# Patient Record
Sex: MALE | Race: WHITE | NOT HISPANIC OR LATINO | Employment: OTHER | URBAN - METROPOLITAN AREA
[De-identification: names, ages, dates, MRNs, and addresses within clinical notes are randomized per-mention and may not be internally consistent; named-entity substitution may affect disease eponyms.]

---

## 2020-06-03 ENCOUNTER — OFFICE VISIT (OUTPATIENT)
Dept: FAMILY MEDICINE CLINIC | Facility: CLINIC | Age: 80
End: 2020-06-03

## 2020-06-03 VITALS
HEIGHT: 69 IN | TEMPERATURE: 98 F | BODY MASS INDEX: 29.77 KG/M2 | SYSTOLIC BLOOD PRESSURE: 144 MMHG | HEART RATE: 60 BPM | OXYGEN SATURATION: 95 % | DIASTOLIC BLOOD PRESSURE: 66 MMHG | WEIGHT: 201 LBS | RESPIRATION RATE: 16 BRPM

## 2020-06-03 DIAGNOSIS — E03.9 ACQUIRED HYPOTHYROIDISM: ICD-10-CM

## 2020-06-03 DIAGNOSIS — E78.2 MIXED HYPERLIPIDEMIA: ICD-10-CM

## 2020-06-03 DIAGNOSIS — I10 ESSENTIAL HYPERTENSION: ICD-10-CM

## 2020-06-03 DIAGNOSIS — I63.9 CEREBROVASCULAR ACCIDENT (CVA), UNSPECIFIED MECHANISM (HCC): ICD-10-CM

## 2020-06-03 DIAGNOSIS — E11.9 TYPE 2 DIABETES MELLITUS WITHOUT COMPLICATION, WITHOUT LONG-TERM CURRENT USE OF INSULIN (HCC): Primary | ICD-10-CM

## 2020-06-03 DIAGNOSIS — K21.9 GASTROESOPHAGEAL REFLUX DISEASE, ESOPHAGITIS PRESENCE NOT SPECIFIED: ICD-10-CM

## 2020-06-03 PROBLEM — J30.1 SEASONAL ALLERGIC RHINITIS DUE TO POLLEN: Status: ACTIVE | Noted: 2020-06-03

## 2020-06-03 PROBLEM — R01.1 HEART MURMUR: Status: ACTIVE | Noted: 2020-06-03

## 2020-06-03 PROCEDURE — 99204 OFFICE O/P NEW MOD 45 MIN: CPT | Performed by: FAMILY MEDICINE

## 2020-06-03 RX ORDER — PANTOPRAZOLE SODIUM 40 MG/1
40 TABLET, DELAYED RELEASE ORAL DAILY
Qty: 90 TABLET | Refills: 1 | Status: SHIPPED | OUTPATIENT
Start: 2020-06-03 | End: 2020-11-11

## 2020-06-03 RX ORDER — AMLODIPINE BESYLATE 10 MG/1
10 TABLET ORAL DAILY
Qty: 90 TABLET | Refills: 1 | Status: SHIPPED | OUTPATIENT
Start: 2020-06-03 | End: 2020-10-28

## 2020-06-03 RX ORDER — AMLODIPINE BESYLATE 10 MG/1
10 TABLET ORAL DAILY
COMMUNITY
End: 2020-06-03 | Stop reason: SDUPTHER

## 2020-06-03 RX ORDER — PANTOPRAZOLE SODIUM 40 MG/1
40 TABLET, DELAYED RELEASE ORAL DAILY
COMMUNITY
End: 2020-06-03 | Stop reason: SDUPTHER

## 2020-06-03 RX ORDER — CLOPIDOGREL BISULFATE 75 MG/1
75 TABLET ORAL DAILY
COMMUNITY
End: 2020-06-03 | Stop reason: SDUPTHER

## 2020-06-03 RX ORDER — FLUTICASONE PROPIONATE 50 MCG
2 SPRAY, SUSPENSION (ML) NASAL DAILY PRN
COMMUNITY

## 2020-06-03 RX ORDER — LEVOTHYROXINE SODIUM 0.07 MG/1
75 TABLET ORAL DAILY
COMMUNITY
End: 2020-07-13 | Stop reason: SDUPTHER

## 2020-06-03 RX ORDER — EMPAGLIFLOZIN 10 MG/1
TABLET, FILM COATED ORAL
COMMUNITY
Start: 2020-05-19 | End: 2020-06-03 | Stop reason: SDUPTHER

## 2020-06-03 RX ORDER — ATORVASTATIN CALCIUM 40 MG/1
40 TABLET, FILM COATED ORAL NIGHTLY
COMMUNITY
End: 2020-09-17 | Stop reason: SDUPTHER

## 2020-06-03 RX ORDER — LOSARTAN POTASSIUM 50 MG/1
50 TABLET ORAL DAILY
COMMUNITY
End: 2020-07-13 | Stop reason: SDUPTHER

## 2020-06-03 RX ORDER — CLOPIDOGREL BISULFATE 75 MG/1
75 TABLET ORAL DAILY
Qty: 90 TABLET | Refills: 1 | Status: SHIPPED | OUTPATIENT
Start: 2020-06-03 | End: 2020-11-11

## 2020-06-03 RX ORDER — EMPAGLIFLOZIN 10 MG/1
10 TABLET, FILM COATED ORAL DAILY
Qty: 90 TABLET | Refills: 1 | Status: SHIPPED | OUTPATIENT
Start: 2020-06-03 | End: 2020-10-28

## 2020-06-03 NOTE — PROGRESS NOTES
Subjective   Chief Complaint   Patient presents with   • Establish Care   • Med Refill   • Diabetes   • GI Problem   • Heartburn   • Hyperlipidemia   • Hypertension   • Hypothyroidism     Alen Ratliff is a 79 y.o. male.     Patient Care Team:  Serenity Wren MD as PCP - General (Family Medicine)    He is coming in today to get established and he wants to review his medical problems and his medications.  He reports that him and his wife moved to this area about a month ago from [place] as their daughter lives in this area.  He has been treated for several medical problems and he is on several different medications.  We are addressing today his hypertension, hypothyroidism, hyperlipidemia, diabetes, acid reflux, and his prior stroke.  He reports that he had a stroke several years ago and at that time he was seen and evaluated by a neurologist.  He has been on Plavix since then.  He tells me that due to his stroke he has been followed by cardiologist and neurologist while living in [place] and he would like to get some refills today.  He was able to provide a copy of his blood work results from February of this year, which was done by his previous medical doctor.  He denies any polyuria polydipsia.  He reports to be up to speed with his colonoscopy as well. Patient denies any chest pain, shortness of breath, dizziness, nausea, vomiting, or diarrhea. No visual issues reported. No headaches, numbness or tingling. No urinary issues. Patient has good appetite and denies any weight changes. No swelling reported. No emotional issues.         The following portions of the patient's history were reviewed and updated as appropriate: allergies, current medications, past family history, past medical history, past social history, past surgical history and problem list.  Past Medical History:   Diagnosis Date   • Allergic rhinitis    • Diabetes (CMS/[facility])    • GERD (gastroesophageal reflux disease)    • Hyperlipidemia     • Hypertension    • Hypothyroidism    • Prostate cancer (CMS/HCC)    • Stroke (CMS/HCC)      Past Surgical History:   Procedure Laterality Date   • BACK SURGERY  1994   • PROSTATECTOMY  2001    due to cancer     The patient has a family history of  Family History   Problem Relation Age of Onset   • Hypertension Other      Social History     Socioeconomic History   • Marital status:      Spouse name: Not on file   • Number of children: Not on file   • Years of education: Not on file   • Highest education level: Not on file   Tobacco Use   • Smoking status: Never Smoker   • Smokeless tobacco: Never Used   Substance and Sexual Activity   • Alcohol use: Not Currently   • Drug use: Never   • Sexual activity: Defer       Review of Systems   Constitutional: Negative for activity change, appetite change, chills, fatigue, fever, unexpected weight gain and unexpected weight loss.   HENT: Negative for congestion, ear pain, hearing loss, nosebleeds, postnasal drip, swollen glands, tinnitus and trouble swallowing.    Eyes: Negative for blurred vision, double vision and visual disturbance.   Respiratory: Negative for cough, choking, chest tightness, shortness of breath, wheezing and stridor.    Cardiovascular: Negative for chest pain, palpitations and leg swelling.   Gastrointestinal: Negative for abdominal distention, abdominal pain, anal bleeding, constipation, diarrhea, nausea, vomiting and GERD.   Endocrine: Negative for cold intolerance, heat intolerance and polyphagia.   Genitourinary: Negative for dysuria, flank pain, frequency, hematuria and urgency.   Musculoskeletal: Negative for arthralgias, back pain, gait problem, joint swelling and neck pain.   Skin: Negative for color change, dry skin and skin lesions.   Allergic/Immunologic: Negative for environmental allergies and food allergies.   Neurological: Negative for dizziness, tremors, speech difficulty, light-headedness, numbness, headache and confusion.  "  Hematological: Negative for adenopathy. Does not bruise/bleed easily.   Psychiatric/Behavioral: Negative for decreased concentration, sleep disturbance, depressed mood and stress.     Visit Vitals  /66 (BP Location: Left arm, Patient Position: Sitting, Cuff Size: Large Adult)   Pulse 60   Temp 98 °F (36.7 °C) (Temporal)   Resp 16   Ht 174 cm (68.5\")   Wt 91.2 kg (201 lb)   SpO2 95%   BMI 30.12 kg/m²       Current Outpatient Medications:   •  amLODIPine (NORVASC) 10 MG tablet, Take 1 tablet by mouth Daily., Disp: 90 tablet, Rfl: 1  •  atorvastatin (LIPITOR) 40 MG tablet, Take 40 mg by mouth Every Night., Disp: , Rfl:   •  clopidogrel (PLAVIX) 75 MG tablet, Take 1 tablet by mouth Daily., Disp: 90 tablet, Rfl: 1  •  fluticasone (FLONASE) 50 MCG/ACT nasal spray, 2 sprays into the nostril(s) as directed by provider Daily., Disp: , Rfl:   •  glucose blood test strip, 1 each by Other route Daily. Use as instructed, Disp: , Rfl:   •  levothyroxine (SYNTHROID, LEVOTHROID) 75 MCG tablet, Take 75 mcg by mouth Daily., Disp: , Rfl:   •  losartan (COZAAR) 50 MG tablet, Take 50 mg by mouth Daily., Disp: , Rfl:   •  pantoprazole (PROTONIX) 40 MG EC tablet, Take 1 tablet by mouth Daily., Disp: 90 tablet, Rfl: 1  •  SITagliptin (JANUVIA) 50 MG tablet, Take 50 mg by mouth Daily., Disp: , Rfl:   •  JARDIANCE 10 MG tablet, Take 10 mg by mouth Daily., Disp: 90 tablet, Rfl: 1    Objective   Physical Exam   Constitutional: He is oriented to person, place, and time. He appears well-developed and well-nourished. No distress.   HENT:   Head: Normocephalic and atraumatic.   Right Ear: External ear normal.   Left Ear: External ear normal.   Mouth/Throat: Oropharynx is clear and moist.   Eyes: Pupils are equal, round, and reactive to light. Conjunctivae and EOM are normal. Right eye exhibits no discharge. Left eye exhibits no discharge. No scleral icterus.   Neck: Normal range of motion. Neck supple. No JVD present. No thyromegaly " present.   Cardiovascular: Normal rate, regular rhythm and intact distal pulses. Exam reveals no gallop and no friction rub.   Murmur heard.  Pulmonary/Chest: Effort normal and breath sounds normal. No respiratory distress. He has no wheezes. He has no rales.   Abdominal: Soft. Bowel sounds are normal. He exhibits no distension and no mass. There is no tenderness. There is no rebound and no guarding. No hernia.   Musculoskeletal: Normal range of motion. He exhibits no edema, tenderness or deformity.   Lymphadenopathy:     He has no cervical adenopathy.   Neurological: He is alert and oriented to person, place, and time. He displays normal reflexes. No cranial nerve deficit or sensory deficit.   Skin: Skin is warm and dry. Capillary refill takes less than 2 seconds. No rash noted. He is not diaphoretic. No erythema. No pallor.   Psychiatric: He has a normal mood and affect. His behavior is normal. Judgment normal.   Nursing note and vitals reviewed.            Advance Care Planning   ACP discussion was held with the patient during this visit. Patient has an advance directive in EMR which is still valid.       Assessment/Plan   Problems Addressed this Visit        Cardiovascular and Mediastinum    Stroke (cerebrum) (CMS/HCC)    Relevant Orders    Ambulatory Referral to Neurology    CBC Auto Differential    Essential hypertension    Relevant Medications    losartan (COZAAR) 50 MG tablet    amLODIPine (NORVASC) 10 MG tablet    Mixed hyperlipidemia    Relevant Medications    atorvastatin (LIPITOR) 40 MG tablet    Other Relevant Orders    Comprehensive Metabolic Panel    Lipid Panel       Digestive    GERD (gastroesophageal reflux disease)    Relevant Medications    pantoprazole (PROTONIX) 40 MG EC tablet       Endocrine    Acquired hypothyroidism    Relevant Medications    levothyroxine (SYNTHROID, LEVOTHROID) 75 MCG tablet    Other Relevant Orders    TSH    Type 2 diabetes mellitus, without long-term current use of  insulin (CMS/[facility]) - Primary    Relevant Medications    SITagliptin (JANUVIA) 50 MG tablet    JARDIANCE 10 MG tablet    Other Relevant Orders    Hemoglobin A1c    Microalbumin / Creatinine Urine Ratio - Urine, Clean Catch        Patient was evaluated in person in the office today.  I reviewed his medical problems and his medications.  I also was able to review some of his blood work results from February of this year, which he provided today.  His hemoglobin A1c then was 7.4, he tells me that his hemoglobin A1c was 8.1 in October 2019.  At this point I advised for him to continue his current medicines and refills were given.  I will be referring him to see the neurologist as well as he request this referral, however I am not sure what the neurologist will at to his care at this point besides surveillance.  He also signed a release form for us to request his old medical records.  Healthy lifestyle was discussed and reinforced.  I advised for him to schedule follow-up appointment with me in 3 months with fasting blood work prior to his appointment.  All questions were answered today to my best knowledge and his satisfaction.             Requested Prescriptions     Signed Prescriptions Disp Refills   • amLODIPine (NORVASC) 10 MG tablet 90 tablet 1     Sig: Take 1 tablet by mouth Daily.   • pantoprazole (PROTONIX) 40 MG EC tablet 90 tablet 1     Sig: Take 1 tablet by mouth Daily.   • clopidogrel (PLAVIX) 75 MG tablet 90 tablet 1     Sig: Take 1 tablet by mouth Daily.   • JARDIANCE 10 MG tablet 90 tablet 1     Sig: Take 10 mg by mouth Daily.

## 2020-07-13 RX ORDER — LOSARTAN POTASSIUM 50 MG/1
50 TABLET ORAL DAILY
Qty: 90 TABLET | Refills: 0 | Status: SHIPPED | OUTPATIENT
Start: 2020-07-13 | End: 2020-07-21 | Stop reason: SDUPTHER

## 2020-07-13 RX ORDER — LEVOTHYROXINE SODIUM 0.07 MG/1
75 TABLET ORAL DAILY
Qty: 90 TABLET | Refills: 0 | Status: SHIPPED | OUTPATIENT
Start: 2020-07-13 | End: 2020-07-21 | Stop reason: SDUPTHER

## 2020-07-21 ENCOUNTER — TELEPHONE (OUTPATIENT)
Dept: FAMILY MEDICINE CLINIC | Facility: CLINIC | Age: 80
End: 2020-07-21

## 2020-07-21 RX ORDER — LEVOTHYROXINE SODIUM 0.07 MG/1
75 TABLET ORAL DAILY
Qty: 90 TABLET | Refills: 1 | Status: SHIPPED | OUTPATIENT
Start: 2020-07-21 | End: 2020-11-30

## 2020-07-21 RX ORDER — LOSARTAN POTASSIUM 50 MG/1
50 TABLET ORAL DAILY
Qty: 90 TABLET | Refills: 1 | Status: SHIPPED | OUTPATIENT
Start: 2020-07-21 | End: 2020-11-30

## 2020-07-21 NOTE — TELEPHONE ENCOUNTER
Patients wife called and patients 2 medications (Levothyroxine and Losartan) were suppose to be sent to [facility] , they only use [facility] when is something to be taken for a month.

## 2020-08-13 ENCOUNTER — TELEPHONE (OUTPATIENT)
Dept: FAMILY MEDICINE CLINIC | Facility: CLINIC | Age: 80
End: 2020-08-13

## 2020-08-27 ENCOUNTER — LAB (OUTPATIENT)
Dept: LAB | Facility: HOSPITAL | Age: 80
End: 2020-08-27

## 2020-08-27 LAB
ALBUMIN SERPL-MCNC: 4.3 G/DL (ref 3.5–5.2)
ALBUMIN UR-MCNC: 5.5 MG/DL
ALBUMIN/GLOB SERPL: 1.5 G/DL
ALP SERPL-CCNC: 64 U/L (ref 39–117)
ALT SERPL W P-5'-P-CCNC: 19 U/L (ref 1–41)
ANION GAP SERPL CALCULATED.3IONS-SCNC: 12.7 MMOL/L (ref 5–15)
AST SERPL-CCNC: 19 U/L (ref 1–40)
BASOPHILS # BLD AUTO: 0.06 10*3/MM3 (ref 0–0.2)
BASOPHILS NFR BLD AUTO: 0.9 % (ref 0–1.5)
BILIRUB SERPL-MCNC: 0.4 MG/DL (ref 0–1.2)
BUN SERPL-MCNC: 30 MG/DL (ref 8–23)
BUN/CREAT SERPL: 21.3 (ref 7–25)
CALCIUM SPEC-SCNC: 9.5 MG/DL (ref 8.6–10.5)
CHLORIDE SERPL-SCNC: 103 MMOL/L (ref 98–107)
CHOLEST SERPL-MCNC: 131 MG/DL (ref 0–200)
CO2 SERPL-SCNC: 23.3 MMOL/L (ref 22–29)
CREAT SERPL-MCNC: 1.41 MG/DL (ref 0.76–1.27)
CREAT UR-MCNC: 66.2 MG/DL
DEPRECATED RDW RBC AUTO: 46 FL (ref 37–54)
EOSINOPHIL # BLD AUTO: 0.09 10*3/MM3 (ref 0–0.4)
EOSINOPHIL NFR BLD AUTO: 1.4 % (ref 0.3–6.2)
ERYTHROCYTE [DISTWIDTH] IN BLOOD BY AUTOMATED COUNT: 13.5 % (ref 12.3–15.4)
GFR SERPL CREATININE-BSD FRML MDRD: 48 ML/MIN/1.73
GLOBULIN UR ELPH-MCNC: 2.8 GM/DL
GLUCOSE SERPL-MCNC: 139 MG/DL (ref 65–99)
HBA1C MFR BLD: 7.1 % (ref 3.5–5.6)
HCT VFR BLD AUTO: 44.7 % (ref 37.5–51)
HDLC SERPL-MCNC: 37 MG/DL (ref 40–60)
HGB BLD-MCNC: 14.7 G/DL (ref 13–17.7)
IMM GRANULOCYTES # BLD AUTO: 0.02 10*3/MM3 (ref 0–0.05)
IMM GRANULOCYTES NFR BLD AUTO: 0.3 % (ref 0–0.5)
LDLC SERPL CALC-MCNC: 76 MG/DL (ref 0–100)
LDLC/HDLC SERPL: 2.05 {RATIO}
LYMPHOCYTES # BLD AUTO: 1.8 10*3/MM3 (ref 0.7–3.1)
LYMPHOCYTES NFR BLD AUTO: 27.8 % (ref 19.6–45.3)
MCH RBC QN AUTO: 30.4 PG (ref 26.6–33)
MCHC RBC AUTO-ENTMCNC: 32.9 G/DL (ref 31.5–35.7)
MCV RBC AUTO: 92.4 FL (ref 79–97)
MICROALBUMIN/CREAT UR: 83.1 MG/G
MONOCYTES # BLD AUTO: 0.73 10*3/MM3 (ref 0.1–0.9)
MONOCYTES NFR BLD AUTO: 11.3 % (ref 5–12)
NEUTROPHILS NFR BLD AUTO: 3.78 10*3/MM3 (ref 1.7–7)
NEUTROPHILS NFR BLD AUTO: 58.3 % (ref 42.7–76)
NRBC BLD AUTO-RTO: 0 /100 WBC (ref 0–0.2)
PLATELET # BLD AUTO: 247 10*3/MM3 (ref 140–450)
PMV BLD AUTO: 10.8 FL (ref 6–12)
POTASSIUM SERPL-SCNC: 4.6 MMOL/L (ref 3.5–5.2)
PROT SERPL-MCNC: 7.1 G/DL (ref 6–8.5)
RBC # BLD AUTO: 4.84 10*6/MM3 (ref 4.14–5.8)
SODIUM SERPL-SCNC: 139 MMOL/L (ref 136–145)
TRIGL SERPL-MCNC: 91 MG/DL (ref 0–150)
TSH SERPL DL<=0.05 MIU/L-ACNC: 3.04 UIU/ML (ref 0.27–4.2)
VLDLC SERPL-MCNC: 18.2 MG/DL (ref 5–40)
WBC # BLD AUTO: 6.48 10*3/MM3 (ref 3.4–10.8)

## 2020-08-27 PROCEDURE — 36415 COLL VENOUS BLD VENIPUNCTURE: CPT | Performed by: FAMILY MEDICINE

## 2020-08-27 PROCEDURE — 82043 UR ALBUMIN QUANTITATIVE: CPT | Performed by: FAMILY MEDICINE

## 2020-08-27 PROCEDURE — 84443 ASSAY THYROID STIM HORMONE: CPT | Performed by: FAMILY MEDICINE

## 2020-08-27 PROCEDURE — 85025 COMPLETE CBC W/AUTO DIFF WBC: CPT | Performed by: FAMILY MEDICINE

## 2020-08-27 PROCEDURE — 82570 ASSAY OF URINE CREATININE: CPT | Performed by: FAMILY MEDICINE

## 2020-08-27 PROCEDURE — 83036 HEMOGLOBIN GLYCOSYLATED A1C: CPT | Performed by: FAMILY MEDICINE

## 2020-08-27 PROCEDURE — 80053 COMPREHEN METABOLIC PANEL: CPT | Performed by: FAMILY MEDICINE

## 2020-08-27 PROCEDURE — 80061 LIPID PANEL: CPT | Performed by: FAMILY MEDICINE

## 2020-09-03 ENCOUNTER — OFFICE VISIT (OUTPATIENT)
Dept: FAMILY MEDICINE CLINIC | Facility: CLINIC | Age: 80
End: 2020-09-03

## 2020-09-03 ENCOUNTER — LAB (OUTPATIENT)
Dept: FAMILY MEDICINE CLINIC | Facility: CLINIC | Age: 80
End: 2020-09-03

## 2020-09-03 VITALS
HEART RATE: 60 BPM | WEIGHT: 207 LBS | RESPIRATION RATE: 16 BRPM | OXYGEN SATURATION: 94 % | TEMPERATURE: 97.8 F | BODY MASS INDEX: 30.66 KG/M2 | DIASTOLIC BLOOD PRESSURE: 73 MMHG | SYSTOLIC BLOOD PRESSURE: 135 MMHG | HEIGHT: 69 IN

## 2020-09-03 DIAGNOSIS — R01.1 HEART MURMUR: ICD-10-CM

## 2020-09-03 DIAGNOSIS — Z85.46 HISTORY OF PROSTATE CANCER: ICD-10-CM

## 2020-09-03 DIAGNOSIS — N18.30 CKD (CHRONIC KIDNEY DISEASE) STAGE 3, GFR 30-59 ML/MIN (HCC): ICD-10-CM

## 2020-09-03 DIAGNOSIS — E03.9 ACQUIRED HYPOTHYROIDISM: ICD-10-CM

## 2020-09-03 DIAGNOSIS — E11.9 TYPE 2 DIABETES MELLITUS WITHOUT COMPLICATION, WITHOUT LONG-TERM CURRENT USE OF INSULIN (HCC): Primary | ICD-10-CM

## 2020-09-03 DIAGNOSIS — E78.2 MIXED HYPERLIPIDEMIA: ICD-10-CM

## 2020-09-03 DIAGNOSIS — I10 ESSENTIAL HYPERTENSION: ICD-10-CM

## 2020-09-03 LAB — PSA SERPL-MCNC: 0.07 NG/ML (ref 0–4)

## 2020-09-03 PROCEDURE — 99214 OFFICE O/P EST MOD 30 MIN: CPT | Performed by: FAMILY MEDICINE

## 2020-09-03 PROCEDURE — 36415 COLL VENOUS BLD VENIPUNCTURE: CPT

## 2020-09-03 PROCEDURE — 84153 ASSAY OF PSA TOTAL: CPT | Performed by: FAMILY MEDICINE

## 2020-09-03 NOTE — PROGRESS NOTES
Subjective   Chief Complaint   Patient presents with   • Follow-up     3 month   • Med Refill   • Discussed labs   • Diabetes   • Hyperlipidemia   • Hypertension     Alen Ratliff is a 80 y.o. male.     Patient Care Team:  Serenity Wren MD as PCP - General (Family Medicine)  William Patricio MD as Consulting Physician (Neurology)    He is coming in today to follow-up on his medical problems and his medications and he also wants to discuss his recent labs.  We are addressing his diabetes, hypertension, hyperlipidemia, hypothyroidism, heart issues, and history of prostate cancer.  He takes his medicines as directed and denies any issues or concerns.  He and his wife recently moved here from [place] and he already got established with a neurologist due to his prior stroke and he would like to get a referral to see a local cardiologist as he was followed by cardiologist in [place] and was planned to get some testing, however this did not happen due to him moving. Patient denies any chest pain, shortness of breath, dizziness, nausea, vomiting, or diarrhea. No visual issues reported. No headaches, numbness or tingling. No urinary issues. Patient has good appetite and denies any weight changes. No swelling reported. No emotional issues.         The following portions of the patient's history were reviewed and updated as appropriate: allergies, current medications, past family history, past medical history, past social history, past surgical history and problem list.  Past Medical History:   Diagnosis Date   • Allergic rhinitis    • Diabetes (CMS/HCC)    • GERD (gastroesophageal reflux disease)    • Hyperlipidemia    • Hypertension    • Hypothyroidism    • Prostate cancer (CMS/HCC)    • Stroke (CMS/HCC)      Past Surgical History:   Procedure Laterality Date   • BACK SURGERY  1994   • COLONOSCOPY  08/25/2015   • PROSTATECTOMY  2001    due to cancer     The patient has a family history of  Family History   Problem  "Relation Age of Onset   • Hypertension Other      Social History     Socioeconomic History   • Marital status:      Spouse name: Not on file   • Number of children: Not on file   • Years of education: Not on file   • Highest education level: Not on file   Tobacco Use   • Smoking status: Never Smoker   • Smokeless tobacco: Never Used   Substance and Sexual Activity   • Alcohol use: Not Currently   • Drug use: Never   • Sexual activity: Defer       Review of Systems   Constitutional: Negative for activity change, fatigue and fever.   Respiratory: Negative for cough, shortness of breath and wheezing.    Cardiovascular: Negative for chest pain, palpitations and leg swelling.   Gastrointestinal: Negative for constipation, diarrhea and indigestion.   Endocrine: Negative for cold intolerance, heat intolerance, polydipsia and polyphagia.   Skin: Negative for color change, dry skin and rash.   Neurological: Negative for tremors and headache.     Visit Vitals  /73 (BP Location: Left arm, Patient Position: Sitting, Cuff Size: Large Adult)   Pulse 60   Temp 97.8 °F (36.6 °C) (Temporal)   Resp 16   Ht 174 cm (68.5\")   Wt 93.9 kg (207 lb)   SpO2 94%   BMI 31.02 kg/m²       Current Outpatient Medications:   •  amLODIPine (NORVASC) 10 MG tablet, Take 1 tablet by mouth Daily., Disp: 90 tablet, Rfl: 1  •  atorvastatin (LIPITOR) 40 MG tablet, Take 40 mg by mouth Every Night., Disp: , Rfl:   •  clopidogrel (PLAVIX) 75 MG tablet, Take 1 tablet by mouth Daily., Disp: 90 tablet, Rfl: 1  •  fluticasone (FLONASE) 50 MCG/ACT nasal spray, 2 sprays into the nostril(s) as directed by provider Daily., Disp: , Rfl:   •  glucose blood test strip, 1 each by Other route Daily. Use as instructed, Disp: , Rfl:   •  JARDIANCE 10 MG tablet, Take 10 mg by mouth Daily., Disp: 90 tablet, Rfl: 1  •  levothyroxine (SYNTHROID, LEVOTHROID) 75 MCG tablet, Take 1 tablet by mouth Daily., Disp: 90 tablet, Rfl: 1  •  losartan (COZAAR) 50 MG tablet, " Take 1 tablet by mouth Daily., Disp: 90 tablet, Rfl: 1  •  pantoprazole (PROTONIX) 40 MG EC tablet, Take 1 tablet by mouth Daily., Disp: 90 tablet, Rfl: 1  •  SITagliptin (JANUVIA) 50 MG tablet, Take 1 tablet by mouth Daily., Disp: 30 tablet, Rfl: 3    Objective   Physical Exam   Constitutional: He is oriented to person, place, and time. He appears well-developed and well-nourished.   HENT:   Head: Normocephalic and atraumatic.   Eyes: Pupils are equal, round, and reactive to light. Conjunctivae and EOM are normal.   Neck: Normal range of motion. Neck supple.   Cardiovascular: Normal rate, regular rhythm and intact distal pulses. Exam reveals no gallop.   Murmur heard.  Pulmonary/Chest: Effort normal and breath sounds normal. No respiratory distress. He has no rales. He exhibits no tenderness.   Musculoskeletal: Normal range of motion. He exhibits no edema or deformity.   Neurological: He is alert and oriented to person, place, and time.   Skin: Skin is warm and dry.   Nursing note and vitals reviewed.           No visits with results within 7 Day(s) from this visit.   Latest known visit with results is:   Office Visit on 06/03/2020   Component Date Value Ref Range Status   • WBC 08/27/2020 6.48  3.40 - 10.80 10*3/mm3 Final   • RBC 08/27/2020 4.84  4.14 - 5.80 10*6/mm3 Final   • Hemoglobin 08/27/2020 14.7  13.0 - 17.7 g/dL Final   • Hematocrit 08/27/2020 44.7  37.5 - 51.0 % Final   • MCV 08/27/2020 92.4  79.0 - 97.0 fL Final   • MCH 08/27/2020 30.4  26.6 - 33.0 pg Final   • MCHC 08/27/2020 32.9  31.5 - 35.7 g/dL Final   • RDW 08/27/2020 13.5  12.3 - 15.4 % Final   • RDW-SD 08/27/2020 46.0  37.0 - 54.0 fl Final   • MPV 08/27/2020 10.8  6.0 - 12.0 fL Final   • Platelets 08/27/2020 247  140 - 450 10*3/mm3 Final   • Neutrophil % 08/27/2020 58.3  42.7 - 76.0 % Final   • Lymphocyte % 08/27/2020 27.8  19.6 - 45.3 % Final   • Monocyte % 08/27/2020 11.3  5.0 - 12.0 % Final   • Eosinophil % 08/27/2020 1.4  0.3 - 6.2 % Final    • Basophil % 08/27/2020 0.9  0.0 - 1.5 % Final   • Immature Grans % 08/27/2020 0.3  0.0 - 0.5 % Final   • Neutrophils, Absolute 08/27/2020 3.78  1.70 - 7.00 10*3/mm3 Final   • Lymphocytes, Absolute 08/27/2020 1.80  0.70 - 3.10 10*3/mm3 Final   • Monocytes, Absolute 08/27/2020 0.73  0.10 - 0.90 10*3/mm3 Final   • Eosinophils, Absolute 08/27/2020 0.09  0.00 - 0.40 10*3/mm3 Final   • Basophils, Absolute 08/27/2020 0.06  0.00 - 0.20 10*3/mm3 Final   • Immature Grans, Absolute 08/27/2020 0.02  0.00 - 0.05 10*3/mm3 Final   • nRBC 08/27/2020 0.0  0.0 - 0.2 /100 WBC Final   • Glucose 08/27/2020 139* 65 - 99 mg/dL Final   • BUN 08/27/2020 30* 8 - 23 mg/dL Final   • Creatinine 08/27/2020 1.41* 0.76 - 1.27 mg/dL Final   • Sodium 08/27/2020 139  136 - 145 mmol/L Final   • Potassium 08/27/2020 4.6  3.5 - 5.2 mmol/L Final    Specimen hemolyzed.  Results may be affected.   • Chloride 08/27/2020 103  98 - 107 mmol/L Final   • CO2 08/27/2020 23.3  22.0 - 29.0 mmol/L Final   • Calcium 08/27/2020 9.5  8.6 - 10.5 mg/dL Final   • Total Protein 08/27/2020 7.1  6.0 - 8.5 g/dL Final   • Albumin 08/27/2020 4.30  3.50 - 5.20 g/dL Final   • ALT (SGPT) 08/27/2020 19  1 - 41 U/L Final   • AST (SGOT) 08/27/2020 19  1 - 40 U/L Final   • Alkaline Phosphatase 08/27/2020 64  39 - 117 U/L Final   • Total Bilirubin 08/27/2020 0.4  0.0 - 1.2 mg/dL Final   • eGFR Non African Amer 08/27/2020 48* >60 mL/min/1.73 Final   • Globulin 08/27/2020 2.8  gm/dL Final   • A/G Ratio 08/27/2020 1.5  g/dL Final   • BUN/Creatinine Ratio 08/27/2020 21.3  7.0 - 25.0 Final   • Anion Gap 08/27/2020 12.7  5.0 - 15.0 mmol/L Final   • Hemoglobin A1C 08/27/2020 7.1* 3.5 - 5.6 % Final   • Total Cholesterol 08/27/2020 131  0 - 200 mg/dL Final   • Triglycerides 08/27/2020 91  0 - 150 mg/dL Final   • HDL Cholesterol 08/27/2020 37* 40 - 60 mg/dL Final   • LDL Cholesterol  08/27/2020 76  0 - 100 mg/dL Final   • VLDL Cholesterol 08/27/2020 18.2  5 - 40 mg/dL Final   • LDL/HDL Ratio  08/27/2020 2.05   Final   • Microalbumin/Creatinine Ratio 08/27/2020 83.1  mg/g Final   • Creatinine, Urine 08/27/2020 66.2  mg/dL Final   • Microalbumin, Urine 08/27/2020 5.5  mg/dL Final   • TSH 08/27/2020 3.040  0.270 - 4.200 uIU/mL Final         Lab Results   Component Value Date    BUN 30 (H) 08/27/2020    CREATININE 1.41 (H) 08/27/2020    EGFRIFNONA 48 (L) 08/27/2020     08/27/2020    K 4.6 08/27/2020     08/27/2020    CALCIUM 9.5 08/27/2020    ALBUMIN 4.30 08/27/2020    BILITOT 0.4 08/27/2020    ALKPHOS 64 08/27/2020    AST 19 08/27/2020    ALT 19 08/27/2020    TRIG 91 08/27/2020    HDL 37 (L) 08/27/2020    VLDL 18.2 08/27/2020    LDL 76 08/27/2020    LDLHDL 2.05 08/27/2020    MICROALBUR 5.5 08/27/2020    WBC 6.48 08/27/2020    RBC 4.84 08/27/2020    HCT 44.7 08/27/2020    MCV 92.4 08/27/2020    MCH 30.4 08/27/2020    TSH 3.040 08/27/2020          Assessment/Plan   Problems Addressed this Visit        Cardiovascular and Mediastinum    Heart murmur    Relevant Orders    Ambulatory Referral to Cardiology    Essential hypertension    Mixed hyperlipidemia       Endocrine    Acquired hypothyroidism    Type 2 diabetes mellitus, without long-term current use of insulin (CMS/[facility]) - Primary    Relevant Medications    SITagliptin (JANUVIA) 50 MG tablet       Genitourinary    CKD (chronic kidney disease) stage 3, GFR 30-59 ml/min (CMS/[facility])       Other    History of prostate cancer    Relevant Orders    PSA DIAGNOSTIC        I reviewed his medical problems and his medications.  His chronic medical problems are stable and pretty well controlled.  He will continue all current medicines.  I will be also rechecking his PSA due to history of prostate cancer several years ago.  I am putting a referral for him to see the cardiologist due to heart murmur and prior history of stroke.  I also reviewed his immunization and he already had both shingles shots and per his wife report even both pneumonia shots.  He will be  getting flu shot later this month.  Healthy lifestyle was discussed and reinforced.             Requested Prescriptions     Signed Prescriptions Disp Refills   • SITagliptin (JANUVIA) 50 MG tablet 30 tablet 3     Sig: Take 1 tablet by mouth Daily.

## 2020-09-16 ENCOUNTER — OFFICE VISIT (OUTPATIENT)
Dept: CARDIOLOGY | Facility: CLINIC | Age: 80
End: 2020-09-16

## 2020-09-16 VITALS
BODY MASS INDEX: 30.51 KG/M2 | WEIGHT: 206 LBS | HEART RATE: 53 BPM | HEIGHT: 69 IN | DIASTOLIC BLOOD PRESSURE: 64 MMHG | SYSTOLIC BLOOD PRESSURE: 120 MMHG

## 2020-09-16 DIAGNOSIS — E78.2 MIXED HYPERLIPIDEMIA: ICD-10-CM

## 2020-09-16 DIAGNOSIS — R01.1 HEART MURMUR: Primary | ICD-10-CM

## 2020-09-16 DIAGNOSIS — I63.9 CEREBROVASCULAR ACCIDENT (CVA), UNSPECIFIED MECHANISM (HCC): ICD-10-CM

## 2020-09-16 DIAGNOSIS — I10 ESSENTIAL HYPERTENSION: ICD-10-CM

## 2020-09-16 DIAGNOSIS — R07.9 CHEST PAIN, UNSPECIFIED TYPE: ICD-10-CM

## 2020-09-16 PROCEDURE — 99204 OFFICE O/P NEW MOD 45 MIN: CPT | Performed by: INTERNAL MEDICINE

## 2020-09-16 PROCEDURE — 93000 ELECTROCARDIOGRAM COMPLETE: CPT | Performed by: INTERNAL MEDICINE

## 2020-09-17 RX ORDER — ATORVASTATIN CALCIUM 40 MG/1
40 TABLET, FILM COATED ORAL NIGHTLY
Qty: 90 TABLET | Refills: 1 | Status: SHIPPED | OUTPATIENT
Start: 2020-09-17 | End: 2020-12-18

## 2020-09-24 ENCOUNTER — HOSPITAL ENCOUNTER (OUTPATIENT)
Dept: NUCLEAR MEDICINE | Facility: HOSPITAL | Age: 80
Discharge: HOME | End: 2020-09-24

## 2020-09-24 ENCOUNTER — OFFICE VISIT (OUTPATIENT)
Dept: CARDIOLOGY | Facility: CLINIC | Age: 80
End: 2020-09-24

## 2020-09-24 ENCOUNTER — HOSPITAL ENCOUNTER (OUTPATIENT)
Dept: CARDIOLOGY | Facility: HOSPITAL | Age: 80
Discharge: HOME | End: 2020-09-24

## 2020-09-24 VITALS
SYSTOLIC BLOOD PRESSURE: 148 MMHG | DIASTOLIC BLOOD PRESSURE: 72 MMHG | WEIGHT: 206 LBS | HEIGHT: 68 IN | BODY MASS INDEX: 31.22 KG/M2

## 2020-09-24 VITALS
DIASTOLIC BLOOD PRESSURE: 64 MMHG | SYSTOLIC BLOOD PRESSURE: 122 MMHG | WEIGHT: 207 LBS | HEIGHT: 68 IN | BODY MASS INDEX: 31.37 KG/M2 | HEART RATE: 64 BPM

## 2020-09-24 DIAGNOSIS — E78.2 MIXED HYPERLIPIDEMIA: ICD-10-CM

## 2020-09-24 DIAGNOSIS — R07.9 CHEST PAIN, UNSPECIFIED TYPE: ICD-10-CM

## 2020-09-24 DIAGNOSIS — R01.1 HEART MURMUR: Primary | ICD-10-CM

## 2020-09-24 DIAGNOSIS — I63.9 CEREBROVASCULAR ACCIDENT (CVA), UNSPECIFIED MECHANISM (HCC): ICD-10-CM

## 2020-09-24 DIAGNOSIS — I10 ESSENTIAL HYPERTENSION: ICD-10-CM

## 2020-09-24 DIAGNOSIS — R01.1 HEART MURMUR: ICD-10-CM

## 2020-09-24 LAB
BH CV STRESS BP STAGE 1: NORMAL
BH CV STRESS BP STAGE 2: NORMAL
BH CV STRESS BP STAGE 3: NORMAL
BH CV STRESS BP STAGE 4: NORMAL
BH CV STRESS COMMENTS STAGE 1: NORMAL
BH CV STRESS COMMENTS STAGE 2: NORMAL
BH CV STRESS DOSE REGADENOSON STAGE 1: 0.4
BH CV STRESS DURATION MIN STAGE 1: 0
BH CV STRESS DURATION MIN STAGE 2: 4
BH CV STRESS DURATION SEC STAGE 1: 10
BH CV STRESS DURATION SEC STAGE 2: 0
BH CV STRESS HR STAGE 1: 66
BH CV STRESS HR STAGE 2: 77
BH CV STRESS HR STAGE 3: 74
BH CV STRESS HR STAGE 4: 71
BH CV STRESS PROTOCOL 1: NORMAL
BH CV STRESS RECOVERY BP: NORMAL MMHG
BH CV STRESS RECOVERY HR: 62 BPM
BH CV STRESS STAGE 1: 1
BH CV STRESS STAGE 2: 2
BH CV STRESS STAGE 3: 3
BH CV STRESS STAGE 4: 4
LV EF NUC BP: 64 %
MAXIMAL PREDICTED HEART RATE: 139 BPM
PERCENT MAX PREDICTED HR: 55.4 %
STRESS BASELINE BP: NORMAL MMHG
STRESS BASELINE HR: 55 BPM
STRESS PERCENT HR: 65 %
STRESS POST PEAK BP: NORMAL MMHG
STRESS POST PEAK HR: 77 BPM
STRESS TARGET HR: 118 BPM

## 2020-09-24 PROCEDURE — 93018 CV STRESS TEST I&R ONLY: CPT | Performed by: NURSE PRACTITIONER

## 2020-09-24 PROCEDURE — 0 TECHNETIUM SESTAMIBI: Performed by: INTERNAL MEDICINE

## 2020-09-24 PROCEDURE — 93306 TTE W/DOPPLER COMPLETE: CPT | Performed by: INTERNAL MEDICINE

## 2020-09-24 PROCEDURE — 99213 OFFICE O/P EST LOW 20 MIN: CPT | Performed by: INTERNAL MEDICINE

## 2020-09-24 PROCEDURE — A9500 TC99M SESTAMIBI: HCPCS | Performed by: INTERNAL MEDICINE

## 2020-09-24 PROCEDURE — 78452 HT MUSCLE IMAGE SPECT MULT: CPT | Performed by: INTERNAL MEDICINE

## 2020-09-24 PROCEDURE — 25010000002 REGADENOSON 0.4 MG/5ML SOLUTION: Performed by: INTERNAL MEDICINE

## 2020-09-24 PROCEDURE — 93017 CV STRESS TEST TRACING ONLY: CPT

## 2020-09-24 PROCEDURE — 93306 TTE W/DOPPLER COMPLETE: CPT

## 2020-09-24 PROCEDURE — 78452 HT MUSCLE IMAGE SPECT MULT: CPT

## 2020-09-24 RX ADMIN — TECHNETIUM TC 99M SESTAMIBI 1 DOSE: 1 INJECTION INTRAVENOUS at 09:35

## 2020-09-24 RX ADMIN — REGADENOSON 0.4 MG: 0.08 INJECTION, SOLUTION INTRAVENOUS at 09:35

## 2020-09-24 RX ADMIN — TECHNETIUM TC 99M SESTAMIBI 1 DOSE: 1 INJECTION INTRAVENOUS at 08:30

## 2020-09-24 NOTE — PROGRESS NOTES
Date of Office Visit: 2020  Encounter Provider: Dr. Steve Muhammad  Place of Service: [facility] CARDIOLOGY [place]  Patient Name: Alen Ratliff  :1940  Serenity Wren MD    Chief Complaint   Patient presents with   • Heart Murmur     2 week follow up stress test   • Stroke   • Hypertension   • Hyperlipidemia   • Shortness of Breath     History of Present Illness    I am pleased to see Mr. Rao in my office today as a follow-up.    As you know, patient is 80-year-old white gentleman whose past medical history is significant for hypertension, hyperlipidemia, diabetes mellitus, history of cardiac murmur, who came today for follow-up.      In 2020, patient was seen in my office for symptom of shortness of breath and relative bradycardia.  Patient also had symptom of occasional chest pain.  Patient underwent stress test which showed moderate inferior/septal fixed defect with small amount of joseph-infarct ischemia.  Echocardiogram showed normal left ventricular size and function and LVEF was 55 to 60%.  Mild LVH was noted.  Calcified aortic valve was noted with mild to moderate aortic valve stenosis with aortic valve area of 1.3 cm².  Holter monitor is pending    Since the previous visit, patient overall is stable.  Patient has baseline shortness of breath.  Chest pain is limited and contained.  Patient denies any orthopnea, PND, syncope or presyncope.  No dizziness and lightheadedness.  No leg edema noted.    Patient had abnormal stress test but he is relatively asymptomatic.  Patient is noted to have mild to moderate aortic stenosis which is because of his cardiac murmur.  At this stage after discussion with the patient and his wife we agreed to observe the symptoms and patient.  If he develops more significant symptoms related to angina pectoris, I would consider cardiac catheterization.  Patient would need a repeat echocardiogram in 1 to 2 years.  We will follow-up on Holter  monitor.        Past Medical History:   Diagnosis Date   • Allergic rhinitis    • Diabetes (CMS/HCC)    • GERD (gastroesophageal reflux disease)    • Heart murmur    • Hyperlipidemia    • Hypertension    • Hypothyroidism    • Prostate cancer (CMS/HCC)    • Stroke (CMS/HCC)          Past Surgical History:   Procedure Laterality Date   • BACK SURGERY  1994   • COLONOSCOPY  08/25/2015   • PROSTATECTOMY  2001    due to cancer           Current Outpatient Medications:   •  amLODIPine (NORVASC) 10 MG tablet, Take 1 tablet by mouth Daily., Disp: 90 tablet, Rfl: 1  •  atorvastatin (LIPITOR) 40 MG tablet, Take 1 tablet by mouth Every Night., Disp: 90 tablet, Rfl: 1  •  clopidogrel (PLAVIX) 75 MG tablet, Take 1 tablet by mouth Daily., Disp: 90 tablet, Rfl: 1  •  fluticasone (FLONASE) 50 MCG/ACT nasal spray, 2 sprays into the nostril(s) as directed by provider Daily., Disp: , Rfl:   •  glucose blood test strip, 1 each by Other route Daily. Use as instructed, Disp: , Rfl:   •  JARDIANCE 10 MG tablet, Take 10 mg by mouth Daily., Disp: 90 tablet, Rfl: 1  •  levothyroxine (SYNTHROID, LEVOTHROID) 75 MCG tablet, Take 1 tablet by mouth Daily., Disp: 90 tablet, Rfl: 1  •  losartan (COZAAR) 50 MG tablet, Take 1 tablet by mouth Daily., Disp: 90 tablet, Rfl: 1  •  pantoprazole (PROTONIX) 40 MG EC tablet, Take 1 tablet by mouth Daily., Disp: 90 tablet, Rfl: 1  •  SITagliptin (JANUVIA) 50 MG tablet, Take 1 tablet by mouth Daily., Disp: 90 tablet, Rfl: 1  No current facility-administered medications for this visit.       Social History     Socioeconomic History   • Marital status:      Spouse name: Not on file   • Number of children: Not on file   • Years of education: Not on file   • Highest education level: Not on file   Tobacco Use   • Smoking status: Never Smoker   • Smokeless tobacco: Never Used   Substance and Sexual Activity   • Alcohol use: Not Currently   • Drug use: Never   • Sexual activity: Defer         Review of Systems  "  Constitution: Negative for chills and fever.   HENT: Negative for ear discharge and nosebleeds.    Eyes: Negative for discharge and redness.   Cardiovascular: Negative for chest pain, orthopnea, palpitations, paroxysmal nocturnal dyspnea and syncope.   Respiratory: Positive for shortness of breath. Negative for cough and wheezing.    Endocrine: Negative for heat intolerance.   Skin: Negative for rash.   Musculoskeletal: Positive for arthritis and joint pain. Negative for myalgias.   Gastrointestinal: Negative for abdominal pain, melena, nausea and vomiting.   Genitourinary: Negative for dysuria and hematuria.   Neurological: Negative for dizziness, light-headedness, numbness and tremors.   Psychiatric/Behavioral: Negative for depression. The patient is not nervous/anxious.        Procedures    Procedures    No orders to display           Objective:    /64   Pulse 64   Ht 172.7 cm (67.99\")   Wt 93.9 kg (207 lb)   BMI 31.48 kg/m²         Constitutional:       Appearance: Well-developed.   Eyes:      General: No scleral icterus.        Right eye: No discharge.   HENT:      Head: Normocephalic and atraumatic.   Neck:      Thyroid: No thyromegaly.      Lymphadenopathy: No cervical adenopathy.   Pulmonary:      Effort: Pulmonary effort is normal. No respiratory distress.      Breath sounds: Normal breath sounds. No wheezing. No rales.   Cardiovascular:      Normal rate. Regular rhythm.      Murmurs: There is a systolic murmur.      No gallop.   Abdominal:      Tenderness: There is no abdominal tenderness.   Skin:     Findings: No erythema or rash.   Neurological:      Mental Status: Alert and oriented to person, place, and time.             Assessment:       Diagnosis Plan   1. Heart murmur     2. Cerebrovascular accident (CVA), unspecified mechanism (CMS/HCC)     3. Essential hypertension     4. Mixed hyperlipidemia              Plan:       At this stage, I will continue current therapy.  Patient does have " abnormal stress test but he is relatively asymptomatic.  I would recommend observation.  If there is change in his symptom pattern, I will consider cardiac catheterization.  We shall follow Holter monitor.  His heart rate is in 60s.  Recommend observation

## 2020-09-27 LAB
BH CV ECHO MEAS - AI DEC SLOPE: 221.2 CM/SEC^2
BH CV ECHO MEAS - AI DEC TIME: 1.9 SEC
BH CV ECHO MEAS - AI MAX PG: 67.5 MMHG
BH CV ECHO MEAS - AI MAX VEL: 410.9 CM/SEC
BH CV ECHO MEAS - AI P1/2T: 544.1 MSEC
BH CV ECHO MEAS - AO MAX PG (FULL): 27.3 MMHG
BH CV ECHO MEAS - AO MAX PG: 30.2 MMHG
BH CV ECHO MEAS - AO MEAN PG (FULL): 14 MMHG
BH CV ECHO MEAS - AO MEAN PG: 15.6 MMHG
BH CV ECHO MEAS - AO ROOT AREA (BSA CORRECTED): 1.4
BH CV ECHO MEAS - AO ROOT AREA: 6.7 CM^2
BH CV ECHO MEAS - AO ROOT DIAM: 2.9 CM
BH CV ECHO MEAS - AO V2 MAX: 274.7 CM/SEC
BH CV ECHO MEAS - AO V2 MEAN: 184.3 CM/SEC
BH CV ECHO MEAS - AO V2 VTI: 66.6 CM
BH CV ECHO MEAS - AORTIC HR: 51.4 BPM
BH CV ECHO MEAS - AORTIC R-R: 1.2 SEC
BH CV ECHO MEAS - ASC AORTA: 2.9 CM
BH CV ECHO MEAS - AVA(I,A): 1.2 CM^2
BH CV ECHO MEAS - AVA(I,D): 1.2 CM^2
BH CV ECHO MEAS - AVA(V,A): 1.2 CM^2
BH CV ECHO MEAS - AVA(V,D): 1.2 CM^2
BH CV ECHO MEAS - BSA(HAYCOCK): 2.1 M^2
BH CV ECHO MEAS - BSA: 2.1 M^2
BH CV ECHO MEAS - BZI_BMI: 31.3 KILOGRAMS/M^2
BH CV ECHO MEAS - BZI_METRIC_HEIGHT: 172.7 CM
BH CV ECHO MEAS - BZI_METRIC_WEIGHT: 93.4 KG
BH CV ECHO MEAS - CI(AO): 11.1 L/MIN/M^2
BH CV ECHO MEAS - CI(LVOT): 2 L/MIN/M^2
BH CV ECHO MEAS - CO(AO): 23.1 L/MIN
BH CV ECHO MEAS - CO(LVOT): 4.2 L/MIN
BH CV ECHO MEAS - EDV(CUBED): 42.5 ML
BH CV ECHO MEAS - EDV(MOD-SP4): 54.5 ML
BH CV ECHO MEAS - EDV(TEICH): 50.5 ML
BH CV ECHO MEAS - EF(CUBED): 69.1 %
BH CV ECHO MEAS - EF(MOD-BP): 60 %
BH CV ECHO MEAS - EF(MOD-SP4): 60.3 %
BH CV ECHO MEAS - EF(TEICH): 61.8 %
BH CV ECHO MEAS - ESV(CUBED): 13.1 ML
BH CV ECHO MEAS - ESV(MOD-SP4): 21.6 ML
BH CV ECHO MEAS - ESV(TEICH): 19.3 ML
BH CV ECHO MEAS - FS: 32.4 %
BH CV ECHO MEAS - IVS/LVPW: 1.1
BH CV ECHO MEAS - IVSD: 1.3 CM
BH CV ECHO MEAS - LA DIMENSION(2D): 5.5 CM
BH CV ECHO MEAS - LV DIASTOLIC VOL/BSA (35-75): 26.3 ML/M^2
BH CV ECHO MEAS - LV MASS(C)D: 147.1 GRAMS
BH CV ECHO MEAS - LV MASS(C)DI: 71.1 GRAMS/M^2
BH CV ECHO MEAS - LV MAX PG: 2.9 MMHG
BH CV ECHO MEAS - LV MEAN PG: 1.7 MMHG
BH CV ECHO MEAS - LV SYSTOLIC VOL/BSA (12-30): 10.5 ML/M^2
BH CV ECHO MEAS - LV V1 MAX: 85.5 CM/SEC
BH CV ECHO MEAS - LV V1 MEAN: 61.2 CM/SEC
BH CV ECHO MEAS - LV V1 VTI: 21.9 CM
BH CV ECHO MEAS - LVIDD: 3.5 CM
BH CV ECHO MEAS - LVIDS: 2.4 CM
BH CV ECHO MEAS - LVOT AREA: 3.8 CM^2
BH CV ECHO MEAS - LVOT DIAM: 2.2 CM
BH CV ECHO MEAS - LVPWD: 1.2 CM
BH CV ECHO MEAS - MR MAX PG: 80.8 MMHG
BH CV ECHO MEAS - MR MAX VEL: 449.5 CM/SEC
BH CV ECHO MEAS - MV A MAX VEL: 104.4 CM/SEC
BH CV ECHO MEAS - MV DEC SLOPE: 326.5 CM/SEC^2
BH CV ECHO MEAS - MV DEC TIME: 0.26 SEC
BH CV ECHO MEAS - MV E MAX VEL: 85.4 CM/SEC
BH CV ECHO MEAS - MV E/A: 0.82
BH CV ECHO MEAS - MV MAX PG: 5.8 MMHG
BH CV ECHO MEAS - MV MEAN PG: 1.9 MMHG
BH CV ECHO MEAS - MV V2 MAX: 120.4 CM/SEC
BH CV ECHO MEAS - MV V2 MEAN: 63.7 CM/SEC
BH CV ECHO MEAS - MV V2 VTI: 34.7 CM
BH CV ECHO MEAS - MVA(VTI): 2.4 CM^2
BH CV ECHO MEAS - PA ACC TIME: 0.1 SEC
BH CV ECHO MEAS - PA MAX PG (FULL): 2.7 MMHG
BH CV ECHO MEAS - PA MAX PG: 6.9 MMHG
BH CV ECHO MEAS - PA MEAN PG (FULL): 0.91 MMHG
BH CV ECHO MEAS - PA MEAN PG: 3.1 MMHG
BH CV ECHO MEAS - PA PR(ACCEL): 32.8 MMHG
BH CV ECHO MEAS - PA V2 MAX: 130.9 CM/SEC
BH CV ECHO MEAS - PA V2 MEAN: 82.1 CM/SEC
BH CV ECHO MEAS - PA V2 VTI: 29.7 CM
BH CV ECHO MEAS - PULM A REVS DUR: 0.13 SEC
BH CV ECHO MEAS - PULM A REVS VEL: 23.3 CM/SEC
BH CV ECHO MEAS - PULM DIAS VEL: 47.8 CM/SEC
BH CV ECHO MEAS - PULM S/D: 1.3
BH CV ECHO MEAS - PULM SYS VEL: 60.4 CM/SEC
BH CV ECHO MEAS - PVA(I,A): 5.6 CM^2
BH CV ECHO MEAS - PVA(I,D): 5.6 CM^2
BH CV ECHO MEAS - PVA(V,A): 4.8 CM^2
BH CV ECHO MEAS - PVA(V,D): 4.8 CM^2
BH CV ECHO MEAS - QP/QS: 2
BH CV ECHO MEAS - RAP SYSTOLE: 3 MMHG
BH CV ECHO MEAS - RV MAX PG: 4.1 MMHG
BH CV ECHO MEAS - RV MEAN PG: 2.2 MMHG
BH CV ECHO MEAS - RV V1 MAX: 101.4 CM/SEC
BH CV ECHO MEAS - RV V1 MEAN: 69.8 CM/SEC
BH CV ECHO MEAS - RV V1 VTI: 26.8 CM
BH CV ECHO MEAS - RVDD: 2 CM
BH CV ECHO MEAS - RVOT AREA: 6.2 CM^2
BH CV ECHO MEAS - RVOT DIAM: 2.8 CM
BH CV ECHO MEAS - RVSP: 29.4 MMHG
BH CV ECHO MEAS - SI(AO): 217.1 ML/M^2
BH CV ECHO MEAS - SI(CUBED): 14.2 ML/M^2
BH CV ECHO MEAS - SI(LVOT): 39.8 ML/M^2
BH CV ECHO MEAS - SI(MOD-SP4): 15.9 ML/M^2
BH CV ECHO MEAS - SI(TEICH): 15.1 ML/M^2
BH CV ECHO MEAS - SV(AO): 449.3 ML
BH CV ECHO MEAS - SV(CUBED): 29.4 ML
BH CV ECHO MEAS - SV(LVOT): 82.4 ML
BH CV ECHO MEAS - SV(MOD-SP4): 32.9 ML
BH CV ECHO MEAS - SV(RVOT): 167.3 ML
BH CV ECHO MEAS - SV(TEICH): 31.2 ML
BH CV ECHO MEAS - TR MAX VEL: 257.1 CM/SEC

## 2020-09-28 ENCOUNTER — TELEPHONE (OUTPATIENT)
Dept: CARDIOLOGY | Facility: CLINIC | Age: 80
End: 2020-09-28

## 2020-10-07 ENCOUNTER — TELEPHONE (OUTPATIENT)
Dept: CARDIOLOGY | Facility: CLINIC | Age: 80
End: 2020-10-07

## 2020-10-28 RX ORDER — AMLODIPINE BESYLATE 10 MG/1
TABLET ORAL
Qty: 90 TABLET | Refills: 1 | Status: SHIPPED | OUTPATIENT
Start: 2020-10-28 | End: 2021-03-11 | Stop reason: SDUPTHER

## 2020-10-28 RX ORDER — EMPAGLIFLOZIN 10 MG/1
TABLET, FILM COATED ORAL
Qty: 90 TABLET | Refills: 1 | Status: SHIPPED | OUTPATIENT
Start: 2020-10-28 | End: 2021-03-11 | Stop reason: SDUPTHER

## 2020-11-11 RX ORDER — PANTOPRAZOLE SODIUM 40 MG/1
TABLET, DELAYED RELEASE ORAL
Qty: 90 TABLET | Refills: 1 | Status: SHIPPED | OUTPATIENT
Start: 2020-11-11 | End: 2020-11-25

## 2020-11-11 RX ORDER — SITAGLIPTIN 50 MG/1
TABLET, FILM COATED ORAL
Qty: 90 TABLET | Refills: 1 | Status: SHIPPED | OUTPATIENT
Start: 2020-11-11 | End: 2021-05-06 | Stop reason: SDUPTHER

## 2020-11-11 RX ORDER — CLOPIDOGREL BISULFATE 75 MG/1
TABLET ORAL
Qty: 90 TABLET | Refills: 1 | Status: SHIPPED | OUTPATIENT
Start: 2020-11-11 | End: 2021-05-06 | Stop reason: SDUPTHER

## 2020-11-25 RX ORDER — PANTOPRAZOLE SODIUM 40 MG/1
TABLET, DELAYED RELEASE ORAL
Qty: 90 TABLET | Refills: 1 | Status: SHIPPED | OUTPATIENT
Start: 2020-11-25 | End: 2021-05-06 | Stop reason: SDUPTHER

## 2020-11-30 RX ORDER — LOSARTAN POTASSIUM 50 MG/1
TABLET ORAL
Qty: 90 TABLET | Refills: 1 | Status: SHIPPED | OUTPATIENT
Start: 2020-11-30 | End: 2021-05-06 | Stop reason: SDUPTHER

## 2020-11-30 RX ORDER — LEVOTHYROXINE SODIUM 0.07 MG/1
TABLET ORAL
Qty: 90 TABLET | Refills: 1 | Status: SHIPPED | OUTPATIENT
Start: 2020-11-30 | End: 2021-05-06 | Stop reason: SDUPTHER

## 2020-12-11 ENCOUNTER — APPOINTMENT (OUTPATIENT)
Dept: CT IMAGING | Facility: HOSPITAL | Age: 80
End: 2020-12-11

## 2020-12-11 ENCOUNTER — HOSPITAL ENCOUNTER (INPATIENT)
Facility: HOSPITAL | Age: 80
LOS: 4 days | Discharge: HOME | End: 2020-12-17
Attending: EMERGENCY MEDICINE | Admitting: INTERNAL MEDICINE

## 2020-12-11 ENCOUNTER — APPOINTMENT (OUTPATIENT)
Dept: GENERAL RADIOLOGY | Facility: HOSPITAL | Age: 80
End: 2020-12-11

## 2020-12-11 DIAGNOSIS — S82.62XA CLOSED DISPLACED FRACTURE OF LATERAL MALLEOLUS OF LEFT FIBULA, INITIAL ENCOUNTER: ICD-10-CM

## 2020-12-11 DIAGNOSIS — G45.9 TIA (TRANSIENT ISCHEMIC ATTACK): Primary | ICD-10-CM

## 2020-12-11 DIAGNOSIS — I65.21 STENOSIS OF RIGHT CAROTID ARTERY: ICD-10-CM

## 2020-12-11 LAB
ABO GROUP BLD: NORMAL
ALBUMIN SERPL-MCNC: 4 G/DL (ref 3.5–5.2)
ALBUMIN/GLOB SERPL: 1.5 G/DL
ALP SERPL-CCNC: 64 U/L (ref 39–117)
ALT SERPL W P-5'-P-CCNC: 23 U/L (ref 1–41)
ANION GAP SERPL CALCULATED.3IONS-SCNC: 13 MMOL/L (ref 5–15)
APTT PPP: 22.8 SECONDS (ref 24–31)
AST SERPL-CCNC: 21 U/L (ref 1–40)
BASOPHILS # BLD AUTO: 0.1 10*3/MM3 (ref 0–0.2)
BASOPHILS NFR BLD AUTO: 1.3 % (ref 0–1.5)
BILIRUB SERPL-MCNC: 0.4 MG/DL (ref 0–1.2)
BLD GP AB SCN SERPL QL: NEGATIVE
BUN SERPL-MCNC: 23 MG/DL (ref 8–23)
BUN/CREAT SERPL: 19.8 (ref 7–25)
CALCIUM SPEC-SCNC: 9 MG/DL (ref 8.6–10.5)
CHLORIDE SERPL-SCNC: 107 MMOL/L (ref 98–107)
CO2 SERPL-SCNC: 21 MMOL/L (ref 22–29)
CREAT SERPL-MCNC: 1.16 MG/DL (ref 0.76–1.27)
DEPRECATED RDW RBC AUTO: 47.3 FL (ref 37–54)
EOSINOPHIL # BLD AUTO: 0.1 10*3/MM3 (ref 0–0.4)
EOSINOPHIL NFR BLD AUTO: 1.2 % (ref 0.3–6.2)
ERYTHROCYTE [DISTWIDTH] IN BLOOD BY AUTOMATED COUNT: 14.9 % (ref 12.3–15.4)
GFR SERPL CREATININE-BSD FRML MDRD: 60 ML/MIN/1.73
GLOBULIN UR ELPH-MCNC: 2.6 GM/DL
GLUCOSE BLDC GLUCOMTR-MCNC: 135 MG/DL (ref 70–105)
GLUCOSE SERPL-MCNC: 133 MG/DL (ref 65–99)
HCT VFR BLD AUTO: 43.8 % (ref 37.5–51)
HGB BLD-MCNC: 14.4 G/DL (ref 13–17.7)
HOLD SPECIMEN: NORMAL
HOLD SPECIMEN: NORMAL
INR PPP: 0.95 (ref 0.93–1.1)
LYMPHOCYTES # BLD AUTO: 1.6 10*3/MM3 (ref 0.7–3.1)
LYMPHOCYTES NFR BLD AUTO: 23.4 % (ref 19.6–45.3)
MCH RBC QN AUTO: 30.1 PG (ref 26.6–33)
MCHC RBC AUTO-ENTMCNC: 33 G/DL (ref 31.5–35.7)
MCV RBC AUTO: 91.3 FL (ref 79–97)
MONOCYTES # BLD AUTO: 0.8 10*3/MM3 (ref 0.1–0.9)
MONOCYTES NFR BLD AUTO: 11.2 % (ref 5–12)
NEUTROPHILS NFR BLD AUTO: 4.3 10*3/MM3 (ref 1.7–7)
NEUTROPHILS NFR BLD AUTO: 62.9 % (ref 42.7–76)
NRBC BLD AUTO-RTO: 0.2 /100 WBC (ref 0–0.2)
PLATELET # BLD AUTO: 219 10*3/MM3 (ref 140–450)
PMV BLD AUTO: 8 FL (ref 6–12)
POTASSIUM SERPL-SCNC: 3.4 MMOL/L (ref 3.5–5.2)
PROT SERPL-MCNC: 6.6 G/DL (ref 6–8.5)
PROTHROMBIN TIME: 10.5 SECONDS (ref 9.6–11.7)
QT INTERVAL: 383 MS
RBC # BLD AUTO: 4.8 10*6/MM3 (ref 4.14–5.8)
RH BLD: POSITIVE
SODIUM SERPL-SCNC: 141 MMOL/L (ref 136–145)
T&S EXPIRATION DATE: NORMAL
TROPONIN T SERPL-MCNC: <0.01 NG/ML (ref 0–0.03)
WBC # BLD AUTO: 6.9 10*3/MM3 (ref 3.4–10.8)
WHOLE BLOOD HOLD SPECIMEN: NORMAL
WHOLE BLOOD HOLD SPECIMEN: NORMAL

## 2020-12-11 PROCEDURE — 86901 BLOOD TYPING SEROLOGIC RH(D): CPT

## 2020-12-11 PROCEDURE — 0 IOPAMIDOL PER 1 ML: Performed by: EMERGENCY MEDICINE

## 2020-12-11 PROCEDURE — 86901 BLOOD TYPING SEROLOGIC RH(D): CPT | Performed by: EMERGENCY MEDICINE

## 2020-12-11 PROCEDURE — 99219 PR INITIAL OBSERVATION CARE/DAY 50 MINUTES: CPT | Performed by: NURSE PRACTITIONER

## 2020-12-11 PROCEDURE — G0378 HOSPITAL OBSERVATION PER HR: HCPCS

## 2020-12-11 PROCEDURE — 86850 RBC ANTIBODY SCREEN: CPT | Performed by: EMERGENCY MEDICINE

## 2020-12-11 PROCEDURE — 70496 CT ANGIOGRAPHY HEAD: CPT

## 2020-12-11 PROCEDURE — 85025 COMPLETE CBC W/AUTO DIFF WBC: CPT | Performed by: EMERGENCY MEDICINE

## 2020-12-11 PROCEDURE — 93005 ELECTROCARDIOGRAM TRACING: CPT | Performed by: EMERGENCY MEDICINE

## 2020-12-11 PROCEDURE — 86900 BLOOD TYPING SEROLOGIC ABO: CPT | Performed by: EMERGENCY MEDICINE

## 2020-12-11 PROCEDURE — 82962 GLUCOSE BLOOD TEST: CPT

## 2020-12-11 PROCEDURE — 84484 ASSAY OF TROPONIN QUANT: CPT | Performed by: EMERGENCY MEDICINE

## 2020-12-11 PROCEDURE — 85610 PROTHROMBIN TIME: CPT | Performed by: EMERGENCY MEDICINE

## 2020-12-11 PROCEDURE — 80053 COMPREHEN METABOLIC PANEL: CPT | Performed by: EMERGENCY MEDICINE

## 2020-12-11 PROCEDURE — 86900 BLOOD TYPING SEROLOGIC ABO: CPT

## 2020-12-11 PROCEDURE — 99285 EMERGENCY DEPT VISIT HI MDM: CPT

## 2020-12-11 PROCEDURE — 70498 CT ANGIOGRAPHY NECK: CPT

## 2020-12-11 PROCEDURE — 71045 X-RAY EXAM CHEST 1 VIEW: CPT

## 2020-12-11 PROCEDURE — 70450 CT HEAD/BRAIN W/O DYE: CPT

## 2020-12-11 PROCEDURE — 85730 THROMBOPLASTIN TIME PARTIAL: CPT | Performed by: EMERGENCY MEDICINE

## 2020-12-11 RX ORDER — POTASSIUM CHLORIDE 1.5 G/1.77G
40 POWDER, FOR SOLUTION ORAL AS NEEDED
Status: DISCONTINUED | OUTPATIENT
Start: 2020-12-11 | End: 2020-12-17 | Stop reason: HOSPADM

## 2020-12-11 RX ORDER — CLOPIDOGREL BISULFATE 75 MG/1
75 TABLET ORAL DAILY
Status: DISCONTINUED | OUTPATIENT
Start: 2020-12-12 | End: 2020-12-17 | Stop reason: HOSPADM

## 2020-12-11 RX ORDER — PANTOPRAZOLE SODIUM 40 MG/1
40 TABLET, DELAYED RELEASE ORAL DAILY
Status: DISCONTINUED | OUTPATIENT
Start: 2020-12-12 | End: 2020-12-17 | Stop reason: HOSPADM

## 2020-12-11 RX ORDER — POTASSIUM CHLORIDE 20 MEQ/1
40 TABLET, EXTENDED RELEASE ORAL AS NEEDED
Status: DISCONTINUED | OUTPATIENT
Start: 2020-12-11 | End: 2020-12-17 | Stop reason: HOSPADM

## 2020-12-11 RX ORDER — ATORVASTATIN CALCIUM 40 MG/1
40 TABLET, FILM COATED ORAL NIGHTLY
Status: DISCONTINUED | OUTPATIENT
Start: 2020-12-11 | End: 2020-12-12

## 2020-12-11 RX ORDER — AMLODIPINE BESYLATE 5 MG/1
10 TABLET ORAL DAILY
Status: DISCONTINUED | OUTPATIENT
Start: 2020-12-12 | End: 2020-12-17 | Stop reason: HOSPADM

## 2020-12-11 RX ORDER — POTASSIUM CHLORIDE 7.45 MG/ML
10 INJECTION INTRAVENOUS
Status: DISCONTINUED | OUTPATIENT
Start: 2020-12-11 | End: 2020-12-17 | Stop reason: HOSPADM

## 2020-12-11 RX ORDER — LOSARTAN POTASSIUM 50 MG/1
50 TABLET ORAL DAILY
Status: DISCONTINUED | OUTPATIENT
Start: 2020-12-12 | End: 2020-12-17 | Stop reason: HOSPADM

## 2020-12-11 RX ORDER — SODIUM CHLORIDE 0.9 % (FLUSH) 0.9 %
10 SYRINGE (ML) INJECTION AS NEEDED
Status: DISCONTINUED | OUTPATIENT
Start: 2020-12-11 | End: 2020-12-17 | Stop reason: HOSPADM

## 2020-12-11 RX ORDER — LEVOTHYROXINE SODIUM 0.07 MG/1
75 TABLET ORAL DAILY
Status: DISCONTINUED | OUTPATIENT
Start: 2020-12-12 | End: 2020-12-17 | Stop reason: HOSPADM

## 2020-12-11 RX ADMIN — IOPAMIDOL 100 ML: 755 INJECTION, SOLUTION INTRAVENOUS at 20:12

## 2020-12-12 ENCOUNTER — APPOINTMENT (OUTPATIENT)
Dept: CARDIOLOGY | Facility: HOSPITAL | Age: 80
End: 2020-12-12

## 2020-12-12 ENCOUNTER — APPOINTMENT (OUTPATIENT)
Dept: MRI IMAGING | Facility: HOSPITAL | Age: 80
End: 2020-12-12

## 2020-12-12 ENCOUNTER — APPOINTMENT (OUTPATIENT)
Dept: GENERAL RADIOLOGY | Facility: HOSPITAL | Age: 80
End: 2020-12-12

## 2020-12-12 PROBLEM — I65.23: Status: ACTIVE | Noted: 2020-12-12

## 2020-12-12 LAB
BH CV ECHO MEAS - ACS: 1.9 CM
BH CV ECHO MEAS - AI DEC SLOPE: 249.5 CM/SEC^2
BH CV ECHO MEAS - AI DEC TIME: 1.6 SEC
BH CV ECHO MEAS - AI MAX PG: 64.8 MMHG
BH CV ECHO MEAS - AI MAX VEL: 402.5 CM/SEC
BH CV ECHO MEAS - AI P1/2T: 472.5 MSEC
BH CV ECHO MEAS - AO MAX PG (FULL): 37.9 MMHG
BH CV ECHO MEAS - AO MAX PG: 46.2 MMHG
BH CV ECHO MEAS - AO MEAN PG (FULL): 20.6 MMHG
BH CV ECHO MEAS - AO MEAN PG: 25.3 MMHG
BH CV ECHO MEAS - AO ROOT AREA (BSA CORRECTED): 1.5
BH CV ECHO MEAS - AO ROOT AREA: 8.5 CM^2
BH CV ECHO MEAS - AO ROOT DIAM: 3.3 CM
BH CV ECHO MEAS - AO V2 MAX: 340 CM/SEC
BH CV ECHO MEAS - AO V2 MEAN: 238.7 CM/SEC
BH CV ECHO MEAS - AO V2 VTI: 74.5 CM
BH CV ECHO MEAS - AORTIC HR: 56.3 BPM
BH CV ECHO MEAS - AORTIC R-R: 1.1 SEC
BH CV ECHO MEAS - ASC AORTA: 2.9 CM
BH CV ECHO MEAS - AVA(I,A): 1.5 CM^2
BH CV ECHO MEAS - AVA(I,D): 1.5 CM^2
BH CV ECHO MEAS - AVA(V,A): 1.4 CM^2
BH CV ECHO MEAS - AVA(V,D): 1.4 CM^2
BH CV ECHO MEAS - BSA(HAYCOCK): 2.2 M^2
BH CV ECHO MEAS - BSA: 2.2 M^2
BH CV ECHO MEAS - BZI_BMI: 28.1 KILOGRAMS/M^2
BH CV ECHO MEAS - BZI_METRIC_HEIGHT: 182.9 CM
BH CV ECHO MEAS - BZI_METRIC_WEIGHT: 93.9 KG
BH CV ECHO MEAS - CI(AO): 16.5 L/MIN/M^2
BH CV ECHO MEAS - CI(LVOT): 3 L/MIN/M^2
BH CV ECHO MEAS - CO(AO): 35.6 L/MIN
BH CV ECHO MEAS - CO(LVOT): 6.4 L/MIN
BH CV ECHO MEAS - EDV(CUBED): 119.7 ML
BH CV ECHO MEAS - EDV(MOD-SP4): 69 ML
BH CV ECHO MEAS - EDV(TEICH): 114.3 ML
BH CV ECHO MEAS - EF(CUBED): 66.9 %
BH CV ECHO MEAS - EF(MOD-BP): 65 %
BH CV ECHO MEAS - EF(MOD-SP4): 64.8 %
BH CV ECHO MEAS - EF(TEICH): 58.3 %
BH CV ECHO MEAS - ESV(CUBED): 39.6 ML
BH CV ECHO MEAS - ESV(MOD-SP4): 24.3 ML
BH CV ECHO MEAS - ESV(TEICH): 47.7 ML
BH CV ECHO MEAS - FS: 30.8 %
BH CV ECHO MEAS - IVS/LVPW: 0.95
BH CV ECHO MEAS - IVSD: 1.1 CM
BH CV ECHO MEAS - LA DIMENSION(2D): 4.7 CM
BH CV ECHO MEAS - LV DIASTOLIC VOL/BSA (35-75): 31.9 ML/M^2
BH CV ECHO MEAS - LV MASS(C)D: 223.3 GRAMS
BH CV ECHO MEAS - LV MASS(C)DI: 103.3 GRAMS/M^2
BH CV ECHO MEAS - LV MAX PG: 8.3 MMHG
BH CV ECHO MEAS - LV MEAN PG: 4.7 MMHG
BH CV ECHO MEAS - LV SYSTOLIC VOL/BSA (12-30): 11.2 ML/M^2
BH CV ECHO MEAS - LV V1 MAX: 144.4 CM/SEC
BH CV ECHO MEAS - LV V1 MEAN: 103.3 CM/SEC
BH CV ECHO MEAS - LV V1 VTI: 34.2 CM
BH CV ECHO MEAS - LVIDD: 4.9 CM
BH CV ECHO MEAS - LVIDS: 3.4 CM
BH CV ECHO MEAS - LVOT AREA: 3.3 CM^2
BH CV ECHO MEAS - LVOT DIAM: 2.1 CM
BH CV ECHO MEAS - LVPWD: 1.2 CM
BH CV ECHO MEAS - MV A MAX VEL: 108.4 CM/SEC
BH CV ECHO MEAS - MV DEC SLOPE: 435.1 CM/SEC^2
BH CV ECHO MEAS - MV DEC TIME: 0.22 SEC
BH CV ECHO MEAS - MV E MAX VEL: 96.8 CM/SEC
BH CV ECHO MEAS - MV E/A: 0.89
BH CV ECHO MEAS - MV MAX PG: 6.3 MMHG
BH CV ECHO MEAS - MV MEAN PG: 2.8 MMHG
BH CV ECHO MEAS - MV V2 MAX: 125.8 CM/SEC
BH CV ECHO MEAS - MV V2 MEAN: 79.9 CM/SEC
BH CV ECHO MEAS - MV V2 VTI: 33.7 CM
BH CV ECHO MEAS - MVA(VTI): 3.4 CM^2
BH CV ECHO MEAS - PA ACC TIME: 0.13 SEC
BH CV ECHO MEAS - PA MAX PG (FULL): 0.41 MMHG
BH CV ECHO MEAS - PA MAX PG: 5.5 MMHG
BH CV ECHO MEAS - PA MEAN PG (FULL): 0.54 MMHG
BH CV ECHO MEAS - PA MEAN PG: 3 MMHG
BH CV ECHO MEAS - PA PR(ACCEL): 22.5 MMHG
BH CV ECHO MEAS - PA V2 MAX: 116.8 CM/SEC
BH CV ECHO MEAS - PA V2 MEAN: 82 CM/SEC
BH CV ECHO MEAS - PA V2 VTI: 25.3 CM
BH CV ECHO MEAS - PVA(I,A): 3.7 CM^2
BH CV ECHO MEAS - PVA(I,D): 3.7 CM^2
BH CV ECHO MEAS - PVA(V,A): 3.8 CM^2
BH CV ECHO MEAS - PVA(V,D): 3.8 CM^2
BH CV ECHO MEAS - QP/QS: 0.82
BH CV ECHO MEAS - RAP SYSTOLE: 3 MMHG
BH CV ECHO MEAS - RV MAX PG: 5.1 MMHG
BH CV ECHO MEAS - RV MEAN PG: 2.5 MMHG
BH CV ECHO MEAS - RV V1 MAX: 112.4 CM/SEC
BH CV ECHO MEAS - RV V1 MEAN: 73.6 CM/SEC
BH CV ECHO MEAS - RV V1 VTI: 23.5 CM
BH CV ECHO MEAS - RVDD: 2.8 CM
BH CV ECHO MEAS - RVOT AREA: 4 CM^2
BH CV ECHO MEAS - RVOT DIAM: 2.2 CM
BH CV ECHO MEAS - RVSP: 46.6 MMHG
BH CV ECHO MEAS - SI(AO): 292.3 ML/M^2
BH CV ECHO MEAS - SI(CUBED): 37 ML/M^2
BH CV ECHO MEAS - SI(LVOT): 52.5 ML/M^2
BH CV ECHO MEAS - SI(MOD-SP4): 20.7 ML/M^2
BH CV ECHO MEAS - SI(TEICH): 30.8 ML/M^2
BH CV ECHO MEAS - SV(AO): 632 ML
BH CV ECHO MEAS - SV(CUBED): 80.1 ML
BH CV ECHO MEAS - SV(LVOT): 113.5 ML
BH CV ECHO MEAS - SV(MOD-SP4): 44.7 ML
BH CV ECHO MEAS - SV(RVOT): 93.2 ML
BH CV ECHO MEAS - SV(TEICH): 66.6 ML
BH CV ECHO MEAS - TR MAX VEL: 330.3 CM/SEC
CHOLEST SERPL-MCNC: 162 MG/DL (ref 0–200)
GLUCOSE BLDC GLUCOMTR-MCNC: 161 MG/DL (ref 70–105)
HDLC SERPL-MCNC: 48 MG/DL (ref 40–60)
LDLC SERPL CALC-MCNC: 97 MG/DL (ref 0–100)
LDLC/HDLC SERPL: 1.99 {RATIO}
LV EF 2D ECHO EST: 65 %
SARS-COV-2 RNA PNL SPEC NAA+PROBE: NOT DETECTED
TRIGL SERPL-MCNC: 93 MG/DL (ref 0–150)
TSH SERPL DL<=0.05 MIU/L-ACNC: 3.71 UIU/ML (ref 0.27–4.2)
VLDLC SERPL-MCNC: 17 MG/DL (ref 5–40)

## 2020-12-12 PROCEDURE — G0378 HOSPITAL OBSERVATION PER HR: HCPCS

## 2020-12-12 PROCEDURE — 63710000001 INSULIN LISPRO (HUMAN) PER 5 UNITS: Performed by: NURSE PRACTITIONER

## 2020-12-12 PROCEDURE — 84443 ASSAY THYROID STIM HORMONE: CPT | Performed by: NURSE PRACTITIONER

## 2020-12-12 PROCEDURE — 73600 X-RAY EXAM OF ANKLE: CPT

## 2020-12-12 PROCEDURE — 70551 MRI BRAIN STEM W/O DYE: CPT

## 2020-12-12 PROCEDURE — 99225 PR SBSQ OBSERVATION CARE/DAY 25 MINUTES: CPT | Performed by: INTERNAL MEDICINE

## 2020-12-12 PROCEDURE — 93306 TTE W/DOPPLER COMPLETE: CPT

## 2020-12-12 PROCEDURE — 82962 GLUCOSE BLOOD TEST: CPT

## 2020-12-12 PROCEDURE — 99223 1ST HOSP IP/OBS HIGH 75: CPT | Performed by: NURSE PRACTITIONER

## 2020-12-12 PROCEDURE — 83036 HEMOGLOBIN GLYCOSYLATED A1C: CPT | Performed by: NURSE PRACTITIONER

## 2020-12-12 PROCEDURE — 93306 TTE W/DOPPLER COMPLETE: CPT | Performed by: INTERNAL MEDICINE

## 2020-12-12 PROCEDURE — U0003 INFECTIOUS AGENT DETECTION BY NUCLEIC ACID (DNA OR RNA); SEVERE ACUTE RESPIRATORY SYNDROME CORONAVIRUS 2 (SARS-COV-2) (CORONAVIRUS DISEASE [COVID-19]), AMPLIFIED PROBE TECHNIQUE, MAKING USE OF HIGH THROUGHPUT TECHNOLOGIES AS DESCRIBED BY CMS-2020-01-R: HCPCS | Performed by: SURGERY

## 2020-12-12 PROCEDURE — 80061 LIPID PANEL: CPT | Performed by: NURSE PRACTITIONER

## 2020-12-12 RX ORDER — ATORVASTATIN CALCIUM 40 MG/1
80 TABLET, FILM COATED ORAL NIGHTLY
Status: DISCONTINUED | OUTPATIENT
Start: 2020-12-12 | End: 2020-12-17 | Stop reason: HOSPADM

## 2020-12-12 RX ORDER — INSULIN LISPRO 100 [IU]/ML
0-9 INJECTION, SOLUTION INTRAVENOUS; SUBCUTANEOUS AS NEEDED
Status: DISCONTINUED | OUTPATIENT
Start: 2020-12-12 | End: 2020-12-17 | Stop reason: HOSPADM

## 2020-12-12 RX ORDER — ONDANSETRON 2 MG/ML
4 INJECTION INTRAMUSCULAR; INTRAVENOUS EVERY 6 HOURS PRN
Status: DISCONTINUED | OUTPATIENT
Start: 2020-12-12 | End: 2020-12-17 | Stop reason: HOSPADM

## 2020-12-12 RX ORDER — ACETAMINOPHEN 650 MG/1
650 SUPPOSITORY RECTAL EVERY 4 HOURS PRN
Status: DISCONTINUED | OUTPATIENT
Start: 2020-12-12 | End: 2020-12-17 | Stop reason: HOSPADM

## 2020-12-12 RX ORDER — ACETAMINOPHEN 325 MG/1
650 TABLET ORAL EVERY 4 HOURS PRN
Status: DISCONTINUED | OUTPATIENT
Start: 2020-12-12 | End: 2020-12-17 | Stop reason: HOSPADM

## 2020-12-12 RX ORDER — NICOTINE POLACRILEX 4 MG
15 LOZENGE BUCCAL
Status: DISCONTINUED | OUTPATIENT
Start: 2020-12-12 | End: 2020-12-17 | Stop reason: HOSPADM

## 2020-12-12 RX ORDER — SODIUM CHLORIDE 0.9 % (FLUSH) 0.9 %
3 SYRINGE (ML) INJECTION EVERY 12 HOURS SCHEDULED
Status: DISCONTINUED | OUTPATIENT
Start: 2020-12-12 | End: 2020-12-17 | Stop reason: HOSPADM

## 2020-12-12 RX ORDER — SODIUM CHLORIDE 0.9 % (FLUSH) 0.9 %
3-10 SYRINGE (ML) INJECTION AS NEEDED
Status: DISCONTINUED | OUTPATIENT
Start: 2020-12-12 | End: 2020-12-17 | Stop reason: HOSPADM

## 2020-12-12 RX ORDER — ASPIRIN 325 MG
325 TABLET ORAL DAILY
Status: DISCONTINUED | OUTPATIENT
Start: 2020-12-12 | End: 2020-12-12

## 2020-12-12 RX ORDER — ASPIRIN 81 MG/1
81 TABLET, CHEWABLE ORAL DAILY
Status: DISCONTINUED | OUTPATIENT
Start: 2020-12-13 | End: 2020-12-17 | Stop reason: HOSPADM

## 2020-12-12 RX ORDER — BISACODYL 10 MG
10 SUPPOSITORY, RECTAL RECTAL DAILY PRN
Status: DISCONTINUED | OUTPATIENT
Start: 2020-12-12 | End: 2020-12-17 | Stop reason: HOSPADM

## 2020-12-12 RX ORDER — ASPIRIN 300 MG/1
300 SUPPOSITORY RECTAL DAILY
Status: DISCONTINUED | OUTPATIENT
Start: 2020-12-12 | End: 2020-12-12

## 2020-12-12 RX ORDER — DEXTROSE MONOHYDRATE 25 G/50ML
25 INJECTION, SOLUTION INTRAVENOUS
Status: DISCONTINUED | OUTPATIENT
Start: 2020-12-12 | End: 2020-12-17 | Stop reason: HOSPADM

## 2020-12-12 RX ORDER — INSULIN LISPRO 100 [IU]/ML
0-9 INJECTION, SOLUTION INTRAVENOUS; SUBCUTANEOUS
Status: DISCONTINUED | OUTPATIENT
Start: 2020-12-12 | End: 2020-12-17 | Stop reason: HOSPADM

## 2020-12-12 RX ADMIN — CLOPIDOGREL BISULFATE 75 MG: 75 TABLET ORAL at 08:18

## 2020-12-12 RX ADMIN — INSULIN LISPRO 2 UNITS: 100 INJECTION, SOLUTION INTRAVENOUS; SUBCUTANEOUS at 08:17

## 2020-12-12 RX ADMIN — AMLODIPINE BESYLATE 10 MG: 5 TABLET ORAL at 08:18

## 2020-12-12 RX ADMIN — LEVOTHYROXINE SODIUM 75 MCG: 75 TABLET ORAL at 08:18

## 2020-12-12 RX ADMIN — PANTOPRAZOLE SODIUM 40 MG: 40 TABLET, DELAYED RELEASE ORAL at 08:18

## 2020-12-12 RX ADMIN — ATORVASTATIN CALCIUM 80 MG: 40 TABLET, FILM COATED ORAL at 21:26

## 2020-12-12 RX ADMIN — INSULIN LISPRO 2 UNITS: 100 INJECTION, SOLUTION INTRAVENOUS; SUBCUTANEOUS at 18:17

## 2020-12-12 RX ADMIN — Medication 3 ML: at 21:26

## 2020-12-12 RX ADMIN — Medication 3 ML: at 08:18

## 2020-12-12 RX ADMIN — ATORVASTATIN CALCIUM 40 MG: 40 TABLET, FILM COATED ORAL at 03:17

## 2020-12-12 RX ADMIN — LOSARTAN POTASSIUM 50 MG: 25 TABLET, FILM COATED ORAL at 08:17

## 2020-12-12 RX ADMIN — ASPIRIN 325 MG ORAL TABLET 325 MG: 325 PILL ORAL at 08:18

## 2020-12-12 NOTE — SIGNIFICANT NOTE
Pt passed stroke swallow screen and placed on a regular consistency diet. CT & MRI imaging negative for any acute findings. Will sign off per protocol. Please reconsult if ST evaluation warranted at later date.

## 2020-12-12 NOTE — PROGRESS NOTES
"      [facility] Daily Progress Note      Hospitalist Team  LOS 1 days      Patient Care Team:  Serenity Wren MD as PCP - General (Family Medicine)  William Patricio MD as Consulting Physician (Neurology)    Patient Location: 271/1      Subjective   Subjective   Complain of ankle pain and swelling.    Chief Complaint / Subjective  Chief Complaint   Patient presents with   • Numbness     tingling on left side and face.  Onset 30 minutes ago.          Brief Synopsis of Hospital Course/HPI  80-year-old male with known history of hypertension, history of CVA, T2DM, hyperlipidemia, prostate cancer, hypothyroidism, and GERD.  He presented to [facility] ED complaining of left arm/left facial numbness.  Denies dysphagia, no dysarthria, slurred speech or speech impediment.  No headache, visual changes, no chest pain, shortness of breath dizziness or lightheadedness.  No fever, or chills.    Presentation, he was hypertensive.  So far, CT of the head without contrast showed no acute intracranial finding, hemorrhage, revealed lacunar infarct in the right thalamus which appears to be chronic.  CTA of the head and neck with finding of left ICA with 50% stenosis and 80% stenosis on the right ICA.  MRI of the brain with result pending.  Laboratory notable for mild hypokalemia, otherwise, unremarkable.  X-ray showed no acute cardiopulmonary finding. He is admitted for suspected TIA versus ischemic stroke, and maintained on stroke protocol.       Objective   Objective    And examined in follow-up.  Extremities numbness has improved.  Vital Signs  Temp:  [97 °F (36.1 °C)-98 °F (36.7 °C)] 98 °F (36.7 °C)  Heart Rate:  [52-99] 61  Resp:  [12-20] 12  BP: (131-188)/(47-85) 136/55  Oxygen Therapy  SpO2: 98 %  Device (Oxygen Therapy): room air  Flowsheet Rows      First Filed Value   Admission Height  175.3 cm (69\") Documented at 12/11/2020 1959   Admission Weight  95.7 kg (211 lb) Documented at 12/11/2020 " 1959        Intake & Output (last 3 days)       12/09 0701 - 12/10 0700 12/10 0701 - 12/11 0700 12/11 0701 - 12/12 0700 12/12 0701 - 12/13 0700    P.O.   240     Total Intake(mL/kg)   240 (2.6)     Net   +240             Urine Unmeasured Occurrence   1 x         Lines, Drains & Airways    Active LDAs     Name:   Placement date:   Placement time:   Site:   Days:    Peripheral IV 09/24/20 0830 Anterior;Distal;Right;Upper Arm   09/24/20 0830    Arm   79    Peripheral IV 12/11/20 2005 Right Arm   12/11/20 2005    Arm   less than 1                Physical Exam  Constitutional:       Appearance: Normal appearance. He is obese.   HENT:      Head: Normocephalic and atraumatic.      Right Ear: External ear normal.      Left Ear: External ear normal.      Nose: Nose normal.      Mouth/Throat:      Mouth: Mucous membranes are moist.   Eyes:      Extraocular Movements: Extraocular movements intact.   Neck:      Musculoskeletal: Normal range of motion and neck supple.   Cardiovascular:      Rate and Rhythm: Normal rate and regular rhythm.      Pulses: Normal pulses.      Heart sounds: Murmur present.   Pulmonary:      Effort: Pulmonary effort is normal.      Breath sounds: Normal breath sounds.   Abdominal:      Palpations: Abdomen is soft.   Genitourinary:     Comments: deferred  Musculoskeletal: Normal range of motion.   Skin:     General: Skin is warm and dry.   Neurological:      General: No focal deficit present.      Mental Status: He is alert and oriented to person, place, and time. Mental status is at baseline.   Psychiatric:         Mood and Affect: Mood normal.         Behavior: Behavior normal.         Thought Content: Thought content normal.            Procedures:      Results Review:     I reviewed the patient's new clinical results.      Lab Results (last 24 hours)     Procedure Component Value Units Date/Time    POC Glucose Once [[id number]]  (Abnormal) Collected: 12/12/20 0813    Specimen: Blood Updated:  12/12/20 0814     Glucose 161 mg/dL      Comment: Serial Number: [id number]:  [id number]       TSH [[id number]]  (Normal) Collected: 12/12/20 0317    Specimen: Blood Updated: 12/12/20 0403     TSH 3.710 uIU/mL     Lipid Panel [[id number]] Collected: 12/12/20 0317    Specimen: Blood Updated: 12/12/20 0402     Total Cholesterol 162 mg/dL      Triglycerides 93 mg/dL      HDL Cholesterol 48 mg/dL      LDL Cholesterol  97 mg/dL      VLDL Cholesterol 17 mg/dL      LDL/HDL Ratio 1.99    Narrative:      Cholesterol Reference Ranges  (U.S. Department of Health and Human Services ATP III Classifications)    Desirable          <200 mg/dL  Borderline High    200-239 mg/dL  High Risk          >240 mg/dL      Triglyceride Reference Ranges  (U.S. Department of Health and Human Services ATP III Classifications)    Normal           <150 mg/dL  Borderline High  150-199 mg/dL  High             200-499 mg/dL  Very High        >500 mg/dL    HDL Reference Ranges  (U.S. Department of Health and Human Services ATP III Classifcations)    Low     <40 mg/dl (major risk factor for CHD)  High    >60 mg/dl ('negative' risk factor for CHD)        LDL Reference Ranges  (U.S. Department of Health and Human Services ATP III Classifcations)    Optimal          <100 mg/dL  Near Optimal     100-129 mg/dL  Borderline High  130-159 mg/dL  High             160-189 mg/dL  Very High        >189 mg/dL    Hemoglobin A1c [[id number]] Collected: 12/12/20 0317    Specimen: Blood Updated: 12/12/20 0323    Violet Draw [[id number]] Collected: 12/11/20 1959    Specimen: Blood Updated: 12/11/20 2100    Narrative:      The following orders were created for panel order Violet Draw.  Procedure                               Abnormality         Status                     ---------                               -----------         ------                     Light Blue Top[[id number]]                                   Final result               Green Top (Gel)[[id number]]                                   Final result               Lavender Top[[id number]]                                     Final result               Gold Top - SST[[id number]]                                   Final result                 Please view results for these tests on the individual orders.    Light Blue Top [[id number]] Collected: 12/11/20 1959    Specimen: Blood Updated: 12/11/20 2100     Extra Tube hold for add-on     Comment: Auto resulted       Green Top (Gel) [[id number]] Collected: 12/11/20 1959    Specimen: Blood Updated: 12/11/20 2100     Extra Tube Hold for add-ons.     Comment: Auto resulted.       Lavender Top [[id number]] Collected: 12/11/20 1959    Specimen: Blood Updated: 12/11/20 2100     Extra Tube hold for add-on     Comment: Auto resulted       Gold Top - SST [[id number]] Collected: 12/11/20 1959    Specimen: Blood Updated: 12/11/20 2100     Extra Tube Hold for add-ons.     Comment: Auto resulted.       Comprehensive Metabolic Panel [[id number]]  (Abnormal) Collected: 12/11/20 1959    Specimen: Blood Updated: 12/11/20 2031     Glucose 133 mg/dL      BUN 23 mg/dL      Creatinine 1.16 mg/dL      Sodium 141 mmol/L      Potassium 3.4 mmol/L      Comment: Slight hemolysis detected by analyzer. Results may be affected.        Chloride 107 mmol/L      CO2 21.0 mmol/L      Calcium 9.0 mg/dL      Total Protein 6.6 g/dL      Albumin 4.00 g/dL      ALT (SGPT) 23 U/L      AST (SGOT) 21 U/L      Alkaline Phosphatase 64 U/L      Total Bilirubin 0.4 mg/dL      eGFR Non African Amer 60 mL/min/1.73      Globulin 2.6 gm/dL      A/G Ratio 1.5 g/dL      BUN/Creatinine Ratio 19.8     Anion Gap 13.0 mmol/L     Narrative:      GFR Normal >60  Chronic Kidney Disease <60  Kidney Failure <15      Troponin [[id number]]  (Normal) Collected: 12/11/20 1959    Specimen: Blood Updated: 12/11/20 2031     Troponin T <0.010 ng/mL     Narrative:      Troponin T Reference Range:  <= 0.03 ng/mL-   Negative for AMI  >0.03 ng/mL-      Abnormal for myocardial necrosis.  Clinicians would have to utilize clinical acumen, EKG, Troponin and serial changes to determine if it is an Acute Myocardial Infarction or myocardial injury due to an underlying chronic condition.       Results may be falsely decreased if patient taking Biotin.      Protime-INR [[id number]]  (Normal) Collected: 12/11/20 1959    Specimen: Blood Updated: 12/11/20 2017     Protime 10.5 Seconds      INR 0.95    aPTT [[id number]]  (Abnormal) Collected: 12/11/20 1959    Specimen: Blood Updated: 12/11/20 2017     PTT 22.8 seconds     CBC & Differential [[id number]]  (Normal) Collected: 12/11/20 1959    Specimen: Blood Updated: 12/11/20 2004    Narrative:      The following orders were created for panel order CBC & Differential.  Procedure                               Abnormality         Status                     ---------                               -----------         ------                     CBC Auto Differential[[id number]]        Normal              Final result                 Please view results for these tests on the individual orders.    CBC Auto Differential [[id number]]  (Normal) Collected: 12/11/20 1959    Specimen: Blood Updated: 12/11/20 2004     WBC 6.90 10*3/mm3      RBC 4.80 10*6/mm3      Hemoglobin 14.4 g/dL      Hematocrit 43.8 %      MCV 91.3 fL      MCH 30.1 pg      MCHC 33.0 g/dL      RDW 14.9 %      RDW-SD 47.3 fl      MPV 8.0 fL      Platelets 219 10*3/mm3      Neutrophil % 62.9 %      Lymphocyte % 23.4 %      Monocyte % 11.2 %      Eosinophil % 1.2 %      Basophil % 1.3 %      Neutrophils, Absolute 4.30 10*3/mm3      Lymphocytes, Absolute 1.60 10*3/mm3      Monocytes, Absolute 0.80 10*3/mm3      Eosinophils, Absolute 0.10 10*3/mm3      Basophils, Absolute 0.10 10*3/mm3      nRBC 0.2 /100 WBC     POC Glucose Once [[id number]]  (Abnormal) Collected: 12/11/20 1949    Specimen: Blood Updated: 12/11/20 1950     Glucose 135 mg/dL      Comment: Serial Number:  [id number]:  [id number]           No results found for: HGBA1C  Results from last 7 days   Lab Units 12/11/20 1959   INR  0.95           No results found for: LIPASE  Lab Results   Component Value Date    CHOL 162 12/12/2020    TRIG 93 12/12/2020    HDL 48 12/12/2020    LDL 97 12/12/2020       No results found for: INTRAOP, PREDX, FINALDX, COMDX    Microbiology Results (last 10 days)     ** No results found for the last 240 hours. **          ECG/EMG Results (most recent)     Procedure Component Value Units Date/Time    ECG 12 Lead [[id number]] Collected: 12/11/20 2017     Updated: 12/11/20 2022     QT Interval 383 ms     Narrative:      HEART RATE= 75  bpm  RR Interval= 804  ms  IN Interval= 92  ms  P Horizontal Axis= 221  deg  P Front Axis= 0  deg  QRSD Interval= 113  ms  QT Interval= 383  ms  QRS Axis= -37  deg  T Wave Axis= 166  deg  - ABNORMAL ECG -  Sinus rhythm  Repol abnrm suggests ischemia, diffuse leads  No previous ECG available for comparison  Electronically Signed By:   Date and Time of Study: 2020-12-11 20:17:32               Results for orders placed during the hospital encounter of 09/24/20   Adult Transthoracic Echo Complete W/ Cont if Necessary Per Protocol    Narrative · Left ventricular systolic function is normal.  · Left ventricular ejection fraction is 55 to 60%  · Left ventricular wall thickness is consistent with concentric   hypertrophy.  · Left ventricular diastolic function is consistent with (grade I)   impaired relaxation.  · Mild aortic valve regurgitation is present.  · Peak velocity of the flow distal to the aortic valve is 274.7 cm/s.   Aortic valve maximum pressure gradient is 30.2 mmHg.  · Aortic valve area is 1.2 cm2.  · Mild mitral valve regurgitation is present.          Xr Chest 1 View    Result Date: 12/11/2020  1.  No evidence of active disease.   Electronically Signed By-Nikki Bright MD On:12/11/2020 8:43 PM This report was finalized on [id number] by  Nikki  MD Kaila.    Ct Head Without Contrast Stroke Protocol    Result Date: 12/11/2020  1. Negative for hemorrhage or mass effect. 2. There is moderate cerebral and cerebellar atrophy. There is mild white matter abnormality which is favored to be due to chronic small vessel ischemia. 3. 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 4. If acute ischemia is clinically suspected, recommend consideration of brain MR.  Electronically Signed By-Nikki Bright MD On:12/11/2020 8:04 PM This report was finalized on [id number] by  Nikki Bright MD.          Xrays, labs reviewed personally by physician.    Medication Review:   I have reviewed the patient's current medication list      Scheduled Meds  amLODIPine, 10 mg, Oral, Daily  aspirin, 325 mg, Oral, Daily    Or  aspirin, 300 mg, Rectal, Daily  atorvastatin, 40 mg, Oral, Nightly  clopidogrel, 75 mg, Oral, Daily  insulin lispro, 0-9 Units, Subcutaneous, TID AC  levothyroxine, 75 mcg, Oral, Daily  losartan, 50 mg, Oral, Daily  pantoprazole, 40 mg, Oral, Daily  sodium chloride, 3 mL, Intravenous, Q12H        Meds Infusions       Meds PRN  •  acetaminophen **OR** acetaminophen  •  bisacodyl  •  dextrose  •  dextrose  •  glucagon (human recombinant)  •  influenza vaccine  •  insulin lispro **AND** insulin lispro  •  ondansetron  •  potassium chloride **OR** potassium chloride **OR** potassium chloride  •  sodium chloride  •  sodium chloride        Assessment/Plan   Assessment/Plan     Active Hospital Problems    Diagnosis  POA   • TIA (transient ischemic attack) [G45.9]  Yes      Resolved Hospital Problems   No resolved problems to display.       MEDICAL DECISION MAKING COMPLEXITY BY PROBLEM:     Suspected TIA versus ischemic stroke     -follow up MRI of the brain pending and neurologist consulted     -on antiplatelet with aspirin/Plavix/Lipitor    Bilateral SIVAN     -follow up on vascular surgeon consultation     -most likely will require endarterectomy      -currently on dual antiplatelets with aspirin/Plavix    Left ankle pain with swelling/suspected  fracture      -obtain x-ray of the left ankle        -cont with of care and pain control    Essential hypertension       -Amlodipine/losartan    T2DM     -cont SSI    Mixed hyperlipidemia     -Lipitor     Hypothyroidism       -Levothyroxine    Obesity      -encourage lifestyle changes        VTE Prophylaxis -   Mechanical Order History:      Ordered        12/12/20 0255  Place Sequential Compression Device  Once         12/12/20 0255  Maintain Sequential Compression Device  Continuous                 Pharmalogical Order History:     None            Code Status -   Code Status and Medical Interventions:   Ordered at: 12/11/20 2055     Code Status:    CPR     Medical Interventions (Level of Support Prior to Arrest):    Full         Discharge Planning      Electronically signed by Kendall Arellano MD, 12/12/20, 08:47 EST.  [facility] Hospitalist Team

## 2020-12-12 NOTE — PLAN OF CARE
Goal Outcome Evaluation:  Plan of Care Reviewed With: patient  Progress: no change   Carotid surgery scheduled for Monday, pt also had left foot xray which showed multiple fractures, c/o pain with movement only

## 2020-12-12 NOTE — ED PROVIDER NOTES
Subjective   Patient is an 80-year-old male who is 1/2-hour prior to arrival to the ED developed some tingling and numbness in his left leg and his face.  He states he had similar symptoms several years ago although it was tingling in his opposite leg.  Was noted to have a mild CVA at that time.  Patient denies cough fever chest pain vomiting or other complaint other than mild dizziness.          Review of Systems  Negative for headache earache throat neck pain cough fever chest pain shortness of breath vomiting diarrhea dysuria achiness weight loss or other complaint  Past Medical History:   Diagnosis Date   • Allergic rhinitis    • Diabetes (CMS/HCC)    • GERD (gastroesophageal reflux disease)    • Heart murmur    • Hyperlipidemia    • Hypertension    • Hypothyroidism    • Prostate cancer (CMS/HCC)    • Stroke (CMS/HCC)        Allergies   Allergen Reactions   • Azithromycin Unknown - High Severity       Past Surgical History:   Procedure Laterality Date   • BACK SURGERY  1994   • COLONOSCOPY  08/25/2015   • PROSTATECTOMY  2001    due to cancer       Family History   Problem Relation Age of Onset   • Hypertension Other    • Heart attack Mother    • Heart disease Mother    • Heart attack Father    • Heart disease Father        Social History     Socioeconomic History   • Marital status:      Spouse name: Not on file   • Number of children: Not on file   • Years of education: Not on file   • Highest education level: Not on file   Tobacco Use   • Smoking status: Never Smoker   • Smokeless tobacco: Never Used   Substance and Sexual Activity   • Alcohol use: Not Currently   • Drug use: Never   • Sexual activity: Defer           Objective   Physical Exam  Neurologic exam is nonfocal.  HEENT exam shows TMs to be clear.  Oropharynx comers but sclerae nonicteric.  Neck has no adenopathy JVD or bruits.  Lungs are clear.  Heart has a regular rate rhythm without murmur rub or gallop.  Chest is nontender.  Abdomen is  soft nontender.  Extremity exam is no cyanosis or edema.  Procedures       My EKG interpretation shows normal sinus rhythm with no acute ST change    ED Course      Results for orders placed or performed during the hospital encounter of 12/11/20   Comprehensive Metabolic Panel    Specimen: Blood   Result Value Ref Range    Glucose 133 (H) 65 - 99 mg/dL    BUN 23 8 - 23 mg/dL    Creatinine 1.16 0.76 - 1.27 mg/dL    Sodium 141 136 - 145 mmol/L    Potassium 3.4 (L) 3.5 - 5.2 mmol/L    Chloride 107 98 - 107 mmol/L    CO2 21.0 (L) 22.0 - 29.0 mmol/L    Calcium 9.0 8.6 - 10.5 mg/dL    Total Protein 6.6 6.0 - 8.5 g/dL    Albumin 4.00 3.50 - 5.20 g/dL    ALT (SGPT) 23 1 - 41 U/L    AST (SGOT) 21 1 - 40 U/L    Alkaline Phosphatase 64 39 - 117 U/L    Total Bilirubin 0.4 0.0 - 1.2 mg/dL    eGFR Non African Amer 60 (L) >60 mL/min/1.73    Globulin 2.6 gm/dL    A/G Ratio 1.5 g/dL    BUN/Creatinine Ratio 19.8 7.0 - 25.0    Anion Gap 13.0 5.0 - 15.0 mmol/L   Protime-INR    Specimen: Blood   Result Value Ref Range    Protime 10.5 9.6 - 11.7 Seconds    INR 0.95 0.93 - 1.10   aPTT    Specimen: Blood   Result Value Ref Range    PTT 22.8 (L) 24.0 - 31.0 seconds   Troponin    Specimen: Blood   Result Value Ref Range    Troponin T <0.010 0.000 - 0.030 ng/mL   CBC Auto Differential    Specimen: Blood   Result Value Ref Range    WBC 6.90 3.40 - 10.80 10*3/mm3    RBC 4.80 4.14 - 5.80 10*6/mm3    Hemoglobin 14.4 13.0 - 17.7 g/dL    Hematocrit 43.8 37.5 - 51.0 %    MCV 91.3 79.0 - 97.0 fL    MCH 30.1 26.6 - 33.0 pg    MCHC 33.0 31.5 - 35.7 g/dL    RDW 14.9 12.3 - 15.4 %    RDW-SD 47.3 37.0 - 54.0 fl    MPV 8.0 6.0 - 12.0 fL    Platelets 219 140 - 450 10*3/mm3    Neutrophil % 62.9 42.7 - 76.0 %    Lymphocyte % 23.4 19.6 - 45.3 %    Monocyte % 11.2 5.0 - 12.0 %    Eosinophil % 1.2 0.3 - 6.2 %    Basophil % 1.3 0.0 - 1.5 %    Neutrophils, Absolute 4.30 1.70 - 7.00 10*3/mm3    Lymphocytes, Absolute 1.60 0.70 - 3.10 10*3/mm3    Monocytes, Absolute  0.80 0.10 - 0.90 10*3/mm3    Eosinophils, Absolute 0.10 0.00 - 0.40 10*3/mm3    Basophils, Absolute 0.10 0.00 - 0.20 10*3/mm3    nRBC 0.2 0.0 - 0.2 /100 WBC   POC Glucose Once    Specimen: Blood   Result Value Ref Range    Glucose 135 (H) 70 - 105 mg/dL   ECG 12 Lead   Result Value Ref Range    QT Interval 383 ms   Type & Screen    Specimen: Blood   Result Value Ref Range    ABO Type O     RH type Positive     Antibody Screen Negative     T&S Expiration Date 12/14/2020 11:59:59 PM    Light Blue Top   Result Value Ref Range    Extra Tube hold for add-on    Green Top (Gel)   Result Value Ref Range    Extra Tube Hold for add-ons.    Lavender Top   Result Value Ref Range    Extra Tube hold for add-on    Gold Top - SST   Result Value Ref Range    Extra Tube Hold for add-ons.      Xr Chest 1 View    Result Date: 12/11/2020  1.  No evidence of active disease.   Electronically Signed By-Nikki Bright MD On:12/11/2020 8:43 PM This report was finalized on [id number] by  Nikki Bright MD.    Ct Head Without Contrast Stroke Protocol    Result Date: 12/11/2020  1. Negative for hemorrhage or mass effect. 2. There is moderate cerebral and cerebellar atrophy. There is mild white matter abnormality which is favored to be due to chronic small vessel ischemia. 3. 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 4. If acute ischemia is clinically suspected, recommend consideration of brain MR.  Electronically Signed By-Nikki Bright MD On:12/11/2020 8:04 PM This report was finalized on [id number] by  Nikki Bright MD.                                           MDM  Number of Diagnoses or Management Options  Diagnosis management comments: Patient had a code stroke initiated upon arrival to the ED.  His NIH was 0.  CT scan shows no acute abnormality.  Upon returning back to the ED the patient was asymptomatic and felt at baseline.  His NIH was 0.  He had no focal neurologic deficits.  There is no evidence of metabolic  abnormality.  There is no evidence infectious process.  Patient was brought to hospital for further observation.  I did speak to the on-call neurologist and hospitalist.       Amount and/or Complexity of Data Reviewed  Clinical lab tests: reviewed  Tests in the radiology section of CPT®: reviewed  Tests in the medicine section of CPT®: reviewed    Risk of Complications, Morbidity, and/or Mortality  Presenting problems: high  Diagnostic procedures: high  Management options: high    Patient Progress  Patient progress: stable      Final diagnoses:   TIA (transient ischemic attack)            Alexys Stevens MD  12/11/20 5035

## 2020-12-12 NOTE — H&P
"      [facility]      Patient Name: Alen Ratliff  : 1940  MRN: [id number]  Primary Care Physician: Serenity Wren MD  Date of admission: 2020    Patient Care Team:  Serenity Wren MD as PCP - General (Family Medicine)  William Patricio MD as Consulting Physician (Neurology)          Subjective   History Present Illness     Chief Complaint:   Chief Complaint   Patient presents with   • Numbness     tingling on left side and face.  Onset 30 minutes ago.                   80 year old male , awake, alert and good hisotrian presents with complaints of onset of left arm and left facial numbness this evening. He denies headache, confusion, slurred speech , focal deficits or vision changes. He reports PMH of a stroke 4 years ago when he lived in [place] and today felt similar. Symptoms resolved in ED, NIH ). CT scan head per radiology:    \"IMPRESSION:  1. Negative for hemorrhage or mass effect.  2. There is moderate cerebral and cerebellar atrophy. There is mild  white matter abnormality which is favored to be due to chronic small  vessel ischemia.  3. 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  4. If acute ischemia is clinically suspected, recommend consideration of  brain MR.\"    CTA head and neck today per radiology:    \"CTA HEAD:      There is calcified atherosclerotic plaque in the cavernous segment of each internal carotid artery.   There is a stenosis of a right M2 branch. RT M2 branches are slightly decreased as compared to LT, with no definiteocclusion.   The left PCA has a fetal origin and there are 2 severe stenoses in the right PCA. There are several stenoses in the left PCA.   The upper segment of the left vertebral artery is not identified. This may be a congenital anomaly or due to pathology.   The basilar artery is relatively small in caliber, which may be due to the fetal origin of the left PCA.        CTA neck:   There is " "calcified plaque in the left ICA bulb with a stenosis of about 50%.   There is calcified plaque in the right ICA bulb with a stenosis of about 80%.   Vertebral arteries are patent. The right is larger than the left.\"    He reports he was followed by both a cardiologist and neurologist in [place]. He denies CAD but reports a hear murmer . Review of records shows he has established care with Dr Muhammad and had an echo and stress test in September:    \"'Left ventricular systolic function is normal.·   Left ventricular ejection fraction is 55 to 60%  · Left ventricular wall thickness is consistent with concentric hypertrophy.  · Left ventricular diastolic function is consistent with (grade I) impaired relaxation.  · Mild aortic valve regurgitation is present.  · Peak velocity of the flow distal to the aortic valve is 274.7 cm/s. Aortic valve maximum pressure gradient is 30.2 mmHg.  · Aortic valve area is 1.2 cm2.  · Mild mitral valve regurgitation is present.'    \"9/16/20    · Findings consistent with a normal ECG stress test.  · Left ventricular ejection fraction is normal. (Calculated EF = 64%).  · Myocardial perfusion imaging indicates a medium-sized infarct located in the inferior wall and septal wall with mild joseph-infarct ischemia.  · Impressions are consistent with a high risk study.  · This is abnormal Cardiolite imaging stress test with moderate sized inferior/septal myocardial infarction with small amount of joseph-infarct ischemia. Left ventricular size and function was normal. Clinical correlation recommended.'    Troponin today negative he denies dizziness or chest pain or dyspnea. Neurology was consulted from ED and he will be admitted for further evaluation and treatment .     PMH DM Type 2, HLD, HTN, prostate cancer, GERD, hypothyroidism . Glucose 133, K 3.4.           Review of Systems   Constitution: Negative.   HENT: Negative.    Eyes: Negative.    Cardiovascular: Negative.    Respiratory: Negative.  "   Endocrine: Negative.    Hematologic/Lymphatic: Negative.    Skin: Negative.    Musculoskeletal: Negative.    Gastrointestinal: Negative.    Genitourinary: Negative.    Neurological: Positive for numbness and paresthesias.   Psychiatric/Behavioral: Negative.    Allergic/Immunologic: Negative.            Personal History     Past Medical History:   Past Medical History:   Diagnosis Date   • Allergic rhinitis    • Diabetes (CMS/HCC)    • GERD (gastroesophageal reflux disease)    • Heart murmur    • Hyperlipidemia    • Hypertension    • Hypothyroidism    • Prostate cancer (CMS/HCC)    • Stroke (CMS/HCC)        Surgical History:      Past Surgical History:   Procedure Laterality Date   • BACK SURGERY  1994   • COLONOSCOPY  08/25/2015   • PROSTATECTOMY  2001    due to cancer           Family History: family history includes Heart attack in his father and mother; Heart disease in his father and mother; Hypertension in an other family member. Otherwise pertinent FHx was reviewed and unremarkable.     Social History:  reports that he has never smoked. He has never used smokeless tobacco. He reports previous alcohol use. He reports that he does not use drugs.      Medications:  Prior to Admission medications    Medication Sig Start Date End Date Taking? Authorizing Provider   amLODIPine (NORVASC) 10 MG tablet TAKE 1 TABLET EVERY DAY 10/28/20   Serenity Wren MD   atorvastatin (LIPITOR) 40 MG tablet Take 1 tablet by mouth Every Night. 9/17/20   Serenity Wren MD   clopidogrel (PLAVIX) 75 MG tablet TAKE 1 TABLET EVERY DAY 11/11/20   Serenity Wren MD   fluticasone (FLONASE) 50 MCG/ACT nasal spray 2 sprays into the nostril(s) as directed by provider Daily.    Renee Fabian MD   glucose blood test strip 1 each by Other route Daily. Use as instructed    Renee Fabian MD   Januvia 50 MG tablet TAKE 1 TABLET EVERY DAY 11/11/20   Serenity Wren MD   Jardiance 10 MG tablet TAKE 1 TABLET EVERY DAY 10/28/20    Serenity Wren MD   levothyroxine (SYNTHROID, LEVOTHROID) 75 MCG tablet TAKE 1 TABLET EVERY DAY 11/30/20   Serenity Wren MD   losartan (COZAAR) 50 MG tablet TAKE 1 TABLET EVERY DAY 11/30/20   Serenity Wren MD   pantoprazole (PROTONIX) 40 MG EC tablet TAKE 1 TABLET EVERY DAY 11/25/20   Serenity Wren MD       Allergies:    Allergies   Allergen Reactions   • Azithromycin Unknown - High Severity       Objective   Objective     Vital Signs  Temp:  [97 °F (36.1 °C)-98 °F (36.7 °C)] 98 °F (36.7 °C)  Heart Rate:  [62-99] 67  Resp:  [18-20] 20  BP: (135-188)/(57-85) 153/62  SpO2:  [95 %-99 %] 97 %  on   ;      Body mass index is 31.16 kg/m².    Physical Exam  Vitals signs reviewed.   Constitutional:       Appearance: Normal appearance. He is obese.   HENT:      Head: Normocephalic and atraumatic.      Right Ear: External ear normal.      Left Ear: External ear normal.      Nose: Nose normal.      Mouth/Throat:      Mouth: Mucous membranes are moist.   Eyes:      Extraocular Movements: Extraocular movements intact.   Neck:      Musculoskeletal: Normal range of motion and neck supple.   Cardiovascular:      Rate and Rhythm: Normal rate and regular rhythm.      Pulses: Normal pulses.      Heart sounds: Murmur present.   Pulmonary:      Effort: Pulmonary effort is normal.      Breath sounds: Normal breath sounds.   Abdominal:      Palpations: Abdomen is soft.   Genitourinary:     Comments: deferred  Musculoskeletal: Normal range of motion.   Skin:     General: Skin is warm and dry.   Neurological:      General: No focal deficit present.      Mental Status: He is alert and oriented to person, place, and time. Mental status is at baseline.   Psychiatric:         Mood and Affect: Mood normal.         Behavior: Behavior normal.         Thought Content: Thought content normal.         Judgment: Judgment normal.         Results Review:  I have personally reviewed most recent radiology images and interpretations and  agree with findings, most notably: CT head CTA head and neck .    Results from last 7 days   Lab Units 12/11/20 1959   WBC 10*3/mm3 6.90   HEMOGLOBIN g/dL 14.4   HEMATOCRIT % 43.8   PLATELETS 10*3/mm3 219   INR  0.95     Results from last 7 days   Lab Units 12/11/20 1959   SODIUM mmol/L 141   POTASSIUM mmol/L 3.4*   CHLORIDE mmol/L 107   CO2 mmol/L 21.0*   BUN mg/dL 23   CREATININE mg/dL 1.16   GLUCOSE mg/dL 133*   CALCIUM mg/dL 9.0   ALT (SGPT) U/L 23   AST (SGOT) U/L 21   TROPONIN T ng/mL <0.010     Estimated Creatinine Clearance: 57 mL/min (by C-G formula based on SCr of 1.16 mg/dL).  Brief Urine Lab Results  (Last result in the past 365 days)      Color   Clarity   Blood   Leuk Est   Nitrite   Protein   CREAT   Urine HCG        08/27/20 0821             66.2             Microbiology Results (last 10 days)     ** No results found for the last 240 hours. **          ECG/EMG Results (most recent)     Procedure Component Value Units Date/Time    ECG 12 Lead [[id number]] Collected: 12/11/20 2017     Updated: 12/11/20 2022     QT Interval 383 ms     Narrative:      HEART RATE= 75  bpm  RR Interval= 804  ms  IA Interval= 92  ms  P Horizontal Axis= 221  deg  P Front Axis= 0  deg  QRSD Interval= 113  ms  QT Interval= 383  ms  QRS Axis= -37  deg  T Wave Axis= 166  deg  - ABNORMAL ECG -  Sinus rhythm  Repol abnrm suggests ischemia, diffuse leads  No previous ECG available for comparison  Electronically Signed By:   Date and Time of Study: 2020-12-11 20:17:32               Results for orders placed during the hospital encounter of 09/24/20   Adult Transthoracic Echo Complete W/ Cont if Necessary Per Protocol    Narrative · Left ventricular systolic function is normal.  · Left ventricular ejection fraction is 55 to 60%  · Left ventricular wall thickness is consistent with concentric   hypertrophy.  · Left ventricular diastolic function is consistent with (grade I)   impaired relaxation.  · Mild aortic valve regurgitation is  present.  · Peak velocity of the flow distal to the aortic valve is 274.7 cm/s.   Aortic valve maximum pressure gradient is 30.2 mmHg.  · Aortic valve area is 1.2 cm2.  · Mild mitral valve regurgitation is present.          Xr Chest 1 View    Result Date: 12/11/2020  1.  No evidence of active disease.   Electronically Signed By-Nikki Bright MD On:12/11/2020 8:43 PM This report was finalized on [id number] by  Nikki Bright MD.    Ct Head Without Contrast Stroke Protocol    Result Date: 12/11/2020  1. Negative for hemorrhage or mass effect. 2. There is moderate cerebral and cerebellar atrophy. There is mild white matter abnormality which is favored to be due to chronic small vessel ischemia. 3. 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 4. If acute ischemia is clinically suspected, recommend consideration of brain MR.  Electronically Signed By-Nikki Bright MD On:12/11/2020 8:04 PM This report was finalized on [id number] by  Nikki Bright MD.        Estimated Creatinine Clearance: 57 mL/min (by C-G formula based on SCr of 1.16 mg/dL).    Assessment/Plan   Assessment/Plan       Active Hospital Problems    Diagnosis  POA   • TIA (transient ischemic attack) [G45.9]  Yes      Resolved Hospital Problems   No resolved problems to display.     TIA CT head negative for hemorrhage or mass effect per radiology- has severe stenosis R PCA and Right ICA per radiology see CTA head and neck , on statin add aspirin 325 daily, on Plavix from PMH CVA MRI in am , neurology following , 2 d echo in am     Hypokalemia K 3.4- K replacement protocol     DM Type 2 controlled- hold oral hypoglycemics add ssi prn and accucheck achs LCS diet     HTN- controlled on Losartan and amlodipine    GERD- on PPI    Hypothyroidism - on levothyroxine - TSH pending     Obesity BMI 31- lifestyle management education             VTE Prophylaxis -   Mechanical Order History:      Ordered        Signed and Held  Place Sequential  Compression Device  Once         Signed and Held  Maintain Sequential Compression Device  Continuous                 Pharmalogical Order History:     None          CODE STATUS:    Code Status and Medical Interventions:   Ordered at: 12/11/20 2055     Code Status:    CPR     Medical Interventions (Level of Support Prior to Arrest):    Full       This patient has been examined wearing appropriate Personal Protective Equipment 12/11/20      I discussed the patient's findings and my recommendations with patient and wife .      Signature:Electronically signed by LARRY Rosario, 12/11/20, 11:07 PM EST.    [facility] Hospitalist Team

## 2020-12-12 NOTE — CONSULTS
Name: Alen Ratliff ADMIT: 2020   : 1940  PCP: Serenity Wren MD    MRN: [id number] LOS: 0 days   AGE/SEX: 80 y.o. male  ROOM: [facility]/[address] [place] [address]      Patient Care Team:  Serenity Wren MD as PCP - General (Family Medicine)  William Patricio MD as Consulting Physician (Neurology)  Chief Complaint   Patient presents with   • Numbness     tingling on left side and face.  Onset 30 minutes ago.      CC: Episode left face and arm numbness resolved after few hours associated with fall.    Subjective     Consults  80-year-old gentleman presenting to the hospital with episode of left face and arm numbness.  Evaluation with CT angiogram head and neck showing 80% right carotid stenosis and 50% left carotid stenosis.  He is on aspirin and Plavix.  He has a remote history of stroke about 4 years ago.  MRI brain with no acute stroke.  Reviewed images tracing report of CT angiogram and an MRI.  Patient has fully recovered from neurologic event.  Continue aspirin Plavix.    Discussed with patient and wife at time of consultation.  Plan right carotid endarterectomy Monday, 2020.  Procedure risk discussed with patient and wife by phone and agreed to proceed.  Discussed risk of stroke, cranial nerve injury, recurrent stenosis, bleeding, and other risks of surgery and agreed to proceed.    Patient with fall at time of event.  He has left fibula fracture.  Primary physician to consult orthopedic surgeon of their choice.  Recommend carotid surgery first prior to any kind of surgical intervention if needed for left ankle fracture.    Review of Systems   Musculoskeletal: Positive for arthralgias and joint swelling.   Neurological: Positive for light-headedness and numbness.   All other systems reviewed and are negative.      Past Medical History:   Diagnosis Date   • Allergic rhinitis    • Diabetes (CMS/[facility])    • GERD (gastroesophageal reflux disease)    • Heart murmur    • Hyperlipidemia    • Hypertension    •  Hypothyroidism    • Prostate cancer (CMS/HCC)    • Stroke (CMS/HCC)      Past Surgical History:   Procedure Laterality Date   • BACK SURGERY  1994   • COLONOSCOPY  08/25/2015   • PROSTATECTOMY  2001    due to cancer     Family History   Problem Relation Age of Onset   • Hypertension Other    • Heart attack Mother    • Heart disease Mother    • Heart attack Father    • Heart disease Father      Social History     Tobacco Use   • Smoking status: Never Smoker   • Smokeless tobacco: Never Used   Substance Use Topics   • Alcohol use: Not Currently   • Drug use: Never     Medications Prior to Admission   Medication Sig Dispense Refill Last Dose   • amLODIPine (NORVASC) 10 MG tablet TAKE 1 TABLET EVERY DAY 90 tablet 1    • atorvastatin (LIPITOR) 40 MG tablet Take 1 tablet by mouth Every Night. 90 tablet 1    • clopidogrel (PLAVIX) 75 MG tablet TAKE 1 TABLET EVERY DAY 90 tablet 1    • fluticasone (FLONASE) 50 MCG/ACT nasal spray 2 sprays into the nostril(s) as directed by provider Daily As Needed.      • Januvia 50 MG tablet TAKE 1 TABLET EVERY DAY 90 tablet 1    • Jardiance 10 MG tablet TAKE 1 TABLET EVERY DAY 90 tablet 1    • levothyroxine (SYNTHROID, LEVOTHROID) 75 MCG tablet TAKE 1 TABLET EVERY DAY 90 tablet 1    • losartan (COZAAR) 50 MG tablet TAKE 1 TABLET EVERY DAY 90 tablet 1    • pantoprazole (PROTONIX) 40 MG EC tablet TAKE 1 TABLET EVERY DAY 90 tablet 1    • glucose blood test strip 1 each by Other route Daily. Use as instructed        amLODIPine, 10 mg, Oral, Daily  [START ON 12/13/2020] aspirin, 81 mg, Oral, Daily  atorvastatin, 80 mg, Oral, Nightly  clopidogrel, 75 mg, Oral, Daily  insulin lispro, 0-9 Units, Subcutaneous, TID AC  levothyroxine, 75 mcg, Oral, Daily  losartan, 50 mg, Oral, Daily  pantoprazole, 40 mg, Oral, Daily  sodium chloride, 3 mL, Intravenous, Q12H         •  acetaminophen **OR** acetaminophen  •  bisacodyl  •  dextrose  •  dextrose  •  glucagon (human recombinant)  •  influenza vaccine  •   "insulin lispro **AND** insulin lispro  •  ondansetron  •  potassium chloride **OR** potassium chloride **OR** potassium chloride  •  sodium chloride  •  sodium chloride  Azithromycin    Objective     Physical Exam:  Physical Exam  Vitals signs reviewed.   Constitutional:       Appearance: Normal appearance.   Eyes:      Extraocular Movements: Extraocular movements intact.      Pupils: Pupils are equal, round, and reactive to light.   Neck:      Musculoskeletal: Neck supple.   Cardiovascular:      Rate and Rhythm: Normal rate and regular rhythm. Occasional extrasystoles are present.     Pulses: Normal pulses.           Radial pulses are 2+ on the right side and 2+ on the left side.        Femoral pulses are 2+ on the right side and 2+ on the left side.       Dorsalis pedis pulses are 2+ on the right side.      Comments: Doppler left pedal pulses but unable to feel likely from fracture  Pulmonary:      Effort: Pulmonary effort is normal.      Breath sounds: Normal breath sounds.   Abdominal:      General: Abdomen is flat. Bowel sounds are normal.      Palpations: Abdomen is soft.   Musculoskeletal:         General: Swelling and tenderness (Left ankle fracture site) present.   Skin:     General: Skin is warm.   Neurological:      General: No focal deficit present.      Mental Status: He is alert and oriented to person, place, and time.      Cranial Nerves: No cranial nerve deficit.      Sensory: No sensory deficit.      Motor: No weakness.   Psychiatric:         Mood and Affect: Mood normal.         Behavior: Behavior normal.         Thought Content: Thought content normal.         Judgment: Judgment normal.          Vital Signs and Labs:  Vital Signs Patient Vitals for the past 24 hrs:   BP Temp Temp src Pulse Resp SpO2 Height Weight   12/12/20 1100 139/65 98.4 °F (36.9 °C) Oral 66 14 99 % -- --   12/12/20 0904 136/55 -- -- -- -- -- 182.9 cm (72\") 93.9 kg (207 lb)   12/12/20 0700 136/55 98 °F (36.7 °C) Oral 61 12 98 % " "-- --   12/12/20 0611 131/47 -- -- 65 15 97 % -- --   12/12/20 0255 146/52 97.8 °F (36.6 °C) Oral 59 18 96 % 182.9 cm (72\") 94.1 kg (207 lb 6.4 oz)   12/12/20 0149 136/62 -- -- 52 -- 95 % -- --   12/11/20 2249 153/62 -- -- 67 -- 97 % -- --   12/11/20 2234 138/57 -- -- 63 -- 95 % -- --   12/11/20 2219 135/63 -- -- 64 -- 97 % -- --   12/11/20 2204 143/60 -- -- 62 -- 96 % -- --   12/11/20 2008 180/70 -- -- -- -- -- -- --   12/11/20 2003 (!) 185/79 98 °F (36.7 °C) -- 98 20 96 % -- --   12/11/20 1959 (!) 188/85 97 °F (36.1 °C) -- 99 18 99 % 175.3 cm (69\") 95.7 kg (211 lb)       CBC    Results from last 7 days   Lab Units 12/11/20 1959   WBC 10*3/mm3 6.90   HEMOGLOBIN g/dL 14.4   PLATELETS 10*3/mm3 219     BMP   Results from last 7 days   Lab Units 12/11/20 1959   SODIUM mmol/L 141   POTASSIUM mmol/L 3.4*   CHLORIDE mmol/L 107   CO2 mmol/L 21.0*   BUN mg/dL 23   CREATININE mg/dL 1.16   GLUCOSE mg/dL 133*     Radiology(recent) Xr Ankle 2 View Left    Result Date: 12/12/2020  1.Oblique fracture of the distal fibula with mild lateral displacement. 2.Suspected minimally displaced fracture of the posterior malleolus. 3.Mild osteoarthritis. 4.Calcific atherosclerosis.  Electronically Signed By-Dylon Cedillo MD On:12/12/2020 1:05 PM This report was finalized on [id number] by  Dylon Cedillo MD.    Mri Brain Without Contrast    Result Date: 12/12/2020  1.No definite MRI findings of acute infarction or other acute intracranial abnormality at this time. 2.Volume loss and suspected mild to moderate chronic small vessel ischemic changes with remote lacunar infarct in the right basal ganglion.   Electronically Signed By-Dylon Cedillo MD On:12/12/2020 1:33 PM This report was finalized on [id number] by  Dylon Cedillo MD.    Xr Chest 1 View    Result Date: 12/11/2020  1.  No evidence of active disease.   Electronically Signed By-Nikki Bright MD On:12/11/2020 8:43 PM This report was finalized on [id number] by  Nikki Bright, " MD.    Ct Head Without Contrast Stroke Protocol    Result Date: 12/11/2020  1. Negative for hemorrhage or mass effect. 2. There is moderate cerebral and cerebellar atrophy. There is mild white matter abnormality which is favored to be due to chronic small vessel ischemia. 3. 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 4. If acute ischemia is clinically suspected, recommend consideration of brain MR.  Electronically Signed By-Nikki Bright MD On:12/11/2020 8:04 PM This report was finalized on [id number] by  Nikki Bright MD.      Active Hospital Problems    Diagnosis  POA   • **Symptomatic carotid artery stenosis, bilateral [I65.23]  Yes   • TIA (transient ischemic attack) [G45.9]  Yes      Resolved Hospital Problems   No resolved problems to display.     Problem Points:  4:  Patient has a new problem, with additional work-up planned  Total problem points:4 or more    Data Points:  1:  I have reviewed or order clinical lab test  1:  I have reviewed or order radiology test (except heart catheterization or echo)  2:  I have personally and independently review of image, tracing, or specimen  1:  I have personally decided to obtain of records  2:  I have reviewed and summation of old records and/or discussed the patients care with another health care provider  Total data points:4 or more    Risk Points:  High:  Abrupt change in neuorlogic exam    MDM requires 2/3 (Problem points, Data points and Risk)  MDM Prob point Data point Risk   SF 1 1 Minimal   Low 2 2 Low   Mod 3 3 Moderate   High 4 4 High     Code requires 3/3 (MDM, History and Exam)  Code MDM History Exam Time   93939 SF/Low Detailed Detailed 30   60577 Mod Comprehensive Comprehensive 50   00827 High Comprehensive Comprehensive 70     Detailed history:  4 elements HPI or status of 3 chronic problems; 2-9 system ROS  Comprehensive:  4 elements HPI or status of 3 chronic problems;  10 system ROS    Detailed Exam:  12 findings from any organ  system  Comprehensive Exam:  2 findings from each of 9 systems.   97004    Assessment/Plan       Symptomatic carotid artery stenosis, bilateral    TIA (transient ischemic attack)      80 y.o. male with severe symptomatic right carotid stenosis and moderate left carotid stenosis.  Continue aspirin and Plavix for now.  Discussed with patient and wife by phone in detail above findings.      Plan right carotid endarterectomy Monday, 12/14/2020.  Procedure and risk discussed with patient and family agreed to proceed.    X-rays with left fibula fracture and primary physician to evaluate and possible orthopedic consultation.  Recommend carotid surgery prior to any kind of orthopedic surgery if indicated or needed by orthopedic surgeon.    Personal protective equipment used for this patient encounter:  Patient wearing surgical mask [x]    Provider wearing a surgical mask [x]    Gloves [x]    Eye protection [x]    Face Shield []    Gown []    N 95 respirator or CAPR/PAPR []   Duration of interaction 30 minutes    I discussed the patients findings and my recommendations with patient and family.    Toby Fung MD  12/12/20  14:47 EST    Please call my office with any question: [phone]

## 2020-12-12 NOTE — PLAN OF CARE
Goal Outcome Evaluation:  Plan of Care Reviewed With: patient  Progress: no change  Chart reviewed and spoke with RN.  New finding of LLE distal fx, plans for ortho consult.  RN reports pt to have R CEA on Monday 12/14.  RN reports pt sits self up on EOB independently.  Will f/u as appropriate for PT eval.  PT did not enter room.

## 2020-12-12 NOTE — CONSULTS
Primary Care Provider: Serenity Wren MD     Consult requested by: Dr. Stevens; LARRY Rosario    Reason for Consultation: Neurological evaluation     History taken from: patient chart RN    Chief complaint: tingling and numbness in his left leg and his face       SUBJECTIVE:    History of present illness: Background per H&P: 80 year old male , awake, alert and good hisotrian presents with complaints of onset of left arm and left facial numbness this evening. He denies headache, confusion, slurred speech , focal deficits or vision changes. Symptoms resolved in ED. His NIH was 0.  He reports PMH of a stroke 4 years ago that caused right leg numbness when he lived in [place] and today felt similar.  - Portions of the above HPI were copied from previous encounters and edited as appropriate.      Pt states that he developed numbness of the left hand, left face, and left leg yesterday. When he went to stand up, he became lightheaded and lost his balance. He fell and caused trauma to his left ankle. His numbness has resolved, but he is complaining of left ankle pain from the fall. No other complaints.     Review of Systems   Constitutional: Negative for chills, diaphoresis and fatigue.   Eyes: Negative for photophobia and visual disturbance.   Musculoskeletal: Positive for joint swelling (left ankle pain and swelling).   Neurological: Negative for dizziness, tremors, seizures, syncope, facial asymmetry, speech difficulty, weakness, light-headedness, numbness and headaches.            PATIENT HISTORY:  Past Medical History:   Diagnosis Date   • Allergic rhinitis    • Diabetes (CMS/HCC)    • GERD (gastroesophageal reflux disease)    • Heart murmur    • Hyperlipidemia    • Hypertension    • Hypothyroidism    • Prostate cancer (CMS/HCC)    • Stroke (CMS/HCC)    ,   Past Surgical History:   Procedure Laterality Date   • BACK SURGERY  1994   • COLONOSCOPY  08/25/2015   • PROSTATECTOMY  2001    due to cancer    ,   Family History   Problem Relation Age of Onset   • Hypertension Other    • Heart attack Mother    • Heart disease Mother    • Heart attack Father    • Heart disease Father    ,   Social History     Tobacco Use   • Smoking status: Never Smoker   • Smokeless tobacco: Never Used   Substance Use Topics   • Alcohol use: Not Currently   • Drug use: Never   ,   Medications Prior to Admission   Medication Sig Dispense Refill Last Dose   • amLODIPine (NORVASC) 10 MG tablet TAKE 1 TABLET EVERY DAY 90 tablet 1    • atorvastatin (LIPITOR) 40 MG tablet Take 1 tablet by mouth Every Night. 90 tablet 1    • clopidogrel (PLAVIX) 75 MG tablet TAKE 1 TABLET EVERY DAY 90 tablet 1    • fluticasone (FLONASE) 50 MCG/ACT nasal spray 2 sprays into the nostril(s) as directed by provider Daily As Needed.      • Januvia 50 MG tablet TAKE 1 TABLET EVERY DAY 90 tablet 1    • Jardiance 10 MG tablet TAKE 1 TABLET EVERY DAY 90 tablet 1    • levothyroxine (SYNTHROID, LEVOTHROID) 75 MCG tablet TAKE 1 TABLET EVERY DAY 90 tablet 1    • losartan (COZAAR) 50 MG tablet TAKE 1 TABLET EVERY DAY 90 tablet 1    • pantoprazole (PROTONIX) 40 MG EC tablet TAKE 1 TABLET EVERY DAY 90 tablet 1    • glucose blood test strip 1 each by Other route Daily. Use as instructed      , Scheduled Meds:  amLODIPine, 10 mg, Oral, Daily  aspirin, 325 mg, Oral, Daily    Or  aspirin, 300 mg, Rectal, Daily  atorvastatin, 40 mg, Oral, Nightly  clopidogrel, 75 mg, Oral, Daily  insulin lispro, 0-9 Units, Subcutaneous, TID AC  levothyroxine, 75 mcg, Oral, Daily  losartan, 50 mg, Oral, Daily  pantoprazole, 40 mg, Oral, Daily  sodium chloride, 3 mL, Intravenous, Q12H    , Continuous Infusions:   , PRN Meds:  •  acetaminophen **OR** acetaminophen  •  bisacodyl  •  dextrose  •  dextrose  •  glucagon (human recombinant)  •  influenza vaccine  •  insulin lispro **AND** insulin lispro  •  ondansetron  •  potassium chloride **OR** potassium chloride **OR** potassium chloride  •  sodium  chloride  •  sodium chloride, Allergies:  Azithromycin    ________________________________________________________        OBJECTIVE:  Upon today's exam, pt is awake and alert. Oriented x3.    Neurologic Exam  PHYSICAL EXAM:    CONSTITUTIONAL: The patient is in no apparent distress, bright awake and alert. There is no shortness of breath.     HEENT: There is no tenderness over the temporal arteries bilaterally. Normocephalic, atraumatic. TMJ open symmetrically without tenderness.    NECK: ROM normal, supple    RESPIRATORY: Breathing unlabored, Breath sounds are normal    CARDIAC: Regular rate and rhythm.     EXTREMITIES:  No clubbing, cyanosis or edema except mild edema and bruising on and around the medial malleolus. Tender to touch    PSYCHIATRIC: Mood/affect normal, judgement normal, appropriate    NEUROLOGICAL:    Cognition:   Fully oriented.  Fund of knowledge excellent.  Concentration and attention normal.   Language normal with normal comprehension, fluent speech, intact repetition and naming.   Short and long term memory appears intact    Cranial nerves;    II - pupils bilaterally equal reacting to light,  No new Visual field deficits;  Fundoscopic exam- Not able to be done, non-dilated exam  III,IV,VI: EOMI with no diplopia  V: Normal facial sensations  VII: No facial asymmetry,  VIII: No New hearing abnormality  IX, X, XI: normal gag and shoulder shrug;  XII: tongue is in the midline.    Sensory:  Intact to light touch in all extremities.     Motor: Strength 5/5 bilaterally upper and lower extremities. No involuntary movements present. Normal tone and bulk.  Deep tendon reflexes: 2/4 and symmetrical in biceps, brachioradialis, triceps, bilateral 2/4 knees and ankles. Both plantars are flexor.    Cerebellar: Finger to nose and mirror movements normal bilaterally.    Gait and balance: deferred due to ankle pain    ________________________________________________________   RESULTS REVIEW:    VITAL SIGNS:    Temp:  [97 °F (36.1 °C)-98 °F (36.7 °C)] 98 °F (36.7 °C)  Heart Rate:  [52-99] 61  Resp:  [12-20] 12  BP: (131-188)/(47-85) 136/55     LABS:  WBC   Date Value Ref Range Status   12/11/2020 6.90 3.40 - 10.80 10*3/mm3 Final     RBC   Date Value Ref Range Status   12/11/2020 4.80 4.14 - 5.80 10*6/mm3 Final     Hemoglobin   Date Value Ref Range Status   12/11/2020 14.4 13.0 - 17.7 g/dL Final     Hematocrit   Date Value Ref Range Status   12/11/2020 43.8 37.5 - 51.0 % Final     MCV   Date Value Ref Range Status   12/11/2020 91.3 79.0 - 97.0 fL Final     MCH   Date Value Ref Range Status   12/11/2020 30.1 26.6 - 33.0 pg Final     MCHC   Date Value Ref Range Status   12/11/2020 33.0 31.5 - 35.7 g/dL Final     RDW   Date Value Ref Range Status   12/11/2020 14.9 12.3 - 15.4 % Final     RDW-SD   Date Value Ref Range Status   12/11/2020 47.3 37.0 - 54.0 fl Final     MPV   Date Value Ref Range Status   12/11/2020 8.0 6.0 - 12.0 fL Final     Platelets   Date Value Ref Range Status   12/11/2020 219 140 - 450 10*3/mm3 Final     Neutrophil %   Date Value Ref Range Status   12/11/2020 62.9 42.7 - 76.0 % Final     Lymphocyte %   Date Value Ref Range Status   12/11/2020 23.4 19.6 - 45.3 % Final     Monocyte %   Date Value Ref Range Status   12/11/2020 11.2 5.0 - 12.0 % Final     Eosinophil %   Date Value Ref Range Status   12/11/2020 1.2 0.3 - 6.2 % Final     Basophil %   Date Value Ref Range Status   12/11/2020 1.3 0.0 - 1.5 % Final     Neutrophils, Absolute   Date Value Ref Range Status   12/11/2020 4.30 1.70 - 7.00 10*3/mm3 Final     Lymphocytes, Absolute   Date Value Ref Range Status   12/11/2020 1.60 0.70 - 3.10 10*3/mm3 Final     Monocytes, Absolute   Date Value Ref Range Status   12/11/2020 0.80 0.10 - 0.90 10*3/mm3 Final     Eosinophils, Absolute   Date Value Ref Range Status   12/11/2020 0.10 0.00 - 0.40 10*3/mm3 Final     Basophils, Absolute   Date Value Ref Range Status   12/11/2020 0.10 0.00 - 0.20 10*3/mm3 Final      nRBC   Date Value Ref Range Status   12/11/2020 0.2 0.0 - 0.2 /100 WBC Final     Glucose   Date Value Ref Range Status   12/11/2020 133 (H) 65 - 99 mg/dL Final     BUN   Date Value Ref Range Status   12/11/2020 23 8 - 23 mg/dL Final     Creatinine   Date Value Ref Range Status   12/11/2020 1.16 0.76 - 1.27 mg/dL Final     Sodium   Date Value Ref Range Status   12/11/2020 141 136 - 145 mmol/L Final     Potassium   Date Value Ref Range Status   12/11/2020 3.4 (L) 3.5 - 5.2 mmol/L Final     Comment:     Slight hemolysis detected by analyzer. Results may be affected.     Chloride   Date Value Ref Range Status   12/11/2020 107 98 - 107 mmol/L Final     CO2   Date Value Ref Range Status   12/11/2020 21.0 (L) 22.0 - 29.0 mmol/L Final     Calcium   Date Value Ref Range Status   12/11/2020 9.0 8.6 - 10.5 mg/dL Final     Total Protein   Date Value Ref Range Status   12/11/2020 6.6 6.0 - 8.5 g/dL Final     Albumin   Date Value Ref Range Status   12/11/2020 4.00 3.50 - 5.20 g/dL Final     ALT (SGPT)   Date Value Ref Range Status   12/11/2020 23 1 - 41 U/L Final     AST (SGOT)   Date Value Ref Range Status   12/11/2020 21 1 - 40 U/L Final     Alkaline Phosphatase   Date Value Ref Range Status   12/11/2020 64 39 - 117 U/L Final     Total Bilirubin   Date Value Ref Range Status   12/11/2020 0.4 0.0 - 1.2 mg/dL Final     eGFR Non  Amer   Date Value Ref Range Status   12/11/2020 60 (L) >60 mL/min/1.73 Final     BUN/Creatinine Ratio   Date Value Ref Range Status   12/11/2020 19.8 7.0 - 25.0 Final     Anion Gap   Date Value Ref Range Status   12/11/2020 13.0 5.0 - 15.0 mmol/L Final       Lab Results   Component Value Date    TSH 3.710 12/12/2020    LDL 97 12/12/2020    HGBA1C 7.1 (H) 08/27/2020         IMAGING STUDIES:  Xr Chest 1 View    Result Date: 12/11/2020  1.  No evidence of active disease.   Electronically Signed By-Nikki Bright MD On:12/11/2020 8:43 PM This report was finalized on [id number] by  Nikki Bright,  MD.    Ct Head Without Contrast Stroke Protocol    Result Date: 12/11/2020  1. Negative for hemorrhage or mass effect. 2. There is moderate cerebral and cerebellar atrophy. There is mild white matter abnormality which is favored to be due to chronic small vessel ischemia. 3. 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 4. If acute ischemia is clinically suspected, recommend consideration of brain MR.  Electronically Signed By-Nikki Bright MD On:12/11/2020 8:04 PM This report was finalized on [id number] by  Nikki Bright MD.      I reviewed the patient's new clinical results.      ________________________________________________________     PROBLEM LIST:    TIA (transient ischemic attack)          Assessment/Plan   ASSESSMENT/PLAN:  1. Acute transient left sided numbness, resolved. Likely TIA second to symptomatic right carotid disease.   - CT head: Negative for hemorrhage or mass effect. There is moderate cerebral and cerebellar atrophy. There is mild white matter abnormality which is favored to be due to chronic small vessel ischemia. 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  - CTA head and neck: Final report pending. Prelim: Right ICA 80% stenosis, left ICA 50% stenosis. Multiple areas of stenosis throughout the intracranial vasculature.   - MRI brain: Report pending. Images reviewed: No acute stroke  - Echo: pending  - EKG: Sinus rhythm. Repol abnrm suggests ischemia, diffuse leads  - Labs: A1C: P, B12: P, LDL: 97, TSH: 3.71  - Antithrombotics: On Plavix 75mg daily at home, add ASA 81mg daily  - Statin: Lipitor 80mg qhs  - PT/OT/ST as appropriate, Neuro checks per protocol, DVT prophylaxis, Stroke education     2. Right ICA stenosis, likely symptomatic. Pt had a near syncopal episode yesterday and had left sided numbness as well.   - Consult vascular  - Continue Plavix, add ASA 81mg daily, increase Lipitor to 80mg qhs    3. Intracranial atherosclerotic disease  - DAPT, Lipitor  80mg qhs  - Follow up with outpt neurology     4. HTN, controlled  - Continue home Losartan and amlodipine     5. Hypothyroidism   - On levothyroxine   - TSH pending      6. Hypokalemia   - K 3.4  - K replacement protocol per primary    7. Type 2 diabetes mellitus  - Strict glycemic control, SSI per primary  - hold oral hypoglycemics add ssi prn and accucheck achs LCS diet     8. Modification of stroke risk factors:   - Blood pressure should be less than 130/80 outpatient, HbA1c less than 6.5, LDL less than 70; b12>500 and smoking cessation if applicable. We would be grateful if the primary team / primary care physician would keep a close watch on the above targets.  - Stroke education  - Follow up with neurologist of choice      I discussed the patient's findings and my recommendations with patient, nursing staff and consulting provider    LARRY Cerna  12/12/20  11:50 EST

## 2020-12-12 NOTE — PLAN OF CARE
Problem: Adult Inpatient Plan of Care  Goal: Plan of Care Review  Outcome: Ongoing, Progressing  Goal: Patient-Specific Goal (Individualized)  Outcome: Ongoing, Progressing  Goal: Absence of Hospital-Acquired Illness or Injury  Outcome: Ongoing, Progressing  Intervention: Identify and Manage Fall Risk  Recent Flowsheet Documentation  Taken 12/12/2020 0330 by Ashley Sanchez RN  Safety Promotion/Fall Prevention:   safety round/check completed   nonskid shoes/slippers when out of bed   lighting adjusted   fall prevention program maintained   clutter free environment maintained   assistive device/personal items within reach   activity supervised  Intervention: Prevent Infection  Recent Flowsheet Documentation  Taken 12/12/2020 0330 by Ashley Sanchez RN  Infection Prevention:   visitors restricted/screened   single patient room provided   rest/sleep promoted   personal protective equipment utilized   hand hygiene promoted  Goal: Optimal Comfort and Wellbeing  Outcome: Ongoing, Progressing  Intervention: Provide Person-Centered Care  Recent Flowsheet Documentation  Taken 12/12/2020 0330 by Ashley Sanchez RN  Trust Relationship/Rapport:   care explained   thoughts/feelings acknowledged   questions answered   questions encouraged  Goal: Readiness for Transition of Care  Outcome: Ongoing, Progressing  Intervention: Mutually Develop Transition Plan  Recent Flowsheet Documentation  Taken 12/12/2020 0308 by Ashley Sanchez RN  Equipment Currently Used at Home: none  Transportation Anticipated: family or friend will provide  Patient/Family Anticipated Services at Transition: none  Patient/Family Anticipates Transition to: home with family  Taken 12/12/2020 0300 by Ashley Sanchez RN  Equipment Currently Used at Home: none     Problem: Fall Injury Risk  Goal: Absence of Fall and Fall-Related Injury  Outcome: Ongoing, Progressing  Intervention: Identify and Manage Contributors to Fall Injury Risk  Recent Flowsheet Documentation  Taken 12/12/2020  0330 by Ashley Sanchez RN  Medication Review/Management: medications reviewed  Intervention: Promote Injury-Free Environment  Recent Flowsheet Documentation  Taken 12/12/2020 0330 by Ashley Sanchez RN  Safety Promotion/Fall Prevention:   safety round/check completed   nonskid shoes/slippers when out of bed   lighting adjusted   fall prevention program maintained   clutter free environment maintained   assistive device/personal items within reach   activity supervised     Problem: Adjustment to Illness (Stroke, Ischemic/Transient Ischemic Attack)  Goal: Optimal Coping  Outcome: Ongoing, Progressing  Intervention: Support Patient/Family Psychosocial Response to Stroke  Recent Flowsheet Documentation  Taken 12/12/2020 0330 by Ashley Sanchez RN  Supportive Measures:   self-care encouraged   verbalization of feelings encouraged   active listening utilized   positive reinforcement provided  Family/Support System Care:   self-care encouraged   support provided     Problem: Bowel Elimination Impaired (Stroke, Ischemic/Transient Ischemic Attack)  Goal: Effective Bowel Elimination  Outcome: Ongoing, Progressing     Problem: Cerebral Tissue Perfusion Risk (Stroke, Ischemic/Transient Ischemic Attack)  Goal: Optimal Cerebral Tissue Perfusion  Outcome: Ongoing, Progressing  Intervention: Protect and Optimize Cerebral Perfusion  Recent Flowsheet Documentation  Taken 12/12/2020 0330 by Ahsley Sanchez RN  Cerebral Perfusion Promotion: blood pressure monitored     Problem: Communication Impairment (Stroke, Ischemic/Transient Ischemic Attack)  Goal: Improved Communication Skills  Outcome: Ongoing, Progressing     Problem: Eating/Swallowing Impairment (Stroke, Ischemic/Transient Ischemic Attack)  Goal: Oral Intake without Aspiration  Outcome: Ongoing, Progressing     Problem: Functional Ability Impaired (Stroke, Ischemic/Transient Ischemic Attack)  Goal: Optimal Functional Ability  Outcome: Ongoing, Progressing  Intervention: Optimize Functional  Ability  Recent Flowsheet Documentation  Taken 12/12/2020 0330 by Ashley Sanchez RN  Activity Management: ambulated to bathroom     Problem: Hemodynamic Instability (Stroke, Ischemic/Transient Ischemic Attack)  Goal: Vital Signs Remain in Desired Range  Outcome: Ongoing, Progressing  Intervention: Optimize Blood Flow  Recent Flowsheet Documentation  Taken 12/12/2020 0330 by Ashley Sanchez RN  Fluid/Electrolyte Management: fluids provided     Problem: Pain (Stroke, Ischemic/Transient Ischemic Attack)  Goal: Acceptable Pain Control  Outcome: Ongoing, Progressing     Problem: Sensorimotor Impairment (Stroke, Ischemic/Transient Ischemic Attack)  Goal: Improved Sensorimotor Function  Outcome: Ongoing, Progressing  Intervention: Optimize Sensory and Perceptual Abilities  Recent Flowsheet Documentation  Taken 12/12/2020 0330 by Ashley Sanchez RN  Pressure Reduction Techniques: frequent weight shift encouraged  Pressure Reduction Devices: pressure-redistributing mattress utilized     Problem: Urinary Elimination Impaired (Stroke, Ischemic/Transient Ischemic Attack)  Goal: Effective Urinary Elimination  Outcome: Ongoing, Progressing   Goal Outcome Evaluation:

## 2020-12-13 ENCOUNTER — ANESTHESIA EVENT (OUTPATIENT)
Dept: PERIOP | Facility: HOSPITAL | Age: 80
End: 2020-12-13

## 2020-12-13 LAB
ANION GAP SERPL CALCULATED.3IONS-SCNC: 9 MMOL/L (ref 5–15)
BUN SERPL-MCNC: 23 MG/DL (ref 8–23)
BUN/CREAT SERPL: 17 (ref 7–25)
CALCIUM SPEC-SCNC: 8.7 MG/DL (ref 8.6–10.5)
CHLORIDE SERPL-SCNC: 109 MMOL/L (ref 98–107)
CO2 SERPL-SCNC: 23 MMOL/L (ref 22–29)
CREAT SERPL-MCNC: 1.35 MG/DL (ref 0.76–1.27)
DEPRECATED RDW RBC AUTO: 45.9 FL (ref 37–54)
ERYTHROCYTE [DISTWIDTH] IN BLOOD BY AUTOMATED COUNT: 14.5 % (ref 12.3–15.4)
GFR SERPL CREATININE-BSD FRML MDRD: 51 ML/MIN/1.73
GLUCOSE BLDC GLUCOMTR-MCNC: 131 MG/DL (ref 70–105)
GLUCOSE BLDC GLUCOMTR-MCNC: 145 MG/DL (ref 70–105)
GLUCOSE BLDC GLUCOMTR-MCNC: 156 MG/DL (ref 70–105)
GLUCOSE BLDC GLUCOMTR-MCNC: 160 MG/DL (ref 70–105)
GLUCOSE SERPL-MCNC: 142 MG/DL (ref 65–99)
HCT VFR BLD AUTO: 37.4 % (ref 37.5–51)
HGB BLD-MCNC: 12.2 G/DL (ref 13–17.7)
MCH RBC QN AUTO: 29.9 PG (ref 26.6–33)
MCHC RBC AUTO-ENTMCNC: 32.7 G/DL (ref 31.5–35.7)
MCV RBC AUTO: 91.4 FL (ref 79–97)
PLATELET # BLD AUTO: 179 10*3/MM3 (ref 140–450)
PMV BLD AUTO: 8.7 FL (ref 6–12)
POTASSIUM SERPL-SCNC: 3.8 MMOL/L (ref 3.5–5.2)
QT INTERVAL: 418 MS
RBC # BLD AUTO: 4.09 10*6/MM3 (ref 4.14–5.8)
SODIUM SERPL-SCNC: 141 MMOL/L (ref 136–145)
WBC # BLD AUTO: 7.3 10*3/MM3 (ref 3.4–10.8)

## 2020-12-13 PROCEDURE — 99232 SBSQ HOSP IP/OBS MODERATE 35: CPT | Performed by: INTERNAL MEDICINE

## 2020-12-13 PROCEDURE — 85027 COMPLETE CBC AUTOMATED: CPT | Performed by: SURGERY

## 2020-12-13 PROCEDURE — 80048 BASIC METABOLIC PNL TOTAL CA: CPT | Performed by: SURGERY

## 2020-12-13 PROCEDURE — 99222 1ST HOSP IP/OBS MODERATE 55: CPT | Performed by: NURSE PRACTITIONER

## 2020-12-13 PROCEDURE — 93005 ELECTROCARDIOGRAM TRACING: CPT | Performed by: SURGERY

## 2020-12-13 PROCEDURE — 82962 GLUCOSE BLOOD TEST: CPT

## 2020-12-13 PROCEDURE — 93010 ELECTROCARDIOGRAM REPORT: CPT | Performed by: INTERNAL MEDICINE

## 2020-12-13 RX ORDER — HYDROCODONE BITARTRATE AND ACETAMINOPHEN 5; 325 MG/1; MG/1
1 TABLET ORAL EVERY 6 HOURS PRN
Status: DISCONTINUED | OUTPATIENT
Start: 2020-12-13 | End: 2020-12-17 | Stop reason: HOSPADM

## 2020-12-13 RX ADMIN — Medication 3 ML: at 08:48

## 2020-12-13 RX ADMIN — CLOPIDOGREL BISULFATE 75 MG: 75 TABLET ORAL at 08:49

## 2020-12-13 RX ADMIN — LOSARTAN POTASSIUM 50 MG: 25 TABLET, FILM COATED ORAL at 08:49

## 2020-12-13 RX ADMIN — ATORVASTATIN CALCIUM 80 MG: 40 TABLET, FILM COATED ORAL at 21:06

## 2020-12-13 RX ADMIN — AMLODIPINE BESYLATE 10 MG: 5 TABLET ORAL at 08:49

## 2020-12-13 RX ADMIN — HYDROCODONE BITARTRATE AND ACETAMINOPHEN 1 TABLET: 5; 325 TABLET ORAL at 21:16

## 2020-12-13 RX ADMIN — ASPIRIN 81 MG CHEWABLE TABLET 81 MG: 81 TABLET CHEWABLE at 08:49

## 2020-12-13 RX ADMIN — HYDROCODONE BITARTRATE AND ACETAMINOPHEN 1 TABLET: 5; 325 TABLET ORAL at 09:15

## 2020-12-13 RX ADMIN — LEVOTHYROXINE SODIUM 75 MCG: 75 TABLET ORAL at 08:49

## 2020-12-13 RX ADMIN — Medication 3 ML: at 21:07

## 2020-12-13 RX ADMIN — PANTOPRAZOLE SODIUM 40 MG: 40 TABLET, DELAYED RELEASE ORAL at 08:49

## 2020-12-13 NOTE — PROGRESS NOTES
Name: Alen Ratliff ADMIT: 2020   : 1940  PCP: Serenity Wren MD    MRN: [id number] LOS: 0 days   AGE/SEX: 80 y.o. male  ROOM: 271/1   [facility]    [place] Subsequent hospital care    Chief Complaint   Patient presents with   • Numbness     tingling on left side and face.  Onset 30 minutes ago.      CC: No further TIA episodes  Subjective     80 y.o. male with transient ischemic attack and severe right carotid stenosis as etiology.  Patient on aspirin and Plavix.  Plan right carotid endarterectomy tomorrow morning 8:00.  Patient aware and agrees to proceed.    Patient with no cardiopulmonary symptoms.  2020 patient had heart stress test that had mild to moderate fixed defect with small amount of joseph-infarct ischemia with no symptoms and plan medical treatment by Dr. Muhammad.  Echocardiogram with ejection fraction normal 64% and mild to moderate aortic valvular disease.  Will check EKG and reconsult cardiology for assessment today.    Orthopedic surgery consult for left ankle fracture.    Review of Systems   Constitutional: Negative.    Respiratory: Negative.    Cardiovascular: Negative.    Gastrointestinal: Negative.    Musculoskeletal: Positive for gait problem and joint swelling (Left ankle fracture).       Objective     Scheduled Medications:   amLODIPine, 10 mg, Oral, Daily  aspirin, 81 mg, Oral, Daily  atorvastatin, 80 mg, Oral, Nightly  [START ON 2020] ceFAZolin, 2 g, Intravenous, 30 Min Pre-Op  clopidogrel, 75 mg, Oral, Daily  insulin lispro, 0-9 Units, Subcutaneous, TID AC  levothyroxine, 75 mcg, Oral, Daily  losartan, 50 mg, Oral, Daily  pantoprazole, 40 mg, Oral, Daily  sodium chloride, 3 mL, Intravenous, Q12H        Active Infusions:       As Needed Medications:  •  acetaminophen **OR** acetaminophen  •  bisacodyl  •  dextrose  •  dextrose  •  glucagon (human recombinant)  •  influenza vaccine  •  insulin lispro **AND** insulin lispro  •  ondansetron  •  potassium  "chloride **OR** potassium chloride **OR** potassium chloride  •  sodium chloride  •  sodium chloride    Vital Signs  Vital Signs Patient Vitals for the past 24 hrs:   BP Temp Temp src Pulse Resp SpO2 Height Weight   12/13/20 0620 150/56 97.8 °F (36.6 °C) Oral 68 19 95 % -- --   12/13/20 0221 153/60 99 °F (37.2 °C) Oral 77 21 94 % -- --   12/12/20 2313 132/54 98.6 °F (37 °C) Oral 69 14 96 % -- --   12/12/20 1819 154/63 98 °F (36.7 °C) Oral 65 14 96 % -- --   12/12/20 1100 139/65 98.4 °F (36.9 °C) Oral 66 14 99 % -- --   12/12/20 0904 136/55 -- -- -- -- -- 182.9 cm (72\") 93.9 kg (207 lb)   12/12/20 0700 136/55 98 °F (36.7 °C) Oral 61 12 98 % -- --       Physical Exam:  Physical Exam  Vitals signs reviewed.   Constitutional:       Appearance: Normal appearance.   Eyes:      Extraocular Movements: Extraocular movements intact.      Pupils: Pupils are equal, round, and reactive to light.   Neck:      Musculoskeletal: Normal range of motion and neck supple.   Cardiovascular:      Rate and Rhythm: Normal rate. Occasional extrasystoles are present.     Pulses: Normal pulses.           Radial pulses are 2+ on the right side and 2+ on the left side.        Femoral pulses are 2+ on the right side and 2+ on the left side.       Dorsalis pedis pulses are 2+ on the right side.      Comments: Doppler left pedal pulses and difficulty feeling with swelling related to left ankle fracture  Pulmonary:      Effort: Pulmonary effort is normal.      Breath sounds: Normal breath sounds.   Abdominal:      General: Abdomen is flat. Bowel sounds are normal.      Palpations: Abdomen is soft.   Musculoskeletal:         General: Swelling (Left ankle fracture), tenderness, deformity and signs of injury present.   Skin:     General: Skin is warm.      Capillary Refill: Capillary refill takes less than 2 seconds.   Neurological:      General: No focal deficit present.      Mental Status: He is alert and oriented to person, place, and time.      " Cranial Nerves: No cranial nerve deficit.      Sensory: No sensory deficit.      Motor: No weakness.   Psychiatric:         Mood and Affect: Mood normal.         Behavior: Behavior normal.         Thought Content: Thought content normal.         Judgment: Judgment normal.          Results Review:     CBC    Results from last 7 days   Lab Units 12/13/20 0437 12/11/20 1959   WBC 10*3/mm3 7.30 6.90   HEMOGLOBIN g/dL 12.2* 14.4   PLATELETS 10*3/mm3 179 219     BMP   Results from last 7 days   Lab Units 12/13/20 0437 12/11/20 1959   SODIUM mmol/L 141 141   POTASSIUM mmol/L 3.8 3.4*   CHLORIDE mmol/L 109* 107   CO2 mmol/L 23.0 21.0*   BUN mg/dL 23 23   CREATININE mg/dL 1.35* 1.16   GLUCOSE mg/dL 142* 133*     Radiology(recent) Xr Ankle 2 View Left    Result Date: 12/12/2020  1.Oblique fracture of the distal fibula with mild lateral displacement. 2.Suspected minimally displaced fracture of the posterior malleolus. 3.Mild osteoarthritis. 4.Calcific atherosclerosis.  Electronically Signed By-Dylon Cedillo MD On:12/12/2020 1:05 PM This report was finalized on [id number] by  Dylon Cedillo MD.    Mri Brain Without Contrast    Result Date: 12/12/2020  1.No definite MRI findings of acute infarction or other acute intracranial abnormality at this time. 2.Volume loss and suspected mild to moderate chronic small vessel ischemic changes with remote lacunar infarct in the right basal ganglion.   Electronically Signed By-Dylon Cedillo MD On:12/12/2020 1:33 PM This report was finalized on [id number] by  Dylon Cedillo MD.    Xr Chest 1 View    Result Date: 12/11/2020  1.  No evidence of active disease.   Electronically Signed By-Nikki Bright MD On:12/11/2020 8:43 PM This report was finalized on [id number] by  Nikki Bright MD.    Ct Head Without Contrast Stroke Protocol    Result Date: 12/11/2020  1. Negative for hemorrhage or mass effect. 2. There is moderate cerebral and cerebellar atrophy. There is mild white matter  abnormality which is favored to be due to chronic small vessel ischemia. 3. 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 4. If acute ischemia is clinically suspected, recommend consideration of brain MR.  Electronically Signed By-Nikki Bright MD On:12/11/2020 8:04 PM This report was finalized on [id number] by  Nikki Bright MD.      Assessment/Plan     Assessment & Plan      Symptomatic carotid artery stenosis, bilateral    TIA (transient ischemic attack)      80 y.o. male plans for left carotid endarterectomy tomorrow morning if okay with medicine and cardiology for symptomatic severe right carotid stenosis.    Patient with abnormal heart stress test with fixed defect and small periodic infarct ischemia normal ejection fraction September 2020 seen Dr. Muhammad and will reconsult.    Orthopedic surgeon to see patient for left ankle fracture evaluation and management.      Personal protective equipment used for this patient encounter:  Patient wearing surgical mask [x]    Provider wearing a surgical mask [x]    Gloves [x]    Eye protection [x]    Face Shield []    Gown []    N 95 respirator or CAPR/PAPR []   Duration of interaction 20 minutes    Toby Fung MD  12/13/20  06:19 EST    Please call my office with any question: [phone]    Active Hospital Problems    Diagnosis  POA   • **Symptomatic carotid artery stenosis, bilateral [I65.23]  Yes   • TIA (transient ischemic attack) [G45.9]  Yes      Resolved Hospital Problems   No resolved problems to display.

## 2020-12-13 NOTE — CONSULTS
CARDIOLOGY CONSULT NOTE      Referring Provider: Dr. De Santiago    Reason for Consultation: Preop Cardiovascular risk assessment    Attending: Kendall Arellano MD    Chief complaint    Left sided weakness/numbness    Subjective .     History of present illness:  Alen Ratliff is a 80 y.o. male who presents with left arm and facial numbness and left lower extremity weakness that occurred suddenly.  He reports he tried to stand and became somewhat dizzy and off balance and fell to the ground.  He denies radha syncope it resulted in him falling to the floor.    The symptoms resolved in the emergency room..  In the emergency room he had a CT of his head that was negative for hemorrhage that showed moderate cerebral and cerebellar atrophy and mild white matter abnormality which was favored to be due to chronic small vessel ischemia.  There is a previous lacunar infarct in the right thalamus.  CTA of his head and neck also was obtained.  There was calcified plaque in the right ICA bulb with stenosis of about 80% calcified plaque in the left ICA bulb with stenosis of about 50%    Patient had an echocardiogram back in September 2020 that showed preserved LV function with moderate aortic stenosis, mild mitral regurgitation.    Patient had a Lexiscan Myoview done in September 2020 that showed a moderate sized inferior septal MI with a small amount of joseph-infarct ischemia.  Patient elected to be treated medically due to no symptoms suggesting unstable angina.    Patient reports since he last saw Dr. Muhammad in September he has had no recurrent chest pain, worsening dyspnea, PND, orthopnea, palpitations, near syncope, lower extremity edema or feelings of his heart racing.  He reports compliance with medical therapy.    His past medical history is significant for hypertension, dyslipidemia, diabetes and previous stroke.    Ankle x-ray done on admission showed an oblique fracture of the distal fibula of the left leg and  suspected minimally displaced fracture of the posterior malleolus    Review of Systems   Constitution: Negative for decreased appetite and diaphoresis.   HENT: Negative for congestion, hearing loss and nosebleeds.    Cardiovascular: Negative for chest pain, claudication, dyspnea on exertion, irregular heartbeat, leg swelling, near-syncope, orthopnea, palpitations, paroxysmal nocturnal dyspnea and syncope.   Respiratory: Negative for cough, shortness of breath and sleep disturbances due to breathing.    Endocrine: Negative for polyuria.   Hematologic/Lymphatic: Does not bruise/bleed easily.   Skin: Negative for itching and rash.   Musculoskeletal: Positive for joint pain. Negative for back pain, muscle weakness and myalgias.   Gastrointestinal: Negative for abdominal pain, change in bowel habit and nausea.   Genitourinary: Negative for dysuria, flank pain, frequency and hesitancy.   Neurological: Positive for focal weakness, loss of balance and numbness. Negative for dizziness, tremors and weakness.   Psychiatric/Behavioral: Negative for altered mental status. The patient does not have insomnia.        History  Past Medical History:   Diagnosis Date   • Allergic rhinitis    • Diabetes (CMS/[facility])    • GERD (gastroesophageal reflux disease)    • Heart murmur    • Hyperlipidemia    • Hypertension    • Hypothyroidism    • Prostate cancer (CMS/HCC)    • Stroke (CMS/HCC)        Past Surgical History:   Procedure Laterality Date   • BACK SURGERY  1994   • COLONOSCOPY  08/25/2015   • PROSTATECTOMY  2001    due to cancer       Family History   Problem Relation Age of Onset   • Hypertension Other    • Heart attack Mother    • Heart disease Mother    • Heart attack Father    • Heart disease Father        Social History     Tobacco Use   • Smoking status: Never Smoker   • Smokeless tobacco: Never Used   Substance Use Topics   • Alcohol use: Not Currently   • Drug use: Never        Medications Prior to Admission   Medication Sig  Dispense Refill Last Dose   • amLODIPine (NORVASC) 10 MG tablet TAKE 1 TABLET EVERY DAY 90 tablet 1    • atorvastatin (LIPITOR) 40 MG tablet Take 1 tablet by mouth Every Night. 90 tablet 1    • clopidogrel (PLAVIX) 75 MG tablet TAKE 1 TABLET EVERY DAY 90 tablet 1    • fluticasone (FLONASE) 50 MCG/ACT nasal spray 2 sprays into the nostril(s) as directed by provider Daily As Needed.      • Januvia 50 MG tablet TAKE 1 TABLET EVERY DAY 90 tablet 1    • Jardiance 10 MG tablet TAKE 1 TABLET EVERY DAY 90 tablet 1    • levothyroxine (SYNTHROID, LEVOTHROID) 75 MCG tablet TAKE 1 TABLET EVERY DAY 90 tablet 1    • losartan (COZAAR) 50 MG tablet TAKE 1 TABLET EVERY DAY 90 tablet 1    • pantoprazole (PROTONIX) 40 MG EC tablet TAKE 1 TABLET EVERY DAY 90 tablet 1    • glucose blood test strip 1 each by Other route Daily. Use as instructed            Azithromycin    Scheduled Meds:amLODIPine, 10 mg, Oral, Daily  aspirin, 81 mg, Oral, Daily  atorvastatin, 80 mg, Oral, Nightly  [START ON 12/14/2020] ceFAZolin, 2 g, Intravenous, 30 Min Pre-Op  clopidogrel, 75 mg, Oral, Daily  insulin lispro, 0-9 Units, Subcutaneous, TID AC  levothyroxine, 75 mcg, Oral, Daily  losartan, 50 mg, Oral, Daily  pantoprazole, 40 mg, Oral, Daily  sodium chloride, 3 mL, Intravenous, Q12H      Continuous Infusions:   PRN Meds:.•  acetaminophen **OR** acetaminophen  •  bisacodyl  •  dextrose  •  dextrose  •  glucagon (human recombinant)  •  HYDROcodone-acetaminophen  •  influenza vaccine  •  insulin lispro **AND** insulin lispro  •  ondansetron  •  potassium chloride **OR** potassium chloride **OR** potassium chloride  •  sodium chloride  •  sodium chloride    Objective     VITAL SIGNS  Vitals:    12/12/20 2313 12/13/20 0221 12/13/20 0620 12/13/20 1025   BP: 132/54 153/60 150/56 149/51   BP Location: Right arm Right arm Right arm Right arm   Patient Position: Lying Lying Lying Lying   Pulse: 69 77 68 61   Resp: 14 21 19 21   Temp: 98.6 °F (37 °C) 99 °F (37.2 °C)  "97.8 °F (36.6 °C) 98.6 °F (37 °C)   TempSrc: Oral Oral Oral Oral   SpO2: 96% 94% 95% 95%   Weight:       Height:           Flowsheet Rows      First Filed Value   Admission Height  175.3 cm (69\") Documented at 12/11/2020 1959   Admission Weight  95.7 kg (211 lb) Documented at 12/11/2020 1959           TELEMETRY: SR    Physical Exam:  Constitutional:       General: Not in acute distress.     Appearance: Normal appearance. Well-developed.   Eyes:      Pupils: Pupils are equal, round, and reactive to light.   HENT:      Head: Normocephalic and atraumatic.   Neck:      Musculoskeletal: Normal range of motion and neck supple.      Vascular: No JVD.   Pulmonary:      Effort: Pulmonary effort is normal.      Breath sounds: Normal breath sounds.   Cardiovascular:      Normal rate. Regular rhythm.   Pulses:     Intact distal pulses.   Edema:     Peripheral edema absent.   Abdominal:      General: There is no distension.      Palpations: Abdomen is soft.      Tenderness: There is no abdominal tenderness.   Musculoskeletal: Normal range of motion.   Skin:     General: Skin is warm and dry.   Neurological:      Mental Status: Alert and oriented to person, place, and time.          Results Review:   I reviewed the patient's new clinical results.    CBC    Results from last 7 days   Lab Units 12/13/20 0437 12/11/20 1959   WBC 10*3/mm3 7.30 6.90   HEMOGLOBIN g/dL 12.2* 14.4   PLATELETS 10*3/mm3 179 219     BMP   Results from last 7 days   Lab Units 12/13/20 0437 12/11/20 1959   SODIUM mmol/L 141 141   POTASSIUM mmol/L 3.8 3.4*   CHLORIDE mmol/L 109* 107   CO2 mmol/L 23.0 21.0*   BUN mg/dL 23 23   CREATININE mg/dL 1.35* 1.16   GLUCOSE mg/dL 142* 133*     Cr Clearance Estimated Creatinine Clearance: 51 mL/min (A) (by C-G formula based on SCr of 1.35 mg/dL (H)).  Coag   Results from last 7 days   Lab Units 12/11/20 1959   INR  0.95   APTT seconds 22.8*     HbA1C   Lab Results   Component Value Date    HGBA1C 7.1 (H) 08/27/2020 "     Blood Glucose   Glucose   Date/Time Value Ref Range Status   12/13/2020 0752 131 (H) 70 - 105 mg/dL Final     Comment:     Serial Number: [id number]:  [id number]   12/12/2020 0813 161 (H) 70 - 105 mg/dL Final     Comment:     Serial Number: [id number]:  [id number]   12/11/2020 1949 135 (H) 70 - 105 mg/dL Final     Comment:     Serial Number: [id number]:  [id number]     Infection     CMP   Results from last 7 days   Lab Units 12/13/20 0437 12/11/20 1959   SODIUM mmol/L 141 141   POTASSIUM mmol/L 3.8 3.4*   CHLORIDE mmol/L 109* 107   CO2 mmol/L 23.0 21.0*   BUN mg/dL 23 23   CREATININE mg/dL 1.35* 1.16   GLUCOSE mg/dL 142* 133*   ALBUMIN g/dL  --  4.00   BILIRUBIN mg/dL  --  0.4   ALK PHOS U/L  --  64   AST (SGOT) U/L  --  21   ALT (SGPT) U/L  --  23     ABG      UA      RHONA  No results found for: POCMETH, POCAMPHET, POCBARBITUR, POCBENZO, POCCOCAINE, POCOPIATES, POCOXYCODO, POCPHENCYC, POCPROPOXY, POCTHC, POCTRICYC  Lysis Labs   Results from last 7 days   Lab Units 12/13/20 0437 12/11/20 1959   INR   --  0.95   APTT seconds  --  22.8*   HEMOGLOBIN g/dL 12.2* 14.4   PLATELETS 10*3/mm3 179 219   CREATININE mg/dL 1.35* 1.16     Radiology(recent) Xr Ankle 2 View Left    Result Date: 12/12/2020  1.Oblique fracture of the distal fibula with mild lateral displacement. 2.Suspected minimally displaced fracture of the posterior malleolus. 3.Mild osteoarthritis. 4.Calcific atherosclerosis.  Electronically Signed By-Dylon Cedillo MD On:12/12/2020 1:05 PM This report was finalized on [id number] by  Dylon Cedillo MD.    Mri Brain Without Contrast    Result Date: 12/12/2020  1.No definite MRI findings of acute infarction or other acute intracranial abnormality at this time. 2.Volume loss and suspected mild to moderate chronic small vessel ischemic changes with remote lacunar infarct in the right basal ganglion.   Electronically Signed By-Dylon Cedillo MD On:12/12/2020 1:33 PM This report was finalized  on [id number] by  Dylon Cedillo MD.    Xr Chest 1 View    Result Date: 12/11/2020  1.  No evidence of active disease.   Electronically Signed By-Nikki Bright MD On:12/11/2020 8:43 PM This report was finalized on [id number] by  Nikki Bright MD.    Ct Head Without Contrast Stroke Protocol    Result Date: 12/11/2020  1. Negative for hemorrhage or mass effect. 2. There is moderate cerebral and cerebellar atrophy. There is mild white matter abnormality which is favored to be due to chronic small vessel ischemia. 3. 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 4. If acute ischemia is clinically suspected, recommend consideration of brain MR.  Electronically Signed By-Nikki Bright MD On:12/11/2020 8:04 PM This report was finalized on [id number] by  Nikki Bright MD.      Results from last 7 days   Lab Units 12/11/20 1959   TROPONIN T ng/mL <0.010       Imaging Results (Last 24 Hours)     Procedure Component Value Units Date/Time    MRI Brain Without Contrast [[id number]] Collected: 12/12/20 1329     Updated: 12/12/20 1335    Narrative:      MRI BRAIN WO CONTRAST-     Date of Exam: 12/12/2020 11:40 AM     Indication: Stroke, follow up; G45.9-Transient cerebral ischemic attack,  unspecified     Comparison: CT head and CT angiography 12/11/2020.     Technique: Multiplanar multisequence images of the brain were performed  without contrast according to routine brain MRI protocol.     FINDINGS:  Midline intracranial structures demonstrate expected morphology. There  is prominence of the ventricles and sulci. No midline shift. Major  intracranial vascular structures appear to demonstrate expected vascular  flow signal. The cerebellopontine angles appear clear. No definite  extra-axial collection or findings to suggest acute or chronic  hemorrhage at this time. T2 and FLAIR hyperintense signal seen in the  periventricular white matter and jaqueline, suspected to represent mild to  moderate small vessel  ischemic changes. There appear to be dilated  perivascular spaces in the basal ganglia. There is a small CSF signal  intensity focus in the right basal ganglion most consistent with a  chronic lacunar infarct. No definite mass lesion or edema is identified.  No definite restricted diffusion is visualized at this time to indicate  a recent infarction.     There is thinning of the orbital lenses consistent with cataracts. There  is a suspected mucous retention cyst in the inferior left maxillary  sinus with mild left maxillary sinus mucosal thickening. Mastoid air  cells appear clear. No definite calvarial abnormality is seen.          Impression:      1.No definite MRI findings of acute infarction or other acute  intracranial abnormality at this time.  2.Volume loss and suspected mild to moderate chronic small vessel  ischemic changes with remote lacunar infarct in the right basal  ganglion.        Electronically Signed By-Dylon Cedillo MD On:12/12/2020 1:33 PM  This report was finalized on [id number] by  Dylon Cedillo MD.    XR Ankle 2 View Left [[id number]] Collected: 12/12/20 1303     Updated: 12/12/20 1307    Narrative:      DATE OF EXAM:  12/12/2020 12:22 PM     PROCEDURE:  XR ANKLE 2 VW LEFT-     INDICATIONS:  Left ankle pain and swelling pain/swelling; G45.9-Transient cerebral  ischemic attack, unspecified     COMPARISON:  No Comparisons Available     TECHNIQUE:   Two routine standard radiographic views were obtained of the left ankle.     FINDINGS:  There is an oblique fracture of the distal fibula. There is lateral  displacement of the distal fracture fragment by approximately 3 mm.  There is surrounding soft tissue edema. There is also a suspected  minimally displaced fracture of the posterior malleolus. Ankle mortise  alignment appears grossly maintained on these 2 views. There appears to  be mild osteoarthritis at the tibiotalar and midfoot joints. There is  calcific atherosclerosis of the visualized  lower extremity arteries.  Enthesophyte formation is seen at the Achilles insertion.       Impression:      1.Oblique fracture of the distal fibula with mild lateral displacement.  2.Suspected minimally displaced fracture of the posterior malleolus.  3.Mild osteoarthritis.  4.Calcific atherosclerosis.     Electronically Signed By-Dylon Cedillo MD On:12/12/2020 1:05 PM  This report was finalized on [id number] by  Dylon Cedillo MD.          EKG            I personally viewed and interpreted the patient's EKG/Telemetry data:    ECHOCARDIOGRAM:  12/12/2020  Interpretation Summary    · Estimated left ventricular EF = 65% Left ventricular systolic function is normal.  · Left ventricular diastolic dysfunction is noted.  · The right atrial cavity is mildly dilated.  · Moderate aortic valve stenosis is present.  · Estimated right ventricular systolic pressure from tricuspid regurgitation is moderately elevated (45-55 mmHg).        Interpretation Summary  9/24/2020    · Left ventricular systolic function is normal.  · Left ventricular ejection fraction is 55 to 60%  · Left ventricular wall thickness is consistent with concentric hypertrophy.  · Left ventricular diastolic function is consistent with (grade I) impaired relaxation.  · Mild aortic valve regurgitation is present.  · Peak velocity of the flow distal to the aortic valve is 274.7 cm/s. Aortic valve maximum pressure gradient is 30.2 mmHg.  · Aortic valve area is 1.2 cm2.  · Mild mitral valve regurgitation is present.            STRESS MYOVIEW:    Interpretation Summary   9/2020    · Findings consistent with a normal ECG stress test.  · Left ventricular ejection fraction is normal. (Calculated EF = 64%).  · Myocardial perfusion imaging indicates a medium-sized infarct located in the inferior wall and septal wall with mild joseph-infarct ischemia.  · Impressions are consistent with a high risk study.  · This is abnormal Cardiolite imaging stress test with moderate  sized inferior/septal myocardial infarction with small amount of joseph-infarct ischemia. Left ventricular size and function was normal. Clinical correlation recommended.            CARDIAC CATHETERIZATION:    OTHER:         Assessment/Plan       Symptomatic carotid artery stenosis, bilateral    TIA (transient ischemic attack)    Assessment:  Preoperative cardiovascular risk assessment requested  Right carotid artery stenosis noted to be 80% by CTA of head and neck  Acute TIA  History of CVA  Moderate aortic stenosis by recent echocardiogram; preserved LV function  Abnormal stress myoview 9/2020: moderate inferior/septal MI with small amount joseph-infarct    Ischemia    Plan:  Patient denies any current or recent chest pain or exertional angina to suggest unstable angina, worsening dyspnea and troponin was negative on admission  EKG does not show acute ST or T wave segment abnormalities  Chest x-ray shows no active disease  Patient appears to be acceptable risk for vascular surgery  We will follow perioperatively    I discussed the patients findings and my recommendations with patient.    Mary Hernandez, LARRY  12/13/20  11:19 EST

## 2020-12-13 NOTE — PLAN OF CARE
Chart reviewed and spoke with RN.  New finding of LLE distal fx, ortho consulted this am although has not seen pt yet.  RN reports pt to have R CEA on Monday 12/14. Will f/u as appropriate for OT eval.  OT did not enter room.

## 2020-12-13 NOTE — PROGRESS NOTES
[facility] Medicine Services Daily Progress Note      Hospitalist Team  LOS 0 days      Patient Care Team:  Serenity Wren MD as PCP - General (Family Medicine)  William Patricio MD as Consulting Physician (Neurology)    Patient Location: 271/1      Subjective   Subjective   Complaint of left ankle pain and discomfort  Chief Complaint / Subjective  Chief Complaint   Patient presents with   • Numbness     tingling on left side and face.  Onset 30 minutes ago.          Brief Synopsis of Hospital Course/HPI  80-year-old male with known history of hypertension, history of CVA, T2DM, hyperlipidemia, prostate cancer, hypothyroidism, and GERD.  He presented to [facility] ED complaining of left arm/left facial numbness.  Denies dysphagia, no dysarthria, slurred speech or speech impediment.  No headache, visual changes, no chest pain, shortness of breath dizziness or lightheadedness.  No fever, or chills.     Presentation, he was hypertensive.  So far, CT of the head without contrast showed no acute intracranial finding, hemorrhage, revealed lacunar infarct in the right thalamus which appears to be chronic.  CTA of the head and neck with finding of left ICA with 50% stenosis and 80% stenosis on the right ICA.  MRI of the brain with result pending.  Laboratory notable for mild hypokalemia, otherwise, unremarkable.  X-ray showed no acute cardiopulmonary finding. He is admitted for suspected TIA versus ischemic stroke, and maintained on stroke protocol.  During examination, he complained of left ankle pain.  X-ray of the ankle showed fracture of the distal fibula and malleolus.         Objective   Objective    Seen and examined in follow-up evaluation.  Vital Signs  Temp:  [97.8 °F (36.6 °C)-99 °F (37.2 °C)] 98.6 °F (37 °C)  Heart Rate:  [61-77] 61  Resp:  [14-21] 21  BP: (132-154)/(51-63) 149/51  Oxygen Therapy  SpO2: 95 %  Pulse Oximetry Type: Intermittent  Device (Oxygen Therapy): room air  Flowsheet Rows  "     First Filed Value   Admission Height  175.3 cm (69\") Documented at 12/11/2020 1959   Admission Weight  95.7 kg (211 lb) Documented at 12/11/2020 1959        Intake & Output (last 3 days)       12/10 0701 - 12/11 0700 12/11 0701 - 12/12 0700 12/12 0701 - 12/13 0700 12/13 0701 - 12/14 0700    P.O.  240 720 240    Total Intake(mL/kg)  240 (2.6) 720 (7.7) 240 (2.6)    Urine (mL/kg/hr)   400 (0.2)     Total Output   400     Net  +240 +320 +240            Urine Unmeasured Occurrence  1 x          Lines, Drains & Airways    Active LDAs     Name:   Placement date:   Placement time:   Site:   Days:    Peripheral IV 09/24/20 0830 Anterior;Distal;Right;Upper Arm   09/24/20    0830    Arm   80    Peripheral IV 12/11/20 2005 Right Arm   12/11/20 2005    Arm   1              Physical Exam  Constitutional:       Appearance: Normal appearance. He is obese.   HENT:      Head: Normocephalic and atraumatic.      Right Ear: External ear normal.      Left Ear: External ear normal.      Nose: Nose normal.      Mouth/Throat:      Mouth: Mucous membranes are moist.   Eyes:      Extraocular Movements: Extraocular movements intact.   Neck:      Musculoskeletal: Normal range of motion and neck supple.   Cardiovascular:      Rate and Rhythm: Normal rate and regular rhythm.      Pulses: Normal pulses.      Heart sounds: Murmur present.   Pulmonary:      Effort: Pulmonary effort is normal.      Breath sounds: Normal breath sounds.   Abdominal:      Palpations: Abdomen is soft.   Genitourinary:     Comments: deferred  Musculoskeletal: Normal range of motion.   Skin: Left ankle slightly swollen with decreased range of motion due to pain     General: Skin is warm and dry.   Neurological:      General: No focal deficit present.      Mental Status: He is alert and oriented to person, place, and time. Mental status is at baseline.   Psychiatric:         Mood and Affect: Mood normal.         Behavior: Behavior normal.         Thought Content: " Thought content normal.               Procedures:    Procedure(s):  CAROTID ENDARTERECTOMY      Results Review:     I reviewed the patient's new clinical results.      Lab Results (last 24 hours)     Procedure Component Value Units Date/Time    POC Glucose Once [[id number]]  (Abnormal) Collected: 12/13/20 1144    Specimen: Blood Updated: 12/13/20 1152     Glucose 160 mg/dL      Comment: Serial Number: [id number]:  [id number]       POC Glucose Once [[id number]]  (Abnormal) Collected: 12/13/20 0752    Specimen: Blood Updated: 12/13/20 0754     Glucose 131 mg/dL      Comment: Serial Number: [id number]:  [id number]       Basic Metabolic Panel [[id number]]  (Abnormal) Collected: 12/13/20 0437    Specimen: Blood Updated: 12/13/20 0554     Glucose 142 mg/dL      BUN 23 mg/dL      Creatinine 1.35 mg/dL      Sodium 141 mmol/L      Potassium 3.8 mmol/L      Chloride 109 mmol/L      CO2 23.0 mmol/L      Calcium 8.7 mg/dL      eGFR Non African Amer 51 mL/min/1.73      BUN/Creatinine Ratio 17.0     Anion Gap 9.0 mmol/L     Narrative:      GFR Normal >60  Chronic Kidney Disease <60  Kidney Failure <15      CBC (No Diff) [[id number]]  (Abnormal) Collected: 12/13/20 0437    Specimen: Blood Updated: 12/13/20 0545     WBC 7.30 10*3/mm3      RBC 4.09 10*6/mm3      Hemoglobin 12.2 g/dL      Hematocrit 37.4 %      MCV 91.4 fL      MCH 29.9 pg      MCHC 32.7 g/dL      RDW 14.5 %      RDW-SD 45.9 fl      MPV 8.7 fL      Platelets 179 10*3/mm3     COVID PRE-OP / PRE-PROCEDURE SCREENING ORDER (NO ISOLATION) - Swab, Nasopharynx [[id number]]  (Normal) Collected: 12/12/20 1817    Specimen: Swab from Nasopharynx Updated: 12/12/20 1915    Narrative:      The following orders were created for panel order COVID PRE-OP / PRE-PROCEDURE SCREENING ORDER (NO ISOLATION) - Swab, Nasopharynx.  Procedure                               Abnormality         Status                     ---------                               -----------         ------                      COVID-19,CEPHEID,COR/LORI...[[id number]]  Normal              Final result                 Please view results for these tests on the individual orders.    COVID-19,CEPHEID,COR/LORI/PAD IN-HOUSE(OR EMERGENT/ADD-ON),NP SWAB IN TRANSPORT MEDIA 3-4 HR TAT - Swab, Nasopharynx [[id number]]  (Normal) Collected: 12/12/20 1817    Specimen: Swab from Nasopharynx Updated: 12/12/20 1915     COVID19 Not Detected    Narrative:      Fact sheet for providers: https://www.fda.gov/media/093634/download     Fact sheet for patients: https://www.fda.gov/media/718619/download        No results found for: HGBA1C  Results from last 7 days   Lab Units 12/11/20 1959   INR  0.95           No results found for: LIPASE  Lab Results   Component Value Date    CHOL 162 12/12/2020    TRIG 93 12/12/2020    HDL 48 12/12/2020    LDL 97 12/12/2020       No results found for: INTRAOP, PREDX, FINALDX, COMDX    Microbiology Results (last 10 days)     Procedure Component Value - Date/Time    COVID PRE-OP / PRE-PROCEDURE SCREENING ORDER (NO ISOLATION) - Swab, Nasopharynx [[id number]]  (Normal) Collected: 12/12/20 1817    Lab Status: Final result Specimen: Swab from Nasopharynx Updated: 12/12/20 1915    Narrative:      The following orders were created for panel order COVID PRE-OP / PRE-PROCEDURE SCREENING ORDER (NO ISOLATION) - Swab, Nasopharynx.  Procedure                               Abnormality         Status                     ---------                               -----------         ------                     COVID-19,CEPHEID,COR/LROI...[[id number]]  Normal              Final result                 Please view results for these tests on the individual orders.    COVID-19,CEPHEID,COR/LORI/PAD IN-HOUSE(OR EMERGENT/ADD-ON),NP SWAB IN TRANSPORT MEDIA 3-4 HR TAT - Swab, Nasopharynx [[id number]]  (Normal) Collected: 12/12/20 1817    Lab Status: Final result Specimen: Swab from Nasopharynx Updated: 12/12/20 1915     COVID19 Not Detected     Narrative:      Fact sheet for providers: https://www.fda.gov/media/640488/download     Fact sheet for patients: https://www.fda.gov/media/127206/download          ECG/EMG Results (most recent)     Procedure Component Value Units Date/Time    ECG 12 Lead [[id number]] Collected: 12/11/20 2017     Updated: 12/11/20 2022     QT Interval 383 ms     Narrative:      HEART RATE= 75  bpm  RR Interval= 804  ms  IL Interval= 92  ms  P Horizontal Axis= 221  deg  P Front Axis= 0  deg  QRSD Interval= 113  ms  QT Interval= 383  ms  QRS Axis= -37  deg  T Wave Axis= 166  deg  - ABNORMAL ECG -  Sinus rhythm  Repol abnrm suggests ischemia, diffuse leads  No previous ECG available for comparison  Electronically Signed By:   Date and Time of Study: 2020-12-11 20:17:32    Adult Transthoracic Echo Complete W/ Cont if Necessary Per Protocol (With Agitated Saline) [[id number]] Collected: 12/12/20 0905     Updated: 12/12/20 1313     BSA 2.2 m^2      RVIDd 2.8 cm      IVSd 1.1 cm      LVIDd 4.9 cm      LVIDs 3.4 cm      LVPWd 1.2 cm      IVS/LVPW 0.95     FS 30.8 %      EDV(Teich) 114.3 ml      ESV(Teich) 47.7 ml      EF(Teich) 58.3 %      EDV(cubed) 119.7 ml      ESV(cubed) 39.6 ml      EF(cubed) 66.9 %      LV mass(C)d 223.3 grams      LV mass(C)dI 103.3 grams/m^2      SV(Teich) 66.6 ml      SI(Teich) 30.8 ml/m^2      SV(cubed) 80.1 ml      SI(cubed) 37.0 ml/m^2      Ao root diam 3.3 cm      Ao root area 8.5 cm^2      ACS 1.9 cm      asc Aorta Diam 2.9 cm      LVOT diam 2.1 cm      LVOT area 3.3 cm^2      RVOT diam 2.2 cm      RVOT area 4.0 cm^2      EDV(MOD-sp4) 69.0 ml      ESV(MOD-sp4) 24.3 ml      EF(MOD-sp4) 64.8 %      SV(MOD-sp4) 44.7 ml      SI(MOD-sp4) 20.7 ml/m^2      Ao root area (BSA corrected) 1.5     LV Delgado Vol (BSA corrected) 31.9 ml/m^2      LV Sys Vol (BSA corrected) 11.2 ml/m^2      Aortic R-R 1.1 sec      Aortic HR 56.3 BPM      MV E max britney 96.8 cm/sec      MV A max britney 108.4 cm/sec      MV E/A 0.89     MV V2 max 125.8  cm/sec      MV max PG 6.3 mmHg      MV V2 mean 79.9 cm/sec      MV mean PG 2.8 mmHg      MV V2 VTI 33.7 cm      MVA(VTI) 3.4 cm^2      MV dec slope 435.1 cm/sec^2      MV dec time 0.22 sec      Ao pk britney 340.0 cm/sec      Ao max PG 46.2 mmHg      Ao max PG (full) 37.9 mmHg      Ao V2 mean 238.7 cm/sec      Ao mean PG 25.3 mmHg      Ao mean PG (full) 20.6 mmHg      Ao V2 VTI 74.5 cm      SAL(I,A) 1.5 cm^2      SAL(I,D) 1.5 cm^2      SAL(V,A) 1.4 cm^2      SAL(V,D) 1.4 cm^2      AI max britney 402.5 cm/sec      AI max PG 64.8 mmHg      AI dec slope 249.5 cm/sec^2      AI dec time 1.6 sec      AI P1/2t 472.5 msec      LV V1 max PG 8.3 mmHg      LV V1 mean PG 4.7 mmHg      LV V1 max 144.4 cm/sec      LV V1 mean 103.3 cm/sec      LV V1 VTI 34.2 cm      CO(Ao) 35.6 l/min      CI(Ao) 16.5 l/min/m^2      SV(Ao) 632.0 ml      SI(Ao) 292.3 ml/m^2      CO(LVOT) 6.4 l/min      CI(LVOT) 3.0 l/min/m^2      SV(LVOT) 113.5 ml      SV(RVOT) 93.2 ml      SI(LVOT) 52.5 ml/m^2      PA V2 max 116.8 cm/sec      PA max PG 5.5 mmHg      PA max PG (full) 0.41 mmHg      PA V2 mean 82.0 cm/sec      PA mean PG 3.0 mmHg      PA mean PG (full) 0.54 mmHg      PA V2 VTI 25.3 cm      PVA(I,A) 3.7 cm^2      BH CV ECHO SANJAY - PVA(I,D) 3.7 cm^2      BH CV ECHO SANJAY - PVA(V,A) 3.8 cm^2      BH CV ECHO SANJAY - PVA(V,D) 3.8 cm^2      PA acc time 0.13 sec      RV V1 max PG 5.1 mmHg      RV V1 mean PG 2.5 mmHg      RV V1 max 112.4 cm/sec      RV V1 mean 73.6 cm/sec      RV V1 VTI 23.5 cm      TR max britney 330.3 cm/sec      RVSP(TR) 46.6 mmHg      RAP systole 3.0 mmHg      PA pr(Accel) 22.5 mmHg      Qp/Qs 0.82      CV ECHO SANJAY - BZI_BMI 28.1 kilograms/m^2       CV ECHO SANJAY - BSA(HAYCOCK) 2.2 m^2       CV ECHO SANJAY - BZI_METRIC_WEIGHT 93.9 kg       CV ECHO SANJAY - BZI_METRIC_HEIGHT 182.9 cm      EF(MOD-bp) 65.0 %      LA dimension(2D) 4.7 cm      Echo EF Estimated 65 %     Narrative:      · Estimated left ventricular EF = 65% Left ventricular systolic  function   is normal.  · Left ventricular diastolic dysfunction is noted.  · The right atrial cavity is mildly dilated.  · Moderate aortic valve stenosis is present.  · Estimated right ventricular systolic pressure from tricuspid   regurgitation is moderately elevated (45-55 mmHg).       ECG 12 Lead [[id number]] Collected: 12/13/20 1107     Updated: 12/13/20 1109     QT Interval 418 ms     Narrative:      HEART RATE= 60  bpm  RR Interval= 996  ms  WA Interval= 296  ms  P Horizontal Axis= -17  deg  P Front Axis= 46  deg  QRSD Interval= 107  ms  QT Interval= 418  ms  QRS Axis= -41  deg  T Wave Axis= -8  deg  - ABNORMAL ECG -  Sinus rhythm  Prolonged WA interval  Inferior infarct, old  Electronically Signed By:   Date and Time of Study: 2020-12-13 11:07:57               Results for orders placed during the hospital encounter of 12/11/20   Adult Transthoracic Echo Complete W/ Cont if Necessary Per Protocol (With Agitated Saline)    Narrative · Estimated left ventricular EF = 65% Left ventricular systolic function   is normal.  · Left ventricular diastolic dysfunction is noted.  · The right atrial cavity is mildly dilated.  · Moderate aortic valve stenosis is present.  · Estimated right ventricular systolic pressure from tricuspid   regurgitation is moderately elevated (45-55 mmHg).          Xr Ankle 2 View Left    Result Date: 12/12/2020  1.Oblique fracture of the distal fibula with mild lateral displacement. 2.Suspected minimally displaced fracture of the posterior malleolus. 3.Mild osteoarthritis. 4.Calcific atherosclerosis.  Electronically Signed By-Dylon Cedillo MD On:12/12/2020 1:05 PM This report was finalized on [id number] by  Dylon Cedillo MD.    Mri Brain Without Contrast    Result Date: 12/12/2020  1.No definite MRI findings of acute infarction or other acute intracranial abnormality at this time. 2.Volume loss and suspected mild to moderate chronic small vessel ischemic changes with remote lacunar infarct  in the right basal ganglion.   Electronically Signed By-Dylon Cedillo MD On:12/12/2020 1:33 PM This report was finalized on [id number] by  Dylon Cedillo MD.    Xr Chest 1 View    Result Date: 12/11/2020  1.  No evidence of active disease.   Electronically Signed By-Nikki Bright MD On:12/11/2020 8:43 PM This report was finalized on [id number] by  Nikki Bright MD.    Ct Head Without Contrast Stroke Protocol    Result Date: 12/11/2020  1. Negative for hemorrhage or mass effect. 2. There is moderate cerebral and cerebellar atrophy. There is mild white matter abnormality which is favored to be due to chronic small vessel ischemia. 3. 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 4. If acute ischemia is clinically suspected, recommend consideration of brain MR.  Electronically Signed By-Nikki Bright MD On:12/11/2020 8:04 PM This report was finalized on [id number] by  Nikki Bright MD.          Xrays, labs reviewed personally by physician.    Medication Review:   I have reviewed the patient's current medication list      Scheduled Meds  amLODIPine, 10 mg, Oral, Daily  aspirin, 81 mg, Oral, Daily  atorvastatin, 80 mg, Oral, Nightly  [START ON 12/14/2020] ceFAZolin, 2 g, Intravenous, 30 Min Pre-Op  clopidogrel, 75 mg, Oral, Daily  insulin lispro, 0-9 Units, Subcutaneous, TID AC  levothyroxine, 75 mcg, Oral, Daily  losartan, 50 mg, Oral, Daily  pantoprazole, 40 mg, Oral, Daily  sodium chloride, 3 mL, Intravenous, Q12H        Meds Infusions       Meds PRN  •  acetaminophen **OR** acetaminophen  •  bisacodyl  •  dextrose  •  dextrose  •  glucagon (human recombinant)  •  HYDROcodone-acetaminophen  •  influenza vaccine  •  insulin lispro **AND** insulin lispro  •  ondansetron  •  potassium chloride **OR** potassium chloride **OR** potassium chloride  •  sodium chloride  •  sodium chloride        Assessment/Plan   Assessment/Plan     Active Hospital Problems    Diagnosis  POA   • **Symptomatic carotid  artery stenosis, bilateral [I65.23]  Yes   • TIA (transient ischemic attack) [G45.9]  Yes      Resolved Hospital Problems   No resolved problems to display.       MEDICAL DECISION MAKING COMPLEXITY BY PROBLEM:   Suspected TIA      -MRI of the brain showed no acute infarct or other intracranial abnormality      -on aspirin/Plavix/Lipitor, and followed by neurologist     Bilateral SIVAN     -appreciate vascular surgeon evaluation and recommendation      -plan for endarterectomy in a.m.      - aspirin/Plavix     Left ankle pain with swelling/suspected  fracture      -supportive care with analgesic and follow-up on orthopedic consult         -X-ray of the ankle on 12/12/2020 showed oblique fracture of the distal fibula with mild lateral displacement and suspected posterior malleolus fracture.     Essential hypertension       -Amlodipine/losartan     T2DM     -cont SSI     Mixed hyperlipidemia     -Lipitor      Hypothyroidism       -Levothyroxine     Obesity      -encourage lifestyle changes          VTE Prophylaxis -   Mechanical Order History:      Ordered        12/12/20 0255  Place Sequential Compression Device  Once         12/12/20 0255  Maintain Sequential Compression Device  Continuous                 Pharmalogical Order History:     None            Code Status -   Code Status and Medical Interventions:   Ordered at: 12/11/20 2055     Code Status:    CPR     Medical Interventions (Level of Support Prior to Arrest):    Full         Discharge Planning    Electronically signed by Kendall Arellano MD, 12/13/20, 13:50 EST.  [facility] Hospitalist Team

## 2020-12-13 NOTE — PLAN OF CARE
Goal Outcome Evaluation:  Plan of Care Reviewed With: patient  Progress: no change   Pt still in quite a bit of pain from his left foot. Spoke with Pedro and will place a CAM boot and see the patient tomorrow after carotic surgery to determine next steps with ortho, no complaints from pt at this time, will continue to monitor

## 2020-12-13 NOTE — ANESTHESIA PREPROCEDURE EVALUATION
Anesthesia Evaluation     Patient summary reviewed and Nursing notes reviewed   no history of anesthetic complications:  NPO Solid Status: > 8 hours  NPO Liquid Status: > 8 hours           Airway   Dental      Pulmonary    Cardiovascular     ECG reviewed  PT is on anticoagulation therapy    (+) hypertension, valvular problems/murmurs AS, hyperlipidemia,  carotid artery disease      Neuro/Psych  (+) TIA, CVA,     GI/Hepatic/Renal/Endo    (+)  GERD,  renal disease CRI, diabetes mellitus, thyroid problem hypothyroidism    Musculoskeletal     Abdominal    Substance History      OB/GYN          Other      history of cancer    ROS/Med Hx Other: Allergies, bilateral carotid stenosis, prostate cancer    Echocardiogram Findings    Left Ventricle Calculated left ventricular EF = 65% Estimated left ventricular EF = 65% Left ventricular systolic function is normal.   Normal left ventricular cavity size and wall thickness noted. Left ventricular diastolic dysfunction is noted.  Right Ventricle Normal right ventricular cavity size noted.  Left Atrium The left atrial cavity is mild to moderately dilated.  Right Atrium The right atrial cavity is mildly dilated.  Aortic Valve The aortic valve is abnormal in structure. There is moderate calcification of the aortic valve. Moderate aortic valve stenosis is present.  Mitral Valve Mild mitral annular calcification is present. Mild mitral valve regurgitation is present.  Tricuspid Valve Mild tricuspid valve regurgitation is present. Estimated right ventricular systolic pressure from tricuspid regurgitation is moderately elevated (45-55 mmHg).  Pulmonic Valve The pulmonic valve is not well visualized.  Greater Vessels No dilation of the aortic root is present.  Pericardium There is no evidence of pericardial effusion.    Stress  · Findings consistent with a normal ECG stress test.  · Left ventricular ejection fraction is normal. (Calculated EF = 64%).  · Myocardial perfusion imaging  indicates a medium-sized infarct located in the inferior wall and septal wall with mild joseph-infarct ischemia.  · Impressions are consistent with a high risk study.  · This is abnormal Cardiolite imaging stress test with moderate sized inferior/septal myocardial infarction with small amount of joseph-infarct ischemia. Left ventricular size and function was normal. Clinical correlation recommended.    [facility]  PROSTATECTOMY BACK SURGERY  COLONOSCOPY                  Anesthesia Plan    ASA 3     general   (Patient identified; pre-operative vital signs, all relevant labs/studies, complete medical/surgical/anesthetic history, full medication list, full allergy list, and NPO status obtained/reviewed; physical assessment performed; anesthetic options, side effects, potential complications, risks, and benefits discussed; questions answered; written anesthesia consent obtained; patient cleared for procedure; anesthesia machine and equipment checked and functioning)  intravenous induction     Anesthetic plan, all risks, benefits, and alternatives have been provided, discussed and informed consent has been obtained with: patient.

## 2020-12-13 NOTE — CONSULTS
12/13/20   Foot and Ankle Surgery - Consult  Provider: Dr. Alen Vasquez, DPM  Location: [facility]    Subjective:  Alen Ratliff is a 80 y.o. male.     Chief Complaint   Patient presents with   • Numbness     tingling on left side and face.  Onset 30 minutes ago.        Consulting Physician: Left ankle pain    Reason for Consult: Left ankle fracture    HPI: Patient is an 80-year-old male that has been admitted for left arm and facial numbness.  He underwent carotid endarterectomy earlier today.  Patient apparently had a near syncopal episode prior to admission causing fall and injury to his left ankle.  Imaging was performed in the ED showing oblique displaced lateral malleolus fracture.  Verbal order was given for cam boot immobilization and minimal weightbearing as needed for transfers.  Patient is unable to provide any substantial history as he does appear to be somewhat lethargic after surgery.  Case was reviewed with patient's nurse.    Allergies   Allergen Reactions   • Azithromycin Unknown - High Severity       Past Medical History:   Diagnosis Date   • Allergic rhinitis    • Diabetes (CMS/[facility])    • GERD (gastroesophageal reflux disease)    • Heart murmur    • Hyperlipidemia    • Hypertension    • Hypothyroidism    • Prostate cancer (CMS/HCC)    • Stroke (CMS/HCC)        Past Surgical History:   Procedure Laterality Date   • BACK SURGERY  1994   • COLONOSCOPY  08/25/2015   • PROSTATECTOMY  2001    due to cancer       Family History   Problem Relation Age of Onset   • Hypertension Other    • Heart attack Mother    • Heart disease Mother    • Heart attack Father    • Heart disease Father        Social History     Socioeconomic History   • Marital status:      Spouse name: Not on file   • Number of children: Not on file   • Years of education: Not on file   • Highest education level: Not on file   Tobacco Use   • Smoking status: Never Smoker   • Smokeless tobacco: Never Used   Substance  and Sexual Activity   • Alcohol use: Not Currently   • Drug use: Never   • Sexual activity: Defer          Current Facility-Administered Medications:   •  acetaminophen (TYLENOL) tablet 650 mg, 650 mg, Oral, Q4H PRN **OR** acetaminophen (TYLENOL) suppository 650 mg, 650 mg, Rectal, Q4H PRN, Daly Buckner APRN  •  amLODIPine (NORVASC) tablet 10 mg, 10 mg, Oral, Daily, Daly Buckner APRN, 10 mg at 12/13/20 0849  •  aspirin chewable tablet 81 mg, 81 mg, Oral, Daily, Jessica Sotomayor APRN, 81 mg at 12/13/20 0849  •  atorvastatin (LIPITOR) tablet 80 mg, 80 mg, Oral, Nightly, Jessica Sotomayor APRN, 80 mg at 12/12/20 2126  •  bisacodyl (DULCOLAX) suppository 10 mg, 10 mg, Rectal, Daily PRN, Daly Buckner APRN  •  [START ON 12/14/2020] ceFAZolin (ANCEF) in SWFI 2 g/20ml IV PUSH syringe, 2 g, Intravenous, 30 Min Pre-Op, Toby Fung MD  •  clopidogrel (PLAVIX) tablet 75 mg, 75 mg, Oral, Daily, Daly Buckner APRN, 75 mg at 12/13/20 0849  •  dextrose (D50W) 25 g/ 50mL Intravenous Solution 25 g, 25 g, Intravenous, Q15 Min PRN, Daly Buckner APRN  •  dextrose (GLUTOSE) oral gel 15 g, 15 g, Oral, Q15 Min PRN, Daly Buckner APRN  •  glucagon (human recombinant) (GLUCAGEN DIAGNOSTIC) injection 1 mg, 1 mg, Subcutaneous, PRN, Daly Buckner APRN  •  HYDROcodone-acetaminophen (NORCO) 5-325 MG per tablet 1 tablet, 1 tablet, Oral, Q6H PRN, Kendall Arellano MD, 1 tablet at 12/13/20 0915  •  influenza vac split quad (FLUZONE,FLUARIX,AFLURIA,FLULAVAL) injection 0.5 mL, 0.5 mL, Intramuscular, During Hospitalization, Maeve Rob MD  •  insulin lispro (ADMELOG) injection 0-9 Units, 0-9 Units, Subcutaneous, TID AC, 2 Units at 12/12/20 1817 **AND** insulin lispro (ADMELOG) injection 0-9 Units, 0-9 Units, Subcutaneous, PRN, Daly Buckner APRN  •  levothyroxine (SYNTHROID, LEVOTHROID) tablet 75 mcg, 75 mcg, Oral, Daily, Daly Buckner APRN, 75 mcg  "at 12/13/20 0849  •  losartan (COZAAR) tablet 50 mg, 50 mg, Oral, Daily, Daly Buckner APRN, 50 mg at 12/13/20 0849  •  ondansetron (ZOFRAN) injection 4 mg, 4 mg, Intravenous, Q6H PRN, Daly Buckner, APRN  •  pantoprazole (PROTONIX) EC tablet 40 mg, 40 mg, Oral, Daily, Daly Buckner APRN, 40 mg at 12/13/20 0849  •  potassium chloride (K-DUR,KLOR-CON) CR tablet 40 mEq, 40 mEq, Oral, PRN **OR** potassium chloride (KLOR-CON) packet 40 mEq, 40 mEq, Oral, PRN **OR** potassium chloride 10 mEq in 100 mL IVPB, 10 mEq, Intravenous, Q1H PRN, Daly Buckner, APRN  •  sodium chloride 0.9 % flush 10 mL, 10 mL, Intravenous, PRN, Alexys Stevens MD  •  sodium chloride 0.9 % flush 3 mL, 3 mL, Intravenous, Q12H, Daly Buckner, APRN, 3 mL at 12/13/20 0848  •  sodium chloride 0.9 % flush 3-10 mL, 3-10 mL, Intravenous, PRN, Daly Buckner, APRN    Review of Systems:  General: Denies fever, chills, fatigue, and weakness.  Eyes: Denies vision loss, blurry vision, and excessive redness.  ENT: Denies hearing issues and difficulty swallowing.  Cardiovascular: Denies palpitations, chest pain, or syncopal episodes.  Respiratory: Denies shortness of breath, wheezing, and coughing.  GI: Denies abdominal pain, nausea, and vomiting.   : Denies frequency, hematuria, and urgency.  Musculoskeletal: + Left ankle pain  Derm: Denies rash, open wounds, or suspicious lesions.  Neuro: Denies headaches, numbness, loss of coordination, and tremors.  Psych: Denies anxiety and depression.  Endocrine: Denies temperature intolerance and changes in appetite.  Heme: Denies bleeding disorders or abnormal bruising.     Objective   /51 (BP Location: Right arm, Patient Position: Lying)   Pulse 61   Temp 98.6 °F (37 °C) (Oral)   Resp 21   Ht 182.9 cm (72\")   Wt 93.9 kg (207 lb)   SpO2 95%   BMI 28.07 kg/m²     Foot/Ankle Exam:       General:   Appearance comment:  Mildly lethargic after surgery  Orientation: " disoriented    Affect: inappropriate      VASCULAR      Left Foot Vascularity   Normal vascular exam    Dorsalis pedis:  2+  Posterior tibial:  2+  Skin Temperature: warm    Edema Grading:  None  CFT:  < 3 seconds  Pedal Hair Growth:  Present  Varicosities: none        NEUROLOGIC     Left Foot Neurologic   Light touch sensation:  Normal  Hot/cold sensation: normal    Achilles reflex:  2+     MUSCULOSKELETAL      Left Foot Musculoskeletal   Ecchymosis:  None  Tenderness: lateral malleolus and anterior tibiotalar joint line    Arch:  Normal     DERMATOLOGIC     Left Foot Dermatologic   Skin: skin intact        Left Foot Additional Comments: Pain with palpation to the lateral malleoli region.  No gross deformity.  Mild edema.  No proximal fibular pain            Results from last 7 days   Lab Units 12/13/20  0437   WBC 10*3/mm3 7.30   HEMOGLOBIN g/dL 12.2*   HEMATOCRIT % 37.4*   PLATELETS 10*3/mm3 179       Assessment/Plan   Patient Active Problem List   Diagnosis   • Heart murmur   • Stroke (cerebrum) (CMS/[facility])   • Essential hypertension   • Mixed hyperlipidemia   • Acquired hypothyroidism   • Type 2 diabetes mellitus, without long-term current use of insulin (CMS/[facility])   • GERD (gastroesophageal reflux disease)   • Seasonal allergic rhinitis due to pollen   • CKD (chronic kidney disease) stage 3, GFR 30-59 ml/min   • History of prostate cancer   • TIA (transient ischemic attack)   • Symptomatic carotid artery stenosis, bilateral     Patient appears to be doing well after carotid endarterectomy.  He does appear to be mildly lethargic and unable to provide any substantial history or discussion.  Case was reviewed with patient's wife.  I explained that there is a mildly displaced lateral malleolus fracture.  We did discuss options of nonoperative versus operative repair.  Patient's wife feels that he should undergo operative repair.  I have recommended that the patient remain in the cam boot with decreased overall  activity.  I will discuss options with him tomorrow.    Thank you for the consultation and allowing me to participate in this patient's care. Please call with any additional questions or concerns.     Note is dictated utilizing voice recognition software. Unfortunately this leads to occasional typographical errors. I apologize in advance if the situation occurs. If questions occur please do not hesitate to call our office.

## 2020-12-13 NOTE — PROGRESS NOTES
Appreciate Dr Fung's  Input  I believe surgery is planned pending cardiology clearance      Modification of stroke risk factors:   - Blood pressure should be less than 130/80 outpatient, HbA1c less than 6.5, LDL less than 70; b12>500 and smoking cessation if applicable. We would be grateful if the primary team / primary care physician keep a close watch on this above targets.  - Stroke education  - Follow up with neurologist of choice

## 2020-12-14 ENCOUNTER — ANESTHESIA (OUTPATIENT)
Dept: PERIOP | Facility: HOSPITAL | Age: 80
End: 2020-12-14

## 2020-12-14 ENCOUNTER — APPOINTMENT (OUTPATIENT)
Dept: CARDIOLOGY | Facility: HOSPITAL | Age: 80
End: 2020-12-14

## 2020-12-14 LAB
BH CV XLRA MEAS RIGHT DIST CCA EDV: 12 CM/SEC
BH CV XLRA MEAS RIGHT DIST CCA PSV: 74 CM/SEC
BH CV XLRA MEAS RIGHT DIST ICA EDV: 32 CM/SEC
BH CV XLRA MEAS RIGHT DIST ICA PSV: 104 CM/SEC
BH CV XLRA MEAS RIGHT PROX ECA PSV: 71 CM/SEC
BH CV XLRA MEAS RIGHT PROX ICA EDV: 19 CM/SEC
BH CV XLRA MEAS RIGHT PROX ICA PSV: 52 CM/SEC
GLUCOSE BLDC GLUCOMTR-MCNC: 140 MG/DL (ref 70–105)
GLUCOSE BLDC GLUCOMTR-MCNC: 142 MG/DL (ref 70–105)
GLUCOSE BLDC GLUCOMTR-MCNC: 162 MG/DL (ref 70–105)
GLUCOSE BLDC GLUCOMTR-MCNC: 170 MG/DL (ref 70–105)
GLUCOSE BLDC GLUCOMTR-MCNC: 187 MG/DL (ref 70–105)
GLUCOSE BLDC GLUCOMTR-MCNC: 190 MG/DL (ref 70–105)
GLUCOSE BLDC GLUCOMTR-MCNC: 194 MG/DL (ref 70–105)
HBA1C MFR BLD: 7.1 % (ref 3.5–5.6)

## 2020-12-14 PROCEDURE — 25010000002 ONDANSETRON PER 1 MG: Performed by: SURGERY

## 2020-12-14 PROCEDURE — 25010000003 CEFAZOLIN PER 500 MG: Performed by: SURGERY

## 2020-12-14 PROCEDURE — 25010000002 HYDROMORPHONE PER 4 MG: Performed by: SURGERY

## 2020-12-14 PROCEDURE — 25010000002 HEPARIN (PORCINE) PER 1000 UNITS: Performed by: SURGERY

## 2020-12-14 PROCEDURE — 82962 GLUCOSE BLOOD TEST: CPT

## 2020-12-14 PROCEDURE — 25010000002 HEPARIN (PORCINE) PER 1000 UNITS: Performed by: ANESTHESIOLOGY

## 2020-12-14 PROCEDURE — 03UM0KZ SUPPLEMENT RIGHT EXTERNAL CAROTID ARTERY WITH NONAUTOLOGOUS TISSUE SUBSTITUTE, OPEN APPROACH: ICD-10-PCS | Performed by: SURGERY

## 2020-12-14 PROCEDURE — C1768 GRAFT, VASCULAR: HCPCS | Performed by: SURGERY

## 2020-12-14 PROCEDURE — 25010000002 ONDANSETRON PER 1 MG: Performed by: ANESTHESIOLOGY

## 2020-12-14 PROCEDURE — 25010000002 PROPOFOL 200 MG/20ML EMULSION: Performed by: ANESTHESIOLOGY

## 2020-12-14 PROCEDURE — 99232 SBSQ HOSP IP/OBS MODERATE 35: CPT | Performed by: INTERNAL MEDICINE

## 2020-12-14 PROCEDURE — 85347 COAGULATION TIME ACTIVATED: CPT

## 2020-12-14 PROCEDURE — 99221 1ST HOSP IP/OBS SF/LOW 40: CPT | Performed by: PODIATRIST

## 2020-12-14 PROCEDURE — 03CK0ZZ EXTIRPATION OF MATTER FROM RIGHT INTERNAL CAROTID ARTERY, OPEN APPROACH: ICD-10-PCS | Performed by: SURGERY

## 2020-12-14 PROCEDURE — 25010000002 PROTAMINE SULFATE PER 10 MG: Performed by: ANESTHESIOLOGY

## 2020-12-14 PROCEDURE — 03CM0ZZ EXTIRPATION OF MATTER FROM RIGHT EXTERNAL CAROTID ARTERY, OPEN APPROACH: ICD-10-PCS | Performed by: SURGERY

## 2020-12-14 PROCEDURE — 25010000003 LIDOCAINE 1 % SOLUTION: Performed by: SURGERY

## 2020-12-14 PROCEDURE — 25010000002 FENTANYL CITRATE (PF) 100 MCG/2ML SOLUTION: Performed by: ANESTHESIOLOGY

## 2020-12-14 PROCEDURE — 03UK0KZ SUPPLEMENT RIGHT INTERNAL CAROTID ARTERY WITH NONAUTOLOGOUS TISSUE SUBSTITUTE, OPEN APPROACH: ICD-10-PCS | Performed by: SURGERY

## 2020-12-14 PROCEDURE — 25010000002 DIPHENHYDRAMINE PER 50 MG: Performed by: NURSE PRACTITIONER

## 2020-12-14 PROCEDURE — 63710000001 INSULIN LISPRO (HUMAN) PER 5 UNITS: Performed by: SURGERY

## 2020-12-14 PROCEDURE — 25010000002 NEOSTIGMINE 10 MG/10ML SOLUTION: Performed by: ANESTHESIOLOGY

## 2020-12-14 PROCEDURE — 25010000002 DEXAMETHASONE PER 1 MG: Performed by: ANESTHESIOLOGY

## 2020-12-14 PROCEDURE — 93882 EXTRACRANIAL UNI/LTD STUDY: CPT

## 2020-12-14 PROCEDURE — 25010000002 CEFAZOLIN PER 500 MG: Performed by: SURGERY

## 2020-12-14 DEVICE — LIGACLIP MCA MULTIPLE CLIP APPLIERS, 20 MEDIUM CLIPS
Type: IMPLANTABLE DEVICE | Status: FUNCTIONAL
Brand: LIGACLIP

## 2020-12-14 DEVICE — VASCU-GUARD PERIPHERAL VASCULAR PATCH IS PREPARED FROM BOVINE PERICARDIUM WHICH IS CROSS-LINKED WITH GLUTARALDEHYDE. THE PERICARDIUM IS PROCURED FROM CATTLE ORIGINATING IN THE UNITED STATES. VASCU-GUARD PERIPHERAL VASCULAR PATCH IS CHEMICALLY STERILIZED USING ETHANOL AND PROPYLENE OXIDE. VASCU-GUARD PERIPHERAL VASCULAR PATCH HAS BEEN TREATED WITH 1 MOLAR SODIUM HYDROXIDE FOR A MINIMUM OF 60 MINUTES AT 20 - 25°C.  VASCU-GUARD PERIPHERAL VASCULAR PATCH IS PACKAGED IN A CONTAINER FILLED WITH STERILE, NON-PYROGENIC WATER CONTAINING PROPYLENE OXIDE. THE CONTENTS OF THE UNOPENED, UNDAMAGED CONTAINER ARE STERILE.
Type: IMPLANTABLE DEVICE | Site: CAROTID | Status: FUNCTIONAL
Brand: VASCU-GUARD PERIPHERAL VASCULAR PATCH

## 2020-12-14 DEVICE — LIGACLIP MCA MULTIPLE CLIP APPLIERS, 20 SMALL CLIPS
Type: IMPLANTABLE DEVICE | Status: FUNCTIONAL
Brand: LIGACLIP

## 2020-12-14 RX ORDER — FENTANYL CITRATE 50 UG/ML
INJECTION, SOLUTION INTRAMUSCULAR; INTRAVENOUS AS NEEDED
Status: DISCONTINUED | OUTPATIENT
Start: 2020-12-14 | End: 2020-12-14 | Stop reason: SURG

## 2020-12-14 RX ORDER — FLUMAZENIL 0.1 MG/ML
0.5 INJECTION INTRAVENOUS AS NEEDED
Status: DISCONTINUED | OUTPATIENT
Start: 2020-12-14 | End: 2020-12-14 | Stop reason: HOSPADM

## 2020-12-14 RX ORDER — HYDRALAZINE HYDROCHLORIDE 10 MG/1
10 TABLET, FILM COATED ORAL EVERY 6 HOURS PRN
Status: DISCONTINUED | OUTPATIENT
Start: 2020-12-14 | End: 2020-12-17 | Stop reason: HOSPADM

## 2020-12-14 RX ORDER — MEPERIDINE HYDROCHLORIDE 25 MG/ML
12.5 INJECTION INTRAMUSCULAR; INTRAVENOUS; SUBCUTANEOUS
Status: DISCONTINUED | OUTPATIENT
Start: 2020-12-14 | End: 2020-12-14 | Stop reason: HOSPADM

## 2020-12-14 RX ORDER — SODIUM CHLORIDE, SODIUM LACTATE, POTASSIUM CHLORIDE, CALCIUM CHLORIDE 600; 310; 30; 20 MG/100ML; MG/100ML; MG/100ML; MG/100ML
1000 INJECTION, SOLUTION INTRAVENOUS CONTINUOUS
Status: DISCONTINUED | OUTPATIENT
Start: 2020-12-14 | End: 2020-12-14

## 2020-12-14 RX ORDER — NALOXONE HCL 0.4 MG/ML
0.4 VIAL (ML) INJECTION AS NEEDED
Status: DISCONTINUED | OUTPATIENT
Start: 2020-12-14 | End: 2020-12-14 | Stop reason: HOSPADM

## 2020-12-14 RX ORDER — DIPHENHYDRAMINE HYDROCHLORIDE 50 MG/ML
12.5 INJECTION INTRAMUSCULAR; INTRAVENOUS EVERY 6 HOURS PRN
Status: DISCONTINUED | OUTPATIENT
Start: 2020-12-14 | End: 2020-12-17 | Stop reason: HOSPADM

## 2020-12-14 RX ORDER — NALOXONE HCL 0.4 MG/ML
0.4 VIAL (ML) INJECTION
Status: DISCONTINUED | OUTPATIENT
Start: 2020-12-14 | End: 2020-12-17 | Stop reason: HOSPADM

## 2020-12-14 RX ORDER — HYDROCODONE BITARTRATE AND ACETAMINOPHEN 5; 325 MG/1; MG/1
1 TABLET ORAL ONCE AS NEEDED
Status: DISCONTINUED | OUTPATIENT
Start: 2020-12-14 | End: 2020-12-14 | Stop reason: HOSPADM

## 2020-12-14 RX ORDER — ACETAMINOPHEN 650 MG/1
650 SUPPOSITORY RECTAL ONCE AS NEEDED
Status: DISCONTINUED | OUTPATIENT
Start: 2020-12-14 | End: 2020-12-14 | Stop reason: HOSPADM

## 2020-12-14 RX ORDER — ONDANSETRON 2 MG/ML
INJECTION INTRAMUSCULAR; INTRAVENOUS AS NEEDED
Status: DISCONTINUED | OUTPATIENT
Start: 2020-12-14 | End: 2020-12-14 | Stop reason: SURG

## 2020-12-14 RX ORDER — PROTAMINE SULFATE 10 MG/ML
INJECTION, SOLUTION INTRAVENOUS AS NEEDED
Status: DISCONTINUED | OUTPATIENT
Start: 2020-12-14 | End: 2020-12-14 | Stop reason: SURG

## 2020-12-14 RX ORDER — PROMETHAZINE HYDROCHLORIDE 25 MG/1
25 TABLET ORAL ONCE AS NEEDED
Status: DISCONTINUED | OUTPATIENT
Start: 2020-12-14 | End: 2020-12-14 | Stop reason: HOSPADM

## 2020-12-14 RX ORDER — PROPOFOL 10 MG/ML
INJECTION, EMULSION INTRAVENOUS AS NEEDED
Status: DISCONTINUED | OUTPATIENT
Start: 2020-12-14 | End: 2020-12-14 | Stop reason: SURG

## 2020-12-14 RX ORDER — ACETAMINOPHEN 325 MG/1
650 TABLET ORAL ONCE AS NEEDED
Status: DISCONTINUED | OUTPATIENT
Start: 2020-12-14 | End: 2020-12-14 | Stop reason: HOSPADM

## 2020-12-14 RX ORDER — NEOSTIGMINE METHYLSULFATE 1 MG/ML
INJECTION, SOLUTION INTRAVENOUS AS NEEDED
Status: DISCONTINUED | OUTPATIENT
Start: 2020-12-14 | End: 2020-12-14 | Stop reason: SURG

## 2020-12-14 RX ORDER — ONDANSETRON 2 MG/ML
4 INJECTION INTRAMUSCULAR; INTRAVENOUS ONCE AS NEEDED
Status: COMPLETED | OUTPATIENT
Start: 2020-12-14 | End: 2020-12-14

## 2020-12-14 RX ORDER — CEFAZOLIN SODIUM 1 G/3ML
INJECTION, POWDER, FOR SOLUTION INTRAMUSCULAR; INTRAVENOUS AS NEEDED
Status: DISCONTINUED | OUTPATIENT
Start: 2020-12-14 | End: 2020-12-14 | Stop reason: HOSPADM

## 2020-12-14 RX ORDER — PROMETHAZINE HYDROCHLORIDE 25 MG/1
25 SUPPOSITORY RECTAL ONCE AS NEEDED
Status: DISCONTINUED | OUTPATIENT
Start: 2020-12-14 | End: 2020-12-14 | Stop reason: HOSPADM

## 2020-12-14 RX ORDER — LORAZEPAM 2 MG/ML
0.5 INJECTION INTRAMUSCULAR
Status: DISCONTINUED | OUTPATIENT
Start: 2020-12-14 | End: 2020-12-14 | Stop reason: HOSPADM

## 2020-12-14 RX ORDER — HEPARIN SODIUM 1000 [USP'U]/ML
INJECTION, SOLUTION INTRAVENOUS; SUBCUTANEOUS AS NEEDED
Status: DISCONTINUED | OUTPATIENT
Start: 2020-12-14 | End: 2020-12-14 | Stop reason: HOSPADM

## 2020-12-14 RX ORDER — ROCURONIUM BROMIDE 10 MG/ML
INJECTION, SOLUTION INTRAVENOUS AS NEEDED
Status: DISCONTINUED | OUTPATIENT
Start: 2020-12-14 | End: 2020-12-14 | Stop reason: SURG

## 2020-12-14 RX ORDER — LIDOCAINE HYDROCHLORIDE 10 MG/ML
INJECTION, SOLUTION INFILTRATION; PERINEURAL AS NEEDED
Status: DISCONTINUED | OUTPATIENT
Start: 2020-12-14 | End: 2020-12-14 | Stop reason: HOSPADM

## 2020-12-14 RX ORDER — MORPHINE SULFATE 4 MG/ML
2 INJECTION, SOLUTION INTRAMUSCULAR; INTRAVENOUS
Status: DISCONTINUED | OUTPATIENT
Start: 2020-12-14 | End: 2020-12-14 | Stop reason: HOSPADM

## 2020-12-14 RX ORDER — HEPARIN SODIUM 1000 [USP'U]/ML
INJECTION, SOLUTION INTRAVENOUS; SUBCUTANEOUS AS NEEDED
Status: DISCONTINUED | OUTPATIENT
Start: 2020-12-14 | End: 2020-12-14 | Stop reason: SURG

## 2020-12-14 RX ORDER — HYDROMORPHONE HCL 110MG/55ML
0.5 PATIENT CONTROLLED ANALGESIA SYRINGE INTRAVENOUS
Status: DISCONTINUED | OUTPATIENT
Start: 2020-12-14 | End: 2020-12-17 | Stop reason: HOSPADM

## 2020-12-14 RX ORDER — SODIUM CHLORIDE 9 MG/ML
INJECTION, SOLUTION INTRAVENOUS AS NEEDED
Status: DISCONTINUED | OUTPATIENT
Start: 2020-12-14 | End: 2020-12-14 | Stop reason: HOSPADM

## 2020-12-14 RX ORDER — KETOROLAC TROMETHAMINE 15 MG/ML
15 INJECTION, SOLUTION INTRAMUSCULAR; INTRAVENOUS EVERY 6 HOURS PRN
Status: DISCONTINUED | OUTPATIENT
Start: 2020-12-14 | End: 2020-12-14 | Stop reason: HOSPADM

## 2020-12-14 RX ORDER — MAGNESIUM HYDROXIDE 1200 MG/15ML
LIQUID ORAL AS NEEDED
Status: DISCONTINUED | OUTPATIENT
Start: 2020-12-14 | End: 2020-12-14 | Stop reason: HOSPADM

## 2020-12-14 RX ORDER — DEXAMETHASONE SODIUM PHOSPHATE 4 MG/ML
INJECTION, SOLUTION INTRA-ARTICULAR; INTRALESIONAL; INTRAMUSCULAR; INTRAVENOUS; SOFT TISSUE AS NEEDED
Status: DISCONTINUED | OUTPATIENT
Start: 2020-12-14 | End: 2020-12-14 | Stop reason: SURG

## 2020-12-14 RX ADMIN — ROCURONIUM BROMIDE 10 MG: 10 INJECTION, SOLUTION INTRAVENOUS at 09:54

## 2020-12-14 RX ADMIN — ONDANSETRON 4 MG: 2 INJECTION INTRAMUSCULAR; INTRAVENOUS at 08:37

## 2020-12-14 RX ADMIN — Medication 3 ML: at 20:33

## 2020-12-14 RX ADMIN — FENTANYL CITRATE 100 MCG: 50 INJECTION, SOLUTION INTRAMUSCULAR; INTRAVENOUS at 07:47

## 2020-12-14 RX ADMIN — ONDANSETRON 4 MG: 2 INJECTION INTRAMUSCULAR; INTRAVENOUS at 17:16

## 2020-12-14 RX ADMIN — ROCURONIUM BROMIDE 10 MG: 10 INJECTION, SOLUTION INTRAVENOUS at 08:12

## 2020-12-14 RX ADMIN — ROCURONIUM BROMIDE 40 MG: 10 INJECTION, SOLUTION INTRAVENOUS at 08:00

## 2020-12-14 RX ADMIN — HYDROCODONE BITARTRATE AND ACETAMINOPHEN 1 TABLET: 5; 325 TABLET ORAL at 20:25

## 2020-12-14 RX ADMIN — CEFAZOLIN SODIUM 2 G: 1 INJECTION, POWDER, FOR SOLUTION INTRAMUSCULAR; INTRAVENOUS at 08:00

## 2020-12-14 RX ADMIN — PROTAMINE SULFATE 40 MG: 10 INJECTION, SOLUTION INTRAVENOUS at 10:04

## 2020-12-14 RX ADMIN — CEFAZOLIN SODIUM 2 G: 10 INJECTION, POWDER, FOR SOLUTION INTRAVENOUS at 14:15

## 2020-12-14 RX ADMIN — HEPARIN SODIUM 7500 UNITS: 1000 INJECTION, SOLUTION INTRAVENOUS; SUBCUTANEOUS at 09:05

## 2020-12-14 RX ADMIN — NEOSTIGMINE METHYLSULFATE 4 MG: 1 INJECTION, SOLUTION INTRAVENOUS at 10:11

## 2020-12-14 RX ADMIN — FENTANYL CITRATE 50 MCG: 50 INJECTION, SOLUTION INTRAMUSCULAR; INTRAVENOUS at 08:10

## 2020-12-14 RX ADMIN — FENTANYL CITRATE 50 MCG: 50 INJECTION, SOLUTION INTRAMUSCULAR; INTRAVENOUS at 08:00

## 2020-12-14 RX ADMIN — ROCURONIUM BROMIDE 10 MG: 10 INJECTION, SOLUTION INTRAVENOUS at 08:40

## 2020-12-14 RX ADMIN — HEPARIN SODIUM 2500 UNITS: 1000 INJECTION, SOLUTION INTRAVENOUS; SUBCUTANEOUS at 09:16

## 2020-12-14 RX ADMIN — GLYCOPYRROLATE 0.5 MG: 0.2 INJECTION, SOLUTION INTRAMUSCULAR; INTRAVITREAL at 10:11

## 2020-12-14 RX ADMIN — DIPHENHYDRAMINE HYDROCHLORIDE 12.5 MG: 50 INJECTION, SOLUTION INTRAMUSCULAR; INTRAVENOUS at 15:49

## 2020-12-14 RX ADMIN — ONDANSETRON 4 MG: 2 INJECTION INTRAMUSCULAR; INTRAVENOUS at 23:12

## 2020-12-14 RX ADMIN — INSULIN LISPRO 2 UNITS: 100 INJECTION, SOLUTION INTRAVENOUS; SUBCUTANEOUS at 17:15

## 2020-12-14 RX ADMIN — ONDANSETRON 4 MG: 2 INJECTION, SOLUTION INTRAMUSCULAR; INTRAVENOUS at 12:20

## 2020-12-14 RX ADMIN — ATORVASTATIN CALCIUM 80 MG: 40 TABLET, FILM COATED ORAL at 20:25

## 2020-12-14 RX ADMIN — LOSARTAN POTASSIUM 50 MG: 25 TABLET, FILM COATED ORAL at 17:26

## 2020-12-14 RX ADMIN — DEXAMETHASONE SODIUM PHOSPHATE 4 MG: 4 INJECTION, SOLUTION INTRAMUSCULAR; INTRAVENOUS at 08:37

## 2020-12-14 RX ADMIN — AMLODIPINE BESYLATE 10 MG: 5 TABLET ORAL at 17:25

## 2020-12-14 RX ADMIN — HYDROMORPHONE HYDROCHLORIDE 0.5 MG: 2 INJECTION, SOLUTION INTRAMUSCULAR; INTRAVENOUS; SUBCUTANEOUS at 14:16

## 2020-12-14 RX ADMIN — FENTANYL CITRATE 50 MCG: 50 INJECTION, SOLUTION INTRAMUSCULAR; INTRAVENOUS at 09:54

## 2020-12-14 RX ADMIN — PROPOFOL 200 MG: 10 INJECTION, EMULSION INTRAVENOUS at 08:00

## 2020-12-14 RX ADMIN — CEFAZOLIN SODIUM 2 G: 10 INJECTION, POWDER, FOR SOLUTION INTRAVENOUS at 22:53

## 2020-12-14 RX ADMIN — SODIUM CHLORIDE, POTASSIUM CHLORIDE, SODIUM LACTATE AND CALCIUM CHLORIDE 1000 ML: 600; 310; 30; 20 INJECTION, SOLUTION INTRAVENOUS at 07:01

## 2020-12-14 NOTE — OP NOTE
Alen Ratliff  Admission date: 12/11/2020  Date of operation: 12/14/2020    Location: [facility]    Pre-op Diagnosis:   TIA (transient ischemic attack) [G45.9]    Post-Op Diagnosis Codes:     * TIA (transient ischemic attack) [G45.9]    Procedure performed: Right carotid endarterectomy    Surgeon: Dr. Toby Fung    Assistants:  Angeles AMBROSE , Provided critical assistance in exposure, retraction, and suction that overall decrease blood loss and operative time.    Anesthesia: General    Staff:   Circulator: Shelia Calderon RN  Scrub Person: Antonina Lane  Assistant: Angeles Diane CSA    Indications: Pleasant gentleman with transient ischemic attack and severe 80% right carotid stenosis for endarterectomy.       Procedure Details right neck prepped and draped in usual fashion.  Linear incision made through skin subcutaneous tissue and platysma.  Bleeders controlled electrocautery and hemoclips.  External jugular vein divided and ligated with silk ligatures.  Greater auricular nerve identified and protected.  Sternocleidomastoid mobilized laterally.  Crossing vessels controlled with hemoclips electrocautery and silk ties.  Carotid sheath identified.  Facial vein divided and ligated with silk suture ligatures.  Additional veins controlled with hemoclips and silk ties.  The common carotid artery, internal carotid artery, and external carotid artery circumferentially dissected from the omohyoid to digastric muscle.  Vagus nerves and hypoglossal nerves were both identified and protected throughout case.  The carotid bifurcation was high and additional crossing veins as well as occipital artery are all divided and ligated with silk ligatures and hemoclips.  The hypoglossal nerve was mobilized superiorly off the internal carotid artery in order to get above the level of plaquing.  Patient heparinized with 7,500 units of heparin.  Additional 2500 units of heparin given throughout the  case to maintain ACT twice normal.  Blood pressure kept over 140 systolic during clamping.  Internal, common, and external carotid artery clamped sequentially.  Longitudinal arteriotomy made in the common carotid artery and extended into the internal carotid artery above level plaquing.  Backbleeding was checked and was not adequate. External Sundt shunt was placed and secured proximally and distally with Ervin clamps.  Endarterectomy performed of the common and internal carotid artery direct vision.  Eversion endarterectomy performed of the external carotid artery.  Adequate endpoints were achieved.  All loose plaque removed.  Endarterectomy site irrigated with heparin saline.  Vascu-Guard patch angioplasty closure of the endarterectomy site was then performed running 6-0 Prolene suture without problems. Shunt removed and all air flushed prior to completion.  Intraoperative duplex scan was normal.  Heparin reversed with 40 mg of protamine.  Hemostasis achieved.  Irrigated with antibiotic solution.  Counts correct.  Wounds closed in layers with 3-0 Vicryl for the sternocleidomastoid, 4-0 Vicryl for the platysma, and subcuticular Vicryl stitch for the skin.  Dermabond applied.  Patient awoke neurologically intact.  Patient taken to recovery room in stable condition.      Radiographic interpretation: Not applicable    Findings: Severe calcified carotid stenosis with high bifurcation, normal intraoperative duplex scan after endarterectomy    Estimated Blood Loss: 100ml    Specimens: * No orders in the log *      Drains: * No LDAs found *    Complications: None    Condition: stable    Disposition: To recovery room    Toby uFng MD     Date: 12/14/2020  Time: 10:39 EST    Active Hospital Problems    Diagnosis  POA   • **Symptomatic carotid artery stenosis, bilateral [I65.23]  Yes   • TIA (transient ischemic attack) [G45.9]  Yes      Resolved Hospital Problems   No resolved problems to display.

## 2020-12-14 NOTE — ANESTHESIA POSTPROCEDURE EVALUATION
Patient: Alen Ratliff    Procedure Summary     Date: 12/14/20 Room / Location: [facility] OR  / [facility] MAIN OR    Anesthesia Start: 0747 Anesthesia Stop: 1044    Procedure: CAROTID ENDARTERECTOMY (Right Neck) Diagnosis:       TIA (transient ischemic attack)      (TIA (transient ischemic attack) [G45.9])    Surgeon: Toby Fung MD Provider: Jadiel Curiel MD    Anesthesia Type: general ASA Status: 3          Anesthesia Type: general    Vitals  Vitals Value Taken Time   /43 12/14/20 1046   Temp     Pulse 56 12/14/20 1048   Resp     SpO2 98 % 12/14/20 1048   Vitals shown include unvalidated device data.        Post Anesthesia Care and Evaluation    Patient location during evaluation: bedside  Patient participation: complete - patient participated  Level of consciousness: awake and alert  Pain score: 1  Pain management: adequate  Airway patency: patent  Anesthetic complications: No anesthetic complications  PONV Status: none  Cardiovascular status: acceptable  Respiratory status: acceptable  Hydration status: acceptable  Post Neuraxial Block status: Motor and sensory function returned to baseline  Comments: Awake  Somewhat confused immediately post opp    Moved all 4

## 2020-12-14 NOTE — PROGRESS NOTES
[facility] Medicine Services Daily Progress Note      Hospitalist Team  LOS 1 days      Patient Care Team:  Serenity Wren MD as PCP - General (Family Medicine)  William Patricio MD as Consulting Physician (Neurology)    Patient Location: 2202/1      Subjective   Subjective   No complaints and preparing for surgery  Chief Complaint / Subjective  Chief Complaint   Patient presents with   • Numbness     tingling on left side and face.  Onset 30 minutes ago.          Brief Synopsis of Hospital Course/HPI  80-year-old male with known history of hypertension,  CVA, T2DM, hyperlipidemia, prostate cancer, hypothyroidism, and GERD.  He presented to [facility] ED complaining of left arm/left facial numbness.  Denies dysphagia, no dysarthria, slurred speech or speech impediment.  No reported headache, visual changes, no chest pain, shortness of breath dizziness or lightheadedness.  He denies fever, chills, no abdominal pain, nausea or vomiting.  .     On presentation, he was hypertensive.  CT of the head without contrast showed no acute intracranial finding, hemorrhage,but  revealed lacunar infarct in the right thalamus which appears to be chronic.  CTA of the head and neck with finding of left ICA with 50% stenosis and 80% stenosis on the right ICA.  Follow-up MRI of the brain on 12/20/2020 showed no definite H acute infarct or other intracranial abnormalities.  Laboratory notable for mild hypokalemia, otherwise, unremarkable.  X-ray showed no acute cardiopulmonary finding. He is admitted for suspected TIA versus ischemic stroke, and bilateral SIVAN.  He was maintained on stroke protocol with dual antiplatelet with aspirin/Plavix and neurologist/vascular surgeon seen in consultation. During hospitalization,  he complained of left ankle pain.  X-ray of the ankle obtained revealed fracture of the distal fibula and malleolus, most likely due to mechanical fall at home.  Vascular surgeon is recommending  "endarterectomy and will follow with orthopedic surgeon for evaluation and recommendation.       Objective   Objective    Seen and examined in evaluation.  Plan for endarterectomy scheduled for today.  Vital Signs  Temp:  [97.9 °F (36.6 °C)-99 °F (37.2 °C)] 98.4 °F (36.9 °C)  Heart Rate:  [51-72] 54  Resp:  [10-19] 17  BP: (119-168)/(40-75) 168/60  Arterial Line BP: (131-156)/(46-56) 139/52  Oxygen Therapy  SpO2: 93 %  Pulse Oximetry Type: Continuous  Device (Oxygen Therapy): nasal cannula  Flow (L/min): 4  Flowsheet Rows      First Filed Value   Admission Height  175.3 cm (69\") Documented at 12/11/2020 1959   Admission Weight  95.7 kg (211 lb) Documented at 12/11/2020 1959        Intake & Output (last 3 days)       12/11 0701 - 12/12 0700 12/12 0701 - 12/13 0700 12/13 0701 - 12/14 0700 12/14 0701 - 12/15 0700    P.O. 240 720 680 0    Total Intake(mL/kg) 240 (2.6) 720 (7.7) 680 (7) 0 (0)    Urine (mL/kg/hr)  400 (0.2)      Total Output  400      Net +240 +320 +680 0            Urine Unmeasured Occurrence 1 x  1 x         Lines, Drains & Airways    Active LDAs     Name:   Placement date:   Placement time:   Site:   Days:    Peripheral IV 09/24/20 0830 Anterior;Distal;Right;Upper Arm   09/24/20    0830    Arm   80    Peripheral IV 12/14/20 0656 Anterior;Left Forearm   12/14/20    0656    Forearm   less than 1    Arterial Line 12/14/20 Left Radial   12/14/20    0755 created via procedure documentation    Radial   less than 1              Physical Exam    Constitutional:       Appearance: Normal appearance. He is obese.   HENT:      Head: Normocephalic and atraumatic.      Right Ear: External ear normal.      Left Ear: External ear normal.      Nose: Nose normal.      Mouth/Throat:      Mouth: Mucous membranes are moist.   Eyes:      Extraocular Movements: Extraocular movements intact.   Neck:      Musculoskeletal: Normal range of motion and neck supple.   Cardiovascular:      Rate and Rhythm: Normal rate and regular " rhythm.      Pulses: Normal pulses.      Heart sounds: Murmur present.   Pulmonary:      Effort: Pulmonary effort is normal.      Breath sounds: Normal breath sounds.   Abdominal:      Palpations: Abdomen is soft.   Genitourinary:     Comments: deferred  Musculoskeletal: Normal range of motion.   Skin: Left ankle slightly swollen with decreased range of motion due to pain     General: Skin is warm and dry.   Neurological:      General: No focal deficit present.      Mental Status: He is alert and oriented to person, place, and time. Mental status is at baseline.   Psychiatric:         Mood and Affect: Mood normal.         Behavior: Behavior normal.         Thought Content: Thought content normal.            Wounds (last 24 hours)      LDA Wound     Row Name 12/14/20 1215 12/14/20 1159 12/14/20 1144       Wound 12/14/20 0830 Right neck    Wound - Properties Group Placement Date: 12/14/20  -CW Placement Time: 0830  -CW Present on Hospital Admission: N  -CW Side: Right  -CW Location: neck  -CW    Dressing Appearance  dry;intact;no drainage;open to air  -AV  dry;intact;no drainage;open to air  -AV  dry;intact;no drainage;open to air  -AV    Closure  Liquid skin adhesive  -AV  Liquid skin adhesive  -AV  Liquid skin adhesive  -AV    Retired Wound - Properties Group Date first assessed: 12/14/20  -CW Time first assessed: 0830  -CW Present on Hospital Admission: N  -CW Side: Right  -CW Location: neck  -CW    Row Name 12/14/20 1129 12/14/20 1114 12/14/20 1059       Wound 12/14/20 0830 Right neck    Wound - Properties Group Placement Date: 12/14/20  -CW Placement Time: 0830  -CW Present on Hospital Admission: N  -CW Side: Right  -CW Location: neck  -CW    Dressing Appearance  dry;intact;open to air;no drainage  -AV  dry;intact;no drainage;open to air  -AV  dry;intact;no drainage;open to air  -AV    Closure  Liquid skin adhesive  -AV  Liquid skin adhesive  -AV  Liquid skin adhesive  -AV    Retired Wound - Properties Group Date  first assessed: 12/14/20  -CW Time first assessed: 0830  -CW Present on Hospital Admission: N  -CW Side: Right  -CW Location: neck  -CW    Row Name 12/14/20 1050 12/14/20 1044          Wound 12/14/20 0830 Right neck    Wound - Properties Group Placement Date: 12/14/20  -CW Placement Time: 0830  -CW Present on Hospital Admission: N  -CW Side: Right  -CW Location: neck  -CW    Dressing Appearance  dry skin affix  -CW  dry;intact;no drainage;open to air  -AV     Closure  --  Liquid skin adhesive  -AV     Retired Wound - Properties Group Date first assessed: 12/14/20  -CW Time first assessed: 0830  -CW Present on Hospital Admission: N  -CW Side: Right  -CW Location: neck  -CW      User Key  (r) = Recorded By, (t) = Taken By, (c) = Cosigned By    Initials Name Provider Type    Rita Eric RN Registered Nurse    Shelia Triana RN Registered Nurse          Procedures:    Procedure(s):  CAROTID ENDARTERECTOMY          Results Review:     I reviewed the patient's new clinical results.      Lab Results (last 24 hours)     Procedure Component Value Units Date/Time    POC Glucose Once [[id number]]  (Abnormal) Collected: 12/14/20 1050    Specimen: Blood Updated: 12/14/20 1053     Glucose 162 mg/dL      Comment: Serial Number: [id number]:  [id number]       Hemoglobin A1c [[id number]]  (Abnormal) Collected: 12/12/20 0317    Specimen: Blood Updated: 12/14/20 1024     Hemoglobin A1C 7.1 %     Narrative:      Hemoglobin A1C Reference Range:    <5.7 %        Normal  5.7-6.4 %     Increased risk for diabetes  > 6.4 %        Diabetes       These guidelines have been recommended by the American Diabetic Association for Hgb A1c.      The following 2010 guidelines have been recommended by the American Diabetes Association for Hemoglobin A1c.    HBA1c 5.7-6.4% Increased risk for future diabetes (pre-diabetes)  HBA1c     >6.4% Diabetes      POC Glucose Once [[id number]]  (Abnormal) Collected: 12/12/20 1839    Specimen: Blood  Updated: 12/14/20 0737     Glucose 187 mg/dL      Comment: Serial Number: [id number]:  [id number]       POC Glucose Once [[id number]]  (Abnormal) Collected: 12/12/20 1811    Specimen: Blood Updated: 12/14/20 0737     Glucose 194 mg/dL      Comment: Serial Number: [id number]:  [id number]       POC Glucose Once [[id number]]  (Abnormal) Collected: 12/12/20 1328    Specimen: Blood Updated: 12/14/20 0737     Glucose 140 mg/dL      Comment: Serial Number: [id number]:  [id number]       POC Glucose Once [[id number]]  (Abnormal) Collected: 12/14/20 0642    Specimen: Blood Updated: 12/14/20 0643     Glucose 142 mg/dL      Comment: Serial Number: [id number]:  [id number]       POC Glucose Once [[id number]]  (Abnormal) Collected: 12/13/20 2309    Specimen: Blood Updated: 12/13/20 2310     Glucose 156 mg/dL      Comment: Serial Number: [id number]:  [id number]       POC Glucose Once [[id number]]  (Abnormal) Collected: 12/13/20 1643    Specimen: Blood Updated: 12/13/20 1648     Glucose 145 mg/dL      Comment: Serial Number: [id number]:  [id number]           Hemoglobin A1C   Date Value Ref Range Status   12/12/2020 7.1 (H) 3.5 - 5.6 % Final     Results from last 7 days   Lab Units 12/11/20  1959   INR  0.95           No results found for: LIPASE  Lab Results   Component Value Date    CHOL 162 12/12/2020    TRIG 93 12/12/2020    HDL 48 12/12/2020    LDL 97 12/12/2020       No results found for: INTRAOP, PREDX, FINALDX, COMDX    Microbiology Results (last 10 days)     Procedure Component Value - Date/Time    COVID PRE-OP / PRE-PROCEDURE SCREENING ORDER (NO ISOLATION) - Swab, Nasopharynx [[id number]]  (Normal) Collected: 12/12/20 1817    Lab Status: Final result Specimen: Swab from Nasopharynx Updated: 12/12/20 1915    Narrative:      The following orders were created for panel order COVID PRE-OP / PRE-PROCEDURE SCREENING ORDER (NO ISOLATION) - Swab, Nasopharynx.  Procedure                                Abnormality         Status                     ---------                               -----------         ------                     COVID-19,CEPHEID,COR/LORI...[[id number]]  Normal              Final result                 Please view results for these tests on the individual orders.    COVID-19,CEPHEID,COR/LORI/PAD IN-HOUSE(OR EMERGENT/ADD-ON),NP SWAB IN TRANSPORT MEDIA 3-4 HR TAT - Swab, Nasopharynx [[id number]]  (Normal) Collected: 12/12/20 1817    Lab Status: Final result Specimen: Swab from Nasopharynx Updated: 12/12/20 1915     COVID19 Not Detected    Narrative:      Fact sheet for providers: https://www.fda.gov/media/998740/download     Fact sheet for patients: https://www.fda.gov/media/218543/download          ECG/EMG Results (most recent)     Procedure Component Value Units Date/Time    ECG 12 Lead [[id number]] Collected: 12/11/20 2017     Updated: 12/11/20 2022     QT Interval 383 ms     Narrative:      HEART RATE= 75  bpm  RR Interval= 804  ms  VT Interval= 92  ms  P Horizontal Axis= 221  deg  P Front Axis= 0  deg  QRSD Interval= 113  ms  QT Interval= 383  ms  QRS Axis= -37  deg  T Wave Axis= 166  deg  - ABNORMAL ECG -  Sinus rhythm  Repol abnrm suggests ischemia, diffuse leads  No previous ECG available for comparison  Electronically Signed By:   Date and Time of Study: 2020-12-11 20:17:32    Adult Transthoracic Echo Complete W/ Cont if Necessary Per Protocol (With Agitated Saline) [[id number]] Collected: 12/12/20 0905     Updated: 12/12/20 1313     BSA 2.2 m^2      RVIDd 2.8 cm      IVSd 1.1 cm      LVIDd 4.9 cm      LVIDs 3.4 cm      LVPWd 1.2 cm      IVS/LVPW 0.95     FS 30.8 %      EDV(Teich) 114.3 ml      ESV(Teich) 47.7 ml      EF(Teich) 58.3 %      EDV(cubed) 119.7 ml      ESV(cubed) 39.6 ml      EF(cubed) 66.9 %      LV mass(C)d 223.3 grams      LV mass(C)dI 103.3 grams/m^2      SV(Teich) 66.6 ml      SI(Teich) 30.8 ml/m^2      SV(cubed) 80.1 ml      SI(cubed) 37.0 ml/m^2      Ao root diam 3.3 cm       Ao root area 8.5 cm^2      ACS 1.9 cm      asc Aorta Diam 2.9 cm      LVOT diam 2.1 cm      LVOT area 3.3 cm^2      RVOT diam 2.2 cm      RVOT area 4.0 cm^2      EDV(MOD-sp4) 69.0 ml      ESV(MOD-sp4) 24.3 ml      EF(MOD-sp4) 64.8 %      SV(MOD-sp4) 44.7 ml      SI(MOD-sp4) 20.7 ml/m^2      Ao root area (BSA corrected) 1.5     LV Delgado Vol (BSA corrected) 31.9 ml/m^2      LV Sys Vol (BSA corrected) 11.2 ml/m^2      Aortic R-R 1.1 sec      Aortic HR 56.3 BPM      MV E max britney 96.8 cm/sec      MV A max britney 108.4 cm/sec      MV E/A 0.89     MV V2 max 125.8 cm/sec      MV max PG 6.3 mmHg      MV V2 mean 79.9 cm/sec      MV mean PG 2.8 mmHg      MV V2 VTI 33.7 cm      MVA(VTI) 3.4 cm^2      MV dec slope 435.1 cm/sec^2      MV dec time 0.22 sec      Ao pk britney 340.0 cm/sec      Ao max PG 46.2 mmHg      Ao max PG (full) 37.9 mmHg      Ao V2 mean 238.7 cm/sec      Ao mean PG 25.3 mmHg      Ao mean PG (full) 20.6 mmHg      Ao V2 VTI 74.5 cm      SAL(I,A) 1.5 cm^2      SAL(I,D) 1.5 cm^2      SAL(V,A) 1.4 cm^2      SAL(V,D) 1.4 cm^2      AI max britney 402.5 cm/sec      AI max PG 64.8 mmHg      AI dec slope 249.5 cm/sec^2      AI dec time 1.6 sec      AI P1/2t 472.5 msec      LV V1 max PG 8.3 mmHg      LV V1 mean PG 4.7 mmHg      LV V1 max 144.4 cm/sec      LV V1 mean 103.3 cm/sec      LV V1 VTI 34.2 cm      CO(Ao) 35.6 l/min      CI(Ao) 16.5 l/min/m^2      SV(Ao) 632.0 ml      SI(Ao) 292.3 ml/m^2      CO(LVOT) 6.4 l/min      CI(LVOT) 3.0 l/min/m^2      SV(LVOT) 113.5 ml      SV(RVOT) 93.2 ml      SI(LVOT) 52.5 ml/m^2      PA V2 max 116.8 cm/sec      PA max PG 5.5 mmHg      PA max PG (full) 0.41 mmHg      PA V2 mean 82.0 cm/sec      PA mean PG 3.0 mmHg      PA mean PG (full) 0.54 mmHg      PA V2 VTI 25.3 cm      PVA(I,A) 3.7 cm^2      BH CV ECHO SANJAY - PVA(I,D) 3.7 cm^2      BH CV ECHO SANJAY - PVA(V,A) 3.8 cm^2      BH CV ECHO SANJAY - PVA(V,D) 3.8 cm^2      PA acc time 0.13 sec      RV V1 max PG 5.1 mmHg      RV V1 mean PG 2.5 mmHg       RV V1 max 112.4 cm/sec      RV V1 mean 73.6 cm/sec      RV V1 VTI 23.5 cm      TR max britney 330.3 cm/sec      RVSP(TR) 46.6 mmHg      RAP systole 3.0 mmHg      PA pr(Accel) 22.5 mmHg      Qp/Qs 0.82      CV ECHO SANJAY - BZI_BMI 28.1 kilograms/m^2       CV ECHO SANJAY - BSA(HAYCOCK) 2.2 m^2       CV ECHO SANJAY - BZI_METRIC_WEIGHT 93.9 kg       CV ECHO SANJAY - BZI_METRIC_HEIGHT 182.9 cm      EF(MOD-bp) 65.0 %      LA dimension(2D) 4.7 cm      Echo EF Estimated 65 %     Narrative:      · Estimated left ventricular EF = 65% Left ventricular systolic function   is normal.  · Left ventricular diastolic dysfunction is noted.  · The right atrial cavity is mildly dilated.  · Moderate aortic valve stenosis is present.  · Estimated right ventricular systolic pressure from tricuspid   regurgitation is moderately elevated (45-55 mmHg).       ECG 12 Lead [[id number]] Collected: 12/13/20 1107     Updated: 12/13/20 1607     QT Interval 418 ms     Narrative:      HEART RATE= 60  bpm  RR Interval= 996  ms  LA Interval= 296  ms  P Horizontal Axis= -17  deg  P Front Axis= 46  deg  QRSD Interval= 107  ms  QT Interval= 418  ms  QRS Axis= -41  deg  T Wave Axis= -8  deg  - ABNORMAL ECG -  Sinus rhythm  Prolonged LA interval  Inferior infarct, old  When compared with ECG of 11-Dec-2020 20:17:32,  Sinus rate is slower  Electronically Signed By: Steve Swain (LORI) 13-Dec-2020 16:07:07  Date and Time of Study: 2020-12-13 11:07:57          Results for orders placed during the hospital encounter of 12/11/20   Intra-Op Duplex Carotid Right Lmt CAR    Narrative · Imaging indicates a normal intra-op exam.          Results for orders placed during the hospital encounter of 12/11/20   Adult Transthoracic Echo Complete W/ Cont if Necessary Per Protocol (With Agitated Saline)    Narrative · Estimated left ventricular EF = 65% Left ventricular systolic function   is normal.  · Left ventricular diastolic dysfunction is noted.  · The right  atrial cavity is mildly dilated.  · Moderate aortic valve stenosis is present.  · Estimated right ventricular systolic pressure from tricuspid   regurgitation is moderately elevated (45-55 mmHg).          Xr Ankle 2 View Left    Result Date: 12/12/2020  1.Oblique fracture of the distal fibula with mild lateral displacement. 2.Suspected minimally displaced fracture of the posterior malleolus. 3.Mild osteoarthritis. 4.Calcific atherosclerosis.  Electronically Signed By-Dylon Cedillo MD On:12/12/2020 1:05 PM This report was finalized on [id number] by  Dylon Cedillo MD.    Ct Angiogram Neck    Result Date: 12/14/2020   1. Approximately 50% stenosis of the right internal carotid artery in its proximal portion. Approximately 80% stenosis of the left internal carotid artery in its proximal portion. 2. Small vertebrobasilar system. The left vertebral artery terminates in the left posterior inferior cerebellar artery. 3. Significant right M2 stenosis. 4. Multiple stenoses throughout the posterior cerebral arteries bilaterally.  Electronically Signed By-Sg Tyson MD On:12/14/2020 9:21 AM This report was finalized on [id number] by  Sg Tyson MD.    Mri Brain Without Contrast    Result Date: 12/12/2020  1.No definite MRI findings of acute infarction or other acute intracranial abnormality at this time. 2.Volume loss and suspected mild to moderate chronic small vessel ischemic changes with remote lacunar infarct in the right basal ganglion.   Electronically Signed By-Dylon Cedillo MD On:12/12/2020 1:33 PM This report was finalized on [id number] by  Dylon Cedillo MD.    Xr Chest 1 View    Result Date: 12/11/2020  1.  No evidence of active disease.   Electronically Signed By-Nikki Bright MD On:12/11/2020 8:43 PM This report was finalized on [id number] by  Nikki Bright MD.    Ct Angiogram Head    Result Date: 12/14/2020   1. Approximately 50% stenosis of the right internal carotid artery in its proximal  portion. Approximately 80% stenosis of the left internal carotid artery in its proximal portion. 2. Small vertebrobasilar system. The left vertebral artery terminates in the left posterior inferior cerebellar artery. 3. Significant right M2 stenosis. 4. Multiple stenoses throughout the posterior cerebral arteries bilaterally.  Electronically Signed By-Sg Tyson MD On:12/14/2020 9:21 AM This report was finalized on [id number] by  Sg Tyson MD.    Ct Head Without Contrast Stroke Protocol    Result Date: 12/11/2020  1. Negative for hemorrhage or mass effect. 2. There is moderate cerebral and cerebellar atrophy. There is mild white matter abnormality which is favored to be due to chronic small vessel ischemia. 3. 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 4. If acute ischemia is clinically suspected, recommend consideration of brain MR.  Electronically Signed By-Nikki Bright MD On:12/11/2020 8:04 PM This report was finalized on [id number] by  Nikki Bright MD.          Xrays, labs reviewed personally by physician.    Medication Review:   I have reviewed the patient's current medication list      Scheduled Meds  amLODIPine, 10 mg, Oral, Daily  [MAR Hold] aspirin, 81 mg, Oral, Daily  [MAR Hold] atorvastatin, 80 mg, Oral, Nightly  [MAR Hold] clopidogrel, 75 mg, Oral, Daily  [MAR Hold] insulin lispro, 0-9 Units, Subcutaneous, TID AC  [MAR Hold] levothyroxine, 75 mcg, Oral, Daily  losartan, 50 mg, Oral, Daily  [MAR Hold] pantoprazole, 40 mg, Oral, Daily  [MAR Hold] sodium chloride, 3 mL, Intravenous, Q12H        Meds Infusions  lactated ringers, 1,000 mL, Last Rate: 1,000 mL (12/14/20 0701)        Meds PRN  •  [MAR Hold] acetaminophen **OR** [MAR Hold] acetaminophen  •  [MAR Hold] bisacodyl  •  [MAR Hold] dextrose  •  [MAR Hold] dextrose  •  [MAR Hold] glucagon (human recombinant)  •  [MAR Hold] HYDROcodone-acetaminophen  •  [MAR Hold] influenza vaccine  •  [MAR Hold] insulin lispro **AND** [MAR  Hold] insulin lispro  •  [MAR Hold] ondansetron  •  [MAR Hold] potassium chloride **OR** [MAR Hold] potassium chloride **OR** [MAR Hold] potassium chloride  •  [MAR Hold] sodium chloride  •  [MAR Hold] sodium chloride        Assessment/Plan   Assessment/Plan     Active Hospital Problems    Diagnosis  POA   • **Symptomatic carotid artery stenosis, bilateral [I65.23]  Yes   • TIA (transient ischemic attack) [G45.9]  Yes   • Essential hypertension [I10]  Yes   • Mixed hyperlipidemia [E78.2]  Yes   • Type 2 diabetes mellitus, without long-term current use of insulin (CMS/HCC) [E11.9]  Yes      Resolved Hospital Problems   No resolved problems to display.       MEDICAL DECISION MAKING COMPLEXITY BY PROBLEM:       Suspected TIA      -MRI of the brain showed no acute infarct or other intracranial abnormality      -on aspirin/Plavix/Lipitor, and followed by neurologist     Symptomatic bilateral SIVAN      -plan for endarterectomy  today      - aspirin/Plavix     Left ankle pain with swelling/suspected  fracture      -supportive care with analgesic and follow-up on orthopedic consult         -x-ray of the ankle on 12/12/2020 showed oblique fracture of the distal fibula with mild lateral displacement and suspected posterior malleolus fracture.     Essential hypertension       -Amlodipine/losartan     T2DM     -cont SSI     Mixed hyperlipidemia     -Lipitor      Hypothyroidism       -Levothyroxine     Obesity      -encourage lifestyle changes          VTE Prophylaxis -   Mechanical Order History:      Ordered        12/14/20 1047  Place Sequential Compression Device  Once         12/14/20 1047  Maintain Sequential Compression Device  Continuous         12/12/20 0255  Place Sequential Compression Device  Once         12/12/20 0255  Maintain Sequential Compression Device  Continuous                 Pharmalogical Order History:      Ordered     Dose Route Frequency Stop    12/14/20 0716  heparin (porcine) injection  Status:   Discontinued      -- -- As Needed 12/14/20 1050                  Code Status -   Code Status and Medical Interventions:   Ordered at: 12/11/20 2055     Code Status:    CPR     Medical Interventions (Level of Support Prior to Arrest):    Full         Discharge Planning      Electronically signed by Kendall Arellano MD, 12/14/20, 12:55 EST.  [facility]ist Team

## 2020-12-14 NOTE — SIGNIFICANT NOTE
12/14/20 1545   Transfer of Nursing Care   Nurse Report Given To ABBIE Sellers   Transfer of Care Comment no new skin issues noted.

## 2020-12-14 NOTE — CONSULTS
PULMONARY/CRITICAL CARE CONSULT NOTE     PATIENT NAME:  Alen Ratliff       :  1940       MRN:  [id number]       ROOM:  [facility]      PRIMARY CARE PROVIDER  Serenity Wren MD    REFERRING PROVIDER     Nik Tejeda MD    REASON FOR CONSULTATION  Critical care management    SUBJECTIVE     CHIEF COMPLAINT:       HISTORY OF PRESENT ILLNESS:  The patient initially presented to the emergency department for evaluation of left arm and left hand numbness, left face numbness, and left leg numbness.  He reports that his symptoms started the previous night and that when he stood from a sitting position he became lightheaded and lost his balance resulting in a fall with pain to his left ankle.  He reported resolution of the lateralizing numbness however continued to complain of left ankle pain related to the fall.  He denied headache, confusion, slurred speech, lateralizing deficits, blurred vision.  He denies that he has recently experienced fever, chills, nausea, vomiting, diarrhea, cough, expectoration, shortness of air or chest pain.  He does report a history of stroke approximately 4 years ago for which he has been on aspirin and Plavix and reports good compliance.  His lateralizing deficits had no relieving or exacerbating factors.  Weight and movement exacerbated the pain in his left ankle which was relieved somewhat at rest.    The patient had a neurology work-up with the diagnosis of acute TIA.  CT of the head was negative for acute hemorrhage or mass-effect.  Nonacute lacunar infarct in the right thalamus.  CTA of the head and neck revealed critical right ICA stenosis as well as left ICA stenosis.  MRI was negative for acute intracranial findings.  Left ankle x-ray revealed oblique fracture of the distal fibula with mild lateral displacement with suspicion of minimally displaced fracture of the posterior malleolus.  Following admission the patient had a vascular surgery consult with plan for  right carotid endarterectomy for severe/symptomatic right carotid stenosis.  Underwent cardiology evaluation and was found to be an acceptable candidate for surgery.  Today the patient underwent an elective right carotid endarterectomy.  The case went as expected and the patient was promptly extubated after surgery.  He has been admitted to the ICU for close monitoring and further postoperative medical management.    REVIEW OF SYSTEMS:  As above      ASSESSMENT & PLAN     Principal Problem:    Symptomatic carotid artery stenosis, bilateral  Active Problems:    Essential hypertension    Mixed hyperlipidemia    Type 2 diabetes mellitus, without long-term current use of insulin (CMS/[facility])    TIA (transient ischemic attack)    Severe, symptomatic right carotid stenosis  TIA with history of CVA  Left lateral malleolus fracture  Essential hypertension  CKD  Hyperlipidemia  Diabetes mellitus  Hypothyroidism    Plan  S/p right carotid endarterectomy  Blood pressure control, Cardene if needed to maintain an SBP <180  Vascular surgery following closely  Frequent neuro checks per protocol  Continue amlodipine, losartan  Continue levothyroxine  Continue aspirin, statin, Plavix  Podiatry consulted, left lower extremity immobilization pending further surgical evaluation  Neurology following  Cardiology following    Code Status: Full code  VTE Prophylaxis: Defer to surgery  PUD Prophylaxis: Protonix  Sliding-scale insulin for tight glycemic control      HOSPITAL MEDICATIONS     SCHEDULED MEDICATIONS:  amLODIPine, 10 mg, Oral, Daily  aspirin, 81 mg, Oral, Daily  atorvastatin, 80 mg, Oral, Nightly  ceFAZolin, 2 g, Intravenous, Q8H  clopidogrel, 75 mg, Oral, Daily  insulin lispro, 0-9 Units, Subcutaneous, TID AC  levothyroxine, 75 mcg, Oral, Daily  losartan, 50 mg, Oral, Daily  pantoprazole, 40 mg, Oral, Daily  sodium chloride, 3 mL, Intravenous, Q12H         CONTINUOUS INFUSIONS:          PRN MEDICATIONS:   •  acetaminophen **OR**  "acetaminophen  •  bisacodyl  •  dextrose  •  dextrose  •  diphenhydrAMINE  •  glucagon (human recombinant)  •  hydrALAZINE  •  HYDROcodone-acetaminophen  •  HYDROmorphone **AND** naloxone  •  influenza vaccine  •  insulin lispro **AND** insulin lispro  •  ondansetron  •  potassium chloride **OR** potassium chloride **OR** potassium chloride  •  sodium chloride  •  sodium chloride       OBJECTIVE     VITAL SIGNS:  /60   Pulse 54   Temp 97.6 °F (36.4 °C) (Oral)   Resp 17   Ht 182.9 cm (72\")   Wt 95.9 kg (211 lb 6.7 oz)   SpO2 92%   BMI 28.67 kg/m²     Wt Readings from Last 3 Encounters:   12/14/20 95.9 kg (211 lb 6.7 oz)   09/24/20 93.4 kg (206 lb)   09/24/20 93.9 kg (207 lb)       INTAKE/OUTPUT:    Intake/Output Summary (Last 24 hours) at 12/14/2020 2053  Last data filed at 12/14/2020 0900  Gross per 24 hour   Intake 0 ml   Output --   Net 0 ml       PHYSICAL EXAM:   Constitutional:  Well developed, well nourished, no acute distress, non-toxic appearance   Eyes:  PERRL, conjunctiva normal, EOMI   HENT:  Atraumatic, external ears normal, nose normal. Neck-somewhat limited movement postoperatively.  Incision well approximated  Respiratory: Slightly diminished bases but clear, non-labored respirations without accessory muscle use  Cardiovascular: Sinus bradycardia, systolic murmur, no gallops, no rubs   GI:  Soft, nondistended, normal bowel sounds, nontender, no rebound or guarding  : Deferred  Musculoskeletal: Swelling and tenderness to the left ankle, no clubbing or cyanosis  Integument:  Well hydrated, no rash   Neurologic: Drowsy postanesthesia, awakens easily and oriented.  Upper extremities with no lateralizing deficits.  Decreased movement of the left lower extremity secondary to fracture  Psychiatric: Unable to evaluate at this time      HISTORY     HISTORY:  Past Medical History:   Diagnosis Date   • Allergic rhinitis    • Diabetes (CMS/HCC)    • GERD (gastroesophageal reflux disease)    • Heart " murmur    • Hyperlipidemia    • Hypertension    • Hypothyroidism    • Prostate cancer (CMS/HCC)    • Stroke (CMS/HCC)      Past Surgical History:   Procedure Laterality Date   • BACK SURGERY  1994   • COLONOSCOPY  08/25/2015   • PROSTATECTOMY  2001    due to cancer     Family History   Problem Relation Age of Onset   • Hypertension Other    • Heart attack Mother    • Heart disease Mother    • Heart attack Father    • Heart disease Father      Social History     Socioeconomic History   • Marital status:      Spouse name: Not on file   • Number of children: Not on file   • Years of education: Not on file   • Highest education level: Not on file   Tobacco Use   • Smoking status: Never Smoker   • Smokeless tobacco: Never Used   Substance and Sexual Activity   • Alcohol use: Not Currently   • Drug use: Never   • Sexual activity: Defer        HOME MEDICATIONS:   Prior to Admission medications    Medication Sig Start Date End Date Taking? Authorizing Provider   amLODIPine (NORVASC) 10 MG tablet TAKE 1 TABLET EVERY DAY 10/28/20  Yes Serenity Wren MD   atorvastatin (LIPITOR) 40 MG tablet Take 1 tablet by mouth Every Night. 9/17/20  Yes Serenity Wren MD   clopidogrel (PLAVIX) 75 MG tablet TAKE 1 TABLET EVERY DAY 11/11/20  Yes Serenity Wren MD   fluticasone (FLONASE) 50 MCG/ACT nasal spray 2 sprays into the nostril(s) as directed by provider Daily As Needed.   Yes Provider, MD Renee   Januvia 50 MG tablet TAKE 1 TABLET EVERY DAY 11/11/20  Yes Serenity Wren MD   Jardiance 10 MG tablet TAKE 1 TABLET EVERY DAY 10/28/20  Yes Serenity Wren MD   levothyroxine (SYNTHROID, LEVOTHROID) 75 MCG tablet TAKE 1 TABLET EVERY DAY 11/30/20  Yes Serenity Wren MD   losartan (COZAAR) 50 MG tablet TAKE 1 TABLET EVERY DAY 11/30/20  Yes Serenity Wren MD   pantoprazole (PROTONIX) 40 MG EC tablet TAKE 1 TABLET EVERY DAY 11/25/20  Yes Serenity Wren MD   glucose blood test strip 1 each by Other route Daily.  Use as instructed    ProviderRenee MD       ALLERGIES:  Azithromycin      RESULTS     LABS:  Lab Results (last 24 hours)     Procedure Component Value Units Date/Time    POC Glucose Once [[id number]]  (Abnormal) Collected: 12/13/20 2309    Specimen: Blood Updated: 12/13/20 2310     Glucose 156 mg/dL      Comment: Serial Number: [id number]:  [id number]       POC Glucose Once [[id number]]  (Abnormal) Collected: 12/14/20 0642    Specimen: Blood Updated: 12/14/20 0643     Glucose 142 mg/dL      Comment: Serial Number: [id number]:  [id number]       POC Glucose Once [[id number]]  (Abnormal) Collected: 12/14/20 1050    Specimen: Blood Updated: 12/14/20 1053     Glucose 162 mg/dL      Comment: Serial Number: [id number]:  [id number]       POC Glucose Once [[id number]]  (Abnormal) Collected: 12/14/20 1633    Specimen: Blood Updated: 12/14/20 1639     Glucose 170 mg/dL      Comment: Serial Number: [id number]:  [id number]       POC Glucose Once [[id number]]  (Abnormal) Collected: 12/14/20 1931    Specimen: Blood Updated: 12/14/20 1936     Glucose 190 mg/dL      Comment: Serial Number: [id number]:  [id number]               MICRO:  Microbiology Results (last 10 days)     Procedure Component Value - Date/Time    COVID PRE-OP / PRE-PROCEDURE SCREENING ORDER (NO ISOLATION) - Swab, Nasopharynx [[id number]]  (Normal) Collected: 12/12/20 1817    Lab Status: Final result Specimen: Swab from Nasopharynx Updated: 12/12/20 1915    Narrative:      The following orders were created for panel order COVID PRE-OP / PRE-PROCEDURE SCREENING ORDER (NO ISOLATION) - Swab, Nasopharynx.  Procedure                               Abnormality         Status                     ---------                               -----------         ------                     COVID-19,CEPHEID,COR/LORI...[[id number]]  Normal              Final result                 Please view results for these tests on the individual orders.     COVID-19,CEPHEID,COR/LORI/PAD IN-HOUSE(OR EMERGENT/ADD-ON),NP SWAB IN TRANSPORT MEDIA 3-4 HR TAT - Swab, Nasopharynx [[id number]]  (Normal) Collected: 12/12/20 1817    Lab Status: Final result Specimen: Swab from Nasopharynx Updated: 12/12/20 1915     COVID19 Not Detected    Narrative:      Fact sheet for providers: https://www.fda.gov/media/480306/download     Fact sheet for patients: https://www.fda.gov/media/539401/download            RADIOLOGY STUDIES:  Imaging Results (Last 72 Hours)     Procedure Component Value Units Date/Time    CT Angiogram Head [[id number]] Collected: 12/14/20 0852     Updated: 12/14/20 0933    Narrative:         DATE OF EXAM:  12/11/2020 8:03 PM     PROCEDURE:  CT ANGIOGRAM HEAD-, CT ANGIOGRAM NECK-     INDICATIONS:   Sensation loss     COMPARISON:   No Comparisons Available     TECHNIQUE:  CTA of the head and CTA of the neck were performed after the intravenous  administration of 100 mL Isovue 370. Reconstructed coronal and sagittal  images were also obtained. In addition, a 3 D volume rendered image was  obtained after post processing. Automated exposure control and iterative  reconstruction methods were used.      FINDINGS:  VASCULAR FINDINGS: Diffuse atherosclerotic disease is present in the  transverse and proximal descending thoracic aorta. Minimal plaque is  identified in the origins of the great vessels. Moderate plaque is  present in the left subclavian without a demonstrable significant  stenosis. Mild plaque deposition is present in the right subclavian.  Both vertebral arteries are relatively small but appear patent. The left  vertebral artery terminates in the left posterior inferior cerebellar  artery. The basilar artery is small. There is dense atherosclerotic  plaque in the carotid bifurcations extending into the proximal internal  carotid arteries bilaterally. The right internal carotid artery stenosis  is estimated at approximately 50%. The left carotid stenosis  is  estimated at approximately 80% by NASCET criteria. Atherosclerotic  plaque is present in both carotid siphons. There is a high-grade  stenosis in the right M2 segment. The left middle cerebral artery is  widely patent. There is decreased filling of the distal right MCA  branches compared with the left. Both anterior cerebral arteries fill  normally. Multiple stenoses are identified in the posterior cerebral  arteries bilaterally.     NONVASCULAR FINDINGS: No acute findings are identified in the lung  apices. There is no superior mediastinal, supraclavicular, or  significant cervical lymphadenopathy. Advanced multilevel degenerative  disc and facet disease is present. There is chronic left maxillary sinus  disease. The bony calvarium is intact. There is generalized prominence  of the ventricles and CSF containing spaces. No intracranial enhancing  lesions are identified. There is a small lacunar infarction in the right  basal ganglia, likely chronic.          Impression:         1. Approximately 50% stenosis of the right internal carotid artery in  its proximal portion. Approximately 80% stenosis of the left internal  carotid artery in its proximal portion.  2. Small vertebrobasilar system. The left vertebral artery terminates in  the left posterior inferior cerebellar artery.  3. Significant right M2 stenosis.  4. Multiple stenoses throughout the posterior cerebral arteries  bilaterally.     Electronically Signed By-Sg Tyson MD On:12/14/2020 9:21 AM  This report was finalized on [id number] by  Sg Tyson MD.    CT Angiogram Neck [[id number]] Collected: 12/14/20 0852     Updated: 12/14/20 0933    Narrative:         DATE OF EXAM:  12/11/2020 8:03 PM     PROCEDURE:  CT ANGIOGRAM HEAD-, CT ANGIOGRAM NECK-     INDICATIONS:   Sensation loss     COMPARISON:   No Comparisons Available     TECHNIQUE:  CTA of the head and CTA of the neck were performed after the intravenous  administration of 100 mL Isovue 370.  Reconstructed coronal and sagittal  images were also obtained. In addition, a 3 D volume rendered image was  obtained after post processing. Automated exposure control and iterative  reconstruction methods were used.      FINDINGS:  VASCULAR FINDINGS: Diffuse atherosclerotic disease is present in the  transverse and proximal descending thoracic aorta. Minimal plaque is  identified in the origins of the great vessels. Moderate plaque is  present in the left subclavian without a demonstrable significant  stenosis. Mild plaque deposition is present in the right subclavian.  Both vertebral arteries are relatively small but appear patent. The left  vertebral artery terminates in the left posterior inferior cerebellar  artery. The basilar artery is small. There is dense atherosclerotic  plaque in the carotid bifurcations extending into the proximal internal  carotid arteries bilaterally. The right internal carotid artery stenosis  is estimated at approximately 50%. The left carotid stenosis is  estimated at approximately 80% by NASCET criteria. Atherosclerotic  plaque is present in both carotid siphons. There is a high-grade  stenosis in the right M2 segment. The left middle cerebral artery is  widely patent. There is decreased filling of the distal right MCA  branches compared with the left. Both anterior cerebral arteries fill  normally. Multiple stenoses are identified in the posterior cerebral  arteries bilaterally.     NONVASCULAR FINDINGS: No acute findings are identified in the lung  apices. There is no superior mediastinal, supraclavicular, or  significant cervical lymphadenopathy. Advanced multilevel degenerative  disc and facet disease is present. There is chronic left maxillary sinus  disease. The bony calvarium is intact. There is generalized prominence  of the ventricles and CSF containing spaces. No intracranial enhancing  lesions are identified. There is a small lacunar infarction in the right  basal  ganglia, likely chronic.          Impression:         1. Approximately 50% stenosis of the right internal carotid artery in  its proximal portion. Approximately 80% stenosis of the left internal  carotid artery in its proximal portion.  2. Small vertebrobasilar system. The left vertebral artery terminates in  the left posterior inferior cerebellar artery.  3. Significant right M2 stenosis.  4. Multiple stenoses throughout the posterior cerebral arteries  bilaterally.     Electronically Signed By-Sg Tyson MD On:12/14/2020 9:21 AM  This report was finalized on [id number] by  Sg Tyson MD.    MRI Brain Without Contrast [[id number]] Collected: 12/12/20 1329     Updated: 12/12/20 1335    Narrative:      MRI BRAIN WO CONTRAST-     Date of Exam: 12/12/2020 11:40 AM     Indication: Stroke, follow up; G45.9-Transient cerebral ischemic attack,  unspecified     Comparison: CT head and CT angiography 12/11/2020.     Technique: Multiplanar multisequence images of the brain were performed  without contrast according to routine brain MRI protocol.     FINDINGS:  Midline intracranial structures demonstrate expected morphology. There  is prominence of the ventricles and sulci. No midline shift. Major  intracranial vascular structures appear to demonstrate expected vascular  flow signal. The cerebellopontine angles appear clear. No definite  extra-axial collection or findings to suggest acute or chronic  hemorrhage at this time. T2 and FLAIR hyperintense signal seen in the  periventricular white matter and jaqueline, suspected to represent mild to  moderate small vessel ischemic changes. There appear to be dilated  perivascular spaces in the basal ganglia. There is a small CSF signal  intensity focus in the right basal ganglion most consistent with a  chronic lacunar infarct. No definite mass lesion or edema is identified.  No definite restricted diffusion is visualized at this time to indicate  a recent infarction.     There is  thinning of the orbital lenses consistent with cataracts. There  is a suspected mucous retention cyst in the inferior left maxillary  sinus with mild left maxillary sinus mucosal thickening. Mastoid air  cells appear clear. No definite calvarial abnormality is seen.          Impression:      1.No definite MRI findings of acute infarction or other acute  intracranial abnormality at this time.  2.Volume loss and suspected mild to moderate chronic small vessel  ischemic changes with remote lacunar infarct in the right basal  ganglion.        Electronically Signed By-Dylon Cedillo MD On:12/12/2020 1:33 PM  This report was finalized on [id number] by  Dylon Cedillo MD.    XR Ankle 2 View Left [[id number]] Collected: 12/12/20 1303     Updated: 12/12/20 1307    Narrative:      DATE OF EXAM:  12/12/2020 12:22 PM     PROCEDURE:  XR ANKLE 2 VW LEFT-     INDICATIONS:  Left ankle pain and swelling pain/swelling; G45.9-Transient cerebral  ischemic attack, unspecified     COMPARISON:  No Comparisons Available     TECHNIQUE:   Two routine standard radiographic views were obtained of the left ankle.     FINDINGS:  There is an oblique fracture of the distal fibula. There is lateral  displacement of the distal fracture fragment by approximately 3 mm.  There is surrounding soft tissue edema. There is also a suspected  minimally displaced fracture of the posterior malleolus. Ankle mortise  alignment appears grossly maintained on these 2 views. There appears to  be mild osteoarthritis at the tibiotalar and midfoot joints. There is  calcific atherosclerosis of the visualized lower extremity arteries.  Enthesophyte formation is seen at the Achilles insertion.       Impression:      1.Oblique fracture of the distal fibula with mild lateral displacement.  2.Suspected minimally displaced fracture of the posterior malleolus.  3.Mild osteoarthritis.  4.Calcific atherosclerosis.     Electronically Signed By-Dylon Cedillo MD On:12/12/2020 1:05  PM  This report was finalized on [id number] by  Dylon Cedillo MD.          Results for orders placed during the hospital encounter of 12/11/20   Intra-Op Duplex Carotid Right Lmt CAR    Narrative · Imaging indicates a normal intra-op exam.              ECHOCARDIOGRAM:  Results for orders placed during the hospital encounter of 12/11/20   Adult Transthoracic Echo Complete W/ Cont if Necessary Per Protocol (With Agitated Saline)    Narrative · Estimated left ventricular EF = 65% Left ventricular systolic function   is normal.  · Left ventricular diastolic dysfunction is noted.  · The right atrial cavity is mildly dilated.  · Moderate aortic valve stenosis is present.  · Estimated right ventricular systolic pressure from tricuspid   regurgitation is moderately elevated (45-55 mmHg).             I reviewed the patient's new clinical results.        Appropriate PPE worn during assessment of patient per established guidelines.      Electronically signed by LARRY Pham, 12/14/20, 8:53 PM EST.

## 2020-12-14 NOTE — PLAN OF CARE
Problem: Adult Inpatient Plan of Care  Goal: Plan of Care Review  12/14/2020 0031 by Ashley Sanchez RN  Outcome: Ongoing, Progressing  12/14/2020 0031 by Ashley Sanchez RN  Outcome: Ongoing, Progressing  Goal: Patient-Specific Goal (Individualized)  12/14/2020 0031 by Ashley Sanchez RN  Outcome: Ongoing, Progressing  12/14/2020 0031 by Ashley Sanchez RN  Outcome: Ongoing, Progressing  Goal: Absence of Hospital-Acquired Illness or Injury  12/14/2020 0031 by Ashley Sanchez RN  Outcome: Ongoing, Progressing  12/14/2020 0031 by Ashley Sanchez RN  Outcome: Ongoing, Progressing  Intervention: Identify and Manage Fall Risk  Recent Flowsheet Documentation  Taken 12/14/2020 0025 by Ashley Sanchez RN  Safety Promotion/Fall Prevention:   safety round/check completed   nonskid shoes/slippers when out of bed   fall prevention program maintained   clutter free environment maintained   assistive device/personal items within reach   activity supervised  Taken 12/13/2020 1950 by Ashley Sanchez RN  Safety Promotion/Fall Prevention:   safety round/check completed   nonskid shoes/slippers when out of bed   fall prevention program maintained   clutter free environment maintained   assistive device/personal items within reach   activity supervised  Intervention: Prevent Skin Injury  Recent Flowsheet Documentation  Taken 12/14/2020 0025 by Ashley Sanchez RN  Body Position:   position changed independently   lower extremity elevated, left   lower extremity elevated, right  Taken 12/13/2020 1950 by Ashley Sanchez RN  Body Position:   position changed independently   sitting up in bed  Intervention: Prevent Infection  Recent Flowsheet Documentation  Taken 12/13/2020 1950 by Ashley Sanchez RN  Infection Prevention:   single patient room provided   personal protective equipment utilized   hand hygiene promoted   rest/sleep promoted  Goal: Optimal Comfort and Wellbeing  12/14/2020 0031 by Ashley Sanchez RN  Outcome: Ongoing, Progressing  12/14/2020 0031 by Ashley Sanchez  RN  Outcome: Ongoing, Progressing  Intervention: Provide Person-Centered Care  Recent Flowsheet Documentation  Taken 12/14/2020 0025 by Ashley Sanchez RN  Trust Relationship/Rapport:   care explained   reassurance provided   thoughts/feelings acknowledged  Goal: Readiness for Transition of Care  12/14/2020 0031 by Ashley Sanchez RN  Outcome: Ongoing, Progressing  12/14/2020 0031 by Ashley Sanchez RN  Outcome: Ongoing, Progressing     Problem: Fall Injury Risk  Goal: Absence of Fall and Fall-Related Injury  12/14/2020 0031 by Ashley Sanchez RN  Outcome: Ongoing, Progressing  12/14/2020 0031 by Ashley Sanchez RN  Outcome: Ongoing, Progressing  Intervention: Identify and Manage Contributors to Fall Injury Risk  Recent Flowsheet Documentation  Taken 12/13/2020 1950 by Ashley Sanchez RN  Medication Review/Management: medications reviewed  Intervention: Promote Injury-Free Environment  Recent Flowsheet Documentation  Taken 12/14/2020 0025 by Ashley Sanchez RN  Safety Promotion/Fall Prevention:   safety round/check completed   nonskid shoes/slippers when out of bed   fall prevention program maintained   clutter free environment maintained   assistive device/personal items within reach   activity supervised  Taken 12/13/2020 1950 by Ashley Sanchez RN  Safety Promotion/Fall Prevention:   safety round/check completed   nonskid shoes/slippers when out of bed   fall prevention program maintained   clutter free environment maintained   assistive device/personal items within reach   activity supervised     Problem: Adjustment to Illness (Stroke, Ischemic/Transient Ischemic Attack)  Goal: Optimal Coping  12/14/2020 0031 by Ashley Sanchez RN  Outcome: Ongoing, Progressing  12/14/2020 0031 by Ashley Sanchez RN  Outcome: Ongoing, Progressing     Problem: Bowel Elimination Impaired (Stroke, Ischemic/Transient Ischemic Attack)  Goal: Effective Bowel Elimination  12/14/2020 0031 by Ashley Sanchez RN  Outcome: Ongoing, Progressing  12/14/2020 0031 by Ashley Sanchez  RN  Outcome: Ongoing, Progressing     Problem: Cerebral Tissue Perfusion Risk (Stroke, Ischemic/Transient Ischemic Attack)  Goal: Optimal Cerebral Tissue Perfusion  12/14/2020 0031 by Ashley Sanchez RN  Outcome: Ongoing, Progressing  12/14/2020 0031 by Ashley Sanchez RN  Outcome: Ongoing, Progressing     Problem: Communication Impairment (Stroke, Ischemic/Transient Ischemic Attack)  Goal: Improved Communication Skills  Outcome: Ongoing, Progressing     Problem: Eating/Swallowing Impairment (Stroke, Ischemic/Transient Ischemic Attack)  Goal: Oral Intake without Aspiration  Outcome: Ongoing, Progressing     Problem: Pain (Stroke, Ischemic/Transient Ischemic Attack)  Goal: Acceptable Pain Control  Outcome: Ongoing, Progressing     Problem: Sensorimotor Impairment (Stroke, Ischemic/Transient Ischemic Attack)  Goal: Improved Sensorimotor Function  Outcome: Ongoing, Progressing  Intervention: Optimize Sensory and Perceptual Abilities  Recent Flowsheet Documentation  Taken 12/13/2020 1950 by Ashley Sanchez RN  Pressure Reduction Techniques: frequent weight shift encouraged  Pressure Reduction Devices: pressure-redistributing mattress utilized     Problem: Urinary Elimination Impaired (Stroke, Ischemic/Transient Ischemic Attack)  Goal: Effective Urinary Elimination  Outcome: Ongoing, Progressing     Problem: Skin Injury Risk Increased  Goal: Skin Health and Integrity  Outcome: Ongoing, Progressing  Intervention: Optimize Skin Protection  Recent Flowsheet Documentation  Taken 12/13/2020 1950 by Ashley Sanchez RN  Pressure Reduction Techniques: frequent weight shift encouraged  Pressure Reduction Devices: pressure-redistributing mattress utilized   Goal Outcome Evaluation:  Plan of Care Reviewed With: patient  Progress: no change

## 2020-12-14 NOTE — ANESTHESIA PROCEDURE NOTES
Arterial Line      Patient reassessed immediately prior to procedure    Patient location during procedure: OR  Start time: 12/14/2020 7:55 AM  Stop Time:12/14/2020 8:00 AM       Line placed for hemodynamic monitoring.  Performed By   Anesthesiologist: Jadiel Curiel MD  Preanesthetic Checklist  Completed: patient identified, site marked, surgical consent, pre-op evaluation, timeout performed, IV checked, risks and benefits discussed and monitors and equipment checked  Arterial Line Prep   Sterile Tech: cap, gloves, gown, mask and sterile barriers  Prep: ChloraPrep  Patient monitoring: blood pressure monitoring, continuous pulse oximetry and EKG  Arterial Line Procedure   Laterality:left  Location:  radial artery  Catheter size: 20 G   Guidance: landmark technique and palpation technique  Number of attempts: 1  Successful placement: yes  Post Assessment   Dressing Type: secured with tape and wrist guard applied.   Complications no  Circ/Move/Sens Assessment: normal.   Patient Tolerance: patient tolerated the procedure well with no apparent complications

## 2020-12-15 PROBLEM — S82.62XA CLOSED DISPLACED FRACTURE OF LATERAL MALLEOLUS OF LEFT FIBULA: Status: ACTIVE | Noted: 2020-12-15

## 2020-12-15 LAB
ABO GROUP BLD: NORMAL
ACT BLD: 136 SECONDS (ref 89–137)
ACT BLD: 241 SECONDS (ref 89–137)
ANION GAP SERPL CALCULATED.3IONS-SCNC: 13 MMOL/L (ref 5–15)
APTT PPP: 24.5 SECONDS (ref 24–31)
BASOPHILS # BLD AUTO: 0.1 10*3/MM3 (ref 0–0.2)
BASOPHILS NFR BLD AUTO: 1.1 % (ref 0–1.5)
BLD GP AB SCN SERPL QL: NEGATIVE
BUN SERPL-MCNC: 33 MG/DL (ref 8–23)
BUN/CREAT SERPL: 22 (ref 7–25)
CA-I SERPL ISE-MCNC: 1.17 MMOL/L (ref 1.2–1.3)
CALCIUM SPEC-SCNC: 8.4 MG/DL (ref 8.6–10.5)
CHLORIDE SERPL-SCNC: 103 MMOL/L (ref 98–107)
CO2 SERPL-SCNC: 20 MMOL/L (ref 22–29)
CREAT SERPL-MCNC: 1.5 MG/DL (ref 0.76–1.27)
DEPRECATED RDW RBC AUTO: 46.4 FL (ref 37–54)
EOSINOPHIL # BLD AUTO: 0 10*3/MM3 (ref 0–0.4)
EOSINOPHIL NFR BLD AUTO: 0.2 % (ref 0.3–6.2)
ERYTHROCYTE [DISTWIDTH] IN BLOOD BY AUTOMATED COUNT: 14.6 % (ref 12.3–15.4)
GFR SERPL CREATININE-BSD FRML MDRD: 45 ML/MIN/1.73
GLUCOSE BLDC GLUCOMTR-MCNC: 146 MG/DL (ref 70–105)
GLUCOSE BLDC GLUCOMTR-MCNC: 163 MG/DL (ref 70–105)
GLUCOSE BLDC GLUCOMTR-MCNC: 178 MG/DL (ref 70–105)
GLUCOSE BLDC GLUCOMTR-MCNC: 194 MG/DL (ref 70–105)
GLUCOSE SERPL-MCNC: 170 MG/DL (ref 65–99)
HCT VFR BLD AUTO: 37.4 % (ref 37.5–51)
HGB BLD-MCNC: 12.3 G/DL (ref 13–17.7)
HOLD SPECIMEN: NORMAL
HOLD SPECIMEN: NORMAL
INR PPP: 0.93 (ref 0.93–1.1)
LYMPHOCYTES # BLD AUTO: 1.1 10*3/MM3 (ref 0.7–3.1)
LYMPHOCYTES NFR BLD AUTO: 9.1 % (ref 19.6–45.3)
MAGNESIUM SERPL-MCNC: 1.4 MG/DL (ref 1.6–2.4)
MCH RBC QN AUTO: 30.1 PG (ref 26.6–33)
MCHC RBC AUTO-ENTMCNC: 32.9 G/DL (ref 31.5–35.7)
MCV RBC AUTO: 91.5 FL (ref 79–97)
MONOCYTES # BLD AUTO: 1.7 10*3/MM3 (ref 0.1–0.9)
MONOCYTES NFR BLD AUTO: 14.2 % (ref 5–12)
NEUTROPHILS NFR BLD AUTO: 75.4 % (ref 42.7–76)
NEUTROPHILS NFR BLD AUTO: 8.8 10*3/MM3 (ref 1.7–7)
NRBC BLD AUTO-RTO: 0.1 /100 WBC (ref 0–0.2)
PHOSPHATE SERPL-MCNC: 4 MG/DL (ref 2.5–4.5)
PLATELET # BLD AUTO: 204 10*3/MM3 (ref 140–450)
PMV BLD AUTO: 8.7 FL (ref 6–12)
POTASSIUM SERPL-SCNC: 4.2 MMOL/L (ref 3.5–5.2)
PROTHROMBIN TIME: 10.3 SECONDS (ref 9.6–11.7)
RBC # BLD AUTO: 4.09 10*6/MM3 (ref 4.14–5.8)
RH BLD: POSITIVE
SODIUM SERPL-SCNC: 136 MMOL/L (ref 136–145)
T&S EXPIRATION DATE: NORMAL
WBC # BLD AUTO: 11.7 10*3/MM3 (ref 3.4–10.8)
WHOLE BLOOD HOLD SPECIMEN: NORMAL

## 2020-12-15 PROCEDURE — 93005 ELECTROCARDIOGRAM TRACING: CPT | Performed by: INTERNAL MEDICINE

## 2020-12-15 PROCEDURE — 93010 ELECTROCARDIOGRAM REPORT: CPT | Performed by: INTERNAL MEDICINE

## 2020-12-15 PROCEDURE — 86900 BLOOD TYPING SEROLOGIC ABO: CPT | Performed by: INTERNAL MEDICINE

## 2020-12-15 PROCEDURE — 85730 THROMBOPLASTIN TIME PARTIAL: CPT | Performed by: INTERNAL MEDICINE

## 2020-12-15 PROCEDURE — 85610 PROTHROMBIN TIME: CPT | Performed by: INTERNAL MEDICINE

## 2020-12-15 PROCEDURE — 25010000002 DOPAMINE PER 40 MG: Performed by: NURSE PRACTITIONER

## 2020-12-15 PROCEDURE — 97166 OT EVAL MOD COMPLEX 45 MIN: CPT

## 2020-12-15 PROCEDURE — 82330 ASSAY OF CALCIUM: CPT | Performed by: INTERNAL MEDICINE

## 2020-12-15 PROCEDURE — 84100 ASSAY OF PHOSPHORUS: CPT | Performed by: INTERNAL MEDICINE

## 2020-12-15 PROCEDURE — 85025 COMPLETE CBC W/AUTO DIFF WBC: CPT | Performed by: SURGERY

## 2020-12-15 PROCEDURE — 97535 SELF CARE MNGMENT TRAINING: CPT

## 2020-12-15 PROCEDURE — 83735 ASSAY OF MAGNESIUM: CPT | Performed by: INTERNAL MEDICINE

## 2020-12-15 PROCEDURE — 63710000001 INSULIN LISPRO (HUMAN) PER 5 UNITS: Performed by: SURGERY

## 2020-12-15 PROCEDURE — 97163 PT EVAL HIGH COMPLEX 45 MIN: CPT

## 2020-12-15 PROCEDURE — 99232 SBSQ HOSP IP/OBS MODERATE 35: CPT | Performed by: INTERNAL MEDICINE

## 2020-12-15 PROCEDURE — 80048 BASIC METABOLIC PNL TOTAL CA: CPT | Performed by: SURGERY

## 2020-12-15 PROCEDURE — 99232 SBSQ HOSP IP/OBS MODERATE 35: CPT | Performed by: PODIATRIST

## 2020-12-15 PROCEDURE — 86850 RBC ANTIBODY SCREEN: CPT | Performed by: INTERNAL MEDICINE

## 2020-12-15 PROCEDURE — 97530 THERAPEUTIC ACTIVITIES: CPT

## 2020-12-15 PROCEDURE — 82962 GLUCOSE BLOOD TEST: CPT

## 2020-12-15 PROCEDURE — 86901 BLOOD TYPING SEROLOGIC RH(D): CPT | Performed by: INTERNAL MEDICINE

## 2020-12-15 PROCEDURE — 25010000002 DOBUTAMINE PER 250 MG: Performed by: INTERNAL MEDICINE

## 2020-12-15 RX ORDER — DOPAMINE HYDROCHLORIDE 160 MG/100ML
2-20 INJECTION, SOLUTION INTRAVENOUS
Status: DISCONTINUED | OUTPATIENT
Start: 2020-12-15 | End: 2020-12-15

## 2020-12-15 RX ORDER — DOBUTAMINE HYDROCHLORIDE 100 MG/100ML
2.5 INJECTION INTRAVENOUS
Status: DISCONTINUED | OUTPATIENT
Start: 2020-12-15 | End: 2020-12-17 | Stop reason: HOSPADM

## 2020-12-15 RX ADMIN — HYDROCODONE BITARTRATE AND ACETAMINOPHEN 1 TABLET: 5; 325 TABLET ORAL at 08:17

## 2020-12-15 RX ADMIN — INSULIN LISPRO 2 UNITS: 100 INJECTION, SOLUTION INTRAVENOUS; SUBCUTANEOUS at 18:31

## 2020-12-15 RX ADMIN — LOSARTAN POTASSIUM 50 MG: 25 TABLET, FILM COATED ORAL at 10:32

## 2020-12-15 RX ADMIN — DOPAMINE HYDROCHLORIDE 2.5 MCG/KG/MIN: 160 INJECTION, SOLUTION INTRAVENOUS at 01:46

## 2020-12-15 RX ADMIN — DOBUTAMINE HYDROCHLORIDE 2.5 MCG/KG/MIN: 100 INJECTION INTRAVENOUS at 18:31

## 2020-12-15 RX ADMIN — ATORVASTATIN CALCIUM 80 MG: 40 TABLET, FILM COATED ORAL at 20:19

## 2020-12-15 RX ADMIN — LEVOTHYROXINE SODIUM 75 MCG: 75 TABLET ORAL at 09:08

## 2020-12-15 RX ADMIN — AMLODIPINE BESYLATE 10 MG: 5 TABLET ORAL at 10:32

## 2020-12-15 RX ADMIN — INSULIN LISPRO 2 UNITS: 100 INJECTION, SOLUTION INTRAVENOUS; SUBCUTANEOUS at 09:08

## 2020-12-15 RX ADMIN — Medication 3 ML: at 20:22

## 2020-12-15 RX ADMIN — CLOPIDOGREL BISULFATE 75 MG: 75 TABLET ORAL at 09:08

## 2020-12-15 RX ADMIN — HYDROCODONE BITARTRATE AND ACETAMINOPHEN 1 TABLET: 5; 325 TABLET ORAL at 18:42

## 2020-12-15 RX ADMIN — ASPIRIN 81 MG CHEWABLE TABLET 81 MG: 81 TABLET CHEWABLE at 09:08

## 2020-12-15 RX ADMIN — Medication 3 ML: at 10:33

## 2020-12-15 RX ADMIN — PANTOPRAZOLE SODIUM 40 MG: 40 TABLET, DELAYED RELEASE ORAL at 09:08

## 2020-12-15 NOTE — PROGRESS NOTES
"12/15/20   Foot and Ankle Surgery - Inpatient Follow-up  Provider: Dr. Alen Vasquez DPM  Location: [facility]    Chief Complaint: Left ankle fracture    Subjective:  Alen Ratliff is a 80 y.o. male.     Alert and oriented today.  Doing well.  Continues to have discomfort involving the left ankle which is well controlled with current pain medication.    Allergies   Allergen Reactions   • Azithromycin Unknown - High Severity       No current facility-administered medications on file prior to encounter.      Current Outpatient Medications on File Prior to Encounter   Medication Sig Dispense Refill   • amLODIPine (NORVASC) 10 MG tablet TAKE 1 TABLET EVERY DAY 90 tablet 1   • atorvastatin (LIPITOR) 40 MG tablet Take 1 tablet by mouth Every Night. 90 tablet 1   • clopidogrel (PLAVIX) 75 MG tablet TAKE 1 TABLET EVERY DAY 90 tablet 1   • fluticasone (FLONASE) 50 MCG/ACT nasal spray 2 sprays into the nostril(s) as directed by provider Daily As Needed.     • Januvia 50 MG tablet TAKE 1 TABLET EVERY DAY 90 tablet 1   • Jardiance 10 MG tablet TAKE 1 TABLET EVERY DAY 90 tablet 1   • levothyroxine (SYNTHROID, LEVOTHROID) 75 MCG tablet TAKE 1 TABLET EVERY DAY 90 tablet 1   • losartan (COZAAR) 50 MG tablet TAKE 1 TABLET EVERY DAY 90 tablet 1   • pantoprazole (PROTONIX) 40 MG EC tablet TAKE 1 TABLET EVERY DAY 90 tablet 1   • glucose blood test strip 1 each by Other route Daily. Use as instructed         Objective   /49   Pulse 59   Temp 98.2 °F (36.8 °C) (Oral)   Resp 17   Ht 182.9 cm (72\")   Wt 96 kg (211 lb 10.3 oz)   SpO2 94%   BMI 28.70 kg/m²     General: Alert and oriented x3.  No apparent distress  Vascular: DP and PT pulses are palpable.  CFT are intact to the digits.  Neuro: Sensation and motor function are intact to the digits.  MSK: Tenderness with palpation to the lateral malleolus.  No gross deformity or instability  Derm: No open wounds or signs of infection.  Mild edema.      Results " from last 7 days   Lab Units 12/15/20  0428   WBC 10*3/mm3 11.70*   HEMOGLOBIN g/dL 12.3*   HEMATOCRIT % 37.4*   PLATELETS 10*3/mm3 204       Assessment/Plan     Patient Active Problem List   Diagnosis   • Heart murmur   • Stroke (cerebrum) (CMS/[facility])   • Essential hypertension   • Mixed hyperlipidemia   • Acquired hypothyroidism   • Type 2 diabetes mellitus, without long-term current use of insulin (CMS/[facility])   • GERD (gastroesophageal reflux disease)   • Seasonal allergic rhinitis due to pollen   • CKD (chronic kidney disease) stage 3, GFR 30-59 ml/min   • History of prostate cancer   • TIA (transient ischemic attack)   • Symptomatic carotid artery stenosis, bilateral       Patient is alert and oriented today.  We did discuss imaging, diagnosis, and treatment options.  We did review operative versus nonoperative management.  Patient would like peace of mind and proceed with correction and fixation.  We did review the procedure, risk, goals, and recovery at length.  He does understand that he will require decreased overall activity after surgery.  Patient may proceed with antiplatelet therapy.  We will plan for surgery tomorrow if medically appropriate.    Note is dictated utilizing voice recognition software. Unfortunately this leads to occasional typographical errors. I apologize in advance if the situation occurs. If questions occur please do not hesitate to call our office.

## 2020-12-15 NOTE — THERAPY EVALUATION
Patient Name: Alen Ratliff  : 1940    MRN: [id number]                              Today's Date: 12/15/2020       Admit Date: 2020    Visit Dx:     ICD-10-CM ICD-9-CM   1. TIA (transient ischemic attack)  G45.9 435.9   2. Stenosis of right carotid artery  I65.21 433.10   3. Closed displaced fracture of lateral malleolus of left fibula, initial encounter  S82.62XA 824.2     Patient Active Problem List   Diagnosis   • Heart murmur   • Stroke (cerebrum) (CMS/HCC)   • Essential hypertension   • Mixed hyperlipidemia   • Acquired hypothyroidism   • Type 2 diabetes mellitus, without long-term current use of insulin (CMS/HCC)   • GERD (gastroesophageal reflux disease)   • Seasonal allergic rhinitis due to pollen   • CKD (chronic kidney disease) stage 3, GFR 30-59 ml/min   • History of prostate cancer   • TIA (transient ischemic attack)   • Symptomatic carotid artery stenosis, bilateral   • Closed displaced fracture of lateral malleolus of left fibula     Past Medical History:   Diagnosis Date   • Allergic rhinitis    • Diabetes (CMS/HCC)    • GERD (gastroesophageal reflux disease)    • Heart murmur    • Hyperlipidemia    • Hypertension    • Hypothyroidism    • Prostate cancer (CMS/HCC)    • Stroke (CMS/HCC)      Past Surgical History:   Procedure Laterality Date   • BACK SURGERY     • CAROTID ENDARTERECTOMY Right 2020    Procedure: CAROTID ENDARTERECTOMY;  Surgeon: Toby Fung MD;  Location: [facility];  Service: Vascular;  Laterality: Right;   • COLONOSCOPY  2015   • PROSTATECTOMY      due to cancer     General Information     Row Name 12/15/20 1153          Physical Therapy Time and Intention    Document Type  evaluation  -CM     Mode of Treatment  physical therapy;co-treatment;occupational therapy  -CM     Row Name 12/15/20 1153          General Information    Patient Profile Reviewed  yes  -CM     Prior Level of Function  independent:;community mobility;gait;driving   -CM     Existing Precautions/Restrictions  left;non-weight bearing to wear cam boot on LLE  -CM     Barriers to Rehab  none identified  -CM     Row Name 12/15/20 1153          Living Environment    Lives With  spouse  -CM     Row Name 12/15/20 1153          Home Main Entrance    Number of Stairs, Main Entrance  none  -CM     Row Name 12/15/20 1153          Stairs Within Home, Primary    Number of Stairs, Within Home, Primary  none  -CM     Row Name 12/15/20 1153          Cognition    Orientation Status (Cognition)  oriented x 4  -CM     Row Name 12/15/20 1153          Safety Issues, Functional Mobility    Impairments Affecting Function (Mobility)  balance;endurance/activity tolerance;pain  -CM       User Key  (r) = Recorded By, (t) = Taken By, (c) = Cosigned By    Initials Name Provider Type    Aundrea Enciso, PT Physical Therapist        Mobility     Row Name 12/15/20 1158          Bed Mobility    Bed Mobility  bed mobility (all) activities  -CM     All Activities, [place] (Bed Mobility)  supervision  -CM     Row Name 12/15/20 1158          Bed-Chair Transfer    Bed-Chair [place] (Transfers)  minimum assist (75% patient effort)  -CM     Assistive Device (Bed-Chair Transfers)  walker, front-wheeled  -CM     Row Name 12/15/20 1158          Sit-Stand Transfer    Sit-Stand [place] (Transfers)  minimum assist (75% patient effort)  -CM     Assistive Device (Sit-Stand Transfers)  walker, front-wheeled  -CM     Row Name 12/15/20 1158          Gait/Stairs (Locomotion)    [place] Level (Gait)  not tested  -CM     Distance in Feet (Gait)  pt fatigues w/ transfer to chair using rw  -CM     Row Name 12/15/20 1158          Mobility    Extremity Weight-bearing Status  left lower extremity  -CM     Left Lower Extremity (Weight-bearing Status)  non weight-bearing (NWB)  -CM       User Key  (r) = Recorded By, (t) = Taken By, (c) = Cosigned By    Initials Name Provider Type    Aundrea Enciso, PT  Physical Therapist        Obj/Interventions     Row Name 12/15/20 1159          Range of Motion Comprehensive    General Range of Motion  bilateral upper extremity ROM WNL;lower extremity range of motion deficits identified  -CM     Comment, General Range of Motion  L ankle ROM n/a due to presence of malleolar fx, not yet stabilized  -CM     Row Name 12/15/20 1159          Strength Comprehensive (MMT)    General Manual Muscle Testing (MMT) Assessment  no strength deficits identified  -CM     Row Name 12/15/20 1159          Balance    Balance Assessment  sitting static balance;sitting dynamic balance;standing static balance;standing dynamic balance  -CM     Static Sitting Balance  WNL;unsupported;sitting, edge of bed  -CM     Dynamic Sitting Balance  sitting, edge of bed;unsupported;WNL  -CM     Static Standing Balance  mild impairment;supported;standing  -CM     Dynamic Standing Balance  mild impairment;supported;standing  -CM     Row Name 12/15/20 1159          Sensory Assessment (Somatosensory)    Sensory Assessment (Somatosensory)  other (see comments) has decreased sensation in R ankle from previous cva, as well as intermittent numbness in L ankle  -CM       User Key  (r) = Recorded By, (t) = Taken By, (c) = Cosigned By    Initials Name Provider Type    CM Aundrea Alejandre, PT Physical Therapist        Goals/Plan     Row Name 12/15/20 1212          Bed Mobility Goal 1 (PT)    Activity/Assistive Device (Bed Mobility Goal 1, PT)  bed mobility activities, all  -CM     [place] Level/Cues Needed (Bed Mobility Goal 1, PT)  independent  -CM     Time Frame (Bed Mobility Goal 1, PT)  2 weeks  -CM     Row Name 12/15/20 1212          Transfer Goal 1 (PT)    Activity/Assistive Device (Transfer Goal 1, PT)  transfers, all;walker, rolling;other (see comments) rw if no pacemaker precautions  -CM     [place] Level/Cues Needed (Transfer Goal 1, PT)  supervision required  -CM     Time Frame (Transfer Goal 1, PT)  2  weeks  -CM     Row Name 12/15/20 1212          Gait Training Goal 1 (PT)    Activity/Assistive Device (Gait Training Goal 1, PT)  gait (walking locomotion);walker, rolling  -CM     [place] Level (Gait Training Goal 1, PT)  minimum assist (75% or more patient effort)  -CM     Distance (Gait Training Goal 1, PT)  25 ft, IF no pacemaker precautions in place  -CM     Time Frame (Gait Training Goal 1, PT)  2 weeks  -CM       User Key  (r) = Recorded By, (t) = Taken By, (c) = Cosigned By    Initials Name Provider Type    Aundrea Enciso, PT Physical Therapist        Clinical Impression     Row Name 12/15/20 1201          Pain    Additional Documentation  Pain Scale: Numbers Pre/Post-Treatment (Group)  -CM     Row Name 12/15/20 1201          Pain Scale: Numbers Pre/Post-Treatment    Pretreatment Pain Rating  0/10 - no pain  -CM     Posttreatment Pain Rating  0/10 - no pain  -CM     Pain Location - Side  Left  -CM     Pain Location  ankle  -CM     Pre/Posttreatment Pain Comment  reports only soreness in L ankle; has had pain meds  -CM     Pain Intervention(s)  Medication (See MAR);Repositioned;Emotional support  -CM     Row Name 12/15/20 1201          Plan of Care Review    Plan of Care Reviewed With  patient  -CM     Outcome Summary  81 yo male adm for acute onset of L sided numbness and paresthesias. Determined to be having TIA per neurology w/ symptomatic vascular disease. Hx of cva w/ little residual effect. Underwent R CEA on 12/14. Pt also determined to have sufferred L medial malleolar fx. ORIF being considered and likely to occur w/in next few days.  Pt now having bradycardia. Cardiology considering pacemaker placement. Pt normally ambulates independently for community distances. He presents w/ normal strength. He comes to standing at a rw w/ min assist and is able to transfer to a chair w/ min assist, maintaining nwb status on LLE. Pt likely to have nwb status on LLE, as well as nwb status on LUE if  undergoes pacemaker placement. Recommend home w/ family w/ use of w/c during few weeks that he has to follow pacemaker precautions. If no pacemaker, then no need for w/c. Will definitely need rw for home, regardless. Will follow pt to reassess as he undergoes procedure(s).  PPE: gloves, mask, goggles.  -     Row Name 12/15/20 1201          Therapy Assessment/Plan (PT)    Rehab Potential (PT)  good, to achieve stated therapy goals  -CM     Criteria for Skilled Interventions Met (PT)  yes;meets criteria;skilled treatment is necessary  -CM     Predicted Duration of Therapy Intervention (PT)  until d/c or goals met  -     Row Name 12/15/20 1201          Vital Signs    Pre Systolic BP Rehab  141  -CM     Pre Treatment Diastolic BP  47  -CM     Post Systolic BP Rehab  156  -CM     Post Treatment Diastolic BP  49  -CM     Pretreatment Heart Rate (beats/min)  55  -CM     Posttreatment Heart Rate (beats/min)  60  -CM     Pre SpO2 (%)  95  -CM     O2 Delivery Pre Treatment  room air  -CM     O2 Delivery Intra Treatment  room air  -CM     Post SpO2 (%)  95  -CM     O2 Delivery Post Treatment  room air  -CM     Row Name 12/15/20 1201          Positioning and Restraints    Pre-Treatment Position  in bed  -CM     Post Treatment Position  chair  -CM     In Chair  notified nsg;sitting;call light within reach;encouraged to call for assist declined to have LEs elevated  -CM       User Key  (r) = Recorded By, (t) = Taken By, (c) = Cosigned By    Initials Name Provider Type    Aundrea Enciso, PT Physical Therapist        Outcome Measures     Row Name 12/15/20 1214          Modified [place] Scale    Pre-Stroke Modified [place] Scale  1 - No significant disability despite symptoms.  Able to carry out all usual duties and activities.  -CM     Modified [place] Scale  5 - Severe disability.  Bedridden, incontinent, and requiring constant nursing care and attention.  -     Row Name 12/15/20 1210          Functional Assessment     Outcome Measure Options  Modified [place]  -       User Key  (r) = Recorded By, (t) = Taken By, (c) = Cosigned By    Initials Name Provider Type    Aundrea Enciso, PT Physical Therapist        Physical Therapy Education                 Title: PT OT SLP Therapies (In Progress)     Topic: Physical Therapy (Done)     Point: Mobility training (Done)     Learning Progress Summary           Patient Acceptance, E,TB, VU,DU by LAVELLE at 12/15/2020 1214                   Point: Precautions (Done)     Learning Progress Summary           Patient Acceptance, E,TB, VU,DU by LAVELLE at 12/15/2020 1214                               User Key     Initials Effective Dates Name Provider Type Discipline     03/01/19 -  Aundrea Alejandre, PT Physical Therapist PT              PT Recommendation and Plan  Planned Therapy Interventions (PT): balance training, bed mobility training, gait training, home exercise program, patient/family education, postural re-education, transfer training, strengthening  Plan of Care Reviewed With: patient  Outcome Summary: 81 yo male adm for acute onset of L sided numbness and paresthesias. Determined to be having TIA per neurology w/ symptomatic vascular disease. Hx of cva w/ little residual effect. Underwent R CEA on 12/14. Pt also determined to have sufferred L medial malleolar fx. ORIF being considered and likely to occur w/in next few days.  Pt now having bradycardia. Cardiology considering pacemaker placement. Pt normally ambulates independently for community distances. He presents w/ normal strength. He comes to standing at a rw w/ min assist and is able to transfer to a chair w/ min assist, maintaining nwb status on LLE. Pt likely to have nwb status on LLE, as well as nwb status on LUE if undergoes pacemaker placement. Recommend home w/ family w/ use of w/c during few weeks that he has to follow pacemaker precautions. If no pacemaker, then no need for w/c. Will definitely need rw for home, regardless.  Will follow pt to reassess as he undergoes procedure(s).  PPE: gloves, mask, goggles.     Time Calculation:   PT Charges     Row Name 12/15/20 1215             Time Calculation    Start Time  0923  -CM      Stop Time  0943  -CM      Time Calculation (min)  20 min  -CM      PT Received On  12/15/20  -CM      PT - Next Appointment  12/17/20  -CM      PT Goal Re-Cert Due Date  12/29/20  -CM         Time Calculation- PT    Total Timed Code Minutes- PT  0 minute(s)  -CM        User Key  (r) = Recorded By, (t) = Taken By, (c) = Cosigned By    Initials Name Provider Type     Aundrea Alejandre, PT Physical Therapist        Therapy Charges for Today     Code Description Service Date Service Provider Modifiers Qty    [id number]  PT EVAL HIGH COMPLEXITY 4 12/15/2020 Aundrea Alejandre, PT GP 1    [id number]  PT THERAPEUTIC ACT EA 15 MIN 12/15/2020 Aundrea Alejandre, PT GP 1          PT G-Codes  Outcome Measure Options: AM-PAC 6 Clicks Daily Activity (OT)  AM-PAC 6 Clicks Score (OT): 19  Modified [place] Scale: 5 - Severe disability.  Bedridden, incontinent, and requiring constant nursing care and attention.    Aundrea Alejandre PT  12/15/2020

## 2020-12-15 NOTE — PROGRESS NOTES
LOS: 2 days   Admiting Physician- Nik Tejeda MD    Reason For Followup:    Bradycardia  Paroxysmal atrial fibrillation  Tachybradycardia syndrome  Right carotid endarterectomy      Subjective     Patient had slight dizziness with bradycardia but at present was sitting comfortably.    Objective     Hemodynamics are stable    Review of Systems:   Review of Systems   Constitution: Negative for chills and fever.   HENT: Negative for ear discharge and nosebleeds.    Eyes: Negative for discharge and redness.   Cardiovascular: Negative for chest pain, orthopnea, palpitations, paroxysmal nocturnal dyspnea and syncope.   Respiratory: Positive for shortness of breath. Negative for cough and wheezing.    Endocrine: Negative for heat intolerance.   Skin: Negative for rash.   Musculoskeletal: Positive for arthritis and joint pain. Negative for myalgias.   Gastrointestinal: Negative for abdominal pain, melena, nausea and vomiting.   Genitourinary: Negative for dysuria and hematuria.   Neurological: Negative for dizziness, light-headedness, numbness and tremors.   Psychiatric/Behavioral: Negative for depression. The patient is not nervous/anxious.          Vital Signs  Vitals:    12/15/20 1120 12/15/20 1200 12/15/20 1220 12/15/20 1223   BP: 117/43  126/49    Pulse: 52 (!) 47 59    Resp:       Temp:    98.2 °F (36.8 °C)   TempSrc:    Oral   SpO2:  97% 94%    Weight:       Height:         Wt Readings from Last 1 Encounters:   12/15/20 96 kg (211 lb 10.3 oz)       Intake/Output Summary (Last 24 hours) at 12/15/2020 1412  Last data filed at 12/15/2020 0950  Gross per 24 hour   Intake 360 ml   Output --   Net 360 ml     Physical Exam:  Constitutional:       Appearance: Well-developed.   Eyes:      General: No scleral icterus.        Right eye: No discharge.   HENT:      Head: Normocephalic and atraumatic.   Neck:      Thyroid: No thyromegaly.      Lymphadenopathy: No cervical adenopathy.   Pulmonary:      Effort: Pulmonary effort  is normal. No respiratory distress.      Breath sounds: Normal breath sounds. No wheezing. No rales.   Cardiovascular:      Normal rate. Regular rhythm.      No gallop.   Edema:     Peripheral edema absent.   Abdominal:      Tenderness: There is no abdominal tenderness.   Skin:     Findings: No erythema or rash.   Neurological:      Mental Status: Alert and oriented to person, place, and time.         Results Review:   Lab Results (last 24 hours)     Procedure Component Value Units Date/Time    POC Activated Clotting Time [[id number]]  (Abnormal) Collected: 12/14/20 0912    Specimen: Arterial Blood Updated: 12/15/20 1251     Activated Clotting Time  241 Seconds      Comment: Serial Number: [id number]:  [id number]       POC Activated Clotting Time [[id number]]  (Normal) Collected: 12/14/20 0822    Specimen: Arterial Blood Updated: 12/15/20 1250     Activated Clotting Time  136 Seconds      Comment: Serial Number: [id number]:  [id number]       POC Glucose Once [[id number]]  (Abnormal) Collected: 12/15/20 1222    Specimen: Blood Updated: 12/15/20 1229     Glucose 146 mg/dL      Comment: Serial Number: [id number]:  [id number]       Calcium, Ionized [[id number]]  (Abnormal) Collected: 12/15/20 0846    Specimen: Blood Updated: 12/15/20 0940     Ionized Calcium 1.17 mmol/L     Magnesium [[id number]]  (Abnormal) Collected: 12/15/20 0428    Specimen: Blood Updated: 12/15/20 0906     Magnesium 1.4 mg/dL     Phosphorus [[id number]]  (Normal) Collected: 12/15/20 0428    Specimen: Blood Updated: 12/15/20 0906     Phosphorus 4.0 mg/dL     POC Glucose Once [[id number]]  (Abnormal) Collected: 12/15/20 0727    Specimen: Blood Updated: 12/15/20 0729     Glucose 163 mg/dL      Comment: Serial Number: [id number]:  [id number]       Basic Metabolic Panel [[id number]]  (Abnormal) Collected: 12/15/20 0428    Specimen: Blood Updated: 12/15/20 0520     Glucose 170 mg/dL      BUN 33 mg/dL      Creatinine 1.50 mg/dL      Sodium 136 mmol/L       Potassium 4.2 mmol/L      Chloride 103 mmol/L      CO2 20.0 mmol/L      Calcium 8.4 mg/dL      eGFR Non African Amer 45 mL/min/1.73      BUN/Creatinine Ratio 22.0     Anion Gap 13.0 mmol/L     Narrative:      GFR Normal >60  Chronic Kidney Disease <60  Kidney Failure <15      CBC & Differential [[id number]]  (Abnormal) Collected: 12/15/20 0428    Specimen: Blood Updated: 12/15/20 0515    Narrative:      The following orders were created for panel order CBC & Differential.  Procedure                               Abnormality         Status                     ---------                               -----------         ------                     CBC Auto Differential[[id number]]        Abnormal            Final result                 Please view results for these tests on the individual orders.    CBC Auto Differential [[id number]]  (Abnormal) Collected: 12/15/20 0428    Specimen: Blood Updated: 12/15/20 0515     WBC 11.70 10*3/mm3      RBC 4.09 10*6/mm3      Hemoglobin 12.3 g/dL      Hematocrit 37.4 %      MCV 91.5 fL      MCH 30.1 pg      MCHC 32.9 g/dL      RDW 14.6 %      RDW-SD 46.4 fl      MPV 8.7 fL      Platelets 204 10*3/mm3      Neutrophil % 75.4 %      Lymphocyte % 9.1 %      Monocyte % 14.2 %      Eosinophil % 0.2 %      Basophil % 1.1 %      Neutrophils, Absolute 8.80 10*3/mm3      Lymphocytes, Absolute 1.10 10*3/mm3      Monocytes, Absolute 1.70 10*3/mm3      Eosinophils, Absolute 0.00 10*3/mm3      Basophils, Absolute 0.10 10*3/mm3      nRBC 0.1 /100 WBC     POC Glucose Once [[id number]]  (Abnormal) Collected: 12/14/20 1931    Specimen: Blood Updated: 12/14/20 1936     Glucose 190 mg/dL      Comment: Serial Number: [id number]:  [id number]       POC Glucose Once [[id number]]  (Abnormal) Collected: 12/14/20 1633    Specimen: Blood Updated: 12/14/20 1639     Glucose 170 mg/dL      Comment: Serial Number: [id number]:  [id number]           Imaging Results (Last 72 Hours)     Procedure Component  Value Units Date/Time    CT Angiogram Head [[id number]] Collected: 12/14/20 0852     Updated: 12/14/20 0933    Narrative:         DATE OF EXAM:  12/11/2020 8:03 PM     PROCEDURE:  CT ANGIOGRAM HEAD-, CT ANGIOGRAM NECK-     INDICATIONS:   Sensation loss     COMPARISON:   No Comparisons Available     TECHNIQUE:  CTA of the head and CTA of the neck were performed after the intravenous  administration of 100 mL Isovue 370. Reconstructed coronal and sagittal  images were also obtained. In addition, a 3 D volume rendered image was  obtained after post processing. Automated exposure control and iterative  reconstruction methods were used.      FINDINGS:  VASCULAR FINDINGS: Diffuse atherosclerotic disease is present in the  transverse and proximal descending thoracic aorta. Minimal plaque is  identified in the origins of the great vessels. Moderate plaque is  present in the left subclavian without a demonstrable significant  stenosis. Mild plaque deposition is present in the right subclavian.  Both vertebral arteries are relatively small but appear patent. The left  vertebral artery terminates in the left posterior inferior cerebellar  artery. The basilar artery is small. There is dense atherosclerotic  plaque in the carotid bifurcations extending into the proximal internal  carotid arteries bilaterally. The right internal carotid artery stenosis  is estimated at approximately 50%. The left carotid stenosis is  estimated at approximately 80% by NASCET criteria. Atherosclerotic  plaque is present in both carotid siphons. There is a high-grade  stenosis in the right M2 segment. The left middle cerebral artery is  widely patent. There is decreased filling of the distal right MCA  branches compared with the left. Both anterior cerebral arteries fill  normally. Multiple stenoses are identified in the posterior cerebral  arteries bilaterally.     NONVASCULAR FINDINGS: No acute findings are identified in the lung  apices. There is  no superior mediastinal, supraclavicular, or  significant cervical lymphadenopathy. Advanced multilevel degenerative  disc and facet disease is present. There is chronic left maxillary sinus  disease. The bony calvarium is intact. There is generalized prominence  of the ventricles and CSF containing spaces. No intracranial enhancing  lesions are identified. There is a small lacunar infarction in the right  basal ganglia, likely chronic.          Impression:         1. Approximately 50% stenosis of the right internal carotid artery in  its proximal portion. Approximately 80% stenosis of the left internal  carotid artery in its proximal portion.  2. Small vertebrobasilar system. The left vertebral artery terminates in  the left posterior inferior cerebellar artery.  3. Significant right M2 stenosis.  4. Multiple stenoses throughout the posterior cerebral arteries  bilaterally.     Electronically Signed By-Sg Tysno MD On:12/14/2020 9:21 AM  This report was finalized on [id number] by  Sg Tyson MD.    CT Angiogram Neck [[id number]] Collected: 12/14/20 0852     Updated: 12/14/20 0933    Narrative:         DATE OF EXAM:  12/11/2020 8:03 PM     PROCEDURE:  CT ANGIOGRAM HEAD-, CT ANGIOGRAM NECK-     INDICATIONS:   Sensation loss     COMPARISON:   No Comparisons Available     TECHNIQUE:  CTA of the head and CTA of the neck were performed after the intravenous  administration of 100 mL Isovue 370. Reconstructed coronal and sagittal  images were also obtained. In addition, a 3 D volume rendered image was  obtained after post processing. Automated exposure control and iterative  reconstruction methods were used.      FINDINGS:  VASCULAR FINDINGS: Diffuse atherosclerotic disease is present in the  transverse and proximal descending thoracic aorta. Minimal plaque is  identified in the origins of the great vessels. Moderate plaque is  present in the left subclavian without a demonstrable significant  stenosis. Mild plaque  deposition is present in the right subclavian.  Both vertebral arteries are relatively small but appear patent. The left  vertebral artery terminates in the left posterior inferior cerebellar  artery. The basilar artery is small. There is dense atherosclerotic  plaque in the carotid bifurcations extending into the proximal internal  carotid arteries bilaterally. The right internal carotid artery stenosis  is estimated at approximately 50%. The left carotid stenosis is  estimated at approximately 80% by NASCET criteria. Atherosclerotic  plaque is present in both carotid siphons. There is a high-grade  stenosis in the right M2 segment. The left middle cerebral artery is  widely patent. There is decreased filling of the distal right MCA  branches compared with the left. Both anterior cerebral arteries fill  normally. Multiple stenoses are identified in the posterior cerebral  arteries bilaterally.     NONVASCULAR FINDINGS: No acute findings are identified in the lung  apices. There is no superior mediastinal, supraclavicular, or  significant cervical lymphadenopathy. Advanced multilevel degenerative  disc and facet disease is present. There is chronic left maxillary sinus  disease. The bony calvarium is intact. There is generalized prominence  of the ventricles and CSF containing spaces. No intracranial enhancing  lesions are identified. There is a small lacunar infarction in the right  basal ganglia, likely chronic.          Impression:         1. Approximately 50% stenosis of the right internal carotid artery in  its proximal portion. Approximately 80% stenosis of the left internal  carotid artery in its proximal portion.  2. Small vertebrobasilar system. The left vertebral artery terminates in  the left posterior inferior cerebellar artery.  3. Significant right M2 stenosis.  4. Multiple stenoses throughout the posterior cerebral arteries  bilaterally.     Electronically Signed By-Sg Tyson MD On:12/14/2020 9:21  AM  This report was finalized on [id number] by  Sg Tyson MD.        ECG/EMG Results (most recent)     Procedure Component Value Units Date/Time    ECG 12 Lead [[id number]] Collected: 12/11/20 2017     Updated: 12/11/20 2022     QT Interval 383 ms     Narrative:      HEART RATE= 75  bpm  RR Interval= 804  ms  IA Interval= 92  ms  P Horizontal Axis= 221  deg  P Front Axis= 0  deg  QRSD Interval= 113  ms  QT Interval= 383  ms  QRS Axis= -37  deg  T Wave Axis= 166  deg  - ABNORMAL ECG -  Sinus rhythm  Repol abnrm suggests ischemia, diffuse leads  No previous ECG available for comparison  Electronically Signed By:   Date and Time of Study: 2020-12-11 20:17:32    Adult Transthoracic Echo Complete W/ Cont if Necessary Per Protocol (With Agitated Saline) [[id number]] Collected: 12/12/20 0905     Updated: 12/12/20 1313     BSA 2.2 m^2      RVIDd 2.8 cm      IVSd 1.1 cm      LVIDd 4.9 cm      LVIDs 3.4 cm      LVPWd 1.2 cm      IVS/LVPW 0.95     FS 30.8 %      EDV(Teich) 114.3 ml      ESV(Teich) 47.7 ml      EF(Teich) 58.3 %      EDV(cubed) 119.7 ml      ESV(cubed) 39.6 ml      EF(cubed) 66.9 %      LV mass(C)d 223.3 grams      LV mass(C)dI 103.3 grams/m^2      SV(Teich) 66.6 ml      SI(Teich) 30.8 ml/m^2      SV(cubed) 80.1 ml      SI(cubed) 37.0 ml/m^2      Ao root diam 3.3 cm      Ao root area 8.5 cm^2      ACS 1.9 cm      asc Aorta Diam 2.9 cm      LVOT diam 2.1 cm      LVOT area 3.3 cm^2      RVOT diam 2.2 cm      RVOT area 4.0 cm^2      EDV(MOD-sp4) 69.0 ml      ESV(MOD-sp4) 24.3 ml      EF(MOD-sp4) 64.8 %      SV(MOD-sp4) 44.7 ml      SI(MOD-sp4) 20.7 ml/m^2      Ao root area (BSA corrected) 1.5     LV Delgado Vol (BSA corrected) 31.9 ml/m^2      LV Sys Vol (BSA corrected) 11.2 ml/m^2      Aortic R-R 1.1 sec      Aortic HR 56.3 BPM      MV E max britney 96.8 cm/sec      MV A max britney 108.4 cm/sec      MV E/A 0.89     MV V2 max 125.8 cm/sec      MV max PG 6.3 mmHg      MV V2 mean 79.9 cm/sec      MV mean PG 2.8 mmHg       MV V2 VTI 33.7 cm      MVA(VTI) 3.4 cm^2      MV dec slope 435.1 cm/sec^2      MV dec time 0.22 sec      Ao pk britney 340.0 cm/sec      Ao max PG 46.2 mmHg      Ao max PG (full) 37.9 mmHg      Ao V2 mean 238.7 cm/sec      Ao mean PG 25.3 mmHg      Ao mean PG (full) 20.6 mmHg      Ao V2 VTI 74.5 cm      SAL(I,A) 1.5 cm^2      SAL(I,D) 1.5 cm^2      SAL(V,A) 1.4 cm^2      SAL(V,D) 1.4 cm^2      AI max britney 402.5 cm/sec      AI max PG 64.8 mmHg      AI dec slope 249.5 cm/sec^2      AI dec time 1.6 sec      AI P1/2t 472.5 msec      LV V1 max PG 8.3 mmHg      LV V1 mean PG 4.7 mmHg      LV V1 max 144.4 cm/sec      LV V1 mean 103.3 cm/sec      LV V1 VTI 34.2 cm      CO(Ao) 35.6 l/min      CI(Ao) 16.5 l/min/m^2      SV(Ao) 632.0 ml      SI(Ao) 292.3 ml/m^2      CO(LVOT) 6.4 l/min      CI(LVOT) 3.0 l/min/m^2      SV(LVOT) 113.5 ml      SV(RVOT) 93.2 ml      SI(LVOT) 52.5 ml/m^2      PA V2 max 116.8 cm/sec      PA max PG 5.5 mmHg      PA max PG (full) 0.41 mmHg      PA V2 mean 82.0 cm/sec      PA mean PG 3.0 mmHg      PA mean PG (full) 0.54 mmHg      PA V2 VTI 25.3 cm      PVA(I,A) 3.7 cm^2      BH CV ECHO SANJAY - PVA(I,D) 3.7 cm^2      BH CV ECHO SANJAY - PVA(V,A) 3.8 cm^2      BH CV ECHO SANJAY - PVA(V,D) 3.8 cm^2      PA acc time 0.13 sec      RV V1 max PG 5.1 mmHg      RV V1 mean PG 2.5 mmHg      RV V1 max 112.4 cm/sec      RV V1 mean 73.6 cm/sec      RV V1 VTI 23.5 cm      TR max britney 330.3 cm/sec      RVSP(TR) 46.6 mmHg      RAP systole 3.0 mmHg      PA pr(Accel) 22.5 mmHg      Qp/Qs 0.82      CV ECHO SANJYA - BZI_BMI 28.1 kilograms/m^2       CV ECHO SANJAY - BSA(HAYCOCK) 2.2 m^2       CV ECHO SANJAY - BZI_METRIC_WEIGHT 93.9 kg       CV ECHO SANJAY - BZI_METRIC_HEIGHT 182.9 cm      EF(MOD-bp) 65.0 %      LA dimension(2D) 4.7 cm      Echo EF Estimated 65 %     Narrative:      · Estimated left ventricular EF = 65% Left ventricular systolic function   is normal.  · Left ventricular diastolic dysfunction is noted.  · The right  atrial cavity is mildly dilated.  · Moderate aortic valve stenosis is present.  · Estimated right ventricular systolic pressure from tricuspid   regurgitation is moderately elevated (45-55 mmHg).       ECG 12 Lead [[id number]] Collected: 12/13/20 1107     Updated: 12/13/20 1607     QT Interval 418 ms     Narrative:      HEART RATE= 60  bpm  RR Interval= 996  ms  NH Interval= 296  ms  P Horizontal Axis= -17  deg  P Front Axis= 46  deg  QRSD Interval= 107  ms  QT Interval= 418  ms  QRS Axis= -41  deg  T Wave Axis= -8  deg  - ABNORMAL ECG -  Sinus rhythm  Prolonged NH interval  Inferior infarct, old  When compared with ECG of 11-Dec-2020 20:17:32,  Sinus rate is slower  Electronically Signed By: Steve Swain (LORI) 13-Dec-2020 16:07:07  Date and Time of Study: 2020-12-13 11:07:57    ECG 12 Lead [[id number]] Collected: 12/15/20 0859     Updated: 12/15/20 0901     QT Interval 452 ms     Narrative:      HEART RATE= 47  bpm  RR Interval= 1271  ms  NH Interval=   ms  P Horizontal Axis=   deg  P Front Axis=   deg  QRSD Interval= 110  ms  QT Interval= 452  ms  QRS Axis= -22  deg  T Wave Axis= 69  deg  - ABNORMAL ECG -  Atrial fibrillation  Borderline ST elevation, anterior leads  When compared with ECG of 13-Dec-2020 11:07:57,  No significant change  Electronically Signed By:   Date and Time of Study: 2020-12-15 08:59:00        CBC    Results from last 7 days   Lab Units 12/15/20  0428 12/13/20  0437 12/11/20  1959   WBC 10*3/mm3 11.70* 7.30 6.90   HEMOGLOBIN g/dL 12.3* 12.2* 14.4   PLATELETS 10*3/mm3 204 179 219     BMP   Results from last 7 days   Lab Units 12/15/20  0428 12/13/20  0437 12/11/20  1959   SODIUM mmol/L 136 141 141   POTASSIUM mmol/L 4.2 3.8 3.4*   CHLORIDE mmol/L 103 109* 107   CO2 mmol/L 20.0* 23.0 21.0*   BUN mg/dL 33* 23 23   CREATININE mg/dL 1.50* 1.35* 1.16   GLUCOSE mg/dL 170* 142* 133*   MAGNESIUM mg/dL 1.4*  --   --    PHOSPHORUS mg/dL 4.0  --   --      CMP   Results from last 7 days   Lab Units  12/15/20  0428 12/13/20  0437 12/11/20  1959   SODIUM mmol/L 136 141 141   POTASSIUM mmol/L 4.2 3.8 3.4*   CHLORIDE mmol/L 103 109* 107   CO2 mmol/L 20.0* 23.0 21.0*   BUN mg/dL 33* 23 23   CREATININE mg/dL 1.50* 1.35* 1.16   GLUCOSE mg/dL 170* 142* 133*   ALBUMIN g/dL  --   --  4.00   BILIRUBIN mg/dL  --   --  0.4   ALK PHOS U/L  --   --  64   AST (SGOT) U/L  --   --  21   ALT (SGPT) U/L  --   --  23     Cardiac Studies:  Echo-   Results for orders placed during the hospital encounter of 12/11/20   Adult Transthoracic Echo Complete W/ Cont if Necessary Per Protocol (With Agitated Saline)    Narrative · Estimated left ventricular EF = 65% Left ventricular systolic function   is normal.  · Left ventricular diastolic dysfunction is noted.  · The right atrial cavity is mildly dilated.  · Moderate aortic valve stenosis is present.  · Estimated right ventricular systolic pressure from tricuspid   regurgitation is moderately elevated (45-55 mmHg).        Stress Myoview-  Cath-      Medication Review:   Scheduled Meds:amLODIPine, 10 mg, Oral, Daily  aspirin, 81 mg, Oral, Daily  atorvastatin, 80 mg, Oral, Nightly  atropine sulfate, , ,   clopidogrel, 75 mg, Oral, Daily  insulin lispro, 0-9 Units, Subcutaneous, TID AC  levothyroxine, 75 mcg, Oral, Daily  losartan, 50 mg, Oral, Daily  pantoprazole, 40 mg, Oral, Daily  sodium chloride, 3 mL, Intravenous, Q12H      Continuous Infusions:   PRN Meds:.•  acetaminophen **OR** acetaminophen  •  bisacodyl  •  dextrose  •  dextrose  •  diphenhydrAMINE  •  glucagon (human recombinant)  •  hydrALAZINE  •  HYDROcodone-acetaminophen  •  HYDROmorphone **AND** naloxone  •  influenza vaccine  •  insulin lispro **AND** insulin lispro  •  ondansetron  •  potassium chloride **OR** potassium chloride **OR** potassium chloride  •  sodium chloride  •  sodium chloride      Assessment/Plan   Patient Active Problem List   Diagnosis   • Heart murmur   • Stroke (cerebrum) (CMS/HCC)   • Essential  hypertension   • Mixed hyperlipidemia   • Acquired hypothyroidism   • Type 2 diabetes mellitus, without long-term current use of insulin (CMS/[facility], located [facility])   • GERD (gastroesophageal reflux disease)   • Seasonal allergic rhinitis due to pollen   • CKD (chronic kidney disease) stage 3, GFR 30-59 ml/min   • History of prostate cancer   • TIA (transient ischemic attack)   • Symptomatic carotid artery stenosis, bilateral     Plan:    Patient was presented with possible TIA and was found to have high-grade stenosis of right carotid artery patient underwent carotid endarterectomy yesterday.  Patient was in the OR and I could not see the patient.  Post operatively patient initial blood pressure was elevated and he was given home medication which included losartan and amlodipine.  His blood pressure improved but patient was noted to have a relative bradycardia through the night especially when he sleeps.  In the morning patient was suspected to have atrial fibrillation but rate was slow.    I suspect patient has tachybradycardia syndrome.  He is in atrial fibrillation.  Heart rate is in 60s.  If patient continues to have persistent bradycardia, patient would need permanent pacemaker.  Avoid any negative chronotropic agents at this stage.    Steve Muhammad MD  12/15/20  14:12 EST

## 2020-12-15 NOTE — PLAN OF CARE
Goal Outcome Evaluation:  Plan of Care Reviewed With: patient  Progress: no change  Outcome Summary: Pt is 81 yo male adm for acute onset of L sided numbness and paresthesias. Determined to be having TIA per neurology w/ symptomatic vascular disease. Hx of cva w/ little residual effect. Underwent R CEA on 12/14. Pt also determined to have sufferred L medial malleolar fx, with potential for ORIF. Pt bradycardic, with potential for pacemaker placement. Pt and spouse moved to [place] from [place] in May 2020, and live in a ground-level apartment with tub-shower combo. At baseline, he is independent with all ADLs and driving. Pt supine in bed upon OT arrival, and demos good strength. Bed mobility with setup. Sit<>Stand min A. Bed<>chair with CGA for balance/safeyt with NWB precautions utilizing RW. No CAM boot in room at this time. Pt requires Min A for LB ADLs secondary to NWB precautions. Based on this assessment, pt safe to return home with spouse and shower chair for bathing. However, pt clinical course to potentially include pacemaker and ORIF ankle.  Will continue to follow pending clinical course. PPE: Gloves, mask, eyewear

## 2020-12-15 NOTE — PAT
Spoke with patient's nurse Marlo, patient needs T&S, MRSA, PTT, PT/INR.  Follow diabetic protocol,

## 2020-12-15 NOTE — PROGRESS NOTES
Name: Alen Ratliff ADMIT: 2020   : 1940  PCP: Serenity Wren MD    MRN: [id number] LOS: 2 days   AGE/SEX: 80 y.o. male  ROOM: /[address] [place] [address]    [place], [facility] [employer]    Chief Complaint   Patient presents with   • Numbness     tingling on left side and face.  Onset 30 minutes ago.      CC: TIA  Subjective     80 y.o. male s/p symptomatic right carotid endarterectomy.  Patient sitting up in bed in [facility] this AM.  Denies any new focal symptoms.  Surgical pain controlled.    Review of Systems  No cardiopulmonary symptoms  No focal neurologic symptoms.    Objective     Scheduled Medications:   amLODIPine, 10 mg, Oral, Daily  aspirin, 81 mg, Oral, Daily  atorvastatin, 80 mg, Oral, Nightly  atropine sulfate, , ,   clopidogrel, 75 mg, Oral, Daily  insulin lispro, 0-9 Units, Subcutaneous, TID AC  levothyroxine, 75 mcg, Oral, Daily  losartan, 50 mg, Oral, Daily  pantoprazole, 40 mg, Oral, Daily  sodium chloride, 3 mL, Intravenous, Q12H        Active Infusions:  DOPamine, 2-20 mcg/kg/min, Last Rate: 2.5 mcg/kg/min (12/15/20 0146)        As Needed Medications:  •  acetaminophen **OR** acetaminophen  •  bisacodyl  •  dextrose  •  dextrose  •  diphenhydrAMINE  •  glucagon (human recombinant)  •  hydrALAZINE  •  HYDROcodone-acetaminophen  •  HYDROmorphone **AND** naloxone  •  influenza vaccine  •  insulin lispro **AND** insulin lispro  •  ondansetron  •  potassium chloride **OR** potassium chloride **OR** potassium chloride  •  sodium chloride  •  sodium chloride    Vital Signs  Vital Signs Patient Vitals for the past 24 hrs:   BP Temp Temp src Pulse Resp SpO2 Weight   12/15/20 0729 -- 98.7 °F (37.1 °C) Oral -- -- -- --   12/15/20 0400 -- 98.3 °F (36.8 °C) Oral -- -- -- 96 kg (211 lb 10.3 oz)   12/15/20 0003 -- 98.4 °F (36.9 °C) Oral -- -- -- --   20 -- -- -- 60 -- -- --   20 142/59 -- -- 55 -- -- --   20 -- -- -- 50 -- -- --   20 (!) 148/34 -- -- 60 --  -- --   12/14/20 2029 -- -- -- 62 -- -- --   12/14/20 2016 -- 97.9 °F (36.6 °C) Oral 68 -- -- --   12/14/20 1927 160/54 -- -- 58 -- 93 % --   12/14/20 1900 -- -- -- 51 -- 93 % --   12/14/20 1633 -- 97.6 °F (36.4 °C) Oral -- -- -- --   12/14/20 1600 -- -- -- 54 -- 92 % --   12/14/20 1530 -- -- -- (!) 49 -- 92 % --   12/14/20 1500 -- -- -- (!) 48 -- 92 % --   12/14/20 1430 -- -- -- 51 -- 95 % --   12/14/20 1415 -- -- -- 55 -- 97 % --   12/14/20 1400 -- -- -- 59 -- 98 % --   12/14/20 1345 -- -- -- 52 -- 96 % --   12/14/20 1330 -- -- -- 52 -- 95 % --   12/14/20 1315 -- -- -- 51 -- 95 % --   12/14/20 1300 -- -- -- 50 -- 95 % --   12/14/20 1245 -- -- -- 52 -- 94 % --   12/14/20 1240 168/60 98.4 °F (36.9 °C) Oral -- 17 93 % 95.9 kg (211 lb 6.7 oz)   12/14/20 1215 136/43 98.2 °F (36.8 °C) -- 54 15 95 % --   12/14/20 1159 136/47 -- -- 52 10 96 % --   12/14/20 1144 126/40 -- -- 51 15 95 % --   12/14/20 1129 150/53 -- -- 54 10 97 % --   12/14/20 1114 140/56 -- -- 54 10 96 % --   12/14/20 1059 138/51 -- -- 54 10 99 % --   12/14/20 1054 135/53 -- -- 55 17 97 % --   12/14/20 1049 119/65 -- -- 55 12 98 % --   12/14/20 1044 126/43 98 °F (36.7 °C) -- 64 10 94 % --     I/O:  I/O last 3 completed shifts:  In: 120 [P.O.:120]  Out: -   BMI:  Body mass index is 28.7 kg/m².    Physical Exam:  Physical Exam   Right cervical incision clean dry and intact.  No hematoma noted.  Tongue is midline.  Upper extremity  strength equal bilaterally.  No hematoma noted.  Left foot pain noted.    Results Review:     CBC    Results from last 7 days   Lab Units 12/15/20  0428 12/13/20  0437 12/11/20  1959   WBC 10*3/mm3 11.70* 7.30 6.90   HEMOGLOBIN g/dL 12.3* 12.2* 14.4   PLATELETS 10*3/mm3 204 179 219     BMP   Results from last 7 days   Lab Units 12/15/20  0428 12/13/20  0437 12/11/20  1959   SODIUM mmol/L 136 141 141   POTASSIUM mmol/L 4.2 3.8 3.4*   CHLORIDE mmol/L 103 109* 107   CO2 mmol/L 20.0* 23.0 21.0*   BUN mg/dL 33* 23 23   CREATININE  mg/dL 1.50* 1.35* 1.16   GLUCOSE mg/dL 170* 142* 133*     Coag   Results from last 7 days   Lab Units 12/11/20 1959   INR  0.95   APTT seconds 22.8*     HbA1C   Lab Results   Component Value Date    HGBA1C 7.1 (H) 12/12/2020    HGBA1C 7.1 (H) 08/27/2020     Infection     Radiology(recent) No radiology results for the last day    Assessment/Plan     Assessment & Plan      Symptomatic carotid artery stenosis, bilateral    Essential hypertension    Mixed hyperlipidemia    Type 2 diabetes mellitus, without long-term current use of insulin (CMS/HCC)    TIA (transient ischemic attack)      80 y.o. male POD #1 symptomatic right carotid endarterectomy.  No new focal neurologic symptoms overnight.  Blood pressure controlled.  Patient currently on dual antiplatelet and statin therapy.    Cardiology following, Dr Muhammad   -Low heart rate overnight    Podiatry following, Dr Vasquez   -oblique displaced lateral malleolus fracture  -cam boot immobilization and minimal weightbearing as needed for transfers  -Operative versus nonoperative    From vascular standpoint  Art line out  Systolic blood pressure goals 150 and below  Okay to downgrade to PCU/SIPS when medically ready      Personal protective equipment used for this patient encounter:  Patient wearing surgical mask []    Provider wearing a surgical mask [x]    Gloves [x]    Eye protection []    Face Shield []    Gown []    N 95 respirator or CAPR/PAPR []       Alba Price PA-C  12/15/20  08:02 EST    Please call my office with any question: [phone]    Active Hospital Problems    Diagnosis  POA   • **Symptomatic carotid artery stenosis, bilateral [I65.23]  Yes   • TIA (transient ischemic attack) [G45.9]  Yes   • Essential hypertension [I10]  Yes   • Mixed hyperlipidemia [E78.2]  Yes   • Type 2 diabetes mellitus, without long-term current use of insulin (CMS/HCC) [E11.9]  Yes      Resolved Hospital Problems   No resolved problems to display.

## 2020-12-15 NOTE — THERAPY EVALUATION
Patient Name: Alen Ratliff  : 1940    MRN: [id number]                              Today's Date: 12/15/2020       Admit Date: 2020    Visit Dx:     ICD-10-CM ICD-9-CM   1. TIA (transient ischemic attack)  G45.9 435.9   2. Stenosis of right carotid artery  I65.21 433.10     Patient Active Problem List   Diagnosis   • Heart murmur   • Stroke (cerebrum) (CMS/HCC)   • Essential hypertension   • Mixed hyperlipidemia   • Acquired hypothyroidism   • Type 2 diabetes mellitus, without long-term current use of insulin (CMS/HCC)   • GERD (gastroesophageal reflux disease)   • Seasonal allergic rhinitis due to pollen   • CKD (chronic kidney disease) stage 3, GFR 30-59 ml/min   • History of prostate cancer   • TIA (transient ischemic attack)   • Symptomatic carotid artery stenosis, bilateral     Past Medical History:   Diagnosis Date   • Allergic rhinitis    • Diabetes (CMS/HCC)    • GERD (gastroesophageal reflux disease)    • Heart murmur    • Hyperlipidemia    • Hypertension    • Hypothyroidism    • Prostate cancer (CMS/HCC)    • Stroke (CMS/HCC)      Past Surgical History:   Procedure Laterality Date   • BACK SURGERY     • CAROTID ENDARTERECTOMY Right 2020    Procedure: CAROTID ENDARTERECTOMY;  Surgeon: Toby Fung MD;  Location: [facility] MAIN OR;  Service: Vascular;  Laterality: Right;   • COLONOSCOPY  2015   • PROSTATECTOMY      due to cancer     General Information     Row Name 12/15/20 1246          OT Time and Intention    Document Type  evaluation  -ES     Mode of Treatment  physical therapy;co-treatment;occupational therapy  -ES     Row Name 12/15/20 1246          General Information    Prior Level of Function  independent:;ADL's;driving  -ES     Existing Precautions/Restrictions  left;non-weight bearing  -ES     Barriers to Rehab  none identified  -ES     Row Name 12/15/20 1246          Occupational Profile    Reason for Services/Referral (Occupational Profile)  Pt is 79 yo  male adm for acute onset of L sided numbness and paresthesias. Determined to be having TIA per neurology w/ symptomatic vascular disease. Hx of cva w/ little residual effect. Underwent R CEA on 12/14. Pt also determined to have sufferred L medial malleolar fx, with potential for ORIF. Pt bradycardic, with potential for pacemaker placement. Pt and spouse moved to [place] from [place] in May 2020, and live in a ground-level apartment with tub-shower combo. At baseline, he is independent with all ADLs and driving.  -ES     Row Name 12/15/20 1246          Living Environment    Lives With  spouse  -ES     Row Name 12/15/20 1246          Home Main Entrance    Number of Stairs, Main Entrance  none  -ES     Stair Railings, Main Entrance  none  -ES     Row Name 12/15/20 1246          Stairs Within Home, Primary    Number of Stairs, Within Home, Primary  none  -ES     Stair Railings, Within Home, Primary  none  -ES     Row Name 12/15/20 1246          Cognition    Orientation Status (Cognition)  oriented x 4  -ES     Row Name 12/15/20 1246          Safety Issues, Functional Mobility    Impairments Affecting Function (Mobility)  balance;endurance/activity tolerance;pain  -ES       User Key  (r) = Recorded By, (t) = Taken By, (c) = Cosigned By    Initials Name Provider Type    Annie Porras OT Occupational Therapist          Mobility/ADL's     Row Name 12/15/20 1253          Bed Mobility    Bed Mobility  bed mobility (all) activities  -ES     All Activities, [place] (Bed Mobility)  supervision  -ES     Row Name 12/15/20 1253          Transfers    Transfers  bed-chair transfer;sit-stand transfer  -ES     Bed-Chair [place] (Transfers)  minimum assist (75% patient effort)  -ES     Assistive Device (Bed-Chair Transfers)  walker, front-wheeled  -ES     Sit-Stand [place] (Transfers)  minimum assist (75% patient effort)  -ES     Row Name 12/15/20 1253          Sit-Stand Transfer    Assistive Device (Sit-Stand  Transfers)  walker, front-wheeled  -ES     Row Name 12/15/20 1253          Functional Mobility    Functional Mobility- Ind. Level  conditional independence  -ES     Row Name 12/15/20 1253          Activities of Daily Living    BADL Assessment/Intervention  upper body dressing;lower body dressing;toileting  -ES     Row Name 12/15/20 1253          Mobility    Extremity Weight-bearing Status  left lower extremity  -ES     Left Lower Extremity (Weight-bearing Status)  non weight-bearing (NWB)  -ES     Row Name 12/15/20 1253          Upper Body Dressing Assessment/Training    [place] Level (Upper Body Dressing)  set up  -ES     Row Name 12/15/20 1253          Lower Body Dressing Assessment/Training    [place] Level (Lower Body Dressing)  minimum assist (75% patient effort)  -ES     Row Name 12/15/20 1253          Toileting Assessment/Training    Comment (Toileting)  Saenz cath  -ES       User Key  (r) = Recorded By, (t) = Taken By, (c) = Cosigned By    Initials Name Provider Type     Annie Koch OT Occupational Therapist        Obj/Interventions     Row Name 12/15/20 1256          Sensory Assessment (Somatosensory)    Sensory Assessment (Somatosensory)  sensation intact  -ES     Row Name 12/15/20 1256          Vision Assessment/Intervention    Visual Impairment/Limitations  WNL  -ES     Row Name 12/15/20 1256          Range of Motion Comprehensive    General Range of Motion  bilateral upper extremity ROM WNL  -ES     Row Name 12/15/20 1256          Strength Comprehensive (MMT)    General Manual Muscle Testing (MMT) Assessment  no strength deficits identified  -ES     Comment, General Manual Muscle Testing (MMT) Assessment  BUE 5/5  -ES     Row Name 12/15/20 1256          Balance    Balance Assessment  sitting static balance;sitting dynamic balance;sit to stand dynamic balance  -ES     Static Sitting Balance  WNL  -ES     Dynamic Sitting Balance  WNL  -ES     Sit to Stand Dynamic Balance  mild  impairment  -ES     Static Standing Balance  mild impairment  -ES     Dynamic Standing Balance  mild impairment  -ES       User Key  (r) = Recorded By, (t) = Taken By, (c) = Cosigned By    Initials Name Provider Type    Annie Porras OT Occupational Therapist        Goals/Plan     Row Name 12/15/20 1312          Bathing Goal 1 (OT)    Activity/Device (Bathing Goal 1, OT)  bathing skills, all  -ES     [place] Level/Cues Needed (Bathing Goal 1, OT)  modified independence  -ES     Time Frame (Bathing Goal 1, OT)  2 weeks  -ES     Row Name 12/15/20 1312          Dressing Goal 1 (OT)    Activity/Device (Dressing Goal 1, OT)  dressing skills, all  -ES     [place]/Cues Needed (Dressing Goal 1, OT)  modified independence  -ES     Time Frame (Dressing Goal 1, OT)  2 weeks  -ES     Row Name 12/15/20 1312          Toileting Goal 1 (OT)    Activity/Device (Toileting Goal 1, OT)  toileting skills, all  -ES     [place] Level/Cues Needed (Toileting Goal 1, OT)  modified independence  -ES     Time Frame (Toileting Goal 1, OT)  2 weeks  -ES     Row Name 12/15/20 1312          Therapy Assessment/Plan (OT)    Planned Therapy Interventions (OT)  activity tolerance training;BADL retraining;functional balance retraining;neuromuscular control/coordination retraining;occupation/activity based interventions;passive ROM/stretching;patient/caregiver education/training;ROM/therapeutic exercise;strengthening exercise  -ES       User Key  (r) = Recorded By, (t) = Taken By, (c) = Cosigned By    Initials Name Provider Type    Annie Porras OT Occupational Therapist        Clinical Impression     Row Name 12/15/20 0931          Pain Scale: Numbers Pre/Post-Treatment    Pretreatment Pain Rating  0/10 - no pain  -ES     Posttreatment Pain Rating  0/10 - no pain  -ES     Row Name 12/15/20 1612          Plan of Care Review    Outcome Summary  Pt is 81 yo male adm for acute onset of L sided numbness and paresthesias.  Determined to be having TIA per neurology w/ symptomatic vascular disease. Hx of cva w/ little residual effect. Underwent R CEA on 12/14. Pt also determined to have sufferred L medial malleolar fx, with potential for ORIF. Pt bradycardic, with potential for pacemaker placement. Pt and spouse moved to [place] from [place] in May 2020, and live in a ground-level apartment with tub-shower combo. At baseline, he is independent with all ADLs and driving. Pt supine in bed upon OT arrival, and demos good strength. Bed mobility with setup. Sit<>Stand min A. Bed<>chair with CGA for balance/safeyt with NWB precautions utilizing RW. No CAM boot in room at this time. Pt requires Min A for LB ADLs secondary to NWB precautions. Based on this assessment, pt safe to return home with spouse and shower chair for bathing. However, pt clinical course to potentially include pacemaker and ORIF ankle.  Will continue to follow pending clinical course. PPE: Gloves, mask, eyewear  -ES     Row Name 12/15/20 1307          Therapy Assessment/Plan (OT)    Therapy Frequency (OT)  3 times/wk  -ES     Row Name 12/15/20 1307          Therapy Plan Review/Discharge Plan (OT)    Anticipated Discharge Disposition (OT)  home with assist  -ES     Row Name 12/15/20 1307          Vital Signs    Pre Systolic BP Rehab  141  -ES     Pre Treatment Diastolic BP  47  -ES     Post Systolic BP Rehab  156  -ES     Post Treatment Diastolic BP  49  -ES     Pretreatment Heart Rate (beats/min)  55  -ES     Posttreatment Heart Rate (beats/min)  60  -ES     Intratreatment Resp Rate (breaths/min)  95  -ES     O2 Delivery Pre Treatment  room air  -ES     Post SpO2 (%)  95  -ES     O2 Delivery Post Treatment  room air  -ES     Pre Patient Position  Supine  -ES     Intra Patient Position  Standing  -ES     Post Patient Position  Sitting  -ES     Row Name 12/15/20 1307          Positioning and Restraints    Pre-Treatment Position  in bed  -ES     Post Treatment Position   chair  -ES     In Chair  call light within reach;encouraged to call for assist;notified nsg  -ES       User Key  (r) = Recorded By, (t) = Taken By, (c) = Cosigned By    Initials Name Provider Type    Annie Porras OT Occupational Therapist        Outcome Measures     Row Name 12/15/20 1312          How much help from another is currently needed...    Putting on and taking off regular lower body clothing?  3  -ES     Bathing (including washing, rinsing, and drying)  3  -ES     Toileting (which includes using toilet bed pan or urinal)  1  -ES     Putting on and taking off regular upper body clothing  4  -ES     Taking care of personal grooming (such as brushing teeth)  4  -ES     Eating meals  4  -ES     AM-PAC 6 Clicks Score (OT)  19  -ES     Row Name 12/15/20 1312          Functional Assessment    Outcome Measure Options  AM-PAC 6 Clicks Daily Activity (OT)  -ES       User Key  (r) = Recorded By, (t) = Taken By, (c) = Cosigned By    Initials Name Provider Type    Annie Porras OT Occupational Therapist        Occupational Therapy Education                 Title: PT OT SLP Therapies (In Progress)     Topic: Occupational Therapy (In Progress)     Point: ADL training (Done)     Description:   Instruct learner(s) on proper safety adaptation and remediation techniques during self care or transfers.   Instruct in proper use of assistive devices.              Learning Progress Summary           Patient Acceptance, E,TB, VU by  at 12/15/2020 1315                   Point: Home exercise program (Not Started)     Description:   Instruct learner(s) on appropriate technique for monitoring, assisting and/or progressing therapeutic exercises/activities.              Learner Progress:  Not documented in this visit.          Point: Precautions (Done)     Description:   Instruct learner(s) on prescribed precautions during self-care and functional transfers.              Learning Progress Summary           Patient  Acceptance, E,TB, VU by  at 12/15/2020 1315                   Point: Body mechanics (Done)     Description:   Instruct learner(s) on proper positioning and spine alignment during self-care, functional mobility activities and/or exercises.              Learning Progress Summary           Patient Acceptance, E,TB, VU by  at 12/15/2020 1315                               User Key     Initials Effective Dates Name Provider Type Discipline    BHAVESH 03/01/19 -  Annie Koch OT Occupational Therapist OT              OT Recommendation and Plan  Planned Therapy Interventions (OT): activity tolerance training, BADL retraining, functional balance retraining, neuromuscular control/coordination retraining, occupation/activity based interventions, passive ROM/stretching, patient/caregiver education/training, ROM/therapeutic exercise, strengthening exercise  Therapy Frequency (OT): 3 times/wk  Plan of Care Review  Outcome Summary: Pt is 79 yo male adm for acute onset of L sided numbness and paresthesias. Determined to be having TIA per neurology w/ symptomatic vascular disease. Hx of cva w/ little residual effect. Underwent R CEA on 12/14. Pt also determined to have sufferred L medial malleolar fx, with potential for ORIF. Pt bradycardic, with potential for pacemaker placement. Pt and spouse moved to [place] from [place] in May 2020, and live in a ground-level apartment with tub-shower combo. At baseline, he is independent with all ADLs and driving. Pt supine in bed upon OT arrival, and demos good strength. Bed mobility with setup. Sit<>Stand min A. Bed<>chair with CGA for balance/safeyt with NWB precautions utilizing RW. No CAM boot in room at this time. Pt requires Min A for LB ADLs secondary to NWB precautions. Based on this assessment, pt safe to return home with spouse and shower chair for bathing. However, pt clinical course to potentially include pacemaker and ORIF ankle.  Will continue to follow pending clinical  course. PPE: Gloves, mask, eyewear     Time Calculation:   Time Calculation- OT     Row Name 12/15/20 1314 12/15/20 1313          Time Calculation- OT    OT Start Time  --  0922  -ES     OT Stop Time  --  0945  -ES     OT Time Calculation (min)  --  23 min  -ES     Total Timed Code Minutes- OT  --  15 minute(s)  -ES     OT Non-Billable Time (min)  --  8 min  -ES     OT Received On  --  12/15/20  -ES     OT - Next Appointment  --  12/17/20  -     OT Goal Re-Cert Due Date  12/29/20  -ES  --       User Key  (r) = Recorded By, (t) = Taken By, (c) = Cosigned By    Initials Name Provider Type    Annie Porras OT Occupational Therapist        Therapy Charges for Today     Code Description Service Date Service Provider Modifiers Qty    [id number]  OT SELF CARE/MGMT/TRAIN EA 15 MIN 12/15/2020 Annie Koch OT GO 1    [id number]  OT EVAL MOD COMPLEXITY 3 12/15/2020 Annie Koch OT GO 1               Annie Koch OT  12/15/2020

## 2020-12-15 NOTE — PLAN OF CARE
Goal Outcome Evaluation:  Plan of Care Reviewed With: patient  Progress: no change  Outcome Summary: 81 yo male adm for acute onset of L sided numbness and paresthesias. Determined to be having TIA per neurology w/ symptomatic vascular disease. Hx of cva w/ little residual effect. Underwent R CEA on 12/14. Pt also determined to have sufferred L medial malleolar fx. ORIF being considered and likely to occur w/in next few days.  Pt now having bradycardia. Cardiology considering pacemaker placement. Pt normally ambulates independently for community distances. He presents w/ normal strength. He comes to standing at a rw w/ min assist and is able to transfer to a chair w/ min assist, maintaining nwb status on LLE. Pt likely to have nwb status on LLE, as well as nwb status on LUE if undergoes pacemaker placement. Recommend home w/ family w/ use of w/c during few weeks that he has to follow pacemaker precautions. If no pacemaker, then no need for w/c. Will definitely need rw for home, regardless. Will follow pt to reassess as he undergoes procedure(s).  PPE: gloves, mask, goggles.

## 2020-12-15 NOTE — PLAN OF CARE
Goal Outcome Evaluation:  Plan of Care Reviewed With: patient  Progress: no change    PT was given the okay to attend surgery for R carotid. HR mid thirties up and do last night. No BP medications were given. 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

## 2020-12-15 NOTE — THERAPY EVALUATION
Patient Name: Alen Ratliff  : 1940    MRN: [id number]                              Today's Date: 12/15/2020       Admit Date: 2020    Visit Dx:     ICD-10-CM ICD-9-CM   1. TIA (transient ischemic attack)  G45.9 435.9   2. Stenosis of right carotid artery  I65.21 433.10     Patient Active Problem List   Diagnosis   • Heart murmur   • Stroke (cerebrum) (CMS/HCC)   • Essential hypertension   • Mixed hyperlipidemia   • Acquired hypothyroidism   • Type 2 diabetes mellitus, without long-term current use of insulin (CMS/HCC)   • GERD (gastroesophageal reflux disease)   • Seasonal allergic rhinitis due to pollen   • CKD (chronic kidney disease) stage 3, GFR 30-59 ml/min   • History of prostate cancer   • TIA (transient ischemic attack)   • Symptomatic carotid artery stenosis, bilateral     Past Medical History:   Diagnosis Date   • Allergic rhinitis    • Diabetes (CMS/HCC)    • GERD (gastroesophageal reflux disease)    • Heart murmur    • Hyperlipidemia    • Hypertension    • Hypothyroidism    • Prostate cancer (CMS/HCC)    • Stroke (CMS/HCC)      Past Surgical History:   Procedure Laterality Date   • BACK SURGERY     • COLONOSCOPY  2015   • PROSTATECTOMY      due to cancer     General Information     Row Name 12/15/20 1153          Physical Therapy Time and Intention    Document Type  evaluation  -CM     Mode of Treatment  physical therapy;co-treatment;occupational therapy  -CM     Row Name 12/15/20 1153          General Information    Patient Profile Reviewed  yes  -CM     Prior Level of Function  independent:;community mobility;gait;driving  -CM     Existing Precautions/Restrictions  left;non-weight bearing to wear cam boot on LLE  -CM     Barriers to Rehab  none identified  -CM     Row Name 12/15/20 1153          Living Environment    Lives With  spouse  -CM     Row Name 12/15/20 1153          Home Main Entrance    Number of Stairs, Main Entrance  none  -CM     Row Name 12/15/20 1158           Stairs Within Home, Primary    Number of Stairs, Within Home, Primary  none  -CM     Row Name 12/15/20 1153          Cognition    Orientation Status (Cognition)  oriented x 4  -CM     Row Name 12/15/20 1153          Safety Issues, Functional Mobility    Impairments Affecting Function (Mobility)  balance;endurance/activity tolerance;pain  -CM       User Key  (r) = Recorded By, (t) = Taken By, (c) = Cosigned By    Initials Name Provider Type    Aundrea Enciso, PT Physical Therapist        Mobility     Row Name 12/15/20 1158          Bed Mobility    Bed Mobility  bed mobility (all) activities  -CM     All Activities, [place] (Bed Mobility)  supervision  -CM     Row Name 12/15/20 1158          Bed-Chair Transfer    Bed-Chair [place] (Transfers)  minimum assist (75% patient effort)  -CM     Assistive Device (Bed-Chair Transfers)  walker, front-wheeled  -CM     Row Name 12/15/20 1158          Sit-Stand Transfer    Sit-Stand [place] (Transfers)  minimum assist (75% patient effort)  -CM     Assistive Device (Sit-Stand Transfers)  walker, front-wheeled  -CM     Row Name 12/15/20 1158          Gait/Stairs (Locomotion)    [place] Level (Gait)  not tested  -CM     Distance in Feet (Gait)  pt fatigues w/ transfer to chair using rw  -CM     Row Name 12/15/20 1158          Mobility    Extremity Weight-bearing Status  left lower extremity  -CM     Left Lower Extremity (Weight-bearing Status)  non weight-bearing (NWB)  -CM       User Key  (r) = Recorded By, (t) = Taken By, (c) = Cosigned By    Initials Name Provider Type    Aundrea Enciso, PT Physical Therapist        Obj/Interventions     Row Name 12/15/20 1159          Range of Motion Comprehensive    General Range of Motion  bilateral upper extremity ROM WNL;lower extremity range of motion deficits identified  -CM     Comment, General Range of Motion  L ankle ROM n/a due to presence of malleolar fx, not yet stabilized  -CM     Row Name  12/15/20 1159          Strength Comprehensive (MMT)    General Manual Muscle Testing (MMT) Assessment  no strength deficits identified  -CM     Row Name 12/15/20 1159          Balance    Balance Assessment  sitting static balance;sitting dynamic balance;standing static balance;standing dynamic balance  -CM     Static Sitting Balance  WNL;unsupported;sitting, edge of bed  -CM     Dynamic Sitting Balance  sitting, edge of bed;unsupported;WNL  -CM     Static Standing Balance  mild impairment;supported;standing  -CM     Dynamic Standing Balance  mild impairment;supported;standing  -CM     Row Name 12/15/20 1159          Sensory Assessment (Somatosensory)    Sensory Assessment (Somatosensory)  other (see comments) has decreased sensation in R ankle from previous cva, as well as intermittent numbness in L ankle  -CM       User Key  (r) = Recorded By, (t) = Taken By, (c) = Cosigned By    Initials Name Provider Type    Aundrea Enciso, PT Physical Therapist        Goals/Plan     Row Name 12/15/20 1212          Bed Mobility Goal 1 (PT)    Activity/Assistive Device (Bed Mobility Goal 1, PT)  bed mobility activities, all  -CM     [place] Level/Cues Needed (Bed Mobility Goal 1, PT)  independent  -CM     Time Frame (Bed Mobility Goal 1, PT)  2 weeks  -CM     Row Name 12/15/20 1212          Transfer Goal 1 (PT)    Activity/Assistive Device (Transfer Goal 1, PT)  transfers, all;walker, rolling;other (see comments) rw if no pacemaker precautions  -CM     [place] Level/Cues Needed (Transfer Goal 1, PT)  supervision required  -CM     Time Frame (Transfer Goal 1, PT)  2 weeks  -CM     Row Name 12/15/20 1212          Gait Training Goal 1 (PT)    Activity/Assistive Device (Gait Training Goal 1, PT)  gait (walking locomotion);walker, rolling  -CM     [place] Level (Gait Training Goal 1, PT)  minimum assist (75% or more patient effort)  -CM     Distance (Gait Training Goal 1, PT)  25 ft, IF no pacemaker precautions in  place  -     Time Frame (Gait Training Goal 1, PT)  2 weeks  -       User Key  (r) = Recorded By, (t) = Taken By, (c) = Cosigned By    Initials Name Provider Type    Aundrea Enciso, PT Physical Therapist        Clinical Impression     Row Name 12/15/20 1201          Pain    Additional Documentation  Pain Scale: Numbers Pre/Post-Treatment (Group)  -     Row Name 12/15/20 1201          Pain Scale: Numbers Pre/Post-Treatment    Pretreatment Pain Rating  0/10 - no pain  -CM     Posttreatment Pain Rating  0/10 - no pain  -CM     Pain Location - Side  Left  -CM     Pain Location  ankle  -CM     Pre/Posttreatment Pain Comment  reports only soreness in L ankle; has had pain meds  -CM     Pain Intervention(s)  Medication (See MAR);Repositioned;Emotional support  -     Row Name 12/15/20 1201          Plan of Care Review    Plan of Care Reviewed With  patient  -CM     Outcome Summary  79 yo male adm for acute onset of L sided numbness and paresthesias. Determined to be having TIA per neurology w/ symptomatic vascular disease. Hx of cva w/ little residual effect. Underwent R CEA on 12/14. Pt also determined to have sufferred L medial malleolar fx. ORIF being considered and likely to occur w/in next few days.  Pt now having bradycardia. Cardiology considering pacemaker placement. Pt normally ambulates independently for community distances. He presents w/ normal strength. He comes to standing at a rw w/ min assist and is able to transfer to a chair w/ min assist, maintaining nwb status on LLE. Pt likely to have nwb status on LLE, as well as nwb status on LUE if undergoes pacemaker placement. Recommend home w/ family w/ use of w/c during few weeks that he has to follow pacemaker precautions. If no pacemaker, then no need for w/c. Will definitely need rw for home, regardless. Will follow pt to reassess as he undergoes procedure(s).  PPE: gloves, mask, goggles.  -     Row Name 12/15/20 1201          Therapy  Assessment/Plan (PT)    Rehab Potential (PT)  good, to achieve stated therapy goals  -CM     Criteria for Skilled Interventions Met (PT)  yes;meets criteria;skilled treatment is necessary  -CM     Predicted Duration of Therapy Intervention (PT)  until d/c or goals met  -     Row Name 12/15/20 1201          Vital Signs    Pre Systolic BP Rehab  141  -CM     Pre Treatment Diastolic BP  47  -CM     Post Systolic BP Rehab  156  -CM     Post Treatment Diastolic BP  49  -CM     Pretreatment Heart Rate (beats/min)  55  -CM     Posttreatment Heart Rate (beats/min)  60  -CM     Pre SpO2 (%)  95  -CM     O2 Delivery Pre Treatment  room air  -CM     O2 Delivery Intra Treatment  room air  -CM     Post SpO2 (%)  95  -CM     O2 Delivery Post Treatment  room air  -CM     Row Name 12/15/20 1201          Positioning and Restraints    Pre-Treatment Position  in bed  -CM     Post Treatment Position  chair  -CM     In Chair  notified nsg;sitting;call light within reach;encouraged to call for assist declined to have LEs elevated  -CM       User Key  (r) = Recorded By, (t) = Taken By, (c) = Cosigned By    Initials Name Provider Type    Aundrea Enciso, PT Physical Therapist        Outcome Measures     Row Name 12/15/20 1214          Modified [place] Scale    Pre-Stroke Modified [place] Scale  1 - No significant disability despite symptoms.  Able to carry out all usual duties and activities.  -CM     Modified [place] Scale  5 - Severe disability.  Bedridden, incontinent, and requiring constant nursing care and attention.  -     Row Name 12/15/20 1214          Functional Assessment    Outcome Measure Options  Modified [place]  -CM       User Key  (r) = Recorded By, (t) = Taken By, (c) = Cosigned By    Initials Name Provider Type    Aundrea Enciso PT Physical Therapist        Physical Therapy Education                 Title: PT OT SLP Therapies (Done)     Topic: Physical Therapy (Done)     Point: Mobility training (Done)      Learning Progress Summary           Patient Acceptance, E,TB, VU,DU by CM at 12/15/2020 1214                   Point: Precautions (Done)     Learning Progress Summary           Patient Acceptance, E,TB, VU,DU by LAVELLE at 12/15/2020 1214                               User Key     Initials Effective Dates Name Provider Type Discipline    LAVELLE 03/01/19 -  Aundrea Alejandre, PT Physical Therapist PT              PT Recommendation and Plan  Planned Therapy Interventions (PT): balance training, bed mobility training, gait training, home exercise program, patient/family education, postural re-education, transfer training, strengthening  Plan of Care Reviewed With: patient  Outcome Summary: 79 yo male adm for acute onset of L sided numbness and paresthesias. Determined to be having TIA per neurology w/ symptomatic vascular disease. Hx of cva w/ little residual effect. Underwent R CEA on 12/14. Pt also determined to have sufferred L medial malleolar fx. ORIF being considered and likely to occur w/in next few days.  Pt now having bradycardia. Cardiology considering pacemaker placement. Pt normally ambulates independently for community distances. He presents w/ normal strength. He comes to standing at a rw w/ min assist and is able to transfer to a chair w/ min assist, maintaining nwb status on LLE. Pt likely to have nwb status on LLE, as well as nwb status on LUE if undergoes pacemaker placement. Recommend home w/ family w/ use of w/c during few weeks that he has to follow pacemaker precautions. If no pacemaker, then no need for w/c. Will definitely need rw for home, regardless. Will follow pt to reassess as he undergoes procedure(s).  PPE: gloves, mask, goggles.     Time Calculation:   PT Charges     Row Name 12/15/20 1215             Time Calculation    Start Time  0923  -CM      Stop Time  0943  -CM      Time Calculation (min)  20 min  -CM      PT Received On  12/15/20  -CM      PT - Next Appointment  12/17/20  -CM      PT Goal  Re-Cert Due Date  12/29/20  -LAVELLE         Time Calculation- PT    Total Timed Code Minutes- PT  0 minute(s)  -CM        User Key  (r) = Recorded By, (t) = Taken By, (c) = Cosigned By    Initials Name Provider Type    Aundrea Enciso, PT Physical Therapist        Therapy Charges for Today     Code Description Service Date Service Provider Modifiers Qty    06982908498 HC PT EVAL HIGH COMPLEXITY 4 12/15/2020 Aundrea Alejandre, PT GP 1          PT G-Codes  Outcome Measure Options: Modified [place]  Modified [place] Scale: 5 - Severe disability.  Bedridden, incontinent, and requiring constant nursing care and attention.    Aundrea Alejandre, PT  12/15/2020

## 2020-12-15 NOTE — PROGRESS NOTES
PULMONARY/CRITICAL CARE PROGRESS NOTE         Name:  Alen Ratliff  Age: 80 y.o.  Sex:  male  :   1940  MRN:  [id number]     ROOM:  [facility]      ASSESSMENT & PLAN     F/U: Medical management in ICU    Principal Problem:    Symptomatic carotid artery stenosis, bilateral  Active Problems:    Essential hypertension    Mixed hyperlipidemia    Type 2 diabetes mellitus, without long-term current use of insulin (CMS/[facility])    TIA (transient ischemic attack)    Bradycardia noted, patient did not tolerate dopamine due to nausea, cardiology were consulted    Severe, symptomatic right carotid stenosis  TIA with history of CVA  Left lateral malleolus fracture  Essential hypertension  CKD  Hyperlipidemia  Diabetes mellitus  Hypothyroidism    Multidisciplinary Rounds:  -Brief symptomatic bradycardia overnight; attempted Dopamine gtt, experienced nausea and was discontinued.  -Cardiology consulted, awaiting recommendations  -If ok with Cardiology, consider downgrade to PCU; await approval of Cards and Vasc. Sx.    PLAN:  S/p right carotid endarterectomy  Blood pressure control, Cardene if needed to maintain an SBP <180  Vascular surgery following closely  Frequent neuro checks per protocol  Continue amlodipine, losartan  Continue levothyroxine  Continue aspirin, statin, Plavix  Podiatry consulted, left lower extremity immobilization pending further surgical evaluation  Neurology following  Cardiology following     Code Status: Full code  VTE Prophylaxis: Defer to surgery  PUD Prophylaxis: Protonix  Sliding-scale insulin for tight glycemic control      [place] & SUBJECTIVE     The patient initially presented to the emergency department for evaluation of left arm and left hand numbness, left face numbness, and left leg numbness.  He reports that his symptoms started the previous night and that when he stood from a sitting position he became lightheaded and lost his balance resulting in a fall with pain to his  left ankle.  He reported resolution of the lateralizing numbness however continued to complain of left ankle pain related to the fall.  He denied headache, confusion, slurred speech, lateralizing deficits, blurred vision.  He denies that he has recently experienced fever, chills, nausea, vomiting, diarrhea, cough, expectoration, shortness of air or chest pain.  He does report a history of stroke approximately 4 years ago for which he has been on aspirin and Plavix and reports good compliance.  His lateralizing deficits had no relieving or exacerbating factors.  Weight and movement exacerbated the pain in his left ankle which was relieved somewhat at rest.     The patient had a neurology work-up with the diagnosis of acute TIA.  CT of the head was negative for acute hemorrhage or mass-effect.  Nonacute lacunar infarct in the right thalamus.  CTA of the head and neck revealed critical right ICA stenosis as well as left ICA stenosis.  MRI was negative for acute intracranial findings.  Left ankle x-ray revealed oblique fracture of the distal fibula with mild lateral displacement with suspicion of minimally displaced fracture of the posterior malleolus.  Following admission the patient had a vascular surgery consult with plan for right carotid endarterectomy for severe/symptomatic right carotid stenosis.  Underwent cardiology evaluation and was found to be an acceptable candidate for surgery.  Today the patient underwent an elective right carotid endarterectomy.  The case went as expected and the patient was promptly extubated after surgery.  He has been admitted to the ICU for close monitoring and further postoperative medical management.    12/15: No general complaints, reported some dizziness overnight; no further N/V/D, no vision changes, CP, SOA      MEDICATIONS     SCHEDULED MEDICATIONS:   amLODIPine, 10 mg, Oral, Daily  aspirin, 81 mg, Oral, Daily  atorvastatin, 80 mg, Oral, Nightly  atropine sulfate, , ,  "  clopidogrel, 75 mg, Oral, Daily  insulin lispro, 0-9 Units, Subcutaneous, TID AC  levothyroxine, 75 mcg, Oral, Daily  losartan, 50 mg, Oral, Daily  pantoprazole, 40 mg, Oral, Daily  sodium chloride, 3 mL, Intravenous, Q12H        CONTINUOUS INFUSIONS:        PRN MEDS:  •  acetaminophen **OR** acetaminophen  •  bisacodyl  •  dextrose  •  dextrose  •  diphenhydrAMINE  •  glucagon (human recombinant)  •  hydrALAZINE  •  HYDROcodone-acetaminophen  •  HYDROmorphone **AND** naloxone  •  influenza vaccine  •  insulin lispro **AND** insulin lispro  •  ondansetron  •  potassium chloride **OR** potassium chloride **OR** potassium chloride  •  sodium chloride  •  sodium chloride      OBJECTIVE     VITAL SIGNS:  /59   Pulse 60   Temp 98.7 °F (37.1 °C) (Oral)   Resp 17   Ht 182.9 cm (72\")   Wt 96 kg (211 lb 10.3 oz)   SpO2 93%   BMI 28.70 kg/m²     I/O FROM PREVIOUS 3 SHIFTS:  I/O last 3 completed shifts:  In: 120 [P.O.:120]  Out: -     I/O THIS SHIFT:  I/O this shift:  In: 240 [P.O.:240]  Out: -     PHYSICAL EXAM:  Constitutional:  Well developed, well nourished, no acute distress, non-toxic appearance   Eyes:  PERRL, conjunctiva normal, EOMI   HENT:  Atraumatic, external ears normal, nose normal. Neck-somewhat limited movement postoperatively.  Incision well approximated  Respiratory: Slightly diminished bases but clear, non-labored respirations without accessory muscle use  Cardiovascular: Sinus bradycardia, systolic murmur, no gallops, no rubs   GI:  Soft, nondistended, normal bowel sounds, nontender, no rebound or guarding  : Deferred  Musculoskeletal: Swelling and tenderness to the left ankle, no clubbing or cyanosis  Integument:  Well hydrated, no rash   Neurologic: A&Ox3.  Upper extremities with no lateralizing deficits.  Decreased movement of the left lower extremity secondary to fracture.  Psychiatric: Speech and behavior appropriate      RESULTS     LABS:  Lab Results (last 24 hours)     Procedure " Component Value Units Date/Time    Calcium, Ionized [[id number]]  (Abnormal) Collected: 12/15/20 0846    Specimen: Blood Updated: 12/15/20 0940     Ionized Calcium 1.17 mmol/L     Magnesium [[id number]]  (Abnormal) Collected: 12/15/20 0428    Specimen: Blood Updated: 12/15/20 0906     Magnesium 1.4 mg/dL     Phosphorus [[id number]]  (Normal) Collected: 12/15/20 0428    Specimen: Blood Updated: 12/15/20 0906     Phosphorus 4.0 mg/dL     POC Glucose Once [[id number]]  (Abnormal) Collected: 12/15/20 0727    Specimen: Blood Updated: 12/15/20 0729     Glucose 163 mg/dL      Comment: Serial Number: [id number]:  [id number]       Basic Metabolic Panel [[id number]]  (Abnormal) Collected: 12/15/20 0428    Specimen: Blood Updated: 12/15/20 0520     Glucose 170 mg/dL      BUN 33 mg/dL      Creatinine 1.50 mg/dL      Sodium 136 mmol/L      Potassium 4.2 mmol/L      Chloride 103 mmol/L      CO2 20.0 mmol/L      Calcium 8.4 mg/dL      eGFR Non African Amer 45 mL/min/1.73      BUN/Creatinine Ratio 22.0     Anion Gap 13.0 mmol/L     Narrative:      GFR Normal >60  Chronic Kidney Disease <60  Kidney Failure <15      CBC & Differential [[id number]]  (Abnormal) Collected: 12/15/20 0428    Specimen: Blood Updated: 12/15/20 0515    Narrative:      The following orders were created for panel order CBC & Differential.  Procedure                               Abnormality         Status                     ---------                               -----------         ------                     CBC Auto Differential[[id number]]        Abnormal            Final result                 Please view results for these tests on the individual orders.    CBC Auto Differential [[id number]]  (Abnormal) Collected: 12/15/20 0428    Specimen: Blood Updated: 12/15/20 0515     WBC 11.70 10*3/mm3      RBC 4.09 10*6/mm3      Hemoglobin 12.3 g/dL      Hematocrit 37.4 %      MCV 91.5 fL      MCH 30.1 pg      MCHC 32.9 g/dL      RDW 14.6 %      RDW-SD 46.4 fl       MPV 8.7 fL      Platelets 204 10*3/mm3      Neutrophil % 75.4 %      Lymphocyte % 9.1 %      Monocyte % 14.2 %      Eosinophil % 0.2 %      Basophil % 1.1 %      Neutrophils, Absolute 8.80 10*3/mm3      Lymphocytes, Absolute 1.10 10*3/mm3      Monocytes, Absolute 1.70 10*3/mm3      Eosinophils, Absolute 0.00 10*3/mm3      Basophils, Absolute 0.10 10*3/mm3      nRBC 0.1 /100 WBC     POC Glucose Once [[id number]]  (Abnormal) Collected: 12/14/20 1931    Specimen: Blood Updated: 12/14/20 1936     Glucose 190 mg/dL      Comment: Serial Number: [id number]:  [id number]       POC Glucose Once [[id number]]  (Abnormal) Collected: 12/14/20 1633    Specimen: Blood Updated: 12/14/20 1639     Glucose 170 mg/dL      Comment: Serial Number: [id number]:  [id number]       POC Glucose Once [[id number]]  (Abnormal) Collected: 12/14/20 1050    Specimen: Blood Updated: 12/14/20 1053     Glucose 162 mg/dL      Comment: Serial Number: [id number]:  [id number]              RADIOLOGY:  No Radiology Exams Resulted Within Past 24 Hours    ECHOCARDIOGRAM:  Results for orders placed during the hospital encounter of 12/11/20   Adult Transthoracic Echo Complete W/ Cont if Necessary Per Protocol (With Agitated Saline)    Narrative · Estimated left ventricular EF = 65% Left ventricular systolic function   is normal.  · Left ventricular diastolic dysfunction is noted.  · The right atrial cavity is mildly dilated.  · Moderate aortic valve stenosis is present.  · Estimated right ventricular systolic pressure from tricuspid   regurgitation is moderately elevated (45-55 mmHg).           I reviewed the patient's new clinical results.    This note has been scribed by me for pulmonary attending physician.    Electronically signed by LARRY Eng, 12/15/20 at 10:44 EST.

## 2020-12-15 NOTE — PROGRESS NOTES
[facility] Medicine Services Daily Progress Note      Hospitalist Team  LOS 2 days      Patient Care Team:  Serenity Wren MD as PCP - General (Family Medicine)  William Patricio MD as Consulting Physician (Neurology)    Patient Location: 2202/1      Subjective   Subjective   No complaints or events overnight  Chief Complaint / Subjective  Chief Complaint   Patient presents with   • Numbness     tingling on left side and face.  Onset 30 minutes ago.          Brief Synopsis of Hospital Course/HPI  80-year-old male with known history of hypertension,  CVA, T2DM, hyperlipidemia, prostate cancer, hypothyroidism, and GERD.  He presented to [facility] ED complaining of left arm/left facial numbness.  Denies dysphagia, no dysarthria, slurred speech or speech impediment.  No reported headache, visual changes, no chest pain, shortness of breath dizziness or lightheadedness.  He denies fever, chills, no abdominal pain, nausea or vomiting.  .     On presentation, he was hypertensive.  CT of the head without contrast showed no acute intracranial finding, hemorrhage,but  revealed lacunar infarct in the right thalamus which appears to be chronic.  CTA of the head and neck with finding of left ICA with 50% stenosis and 80% stenosis on the right ICA.  Follow-up MRI of the brain on 12/20/2020 showed no definite H acute infarct or other intracranial abnormalities.  Laboratory notable for mild hypokalemia, otherwise, unremarkable.  X-ray showed no acute cardiopulmonary finding. He is admitted for suspected TIA versus ischemic stroke, and bilateral SIVAN.  He was maintained on stroke protocol with dual antiplatelet with aspirin/Plavix and neurologist/vascular surgeon seen in consultation. During hospitalization,  he complained of left ankle pain.  X-ray of the ankle obtained revealed fracture of the distal fibula and malleolus, most likely due to mechanical fall at home.  Vascular surgeon is recommending  "endarterectomy and will follow with orthopedic surgeon for evaluation and recommendation.    12/14/2020: Underwent right carotid endarterectomy by Dr. Kamran Fung      Objective   Objective    Seen and examined in follow-up evaluation.  Vital Signs  Temp:  [97.9 °F (36.6 °C)-98.7 °F (37.1 °C)] 98.5 °F (36.9 °C)  Heart Rate:  [47-68] 59  BP: (114-160)/(32-59) 126/49  Arterial Line BP: (134-169)/(51-71) 156/64  Oxygen Therapy  SpO2: 94 %  Pulse Oximetry Type: Continuous  Device (Oxygen Therapy): room air  Flow (L/min): 2  Flowsheet Rows      First Filed Value   Admission Height  175.3 cm (69\") Documented at 12/11/2020 1959   Admission Weight  95.7 kg (211 lb) Documented at 12/11/2020 1959        Intake & Output (last 3 days)       12/12 0701 - 12/13 0700 12/13 0701 - 12/14 0700 12/14 0701 - 12/15 0700 12/15 0701 - 12/16 0700    P.O. 720 680 120 480    Total Intake(mL/kg) 720 (7.7) 680 (7) 120 (1.3) 480 (5)    Urine (mL/kg/hr) 400 (0.2)       Total Output 400       Net +320 +680 +120 +480            Urine Unmeasured Occurrence  1 x          Lines, Drains & Airways    Active LDAs     Name:   Placement date:   Placement time:   Site:   Days:    Peripheral IV 09/24/20 0830 Anterior;Distal;Right;Upper Arm   09/24/20    0830    Arm   82    Peripheral IV 12/14/20 0656 Anterior;Left Forearm   12/14/20    0656    Forearm   1    Arterial Line 12/14/20 Left Radial   12/14/20    0755 created via procedure documentation    Radial   1              Physical Exam  Constitutional:       Appearance: Normal appearance. He is obese.   HENT:      Head: Normocephalic and atraumatic.      Right Ear: External ear normal.      Left Ear: External ear normal.      Nose: Nose normal.      Mouth/Throat:      Mouth: Mucous membranes are moist.   Eyes:      Extraocular Movements: Extraocular movements intact.   Neck: Right endarterectomy site is clean with no redness or drainage     Musculoskeletal: Normal range of motion and neck supple. "   Cardiovascular:      Rate and Rhythm: Normal rate and regular rhythm.      Pulses: Normal pulses.      Heart sounds: Murmur present.   Pulmonary:      Effort: Pulmonary effort is normal.      Breath sounds: Normal breath sounds.   Abdominal:      Palpations: Abdomen is soft.   Genitourinary:     Comments: deferred  Musculoskeletal: Normal range of motion.   Skin: Left ankle slightly swollen with decreased range of motion due to pain     General: Skin is warm and dry.   Neurological:      General: No focal deficit present.      Mental Status: He is alert and oriented to person, place, and time. Mental status is at baseline.   Psychiatric:         Mood and Affect: Mood normal.         Behavior: Behavior normal.         Thought Content: Thought content normal.           Wounds (last 24 hours)      LDA Wound     Row Name 12/15/20 1200 12/15/20 0800 12/15/20 0400       Wound 12/14/20 0830 Right neck    Wound - Properties Group Placement Date: 12/14/20  -CW Placement Time: 0830 -CW Present on Hospital Admission: N  -CW Side: Right  -CW Location: neck  -CW    Dressing Appearance  --  intact;open to air  -MC  --    Closure  Approximated;Liquid skin adhesive  -MC  Approximated;Liquid skin adhesive  -MC  Liquid skin adhesive  -NH    Base  clean;dry  -MC  clean;dry  -MC  --    Drainage Amount  none  -MC  none  -MC  none  -NH    Dressing Care  open to air  -MC  open to air  -MC  --    Retired Wound - Properties Group Date first assessed: 12/14/20  -CW Time first assessed: 0830 -CW Present on Hospital Admission: N  -CW Side: Right  -CW Location: neck  -CW    Row Name 12/15/20 0030 12/14/20 2030          Wound 12/14/20 0830 Right neck    Wound - Properties Group Placement Date: 12/14/20  -CW Placement Time: 0830 -CW Present on Hospital Admission: N  -CW Side: Right  -CW Location: neck  -CW    Dressing Appearance  --  dry;intact;open to air  -NH     Closure  Liquid skin adhesive  -NH  Liquid skin adhesive  -NH     Drainage  Amount  none  -NH  none  -NH     Dressing Care  --  open to air  -NH     Retired Wound - Properties Group Date first assessed: 12/14/20  -CW Time first assessed: 0830  -CW Present on Hospital Admission: N  -CW Side: Right  -CW Location: neck  -CW      User Key  (r) = Recorded By, (t) = Taken By, (c) = Cosigned By    Initials Name Provider Type    CW Shelia Calderon, RN Registered Nurse    Dylon Duenas, RN Registered Nurse    Karley Pratt, RN Registered Nurse          Procedures:    Procedure(s):  CAROTID ENDARTERECTOMY          Results Review:     I reviewed the patient's new clinical results.      Lab Results (last 24 hours)     Procedure Component Value Units Date/Time    Extra Tubes [[id number]] Collected: 12/15/20 1631    Specimen: Blood, Venous Line Updated: 12/15/20 1647    Narrative:      The following orders were created for panel order Extra Tubes.  Procedure                               Abnormality         Status                     ---------                               -----------         ------                     Lavender Top[[id number]]                                     In process                 Gold Top - SST[[id number]]                                   In process                 Green Top (Gel)[[id number]]                                  In process                   Please view results for these tests on the individual orders.    Gold Top - SST [[id number]] Collected: 12/15/20 1631    Specimen: Blood Updated: 12/15/20 1647    Green Top (Gel) [[id number]] Collected: 12/15/20 1631    Specimen: Blood Updated: 12/15/20 1647    Lavender Top [[id number]] Collected: 12/15/20 1631    Specimen: Blood Updated: 12/15/20 1647    Protime-INR [[id number]] Collected: 12/15/20 1631    Specimen: Blood Updated: 12/15/20 1646    aPTT [[id number]] Collected: 12/15/20 1631    Specimen: Blood Updated: 12/15/20 1646    POC Glucose Once [[id number]]  (Abnormal) Collected: 12/15/20 1634    Specimen: Blood Updated:  12/15/20 1637     Glucose 178 mg/dL      Comment: Serial Number: [id number]:  [id number]       POC Activated Clotting Time [[id number]]  (Abnormal) Collected: 12/14/20 0912    Specimen: Arterial Blood Updated: 12/15/20 1251     Activated Clotting Time  241 Seconds      Comment: Serial Number: [id number]:  [id number]       POC Activated Clotting Time [[id number]]  (Normal) Collected: 12/14/20 0822    Specimen: Arterial Blood Updated: 12/15/20 1250     Activated Clotting Time  136 Seconds      Comment: Serial Number: [id number]:  [id number]       POC Glucose Once [[id number]]  (Abnormal) Collected: 12/15/20 1222    Specimen: Blood Updated: 12/15/20 1229     Glucose 146 mg/dL      Comment: Serial Number: [id number]:  [id number]       Calcium, Ionized [[id number]]  (Abnormal) Collected: 12/15/20 0846    Specimen: Blood Updated: 12/15/20 0940     Ionized Calcium 1.17 mmol/L     Magnesium [[id number]]  (Abnormal) Collected: 12/15/20 0428    Specimen: Blood Updated: 12/15/20 0906     Magnesium 1.4 mg/dL     Phosphorus [[id number]]  (Normal) Collected: 12/15/20 0428    Specimen: Blood Updated: 12/15/20 0906     Phosphorus 4.0 mg/dL     POC Glucose Once [[id number]]  (Abnormal) Collected: 12/15/20 0727    Specimen: Blood Updated: 12/15/20 0729     Glucose 163 mg/dL      Comment: Serial Number: [id number]:  [id number]       Basic Metabolic Panel [[id number]]  (Abnormal) Collected: 12/15/20 0428    Specimen: Blood Updated: 12/15/20 0520     Glucose 170 mg/dL      BUN 33 mg/dL      Creatinine 1.50 mg/dL      Sodium 136 mmol/L      Potassium 4.2 mmol/L      Chloride 103 mmol/L      CO2 20.0 mmol/L      Calcium 8.4 mg/dL      eGFR Non African Amer 45 mL/min/1.73      BUN/Creatinine Ratio 22.0     Anion Gap 13.0 mmol/L     Narrative:      GFR Normal >60  Chronic Kidney Disease <60  Kidney Failure <15      CBC & Differential [[id number]]  (Abnormal) Collected: 12/15/20 0428    Specimen: Blood Updated: 12/15/20 0515     Narrative:      The following orders were created for panel order CBC & Differential.  Procedure                               Abnormality         Status                     ---------                               -----------         ------                     CBC Auto Differential[[id number]]        Abnormal            Final result                 Please view results for these tests on the individual orders.    CBC Auto Differential [[id number]]  (Abnormal) Collected: 12/15/20 0428    Specimen: Blood Updated: 12/15/20 0515     WBC 11.70 10*3/mm3      RBC 4.09 10*6/mm3      Hemoglobin 12.3 g/dL      Hematocrit 37.4 %      MCV 91.5 fL      MCH 30.1 pg      MCHC 32.9 g/dL      RDW 14.6 %      RDW-SD 46.4 fl      MPV 8.7 fL      Platelets 204 10*3/mm3      Neutrophil % 75.4 %      Lymphocyte % 9.1 %      Monocyte % 14.2 %      Eosinophil % 0.2 %      Basophil % 1.1 %      Neutrophils, Absolute 8.80 10*3/mm3      Lymphocytes, Absolute 1.10 10*3/mm3      Monocytes, Absolute 1.70 10*3/mm3      Eosinophils, Absolute 0.00 10*3/mm3      Basophils, Absolute 0.10 10*3/mm3      nRBC 0.1 /100 WBC     POC Glucose Once [[id number]]  (Abnormal) Collected: 12/14/20 1931    Specimen: Blood Updated: 12/14/20 1936     Glucose 190 mg/dL      Comment: Serial Number: [id number]:  [id number]           No results found for: HGBA1C  Results from last 7 days   Lab Units 12/11/20 1959   INR  0.95           No results found for: LIPASE  Lab Results   Component Value Date    CHOL 162 12/12/2020    TRIG 93 12/12/2020    HDL 48 12/12/2020    LDL 97 12/12/2020       No results found for: INTRAOP, PREDX, FINALDX, COMDX    Microbiology Results (last 10 days)     Procedure Component Value - Date/Time    COVID PRE-OP / PRE-PROCEDURE SCREENING ORDER (NO ISOLATION) - Swab, Nasopharynx [[id number]]  (Normal) Collected: 12/12/20 1817    Lab Status: Final result Specimen: Swab from Nasopharynx Updated: 12/12/20 1915    Narrative:      The following  orders were created for panel order COVID PRE-OP / PRE-PROCEDURE SCREENING ORDER (NO ISOLATION) - Swab, Nasopharynx.  Procedure                               Abnormality         Status                     ---------                               -----------         ------                     COVID-19,CEPHEID,COR/LORI...[[id number]]  Normal              Final result                 Please view results for these tests on the individual orders.    COVID-19,CEPHEID,COR/LORI/PAD IN-HOUSE(OR EMERGENT/ADD-ON),NP SWAB IN TRANSPORT MEDIA 3-4 HR TAT - Swab, Nasopharynx [[id number]]  (Normal) Collected: 12/12/20 1817    Lab Status: Final result Specimen: Swab from Nasopharynx Updated: 12/12/20 1915     COVID19 Not Detected    Narrative:      Fact sheet for providers: https://www.fda.gov/media/721186/download     Fact sheet for patients: https://www.fda.gov/media/377529/download          ECG/EMG Results (most recent)     Procedure Component Value Units Date/Time    ECG 12 Lead [[id number]] Collected: 12/11/20 2017     Updated: 12/11/20 2022     QT Interval 383 ms     Narrative:      HEART RATE= 75  bpm  RR Interval= 804  ms  NM Interval= 92  ms  P Horizontal Axis= 221  deg  P Front Axis= 0  deg  QRSD Interval= 113  ms  QT Interval= 383  ms  QRS Axis= -37  deg  T Wave Axis= 166  deg  - ABNORMAL ECG -  Sinus rhythm  Repol abnrm suggests ischemia, diffuse leads  No previous ECG available for comparison  Electronically Signed By:   Date and Time of Study: 2020-12-11 20:17:32    Adult Transthoracic Echo Complete W/ Cont if Necessary Per Protocol (With Agitated Saline) [[id number]] Collected: 12/12/20 0905     Updated: 12/12/20 1313     BSA 2.2 m^2      RVIDd 2.8 cm      IVSd 1.1 cm      LVIDd 4.9 cm      LVIDs 3.4 cm      LVPWd 1.2 cm      IVS/LVPW 0.95     FS 30.8 %      EDV(Teich) 114.3 ml      ESV(Teich) 47.7 ml      EF(Teich) 58.3 %      EDV(cubed) 119.7 ml      ESV(cubed) 39.6 ml      EF(cubed) 66.9 %      LV mass(C)d 223.3 grams       LV mass(C)dI 103.3 grams/m^2      SV(Teich) 66.6 ml      SI(Teich) 30.8 ml/m^2      SV(cubed) 80.1 ml      SI(cubed) 37.0 ml/m^2      Ao root diam 3.3 cm      Ao root area 8.5 cm^2      ACS 1.9 cm      asc Aorta Diam 2.9 cm      LVOT diam 2.1 cm      LVOT area 3.3 cm^2      RVOT diam 2.2 cm      RVOT area 4.0 cm^2      EDV(MOD-sp4) 69.0 ml      ESV(MOD-sp4) 24.3 ml      EF(MOD-sp4) 64.8 %      SV(MOD-sp4) 44.7 ml      SI(MOD-sp4) 20.7 ml/m^2      Ao root area (BSA corrected) 1.5     LV Delgado Vol (BSA corrected) 31.9 ml/m^2      LV Sys Vol (BSA corrected) 11.2 ml/m^2      Aortic R-R 1.1 sec      Aortic HR 56.3 BPM      MV E max britney 96.8 cm/sec      MV A max britney 108.4 cm/sec      MV E/A 0.89     MV V2 max 125.8 cm/sec      MV max PG 6.3 mmHg      MV V2 mean 79.9 cm/sec      MV mean PG 2.8 mmHg      MV V2 VTI 33.7 cm      MVA(VTI) 3.4 cm^2      MV dec slope 435.1 cm/sec^2      MV dec time 0.22 sec      Ao pk britney 340.0 cm/sec      Ao max PG 46.2 mmHg      Ao max PG (full) 37.9 mmHg      Ao V2 mean 238.7 cm/sec      Ao mean PG 25.3 mmHg      Ao mean PG (full) 20.6 mmHg      Ao V2 VTI 74.5 cm      SAL(I,A) 1.5 cm^2      SAL(I,D) 1.5 cm^2      SAL(V,A) 1.4 cm^2      SAL(V,D) 1.4 cm^2      AI max britney 402.5 cm/sec      AI max PG 64.8 mmHg      AI dec slope 249.5 cm/sec^2      AI dec time 1.6 sec      AI P1/2t 472.5 msec      LV V1 max PG 8.3 mmHg      LV V1 mean PG 4.7 mmHg      LV V1 max 144.4 cm/sec      LV V1 mean 103.3 cm/sec      LV V1 VTI 34.2 cm      CO(Ao) 35.6 l/min      CI(Ao) 16.5 l/min/m^2      SV(Ao) 632.0 ml      SI(Ao) 292.3 ml/m^2      CO(LVOT) 6.4 l/min      CI(LVOT) 3.0 l/min/m^2      SV(LVOT) 113.5 ml      SV(RVOT) 93.2 ml      SI(LVOT) 52.5 ml/m^2      PA V2 max 116.8 cm/sec      PA max PG 5.5 mmHg      PA max PG (full) 0.41 mmHg      PA V2 mean 82.0 cm/sec      PA mean PG 3.0 mmHg      PA mean PG (full) 0.54 mmHg      PA V2 VTI 25.3 cm      PVA(I,A) 3.7 cm^2      BH CV ECHO SANJAY - PVA(I,D) 3.7 cm^2        CV ECHO SANJAY - PVA(V,A) 3.8 cm^2       CV ECHO SANJAY - PVA(V,D) 3.8 cm^2      PA acc time 0.13 sec      RV V1 max PG 5.1 mmHg      RV V1 mean PG 2.5 mmHg      RV V1 max 112.4 cm/sec      RV V1 mean 73.6 cm/sec      RV V1 VTI 23.5 cm      TR max britney 330.3 cm/sec      RVSP(TR) 46.6 mmHg      RAP systole 3.0 mmHg      PA pr(Accel) 22.5 mmHg      Qp/Qs 0.82      CV ECHO SANJAY - BZI_BMI 28.1 kilograms/m^2       CV ECHO SANJAY - BSA(HAYCOCK) 2.2 m^2       CV ECHO SANJAY - BZI_METRIC_WEIGHT 93.9 kg       CV ECHO SANJAY - BZI_METRIC_HEIGHT 182.9 cm      EF(MOD-bp) 65.0 %      LA dimension(2D) 4.7 cm      Echo EF Estimated 65 %     Narrative:      · Estimated left ventricular EF = 65% Left ventricular systolic function   is normal.  · Left ventricular diastolic dysfunction is noted.  · The right atrial cavity is mildly dilated.  · Moderate aortic valve stenosis is present.  · Estimated right ventricular systolic pressure from tricuspid   regurgitation is moderately elevated (45-55 mmHg).       ECG 12 Lead [[id number]] Collected: 12/13/20 1107     Updated: 12/13/20 1607     QT Interval 418 ms     Narrative:      HEART RATE= 60  bpm  RR Interval= 996  ms  KS Interval= 296  ms  P Horizontal Axis= -17  deg  P Front Axis= 46  deg  QRSD Interval= 107  ms  QT Interval= 418  ms  QRS Axis= -41  deg  T Wave Axis= -8  deg  - ABNORMAL ECG -  Sinus rhythm  Prolonged KS interval  Inferior infarct, old  When compared with ECG of 11-Dec-2020 20:17:32,  Sinus rate is slower  Electronically Signed By: Steve Swain (LORI) 13-Dec-2020 16:07:07  Date and Time of Study: 2020-12-13 11:07:57    ECG 12 Lead [[id number]] Collected: 12/15/20 0859     Updated: 12/15/20 0901     QT Interval 452 ms     Narrative:      HEART RATE= 47  bpm  RR Interval= 1271  ms  KS Interval=   ms  P Horizontal Axis=   deg  P Front Axis=   deg  QRSD Interval= 110  ms  QT Interval= 452  ms  QRS Axis= -22  deg  T Wave Axis= 69  deg  - ABNORMAL ECG -  Atrial  fibrillation  Borderline ST elevation, anterior leads  When compared with ECG of 13-Dec-2020 11:07:57,  No significant change  Electronically Signed By:   Date and Time of Study: 2020-12-15 08:59:00          Results for orders placed during the hospital encounter of 12/11/20   Intra-Op Duplex Carotid Right Lmt CAR    Narrative · Imaging indicates a normal intra-op exam.          Results for orders placed during the hospital encounter of 12/11/20   Adult Transthoracic Echo Complete W/ Cont if Necessary Per Protocol (With Agitated Saline)    Narrative · Estimated left ventricular EF = 65% Left ventricular systolic function   is normal.  · Left ventricular diastolic dysfunction is noted.  · The right atrial cavity is mildly dilated.  · Moderate aortic valve stenosis is present.  · Estimated right ventricular systolic pressure from tricuspid   regurgitation is moderately elevated (45-55 mmHg).          Xr Ankle 2 View Left    Result Date: 12/12/2020  1.Oblique fracture of the distal fibula with mild lateral displacement. 2.Suspected minimally displaced fracture of the posterior malleolus. 3.Mild osteoarthritis. 4.Calcific atherosclerosis.  Electronically Signed By-Dylon Cedillo MD On:12/12/2020 1:05 PM This report was finalized on [id number] by  Dylon Cedillo MD.    Ct Angiogram Neck    Result Date: 12/14/2020   1. Approximately 50% stenosis of the right internal carotid artery in its proximal portion. Approximately 80% stenosis of the left internal carotid artery in its proximal portion. 2. Small vertebrobasilar system. The left vertebral artery terminates in the left posterior inferior cerebellar artery. 3. Significant right M2 stenosis. 4. Multiple stenoses throughout the posterior cerebral arteries bilaterally.  Electronically Signed By-Sg Tyson MD On:12/14/2020 9:21 AM This report was finalized on [id number] by  Sg Tyson MD.    Mri Brain Without Contrast    Result Date: 12/12/2020  1.No definite MRI  findings of acute infarction or other acute intracranial abnormality at this time. 2.Volume loss and suspected mild to moderate chronic small vessel ischemic changes with remote lacunar infarct in the right basal ganglion.   Electronically Signed By-Dylon Cedillo MD On:12/12/2020 1:33 PM This report was finalized on [id number] by  Dylon Cedillo MD.    Xr Chest 1 View    Result Date: 12/11/2020  1.  No evidence of active disease.   Electronically Signed By-Nikki Bright MD On:12/11/2020 8:43 PM This report was finalized on [id number] by  Nikki Bright MD.    Ct Angiogram Head    Result Date: 12/14/2020   1. Approximately 50% stenosis of the right internal carotid artery in its proximal portion. Approximately 80% stenosis of the left internal carotid artery in its proximal portion. 2. Small vertebrobasilar system. The left vertebral artery terminates in the left posterior inferior cerebellar artery. 3. Significant right M2 stenosis. 4. Multiple stenoses throughout the posterior cerebral arteries bilaterally.  Electronically Signed By-Sg Tyson MD On:12/14/2020 9:21 AM This report was finalized on [id number] by  Sg Tyson MD.    Ct Head Without Contrast Stroke Protocol    Result Date: 12/11/2020  1. Negative for hemorrhage or mass effect. 2. There is moderate cerebral and cerebellar atrophy. There is mild white matter abnormality which is favored to be due to chronic small vessel ischemia. 3. 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 4. If acute ischemia is clinically suspected, recommend consideration of brain MR.  Electronically Signed By-Nikki Bright MD On:12/11/2020 8:04 PM This report was finalized on [id number] by  Nikki Bright MD.          Xrays, labs reviewed personally by physician.    Medication Review:   I have reviewed the patient's current medication list      Scheduled Meds  amLODIPine, 10 mg, Oral, Daily  aspirin, 81 mg, Oral, Daily  atorvastatin, 80 mg, Oral,  Nightly  clopidogrel, 75 mg, Oral, Daily  insulin lispro, 0-9 Units, Subcutaneous, TID AC  levothyroxine, 75 mcg, Oral, Daily  losartan, 50 mg, Oral, Daily  pantoprazole, 40 mg, Oral, Daily  sodium chloride, 3 mL, Intravenous, Q12H        Meds Infusions       Meds PRN  •  acetaminophen **OR** acetaminophen  •  bisacodyl  •  dextrose  •  dextrose  •  diphenhydrAMINE  •  glucagon (human recombinant)  •  hydrALAZINE  •  HYDROcodone-acetaminophen  •  HYDROmorphone **AND** naloxone  •  influenza vaccine  •  insulin lispro **AND** insulin lispro  •  ondansetron  •  potassium chloride **OR** potassium chloride **OR** potassium chloride  •  sodium chloride  •  sodium chloride        Assessment/Plan   Assessment/Plan     Active Hospital Problems    Diagnosis  POA   • **Symptomatic carotid artery stenosis, bilateral [I65.23]  Yes   • Closed displaced fracture of lateral malleolus of left fibula [S82.62XA]  Unknown   • TIA (transient ischemic attack) [G45.9]  Yes   • Essential hypertension [I10]  Yes   • Mixed hyperlipidemia [E78.2]  Yes   • Type 2 diabetes mellitus, without long-term current use of insulin (CMS/[facility]) [E11.9]  Yes      Resolved Hospital Problems   No resolved problems to display.       MEDICAL DECISION MAKING COMPLEXITY BY PROBLEM:     Suspected TIA      -MRI of the brain showed no acute infarct or other intracranial abnormality      -on aspirin/Plavix/Lipitor, and followed by neurologist     Symptomatic bilateral SIVAN      -S/p endarterectomy on 12/14/2020 , by Dr Fung      - on aspirin/Plavix/Lipitor     Left ankle pain with swelling/suspected  fracture      -supportive care with analgesic and follow-up on orthopedic consult         -x-ray of the ankle on 12/12/2020 showed oblique fracture of the distal fibula with mild lateral displacement and suspected posterior malleolus fracture.     Essential hypertension       -Amlodipine/losartan     T2DM     -cont SSI     Mixed hyperlipidemia     -Lipitor       Hypothyroidism       -Levothyroxine     Obesity      -encourage lifestyle changes       VTE Prophylaxis -   Mechanical Order History:      Ordered        12/14/20 1047  Place Sequential Compression Device  Once         12/14/20 1047  Maintain Sequential Compression Device  Continuous         12/12/20 0255  Place Sequential Compression Device  Once         12/12/20 0255  Maintain Sequential Compression Device  Continuous                 Pharmalogical Order History:      Ordered     Dose Route Frequency Stop    12/14/20 0716  heparin (porcine) injection  Status:  Discontinued      -- -- As Needed 12/14/20 1050                  Code Status -   Code Status and Medical Interventions:   Ordered at: 12/11/20 2055     Code Status:    CPR     Medical Interventions (Level of Support Prior to Arrest):    Full       Discharge Planning      Electronically signed by Kendall Arellano MD, 12/15/20, 16:47 EST.  [facility]ist Team

## 2020-12-15 NOTE — PROGRESS NOTES
Discharge Planning Assessment   David     Patient Name: Alen Ratliff  MRN: [id number]  Today's Date: 12/15/2020    Admit Date: 12/11/2020    Discharge Needs Assessment     Row Name 12/15/20 1452       Living Environment    Lives With  spouse    Current Living Arrangements  home/apartment/condo    Primary Care Provided by  self    Provides Primary Care For  no one    Family Caregiver if Needed  spouse    Quality of Family Relationships  unable to assess    Able to Return to Prior Arrangements  other (see comments) pending clinical course       Resource/Environmental Concerns    Resource/Environmental Concerns  none       Transition Planning    Patient/Family Anticipates Transition to  home with family    Patient/Family Anticipated Services at Transition  none    Transportation Anticipated  family or friend will provide       Discharge Needs Assessment    Readmission Within the Last 30 Days  no previous admission in last 30 days    Equipment Currently Used at Home  none    Concerns to be Addressed  no discharge needs identified    Provided Post Acute Provider List?  N/A    N/A Provider List Comment  no needs identified at this time        Discharge Plan     Row Name 12/15/20 1454       Plan    Plan  from home with spouse pending clinical course    Patient/Family in Agreement with Plan  yes    Plan Comments  spoke to patient in room with ppe(mask and goggles); staying 6 feet away from patient and less than 15 mins in room; patient follows with dr washington and uses [facility] [place]; discharge plan pending clinical course;         Patient Forms    Important Message from Medicare (IMM)  Delivered per documentation im letter given 12/11            Carol naegele rn  Case management  Office number [phone]  Cell phone [phone]

## 2020-12-16 ENCOUNTER — ANESTHESIA (OUTPATIENT)
Dept: PERIOP | Facility: HOSPITAL | Age: 80
End: 2020-12-16

## 2020-12-16 ENCOUNTER — APPOINTMENT (OUTPATIENT)
Dept: GENERAL RADIOLOGY | Facility: HOSPITAL | Age: 80
End: 2020-12-16

## 2020-12-16 ENCOUNTER — ANESTHESIA EVENT (OUTPATIENT)
Dept: PERIOP | Facility: HOSPITAL | Age: 80
End: 2020-12-16

## 2020-12-16 LAB
ANION GAP SERPL CALCULATED.3IONS-SCNC: 10 MMOL/L (ref 5–15)
BASOPHILS # BLD AUTO: 0.1 10*3/MM3 (ref 0–0.2)
BASOPHILS NFR BLD AUTO: 0.7 % (ref 0–1.5)
BUN SERPL-MCNC: 41 MG/DL (ref 8–23)
BUN/CREAT SERPL: 26.6 (ref 7–25)
CALCIUM SPEC-SCNC: 8.5 MG/DL (ref 8.6–10.5)
CHLORIDE SERPL-SCNC: 106 MMOL/L (ref 98–107)
CO2 SERPL-SCNC: 20 MMOL/L (ref 22–29)
CREAT SERPL-MCNC: 1.54 MG/DL (ref 0.76–1.27)
DEPRECATED RDW RBC AUTO: 46.8 FL (ref 37–54)
EOSINOPHIL # BLD AUTO: 0.1 10*3/MM3 (ref 0–0.4)
EOSINOPHIL NFR BLD AUTO: 1.4 % (ref 0.3–6.2)
ERYTHROCYTE [DISTWIDTH] IN BLOOD BY AUTOMATED COUNT: 14.5 % (ref 12.3–15.4)
GFR SERPL CREATININE-BSD FRML MDRD: 44 ML/MIN/1.73
GLUCOSE BLDC GLUCOMTR-MCNC: 155 MG/DL (ref 70–105)
GLUCOSE BLDC GLUCOMTR-MCNC: 164 MG/DL (ref 70–105)
GLUCOSE BLDC GLUCOMTR-MCNC: 165 MG/DL (ref 70–105)
GLUCOSE BLDC GLUCOMTR-MCNC: 172 MG/DL (ref 70–105)
GLUCOSE SERPL-MCNC: 166 MG/DL (ref 65–99)
HCT VFR BLD AUTO: 36.4 % (ref 37.5–51)
HGB BLD-MCNC: 11.8 G/DL (ref 13–17.7)
LYMPHOCYTES # BLD AUTO: 1.7 10*3/MM3 (ref 0.7–3.1)
LYMPHOCYTES NFR BLD AUTO: 18.9 % (ref 19.6–45.3)
MCH RBC QN AUTO: 30.2 PG (ref 26.6–33)
MCHC RBC AUTO-ENTMCNC: 32.4 G/DL (ref 31.5–35.7)
MCV RBC AUTO: 93.2 FL (ref 79–97)
MONOCYTES # BLD AUTO: 1.4 10*3/MM3 (ref 0.1–0.9)
MONOCYTES NFR BLD AUTO: 15.2 % (ref 5–12)
NEUTROPHILS NFR BLD AUTO: 5.8 10*3/MM3 (ref 1.7–7)
NEUTROPHILS NFR BLD AUTO: 63.8 % (ref 42.7–76)
NRBC BLD AUTO-RTO: 0.1 /100 WBC (ref 0–0.2)
PLATELET # BLD AUTO: 221 10*3/MM3 (ref 140–450)
PMV BLD AUTO: 8.8 FL (ref 6–12)
POTASSIUM SERPL-SCNC: 4.1 MMOL/L (ref 3.5–5.2)
RBC # BLD AUTO: 3.9 10*6/MM3 (ref 4.14–5.8)
SODIUM SERPL-SCNC: 136 MMOL/L (ref 136–145)
WBC # BLD AUTO: 9.1 10*3/MM3 (ref 3.4–10.8)

## 2020-12-16 PROCEDURE — 25010000002 DEXAMETHASONE PER 1 MG: Performed by: ANESTHESIOLOGY

## 2020-12-16 PROCEDURE — 76000 FLUOROSCOPY <1 HR PHYS/QHP: CPT

## 2020-12-16 PROCEDURE — 27792 TREATMENT OF ANKLE FRACTURE: CPT | Performed by: PODIATRIST

## 2020-12-16 PROCEDURE — 99232 SBSQ HOSP IP/OBS MODERATE 35: CPT | Performed by: INTERNAL MEDICINE

## 2020-12-16 PROCEDURE — 25010000002 DOBUTAMINE PER 250 MG: Performed by: ANESTHESIOLOGY

## 2020-12-16 PROCEDURE — 93005 ELECTROCARDIOGRAM TRACING: CPT | Performed by: INTERNAL MEDICINE

## 2020-12-16 PROCEDURE — 93010 ELECTROCARDIOGRAM REPORT: CPT | Performed by: INTERNAL MEDICINE

## 2020-12-16 PROCEDURE — 82962 GLUCOSE BLOOD TEST: CPT

## 2020-12-16 PROCEDURE — 80048 BASIC METABOLIC PNL TOTAL CA: CPT | Performed by: INTERNAL MEDICINE

## 2020-12-16 PROCEDURE — C1713 ANCHOR/SCREW BN/BN,TIS/BN: HCPCS | Performed by: PODIATRIST

## 2020-12-16 PROCEDURE — 25010000002 PROPOFOL 10 MG/ML EMULSION: Performed by: ANESTHESIOLOGY

## 2020-12-16 PROCEDURE — 99233 SBSQ HOSP IP/OBS HIGH 50: CPT | Performed by: INTERNAL MEDICINE

## 2020-12-16 PROCEDURE — 85025 COMPLETE CBC W/AUTO DIFF WBC: CPT | Performed by: INTERNAL MEDICINE

## 2020-12-16 PROCEDURE — 0QSK04Z REPOSITION LEFT FIBULA WITH INTERNAL FIXATION DEVICE, OPEN APPROACH: ICD-10-PCS | Performed by: PODIATRIST

## 2020-12-16 PROCEDURE — 73600 X-RAY EXAM OF ANKLE: CPT

## 2020-12-16 PROCEDURE — 76942 ECHO GUIDE FOR BIOPSY: CPT | Performed by: PODIATRIST

## 2020-12-16 PROCEDURE — 25010000002 ROPIVACAINE PER 1 MG: Performed by: ANESTHESIOLOGY

## 2020-12-16 DEVICE — IMPLANTABLE DEVICE
Type: IMPLANTABLE DEVICE | Site: ANKLE | Status: FUNCTIONAL
Brand: ORTHOLOC

## 2020-12-16 DEVICE — IMPLANTABLE DEVICE
Type: IMPLANTABLE DEVICE | Site: ANKLE | Status: FUNCTIONAL
Brand: ORTHOLOC 3DI

## 2020-12-16 DEVICE — IMPLANTABLE DEVICE
Type: IMPLANTABLE DEVICE | Site: ANKLE | Status: FUNCTIONAL
Brand: ORTHOLOC 3DI PLATING SYSTEM

## 2020-12-16 RX ORDER — POTASSIUM CHLORIDE 1.5 G/1.77G
40 POWDER, FOR SOLUTION ORAL AS NEEDED
Status: DISCONTINUED | OUTPATIENT
Start: 2020-12-16 | End: 2020-12-16 | Stop reason: SDUPTHER

## 2020-12-16 RX ORDER — HYDROCODONE BITARTRATE AND ACETAMINOPHEN 5; 325 MG/1; MG/1
1 TABLET ORAL ONCE AS NEEDED
Status: DISCONTINUED | OUTPATIENT
Start: 2020-12-16 | End: 2020-12-16 | Stop reason: HOSPADM

## 2020-12-16 RX ORDER — GLYCOPYRROLATE 1 MG/5 ML
SYRINGE (ML) INTRAVENOUS AS NEEDED
Status: DISCONTINUED | OUTPATIENT
Start: 2020-12-16 | End: 2020-12-16 | Stop reason: SURG

## 2020-12-16 RX ORDER — MAGNESIUM SULFATE HEPTAHYDRATE 40 MG/ML
4 INJECTION, SOLUTION INTRAVENOUS AS NEEDED
Status: DISCONTINUED | OUTPATIENT
Start: 2020-12-16 | End: 2020-12-17 | Stop reason: HOSPADM

## 2020-12-16 RX ORDER — SODIUM CHLORIDE, SODIUM LACTATE, POTASSIUM CHLORIDE, CALCIUM CHLORIDE 600; 310; 30; 20 MG/100ML; MG/100ML; MG/100ML; MG/100ML
1000 INJECTION, SOLUTION INTRAVENOUS CONTINUOUS
Status: DISCONTINUED | OUTPATIENT
Start: 2020-12-16 | End: 2020-12-17 | Stop reason: HOSPADM

## 2020-12-16 RX ORDER — CALCIUM GLUCONATE 20 MG/ML
1 INJECTION, SOLUTION INTRAVENOUS AS NEEDED
Status: DISCONTINUED | OUTPATIENT
Start: 2020-12-16 | End: 2020-12-17 | Stop reason: HOSPADM

## 2020-12-16 RX ORDER — POTASSIUM CHLORIDE 7.45 MG/ML
10 INJECTION INTRAVENOUS
Status: DISCONTINUED | OUTPATIENT
Start: 2020-12-16 | End: 2020-12-16 | Stop reason: SDUPTHER

## 2020-12-16 RX ORDER — MORPHINE SULFATE 4 MG/ML
2 INJECTION, SOLUTION INTRAMUSCULAR; INTRAVENOUS
Status: DISCONTINUED | OUTPATIENT
Start: 2020-12-16 | End: 2020-12-16 | Stop reason: HOSPADM

## 2020-12-16 RX ORDER — DOBUTAMINE HYDROCHLORIDE 100 MG/100ML
INJECTION INTRAVENOUS CONTINUOUS PRN
Status: DISCONTINUED | OUTPATIENT
Start: 2020-12-16 | End: 2020-12-16 | Stop reason: SURG

## 2020-12-16 RX ORDER — POTASSIUM CHLORIDE 20 MEQ/1
40 TABLET, EXTENDED RELEASE ORAL AS NEEDED
Status: DISCONTINUED | OUTPATIENT
Start: 2020-12-16 | End: 2020-12-16 | Stop reason: SDUPTHER

## 2020-12-16 RX ORDER — DEXAMETHASONE SODIUM PHOSPHATE 4 MG/ML
INJECTION, SOLUTION INTRA-ARTICULAR; INTRALESIONAL; INTRAMUSCULAR; INTRAVENOUS; SOFT TISSUE AS NEEDED
Status: DISCONTINUED | OUTPATIENT
Start: 2020-12-16 | End: 2020-12-16 | Stop reason: SURG

## 2020-12-16 RX ORDER — ACETAMINOPHEN 650 MG/1
650 SUPPOSITORY RECTAL ONCE AS NEEDED
Status: DISCONTINUED | OUTPATIENT
Start: 2020-12-16 | End: 2020-12-16 | Stop reason: HOSPADM

## 2020-12-16 RX ORDER — ROPIVACAINE HYDROCHLORIDE 5 MG/ML
INJECTION, SOLUTION EPIDURAL; INFILTRATION; PERINEURAL
Status: COMPLETED | OUTPATIENT
Start: 2020-12-16 | End: 2020-12-16

## 2020-12-16 RX ORDER — DEXAMETHASONE SODIUM PHOSPHATE 4 MG/ML
INJECTION, SOLUTION INTRA-ARTICULAR; INTRALESIONAL; INTRAMUSCULAR; INTRAVENOUS; SOFT TISSUE
Status: COMPLETED | OUTPATIENT
Start: 2020-12-16 | End: 2020-12-16

## 2020-12-16 RX ORDER — MAGNESIUM SULFATE HEPTAHYDRATE 40 MG/ML
2 INJECTION, SOLUTION INTRAVENOUS AS NEEDED
Status: DISCONTINUED | OUTPATIENT
Start: 2020-12-16 | End: 2020-12-17 | Stop reason: HOSPADM

## 2020-12-16 RX ORDER — ACETAMINOPHEN 325 MG/1
650 TABLET ORAL ONCE AS NEEDED
Status: DISCONTINUED | OUTPATIENT
Start: 2020-12-16 | End: 2020-12-16 | Stop reason: HOSPADM

## 2020-12-16 RX ORDER — ONDANSETRON 2 MG/ML
4 INJECTION INTRAMUSCULAR; INTRAVENOUS ONCE AS NEEDED
Status: DISCONTINUED | OUTPATIENT
Start: 2020-12-16 | End: 2020-12-16 | Stop reason: HOSPADM

## 2020-12-16 RX ORDER — CALCIUM GLUCONATE 20 MG/ML
2 INJECTION, SOLUTION INTRAVENOUS AS NEEDED
Status: DISCONTINUED | OUTPATIENT
Start: 2020-12-16 | End: 2020-12-17 | Stop reason: HOSPADM

## 2020-12-16 RX ORDER — PROPOFOL 10 MG/ML
VIAL (ML) INTRAVENOUS AS NEEDED
Status: DISCONTINUED | OUTPATIENT
Start: 2020-12-16 | End: 2020-12-16 | Stop reason: SURG

## 2020-12-16 RX ORDER — LIDOCAINE HYDROCHLORIDE 10 MG/ML
INJECTION, SOLUTION EPIDURAL; INFILTRATION; INTRACAUDAL; PERINEURAL AS NEEDED
Status: DISCONTINUED | OUTPATIENT
Start: 2020-12-16 | End: 2020-12-16 | Stop reason: SURG

## 2020-12-16 RX ADMIN — ASPIRIN 81 MG CHEWABLE TABLET 81 MG: 81 TABLET CHEWABLE at 08:14

## 2020-12-16 RX ADMIN — AMLODIPINE BESYLATE 10 MG: 5 TABLET ORAL at 08:14

## 2020-12-16 RX ADMIN — CLOPIDOGREL BISULFATE 75 MG: 75 TABLET ORAL at 08:14

## 2020-12-16 RX ADMIN — Medication 3 ML: at 08:14

## 2020-12-16 RX ADMIN — ROPIVACAINE HYDROCHLORIDE 55 ML: 5 INJECTION, SOLUTION EPIDURAL; INFILTRATION; PERINEURAL at 11:45

## 2020-12-16 RX ADMIN — LOSARTAN POTASSIUM 50 MG: 25 TABLET, FILM COATED ORAL at 08:14

## 2020-12-16 RX ADMIN — LIDOCAINE HYDROCHLORIDE 40 MG: 10 INJECTION, SOLUTION EPIDURAL; INFILTRATION; INTRACAUDAL; PERINEURAL at 12:01

## 2020-12-16 RX ADMIN — Medication 0.2 MG: at 12:17

## 2020-12-16 RX ADMIN — Medication 3 ML: at 21:48

## 2020-12-16 RX ADMIN — CEFAZOLIN SODIUM 2 G: 1 INJECTION, POWDER, FOR SOLUTION INTRAMUSCULAR; INTRAVENOUS at 12:05

## 2020-12-16 RX ADMIN — HYDROCODONE BITARTRATE AND ACETAMINOPHEN 1 TABLET: 5; 325 TABLET ORAL at 08:15

## 2020-12-16 RX ADMIN — PROPOFOL 150 MG: 10 INJECTION, EMULSION INTRAVENOUS at 12:01

## 2020-12-16 RX ADMIN — DEXAMETHASONE SODIUM PHOSPHATE 4 MG: 4 INJECTION, SOLUTION INTRAMUSCULAR; INTRAVENOUS at 12:06

## 2020-12-16 RX ADMIN — PANTOPRAZOLE SODIUM 40 MG: 40 TABLET, DELAYED RELEASE ORAL at 08:14

## 2020-12-16 RX ADMIN — LEVOTHYROXINE SODIUM 75 MCG: 75 TABLET ORAL at 08:14

## 2020-12-16 RX ADMIN — DEXAMETHASONE SODIUM PHOSPHATE 8 MG: 4 INJECTION, SOLUTION INTRAMUSCULAR; INTRAVENOUS at 11:45

## 2020-12-16 RX ADMIN — SODIUM CHLORIDE, POTASSIUM CHLORIDE, SODIUM LACTATE AND CALCIUM CHLORIDE 1000 ML: 600; 310; 30; 20 INJECTION, SOLUTION INTRAVENOUS at 11:22

## 2020-12-16 RX ADMIN — ATORVASTATIN CALCIUM 80 MG: 40 TABLET, FILM COATED ORAL at 21:47

## 2020-12-16 RX ADMIN — DOBUTAMINE IN DEXTROSE 2.5 MCG/KG/MIN: 100 INJECTION, SOLUTION INTRAVENOUS at 10:56

## 2020-12-16 NOTE — OP NOTE
Operative Note   Foot and Ankle Surgery   Provider: Dr. Alen Vasquez   Location: [facility]      Procedure:  1.  Open reduction internal fixation of lateral malleolus fracture, left    Pre-operative Diagnosis:   1.  Closed, displaced lateral malleolus fracture, left    Post-operative Diagnosis: Same    Surgeon: Alen Vasquez    Assistant: None    Anesthesia: General    Implants: Gibson medical plate and 3.5 millimeter locking and nonlocking screws    Findings: No unexpected findings    Specimen: None    Blood Loss: Less than 5cc    Complications: None    Post Op Plan: Return to floor.  No further operative plans from my standpoint.  Partial weightbearing activity in cam boot with walker assist as needed for transfers.  Dressing is to remain clean, dry, intact until follow-up with me in 1-2 weeks after discharge.    Summary:    Patient is a pleasant 80-year-old male that has been admitted after syncopal episode causing a fall and injury to the left ankle.  He recently underwent carotid endarterectomy.  He has been doing well.  Imaging showed that he sustained a lateral malleolus fracture with displacement.  We did discuss treatment options and I have recommended that we consider open reduction and internal fixation for definitive treatment.  He understands and agrees and would like to proceed.    Procedure, risks, complications, and goals were discussed with the patient at bedside.  Risks include but are not limited to infection, complications from anesthesia (including death), chronic pain or numbness, hematoma/seroma, deep vein thrombosis, wound complications, and potential for additional surgical procedures.  Patient understands and elects to proceed with surgery at this time. Informed consent was obtained before proceeding to the operating suite.  All questions were answered to the patient's satisfaction. No guarantees or assurances were given or implied.    Procedure:    Patient was brought to the  operating room and placed in the operative table in supine position.  Once adequate general anesthesia was administered, a pneumatic tourniquet was placed about the patient's left thigh.  The left lower extremity was scrubbed prepped and draped in usual sterile fashion.  The limb was elevated and exsanguinated and the pneumatic tourniquet was inflated to 300 mmHg.  A formal timeout was conducted prior skin incision.    Attention was then directed to the lateral aspect of the left ankle.  A linear longitudinal incision was performed to the midline of the distal fibula.  Incision was deepened through the subcutaneous tissue into the level of bone.  Vital structures were identified and preserved.  Bleeders were cauterized as needed.  The fracture was evaluated showing mild hematoma and displacement as expected.  The hematoma was evacuated and the fracture was manipulated and reduced.  Reduction was maintained and definitive fixation was achieved utilizing a 3.5 x 16 mm cortical screw from a anterior to posterior orientation across the fracture.  The fixation was neutralized with a distal fibular contoured Gibson medical plate.  The plate was secured to bone utilizing locking and nonlocking 3.5 millimeter screws of various lengths.  Final imaging was performed showing excellent reduction of the fracture and stable internal fixation.  The wound was irrigated with copious amounts of normal saline.  The deep structures were closed with a 2-0 Vicryl in a simple interrupted manner.  The subcutaneous tissues were closed with a 4-0 Vicryl and the skin was repaired with skin staples.  The incision was dressed with Xeroform and sterile compressive dressings.  The tourniquet was released and a prompt hyperemic response noted to all digits of the left lower extremity.  The patient tolerated the procedure and anesthesia well.  He was transferred from the operating room to the recovery room with vital signs stable and neurovascular  is unchanged to the left lower extremity.      Dr. Alen Vasquez, DPM  [facility]  [phone]    Note is dictated utilizing voice recognition software. Unfortunately this leads to occasional typographical errors. I apologize in advance if the situation occurs. If questions occur please do not hesitate to call our office.

## 2020-12-16 NOTE — PROGRESS NOTES
PULMONARY/CRITICAL CARE PROGRESS NOTE         Name:  Alen Ratliff  Age: 80 y.o.  Sex:  male  :   1940  MRN:  [id number]     ROOM:  [facility]      ASSESSMENT & PLAN     F/U: Medical management in ICU    Principal Problem:    Symptomatic carotid artery stenosis, bilateral  Active Problems:    Essential hypertension    Mixed hyperlipidemia    Type 2 diabetes mellitus, without long-term current use of insulin (CMS/[facility])    TIA (transient ischemic attack)    Closed displaced fracture of lateral malleolus of left fibula  Bradycardia  Severe, symptomatic right carotid stenosis  TIA with history of CVA  Left lateral malleolus fracture  Essential hypertension  CKD  Hyperlipidemia  Diabetes mellitus  Hypothyroidism    Multidisciplinary Rounds:  -Brief symptomatic bradycardia overnight; attempted Dopamine gtt, experienced nausea and was discontinued; on Dobutamine non-titrated per Cardiology  -Planning to go to OR for an ORIF of left ankle per podiatry.  -Only one episode of atrial fibrillation with bradycardia overnight.  -MUNA downgrade, hospitalist consulted    PLAN:  S/p right carotid endarterectomy  Blood pressure control, Cardene if needed to maintain an SBP <180  Vascular surgery following closely  Frequent neuro checks per protocol  Continue amlodipine, losartan  Continue levothyroxine  Continue aspirin, statin, Plavix  Podiatry consulted, left lower extremity immobilization pending further surgical evaluation; planned surgery today.  Neurology following  Cardiology following for bradycardia, on IV dobutamine.     Code Status: Full code  VTE Prophylaxis: Defer to surgery  PUD Prophylaxis: Protonix  Sliding-scale insulin for tight glycemic control      [place] & SUBJECTIVE     The patient initially presented to the emergency department for evaluation of left arm and left hand numbness, left face numbness, and left leg numbness.  He reports that his symptoms started the previous night and that when he  stood from a sitting position he became lightheaded and lost his balance resulting in a fall with pain to his left ankle.  He reported resolution of the lateralizing numbness however continued to complain of left ankle pain related to the fall.  He denied headache, confusion, slurred speech, lateralizing deficits, blurred vision.  He denies that he has recently experienced fever, chills, nausea, vomiting, diarrhea, cough, expectoration, shortness of air or chest pain.  He does report a history of stroke approximately 4 years ago for which he has been on aspirin and Plavix and reports good compliance.  His lateralizing deficits had no relieving or exacerbating factors.  Weight and movement exacerbated the pain in his left ankle which was relieved somewhat at rest.     The patient had a neurology work-up with the diagnosis of acute TIA.  CT of the head was negative for acute hemorrhage or mass-effect.  Nonacute lacunar infarct in the right thalamus.  CTA of the head and neck revealed critical right ICA stenosis as well as left ICA stenosis.  MRI was negative for acute intracranial findings.  Left ankle x-ray revealed oblique fracture of the distal fibula with mild lateral displacement with suspicion of minimally displaced fracture of the posterior malleolus.  Following admission the patient had a vascular surgery consult with plan for right carotid endarterectomy for severe/symptomatic right carotid stenosis.  Underwent cardiology evaluation and was found to be an acceptable candidate for surgery.  Today the patient underwent an elective right carotid endarterectomy.  The case went as expected and the patient was promptly extubated after surgery.  He has been admitted to the ICU for close monitoring and further postoperative medical management.    12/15: No general complaints, reported some dizziness overnight; no further N/V/D, no vision changes, CP, SOA  12/16: No general complaints, no palpitation, CP, SOA, N/V/D.   "Planned OR today.      MEDICATIONS     SCHEDULED MEDICATIONS:   amLODIPine, 10 mg, Oral, Daily  aspirin, 81 mg, Oral, Daily  atorvastatin, 80 mg, Oral, Nightly  clopidogrel, 75 mg, Oral, Daily  insulin lispro, 0-9 Units, Subcutaneous, TID AC  levothyroxine, 75 mcg, Oral, Daily  losartan, 50 mg, Oral, Daily  pantoprazole, 40 mg, Oral, Daily  sodium chloride, 3 mL, Intravenous, Q12H        CONTINUOUS INFUSIONS:   DOBUTamine, 2.5 mcg/kg/min, Last Rate: 2.5 mcg/kg/min (12/15/20 1832)        PRN MEDS:  •  acetaminophen **OR** acetaminophen  •  bisacodyl  •  dextrose  •  dextrose  •  diphenhydrAMINE  •  glucagon (human recombinant)  •  hydrALAZINE  •  HYDROcodone-acetaminophen  •  HYDROmorphone **AND** naloxone  •  influenza vaccine  •  insulin lispro **AND** insulin lispro  •  ondansetron  •  potassium chloride **OR** potassium chloride **OR** potassium chloride  •  sodium chloride  •  sodium chloride      OBJECTIVE     VITAL SIGNS:  BP (!) 113/32   Pulse 71   Temp 98.1 °F (36.7 °C) (Oral)   Resp 17   Ht 182.9 cm (72\")   Wt 96.9 kg (213 lb 10 oz)   SpO2 94%   BMI 28.97 kg/m²     I/O FROM PREVIOUS 3 SHIFTS:  I/O last 3 completed shifts:  In: 752 [P.O.:600; I.V.:152]  Out: -     I/O THIS SHIFT:  No intake/output data recorded.    PHYSICAL EXAM:  Constitutional:  Well developed, well nourished, no acute distress, non-toxic appearance   Eyes:  PERRL, conjunctiva normal, EOMI   HENT:  Atraumatic, external ears normal, nose normal. Neck-somewhat limited movement postoperatively.  Incision well approximated  Respiratory: Slightly diminished bases but clear, non-labored respirations without accessory muscle use  Cardiovascular: Sinus bradycardia, systolic murmur, no gallops, no rubs   GI:  Soft, nondistended, normal bowel sounds, nontender, no rebound or guarding  : Deferred  Musculoskeletal: Swelling and tenderness to the left ankle, no clubbing or cyanosis  Integument:  Well hydrated, no rash   Neurologic: A&Ox3.  " Upper extremities with no lateralizing deficits.  Decreased movement of the left lower extremity secondary to fracture.  Psychiatric: Speech and behavior appropriate      RESULTS     LABS:  Lab Results (last 24 hours)     Procedure Component Value Units Date/Time    POC Glucose Once [[id number]]  (Abnormal) Collected: 12/16/20 0806    Specimen: Blood Updated: 12/16/20 0807     Glucose 155 mg/dL      Comment: Serial Number: [id number]:  [id number]       Basic Metabolic Panel [[id number]]  (Abnormal) Collected: 12/16/20 0412    Specimen: Blood Updated: 12/16/20 0446     Glucose 166 mg/dL      BUN 41 mg/dL      Creatinine 1.54 mg/dL      Sodium 136 mmol/L      Potassium 4.1 mmol/L      Chloride 106 mmol/L      CO2 20.0 mmol/L      Calcium 8.5 mg/dL      eGFR Non African Amer 44 mL/min/1.73      BUN/Creatinine Ratio 26.6     Anion Gap 10.0 mmol/L     Narrative:      GFR Normal >60  Chronic Kidney Disease <60  Kidney Failure <15      CBC & Differential [[id number]]  (Abnormal) Collected: 12/16/20 0412    Specimen: Blood Updated: 12/16/20 0421    Narrative:      The following orders were created for panel order CBC & Differential.  Procedure                               Abnormality         Status                     ---------                               -----------         ------                     CBC Auto Differential[[id number]]        Abnormal            Final result                 Please view results for these tests on the individual orders.    CBC Auto Differential [[id number]]  (Abnormal) Collected: 12/16/20 0412    Specimen: Blood Updated: 12/16/20 0421     WBC 9.10 10*3/mm3      RBC 3.90 10*6/mm3      Hemoglobin 11.8 g/dL      Hematocrit 36.4 %      MCV 93.2 fL      MCH 30.2 pg      MCHC 32.4 g/dL      RDW 14.5 %      RDW-SD 46.8 fl      MPV 8.8 fL      Platelets 221 10*3/mm3      Neutrophil % 63.8 %      Lymphocyte % 18.9 %      Monocyte % 15.2 %      Eosinophil % 1.4 %      Basophil % 0.7 %       Neutrophils, Absolute 5.80 10*3/mm3      Lymphocytes, Absolute 1.70 10*3/mm3      Monocytes, Absolute 1.40 10*3/mm3      Eosinophils, Absolute 0.10 10*3/mm3      Basophils, Absolute 0.10 10*3/mm3      nRBC 0.1 /100 WBC     POC Glucose Once [[id number]]  (Abnormal) Collected: 12/15/20 1949    Specimen: Blood Updated: 12/15/20 1951     Glucose 194 mg/dL      Comment: Serial Number: [id number]:  [id number]       Extra Tubes [[id number]] Collected: 12/15/20 1631    Specimen: Blood, Venous Line Updated: 12/15/20 1745    Narrative:      The following orders were created for panel order Extra Tubes.  Procedure                               Abnormality         Status                     ---------                               -----------         ------                     Lavender Top[[id number]]                                     Final result               Gold Top - SST[[id number]]                                   Final result               Green Top (Gel)[[id number]]                                  Final result                 Please view results for these tests on the individual orders.    Lavender Top [[id number]] Collected: 12/15/20 1631    Specimen: Blood Updated: 12/15/20 1745     Extra Tube hold for add-on     Comment: Auto resulted       Gold Top - SST [[id number]] Collected: 12/15/20 1631    Specimen: Blood Updated: 12/15/20 1745     Extra Tube Hold for add-ons.     Comment: Auto resulted.       Green Top (Gel) [[id number]] Collected: 12/15/20 1631    Specimen: Blood Updated: 12/15/20 1745     Extra Tube Hold for add-ons.     Comment: Auto resulted.       Protime-INR [[id number]]  (Normal) Collected: 12/15/20 1631    Specimen: Blood Updated: 12/15/20 1708     Protime 10.3 Seconds      INR 0.93    aPTT [[id number]]  (Normal) Collected: 12/15/20 1631    Specimen: Blood Updated: 12/15/20 1708     PTT 24.5 seconds     POC Glucose Once [[id number]]  (Abnormal) Collected: 12/15/20 1634    Specimen: Blood Updated:  12/15/20 1637     Glucose 178 mg/dL      Comment: Serial Number: [id number]:  [id number]       POC Activated Clotting Time [[id number]]  (Abnormal) Collected: 12/14/20 0912    Specimen: Arterial Blood Updated: 12/15/20 1251     Activated Clotting Time  241 Seconds      Comment: Serial Number: [id number]:  [id number]       POC Activated Clotting Time [[id number]]  (Normal) Collected: 12/14/20 0822    Specimen: Arterial Blood Updated: 12/15/20 1250     Activated Clotting Time  136 Seconds      Comment: Serial Number: [id number]:  [id number]       POC Glucose Once [[id number]]  (Abnormal) Collected: 12/15/20 1222    Specimen: Blood Updated: 12/15/20 1229     Glucose 146 mg/dL      Comment: Serial Number: [id number]:  [id number]       Calcium, Ionized [[id number]]  (Abnormal) Collected: 12/15/20 0846    Specimen: Blood Updated: 12/15/20 0940     Ionized Calcium 1.17 mmol/L     Magnesium [[id number]]  (Abnormal) Collected: 12/15/20 0428    Specimen: Blood Updated: 12/15/20 0906     Magnesium 1.4 mg/dL     Phosphorus [[id number]]  (Normal) Collected: 12/15/20 0428    Specimen: Blood Updated: 12/15/20 0906     Phosphorus 4.0 mg/dL            RADIOLOGY:  No Radiology Exams Resulted Within Past 24 Hours    ECHOCARDIOGRAM:  Results for orders placed during the hospital encounter of 12/11/20   Adult Transthoracic Echo Complete W/ Cont if Necessary Per Protocol (With Agitated Saline)    Narrative · Estimated left ventricular EF = 65% Left ventricular systolic function   is normal.  · Left ventricular diastolic dysfunction is noted.  · The right atrial cavity is mildly dilated.  · Moderate aortic valve stenosis is present.  · Estimated right ventricular systolic pressure from tricuspid   regurgitation is moderately elevated (45-55 mmHg).           I reviewed the patient's new clinical results.    This note has been scribed by me for pulmonary attending physician.    Electronically signed by LARRY Eng, 12/16/20  at 08:38 EST.

## 2020-12-16 NOTE — ANESTHESIA PROCEDURE NOTES
Peripheral Block      Patient reassessed immediately prior to procedure    Patient location during procedure: pre-op  Start time: 12/16/2020 11:40 AM  Stop time: 12/16/2020 11:45 AM  Reason for block: procedure for pain, at surgeon's request, post-op pain management and secondary anesthetic  Performed by  Anesthesiologist: Omar Brewer MD  Preanesthetic Checklist  Completed: patient identified, site marked, surgical consent, pre-op evaluation, timeout performed, IV checked, risks and benefits discussed and monitors and equipment checked  Prep:  Pt Position: supine  Sterile barriers:cap, gloves and mask  Prep: ChloraPrep  Patient monitoring: blood pressure monitoring, continuous pulse oximetry and EKG  Procedure  Sedation:no  Performed under: local infiltration  Guidance:ultrasound guided and landmark technique  ULTRASOUND INTERPRETATION. Using ultrasound guidance a 20 G gauge needle was placed in close proximity to the nerve, at which point, under ultrasound guidance anesthetic was injected in the area of the nerve and spread of the anesthesia was seen on ultrasound in close proximity thereto.  There were no abnormalities seen on ultrasound; a digital image was taken; and the patient tolerated the procedure with no complications. Images:still images obtained, printed/placed on chart    Laterality:left  Block Type:adductor canal block and popliteal  Injection Technique:single-shot  Needle Type:echogenic  Needle Gauge:20 G  Resistance on Injection: less than 15 psi    Medications Used: ropivacaine (NAROPIN) 0.5 % injection, 55 mL  dexamethasone (DECADRON) injection, 8 mg  Med admintered at 12/16/2020 11:45 AM      Medications  Comment:30ml local popliteal   25 ml adductor     izzy well     Post Assessment  Injection Assessment: negative aspiration for heme, no paresthesia on injection and incremental injection  Patient Tolerance:comfortable throughout block  Complications:no  Additional  Notes  Pre-procedure:  Peripheral nerve block performed preoperatively prior to the start of anesthesia time at the request of the patient and the surgeon for the management of postoperative acute surgical pain as well as for a secondary intraoperative anesthetic (general anesthesia is the primary intraoperative anesthetic); patient identified; pre-procedure vital signs, all relevant labs/studies, complete medical/surgical/anesthetic history, full medication list, full allergy list, and NPO status obtained/reviewed; physical assessment performed; anesthetic options, side effects, potential complications, risks, and benefits discussed; questions answered; patient wishes to proceed with the procedure; written anesthesia procedure consent obtained; patient cleared for procedure; time out performed; IV access in situ    Procedure:  ASA monitor placed; supplemental oxygen provided; patient positioned; hand hygiene performed; sterile technique maintained throughout the procedure; sterile prep applied; insertion site determined by anatomical landmarks, palpation, and ultrasound imaging; live ultrasound guidance throughout the procedure; target nerves/landmarks identified on live ultrasound; skin and subcutaneous tissues numbed by injection of 1% lidocaine; 4 inch 20G R2G Ultra 360 Insulated Echogenic Needle used; realtime needle advancement and placement near the target nerves witnessed on live ultrasound; negative aspiration prior to injection; correct needle placement confirmed on live ultrasound by local anesthetic spread around the target nerves; local anesthetic mixture injected with negative aspiration prior to each injection and after each 1-5 mL injected; needle withdrawn; dressing applied; ultrasound image printed and placed in the patient's permanent medical record    Post-procedure:  Peripheral nerve block placed successfully; good block; no apparent complications; minimal estimated blood loss; vital signs  stable throughout; see nurse's notes for vitals; transported to the OR, general anesthesia induced, and surgery started

## 2020-12-16 NOTE — ANESTHESIA POSTPROCEDURE EVALUATION
Patient: Alen Ratliff    Procedure Summary     Date: 12/16/20 Room / Location: [facility] OR 06 / [facility] MAIN OR    Anesthesia Start: 1155 Anesthesia Stop: 1308    Procedure: ANKLE OPEN REDUCTION INTERNAL FIXATION (Left Ankle) Diagnosis:       Closed displaced fracture of lateral malleolus of left fibula, initial encounter      (Closed displaced fracture of lateral malleolus of left fibula, initial encounter [S82.62XA])    Surgeon: CAROLE Vasquez DPM Provider: Omar Brewer MD    Anesthesia Type: general with block ASA Status: 3          Anesthesia Type: general with block    Vitals  Vitals Value Taken Time   /73 12/16/20 1340   Temp 97.8 °F (36.6 °C) 12/16/20 1308   Pulse 62 12/16/20 1341   Resp 13 12/16/20 1335   SpO2 95 % 12/16/20 1341   Vitals shown include unvalidated device data.        Post Anesthesia Care and Evaluation    Patient location during evaluation: PACU  Patient participation: complete - patient participated  Level of consciousness: awake  Pain score: 1 (See nurse's notes for pain score)  Pain management: adequate  Airway patency: patent  Anesthetic complications: No anesthetic complications  PONV Status: none  Cardiovascular status: acceptable  Respiratory status: acceptable  Hydration status: acceptable    Comments: Patient seen and examined postoperatively; vital signs stable; SpO2 greater than or equal to 90%; cardiopulmonary status stable; nausea/vomiting adequately controlled; pain adequately controlled; no apparent anesthesia complications; patient discharged from anesthesia care when discharge criteria were met

## 2020-12-16 NOTE — PLAN OF CARE
Goal Outcome Evaluation:  Plan of Care Reviewed With: patient  Progress: no change       Patient s/p R CEA.  Pt went down for L ankle surgery today.  Tolerated well.  VSS.  Pt still on Dobutamine, and tolerating.

## 2020-12-16 NOTE — PROGRESS NOTES
LOS: 4 days   Admiting Physician- Kendall Arellano MD    Reason For Followup:    Bradycardia  Paroxysmal atrial fibrillation  Tachybradycardia syndrome  Right carotid endarterectomy      Subjective     Patient had slight dizziness with bradycardia but at present was sitting comfortably.    Objective     Hemodynamics are stable    Review of Systems:   Review of Systems   Constitution: Negative for chills and fever.   HENT: Negative for ear discharge and nosebleeds.    Eyes: Negative for discharge and redness.   Cardiovascular: Negative for chest pain, orthopnea, palpitations, paroxysmal nocturnal dyspnea and syncope.   Respiratory: Negative for cough, shortness of breath and wheezing.    Endocrine: Negative for heat intolerance.   Skin: Negative for rash.   Musculoskeletal: Positive for arthritis and joint pain. Negative for myalgias.   Gastrointestinal: Negative for abdominal pain, melena, nausea and vomiting.   Genitourinary: Negative for dysuria and hematuria.   Neurological: Negative for dizziness, light-headedness, numbness and tremors.   Psychiatric/Behavioral: Negative for depression. The patient is not nervous/anxious.          Vital Signs  Vitals:    12/17/20 0200 12/17/20 0530 12/17/20 0829 12/17/20 1009   BP: 127/61 151/64 147/73    BP Location: Left arm Left arm     Patient Position: Lying Lying     Pulse: 85 62 81    Resp: 17 17  15   Temp: 97.6 °F (36.4 °C) 97.9 °F (36.6 °C)  98 °F (36.7 °C)   TempSrc: Oral Oral  Oral   SpO2: 94% 93%  97%   Weight:  95.4 kg (210 lb 5.1 oz)     Height:         Wt Readings from Last 1 Encounters:   12/17/20 95.4 kg (210 lb 5.1 oz)       Intake/Output Summary (Last 24 hours) at 12/17/2020 1142  Last data filed at 12/17/2020 0829  Gross per 24 hour   Intake 770 ml   Output 750 ml   Net 20 ml     Physical Exam:  Constitutional:       Appearance: Well-developed.   Eyes:      General: No scleral icterus.        Right eye: No discharge.   HENT:      Head: Normocephalic  and atraumatic.   Neck:      Thyroid: No thyromegaly.      Lymphadenopathy: No cervical adenopathy.   Pulmonary:      Effort: Pulmonary effort is normal. No respiratory distress.      Breath sounds: Normal breath sounds. No wheezing. No rales.   Cardiovascular:      Normal rate. Regular rhythm.      No gallop.   Abdominal:      Tenderness: There is no abdominal tenderness.   Skin:     Findings: No erythema or rash.   Neurological:      Mental Status: Alert and oriented to person, place, and time.         Results Review:   Lab Results (last 24 hours)     Procedure Component Value Units Date/Time    POC Glucose Once [[id number]]  (Abnormal) Collected: 12/17/20 1106    Specimen: Blood Updated: 12/17/20 1108     Glucose 208 mg/dL      Comment: Serial Number: [id number]:  [id number]       POC Glucose Once [[id number]]  (Abnormal) Collected: 12/17/20 0726    Specimen: Blood Updated: 12/17/20 0727     Glucose 173 mg/dL      Comment: Serial Number: [id number]:  [id number]       POC Glucose Once [[id number]]  (Abnormal) Collected: 12/16/20 1554    Specimen: Blood Updated: 12/16/20 1556     Glucose 164 mg/dL      Comment: Serial Number: [id number]:  [id number]       POC Glucose Once [[id number]]  (Abnormal) Collected: 12/16/20 1310    Specimen: Blood Updated: 12/16/20 1313     Glucose 172 mg/dL      Comment: Serial Number: [id number]:  [id number]           Imaging Results (Last 72 Hours)     Procedure Component Value Units Date/Time    FL C Arm During Surgery [[id number]] Resulted: 12/16/20 1554     Updated: 12/16/20 1736    XR Ankle 2 View Left [[id number]] Resulted: 12/16/20 1553     Updated: 12/16/20 1554        ECG/EMG Results (most recent)     Procedure Component Value Units Date/Time    ECG 12 Lead [[id number]] Collected: 12/11/20 2017     Updated: 12/11/20 2022     QT Interval 383 ms     Narrative:      HEART RATE= 75  bpm  RR Interval= 804  ms  MN Interval= 92  ms  P Horizontal Axis= 221  deg  P Front  Axis= 0  deg  QRSD Interval= 113  ms  QT Interval= 383  ms  QRS Axis= -37  deg  T Wave Axis= 166  deg  - ABNORMAL ECG -  Sinus rhythm  Repol abnrm suggests ischemia, diffuse leads  No previous ECG available for comparison  Electronically Signed By:   Date and Time of Study: 2020-12-11 20:17:32    Adult Transthoracic Echo Complete W/ Cont if Necessary Per Protocol (With Agitated Saline) [[id number]] Collected: 12/12/20 0905     Updated: 12/12/20 1313     BSA 2.2 m^2      RVIDd 2.8 cm      IVSd 1.1 cm      LVIDd 4.9 cm      LVIDs 3.4 cm      LVPWd 1.2 cm      IVS/LVPW 0.95     FS 30.8 %      EDV(Teich) 114.3 ml      ESV(Teich) 47.7 ml      EF(Teich) 58.3 %      EDV(cubed) 119.7 ml      ESV(cubed) 39.6 ml      EF(cubed) 66.9 %      LV mass(C)d 223.3 grams      LV mass(C)dI 103.3 grams/m^2      SV(Teich) 66.6 ml      SI(Teich) 30.8 ml/m^2      SV(cubed) 80.1 ml      SI(cubed) 37.0 ml/m^2      Ao root diam 3.3 cm      Ao root area 8.5 cm^2      ACS 1.9 cm      asc Aorta Diam 2.9 cm      LVOT diam 2.1 cm      LVOT area 3.3 cm^2      RVOT diam 2.2 cm      RVOT area 4.0 cm^2      EDV(MOD-sp4) 69.0 ml      ESV(MOD-sp4) 24.3 ml      EF(MOD-sp4) 64.8 %      SV(MOD-sp4) 44.7 ml      SI(MOD-sp4) 20.7 ml/m^2      Ao root area (BSA corrected) 1.5     LV Delgado Vol (BSA corrected) 31.9 ml/m^2      LV Sys Vol (BSA corrected) 11.2 ml/m^2      Aortic R-R 1.1 sec      Aortic HR 56.3 BPM      MV E max britney 96.8 cm/sec      MV A max britney 108.4 cm/sec      MV E/A 0.89     MV V2 max 125.8 cm/sec      MV max PG 6.3 mmHg      MV V2 mean 79.9 cm/sec      MV mean PG 2.8 mmHg      MV V2 VTI 33.7 cm      MVA(VTI) 3.4 cm^2      MV dec slope 435.1 cm/sec^2      MV dec time 0.22 sec      Ao pk britney 340.0 cm/sec      Ao max PG 46.2 mmHg      Ao max PG (full) 37.9 mmHg      Ao V2 mean 238.7 cm/sec      Ao mean PG 25.3 mmHg      Ao mean PG (full) 20.6 mmHg      Ao V2 VTI 74.5 cm      SAL(I,A) 1.5 cm^2      SAL(I,D) 1.5 cm^2      SAL(V,A) 1.4 cm^2       SAL(V,D) 1.4 cm^2      AI max britney 402.5 cm/sec      AI max PG 64.8 mmHg      AI dec slope 249.5 cm/sec^2      AI dec time 1.6 sec      AI P1/2t 472.5 msec      LV V1 max PG 8.3 mmHg      LV V1 mean PG 4.7 mmHg      LV V1 max 144.4 cm/sec      LV V1 mean 103.3 cm/sec      LV V1 VTI 34.2 cm      CO(Ao) 35.6 l/min      CI(Ao) 16.5 l/min/m^2      SV(Ao) 632.0 ml      SI(Ao) 292.3 ml/m^2      CO(LVOT) 6.4 l/min      CI(LVOT) 3.0 l/min/m^2      SV(LVOT) 113.5 ml      SV(RVOT) 93.2 ml      SI(LVOT) 52.5 ml/m^2      PA V2 max 116.8 cm/sec      PA max PG 5.5 mmHg      PA max PG (full) 0.41 mmHg      PA V2 mean 82.0 cm/sec      PA mean PG 3.0 mmHg      PA mean PG (full) 0.54 mmHg      PA V2 VTI 25.3 cm      PVA(I,A) 3.7 cm^2       CV ECHO SANJAY - PVA(I,D) 3.7 cm^2       CV ECHO SANJAY - PVA(V,A) 3.8 cm^2       CV ECHO SANJAY - PVA(V,D) 3.8 cm^2      PA acc time 0.13 sec      RV V1 max PG 5.1 mmHg      RV V1 mean PG 2.5 mmHg      RV V1 max 112.4 cm/sec      RV V1 mean 73.6 cm/sec      RV V1 VTI 23.5 cm      TR max britney 330.3 cm/sec      RVSP(TR) 46.6 mmHg      RAP systole 3.0 mmHg      PA pr(Accel) 22.5 mmHg      Qp/Qs 0.82      CV ECHO SANJAY - BZI_BMI 28.1 kilograms/m^2       CV ECHO SANJAY - BSA(HAYCOCK) 2.2 m^2       CV ECHO SANJAY - BZI_METRIC_WEIGHT 93.9 kg       CV ECHO SANJAY - BZI_METRIC_HEIGHT 182.9 cm      EF(MOD-bp) 65.0 %      LA dimension(2D) 4.7 cm      Echo EF Estimated 65 %     Narrative:      · Estimated left ventricular EF = 65% Left ventricular systolic function   is normal.  · Left ventricular diastolic dysfunction is noted.  · The right atrial cavity is mildly dilated.  · Moderate aortic valve stenosis is present.  · Estimated right ventricular systolic pressure from tricuspid   regurgitation is moderately elevated (45-55 mmHg).       ECG 12 Lead [[id number]] Collected: 12/13/20 1107     Updated: 12/13/20 1607     QT Interval 418 ms     Narrative:      HEART RATE= 60  bpm  RR Interval= 996  ms  CA  Interval= 296  ms  P Horizontal Axis= -17  deg  P Front Axis= 46  deg  QRSD Interval= 107  ms  QT Interval= 418  ms  QRS Axis= -41  deg  T Wave Axis= -8  deg  - ABNORMAL ECG -  Sinus rhythm  Prolonged CT interval  Inferior infarct, old  When compared with ECG of 11-Dec-2020 20:17:32,  Sinus rate is slower  Electronically Signed By: Steve Swain (LORI) 13-Dec-2020 16:07:07  Date and Time of Study: 2020-12-13 11:07:57    ECG 12 Lead [[id number]] Collected: 12/15/20 0859     Updated: 12/15/20 0901     QT Interval 452 ms     Narrative:      HEART RATE= 47  bpm  RR Interval= 1271  ms  CT Interval=   ms  P Horizontal Axis=   deg  P Front Axis=   deg  QRSD Interval= 110  ms  QT Interval= 452  ms  QRS Axis= -22  deg  T Wave Axis= 69  deg  - ABNORMAL ECG -  Atrial fibrillation  Borderline ST elevation, anterior leads  When compared with ECG of 13-Dec-2020 11:07:57,  No significant change  Electronically Signed By:   Date and Time of Study: 2020-12-15 08:59:00    ECG 12 Lead [[id number]] Collected: 12/16/20 0944     Updated: 12/16/20 0950     QT Interval 419 ms     Narrative:      HEART RATE= 66  bpm  RR Interval= 908  ms  CT Interval=   ms  P Horizontal Axis=   deg  P Front Axis=   deg  QRSD Interval= 106  ms  QT Interval= 419  ms  QRS Axis= -23  deg  T Wave Axis= 116  deg  - ABNORMAL ECG -  Atrial fibrillation  Abnormal T, consider ischemia, lateral leads  When compared with ECG of 15-Dec-2020 8:59:00,  Significant repolarization change  Electronically Signed By:   Date and Time of Study: 2020-12-16 09:44:18        CBC    Results from last 7 days   Lab Units 12/16/20  0412 12/15/20  0428 12/13/20  0437 12/11/20  1959   WBC 10*3/mm3 9.10 11.70* 7.30 6.90   HEMOGLOBIN g/dL 11.8* 12.3* 12.2* 14.4   PLATELETS 10*3/mm3 221 204 179 219     BMP   Results from last 7 days   Lab Units 12/16/20  0412 12/15/20  0428 12/13/20  0437 12/11/20  1959   SODIUM mmol/L 136 136 141 141   POTASSIUM mmol/L 4.1 4.2 3.8 3.4*   CHLORIDE mmol/L  106 103 109* 107   CO2 mmol/L 20.0* 20.0* 23.0 21.0*   BUN mg/dL 41* 33* 23 23   CREATININE mg/dL 1.54* 1.50* 1.35* 1.16   GLUCOSE mg/dL 166* 170* 142* 133*   MAGNESIUM mg/dL  --  1.4*  --   --    PHOSPHORUS mg/dL  --  4.0  --   --      CMP   Results from last 7 days   Lab Units 12/16/20  0412 12/15/20  0428 12/13/20  0437 12/11/20  1959   SODIUM mmol/L 136 136 141 141   POTASSIUM mmol/L 4.1 4.2 3.8 3.4*   CHLORIDE mmol/L 106 103 109* 107   CO2 mmol/L 20.0* 20.0* 23.0 21.0*   BUN mg/dL 41* 33* 23 23   CREATININE mg/dL 1.54* 1.50* 1.35* 1.16   GLUCOSE mg/dL 166* 170* 142* 133*   ALBUMIN g/dL  --   --   --  4.00   BILIRUBIN mg/dL  --   --   --  0.4   ALK PHOS U/L  --   --   --  64   AST (SGOT) U/L  --   --   --  21   ALT (SGPT) U/L  --   --   --  23     Cardiac Studies:  Echo-   Results for orders placed during the hospital encounter of 12/11/20   Adult Transthoracic Echo Complete W/ Cont if Necessary Per Protocol (With Agitated Saline)    Narrative · Estimated left ventricular EF = 65% Left ventricular systolic function   is normal.  · Left ventricular diastolic dysfunction is noted.  · The right atrial cavity is mildly dilated.  · Moderate aortic valve stenosis is present.  · Estimated right ventricular systolic pressure from tricuspid   regurgitation is moderately elevated (45-55 mmHg).        Stress Myoview-  Cath-      Medication Review:   Scheduled Meds:amLODIPine, 10 mg, Oral, Daily  aspirin, 81 mg, Oral, Daily  atorvastatin, 80 mg, Oral, Nightly  clopidogrel, 75 mg, Oral, Daily  insulin lispro, 0-9 Units, Subcutaneous, TID AC  levothyroxine, 75 mcg, Oral, Daily  losartan, 50 mg, Oral, Daily  pantoprazole, 40 mg, Oral, Daily  sodium chloride, 3 mL, Intravenous, Q12H      Continuous Infusions:DOBUTamine, 2.5 mcg/kg/min, Last Rate: Stopped (12/17/20 0037)  lactated ringers, 1,000 mL, Last Rate: Stopped (12/17/20 0038)      PRN Meds:.•  acetaminophen **OR** acetaminophen  •  bisacodyl  •  Calcium Gluconate-NaCl  **AND** calcium gluconate **AND** Calcium, Ionized  •  dextrose  •  dextrose  •  diphenhydrAMINE  •  glucagon (human recombinant)  •  hydrALAZINE  •  HYDROcodone-acetaminophen  •  HYDROmorphone **AND** naloxone  •  influenza vaccine  •  insulin lispro **AND** insulin lispro  •  magnesium sulfate **OR** magnesium sulfate **OR** magnesium sulfate  •  ondansetron  •  potassium chloride **OR** potassium chloride **OR** potassium chloride  •  potassium phosphate infusion greater than 15 mMoles **OR** potassium phosphate **OR** potassium phosphate  •  sodium chloride  •  sodium chloride      Assessment/Plan   Patient Active Problem List   Diagnosis   • Heart murmur   • Stroke (cerebrum) (CMS/HCC)   • Essential hypertension   • Mixed hyperlipidemia   • Acquired hypothyroidism   • Type 2 diabetes mellitus, without long-term current use of insulin (CMS/[facility])   • GERD (gastroesophageal reflux disease)   • Seasonal allergic rhinitis due to pollen   • CKD (chronic kidney disease) stage 3, GFR 30-59 ml/min   • History of prostate cancer   • TIA (transient ischemic attack)   • Symptomatic carotid artery stenosis, bilateral   • Closed displaced fracture of lateral malleolus of left fibula       Assessment:  Preoperative cardiovascular risk assessment requested  Right carotid artery stenosis noted to be 80% by CTA of head and neck  Acute TIA  History of CVA  Moderate aortic stenosis by recent echocardiogram; preserved LV function  Abnormal stress myoview 9/2020: moderate inferior/septal MI with small amount joseph-infarct    Ischemia  Atrial Fibrillation slow VR: Currently on IV Dobutamine    Plan:  S/p right carotid endarterectomy  Plans for surgery for distal fibula fracture today  Remains in Afib with slow VR.  On IV dobutamine for hr   Will ask Dr. Dave to evaluate re: possible need for PM  Avoid negative chronotropic agents.     Patient is seen and examined and findings are verified.  Patient underwent left foot surgery and  did well.  Patient is on dobutamine right now.    Heart rate is relatively stable.  Patient is still in atrial fibrillation.  Blood pressure is stable.    Normal S1 and S2.  Heart rate is irregular.  Chest is clear to auscultation.  Surgical wound on the right leg is normal and healing well.  No leg edema.    I would recommend to discontinue dobutamine.  We will observe the heart rate.  If patient continues to have bradycardia overnight, we will consider permanent pacemaker.  I will discontinue intravenous dobutamine now.  Patient probably would need anticoagulation if there is persistent atrial fibrillation.    I discussed in detail pros and cons and the indication of permanent pacemaker with the patient    Steve Muhammad MD  12/17/20  11:42 EST

## 2020-12-16 NOTE — PROGRESS NOTES
[facility] Daily Progress Note      Hospitalist Team  LOS 3 days      Patient Care Team:  Serenity Wren MD as PCP - General (Family Medicine)  William Patricio MD as Consulting Physician (Neurology)    Patient Location: 2202/1      Subjective   Subjective   No complaints and feeling better.  Chief Complaint / Subjective  Chief Complaint   Patient presents with   • Numbness     tingling on left side and face.  Onset 30 minutes ago.          Brief Synopsis of Hospital Course/HPI  80-year-old male with known history of hypertension,  CVA, T2DM, hyperlipidemia, prostate cancer, hypothyroidism, and GERD.  He presented to [facility] ED complaining of left arm/left facial numbness.  Denies dysphagia, no dysarthria, slurred speech or speech impediment.  No reported headache, visual changes, no chest pain, shortness of breath dizziness or lightheadedness.  He denies fever, chills, no abdominal pain, nausea or vomiting.  .     On presentation, he was hypertensive.  CT of the head without contrast showed no acute intracranial finding, hemorrhage,but  revealed lacunar infarct in the right thalamus which appears to be chronic.  CTA of the head and neck with finding of left ICA with 50% stenosis and 80% stenosis on the right ICA.  Follow-up MRI of the brain on 12/20/2020 showed no definite H acute infarct or other intracranial abnormalities.  Laboratory notable for mild hypokalemia, otherwise, unremarkable.  X-ray showed no acute cardiopulmonary finding. He is admitted for suspected TIA versus ischemic stroke, and bilateral SIVAN.  He was maintained on stroke protocol with dual antiplatelet with aspirin/Plavix and neurologist/vascular surgeon seen in consultation. During hospitalization,  he complained of left ankle pain.  X-ray of the ankle obtained revealed fracture of the distal fibula and malleolus, most likely due to mechanical fall at home.  Vascular surgeon is recommending  "endarterectomy and will follow with orthopedic surgeon for evaluation and recommendation.      12/16/2020: Event noted for A. fib with slow ventricular rate with heart rate in the mid 30s and seen by cardiologist in consultation for possible PPM implant.   12/14/2020: Underwent right carotid endarterectomy by Dr. Kamran Fung         Objective   Objective    Seen and examined in follow-up.  Sitting comfortably in chair in no distress or discomfort.  Vital Signs  Temp:  [97.3 °F (36.3 °C)-98.5 °F (36.9 °C)] 97.8 °F (36.6 °C)  Heart Rate:  [47-92] 73  Resp:  [12-17] 13  BP: (107-177)/(32-99) 177/81  Oxygen Therapy  SpO2: 97 %  Pulse Oximetry Type: Continuous  Device (Oxygen Therapy): nasal cannula  Flow (L/min): 2  Flowsheet Rows      First Filed Value   Admission Height  175.3 cm (69\") Documented at 12/11/2020 1959   Admission Weight  95.7 kg (211 lb) Documented at 12/11/2020 1959        Intake & Output (last 3 days)       12/13 0701 - 12/14 0700 12/14 0701 - 12/15 0700 12/15 0701 - 12/16 0700 12/16 0701 - 12/17 0700    P.O. 680 120 480     I.V. (mL/kg)   152 (1.6) 400 (4.1)    Total Intake(mL/kg) 680 (7) 120 (1.3) 632 (6.5) 400 (4.1)    Urine (mL/kg/hr)        Total Output        Net +680 +120 +632 +400            Urine Unmeasured Occurrence 1 x  1 x         Lines, Drains & Airways    Active LDAs     Name:   Placement date:   Placement time:   Site:   Days:    Peripheral IV 09/24/20 0830 Anterior;Distal;Right;Upper Arm   09/24/20    0830    Arm   83                Physical Exam  Constitutional:       Appearance: Normal appearance.    HENT:      Head: Normocephalic and atraumatic.      Right Ear: External ear normal.      Left Ear: External ear normal.      Nose: Nose normal.      Mouth/Throat:      Mouth: Mucous membranes are moist.   Eyes:      Extraocular Movements: Extraocular movements intact.   Neck: Right endarterectomy site is clean with no redness or drainage     Musculoskeletal: Normal range of motion and " neck supple.   Cardiovascular:      Rate and Rhythm: Normal rate and regular rhythm.      Pulses: Normal pulses.      Heart sounds: Murmur present.   Pulmonary:      Effort: Pulmonary effort is normal.      Breath sounds: Normal breath sounds.   Abdominal:      Palpations: Abdomen is soft.   Genitourinary:     Comments: deferred  Musculoskeletal: Normal range of motion.   Skin: Left ankle slightly swollen with decreased range of motion due to pain     General: Skin is warm and dry.   Neurological:      General: No focal deficit present.      Mental Status: He is alert and oriented to person, place, and time. Mental status is at baseline.   Psychiatric:         Mood and Affect: Mood normal.         Behavior: Behavior normal.         Thought Content: Thought content normal.              Wounds (last 24 hours)      LDA Wound     Row Name 12/16/20 1336 12/16/20 1308 12/16/20 0800       Wound 12/14/20 0830 Right neck    Wound - Properties Group Placement Date: 12/14/20  -CW Placement Time: 0830  -CW Present on Hospital Admission: N  -CW Side: Right  -CW Location: neck  -CW    Dressing Appearance  --  --  open to air  -BH    Closure  --  --  Approximated;Liquid skin adhesive  -    Base  --  --  clean;dry  -BH    Drainage Amount  --  --  none  -BH    Retired Wound - Properties Group Date first assessed: 12/14/20  -CW Time first assessed: 0830  -CW Present on Hospital Admission: N  -CW Side: Right  -CW Location: neck  -CW       Wound 12/16/20 1248 Left anterior ankle Incision    Wound - Properties Group Placement Date: 12/16/20  -LJ Placement Time: 1248  -LJ Side: Left  -LJ Orientation: anterior  -LJ Location: ankle  -LJ Primary Wound Type: Incision  -LJ Additional Comments: staples, xeroform gauze, fluffs, kerlix wrap, and ace cam walker boot  -LJ    Dressing Appearance  dry;intact;no drainage  -AH  dry;intact;no drainage  -AH  --    Closure  VANDANA Staples, xeroform, 4x4's, kerlix, Ace  -AH  VANDANA [place], xeroform, 4x4's,  kerlix, Ace  -AH  --    Retired Wound - Properties Group Date first assessed: 12/16/20  -LJ Time first assessed: 1248  -LJ Side: Left  -LJ Location: ankle  -LJ Primary Wound Type: Incision  -LJ Additional Comments: staples, xeroform gauze, fluffs, kerlix wrap, and ace cam walker boot  -LJ    Row Name 12/16/20 0400 12/16/20 0000 12/15/20 2000       Wound 12/14/20 0830 Right neck    Wound - Properties Group Placement Date: 12/14/20  -CW Placement Time: 0830  -CW Present on Hospital Admission: N  -CW Side: Right  -CW Location: neck  -CW    Dressing Appearance  --  --  open to air  -AM    Closure  Approximated;Liquid skin adhesive  -AM  Approximated;Liquid skin adhesive  -AM  Approximated;Liquid skin adhesive  -AM    Base  clean;dry  -AM  clean;dry  -AM  clean;dry  -AM    Drainage Amount  none  -AM  none  -AM  none  -AM    Care, Wound  --  --  cleansed with;antimicrobial agent applied  -AM    Dressing Care  --  --  open to air  -AM    Retired Wound - Properties Group Date first assessed: 12/14/20  -CW Time first assessed: 0830  -CW Present on Hospital Admission: N  -CW Side: Right  -CW Location: neck  -CW    Row Name 12/15/20 1600             Wound 12/14/20 0830 Right neck    Wound - Properties Group Placement Date: 12/14/20  -CW Placement Time: 0830  -CW Present on Hospital Admission: N  -CW Side: Right  -CW Location: neck  -CW    Closure  Approximated;Liquid skin adhesive  -MC      Base  clean;dry  -MC      Drainage Amount  none  -MC      Dressing Care  open to air  -MC      Retired Wound - Properties Group Date first assessed: 12/14/20  -CW Time first assessed: 0830  -CW Present on Hospital Admission: N  -CW Side: Right  -CW Location: neck  -CW      User Key  (r) = Recorded By, (t) = Taken By, (c) = Cosigned By    Initials Name Provider Type    Susanne King, RN Registered Nurse    Carrie Hi, RN Registered Nurse    Shelia Triana, RN Registered Nurse    Dylon Duenas RN Registered Nurse    GEORGINA Moore  Flores COBB, RN Registered Nurse     Larisa Reeder RN Registered Nurse          Procedures:    Procedure(s):  ANKLE OPEN REDUCTION INTERNAL FIXATION          Results Review:     I reviewed the patient's new clinical results.      Lab Results (last 24 hours)     Procedure Component Value Units Date/Time    POC Glucose Once [[id number]]  (Abnormal) Collected: 12/16/20 1310    Specimen: Blood Updated: 12/16/20 1313     Glucose 172 mg/dL      Comment: Serial Number: [id number]:  [id number]       POC Glucose Once [[id number]]  (Abnormal) Collected: 12/16/20 1128    Specimen: Blood Updated: 12/16/20 1138     Glucose 165 mg/dL      Comment: Serial Number: [id number]:  [id number]       POC Glucose Once [[id number]]  (Abnormal) Collected: 12/16/20 0806    Specimen: Blood Updated: 12/16/20 0807     Glucose 155 mg/dL      Comment: Serial Number: [id number]:  [id number]       Basic Metabolic Panel [[id number]]  (Abnormal) Collected: 12/16/20 0412    Specimen: Blood Updated: 12/16/20 0446     Glucose 166 mg/dL      BUN 41 mg/dL      Creatinine 1.54 mg/dL      Sodium 136 mmol/L      Potassium 4.1 mmol/L      Chloride 106 mmol/L      CO2 20.0 mmol/L      Calcium 8.5 mg/dL      eGFR Non African Amer 44 mL/min/1.73      BUN/Creatinine Ratio 26.6     Anion Gap 10.0 mmol/L     Narrative:      GFR Normal >60  Chronic Kidney Disease <60  Kidney Failure <15      CBC & Differential [[id number]]  (Abnormal) Collected: 12/16/20 0412    Specimen: Blood Updated: 12/16/20 0421    Narrative:      The following orders were created for panel order CBC & Differential.  Procedure                               Abnormality         Status                     ---------                               -----------         ------                     CBC Auto Differential[[id number]]        Abnormal            Final result                 Please view results for these tests on the individual orders.    CBC Auto Differential [[id number]]   (Abnormal) Collected: 12/16/20 0412    Specimen: Blood Updated: 12/16/20 0421     WBC 9.10 10*3/mm3      RBC 3.90 10*6/mm3      Hemoglobin 11.8 g/dL      Hematocrit 36.4 %      MCV 93.2 fL      MCH 30.2 pg      MCHC 32.4 g/dL      RDW 14.5 %      RDW-SD 46.8 fl      MPV 8.8 fL      Platelets 221 10*3/mm3      Neutrophil % 63.8 %      Lymphocyte % 18.9 %      Monocyte % 15.2 %      Eosinophil % 1.4 %      Basophil % 0.7 %      Neutrophils, Absolute 5.80 10*3/mm3      Lymphocytes, Absolute 1.70 10*3/mm3      Monocytes, Absolute 1.40 10*3/mm3      Eosinophils, Absolute 0.10 10*3/mm3      Basophils, Absolute 0.10 10*3/mm3      nRBC 0.1 /100 WBC     POC Glucose Once [[id number]]  (Abnormal) Collected: 12/15/20 1949    Specimen: Blood Updated: 12/15/20 1951     Glucose 194 mg/dL      Comment: Serial Number: [id number]:  [id number]       Extra Tubes [[id number]] Collected: 12/15/20 1631    Specimen: Blood, Venous Line Updated: 12/15/20 1745    Narrative:      The following orders were created for panel order Extra Tubes.  Procedure                               Abnormality         Status                     ---------                               -----------         ------                     Lavender Top[[id number]]                                     Final result               Gold Top - SST[[id number]]                                   Final result               Green Top (Gel)[[id number]]                                  Final result                 Please view results for these tests on the individual orders.    Lavender Top [[id number]] Collected: 12/15/20 1631    Specimen: Blood Updated: 12/15/20 1745     Extra Tube hold for add-on     Comment: Auto resulted       Gold Top - SST [[id number]] Collected: 12/15/20 1631    Specimen: Blood Updated: 12/15/20 1745     Extra Tube Hold for add-ons.     Comment: Auto resulted.       Green Top (Gel) [[id number]] Collected: 12/15/20 1631    Specimen: Blood Updated: 12/15/20 1745      Extra Tube Hold for add-ons.     Comment: Auto resulted.       Protime-INR [[id number]]  (Normal) Collected: 12/15/20 1631    Specimen: Blood Updated: 12/15/20 1708     Protime 10.3 Seconds      INR 0.93    aPTT [[id number]]  (Normal) Collected: 12/15/20 1631    Specimen: Blood Updated: 12/15/20 1708     PTT 24.5 seconds     POC Glucose Once [[id number]]  (Abnormal) Collected: 12/15/20 1634    Specimen: Blood Updated: 12/15/20 1637     Glucose 178 mg/dL      Comment: Serial Number: [id number]:  [id number]           No results found for: HGBA1C  Results from last 7 days   Lab Units 12/15/20  1631 12/11/20 1959   INR  0.93 0.95           No results found for: LIPASE  Lab Results   Component Value Date    CHOL 162 12/12/2020    TRIG 93 12/12/2020    HDL 48 12/12/2020    LDL 97 12/12/2020       No results found for: INTRAOP, PREDX, FINALDX, COMDX    Microbiology Results (last 10 days)     Procedure Component Value - Date/Time    COVID PRE-OP / PRE-PROCEDURE SCREENING ORDER (NO ISOLATION) - Swab, Nasopharynx [[id number]]  (Normal) Collected: 12/12/20 1817    Lab Status: Final result Specimen: Swab from Nasopharynx Updated: 12/12/20 1915    Narrative:      The following orders were created for panel order COVID PRE-OP / PRE-PROCEDURE SCREENING ORDER (NO ISOLATION) - Swab, Nasopharynx.  Procedure                               Abnormality         Status                     ---------                               -----------         ------                     COVID-19,CEPHEID,COR/LORI...[[id number]]  Normal              Final result                 Please view results for these tests on the individual orders.    COVID-19,CEPHEID,COR/LORI/PAD IN-HOUSE(OR EMERGENT/ADD-ON),NP SWAB IN TRANSPORT MEDIA 3-4 HR TAT - Swab, Nasopharynx [[id number]]  (Normal) Collected: 12/12/20 1817    Lab Status: Final result Specimen: Swab from Nasopharynx Updated: 12/12/20 1915     COVID19 Not Detected    Narrative:      Fact sheet for  providers: https://www.fda.gov/media/377307/download     Fact sheet for patients: https://www.fda.gov/media/177000/download          ECG/EMG Results (most recent)     Procedure Component Value Units Date/Time    ECG 12 Lead [[id number]] Collected: 12/11/20 2017     Updated: 12/11/20 2022     QT Interval 383 ms     Narrative:      HEART RATE= 75  bpm  RR Interval= 804  ms  LA Interval= 92  ms  P Horizontal Axis= 221  deg  P Front Axis= 0  deg  QRSD Interval= 113  ms  QT Interval= 383  ms  QRS Axis= -37  deg  T Wave Axis= 166  deg  - ABNORMAL ECG -  Sinus rhythm  Repol abnrm suggests ischemia, diffuse leads  No previous ECG available for comparison  Electronically Signed By:   Date and Time of Study: 2020-12-11 20:17:32    Adult Transthoracic Echo Complete W/ Cont if Necessary Per Protocol (With Agitated Saline) [[id number]] Collected: 12/12/20 0905     Updated: 12/12/20 1313     BSA 2.2 m^2      RVIDd 2.8 cm      IVSd 1.1 cm      LVIDd 4.9 cm      LVIDs 3.4 cm      LVPWd 1.2 cm      IVS/LVPW 0.95     FS 30.8 %      EDV(Teich) 114.3 ml      ESV(Teich) 47.7 ml      EF(Teich) 58.3 %      EDV(cubed) 119.7 ml      ESV(cubed) 39.6 ml      EF(cubed) 66.9 %      LV mass(C)d 223.3 grams      LV mass(C)dI 103.3 grams/m^2      SV(Teich) 66.6 ml      SI(Teich) 30.8 ml/m^2      SV(cubed) 80.1 ml      SI(cubed) 37.0 ml/m^2      Ao root diam 3.3 cm      Ao root area 8.5 cm^2      ACS 1.9 cm      asc Aorta Diam 2.9 cm      LVOT diam 2.1 cm      LVOT area 3.3 cm^2      RVOT diam 2.2 cm      RVOT area 4.0 cm^2      EDV(MOD-sp4) 69.0 ml      ESV(MOD-sp4) 24.3 ml      EF(MOD-sp4) 64.8 %      SV(MOD-sp4) 44.7 ml      SI(MOD-sp4) 20.7 ml/m^2      Ao root area (BSA corrected) 1.5     LV Delgado Vol (BSA corrected) 31.9 ml/m^2      LV Sys Vol (BSA corrected) 11.2 ml/m^2      Aortic R-R 1.1 sec      Aortic HR 56.3 BPM      MV E max britney 96.8 cm/sec      MV A max britney 108.4 cm/sec      MV E/A 0.89     MV V2 max 125.8 cm/sec      MV max PG 6.3 mmHg       MV V2 mean 79.9 cm/sec      MV mean PG 2.8 mmHg      MV V2 VTI 33.7 cm      MVA(VTI) 3.4 cm^2      MV dec slope 435.1 cm/sec^2      MV dec time 0.22 sec      Ao pk britney 340.0 cm/sec      Ao max PG 46.2 mmHg      Ao max PG (full) 37.9 mmHg      Ao V2 mean 238.7 cm/sec      Ao mean PG 25.3 mmHg      Ao mean PG (full) 20.6 mmHg      Ao V2 VTI 74.5 cm      SAL(I,A) 1.5 cm^2      SAL(I,D) 1.5 cm^2      SAL(V,A) 1.4 cm^2      SAL(V,D) 1.4 cm^2      AI max britney 402.5 cm/sec      AI max PG 64.8 mmHg      AI dec slope 249.5 cm/sec^2      AI dec time 1.6 sec      AI P1/2t 472.5 msec      LV V1 max PG 8.3 mmHg      LV V1 mean PG 4.7 mmHg      LV V1 max 144.4 cm/sec      LV V1 mean 103.3 cm/sec      LV V1 VTI 34.2 cm      CO(Ao) 35.6 l/min      CI(Ao) 16.5 l/min/m^2      SV(Ao) 632.0 ml      SI(Ao) 292.3 ml/m^2      CO(LVOT) 6.4 l/min      CI(LVOT) 3.0 l/min/m^2      SV(LVOT) 113.5 ml      SV(RVOT) 93.2 ml      SI(LVOT) 52.5 ml/m^2      PA V2 max 116.8 cm/sec      PA max PG 5.5 mmHg      PA max PG (full) 0.41 mmHg      PA V2 mean 82.0 cm/sec      PA mean PG 3.0 mmHg      PA mean PG (full) 0.54 mmHg      PA V2 VTI 25.3 cm      PVA(I,A) 3.7 cm^2      BH CV ECHO SANJAY - PVA(I,D) 3.7 cm^2      BH CV ECHO SANJAY - PVA(V,A) 3.8 cm^2      BH CV ECHO SANJAY - PVA(V,D) 3.8 cm^2      PA acc time 0.13 sec      RV V1 max PG 5.1 mmHg      RV V1 mean PG 2.5 mmHg      RV V1 max 112.4 cm/sec      RV V1 mean 73.6 cm/sec      RV V1 VTI 23.5 cm      TR max britney 330.3 cm/sec      RVSP(TR) 46.6 mmHg      RAP systole 3.0 mmHg      PA pr(Accel) 22.5 mmHg      Qp/Qs 0.82      CV ECHO SANJAY - BZI_BMI 28.1 kilograms/m^2       CV ECHO SANJAY - BSA([facility]) 2.2 m^2       CV ECHO SANJAY - BZI_METRIC_WEIGHT 93.9 kg       CV ECHO SANJAY - BZI_METRIC_HEIGHT 182.9 cm      EF(MOD-bp) 65.0 %      LA dimension(2D) 4.7 cm      Echo EF Estimated 65 %     Narrative:      · Estimated left ventricular EF = 65% Left ventricular systolic function   is normal.  · Left  ventricular diastolic dysfunction is noted.  · The right atrial cavity is mildly dilated.  · Moderate aortic valve stenosis is present.  · Estimated right ventricular systolic pressure from tricuspid   regurgitation is moderately elevated (45-55 mmHg).       ECG 12 Lead [[id number]] Collected: 12/13/20 1107     Updated: 12/13/20 1607     QT Interval 418 ms     Narrative:      HEART RATE= 60  bpm  RR Interval= 996  ms  AR Interval= 296  ms  P Horizontal Axis= -17  deg  P Front Axis= 46  deg  QRSD Interval= 107  ms  QT Interval= 418  ms  QRS Axis= -41  deg  T Wave Axis= -8  deg  - ABNORMAL ECG -  Sinus rhythm  Prolonged AR interval  Inferior infarct, old  When compared with ECG of 11-Dec-2020 20:17:32,  Sinus rate is slower  Electronically Signed By: Steve Swain (LORI) 13-Dec-2020 16:07:07  Date and Time of Study: 2020-12-13 11:07:57    ECG 12 Lead [[id number]] Collected: 12/15/20 0859     Updated: 12/15/20 0901     QT Interval 452 ms     Narrative:      HEART RATE= 47  bpm  RR Interval= 1271  ms  AR Interval=   ms  P Horizontal Axis=   deg  P Front Axis=   deg  QRSD Interval= 110  ms  QT Interval= 452  ms  QRS Axis= -22  deg  T Wave Axis= 69  deg  - ABNORMAL ECG -  Atrial fibrillation  Borderline ST elevation, anterior leads  When compared with ECG of 13-Dec-2020 11:07:57,  No significant change  Electronically Signed By:   Date and Time of Study: 2020-12-15 08:59:00    ECG 12 Lead [[id number]] Collected: 12/16/20 0944     Updated: 12/16/20 0950     QT Interval 419 ms     Narrative:      HEART RATE= 66  bpm  RR Interval= 908  ms  AR Interval=   ms  P Horizontal Axis=   deg  P Front Axis=   deg  QRSD Interval= 106  ms  QT Interval= 419  ms  QRS Axis= -23  deg  T Wave Axis= 116  deg  - ABNORMAL ECG -  Atrial fibrillation  Abnormal T, consider ischemia, lateral leads  When compared with ECG of 15-Dec-2020 8:59:00,  Significant repolarization change  Electronically Signed By:   Date and Time of Study: 2020-12-16  09:44:18          Results for orders placed during the hospital encounter of 12/11/20   Intra-Op Duplex Carotid Right Lmt CAR    Narrative · Imaging indicates a normal intra-op exam.          Results for orders placed during the hospital encounter of 12/11/20   Adult Transthoracic Echo Complete W/ Cont if Necessary Per Protocol (With Agitated Saline)    Narrative · Estimated left ventricular EF = 65% Left ventricular systolic function   is normal.  · Left ventricular diastolic dysfunction is noted.  · The right atrial cavity is mildly dilated.  · Moderate aortic valve stenosis is present.  · Estimated right ventricular systolic pressure from tricuspid   regurgitation is moderately elevated (45-55 mmHg).          Xr Ankle 2 View Left    Result Date: 12/12/2020  1.Oblique fracture of the distal fibula with mild lateral displacement. 2.Suspected minimally displaced fracture of the posterior malleolus. 3.Mild osteoarthritis. 4.Calcific atherosclerosis.  Electronically Signed By-Dylon Cedillo MD On:12/12/2020 1:05 PM This report was finalized on [id number] by  Dylon Cedillo MD.    Ct Angiogram Neck    Result Date: 12/14/2020   1. Approximately 50% stenosis of the right internal carotid artery in its proximal portion. Approximately 80% stenosis of the left internal carotid artery in its proximal portion. 2. Small vertebrobasilar system. The left vertebral artery terminates in the left posterior inferior cerebellar artery. 3. Significant right M2 stenosis. 4. Multiple stenoses throughout the posterior cerebral arteries bilaterally.  Electronically Signed By-Sg Tyson MD On:12/14/2020 9:21 AM This report was finalized on [id number] by  Sg Tyson MD.    Mri Brain Without Contrast    Result Date: 12/12/2020  1.No definite MRI findings of acute infarction or other acute intracranial abnormality at this time. 2.Volume loss and suspected mild to moderate chronic small vessel ischemic changes with remote lacunar  infarct in the right basal ganglion.   Electronically Signed By-Dylon Cedillo MD On:12/12/2020 1:33 PM This report was finalized on [id number] by  Dylon Cedillo MD.    Xr Chest 1 View    Result Date: 12/11/2020  1.  No evidence of active disease.   Electronically Signed By-Nikki Bright MD On:12/11/2020 8:43 PM This report was finalized on [id number] by  Nikki Bright MD.    Ct Angiogram Head    Result Date: 12/14/2020   1. Approximately 50% stenosis of the right internal carotid artery in its proximal portion. Approximately 80% stenosis of the left internal carotid artery in its proximal portion. 2. Small vertebrobasilar system. The left vertebral artery terminates in the left posterior inferior cerebellar artery. 3. Significant right M2 stenosis. 4. Multiple stenoses throughout the posterior cerebral arteries bilaterally.  Electronically Signed By-Sg Tyson MD On:12/14/2020 9:21 AM This report was finalized on [id number] by  Sg Tyson MD.    Ct Head Without Contrast Stroke Protocol    Result Date: 12/11/2020  1. Negative for hemorrhage or mass effect. 2. There is moderate cerebral and cerebellar atrophy. There is mild white matter abnormality which is favored to be due to chronic small vessel ischemia. 3. 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 4. If acute ischemia is clinically suspected, recommend consideration of brain MR.  Electronically Signed By-Nikki Bright MD On:12/11/2020 8:04 PM This report was finalized on [id number] by  Nikki Bright MD.          Xrays, labs reviewed personally by physician.    Medication Review:   I have reviewed the patient's current medication list      Scheduled Meds  amLODIPine, 10 mg, Oral, Daily  [MAR Hold] aspirin, 81 mg, Oral, Daily  [MAR Hold] atorvastatin, 80 mg, Oral, Nightly  [MAR Hold] clopidogrel, 75 mg, Oral, Daily  [MAR Hold] insulin lispro, 0-9 Units, Subcutaneous, TID AC  [MAR Hold] levothyroxine, 75 mcg, Oral, Daily  losartan,  50 mg, Oral, Daily  [MAR Hold] pantoprazole, 40 mg, Oral, Daily  [MAR Hold] sodium chloride, 3 mL, Intravenous, Q12H        Meds Infusions  DOBUTamine, 2.5 mcg/kg/min, Last Rate: 2.5 mcg/kg/min (12/15/20 1832)  lactated ringers, 1,000 mL, Last Rate: 1,000 mL (12/16/20 1122)        Meds PRN  •  [MAR Hold] acetaminophen **OR** [MAR Hold] acetaminophen  •  [MAR Hold] bisacodyl  •  [MAR Hold] Calcium Gluconate-NaCl **AND** [MAR Hold] calcium gluconate **AND** Calcium, Ionized  •  [MAR Hold] dextrose  •  [MAR Hold] dextrose  •  [MAR Hold] diphenhydrAMINE  •  [MAR Hold] glucagon (human recombinant)  •  hydrALAZINE  •  [MAR Hold] HYDROcodone-acetaminophen  •  [MAR Hold] HYDROmorphone **AND** [MAR Hold] naloxone  •  [MAR Hold] influenza vaccine  •  [MAR Hold] insulin lispro **AND** [MAR Hold] insulin lispro  •  [MAR Hold] magnesium sulfate **OR** [MAR Hold] magnesium sulfate **OR** [MAR Hold] magnesium sulfate  •  [MAR Hold] ondansetron  •  [MAR Hold] potassium chloride **OR** [MAR Hold] potassium chloride **OR** [MAR Hold] potassium chloride  •  [MAR Hold] potassium phosphate infusion greater than 15 mMoles **OR** [MAR Hold] potassium phosphate **OR** [MAR Hold] potassium phosphate  •  [MAR Hold] sodium chloride  •  [MAR Hold] sodium chloride        Assessment/Plan   Assessment/Plan     Active Hospital Problems    Diagnosis  POA   • **Symptomatic carotid artery stenosis, bilateral [I65.23]  Yes   • Closed displaced fracture of lateral malleolus of left fibula [S82.62XA]  Unknown   • TIA (transient ischemic attack) [G45.9]  Yes   • Essential hypertension [I10]  Yes   • Mixed hyperlipidemia [E78.2]  Yes   • Type 2 diabetes mellitus, without long-term current use of insulin (CMS/HCC) [E11.9]  Yes      Resolved Hospital Problems   No resolved problems to display.       MEDICAL DECISION MAKING COMPLEXITY BY PROBLEM:     Suspected TIA      -MRI of the brain showed no acute infarct or other intracranial  abnormality      -on aspirin/Plavix/Lipitor, and followed by neurologist    Atrial fibrillation with SSS       -cardiologist consulted for possible PPM implant        -currently on dobutamine     Symptomatic bilateral SIVAN      -S/p endarterectomy on 12/14/2020 , by Dr Fung      - on aspirin/Plavix/Lipitor     Left ankle pain with swelling/suspected  fracture      -plan for ORIF today          -x-ray of the ankle on 12/12/2020 showed oblique fracture of the distal fibula with mild lateral displacement and suspected posterior malleolus fracture.     Essential hypertension       -Amlodipine/losartan     T2DM     -cont SSI     Mixed hyperlipidemia     -Lipitor      Hypothyroidism       -Levothyroxine     Obesity      -encourage lifestyle changes       VTE Prophylaxis -   Mechanical Order History:      Ordered        12/14/20 1047  Place Sequential Compression Device  Once         12/14/20 1047  Maintain Sequential Compression Device  Continuous         12/12/20 0255  Place Sequential Compression Device  Once         12/12/20 0255  Maintain Sequential Compression Device  Continuous                 Pharmalogical Order History:      Ordered     Dose Route Frequency Stop    12/14/20 0716  heparin (porcine) injection  Status:  Discontinued      -- -- As Needed 12/14/20 1050                  Code Status -   Code Status and Medical Interventions:   Ordered at: 12/11/20 2055     Code Status:    CPR     Medical Interventions (Level of Support Prior to Arrest):    Full       Discharge Planning      Electronically signed by Kendall Arellano MD, 12/16/20, 14:01 EST.  [facility] Hospitalist Team

## 2020-12-16 NOTE — ANESTHESIA PROCEDURE NOTES
Airway  Urgency: elective    Date/Time: 12/16/2020 12:03 PM  Airway not difficult    General Information and Staff    Patient location during procedure: OR  Anesthesiologist: Omar Brewer MD    Indications and Patient Condition  Indications for airway management: airway protection    Preoxygenated: yes  MILS not maintained throughout  Mask difficulty assessment: 0 - not attempted    Final Airway Details  Final airway type: supraglottic airway      Successful airway: LMA and unique  Size 4    Number of attempts at approach: 1  Assessment: lips, teeth, and gum same as pre-op and atraumatic intubation    Additional Comments  ASA monitors applied; preoxygenated with 100% FiO2 via anesthesia face mask; induction of general anesthesia; patient's position optimized; LMA lubricated with lidocaine jelly and placed; LMA connected to anesthesia circuit; atraumatic/dentition in preoperative condition; LMA secured in place; correct placement confirmed by bilateral chest rise, tube condensation, and return of EtCO2 > 30 mmHg x3

## 2020-12-16 NOTE — PROGRESS NOTES
Cardiology prepacemaker evaluation:    80-year-old gentleman with PAF/SSS, previous and remote stroke events thought related to high-grade right ICA occlusive disease for which he underwent R CEA 2 days ago, has been found to have A. fib with slow ventricular rates including heart rates in the 30s.  -Asked to see patient for consideration of permanent pacemaker implant.    Patient examined by me personally and clinical events and data including images reviewed by me in detail.    Currently on IV dobutamine to maintain satisfactory heart rates now high 50s to low 70s range.  Blood pressure currently well regulated.  R CEA site looks good with satisfactory convalescence.  Left anterior neck with soft bruit.  Chest grossly symmetric and clear to auscultation bilaterally.  Currently in atrial fibrillation with normal heart tones and controlled ventricular rate.  Remainder exam benign with modest residual neuropathy.      Assessment:  80-year-old man with PAF and SSS with slow ventricular rates, currently requiring IV dobutamine to maintain satisfactory ventricular rate.    Peripheral vascular disease with high-grade R ICA obstruction; now status post R CEA with satisfactory postoperative convalescence.    History of previous CVA with recent TIA and subsequent fall.    Cardiac conduction system disease with slow ventricular rates and evidence for infranodal conduction issues with borderline nonspecific IVCD (QRS duration 107 to 110 ms).    Suspect ischemic coronary artery disease with fixed and reversible inferior/septal defects per stress Myoview performed 9/2020    Moderate aortic stenosis by echo with well-preserved LV systolic function, and LAE (4.7 cm LA ID)      Recommendation:  -Continue IV dobutamine through planned ORIF from fractured left distal fibula-plan for today  -Discontinue IV dobutamine postoperatively, and observe overnight on telemetry.  -If further drops in ventricular rates into the low 40s or even  30s, should be considered for AV sequential pacemaker implant.  We will follow along postoperatively and reassess in a.m.

## 2020-12-16 NOTE — DISCHARGE INSTRUCTIONS
[facility]  Olu Marin, Talita Roche Rachel, Scherrer, Thomas  [address]  [place] [address]  [phone]    Carotid Endartectomy Discharge Instructions:    Activity  · Do not lift anything heavier than 5 pounds (a gallon of milk); no bending, straining, or strenuous activity for the first 2 weeks.  · No driving for 7 days or while taking prescription pain medications. You may ride in a car. It is important that you have the ability to turn your head quickly without discomfort whileoperating a vehicle. This may not be possible for some people for 1-2 weeks after surgery.  · Walk as often as you wish. Walk short distances at first and increase slowly. Avoid exercising inextreme temperatures.  · You may have some slight dizziness, a mild headache or tiredness for a few days.  · You may go up and down stairs. Hold onto the rail for the first few days after surgery because you may experience dizziness.  · If you smoke, please quit. Smoking increases you chances of developing heart disease, carotid artery disease, lung cancer, and peripheral vascular disease. It can also delay wound healing.    Personal Hygiene/Shower  · ?You may shower the next day after your surgery.  · Use soap and water to gently clean the incision. Do not scrub the incision. Pat dry with a clean towel.  · Do not soak in a tub, whirlpool, or hot tub, or go swimming until the incision is completely healed. This is generally 3-4 weeks for most people.    Diet  · Resume the diet you were on before your surgery unless otherwise directed.  · For most people a low saturated fat and cholesterol diet which is high in fruits, vegetables and whole grains is a good healthy diet unless otherwise directed by your doctor.    Incision  · There will be an area of numbness along the incision in the neck and toward the chin. The earlobe may also be affected. This generally goes away and may last for 6-12 months.  · Your  neck incision is about is about 3-4 inches in length. The sutures under the skin will dissolve on their own. Take a close look at the incision in the mirror so that you will know what it looks like before leaving the hospital and will notice changes if they occur at home.  · For the first couple of days sleep on a couple of pillows to help with the swelling in the neck around the incision.  · You may apply an ice pack for the first 48 hours after surgery to the neck incision. It should be applied for 15 minutes, then off for 15 minutes. This may help with swelling and discomfort.  · Men may find it more difficult to shave with a blade and may switch to an electric razor. Do not shave over the incision; go around it until the incision is healed.  · Your incision will take several months to heal completely. It may feel raised and thickened for a while and will decrease over time. It takes 2-3 weeks for the swelling to go away. No ointments, lotions, creams or bandages should be applied to the incision.  · Most people do not have very much pain with this surgery. You can take over the counter Tylenol (Acetaminophen) for mild discomfort. Take it was directed on the packaging. You will be prescribed a pain medication for moderate discomfort, take it as directed. Do NOT take additional Tylenol with prescription pain medication. One of the side effects of pain medications is constipation and nausea. Most people will have nausea if it is taken on an empty stomach. Eat a small snack with pain medication to avoid this side effect. Drinking plenty of fluid and eating high fiber foods (fruits, vegetables and whole grains) can help prevent constipation. If needed you can take Docusate Sodium (Colace) 100 mg, a stool softener, once or twice a day. This can be purchased at any drug store. A laxative may be needed if the constipation continues. Generally, an over the counter laxative, such as Dulcolax tablets, is recommended (take  it as directed on the manufacturers packaging).  · Your updated medication list will be given to you before you leave the hospital. New prescriptions will be provided to you and education regarding new medications provided.    Call Your Surgeon for Any of These Symptoms @ [phone]    · Increasing pain or swelling in the neck causing difficulty breathing or swallowing.   · Sudden or severe headache. A headache that will not go away.  · Changes in your eyesight, problem speaking, or problem swallowing.  · Weakness on one side of your body.  · Increasing pain, not relieved by pain medication.  · Fever above 101 degrees.  · Redness, an increase in swelling or bruising around your incision. There may be some slight drainage the first couple of days from the incision, but this should stop and the incision should be dry. If there is continued drainage or pus the surgeon should be called. If you cannot reach your doctor, go to the nearest emergency room for immediate assessment.    Reducing Your Risks  · All patients with vascular disease should take important steps to prevent worsening of their condition or development of new disease. It is very important that if you smoke to quit. Good medical management of high cholesterol, high blood pressure, and diabetes, along with maintaining a normal body weight is encouraged to all of our patients. This is one of the reasons why routine follow-up with your primary care physician is very important to your continued health and well-being.    Follow-up appointment  · A follow-up appointment will be provided for you before you leave the hospital 1-4 weeks after your surgery. During this or the next visit you will undergo carotid artery duplex scanning. It is extremely important that you make this appointment because we need to evaluate the post-surgical carotid artery for normal blood flow.    Returning to Work  · This is generally discussed at the follow-up visit. If you have a  desk job, you may be able to return to work earlier than someone with a job requiring physical labor. If you want to return to work earlier than 4 weeks, this should be discussed with your surgeon prior to leaving the hospital.

## 2020-12-16 NOTE — PROGRESS NOTES
Continued Stay Note  [facility]     Patient Name: Alen Ratliff  MRN: [id number]  Today's Date: 12/16/2020    Admit Date: 12/11/2020    Discharge Plan     Row Name 12/16/20 0809       Plan    Plan  Anticipate routine discharge home with spouse.    Patient/Family in Agreement with Plan  yes    Plan Comments  Barriers to discharge:S/P POD#2 carotid endarterectomy. Anticipate discharge tomorrow.              Anna Naegele [place] [address]   [phone]  office  [phone]  fax  [email or web address]

## 2020-12-16 NOTE — ANESTHESIA PREPROCEDURE EVALUATION
Anesthesia Evaluation     Patient summary reviewed and Nursing notes reviewed   NPO Solid Status: > 8 hours  NPO Liquid Status: > 8 hours           Airway   Mallampati: II  TM distance: >3 FB  Neck ROM: full  No difficulty expected  Dental - normal exam     Pulmonary - normal exam   Cardiovascular - normal exam    ECG reviewed    (+) hypertension, valvular problems/murmurs, hyperlipidemia,       Neuro/Psych  (+) TIA, CVA,     GI/Hepatic/Renal/Endo    (+)  GERD,  renal disease, diabetes mellitus,     Musculoskeletal     Abdominal  - normal exam    Bowel sounds: normal.   Substance History      OB/GYN          Other      history of cancer    ROS/Med Hx Other: · Estimated left ventricular EF = 65% Left ventricular systolic function is normal.  · Left ventricular diastolic dysfunction is noted.  · The right atrial cavity is mildly dilated.  · Moderate aortic valve stenosis is present.  · Estimated right ventricular systolic pressure from tricuspid regurgitation is moderately elevated (45-55 mmHg).                  Anesthesia Plan    ASA 3     general with block     intravenous induction     Anesthetic plan, all risks, benefits, and alternatives have been provided, discussed and informed consent has been obtained with: patient.

## 2020-12-16 NOTE — PROGRESS NOTES
Name: Alen Ratliff ADMIT: 2020   : 1940  PCP: Serenity Wren MD    MRN: [id number] LOS: 3 days   AGE/SEX: 80 y.o. male  ROOM: /   [facility]    [place]    Chief Complaint   Patient presents with   • Numbness     tingling on left side and face.  Onset 30 minutes ago.      CC: TIA  Subjective     80 y.o. male s/p symptomatic right carotid endarterectomy.  Patient sitting up in bed in [facility] this AM.  Denies any new focal symptoms.  Surgical pain controlled, reports appropriate numbness at incision site.    Review of Systems  No cardiopulmonary symptoms  No focal neurologic symptoms.    Objective     Scheduled Medications:   amLODIPine, 10 mg, Oral, Daily  aspirin, 81 mg, Oral, Daily  atorvastatin, 80 mg, Oral, Nightly  clopidogrel, 75 mg, Oral, Daily  insulin lispro, 0-9 Units, Subcutaneous, TID AC  levothyroxine, 75 mcg, Oral, Daily  losartan, 50 mg, Oral, Daily  pantoprazole, 40 mg, Oral, Daily  sodium chloride, 3 mL, Intravenous, Q12H        Active Infusions:  DOBUTamine, 2.5 mcg/kg/min, Last Rate: 2.5 mcg/kg/min (12/15/20 1832)        As Needed Medications:  •  acetaminophen **OR** acetaminophen  •  bisacodyl  •  dextrose  •  dextrose  •  diphenhydrAMINE  •  glucagon (human recombinant)  •  hydrALAZINE  •  HYDROcodone-acetaminophen  •  HYDROmorphone **AND** naloxone  •  influenza vaccine  •  insulin lispro **AND** insulin lispro  •  ondansetron  •  potassium chloride **OR** potassium chloride **OR** potassium chloride  •  sodium chloride  •  sodium chloride    Vital Signs  Vital Signs   Patient Vitals for the past 24 hrs:   BP Temp Temp src Pulse SpO2 Weight   20 0500 -- -- -- -- -- 96.9 kg (213 lb 10 oz)   20 0405 (!) 113/32 -- -- 71 94 % --   20 0348 -- 98.3 °F (36.8 °C) Oral -- -- 96.8 kg (213 lb 6.5 oz)   20 0016 -- -- -- 76 94 % --   20 -- -- -- 69 93 % --   20 -- -- -- 65 94 % --   20 -- -- -- 70 -- --    12/16/20 0012 -- -- -- 68 -- --   12/16/20 0011 -- -- -- 68 -- --   12/16/20 0010 -- -- -- 68 -- --   12/16/20 0009 -- -- -- 66 -- --   12/16/20 0008 -- -- -- 69 -- --   12/16/20 0007 -- -- -- 75 94 % --   12/16/20 0006 -- -- -- 83 94 % --   12/16/20 0005 -- -- -- 67 95 % --   12/16/20 0004 -- -- -- 71 95 % --   12/16/20 0003 -- -- -- 69 96 % --   12/16/20 0002 -- -- -- 72 94 % --   12/16/20 0001 -- -- -- 69 94 % --   12/16/20 0000 -- -- -- 74 95 % --   12/15/20 2359 -- -- -- 64 95 % --   12/15/20 2358 -- -- -- 65 94 % --   12/15/20 2357 -- -- -- 68 95 % --   12/15/20 2356 -- -- -- 68 94 % --   12/15/20 2355 -- -- -- 64 96 % --   12/15/20 2354 -- -- -- 64 95 % --   12/15/20 2353 -- -- -- 72 94 % --   12/15/20 2352 120/99 98.1 °F (36.7 °C) Oral 70 94 % --   12/15/20 2351 -- -- -- 81 93 % --   12/15/20 2350 -- -- -- 72 93 % --   12/15/20 2349 -- -- -- 58 92 % --   12/15/20 2348 -- -- -- 60 93 % --   12/15/20 2347 -- -- -- 61 94 % --   12/15/20 2346 -- -- -- 66 95 % --   12/15/20 2345 -- -- -- 66 93 % --   12/15/20 2344 -- -- -- 58 93 % --   12/15/20 2343 -- -- -- 62 93 % --   12/15/20 2342 -- -- -- 58 92 % --   12/15/20 2341 -- -- -- 58 93 % --   12/15/20 2340 -- -- -- 62 93 % --   12/15/20 2339 -- -- -- 60 93 % --   12/15/20 2338 -- -- -- 62 92 % --   12/15/20 2337 -- -- -- 59 93 % --   12/15/20 2336 -- -- -- 62 94 % --   12/15/20 2335 -- -- -- 61 93 % --   12/15/20 2334 -- -- -- 64 93 % --   12/15/20 2333 -- -- -- 61 93 % --   12/15/20 2332 -- -- -- 64 93 % --   12/15/20 2331 -- -- -- 63 94 % --   12/15/20 2330 -- -- -- 65 92 % --   12/15/20 2329 -- -- -- 63 93 % --   12/15/20 2328 -- -- -- 62 93 % --   12/15/20 2327 -- -- -- 63 93 % --   12/15/20 2326 -- -- -- 62 93 % --   12/15/20 2325 -- -- -- 64 94 % --   12/15/20 2324 -- -- -- 64 95 % --   12/15/20 2323 -- -- -- 63 94 % --   12/15/20 2322 119/50 -- -- 66 95 % --   12/15/20 2321 -- -- -- 64 96 % --   12/15/20 2320 -- -- -- 71 96 % --   12/15/20 2319 -- -- --  70 95 % --   12/15/20 2318 -- -- -- 82 92 % --   12/15/20 2317 -- -- -- 79 94 % --   12/15/20 2316 -- -- -- 74 96 % --   12/15/20 2315 -- -- -- 84 96 % --   12/15/20 2314 -- -- -- 84 -- --   12/15/20 2313 -- -- -- 82 -- --   12/15/20 2312 -- -- -- 85 -- --   12/15/20 2311 -- -- -- 84 -- --   12/15/20 2310 -- -- -- 73 -- --   12/15/20 2309 -- -- -- 86 -- --   12/15/20 2308 -- -- -- 82 95 % --   12/15/20 2307 -- -- -- 78 94 % --   12/15/20 2306 -- -- -- 92 96 % --   12/15/20 2305 -- -- -- 80 94 % --   12/15/20 2304 -- -- -- 82 95 % --   12/15/20 2303 -- -- -- 77 94 % --   12/15/20 2302 -- -- -- 72 94 % --   12/15/20 2301 -- -- -- 72 94 % --   12/15/20 2300 -- -- -- 72 94 % --   12/15/20 2259 -- -- -- 72 94 % --   12/15/20 2258 -- -- -- 70 95 % --   12/15/20 2257 -- -- -- 72 94 % --   12/15/20 2256 -- -- -- 73 94 % --   12/15/20 2255 -- -- -- 70 94 % --   12/15/20 2254 -- -- -- 69 94 % --   12/15/20 2253 -- -- -- 69 96 % --   12/15/20 2252 136/51 -- -- 67 -- --   12/15/20 2251 -- -- -- 68 94 % --   12/15/20 2250 -- -- -- 69 93 % --   12/15/20 2249 -- -- -- 68 94 % --   12/15/20 2248 -- -- -- 71 96 % --   12/15/20 2247 -- -- -- 70 94 % --   12/15/20 2246 -- -- -- 76 94 % --   12/15/20 2245 -- -- -- 70 94 % --   12/15/20 2244 -- -- -- 68 94 % --   12/15/20 2243 -- -- -- 71 95 % --   12/15/20 2242 -- -- -- 69 97 % --   12/15/20 2241 -- -- -- 69 96 % --   12/15/20 2240 -- -- -- 71 95 % --   12/15/20 2239 -- -- -- 73 98 % --   12/15/20 2238 -- -- -- 76 91 % --   12/15/20 2237 -- -- -- 73 96 % --   12/15/20 2236 -- -- -- 73 96 % --   12/15/20 2235 -- -- -- 68 95 % --   12/15/20 2234 -- -- -- 69 95 % --   12/15/20 2233 -- -- -- 66 95 % --   12/15/20 2232 -- -- -- 71 95 % --   12/15/20 2231 -- -- -- 74 96 % --   12/15/20 2230 -- -- -- 70 95 % --   12/15/20 2229 -- -- -- 69 95 % --   12/15/20 2228 -- -- -- 71 95 % --   12/15/20 2227 -- -- -- 73 94 % --   12/15/20 2226 -- -- -- 72 95 % --   12/15/20 2225 -- -- -- 77 95 % --    12/15/20 2224 -- -- -- 71 94 % --   12/15/20 2223 -- -- -- 71 94 % --   12/15/20 2222 131/44 -- -- 71 93 % --   12/15/20 2221 -- -- -- 68 94 % --   12/15/20 2220 -- -- -- 71 95 % --   12/15/20 2219 -- -- -- 74 94 % --   12/15/20 2218 -- -- -- 72 94 % --   12/15/20 2217 -- -- -- 68 94 % --   12/15/20 2216 -- -- -- 73 95 % --   12/15/20 2215 -- -- -- 64 93 % --   12/15/20 2214 -- -- -- 69 94 % --   12/15/20 2213 -- -- -- 68 94 % --   12/15/20 2212 -- -- -- 67 94 % --   12/15/20 2211 -- -- -- 70 94 % --   12/15/20 2210 -- -- -- 72 95 % --   12/15/20 2209 -- -- -- 69 94 % --   12/15/20 2208 -- -- -- 72 94 % --   12/15/20 2207 -- -- -- 68 94 % --   12/15/20 2206 -- -- -- 63 93 % --   12/15/20 2205 -- -- -- 66 93 % --   12/15/20 2204 -- -- -- 74 93 % --   12/15/20 2203 -- -- -- 71 94 % --   12/15/20 2202 -- -- -- 73 92 % --   12/15/20 2201 -- -- -- 72 93 % --   12/15/20 2200 -- -- -- 70 93 % --   12/15/20 2159 -- -- -- 71 93 % --   12/15/20 2158 -- -- -- 68 94 % --   12/15/20 2157 -- -- -- 71 94 % --   12/15/20 2156 -- -- -- 75 -- --   12/15/20 2155 126/49 -- -- 77 93 % --   12/15/20 2154 -- -- -- 71 92 % --   12/15/20 2153 -- -- -- 85 92 % --   12/15/20 2152 -- -- -- 91 -- --   12/15/20 2151 -- -- -- 78 -- --   12/15/20 2150 -- -- -- 55 92 % --   12/15/20 2149 -- -- -- 67 95 % --   12/15/20 2148 -- -- -- 79 -- --   12/15/20 2147 -- -- -- 61 91 % --   12/15/20 2146 -- -- -- 62 91 % --   12/15/20 2145 -- -- -- 62 91 % --   12/15/20 2144 -- -- -- 63 93 % --   12/15/20 2143 -- -- -- 67 92 % --   12/15/20 2142 -- -- -- 56 92 % --   12/15/20 2141 -- -- -- 63 91 % --   12/15/20 2140 -- -- -- 63 94 % --   12/15/20 2139 -- -- -- 67 92 % --   12/15/20 2138 -- -- -- 63 94 % --   12/15/20 2137 -- -- -- 67 94 % --   12/15/20 2136 -- -- -- 74 90 % --   12/15/20 2135 -- -- -- 63 92 % --   12/15/20 2134 -- -- -- 61 93 % --   12/15/20 2133 -- -- -- 69 91 % --   12/15/20 2132 -- -- -- 64 91 % --   12/15/20 2131 -- -- -- 63 91 % --    12/15/20 2130 -- -- -- 62 92 % --   12/15/20 2129 -- -- -- 65 93 % --   12/15/20 2128 -- -- -- 64 92 % --   12/15/20 2127 -- -- -- 61 92 % --   12/15/20 2126 -- -- -- 61 92 % --   12/15/20 2125 -- -- -- 66 93 % --   12/15/20 2124 -- -- -- 63 93 % --   12/15/20 2123 -- -- -- 67 94 % --   12/15/20 2122 (!) 107/33 -- -- 67 93 % --   12/15/20 2121 -- -- -- 70 94 % --   12/15/20 2120 -- -- -- 70 94 % --   12/15/20 2119 -- -- -- 68 93 % --   12/15/20 2118 -- -- -- 67 94 % --   12/15/20 2117 -- -- -- 69 94 % --   12/15/20 2116 -- -- -- 71 95 % --   12/15/20 2115 -- -- -- 79 93 % --   12/15/20 2114 -- -- -- 71 94 % --   12/15/20 2113 -- -- -- 75 95 % --   12/15/20 2112 -- -- -- 68 94 % --   12/15/20 2111 -- -- -- 65 94 % --   12/15/20 2110 -- -- -- 72 93 % --   12/15/20 2109 -- -- -- 71 95 % --   12/15/20 2108 -- -- -- 77 93 % --   12/15/20 2107 -- -- -- 68 93 % --   12/15/20 2106 -- -- -- 68 94 % --   12/15/20 2105 -- -- -- 73 94 % --   12/15/20 2104 -- -- -- 76 97 % --   12/15/20 2103 -- -- -- 70 96 % --   12/15/20 2102 -- -- -- 74 95 % --   12/15/20 2101 -- -- -- 74 95 % --   12/15/20 2100 -- -- -- 81 -- --   12/15/20 2059 -- -- -- 62 -- --   12/15/20 2058 -- -- -- 59 -- --   12/15/20 2057 -- -- -- 61 -- --   12/15/20 2056 -- -- -- 65 -- --   12/15/20 2055 -- -- -- 63 -- --   12/15/20 2054 -- -- -- 65 -- --   12/15/20 2053 -- -- -- 65 -- --   12/15/20 2052 (!) 124/35 -- -- 66 -- --   12/15/20 2051 -- -- -- 70 -- --   12/15/20 2050 -- -- -- 73 -- --   12/15/20 2049 -- -- -- 84 93 % --   12/15/20 2048 -- -- -- 72 94 % --   12/15/20 2047 -- -- -- 72 94 % --   12/15/20 2046 -- -- -- 72 94 % --   12/15/20 2045 -- -- -- 71 94 % --   12/15/20 2044 -- -- -- 74 94 % --   12/15/20 2043 -- -- -- 80 -- --   12/15/20 2042 -- -- -- 68 94 % --   12/15/20 2041 -- -- -- 67 95 % --   12/15/20 2040 -- -- -- 67 94 % --   12/15/20 2039 -- -- -- 68 95 % --   12/15/20 2038 -- -- -- 68 94 % --   12/15/20 2037 -- -- -- 67 93 % --   12/15/20  2036 -- -- -- 66 93 % --   12/15/20 2035 -- -- -- 66 93 % --   12/15/20 2034 -- -- -- 68 94 % --   12/15/20 2033 -- -- -- 64 93 % --   12/15/20 2032 -- -- -- 64 94 % --   12/15/20 2031 -- -- -- 65 93 % --   12/15/20 2030 -- -- -- 68 93 % --   12/15/20 2029 -- -- -- 67 93 % --   12/15/20 2028 -- -- -- 68 94 % --   12/15/20 2027 -- -- -- 66 94 % --   12/15/20 2026 -- -- -- 68 96 % --   12/15/20 2025 -- -- -- 80 94 % --   12/15/20 2024 -- -- -- 72 95 % --   12/15/20 2023 -- -- -- 81 93 % --   12/15/20 2022 117/45 -- -- 78 96 % --   12/15/20 2021 -- -- -- 76 94 % --   12/15/20 2020 -- -- -- 81 91 % --   12/15/20 2019 -- -- -- 58 90 % --   12/15/20 2018 -- -- -- 60 90 % --   12/15/20 2017 -- -- -- 61 90 % --   12/15/20 2016 -- -- -- 61 91 % --   12/15/20 2015 -- -- -- 65 91 % --   12/15/20 2014 -- -- -- 61 92 % --   12/15/20 2013 -- -- -- 68 91 % --   12/15/20 2012 -- -- -- 68 92 % --   12/15/20 2011 -- -- -- 65 92 % --   12/15/20 2010 -- -- -- 64 92 % --   12/15/20 2009 -- -- -- 66 92 % --   12/15/20 2008 -- -- -- 66 91 % --   12/15/20 2007 -- -- -- 65 91 % --   12/15/20 2006 -- -- -- 63 93 % --   12/15/20 2005 -- -- -- 69 92 % --   12/15/20 2004 -- -- -- 68 92 % --   12/15/20 2003 -- -- -- 65 93 % --   12/15/20 2002 -- -- -- 75 93 % --   12/15/20 2001 -- -- -- 69 93 % --   12/15/20 2000 -- 98.4 °F (36.9 °C) Oral 66 92 % --   12/15/20 1952 115/69 -- -- 80 99 % --   12/15/20 1922 (!) 118/36 -- -- 62 94 % --   12/15/20 1900 -- -- -- 66 95 % --   12/15/20 1639 -- 98.5 °F (36.9 °C) Oral -- -- --   12/15/20 1223 -- 98.2 °F (36.8 °C) Oral -- -- --   12/15/20 1220 126/49 -- -- 59 94 % --   12/15/20 1200 -- -- -- (!) 47 97 % --   12/15/20 1120 117/43 -- -- 52 -- --   12/15/20 1020 128/49 -- -- 54 -- --   12/15/20 0920 141/47 -- -- 59 94 % --   12/15/20 0820 (!) 114/32 -- -- 55 96 % --   12/15/20 0800 -- -- -- 58 96 % --   12/15/20 0735 126/58 -- -- 54 96 % --   12/15/20 0729 -- 98.7 °F (37.1 °C) Oral -- -- --     I/O:  I/O last  3 completed shifts:  In: 752 [P.O.:600; I.V.:152]  Out: -   BMI:  Body mass index is 28.97 kg/m².    Physical Exam:  Physical Exam   Right cervical incision clean dry and intact.  No hematoma noted.  Tongue is midline.  Upper extremity  strength equal bilaterally.  No hematoma noted.  Left foot pain noted.    Results Review:     CBC    Results from last 7 days   Lab Units 12/16/20  0412 12/15/20  0428 12/13/20  0437 12/11/20  1959   WBC 10*3/mm3 9.10 11.70* 7.30 6.90   HEMOGLOBIN g/dL 11.8* 12.3* 12.2* 14.4   PLATELETS 10*3/mm3 221 204 179 219     BMP   Results from last 7 days   Lab Units 12/16/20  0412 12/15/20  0428 12/13/20  0437 12/11/20  1959   SODIUM mmol/L 136 136 141 141   POTASSIUM mmol/L 4.1 4.2 3.8 3.4*   CHLORIDE mmol/L 106 103 109* 107   CO2 mmol/L 20.0* 20.0* 23.0 21.0*   BUN mg/dL 41* 33* 23 23   CREATININE mg/dL 1.54* 1.50* 1.35* 1.16   GLUCOSE mg/dL 166* 170* 142* 133*   MAGNESIUM mg/dL  --  1.4*  --   --    PHOSPHORUS mg/dL  --  4.0  --   --      Coag   Results from last 7 days   Lab Units 12/15/20  1631 12/11/20 1959   INR  0.93 0.95   APTT seconds 24.5 22.8*     HbA1C   Lab Results   Component Value Date    HGBA1C 7.1 (H) 12/12/2020    HGBA1C 7.1 (H) 08/27/2020     Infection     Radiology(recent) No radiology results for the last day    Assessment/Plan     Assessment & Plan      Symptomatic carotid artery stenosis, bilateral    Essential hypertension    Mixed hyperlipidemia    Type 2 diabetes mellitus, without long-term current use of insulin (CMS/[facility])    TIA (transient ischemic attack)    Closed displaced fracture of lateral malleolus of left fibula      80 y.o. male POD #2 symptomatic right carotid endarterectomy.  No new focal neurologic symptoms overnight.  Blood pressure controlled.  Patient currently on dual antiplatelet and statin therapy.    Cardiology following, Dr Jb   -Bradycardia overnight  -Considering permanent pacemaker for persistent bradycardia    Podiatry following,   Pedro   -oblique displaced lateral malleolus fracture  -cam boot immobilization and minimal weightbearing as needed for transfers  -Surgery today    We will see patientas needed while in hospital and follow up outpatient vascular clinic in 2-weeks.        Personal protective equipment used for this patient encounter:  Patient wearing surgical mask [x]    Provider wearing a surgical mask [x]    Gloves [x]    Eye protection [x]    Face Shield []    Gown []    N 95 respirator or CAPR/PAPR []       Alba Price PA-C  12/16/20  07:02 EST    Please call my office with any question: [phone]    Active Hospital Problems    Diagnosis  POA   • **Symptomatic carotid artery stenosis, bilateral [I65.23]  Yes   • Closed displaced fracture of lateral malleolus of left fibula [S82.62XA]  Unknown   • TIA (transient ischemic attack) [G45.9]  Yes   • Essential hypertension [I10]  Yes   • Mixed hyperlipidemia [E78.2]  Yes   • Type 2 diabetes mellitus, without long-term current use of insulin (CMS/[facility]) [E11.9]  Yes      Resolved Hospital Problems   No resolved problems to display.

## 2020-12-17 ENCOUNTER — READMISSION MANAGEMENT (OUTPATIENT)
Dept: CALL CENTER | Facility: HOSPITAL | Age: 80
End: 2020-12-17

## 2020-12-17 ENCOUNTER — APPOINTMENT (OUTPATIENT)
Dept: RESPIRATORY THERAPY | Facility: HOSPITAL | Age: 80
End: 2020-12-17

## 2020-12-17 VITALS
RESPIRATION RATE: 14 BRPM | WEIGHT: 210.32 LBS | TEMPERATURE: 97.5 F | BODY MASS INDEX: 28.49 KG/M2 | SYSTOLIC BLOOD PRESSURE: 127 MMHG | HEIGHT: 72 IN | DIASTOLIC BLOOD PRESSURE: 64 MMHG | OXYGEN SATURATION: 96 % | HEART RATE: 75 BPM

## 2020-12-17 LAB
GLUCOSE BLDC GLUCOMTR-MCNC: 173 MG/DL (ref 70–105)
GLUCOSE BLDC GLUCOMTR-MCNC: 208 MG/DL (ref 70–105)
GLUCOSE BLDC GLUCOMTR-MCNC: 219 MG/DL (ref 70–105)

## 2020-12-17 PROCEDURE — 63710000001 INSULIN LISPRO (HUMAN) PER 5 UNITS: Performed by: NURSE PRACTITIONER

## 2020-12-17 PROCEDURE — 90686 IIV4 VACC NO PRSV 0.5 ML IM: CPT | Performed by: NURSE PRACTITIONER

## 2020-12-17 PROCEDURE — 82962 GLUCOSE BLOOD TEST: CPT

## 2020-12-17 PROCEDURE — G0008 ADMIN INFLUENZA VIRUS VAC: HCPCS | Performed by: NURSE PRACTITIONER

## 2020-12-17 PROCEDURE — 93010 ELECTROCARDIOGRAM REPORT: CPT | Performed by: INTERNAL MEDICINE

## 2020-12-17 PROCEDURE — 93005 ELECTROCARDIOGRAM TRACING: CPT | Performed by: NURSE PRACTITIONER

## 2020-12-17 PROCEDURE — 99232 SBSQ HOSP IP/OBS MODERATE 35: CPT | Performed by: INTERNAL MEDICINE

## 2020-12-17 PROCEDURE — 97164 PT RE-EVAL EST PLAN CARE: CPT

## 2020-12-17 PROCEDURE — 93270 REMOTE 30 DAY ECG REV/REPORT: CPT

## 2020-12-17 PROCEDURE — 25010000002 INFLUENZA VAC SPLIT QUAD 0.5 ML SUSPENSION PREFILLED SYRINGE: Performed by: NURSE PRACTITIONER

## 2020-12-17 PROCEDURE — 99239 HOSP IP/OBS DSCHRG MGMT >30: CPT | Performed by: INTERNAL MEDICINE

## 2020-12-17 PROCEDURE — 97530 THERAPEUTIC ACTIVITIES: CPT

## 2020-12-17 RX ORDER — ASPIRIN 81 MG/1
81 TABLET, CHEWABLE ORAL DAILY
Qty: 30 TABLET | Refills: 1 | Status: SHIPPED | OUTPATIENT
Start: 2020-12-18

## 2020-12-17 RX ADMIN — PANTOPRAZOLE SODIUM 40 MG: 40 TABLET, DELAYED RELEASE ORAL at 08:30

## 2020-12-17 RX ADMIN — ASPIRIN 81 MG CHEWABLE TABLET 81 MG: 81 TABLET CHEWABLE at 08:30

## 2020-12-17 RX ADMIN — LEVOTHYROXINE SODIUM 75 MCG: 75 TABLET ORAL at 08:29

## 2020-12-17 RX ADMIN — INSULIN LISPRO 4 UNITS: 100 INJECTION, SOLUTION INTRAVENOUS; SUBCUTANEOUS at 12:28

## 2020-12-17 RX ADMIN — LOSARTAN POTASSIUM 50 MG: 25 TABLET, FILM COATED ORAL at 08:30

## 2020-12-17 RX ADMIN — INFLUENZA VIRUS VACCINE 0.5 ML: 15; 15; 15; 15 SUSPENSION INTRAMUSCULAR at 15:14

## 2020-12-17 RX ADMIN — AMLODIPINE BESYLATE 10 MG: 5 TABLET ORAL at 08:30

## 2020-12-17 RX ADMIN — Medication 3 ML: at 09:33

## 2020-12-17 RX ADMIN — CLOPIDOGREL BISULFATE 75 MG: 75 TABLET ORAL at 08:30

## 2020-12-17 NOTE — PROGRESS NOTES
LOS: 4 days   Admiting Physician- Kendall Arellano MD    Reason For Followup:    Bradycardia  Paroxysmal atrial fibrillation  Tachybradycardia syndrome  Right carotid endarterectomy      Subjective     Sitting up at side of bed eating breakfast  Denies pain or dyspnea    Objective     Hemodynamics are stable    Review of Systems:   Review of Systems   Constitution: Negative for chills and fever.   HENT: Negative for ear discharge and nosebleeds.    Eyes: Negative for discharge and redness.   Cardiovascular: Negative for chest pain, orthopnea, palpitations, paroxysmal nocturnal dyspnea and syncope.   Respiratory: Negative for cough, shortness of breath and wheezing.    Endocrine: Negative for heat intolerance.   Skin: Negative for rash.   Musculoskeletal: Negative for arthritis and myalgias.   Gastrointestinal: Negative for abdominal pain, melena, nausea and vomiting.   Genitourinary: Negative for dysuria and hematuria.   Neurological: Negative for dizziness, light-headedness, numbness and tremors.   Psychiatric/Behavioral: Negative for depression. The patient is not nervous/anxious.          Vital Signs  Vitals:    12/17/20 0200 12/17/20 0530 12/17/20 0829 12/17/20 1009   BP: 127/61 151/64 147/73    BP Location: Left arm Left arm     Patient Position: Lying Lying     Pulse: 85 62 81    Resp: 17 17  15   Temp: 97.6 °F (36.4 °C) 97.9 °F (36.6 °C)  98 °F (36.7 °C)   TempSrc: Oral Oral  Oral   SpO2: 94% 93%  97%   Weight:  95.4 kg (210 lb 5.1 oz)     Height:         Wt Readings from Last 1 Encounters:   12/17/20 95.4 kg (210 lb 5.1 oz)       Intake/Output Summary (Last 24 hours) at 12/17/2020 1140  Last data filed at 12/17/2020 0829  Gross per 24 hour   Intake 770 ml   Output 750 ml   Net 20 ml     Physical Exam:  Constitutional:       Appearance: Well-developed.   Eyes:      General: No scleral icterus.        Right eye: No discharge.   HENT:      Head: Normocephalic and atraumatic.   Neck:      Thyroid: No  thyromegaly.      Lymphadenopathy: No cervical adenopathy.   Pulmonary:      Effort: Pulmonary effort is normal. No respiratory distress.      Breath sounds: Normal breath sounds. No wheezing. No rales.   Cardiovascular:      Normal rate. Regular rhythm.      No gallop.   Abdominal:      Tenderness: There is no abdominal tenderness.   Skin:     Findings: No erythema or rash.   Neurological:      Mental Status: Alert and oriented to person, place, and time.         Results Review:   Lab Results (last 24 hours)     Procedure Component Value Units Date/Time    POC Glucose Once [[id number]]  (Abnormal) Collected: 12/17/20 1106    Specimen: Blood Updated: 12/17/20 1108     Glucose 208 mg/dL      Comment: Serial Number: [id number]:  [id number]       POC Glucose Once [[id number]]  (Abnormal) Collected: 12/17/20 0726    Specimen: Blood Updated: 12/17/20 0727     Glucose 173 mg/dL      Comment: Serial Number: [id number]:  [id number]       POC Glucose Once [[id number]]  (Abnormal) Collected: 12/16/20 1554    Specimen: Blood Updated: 12/16/20 1556     Glucose 164 mg/dL      Comment: Serial Number: [id number]:  [id number]       POC Glucose Once [[id number]]  (Abnormal) Collected: 12/16/20 1310    Specimen: Blood Updated: 12/16/20 1313     Glucose 172 mg/dL      Comment: Serial Number: [id number]:  [id number]           Imaging Results (Last 72 Hours)     Procedure Component Value Units Date/Time    FL C Arm During Surgery [[id number]] Resulted: 12/16/20 1554     Updated: 12/16/20 1736    XR Ankle 2 View Left [[id number]] Resulted: 12/16/20 1553     Updated: 12/16/20 1554        ECG/EMG Results (most recent)     Procedure Component Value Units Date/Time    ECG 12 Lead [[id number]] Collected: 12/11/20 2017     Updated: 12/11/20 2022     QT Interval 383 ms     Narrative:      HEART RATE= 75  bpm  RR Interval= 804  ms  MO Interval= 92  ms  P Horizontal Axis= 221  deg  P Front Axis= 0  deg  QRSD Interval= 113  ms  QT  Interval= 383  ms  QRS Axis= -37  deg  T Wave Axis= 166  deg  - ABNORMAL ECG -  Sinus rhythm  Repol abnrm suggests ischemia, diffuse leads  No previous ECG available for comparison  Electronically Signed By:   Date and Time of Study: 2020-12-11 20:17:32    Adult Transthoracic Echo Complete W/ Cont if Necessary Per Protocol (With Agitated Saline) [[id number]] Collected: 12/12/20 0905     Updated: 12/12/20 1313     BSA 2.2 m^2      RVIDd 2.8 cm      IVSd 1.1 cm      LVIDd 4.9 cm      LVIDs 3.4 cm      LVPWd 1.2 cm      IVS/LVPW 0.95     FS 30.8 %      EDV(Teich) 114.3 ml      ESV(Teich) 47.7 ml      EF(Teich) 58.3 %      EDV(cubed) 119.7 ml      ESV(cubed) 39.6 ml      EF(cubed) 66.9 %      LV mass(C)d 223.3 grams      LV mass(C)dI 103.3 grams/m^2      SV(Teich) 66.6 ml      SI(Teich) 30.8 ml/m^2      SV(cubed) 80.1 ml      SI(cubed) 37.0 ml/m^2      Ao root diam 3.3 cm      Ao root area 8.5 cm^2      ACS 1.9 cm      asc Aorta Diam 2.9 cm      LVOT diam 2.1 cm      LVOT area 3.3 cm^2      RVOT diam 2.2 cm      RVOT area 4.0 cm^2      EDV(MOD-sp4) 69.0 ml      ESV(MOD-sp4) 24.3 ml      EF(MOD-sp4) 64.8 %      SV(MOD-sp4) 44.7 ml      SI(MOD-sp4) 20.7 ml/m^2      Ao root area (BSA corrected) 1.5     LV Delgado Vol (BSA corrected) 31.9 ml/m^2      LV Sys Vol (BSA corrected) 11.2 ml/m^2      Aortic R-R 1.1 sec      Aortic HR 56.3 BPM      MV E max britney 96.8 cm/sec      MV A max britney 108.4 cm/sec      MV E/A 0.89     MV V2 max 125.8 cm/sec      MV max PG 6.3 mmHg      MV V2 mean 79.9 cm/sec      MV mean PG 2.8 mmHg      MV V2 VTI 33.7 cm      MVA(VTI) 3.4 cm^2      MV dec slope 435.1 cm/sec^2      MV dec time 0.22 sec      Ao pk britney 340.0 cm/sec      Ao max PG 46.2 mmHg      Ao max PG (full) 37.9 mmHg      Ao V2 mean 238.7 cm/sec      Ao mean PG 25.3 mmHg      Ao mean PG (full) 20.6 mmHg      Ao V2 VTI 74.5 cm      SAL(I,A) 1.5 cm^2      SAL(I,D) 1.5 cm^2      SAL(V,A) 1.4 cm^2      SAL(V,D) 1.4 cm^2      AI max britney 402.5 cm/sec       AI max PG 64.8 mmHg      AI dec slope 249.5 cm/sec^2      AI dec time 1.6 sec      AI P1/2t 472.5 msec      LV V1 max PG 8.3 mmHg      LV V1 mean PG 4.7 mmHg      LV V1 max 144.4 cm/sec      LV V1 mean 103.3 cm/sec      LV V1 VTI 34.2 cm      CO(Ao) 35.6 l/min      CI(Ao) 16.5 l/min/m^2      SV(Ao) 632.0 ml      SI(Ao) 292.3 ml/m^2      CO(LVOT) 6.4 l/min      CI(LVOT) 3.0 l/min/m^2      SV(LVOT) 113.5 ml      SV(RVOT) 93.2 ml      SI(LVOT) 52.5 ml/m^2      PA V2 max 116.8 cm/sec      PA max PG 5.5 mmHg      PA max PG (full) 0.41 mmHg      PA V2 mean 82.0 cm/sec      PA mean PG 3.0 mmHg      PA mean PG (full) 0.54 mmHg      PA V2 VTI 25.3 cm      PVA(I,A) 3.7 cm^2       CV ECHO SANJAY - PVA(I,D) 3.7 cm^2       CV ECHO SANJAY - PVA(V,A) 3.8 cm^2       CV ECHO SANJAY - PVA(V,D) 3.8 cm^2      PA acc time 0.13 sec      RV V1 max PG 5.1 mmHg      RV V1 mean PG 2.5 mmHg      RV V1 max 112.4 cm/sec      RV V1 mean 73.6 cm/sec      RV V1 VTI 23.5 cm      TR max britney 330.3 cm/sec      RVSP(TR) 46.6 mmHg      RAP systole 3.0 mmHg      PA pr(Accel) 22.5 mmHg      Qp/Qs 0.82      CV ECHO SANJAY - BZI_BMI 28.1 kilograms/m^2       CV ECHO SANJAY - BSA(HAYCOCK) 2.2 m^2       CV ECHO SANJAY - BZI_METRIC_WEIGHT 93.9 kg       CV ECHO SANJAY - BZI_METRIC_HEIGHT 182.9 cm      EF(MOD-bp) 65.0 %      LA dimension(2D) 4.7 cm      Echo EF Estimated 65 %     Narrative:      · Estimated left ventricular EF = 65% Left ventricular systolic function   is normal.  · Left ventricular diastolic dysfunction is noted.  · The right atrial cavity is mildly dilated.  · Moderate aortic valve stenosis is present.  · Estimated right ventricular systolic pressure from tricuspid   regurgitation is moderately elevated (45-55 mmHg).       ECG 12 Lead [[id number]] Collected: 12/13/20 1107     Updated: 12/13/20 1607     QT Interval 418 ms     Narrative:      HEART RATE= 60  bpm  RR Interval= 996  ms  LA Interval= 296  ms  P Horizontal Axis= -17  deg  P Front  Axis= 46  deg  QRSD Interval= 107  ms  QT Interval= 418  ms  QRS Axis= -41  deg  T Wave Axis= -8  deg  - ABNORMAL ECG -  Sinus rhythm  Prolonged IL interval  Inferior infarct, old  When compared with ECG of 11-Dec-2020 20:17:32,  Sinus rate is slower  Electronically Signed By: Steve Swain (LORI) 13-Dec-2020 16:07:07  Date and Time of Study: 2020-12-13 11:07:57    ECG 12 Lead [[id number]] Collected: 12/15/20 0859     Updated: 12/15/20 0901     QT Interval 452 ms     Narrative:      HEART RATE= 47  bpm  RR Interval= 1271  ms  IL Interval=   ms  P Horizontal Axis=   deg  P Front Axis=   deg  QRSD Interval= 110  ms  QT Interval= 452  ms  QRS Axis= -22  deg  T Wave Axis= 69  deg  - ABNORMAL ECG -  Atrial fibrillation  Borderline ST elevation, anterior leads  When compared with ECG of 13-Dec-2020 11:07:57,  No significant change  Electronically Signed By:   Date and Time of Study: 2020-12-15 08:59:00    ECG 12 Lead [[id number]] Collected: 12/16/20 0944     Updated: 12/16/20 0950     QT Interval 419 ms     Narrative:      HEART RATE= 66  bpm  RR Interval= 908  ms  IL Interval=   ms  P Horizontal Axis=   deg  P Front Axis=   deg  QRSD Interval= 106  ms  QT Interval= 419  ms  QRS Axis= -23  deg  T Wave Axis= 116  deg  - ABNORMAL ECG -  Atrial fibrillation  Abnormal T, consider ischemia, lateral leads  When compared with ECG of 15-Dec-2020 8:59:00,  Significant repolarization change  Electronically Signed By:   Date and Time of Study: 2020-12-16 09:44:18        CBC    Results from last 7 days   Lab Units 12/16/20  0412 12/15/20  0428 12/13/20  0437 12/11/20  1959   WBC 10*3/mm3 9.10 11.70* 7.30 6.90   HEMOGLOBIN g/dL 11.8* 12.3* 12.2* 14.4   PLATELETS 10*3/mm3 221 204 179 219     BMP   Results from last 7 days   Lab Units 12/16/20  0412 12/15/20  0428 12/13/20  0437 12/11/20  1959   SODIUM mmol/L 136 136 141 141   POTASSIUM mmol/L 4.1 4.2 3.8 3.4*   CHLORIDE mmol/L 106 103 109* 107   CO2 mmol/L 20.0* 20.0* 23.0 21.0*    BUN mg/dL 41* 33* 23 23   CREATININE mg/dL 1.54* 1.50* 1.35* 1.16   GLUCOSE mg/dL 166* 170* 142* 133*   MAGNESIUM mg/dL  --  1.4*  --   --    PHOSPHORUS mg/dL  --  4.0  --   --      CMP   Results from last 7 days   Lab Units 12/16/20  0412 12/15/20  0428 12/13/20  0437 12/11/20  1959   SODIUM mmol/L 136 136 141 141   POTASSIUM mmol/L 4.1 4.2 3.8 3.4*   CHLORIDE mmol/L 106 103 109* 107   CO2 mmol/L 20.0* 20.0* 23.0 21.0*   BUN mg/dL 41* 33* 23 23   CREATININE mg/dL 1.54* 1.50* 1.35* 1.16   GLUCOSE mg/dL 166* 170* 142* 133*   ALBUMIN g/dL  --   --   --  4.00   BILIRUBIN mg/dL  --   --   --  0.4   ALK PHOS U/L  --   --   --  64   AST (SGOT) U/L  --   --   --  21   ALT (SGPT) U/L  --   --   --  23     Cardiac Studies:  Echo-   Results for orders placed during the hospital encounter of 12/11/20   Adult Transthoracic Echo Complete W/ Cont if Necessary Per Protocol (With Agitated Saline)    Narrative · Estimated left ventricular EF = 65% Left ventricular systolic function   is normal.  · Left ventricular diastolic dysfunction is noted.  · The right atrial cavity is mildly dilated.  · Moderate aortic valve stenosis is present.  · Estimated right ventricular systolic pressure from tricuspid   regurgitation is moderately elevated (45-55 mmHg).        Stress Myoview-  Cath-      Medication Review:   Scheduled Meds:amLODIPine, 10 mg, Oral, Daily  aspirin, 81 mg, Oral, Daily  atorvastatin, 80 mg, Oral, Nightly  clopidogrel, 75 mg, Oral, Daily  insulin lispro, 0-9 Units, Subcutaneous, TID AC  levothyroxine, 75 mcg, Oral, Daily  losartan, 50 mg, Oral, Daily  pantoprazole, 40 mg, Oral, Daily  sodium chloride, 3 mL, Intravenous, Q12H      Continuous Infusions:DOBUTamine, 2.5 mcg/kg/min, Last Rate: Stopped (12/17/20 0037)  lactated ringers, 1,000 mL, Last Rate: Stopped (12/17/20 0038)      PRN Meds:.•  acetaminophen **OR** acetaminophen  •  bisacodyl  •  Calcium Gluconate-NaCl **AND** calcium gluconate **AND** Calcium, Ionized  •   dextrose  •  dextrose  •  diphenhydrAMINE  •  glucagon (human recombinant)  •  hydrALAZINE  •  HYDROcodone-acetaminophen  •  HYDROmorphone **AND** naloxone  •  influenza vaccine  •  insulin lispro **AND** insulin lispro  •  magnesium sulfate **OR** magnesium sulfate **OR** magnesium sulfate  •  ondansetron  •  potassium chloride **OR** potassium chloride **OR** potassium chloride  •  potassium phosphate infusion greater than 15 mMoles **OR** potassium phosphate **OR** potassium phosphate  •  sodium chloride  •  sodium chloride      Assessment/Plan   Patient Active Problem List   Diagnosis   • Heart murmur   • Stroke (cerebrum) (CMS/[facility])   • Essential hypertension   • Mixed hyperlipidemia   • Acquired hypothyroidism   • Type 2 diabetes mellitus, without long-term current use of insulin (CMS/[facility])   • GERD (gastroesophageal reflux disease)   • Seasonal allergic rhinitis due to pollen   • CKD (chronic kidney disease) stage 3, GFR 30-59 ml/min   • History of prostate cancer   • TIA (transient ischemic attack)   • Symptomatic carotid artery stenosis, bilateral   • Closed displaced fracture of lateral malleolus of left fibula       Assessment:    Preoperative cardiovascular risk assessment requested  Right carotid artery stenosis noted to be 80% by CTA of head and neck  Acute TIA  History of CVA  Moderate aortic stenosis by recent echocardiogram; preserved LV function  Abnormal stress myoview 9/2020: moderate inferior/septal MI with small amount joseph-infarct    Ischemia  Atrial Fibrillation slow VR: Now converted to SR    Plan:  S/p right carotid endarterectomy  S/P distal fibula fracture repair   Has converted to SR; HR 70-80s  Dobutamine has been off overnight.   Rhythm and hemodynamics stable  Check EKG  Ok for d/c home   Will continue ASA/Plavix for now  Place event monitor at  Discharge.   Additional recommendations per Dr. Muhammad:    Patient is seen and examined and findings are verified.  Patient denies any symptom of  dizziness and lightheadedness.  No chest pain.    Hemodynamics are stable.  Patient is maintaining sinus rhythm and heart rate is in 70s to 80s beats per minute.    Normal S1 and S2.  No pericardial rub or murmur.  No abdominal exam tenderness.  No leg edema.    Patient is maintaining sinus rhythm and is not bradycardic.  Patient is off dobutamine.  At this stage I would not start Eliquis.  Continue aspirin and Plavix.  I will proceed with event monitor on discharge.  If patient had a recurrence of atrial fibrillation, I would consider Eliquis.  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  Patient is educated about symptom of bradycardia.  Blood pressure and heart rate monitoring is recommended after discharge.  I will see the patient in 1 month    Steve Muhammad MD  12/17/20  11:40 EST

## 2020-12-17 NOTE — DISCHARGE SUMMARY
[facility] Medicine Services  DISCHARGE SUMMARY        Prepared For PCP:  Serenity Wren MD    Patient Name: Alen Ratliff  : 1940  MRN: [id number]      Date of Admission:   2020    Date of Discharge:  2020    Length of stay:  LOS: 4 days     Hospital Course     Presenting Problem:   TIA (transient ischemic attack) [G45.9]  TIA (transient ischemic attack) [G45.9]      Active Hospital Problems    Diagnosis  POA   • **Symptomatic carotid artery stenosis, bilateral [I65.23]  Yes   • Closed displaced fracture of lateral malleolus of left fibula [S82.62XA]  Unknown   • TIA (transient ischemic attack) [G45.9]  Yes   • Essential hypertension [I10]  Yes   • Mixed hyperlipidemia [E78.2]  Yes   • Type 2 diabetes mellitus, without long-term current use of insulin (CMS/[facility]) [E11.9]  Yes      Resolved Hospital Problems   No resolved problems to display.           Hospital Course:    80-year-old male with known history of hypertension,  CVA, T2DM, hyperlipidemia, prostate cancer, hypothyroidism, and GERD.  He presented to [facility] ED complaining of left arm/left facial numbness.  Denies dysphagia, no dysarthria, slurred speech or speech impediment.  No reported headache, visual changes, and  no chest pain, shortness of breath dizziness or lightheadedness.  He denies fever, chills, no abdominal pain, nausea or vomiting. On presentation, he was hypertensive.      CT of the head without contrast showed no acute intracranial finding, hemorrhage, but  revealed lacunar infarct in the right thalamus which appears to be chronic.  CTA of the head and neck with finding of left ICA with 50% stenosis and 80% stenosis on the right ICA.  Follow-up MRI of the brain on 2020 showed no definite acute infarct or other intracranial abnormalities.  Laboratory notable for mild hypokalemia, otherwise, unremarkable.  X-ray showed no acute cardiopulmonary finding. He was admitted for suspected TIA  versus ischemic stroke, bilateral SIVAN, and left ankle fracture.  He was maintained on stroke protocol with dual antiplatelet with aspirin/Plavix.  He was  evaluated by neurologist and vascular surgeon in consultation.  He is status post right endarterectomy on 12/14/2020, by Dr. Kamran Fung.     Hospital course  was complicated by atrial fibrillation with slow ventricular response /SSS, and complaint of left ankle pain with swelling.  X-ray of the ankle obtained in on 12/12/2020 showed oblique fracture of the distal fibula with mild lateral displacement and suspected posterior malleolus fracture.  He reported mechanical fall at home.  He was evaluated by podiatrist and underwent ORIF on 12/16/2020, and tolerated procedure well.  Regarding atrial fibrillation with SSS, patient was seen and followed by cardiologist in consultation for possible PPM implant. He was treated with dobutamine prior to ORIF.  Prior to  discharge home, he converted to sinus rhythm and cardiologist recommended event monitor at discharge.  The remainder of his hospital course was uneventful.  He was placed on cam boot as recommended by podiatrist.  On discharge, he denies severe pain or discomfort.  No chest pain, shortness of breath, dizziness or lightheadedness.       Recommendation for Outpatient Providers:     Discharge home on event monitor and to follow with cardiologist, podiatrist and PCP        Reasons For Change In Medications and Indications for New Medications:      Day of Discharge     HPI:     Vital Signs:   Temp:  [97.5 °F (36.4 °C)-98.2 °F (36.8 °C)] 98 °F (36.7 °C)  Heart Rate:  [59-89] 81  Resp:  [13-18] 15  BP: (127-177)/(58-85) 147/73     Physical Exam  Constitutional:       Appearance: Normal appearance.    HENT:      Head: Normocephalic and atraumatic.      Right Ear: External ear normal.      Left Ear: External ear normal.      Nose: Nose normal.      Mouth/Throat:      Mouth: Mucous membranes are moist.   Eyes:       Extraocular Movements: Extraocular movements intact.   Neck: Right endarterectomy site is clean with no redness or drainage     Musculoskeletal: Normal range of motion and neck supple.   Cardiovascular:      Rate and Rhythm: Normal rate and regular rhythm.      Pulses: Normal pulses.      Heart sounds: Murmur present.   Pulmonary:      Effort: Pulmonary effort is normal.      Breath sounds: Normal breath sounds.   Abdominal:      Palpations: Abdomen is soft.   Genitourinary:     Comments: deferred  Musculoskeletal: Normal range of motion.   Skin: Left ankle slightly swollen with decreased range of motion due to pain     General: Skin is warm and dry.   Neurological:      General: No focal deficit present.      Mental Status: He is alert and oriented to person, place, and time. Mental status is at baseline.   Psychiatric:         Mood and Affect: Mood normal.         Behavior: Behavior normal.         Thought Content: Thought content normal.      Pertinent  and/or Most Recent Results     Results from last 7 days   Lab Units 12/16/20  0412 12/15/20  0428 12/13/20  0437 12/11/20 1959   WBC 10*3/mm3 9.10 11.70* 7.30 6.90   HEMOGLOBIN g/dL 11.8* 12.3* 12.2* 14.4   HEMATOCRIT % 36.4* 37.4* 37.4* 43.8   PLATELETS 10*3/mm3 221 204 179 219   SODIUM mmol/L 136 136 141 141   POTASSIUM mmol/L 4.1 4.2 3.8 3.4*   CHLORIDE mmol/L 106 103 109* 107   CO2 mmol/L 20.0* 20.0* 23.0 21.0*   BUN mg/dL 41* 33* 23 23   CREATININE mg/dL 1.54* 1.50* 1.35* 1.16   GLUCOSE mg/dL 166* 170* 142* 133*   CALCIUM mg/dL 8.5* 8.4* 8.7 9.0     Results from last 7 days   Lab Units 12/15/20  1631 12/11/20 1959   BILIRUBIN mg/dL  --  0.4   ALK PHOS U/L  --  64   ALT (SGPT) U/L  --  23   AST (SGOT) U/L  --  21   PROTIME Seconds 10.3 10.5   INR  0.93 0.95   APTT seconds 24.5 22.8*     Results from last 7 days   Lab Units 12/12/20  0317   CHOLESTEROL mg/dL 162   TRIGLYCERIDES mg/dL 93   HDL CHOL mg/dL 48     Results from last 7 days   Lab Units  12/12/20  0317 12/11/20 1959   TSH uIU/mL 3.710  --    HEMOGLOBIN A1C % 7.1*  --    TROPONIN T ng/mL  --  <0.010       Brief Urine Lab Results  (Last result in the past 365 days)      Color   Clarity   Blood   Leuk Est   Nitrite   Protein   CREAT   Urine HCG        08/27/20 0821             66.2             Microbiology Results Abnormal     Procedure Component Value - Date/Time    COVID PRE-OP / PRE-PROCEDURE SCREENING ORDER (NO ISOLATION) - Swab, Nasopharynx [[id number]]  (Normal) Collected: 12/12/20 1817    Lab Status: Final result Specimen: Swab from Nasopharynx Updated: 12/12/20 1915    Narrative:      The following orders were created for panel order COVID PRE-OP / PRE-PROCEDURE SCREENING ORDER (NO ISOLATION) - Swab, Nasopharynx.  Procedure                               Abnormality         Status                     ---------                               -----------         ------                     COVID-19,CEPHEID,COR/LORI...[[id number]]  Normal              Final result                 Please view results for these tests on the individual orders.    COVID-19,CEPHEID,COR/LORI/PAD IN-HOUSE(OR EMERGENT/ADD-ON),NP SWAB IN TRANSPORT MEDIA 3-4 HR TAT - Swab, Nasopharynx [[id number]]  (Normal) Collected: 12/12/20 1817    Lab Status: Final result Specimen: Swab from Nasopharynx Updated: 12/12/20 1915     COVID19 Not Detected    Narrative:      Fact sheet for providers: https://www.fda.gov/media/472761/download     Fact sheet for patients: https://www.fda.gov/media/846464/download          Xr Ankle 2 View Left    Result Date: 12/12/2020  Impression: 1.Oblique fracture of the distal fibula with mild lateral displacement. 2.Suspected minimally displaced fracture of the posterior malleolus. 3.Mild osteoarthritis. 4.Calcific atherosclerosis.  Electronically Signed By-Dylon Cedillo MD On:12/12/2020 1:05 PM This report was finalized on [id number] by  Dylon Cedillo MD.    Ct Angiogram Neck    Result Date:  12/14/2020  Impression:  1. Approximately 50% stenosis of the right internal carotid artery in its proximal portion. Approximately 80% stenosis of the left internal carotid artery in its proximal portion. 2. Small vertebrobasilar system. The left vertebral artery terminates in the left posterior inferior cerebellar artery. 3. Significant right M2 stenosis. 4. Multiple stenoses throughout the posterior cerebral arteries bilaterally.  Electronically Signed By-Sg Tyson MD On:12/14/2020 9:21 AM This report was finalized on [id number] by  Sg Tyson MD.    Mri Brain Without Contrast    Result Date: 12/12/2020  Impression: 1.No definite MRI findings of acute infarction or other acute intracranial abnormality at this time. 2.Volume loss and suspected mild to moderate chronic small vessel ischemic changes with remote lacunar infarct in the right basal ganglion.   Electronically Signed By-Dylon Cedillo MD On:12/12/2020 1:33 PM This report was finalized on [id number] by  Dylon Cedillo MD.    Xr Chest 1 View    Result Date: 12/11/2020  Impression: 1.  No evidence of active disease.   Electronically Signed By-Nikki Bright MD On:12/11/2020 8:43 PM This report was finalized on [id number] by  Nikki Bright MD.    Ct Angiogram Head    Result Date: 12/14/2020  Impression:  1. Approximately 50% stenosis of the right internal carotid artery in its proximal portion. Approximately 80% stenosis of the left internal carotid artery in its proximal portion. 2. Small vertebrobasilar system. The left vertebral artery terminates in the left posterior inferior cerebellar artery. 3. Significant right M2 stenosis. 4. Multiple stenoses throughout the posterior cerebral arteries bilaterally.  Electronically Signed By-Sg Tyson MD On:12/14/2020 9:21 AM This report was finalized on [id number] by  Sg Tyson MD.    Ct Head Without Contrast Stroke Protocol    Result Date: 12/11/2020  Impression: 1. Negative for hemorrhage or  mass effect. 2. There is moderate cerebral and cerebellar atrophy. There is mild white matter abnormality which is favored to be due to chronic small vessel ischemia. 3. 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 4. If acute ischemia is clinically suspected, recommend consideration of brain MR.  Electronically Signed By-Nikki Bright MD On:12/11/2020 8:04 PM This report was finalized on [id number] by  Nikki Bright MD.      Results for orders placed during the hospital encounter of 12/11/20   Intra-Op Duplex Carotid Right Lmt CAR    Narrative · Imaging indicates a normal intra-op exam.          Results for orders placed during the hospital encounter of 12/11/20   Intra-Op Duplex Carotid Right Lmt CAR    Narrative · Imaging indicates a normal intra-op exam.          Results for orders placed during the hospital encounter of 12/11/20   Adult Transthoracic Echo Complete W/ Cont if Necessary Per Protocol (With Agitated Saline)    Narrative · Estimated left ventricular EF = 65% Left ventricular systolic function   is normal.  · Left ventricular diastolic dysfunction is noted.  · The right atrial cavity is mildly dilated.  · Moderate aortic valve stenosis is present.  · Estimated right ventricular systolic pressure from tricuspid   regurgitation is moderately elevated (45-55 mmHg).                  Test Results Pending at Discharge        Procedures Performed  Procedure(s):  ANKLE OPEN REDUCTION INTERNAL FIXATION         Consults:   Consults     Date and Time Order Name Status Description    12/16/2020 1448 Inpatient Hospitalist Consult      12/15/2020 0117 Inpatient Cardiology Consult      12/14/2020 1302 Inpatient Intensivist Consult      12/13/2020 0728 Inpatient Cardiology Consult Completed     12/13/2020 0619 Inpatient Orthopedic Surgery Consult      12/12/2020 1222 Inpatient Vascular Surgery Consult Completed     12/12/2020 0255 Inpatient Neurology Consult Stroke Completed     12/11/2020 2043  Hospitalist (on-call MD unless specified) Completed     12/11/2020 1951 Inpatient Neurology Consult Stroke Completed     12/11/2020 1951 Inpatient Neurology Consult Stroke Completed             Discharge Details        Discharge Medications      New Medications      Instructions Start Date   aspirin 81 MG chewable tablet   81 mg, Oral, Daily   Start Date: December 18, 2020        Continue These Medications      Instructions Start Date   amLODIPine 10 MG tablet  Commonly known as: NORVASC   TAKE 1 TABLET EVERY DAY      atorvastatin 40 MG tablet  Commonly known as: LIPITOR   40 mg, Oral, Nightly      clopidogrel 75 MG tablet  Commonly known as: PLAVIX   TAKE 1 TABLET EVERY DAY      fluticasone 50 MCG/ACT nasal spray  Commonly known as: FLONASE   2 sprays, Nasal, Daily PRN      glucose blood test strip   1 each, Other, Daily, Use as instructed      Januvia 50 MG tablet  Generic drug: SITagliptin   TAKE 1 TABLET EVERY DAY      Jardiance 10 MG tablet  Generic drug: Empagliflozin   TAKE 1 TABLET EVERY DAY      levothyroxine 75 MCG tablet  Commonly known as: SYNTHROID, LEVOTHROID   TAKE 1 TABLET EVERY DAY      losartan 50 MG tablet  Commonly known as: COZAAR   TAKE 1 TABLET EVERY DAY      pantoprazole 40 MG EC tablet  Commonly known as: PROTONIX   TAKE 1 TABLET EVERY DAY             Allergies   Allergen Reactions   • Azithromycin Unknown - High Severity         Discharge Disposition:  Home or Self Care    Diet:  Hospital:  Diet Order   Procedures   • Diet Cardiac; 2gm Na+         Discharge Activity:   Ad mark anthony.      CODE STATUS:    Code Status and Medical Interventions:   Ordered at: 12/11/20 2055     Code Status:    CPR     Medical Interventions (Level of Support Prior to Arrest):    Full         Follow-up Appointments  Future Appointments   Date Time Provider Department Center   2/24/2021  8:10 AM [facility]   3/3/2021 10:45 AM Serenity Wren MD [facility]   3/29/2021 10:45 AM Steve Muhammad  T, MD [facility]             Condition on Discharge:      Stable      Electronically signed by Kendall Arellano MD, 12/17/20, 12:53 PM EST.      Time: I spent  >30  minutes on this discharge activity which included face-to-face encounter with the patient/reviewing the data in the system/coordination of the care with the nursing staff as well as consultants/documentation/entering orders.

## 2020-12-17 NOTE — THERAPY RE-EVALUATION
Patient Name: Alen Ratliff  : 1940    MRN: [id number]                              Today's Date: 2020       Admit Date: 2020    Visit Dx:     ICD-10-CM ICD-9-CM   1. TIA (transient ischemic attack)  G45.9 435.9   2. Stenosis of right carotid artery  I65.21 433.10   3. Closed displaced fracture of lateral malleolus of left fibula, initial encounter  S82.62XA 824.2     Patient Active Problem List   Diagnosis   • Heart murmur   • Stroke (cerebrum) (CMS/HCC)   • Essential hypertension   • Mixed hyperlipidemia   • Acquired hypothyroidism   • Type 2 diabetes mellitus, without long-term current use of insulin (CMS/HCC)   • GERD (gastroesophageal reflux disease)   • Seasonal allergic rhinitis due to pollen   • CKD (chronic kidney disease) stage 3, GFR 30-59 ml/min   • History of prostate cancer   • TIA (transient ischemic attack)   • Symptomatic carotid artery stenosis, bilateral   • Closed displaced fracture of lateral malleolus of left fibula     Past Medical History:   Diagnosis Date   • Allergic rhinitis    • Diabetes (CMS/HCC)    • GERD (gastroesophageal reflux disease)    • Heart murmur    • Hyperlipidemia    • Hypertension    • Hypothyroidism    • Prostate cancer (CMS/HCC)    • Stroke (CMS/HCC)      Past Surgical History:   Procedure Laterality Date   • ANKLE OPEN REDUCTION INTERNAL FIXATION Left 2020    Procedure: ANKLE OPEN REDUCTION INTERNAL FIXATION;  Surgeon: CAROLE Vasquez DPM;  Location: [facility];  Service: Podiatry;  Laterality: Left;   • BACK SURGERY     • CAROTID ENDARTERECTOMY Right 2020    Procedure: CAROTID ENDARTERECTOMY;  Surgeon: Toby Fung MD;  Location: [facility] MAIN OR;  Service: Vascular;  Laterality: Right;   • COLONOSCOPY  2015   • PROSTATECTOMY      due to cancer     General Information     Row Name 20 0723          Physical Therapy Time and Intention    Document Type  re-evaluation  -SS     Mode of Treatment  physical  therapy  -     Row Name 12/17/20 1641          General Information    Patient Profile Reviewed  yes  -     Prior Level of Function  independent:;ADL's;driving;community mobility  -     Existing Precautions/Restrictions  partial weight bearing;left CAM boot  -SS     Row Name 12/17/20 1641          Living Environment    Lives With  spouse  -     Row Name 12/17/20 1641          Home Main Entrance    Number of Stairs, Main Entrance  none  -SS     Stair Railings, Main Entrance  none  -SS     Row Name 12/17/20 1641          Cognition    Orientation Status (Cognition)  oriented x 4  -     Row Name 12/17/20 1641          Safety Issues, Functional Mobility    Impairments Affecting Function (Mobility)  balance;endurance/activity tolerance;strength  -       User Key  (r) = Recorded By, (t) = Taken By, (c) = Cosigned By    Initials Name Provider Type    SS Jyotsna Molina PT Physical Therapist        Mobility     Row Name 12/17/20 1643          Bed Mobility    All Activities, [place] (Bed Mobility)  independent  -     Row Name 12/17/20 1643          Sit-Stand Transfer    Sit-Stand [place] (Transfers)  contact guard  -     Assistive Device (Sit-Stand Transfers)  walker, front-wheeled  -SS     Row Name 12/17/20 1643          Gait/Stairs (Locomotion)    [place] Level (Gait)  contact guard  -     Assistive Device (Gait)  walker, front-wheeled  -     Distance in Feet (Gait)  20'  -     Comment (Gait/Stairs)  cues for ensuring maintenance of PWB status to L LE, unweighting through UEs.  -     Row Name 12/17/20 1643          Mobility    Extremity Weight-bearing Status  left lower extremity  -     Left Lower Extremity (Weight-bearing Status)  partial weight-bearing (PWB) in CAM boot  -       User Key  (r) = Recorded By, (t) = Taken By, (c) = Cosigned By    Initials Name Provider Type    Jyotsna Mari PT Physical Therapist        Obj/Interventions     Row Name 12/17/20 1645           Range of Motion Comprehensive    Comment, General Range of Motion  L ankle ROM n/t due to CAM boot and s/p distal fibular repair  -     Row Name 12/17/20 1644          Strength Comprehensive (MMT)    General Manual Muscle Testing (MMT) Assessment  no strength deficits identified  -     Row Name 12/17/20 1645          Balance    Balance Assessment  standing static balance  -     Static Sitting Balance  WNL  -     Row Name 12/17/20 1644          Sensory Assessment (Somatosensory)    Sensory Assessment (Somatosensory)  sensation intact  -       User Key  (r) = Recorded By, (t) = Taken By, (c) = Cosigned By    Initials Name Provider Type    SS Jyotsna Molina, PT Physical Therapist        Goals/Plan    No documentation.       Clinical Impression     Row Name 12/17/20 1645          Pain Scale: Numbers Pre/Post-Treatment    Pretreatment Pain Rating  0/10 - no pain  -SS     Posttreatment Pain Rating  0/10 - no pain  -     Row Name 12/17/20 1645          Plan of Care Review    Plan of Care Reviewed With  patient  -     Outcome Summary  81 y/o M re-eval s/p Open reduction internal fixation of lateral malleolus fracture, left 12/16 per Dr. Vasquez. also s/p R CEA. recent conversion to sinus rhythm (no longer considering pacemaker). patient is PWB in CAM boot for transfers. Patient is independent and active at baseline. Patient ambulates 20' with RW while maintaining PWB status with CGA. He is SBA for transfers and bed mobility. Pt is safe to d/c home routinely with RW. PPE: mask, goggles, gloves.  -     Row Name 12/17/20 1645          Therapy Assessment/Plan (PT)    Criteria for Skilled Interventions Met (PT)  no;other (see comments) safe to d/c home  -       User Key  (r) = Recorded By, (t) = Taken By, (c) = Cosigned By    Initials Name Provider Type    SS Jyotsna Molina, PT Physical Therapist        Outcome Measures     Row Name 12/17/20 1655          Modified [place] Scale    Pre-Stroke Modified  [place] Scale  1 - No significant disability despite symptoms.  Able to carry out all usual duties and activities.  -SS     Modified [place] Scale  2 - Slight disability.  Unable to carry out all previous activities but able to look after own affairs without assistance.  -     Row Name 12/17/20 1652          Functional Assessment    Outcome Measure Options  Modified [place]  -SS       User Key  (r) = Recorded By, (t) = Taken By, (c) = Cosigned By    Initials Name Provider Type    SS Jyotsna Molina, PT Physical Therapist        Physical Therapy Education                 Title: PT OT SLP Therapies (Resolved)     Topic: Physical Therapy (Resolved)     Point: Mobility training (Resolved)     Learning Progress Summary           Patient Acceptance, E,TB, VU,DU by CM at 12/15/2020 1214                   Point: Precautions (Resolved)     Learning Progress Summary           Patient Acceptance, E,TB, VU,DU by CM at 12/15/2020 1214                               User Key     Initials Effective Dates Name Provider Type Discipline     03/01/19 -  Aundrea Alejandre, PT Physical Therapist PT              PT Recommendation and Plan     Plan of Care Reviewed With: patient  Outcome Summary: 81 y/o M re-eval s/p Open reduction internal fixation of lateral malleolus fracture, left 12/16 per Dr. Vasquez. also s/p R CEA. recent conversion to sinus rhythm (no longer considering pacemaker). patient is PWB in CAM boot for transfers. Patient is independent and active at baseline. Patient ambulates 20' with RW while maintaining PWB status with CGA. He is SBA for transfers and bed mobility. Pt is safe to d/c home routinely with RW. PPE: mask, goggles, gloves.     Time Calculation:   PT Charges     Row Name 12/17/20 1544             Time Calculation    Start Time  1024  -SS      Stop Time  1043  -      Time Calculation (min)  19 min  -SS      PT Received On  12/17/20  -         Time Calculation- PT    Total Timed Code Minutes- PT  9  minute(s)  -SS        User Key  (r) = Recorded By, (t) = Taken By, (c) = Cosigned By    Initials Name Provider Type    SS Jyotsna Molina, PT Physical Therapist        Therapy Charges for Today     Code Description Service Date Service Provider Modifiers Qty    [id number]  PT RE-EVAL ESTABLISHED PLAN 2 12/17/2020 Jyotsna Molina, PT GP 1    [id number]  PT THERAPEUTIC ACT EA 15 MIN 12/17/2020 Jyotsna Molina, PT GP 1          PT G-Codes  Outcome Measure Options: Modified [place]  AM-PAC 6 Clicks Score (OT): 19  Modified [place] Scale: 2 - Slight disability.  Unable to carry out all previous activities but able to look after own affairs without assistance.    Jyotsna Molina PT  12/17/2020

## 2020-12-17 NOTE — PROGRESS NOTES
Continued Stay Note  THAD Hernandez     Patient Name: Alen Ratliff  MRN: [id number]  Today's Date: 12/17/2020    Admit Date: 12/11/2020    Discharge Plan     Row Name 12/17/20 1343       Plan    Plan  PT and OT recommends home with assist      Plan Comments  Patient needing a walker for home use. Referral made to [facility] with [employer]. Order completed and sent per Epic.      .       Expected Discharge Date and Time     Expected Discharge Date Expected Discharge Time    Dec 17, 2020         Prachi Aguillon RN, CM  Office Phone [phone]  Cell [phone]

## 2020-12-17 NOTE — DISCHARGE INSTR - ACTIVITY
May be up to bathroom and to eat otherwise keep leg elevated on pillows. You can have the boot off while in bed.

## 2020-12-17 NOTE — PROGRESS NOTES
Daily Progress Note    Symptomatic carotid artery stenosis, bilateral    Essential hypertension    Mixed hyperlipidemia    Type 2 diabetes mellitus, without long-term current use of insulin (CMS/[facility])    TIA (transient ischemic attack)    Closed displaced fracture of lateral malleolus of left fibula    Assessment    S/p right carotid endarterectomy  Closed displaced fracture of lateral malleolus of left fibula   Atrial fibrillation with SSS resolved    Plan    Currently recommended plavix and ASA  Off all drips  Ok to discharge from pulmonary standpoint     LOS: 4 days       Subjective         Objective     Vital signs for last 24 hours:  Vitals:    12/17/20 0200 12/17/20 0530 12/17/20 0829 12/17/20 1009   BP: 127/61 151/64 147/73    BP Location: Left arm Left arm     Patient Position: Lying Lying     Pulse: 85 62 81    Resp: 17 17  15   Temp: 97.6 °F (36.4 °C) 97.9 °F (36.6 °C)  98 °F (36.7 °C)   TempSrc: Oral Oral  Oral   SpO2: 94% 93%  97%   Weight:  95.4 kg (210 lb 5.1 oz)     Height:           Intake/Output last 3 shifts:  I/O last 3 completed shifts:  In: 922 [P.O.:240; I.V.:682]  Out: 400 [Urine:400]  Intake/Output this shift:  I/O this shift:  In: 340 [P.O.:340]  Out: 350 [Urine:350]      Radiology  Imaging Results (Last 24 Hours)     Procedure Component Value Units Date/Time    FL C Arm During Surgery [[id number]] Resulted: 12/16/20 1554     Updated: 12/16/20 1736    XR Ankle 2 View Left [[id number]] Resulted: 12/16/20 1553     Updated: 12/16/20 1554          Labs:  Results from last 7 days   Lab Units 12/16/20 0412   WBC 10*3/mm3 9.10   HEMOGLOBIN g/dL 11.8*   HEMATOCRIT % 36.4*   PLATELETS 10*3/mm3 221     Results from last 7 days   Lab Units 12/16/20 0412 12/11/20 1959   SODIUM mmol/L 136   < > 141   POTASSIUM mmol/L 4.1   < > 3.4*   CHLORIDE mmol/L 106   < > 107   CO2 mmol/L 20.0*   < > 21.0*   BUN mg/dL 41*   < > 23   CREATININE mg/dL 1.54*   < > 1.16   CALCIUM mg/dL 8.5*   < > 9.0   BILIRUBIN mg/dL   --   --  0.4   ALK PHOS U/L  --   --  64   ALT (SGPT) U/L  --   --  23   AST (SGOT) U/L  --   --  21   GLUCOSE mg/dL 166*   < > 133*    < > = values in this interval not displayed.         Results from last 7 days   Lab Units 12/11/20 1959   ALBUMIN g/dL 4.00     Results from last 7 days   Lab Units 12/11/20 1959   TROPONIN T ng/mL <0.010         Results from last 7 days   Lab Units 12/15/20  0428   MAGNESIUM mg/dL 1.4*     Results from last 7 days   Lab Units 12/15/20  1631 12/11/20 1959   INR  0.93 0.95   APTT seconds 24.5 22.8*     Results from last 7 days   Lab Units 12/12/20  0317   TSH uIU/mL 3.710           Meds:   SCHEDULE  amLODIPine, 10 mg, Oral, Daily  aspirin, 81 mg, Oral, Daily  atorvastatin, 80 mg, Oral, Nightly  clopidogrel, 75 mg, Oral, Daily  insulin lispro, 0-9 Units, Subcutaneous, TID AC  levothyroxine, 75 mcg, Oral, Daily  losartan, 50 mg, Oral, Daily  pantoprazole, 40 mg, Oral, Daily  sodium chloride, 3 mL, Intravenous, Q12H      Infusions  DOBUTamine, 2.5 mcg/kg/min, Last Rate: Stopped (12/17/20 0037)  lactated ringers, 1,000 mL, Last Rate: Stopped (12/17/20 0038)      PRNs  •  acetaminophen **OR** acetaminophen  •  bisacodyl  •  Calcium Gluconate-NaCl **AND** calcium gluconate **AND** Calcium, Ionized  •  dextrose  •  dextrose  •  diphenhydrAMINE  •  glucagon (human recombinant)  •  hydrALAZINE  •  HYDROcodone-acetaminophen  •  HYDROmorphone **AND** naloxone  •  influenza vaccine  •  insulin lispro **AND** insulin lispro  •  magnesium sulfate **OR** magnesium sulfate **OR** magnesium sulfate  •  ondansetron  •  potassium chloride **OR** potassium chloride **OR** potassium chloride  •  potassium phosphate infusion greater than 15 mMoles **OR** potassium phosphate **OR** potassium phosphate  •  sodium chloride  •  sodium chloride    Physical Exam:  Physical Exam    ROS  Review of Systems

## 2020-12-17 NOTE — PLAN OF CARE
Problem: Adult Inpatient Plan of Care  Goal: Plan of Care Review  Outcome: Ongoing, Progressing  Goal: Patient-Specific Goal (Individualized)  Outcome: Ongoing, Progressing  Goal: Absence of Hospital-Acquired Illness or Injury  Outcome: Ongoing, Progressing  Intervention: Identify and Manage Fall Risk  Recent Flowsheet Documentation  Taken 12/17/2020 0321 by Sin Moya RN  Safety Promotion/Fall Prevention:   activity supervised   assistive device/personal items within reach   clutter free environment maintained   fall prevention program maintained   nonskid shoes/slippers when out of bed   safety round/check completed  Taken 12/17/2020 0051 by Sin Moya RN  Safety Promotion/Fall Prevention:   activity supervised   assistive device/personal items within reach   clutter free environment maintained   fall prevention program maintained   nonskid shoes/slippers when out of bed   safety round/check completed  Taken 12/16/2020 1930 by Sin Moya RN  Safety Promotion/Fall Prevention:   activity supervised   assistive device/personal items within reach   clutter free environment maintained   fall prevention program maintained   nonskid shoes/slippers when out of bed   safety round/check completed  Intervention: Prevent Skin Injury  Recent Flowsheet Documentation  Taken 12/17/2020 0321 by Sin Moya RN  Body Position: position changed independently  Taken 12/17/2020 0051 by Sin Moya RN  Body Position: position changed independently  Taken 12/16/2020 1930 by Sin Moya RN  Body Position: position changed independently  Intervention: Prevent and Manage VTE (venous thromboembolism) Risk  Recent Flowsheet Documentation  Taken 12/17/2020 0051 by Sin Moya RN  VTE Prevention/Management:   bilateral   sequential compression devices off   bleeding risk factor(s) identified  Taken 12/16/2020 1930 by Sin Moya RN  VTE Prevention/Management:   bilateral   sequential compression  devices off   bleeding risk factor(s) identified  Intervention: Prevent Infection  Recent Flowsheet Documentation  Taken 12/17/2020 0321 by Sin Moya RN  Infection Prevention:   environmental surveillance performed   hand hygiene promoted   personal protective equipment utilized   rest/sleep promoted   single patient room provided  Taken 12/17/2020 0051 by Sin Moya RN  Infection Prevention:   environmental surveillance performed   personal protective equipment utilized   hand hygiene promoted   rest/sleep promoted   single patient room provided  Taken 12/16/2020 1930 by Sin Myoa RN  Infection Prevention:   environmental surveillance performed   hand hygiene promoted   personal protective equipment utilized   rest/sleep promoted   single patient room provided  Goal: Optimal Comfort and Wellbeing  Outcome: Ongoing, Progressing  Intervention: Provide Person-Centered Care  Recent Flowsheet Documentation  Taken 12/17/2020 0321 by Sin Moya RN  Trust Relationship/Rapport:   care explained   choices provided   emotional support provided   empathic listening provided   questions answered   questions encouraged   thoughts/feelings acknowledged   reassurance provided  Taken 12/17/2020 0051 by Sin Moya RN  Trust Relationship/Rapport:   care explained   choices provided   emotional support provided   empathic listening provided   questions answered   questions encouraged   reassurance provided   thoughts/feelings acknowledged  Taken 12/16/2020 1930 by Sin Moya RN  Trust Relationship/Rapport:   care explained   choices provided   emotional support provided   empathic listening provided   questions answered   questions encouraged   reassurance provided   thoughts/feelings acknowledged  Goal: Readiness for Transition of Care  Outcome: Ongoing, Progressing     Problem: Fall Injury Risk  Goal: Absence of Fall and Fall-Related Injury  Outcome: Ongoing, Progressing  Intervention: Identify  and Manage Contributors to Fall Injury Risk  Recent Flowsheet Documentation  Taken 12/17/2020 0321 by Sin Moya RN  Medication Review/Management: medications reviewed  Self-Care Promotion:   independence encouraged   BADL personal objects within reach  Taken 12/17/2020 0051 by Sin Moya RN  Medication Review/Management: medications reviewed  Self-Care Promotion:   independence encouraged   BADL personal objects within reach  Taken 12/16/2020 1930 by Sin Moya RN  Medication Review/Management: medications reviewed  Self-Care Promotion:   independence encouraged   BADL personal objects within reach  Intervention: Promote Injury-Free Environment  Recent Flowsheet Documentation  Taken 12/17/2020 0321 by Sin Moya RN  Safety Promotion/Fall Prevention:   activity supervised   assistive device/personal items within reach   clutter free environment maintained   fall prevention program maintained   nonskid shoes/slippers when out of bed   safety round/check completed  Taken 12/17/2020 0051 by Sin Moya RN  Safety Promotion/Fall Prevention:   activity supervised   assistive device/personal items within reach   clutter free environment maintained   fall prevention program maintained   nonskid shoes/slippers when out of bed   safety round/check completed  Taken 12/16/2020 1930 by Sin Moya RN  Safety Promotion/Fall Prevention:   activity supervised   assistive device/personal items within reach   clutter free environment maintained   fall prevention program maintained   nonskid shoes/slippers when out of bed   safety round/check completed     Problem: Adjustment to Illness (Stroke, Ischemic/Transient Ischemic Attack)  Goal: Optimal Coping  Outcome: Ongoing, Progressing  Intervention: Support Patient/Family Psychosocial Response to Stroke  Recent Flowsheet Documentation  Taken 12/17/2020 0321 by Sin Moya RN  Supportive Measures:   active listening utilized   decision-making  supported   relaxation techniques promoted   self-care encouraged  Family/Support System Care:   self-care encouraged   support provided  Taken 12/17/2020 0051 by Sin Moya RN  Supportive Measures:   active listening utilized   decision-making supported   relaxation techniques promoted   self-care encouraged  Family/Support System Care:   self-care encouraged   support provided  Taken 12/16/2020 1930 by Sin Moya RN  Supportive Measures:   active listening utilized   decision-making supported   relaxation techniques promoted   self-care encouraged  Family/Support System Care:   self-care encouraged   support provided     Problem: Bowel Elimination Impaired (Stroke, Ischemic/Transient Ischemic Attack)  Goal: Effective Bowel Elimination  Outcome: Ongoing, Progressing     Problem: Cerebral Tissue Perfusion Risk (Stroke, Ischemic/Transient Ischemic Attack)  Goal: Optimal Cerebral Tissue Perfusion  Outcome: Ongoing, Progressing  Intervention: Protect and Optimize Cerebral Perfusion  Recent Flowsheet Documentation  Taken 12/17/2020 0321 by Sin Moya RN  Sensory Stimulation Regulation:   care clustered   quiet environment promoted  Cerebral Perfusion Promotion: blood pressure monitored  Taken 12/17/2020 0051 by Sin Moya RN  Sensory Stimulation Regulation:   care clustered   quiet environment promoted  Cerebral Perfusion Promotion: blood pressure monitored  Taken 12/16/2020 1930 by Sin Moya RN  Sensory Stimulation Regulation:   care clustered   quiet environment promoted  Cerebral Perfusion Promotion: blood pressure monitored  Intervention: Optimize Oxygenation and Ventilation  Recent Flowsheet Documentation  Taken 12/17/2020 0321 by Sin Moya RN  Head of Bed (HOB): HOB elevated  Taken 12/17/2020 0051 by Sin Moya RN  Head of Bed (HOB): HOB elevated  Taken 12/16/2020 1930 by Sin Moya RN  Head of Bed (HOB): HOB elevated     Problem: Communication Impairment (Stroke,  Ischemic/Transient Ischemic Attack)  Goal: Improved Communication Skills  Outcome: Ongoing, Progressing  Intervention: Optimize Cognitive and Communication Skills  Recent Flowsheet Documentation  Taken 12/17/2020 0321 by Sin Moya RN  Reorientation Measures:   clock in view   reorientation provided  Environment Familiarity/Consistency: daily routine followed  Communication Enhancement Strategies: call light answered in person  Taken 12/17/2020 0051 by Sin Moya RN  Reorientation Measures:   clock in view   reorientation provided  Environment Familiarity/Consistency: daily routine followed  Communication Enhancement Strategies: call light answered in person  Taken 12/16/2020 1930 by Sin Moya RN  Reorientation Measures:   clock in view   reorientation provided  Communication Enhancement Strategies: call light answered in person     Problem: Eating/Swallowing Impairment (Stroke, Ischemic/Transient Ischemic Attack)  Goal: Oral Intake without Aspiration  Outcome: Ongoing, Progressing  Intervention: Optimize Eating and Swallowing  Recent Flowsheet Documentation  Taken 12/17/2020 0321 by Sin Moya RN  Aspiration Precautions: awake/alert before oral intake  Taken 12/17/2020 0051 by Sin Moya RN  Aspiration Precautions: awake/alert before oral intake  Taken 12/16/2020 1930 by Sin Moya RN  Aspiration Precautions: awake/alert before oral intake     Problem: Functional Ability Impaired (Stroke, Ischemic/Transient Ischemic Attack)  Goal: Optimal Functional Ability  Outcome: Ongoing, Progressing  Intervention: Optimize Functional Ability  Recent Flowsheet Documentation  Taken 12/17/2020 0321 by Sin Moya RN  Activity Management: activity adjusted per tolerance  Self-Care Promotion:   independence encouraged   BADL personal objects within reach  Taken 12/17/2020 0051 by Sin Moya RN  Activity Management: activity adjusted per tolerance  Self-Care Promotion:   independence  encouraged   BADL personal objects within reach  Taken 12/16/2020 1930 by Sin Moya RN  Activity Management: activity adjusted per tolerance  Self-Care Promotion:   independence encouraged   BADL personal objects within reach     Problem: Hemodynamic Instability (Stroke, Ischemic/Transient Ischemic Attack)  Goal: Vital Signs Remain in Desired Range  Outcome: Ongoing, Progressing  Intervention: Optimize Blood Flow  Recent Flowsheet Documentation  Taken 12/17/2020 0321 by Sin Moya RN  Fluid/Electrolyte Management: fluids provided  Taken 12/17/2020 0051 by Sin Moya RN  Fluid/Electrolyte Management: fluids provided  Taken 12/16/2020 1930 by Sin Moya RN  Fluid/Electrolyte Management: fluids provided     Problem: Pain (Stroke, Ischemic/Transient Ischemic Attack)  Goal: Acceptable Pain Control  Outcome: Ongoing, Progressing     Problem: Sensorimotor Impairment (Stroke, Ischemic/Transient Ischemic Attack)  Goal: Improved Sensorimotor Function  Outcome: Ongoing, Progressing  Intervention: Optimize Range of Motion, Motor Control and Function  Recent Flowsheet Documentation  Taken 12/17/2020 0321 by Sin Moay RN  Positioning/Transfer Devices:   pillows   in use  Taken 12/17/2020 0051 by Sin Moya RN  Positioning/Transfer Devices:   pillows   in use  Taken 12/16/2020 1930 by Sin Moya RN  Positioning/Transfer Devices:   pillows   in use  Intervention: Optimize Sensory and Perceptual Abilities  Recent Flowsheet Documentation  Taken 12/17/2020 0321 by Sin Moya RN  Pressure Reduction Techniques:   frequent weight shift encouraged   pressure points protected   rest period provided between sit times  Pressure Reduction Devices:   pressure-redistributing mattress utilized   positioning supports utilized  Taken 12/17/2020 0051 by Sin Moya RN  Pressure Reduction Techniques:   frequent weight shift encouraged   heels elevated off bed   positioned off wounds   pressure  points protected   rest period provided between sit times  Pressure Reduction Devices:   pressure-redistributing mattress utilized   positioning supports utilized  Taken 12/16/2020 1941 by Sin Moya RN  Pressure Reduction Techniques:   frequent weight shift encouraged   heels elevated off bed   positioned off wounds   pressure points protected   rest period provided between sit times  Pressure Reduction Devices:   pressure-redistributing mattress utilized   positioning supports utilized  Taken 12/16/2020 1930 by Sin Moya RN  Pressure Reduction Techniques:   frequent weight shift encouraged   heels elevated off bed   positioned off wounds   pressure points protected   rest period provided between sit times  Pressure Reduction Devices:   pressure-redistributing mattress utilized   positioning supports utilized     Problem: Urinary Elimination Impaired (Stroke, Ischemic/Transient Ischemic Attack)  Goal: Effective Urinary Elimination  Outcome: Ongoing, Progressing  Intervention: Promote Effective Bladder Elimination  Recent Flowsheet Documentation  Taken 12/17/2020 0051 by Sin Moya RN  Urinary Elimination Promotion: toileting device within reach     Problem: Skin Injury Risk Increased  Goal: Skin Health and Integrity  Outcome: Ongoing, Progressing  Intervention: Optimize Skin Protection  Recent Flowsheet Documentation  Taken 12/17/2020 0321 by Sin Moya RN  Pressure Reduction Techniques:   frequent weight shift encouraged   pressure points protected   rest period provided between sit times  Head of Bed (HOB): HOB elevated  Pressure Reduction Devices:   pressure-redistributing mattress utilized   positioning supports utilized  Skin Protection:   adhesive use limited   tubing/devices free from skin contact   silicone foam dressing in place  Taken 12/17/2020 0051 by Sin Moya RN  Pressure Reduction Techniques:   frequent weight shift encouraged   heels elevated off bed   positioned off  wounds   pressure points protected   rest period provided between sit times  Head of Bed (HOB): [facility] elevated  Pressure Reduction Devices:   pressure-redistributing mattress utilized   positioning supports utilized  Skin Protection:   adhesive use limited   silicone foam dressing in place   tubing/devices free from skin contact  Taken 12/16/2020 1941 by Sin Moya RN  Pressure Reduction Techniques:   frequent weight shift encouraged   heels elevated off bed   positioned off wounds   pressure points protected   rest period provided between sit times  Pressure Reduction Devices:   pressure-redistributing mattress utilized   positioning supports utilized  Skin Protection: silicone foam dressing in place  Taken 12/16/2020 1930 by Sin Moya RN  Pressure Reduction Techniques:   frequent weight shift encouraged   heels elevated off bed   positioned off wounds   pressure points protected   rest period provided between sit times  Head of Bed ([facility]): [facility] elevated  Pressure Reduction Devices:   pressure-redistributing mattress utilized   positioning supports utilized  Skin Protection:   adhesive use limited   tubing/devices free from skin contact   silicone foam dressing in place   Goal Outcome Evaluation:  Plan of Care Reviewed With: patient  Progress: no change

## 2020-12-17 NOTE — DISCHARGE PLACEMENT REQUEST
"Alen Viera (80 y.o. Male)     Date of Birth Social Security Number Address Home Phone MRN    1940  [facility] [address] [place] [address] [phone] [id number]    Christianity Marital Status          Confucianist        Admission Date Admission Type Admitting Provider Attending Provider Department, Room/Bed    12/11/20 Emergency Kendall Arellano MD Ojo-Oniyun, Saheed G, MD [facility] NEURO HEART, 266/1    Discharge Date Discharge Disposition Discharge Destination         Home or Self Care              Attending Provider: Kendall Arellano MD    Allergies: Azithromycin    Isolation: None   Infection: None   Code Status: CPR    Ht: 182.9 cm (72.01\")   Wt: 95.4 kg (210 lb 5.1 oz)    Admission Cmt: None   Principal Problem: Symptomatic carotid artery stenosis, bilateral [I65.23]                 Active Insurance as of 12/11/2020     Primary Coverage     Payor Plan Insurance Group Employer/Plan Group    HUMANA MEDICARE REPLACEMENT HUMANA MEDICARE REPLACEMENT [id number]     Payor Plan Address Payor Plan Phone Number Payor Plan Fax Number Effective Dates    [address] [address] [phone]  5/1/2020 - None Entered    [facility] [id number]       Subscriber Name Subscriber Birth Date Member ID       ALEN VIERA 1940 [id number]                 Emergency Contacts      (Rel.) Home Phone Work Phone Mobile Phone    DEWEY DAS (Spouse) [phone] -- --      Kelvin [] (Order [id number])  Order  Date: 12/17/2020 Department: [facility] NEURO HEART Ordering/Authorizing: Kendall Arellano MD   Order History  Outpatient  Date/Time Action Taken User Additional Information   12/17/20 1342 Sign Prachi Aguillon RN Ordering Mode: Verbal with readback   Order Details    Frequency Duration Priority Order Class   None None Routine External   Start Date/Time    Start Date   12/17/20   Order Information    Order Date Service Start Date Start Time "   20 Pulmonology 20    Reference Links    Associated Reports   View Encounter   Order Questions    Question Answer Comment   Equipment  Walker Folding with Wheels    Length of Need (99 Months = Lifetime) 99 Months = Lifetime    Note:  Custom values to enter length of need for DME          Verbal Order Info    Action Created on Order Mode Entered by Responsible Provider Signed by Signed on   Ordering 20 1342 Verbal with readback Prachi Aguillon RN Ojo-Oniyun, Saheed G, MD     Source Order Set / Preference List    Preference List   AMB SUPPLIES [1416]   Clinical Indications     ICD-10-CM ICD-9-CM   TIA (transient ischemic attack)  - Primary     G45.9 435.9   Stenosis of right carotid artery     I65.21 433.10   Closed displaced fracture of lateral malleolus of left fibula, initial encounter     S82.62XA 824.2   Reprint Order Requisition    Walker (Order #[id number]) on 20   Encounter    View Encounter          Order Provider Info        Office phone Pager E-mail   Ordering User Prachi Aguillon RN -- -- --   Authorizing Provider Kendall Arellano [phone] -- --   Attending Provider Kendall Arellano [phone] -- --   Entered By Prachi Aguillon RN -- -- --   Ordering Provider Kendall Arellano [phone] -- --   Linked Charges    No charges linked to this procedure   Tracking Reports  Cosign Tracking Order Transmittal Tracking   Authorized by:  Kendall Arellano MD  (NPI: [id number])       Lab Component SmartPhrase Guide    Walker (Order #[id number]) on 20           Insurance Information                HUMANA MEDICARE REPLACEMENT/HUMANA MEDICARE REPLACEMENT Phone: [phone]    Subscriber: Alen Ratliff Subscriber#: [id number]    Group#: [id number] Precert#:              History & Physical      Kinza-Daly Velazco APRN at 20                [facility] Medicine Services      Patient Name: Alen Ratliff  :  "1940  MRN: [id number]  Primary Care Physician: Serenity Wren MD  Date of admission: 12/11/2020    Patient Care Team:  Serenity Wren MD as PCP - General (Family Medicine)  William Patricio MD as Consulting Physician (Neurology)          Subjective   History Present Illness     Chief Complaint:   Chief Complaint   Patient presents with   • Numbness     tingling on left side and face.  Onset 30 minutes ago.                   80 year old male , awake, alert and good hisotrian presents with complaints of onset of left arm and left facial numbness this evening. He denies headache, confusion, slurred speech , focal deficits or vision changes. He reports PMH of a stroke 4 years ago when he lived in [place] and today felt similar. Symptoms resolved in ED, [facility] ). CT scan head per radiology:    \"IMPRESSION:  1. Negative for hemorrhage or mass effect.  2. There is moderate cerebral and cerebellar atrophy. There is mild  white matter abnormality which is favored to be due to chronic small  vessel ischemia.  3. 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  4. If acute ischemia is clinically suspected, recommend consideration of  brain MR.\"    CTA head and neck today per radiology:    \"CTA HEAD:      There is calcified atherosclerotic plaque in the cavernous segment of each internal carotid artery.   There is a stenosis of a right M2 branch. RT M2 branches are slightly decreased as compared to LT, with no definiteocclusion.   The left PCA has a fetal origin and there are 2 severe stenoses in the right PCA. There are several stenoses in the left PCA.   The upper segment of the left vertebral artery is not identified. This may be a congenital anomaly or due to pathology.   The basilar artery is relatively small in caliber, which may be due to the fetal origin of the left PCA.        CTA neck:   There is calcified plaque in the left ICA bulb with a stenosis of about 50%.   There is calcified plaque in the " "right ICA bulb with a stenosis of about 80%.   Vertebral arteries are patent. The right is larger than the left.\"    He reports he was followed by both a cardiologist and neurologist in [place]. He denies CAD but reports a hear murmer . Review of records shows he has established care with Dr Muhammad and had an echo and stress test in September:    \"'Left ventricular systolic function is normal.·   Left ventricular ejection fraction is 55 to 60%  · Left ventricular wall thickness is consistent with concentric hypertrophy.  · Left ventricular diastolic function is consistent with (grade I) impaired relaxation.  · Mild aortic valve regurgitation is present.  · Peak velocity of the flow distal to the aortic valve is 274.7 cm/s. Aortic valve maximum pressure gradient is 30.2 mmHg.  · Aortic valve area is 1.2 cm2.  · Mild mitral valve regurgitation is present.'    \"9/16/20    · Findings consistent with a normal ECG stress test.  · Left ventricular ejection fraction is normal. (Calculated EF = 64%).  · Myocardial perfusion imaging indicates a medium-sized infarct located in the inferior wall and septal wall with mild joseph-infarct ischemia.  · Impressions are consistent with a high risk study.  · This is abnormal Cardiolite imaging stress test with moderate sized inferior/septal myocardial infarction with small amount of joseph-infarct ischemia. Left ventricular size and function was normal. Clinical correlation recommended.'    Troponin today negative he denies dizziness or chest pain or dyspnea. Neurology was consulted from ED and he will be admitted for further evaluation and treatment .     PMH DM Type 2, HLD, HTN, prostate cancer, GERD, hypothyroidism . Glucose 133, K 3.4.           Review of Systems   Constitution: Negative.   HENT: Negative.    Eyes: Negative.    Cardiovascular: Negative.    Respiratory: Negative.    Endocrine: Negative.    Hematologic/Lymphatic: Negative.    Skin: Negative.    Musculoskeletal: Negative. "    Gastrointestinal: Negative.    Genitourinary: Negative.    Neurological: Positive for numbness and paresthesias.   Psychiatric/Behavioral: Negative.    Allergic/Immunologic: Negative.            Personal History     Past Medical History:   Past Medical History:   Diagnosis Date   • Allergic rhinitis    • Diabetes (CMS/HCC)    • GERD (gastroesophageal reflux disease)    • Heart murmur    • Hyperlipidemia    • Hypertension    • Hypothyroidism    • Prostate cancer (CMS/HCC)    • Stroke (CMS/HCC)        Surgical History:      Past Surgical History:   Procedure Laterality Date   • BACK SURGERY  1994   • COLONOSCOPY  08/25/2015   • PROSTATECTOMY  2001    due to cancer           Family History: family history includes Heart attack in his father and mother; Heart disease in his father and mother; Hypertension in an other family member. Otherwise pertinent FHx was reviewed and unremarkable.     Social History:  reports that he has never smoked. He has never used smokeless tobacco. He reports previous alcohol use. He reports that he does not use drugs.      Medications:  Prior to Admission medications    Medication Sig Start Date End Date Taking? Authorizing Provider   amLODIPine (NORVASC) 10 MG tablet TAKE 1 TABLET EVERY DAY 10/28/20   Serenity Wren MD   atorvastatin (LIPITOR) 40 MG tablet Take 1 tablet by mouth Every Night. 9/17/20   Serenity Wren MD   clopidogrel (PLAVIX) 75 MG tablet TAKE 1 TABLET EVERY DAY 11/11/20   Serenity Wren MD   fluticasone (FLONASE) 50 MCG/ACT nasal spray 2 sprays into the nostril(s) as directed by provider Daily.    ProviderRenee MD   glucose blood test strip 1 each by Other route Daily. Use as instructed    Renee Fabian MD   Januvia 50 MG tablet TAKE 1 TABLET EVERY DAY 11/11/20   Serenity Wren MD   Jardiance 10 MG tablet TAKE 1 TABLET EVERY DAY 10/28/20   Serenity Wren MD   levothyroxine (SYNTHROID, LEVOTHROID) 75 MCG tablet TAKE 1 TABLET EVERY DAY  11/30/20   Serenity Wren MD   losartan (COZAAR) 50 MG tablet TAKE 1 TABLET EVERY DAY 11/30/20   Serenity Wren MD   pantoprazole (PROTONIX) 40 MG EC tablet TAKE 1 TABLET EVERY DAY 11/25/20   Serenity Wren MD       Allergies:    Allergies   Allergen Reactions   • Azithromycin Unknown - High Severity       Objective   Objective     Vital Signs  Temp:  [97 °F (36.1 °C)-98 °F (36.7 °C)] 98 °F (36.7 °C)  Heart Rate:  [62-99] 67  Resp:  [18-20] 20  BP: (135-188)/(57-85) 153/62  SpO2:  [95 %-99 %] 97 %  on   ;      Body mass index is 31.16 kg/m².    Physical Exam  Vitals signs reviewed.   Constitutional:       Appearance: Normal appearance. He is obese.   HENT:      Head: Normocephalic and atraumatic.      Right Ear: External ear normal.      Left Ear: External ear normal.      Nose: Nose normal.      Mouth/Throat:      Mouth: Mucous membranes are moist.   Eyes:      Extraocular Movements: Extraocular movements intact.   Neck:      Musculoskeletal: Normal range of motion and neck supple.   Cardiovascular:      Rate and Rhythm: Normal rate and regular rhythm.      Pulses: Normal pulses.      Heart sounds: Murmur present.   Pulmonary:      Effort: Pulmonary effort is normal.      Breath sounds: Normal breath sounds.   Abdominal:      Palpations: Abdomen is soft.   Genitourinary:     Comments: deferred  Musculoskeletal: Normal range of motion.   Skin:     General: Skin is warm and dry.   Neurological:      General: No focal deficit present.      Mental Status: He is alert and oriented to person, place, and time. Mental status is at baseline.   Psychiatric:         Mood and Affect: Mood normal.         Behavior: Behavior normal.         Thought Content: Thought content normal.         Judgment: Judgment normal.         Results Review:  I have personally reviewed most recent radiology images and interpretations and agree with findings, most notably: CT head CTA head and neck .    Results from last 7 days   Lab Units  12/11/20 1959   WBC 10*3/mm3 6.90   HEMOGLOBIN g/dL 14.4   HEMATOCRIT % 43.8   PLATELETS 10*3/mm3 219   INR  0.95     Results from last 7 days   Lab Units 12/11/20 1959   SODIUM mmol/L 141   POTASSIUM mmol/L 3.4*   CHLORIDE mmol/L 107   CO2 mmol/L 21.0*   BUN mg/dL 23   CREATININE mg/dL 1.16   GLUCOSE mg/dL 133*   CALCIUM mg/dL 9.0   ALT (SGPT) U/L 23   AST (SGOT) U/L 21   TROPONIN T ng/mL <0.010     Estimated Creatinine Clearance: 57 mL/min (by C-G formula based on SCr of 1.16 mg/dL).  Brief Urine Lab Results  (Last result in the past 365 days)      Color   Clarity   Blood   Leuk Est   Nitrite   Protein   CREAT   Urine HCG        08/27/20 0821             66.2             Microbiology Results (last 10 days)     ** No results found for the last 240 hours. **          ECG/EMG Results (most recent)     Procedure Component Value Units Date/Time    ECG 12 Lead [[id number]] Collected: 12/11/20 2017     Updated: 12/11/20 2022     QT Interval 383 ms     Narrative:      HEART RATE= 75  bpm  RR Interval= 804  ms  WA Interval= 92  ms  P Horizontal Axis= 221  deg  P Front Axis= 0  deg  QRSD Interval= 113  ms  QT Interval= 383  ms  QRS Axis= -37  deg  T Wave Axis= 166  deg  - ABNORMAL ECG -  Sinus rhythm  Repol abnrm suggests ischemia, diffuse leads  No previous ECG available for comparison  Electronically Signed By:   Date and Time of Study: 2020-12-11 20:17:32               Results for orders placed during the hospital encounter of 09/24/20   Adult Transthoracic Echo Complete W/ Cont if Necessary Per Protocol    Narrative · Left ventricular systolic function is normal.  · Left ventricular ejection fraction is 55 to 60%  · Left ventricular wall thickness is consistent with concentric   hypertrophy.  · Left ventricular diastolic function is consistent with (grade I)   impaired relaxation.  · Mild aortic valve regurgitation is present.  · Peak velocity of the flow distal to the aortic valve is 274.7 cm/s.   Aortic valve maximum  pressure gradient is 30.2 mmHg.  · Aortic valve area is 1.2 cm2.  · Mild mitral valve regurgitation is present.          Xr Chest 1 View    Result Date: 12/11/2020  1.  No evidence of active disease.   Electronically Signed By-Nikki Bright MD On:12/11/2020 8:43 PM This report was finalized on [id number] by  Nikki Bright MD.    Ct Head Without Contrast Stroke Protocol    Result Date: 12/11/2020  1. Negative for hemorrhage or mass effect. 2. There is moderate cerebral and cerebellar atrophy. There is mild white matter abnormality which is favored to be due to chronic small vessel ischemia. 3. 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 4. If acute ischemia is clinically suspected, recommend consideration of brain MR.  Electronically Signed By-Nikki Bright MD On:12/11/2020 8:04 PM This report was finalized on [id number] by  Nikki Bright MD.        Estimated Creatinine Clearance: 57 mL/min (by C-G formula based on SCr of 1.16 mg/dL).    Assessment/Plan   Assessment/Plan       Active Hospital Problems    Diagnosis  POA   • TIA (transient ischemic attack) [G45.9]  Yes      Resolved Hospital Problems   No resolved problems to display.     TIA CT head negative for hemorrhage or mass effect per radiology- has severe stenosis R PCA and Right ICA per radiology see CTA head and neck , on statin add aspirin 325 daily, on Plavix from PMH CVA MRI in am , neurology following , 2 d echo in am     Hypokalemia K 3.4- K replacement protocol     DM Type 2 controlled- hold oral hypoglycemics add ssi prn and accucheck achs LCS diet     HTN- controlled on Losartan and amlodipine    GERD- on PPI    Hypothyroidism - on levothyroxine - TSH pending     Obesity BMI 31- lifestyle management education             VTE Prophylaxis -   Mechanical Order History:      Ordered        Signed and Held  Place Sequential Compression Device  Once         Signed and Held  Maintain Sequential Compression Device  Continuous                  Pharmalogical Order History:     None          CODE STATUS:    Code Status and Medical Interventions:   Ordered at: 12/11/20 2055     Code Status:    CPR     Medical Interventions (Level of Support Prior to Arrest):    Full       This patient has been examined wearing appropriate Personal Protective Equipment 12/11/20      I discussed the patient's findings and my recommendations with patient and wife .      Signature:Electronically signed by LARRY Rosario, 12/11/20, 11:07 PM EST.    [facility] Hospitalist Team          Electronically signed by Daly Buckner APRN at 12/11/20 2311          Physician Progress Notes (last 24 hours) (Notes from 12/16/20 1343 through 12/17/20 1343)      Lisa Angelo APRN at 12/17/20 1247          Daily Progress Note    Symptomatic carotid artery stenosis, bilateral    Essential hypertension    Mixed hyperlipidemia    Type 2 diabetes mellitus, without long-term current use of insulin (CMS/HCC)    TIA (transient ischemic attack)    Closed displaced fracture of lateral malleolus of left fibula    Assessment    S/p right carotid endarterectomy  Closed displaced fracture of lateral malleolus of left fibula   Atrial fibrillation with SSS resolved    Plan    Currently recommended plavix and ASA  Off all drips  Ok to discharge from pulmonary standpoint     LOS: 4 days       Subjective         Objective     Vital signs for last 24 hours:  Vitals:    12/17/20 0200 12/17/20 0530 12/17/20 0829 12/17/20 1009   BP: 127/61 151/64 147/73    BP Location: Left arm Left arm     Patient Position: Lying Lying     Pulse: 85 62 81    Resp: 17 17  15   Temp: 97.6 °F (36.4 °C) 97.9 °F (36.6 °C)  98 °F (36.7 °C)   TempSrc: Oral Oral  Oral   SpO2: 94% 93%  97%   Weight:  95.4 kg (210 lb 5.1 oz)     Height:           Intake/Output last 3 shifts:  I/O last 3 completed shifts:  In: 922 [P.O.:240; I.V.:682]  Out: 400 [Urine:400]  Intake/Output this shift:  I/O this shift:  In: 340  [P.O.:340]  Out: 350 [Urine:350]      Radiology  Imaging Results (Last 24 Hours)     Procedure Component Value Units Date/Time    FL C Arm During Surgery [[id number]] Resulted: 12/16/20 1554     Updated: 12/16/20 1736    XR Ankle 2 View Left [[id number]] Resulted: 12/16/20 1553     Updated: 12/16/20 1554          Labs:  Results from last 7 days   Lab Units 12/16/20 0412   WBC 10*3/mm3 9.10   HEMOGLOBIN g/dL 11.8*   HEMATOCRIT % 36.4*   PLATELETS 10*3/mm3 221     Results from last 7 days   Lab Units 12/16/20 0412 12/11/20 1959   SODIUM mmol/L 136   < > 141   POTASSIUM mmol/L 4.1   < > 3.4*   CHLORIDE mmol/L 106   < > 107   CO2 mmol/L 20.0*   < > 21.0*   BUN mg/dL 41*   < > 23   CREATININE mg/dL 1.54*   < > 1.16   CALCIUM mg/dL 8.5*   < > 9.0   BILIRUBIN mg/dL  --   --  0.4   ALK PHOS U/L  --   --  64   ALT (SGPT) U/L  --   --  23   AST (SGOT) U/L  --   --  21   GLUCOSE mg/dL 166*   < > 133*    < > = values in this interval not displayed.         Results from last 7 days   Lab Units 12/11/20 1959   ALBUMIN g/dL 4.00     Results from last 7 days   Lab Units 12/11/20 1959   TROPONIN T ng/mL <0.010         Results from last 7 days   Lab Units 12/15/20  0428   MAGNESIUM mg/dL 1.4*     Results from last 7 days   Lab Units 12/15/20  1631 12/11/20 1959   INR  0.93 0.95   APTT seconds 24.5 22.8*     Results from last 7 days   Lab Units 12/12/20  0317   TSH uIU/mL 3.710           Meds:   SCHEDULE  amLODIPine, 10 mg, Oral, Daily  aspirin, 81 mg, Oral, Daily  atorvastatin, 80 mg, Oral, Nightly  clopidogrel, 75 mg, Oral, Daily  insulin lispro, 0-9 Units, Subcutaneous, TID AC  levothyroxine, 75 mcg, Oral, Daily  losartan, 50 mg, Oral, Daily  pantoprazole, 40 mg, Oral, Daily  sodium chloride, 3 mL, Intravenous, Q12H      Infusions  DOBUTamine, 2.5 mcg/kg/min, Last Rate: Stopped (12/17/20 0037)  lactated ringers, 1,000 mL, Last Rate: Stopped (12/17/20 0038)      PRNs  •  acetaminophen **OR** acetaminophen  •  bisacodyl  •   Calcium Gluconate-NaCl **AND** calcium gluconate **AND** Calcium, Ionized  •  dextrose  •  dextrose  •  diphenhydrAMINE  •  glucagon (human recombinant)  •  hydrALAZINE  •  HYDROcodone-acetaminophen  •  HYDROmorphone **AND** naloxone  •  influenza vaccine  •  insulin lispro **AND** insulin lispro  •  magnesium sulfate **OR** magnesium sulfate **OR** magnesium sulfate  •  ondansetron  •  potassium chloride **OR** potassium chloride **OR** potassium chloride  •  potassium phosphate infusion greater than 15 mMoles **OR** potassium phosphate **OR** potassium phosphate  •  sodium chloride  •  sodium chloride    Physical Exam:  Physical Exam    ROS  Review of Systems                  Electronically signed by Lisa Angelo APRN at 12/17/20 1252     Steve Muhammad MD at 12/17/20 0848             LOS: 4 days   Admiting Physician- Kendall Arellano MD    Reason For Followup:    Bradycardia  Paroxysmal atrial fibrillation  Tachybradycardia syndrome  Right carotid endarterectomy      Subjective     Sitting up at side of bed eating breakfast  Denies pain or dyspnea    Objective     Hemodynamics are stable    Review of Systems:   Review of Systems   Constitution: Negative for chills and fever.   HENT: Negative for ear discharge and nosebleeds.    Eyes: Negative for discharge and redness.   Cardiovascular: Negative for chest pain, orthopnea, palpitations, paroxysmal nocturnal dyspnea and syncope.   Respiratory: Negative for cough, shortness of breath and wheezing.    Endocrine: Negative for heat intolerance.   Skin: Negative for rash.   Musculoskeletal: Negative for arthritis and myalgias.   Gastrointestinal: Negative for abdominal pain, melena, nausea and vomiting.   Genitourinary: Negative for dysuria and hematuria.   Neurological: Negative for dizziness, light-headedness, numbness and tremors.   Psychiatric/Behavioral: Negative for depression. The patient is not nervous/anxious.          Vital Signs  Vitals:    12/17/20  0200 12/17/20 0530 12/17/20 0829 12/17/20 1009   BP: 127/61 151/64 147/73    BP Location: Left arm Left arm     Patient Position: Lying Lying     Pulse: 85 62 81    Resp: 17 17  15   Temp: 97.6 °F (36.4 °C) 97.9 °F (36.6 °C)  98 °F (36.7 °C)   TempSrc: Oral Oral  Oral   SpO2: 94% 93%  97%   Weight:  95.4 kg (210 lb 5.1 oz)     Height:         Wt Readings from Last 1 Encounters:   12/17/20 95.4 kg (210 lb 5.1 oz)       Intake/Output Summary (Last 24 hours) at 12/17/2020 1140  Last data filed at 12/17/2020 0829  Gross per 24 hour   Intake 770 ml   Output 750 ml   Net 20 ml     Physical Exam:  Constitutional:       Appearance: Well-developed.   Eyes:      General: No scleral icterus.        Right eye: No discharge.   HENT:      Head: Normocephalic and atraumatic.   Neck:      Thyroid: No thyromegaly.      Lymphadenopathy: No cervical adenopathy.   Pulmonary:      Effort: Pulmonary effort is normal. No respiratory distress.      Breath sounds: Normal breath sounds. No wheezing. No rales.   Cardiovascular:      Normal rate. Regular rhythm.      No gallop.   Abdominal:      Tenderness: There is no abdominal tenderness.   Skin:     Findings: No erythema or rash.   Neurological:      Mental Status: Alert and oriented to person, place, and time.         Results Review:   Lab Results (last 24 hours)     Procedure Component Value Units Date/Time    POC Glucose Once [[id number]]  (Abnormal) Collected: 12/17/20 1106    Specimen: Blood Updated: 12/17/20 1108     Glucose 208 mg/dL      Comment: Serial Number: [id number]:  [id number]       POC Glucose Once [[id number]]  (Abnormal) Collected: 12/17/20 0726    Specimen: Blood Updated: 12/17/20 0727     Glucose 173 mg/dL      Comment: Serial Number: [id number]:  [id number]       POC Glucose Once [[id number]]  (Abnormal) Collected: 12/16/20 1554    Specimen: Blood Updated: 12/16/20 1556     Glucose 164 mg/dL      Comment: Serial Number: [id number]:  [id number]       POC  Glucose Once [[id number]]  (Abnormal) Collected: 12/16/20 1310    Specimen: Blood Updated: 12/16/20 1313     Glucose 172 mg/dL      Comment: Serial Number: [id number]:  [id number]           Imaging Results (Last 72 Hours)     Procedure Component Value Units Date/Time    FL C Arm During Surgery [[id number]] Resulted: 12/16/20 1554     Updated: 12/16/20 1736    XR Ankle 2 View Left [[id number]] Resulted: 12/16/20 1553     Updated: 12/16/20 1554        ECG/EMG Results (most recent)     Procedure Component Value Units Date/Time    ECG 12 Lead [[id number]] Collected: 12/11/20 2017     Updated: 12/11/20 2022     QT Interval 383 ms     Narrative:      HEART RATE= 75  bpm  RR Interval= 804  ms  OR Interval= 92  ms  P Horizontal Axis= 221  deg  P Front Axis= 0  deg  QRSD Interval= 113  ms  QT Interval= 383  ms  QRS Axis= -37  deg  T Wave Axis= 166  deg  - ABNORMAL ECG -  Sinus rhythm  Repol abnrm suggests ischemia, diffuse leads  No previous ECG available for comparison  Electronically Signed By:   Date and Time of Study: 2020-12-11 20:17:32    Adult Transthoracic Echo Complete W/ Cont if Necessary Per Protocol (With Agitated Saline) [[id number]] Collected: 12/12/20 0905     Updated: 12/12/20 1313     BSA 2.2 m^2      RVIDd 2.8 cm      IVSd 1.1 cm      LVIDd 4.9 cm      LVIDs 3.4 cm      LVPWd 1.2 cm      IVS/LVPW 0.95     FS 30.8 %      EDV(Teich) 114.3 ml      ESV(Teich) 47.7 ml      EF(Teich) 58.3 %      EDV(cubed) 119.7 ml      ESV(cubed) 39.6 ml      EF(cubed) 66.9 %      LV mass(C)d 223.3 grams      LV mass(C)dI 103.3 grams/m^2      SV(Teich) 66.6 ml      SI(Teich) 30.8 ml/m^2      SV(cubed) 80.1 ml      SI(cubed) 37.0 ml/m^2      Ao root diam 3.3 cm      Ao root area 8.5 cm^2      ACS 1.9 cm      asc Aorta Diam 2.9 cm      LVOT diam 2.1 cm      LVOT area 3.3 cm^2      RVOT diam 2.2 cm      RVOT area 4.0 cm^2      EDV(MOD-sp4) 69.0 ml      ESV(MOD-sp4) 24.3 ml      EF(MOD-sp4) 64.8 %      SV(MOD-sp4) 44.7 ml       SI(MOD-sp4) 20.7 ml/m^2      Ao root area (BSA corrected) 1.5     LV Delgado Vol (BSA corrected) 31.9 ml/m^2      LV Sys Vol (BSA corrected) 11.2 ml/m^2      Aortic R-R 1.1 sec      Aortic HR 56.3 BPM      MV E max britney 96.8 cm/sec      MV A max britney 108.4 cm/sec      MV E/A 0.89     MV V2 max 125.8 cm/sec      MV max PG 6.3 mmHg      MV V2 mean 79.9 cm/sec      MV mean PG 2.8 mmHg      MV V2 VTI 33.7 cm      MVA(VTI) 3.4 cm^2      MV dec slope 435.1 cm/sec^2      MV dec time 0.22 sec      Ao pk britney 340.0 cm/sec      Ao max PG 46.2 mmHg      Ao max PG (full) 37.9 mmHg      Ao V2 mean 238.7 cm/sec      Ao mean PG 25.3 mmHg      Ao mean PG (full) 20.6 mmHg      Ao V2 VTI 74.5 cm      SAL(I,A) 1.5 cm^2      SAL(I,D) 1.5 cm^2      SAL(V,A) 1.4 cm^2      SAL(V,D) 1.4 cm^2      AI max britney 402.5 cm/sec      AI max PG 64.8 mmHg      AI dec slope 249.5 cm/sec^2      AI dec time 1.6 sec      AI P1/2t 472.5 msec      LV V1 max PG 8.3 mmHg      LV V1 mean PG 4.7 mmHg      LV V1 max 144.4 cm/sec      LV V1 mean 103.3 cm/sec      LV V1 VTI 34.2 cm      CO(Ao) 35.6 l/min      CI(Ao) 16.5 l/min/m^2      SV(Ao) 632.0 ml      SI(Ao) 292.3 ml/m^2      CO(LVOT) 6.4 l/min      CI(LVOT) 3.0 l/min/m^2      SV(LVOT) 113.5 ml      SV(RVOT) 93.2 ml      SI(LVOT) 52.5 ml/m^2      PA V2 max 116.8 cm/sec      PA max PG 5.5 mmHg      PA max PG (full) 0.41 mmHg      PA V2 mean 82.0 cm/sec      PA mean PG 3.0 mmHg      PA mean PG (full) 0.54 mmHg      PA V2 VTI 25.3 cm      PVA(I,A) 3.7 cm^2       CV ECHO SANJAY - PVA(I,D) 3.7 cm^2       CV ECHO SANJAY - PVA(V,A) 3.8 cm^2       CV ECHO SANJAY - PVA(V,D) 3.8 cm^2      PA acc time 0.13 sec      RV V1 max PG 5.1 mmHg      RV V1 mean PG 2.5 mmHg      RV V1 max 112.4 cm/sec      RV V1 mean 73.6 cm/sec      RV V1 VTI 23.5 cm      TR max britney 330.3 cm/sec      RVSP(TR) 46.6 mmHg      RAP systole 3.0 mmHg      PA pr(Accel) 22.5 mmHg      Qp/Qs 0.82      CV ECHO SANJAY - BZI_BMI 28.1 kilograms/m^2       CV  ECHO SANJAY - BSA(HAYCOCK) 2.2 m^2      BH CV ECHO SANJAY - BZI_METRIC_WEIGHT 93.9 kg      BH CV ECHO SANJAY - BZI_METRIC_HEIGHT 182.9 cm      EF(MOD-bp) 65.0 %      LA dimension(2D) 4.7 cm      Echo EF Estimated 65 %     Narrative:      · Estimated left ventricular EF = 65% Left ventricular systolic function   is normal.  · Left ventricular diastolic dysfunction is noted.  · The right atrial cavity is mildly dilated.  · Moderate aortic valve stenosis is present.  · Estimated right ventricular systolic pressure from tricuspid   regurgitation is moderately elevated (45-55 mmHg).       ECG 12 Lead [[id number]] Collected: 12/13/20 1107     Updated: 12/13/20 1607     QT Interval 418 ms     Narrative:      HEART RATE= 60  bpm  RR Interval= 996  ms  RI Interval= 296  ms  P Horizontal Axis= -17  deg  P Front Axis= 46  deg  QRSD Interval= 107  ms  QT Interval= 418  ms  QRS Axis= -41  deg  T Wave Axis= -8  deg  - ABNORMAL ECG -  Sinus rhythm  Prolonged RI interval  Inferior infarct, old  When compared with ECG of 11-Dec-2020 20:17:32,  Sinus rate is slower  Electronically Signed By: Steve Swain (LORI) 13-Dec-2020 16:07:07  Date and Time of Study: 2020-12-13 11:07:57    ECG 12 Lead [[id number]] Collected: 12/15/20 0859     Updated: 12/15/20 0901     QT Interval 452 ms     Narrative:      HEART RATE= 47  bpm  RR Interval= 1271  ms  RI Interval=   ms  P Horizontal Axis=   deg  P Front Axis=   deg  QRSD Interval= 110  ms  QT Interval= 452  ms  QRS Axis= -22  deg  T Wave Axis= 69  deg  - ABNORMAL ECG -  Atrial fibrillation  Borderline ST elevation, anterior leads  When compared with ECG of 13-Dec-2020 11:07:57,  No significant change  Electronically Signed By:   Date and Time of Study: 2020-12-15 08:59:00    ECG 12 Lead [[id number]] Collected: 12/16/20 0944     Updated: 12/16/20 0950     QT Interval 419 ms     Narrative:      HEART RATE= 66  bpm  RR Interval= 908  ms  RI Interval=   ms  P Horizontal Axis=   deg  P Front Axis=    deg  QRSD Interval= 106  ms  QT Interval= 419  ms  QRS Axis= -23  deg  T Wave Axis= 116  deg  - ABNORMAL ECG -  Atrial fibrillation  Abnormal T, consider ischemia, lateral leads  When compared with ECG of 15-Dec-2020 8:59:00,  Significant repolarization change  Electronically Signed By:   Date and Time of Study: 2020-12-16 09:44:18        CBC    Results from last 7 days   Lab Units 12/16/20  0412 12/15/20  0428 12/13/20  0437 12/11/20  1959   WBC 10*3/mm3 9.10 11.70* 7.30 6.90   HEMOGLOBIN g/dL 11.8* 12.3* 12.2* 14.4   PLATELETS 10*3/mm3 221 204 179 219     BMP   Results from last 7 days   Lab Units 12/16/20  0412 12/15/20  0428 12/13/20  0437 12/11/20  1959   SODIUM mmol/L 136 136 141 141   POTASSIUM mmol/L 4.1 4.2 3.8 3.4*   CHLORIDE mmol/L 106 103 109* 107   CO2 mmol/L 20.0* 20.0* 23.0 21.0*   BUN mg/dL 41* 33* 23 23   CREATININE mg/dL 1.54* 1.50* 1.35* 1.16   GLUCOSE mg/dL 166* 170* 142* 133*   MAGNESIUM mg/dL  --  1.4*  --   --    PHOSPHORUS mg/dL  --  4.0  --   --      CMP   Results from last 7 days   Lab Units 12/16/20  0412 12/15/20  0428 12/13/20  0437 12/11/20  1959   SODIUM mmol/L 136 136 141 141   POTASSIUM mmol/L 4.1 4.2 3.8 3.4*   CHLORIDE mmol/L 106 103 109* 107   CO2 mmol/L 20.0* 20.0* 23.0 21.0*   BUN mg/dL 41* 33* 23 23   CREATININE mg/dL 1.54* 1.50* 1.35* 1.16   GLUCOSE mg/dL 166* 170* 142* 133*   ALBUMIN g/dL  --   --   --  4.00   BILIRUBIN mg/dL  --   --   --  0.4   ALK PHOS U/L  --   --   --  64   AST (SGOT) U/L  --   --   --  21   ALT (SGPT) U/L  --   --   --  23     Cardiac Studies:  Echo-   Results for orders placed during the hospital encounter of 12/11/20   Adult Transthoracic Echo Complete W/ Cont if Necessary Per Protocol (With Agitated Saline)    Narrative · Estimated left ventricular EF = 65% Left ventricular systolic function   is normal.  · Left ventricular diastolic dysfunction is noted.  · The right atrial cavity is mildly dilated.  · Moderate aortic valve stenosis is present.  ·  Estimated right ventricular systolic pressure from tricuspid   regurgitation is moderately elevated (45-55 mmHg).        Stress Myoview-  Cath-      Medication Review:   Scheduled Meds:amLODIPine, 10 mg, Oral, Daily  aspirin, 81 mg, Oral, Daily  atorvastatin, 80 mg, Oral, Nightly  clopidogrel, 75 mg, Oral, Daily  insulin lispro, 0-9 Units, Subcutaneous, TID AC  levothyroxine, 75 mcg, Oral, Daily  losartan, 50 mg, Oral, Daily  pantoprazole, 40 mg, Oral, Daily  sodium chloride, 3 mL, Intravenous, Q12H      Continuous Infusions:DOBUTamine, 2.5 mcg/kg/min, Last Rate: Stopped (12/17/20 0037)  lactated ringers, 1,000 mL, Last Rate: Stopped (12/17/20 0038)      PRN Meds:.•  acetaminophen **OR** acetaminophen  •  bisacodyl  •  Calcium Gluconate-NaCl **AND** calcium gluconate **AND** Calcium, Ionized  •  dextrose  •  dextrose  •  diphenhydrAMINE  •  glucagon (human recombinant)  •  hydrALAZINE  •  HYDROcodone-acetaminophen  •  HYDROmorphone **AND** naloxone  •  influenza vaccine  •  insulin lispro **AND** insulin lispro  •  magnesium sulfate **OR** magnesium sulfate **OR** magnesium sulfate  •  ondansetron  •  potassium chloride **OR** potassium chloride **OR** potassium chloride  •  potassium phosphate infusion greater than 15 mMoles **OR** potassium phosphate **OR** potassium phosphate  •  sodium chloride  •  sodium chloride      Assessment/Plan   Patient Active Problem List   Diagnosis   • Heart murmur   • Stroke (cerebrum) (CMS/[facility])   • Essential hypertension   • Mixed hyperlipidemia   • Acquired hypothyroidism   • Type 2 diabetes mellitus, without long-term current use of insulin (CMS/[facility])   • GERD (gastroesophageal reflux disease)   • Seasonal allergic rhinitis due to pollen   • CKD (chronic kidney disease) stage 3, GFR 30-59 ml/min   • History of prostate cancer   • TIA (transient ischemic attack)   • Symptomatic carotid artery stenosis, bilateral   • Closed displaced fracture of lateral malleolus of left fibula        Assessment:    Preoperative cardiovascular risk assessment requested  Right carotid artery stenosis noted to be 80% by CTA of head and neck  Acute TIA  History of CVA  Moderate aortic stenosis by recent echocardiogram; preserved LV function  Abnormal stress myoview 9/2020: moderate inferior/septal MI with small amount joseph-infarct    Ischemia  Atrial Fibrillation slow VR: Now converted to SR    Plan:  S/p right carotid endarterectomy  S/P distal fibula fracture repair   Has converted to SR; HR 70-80s  Dobutamine has been off overnight.   Rhythm and hemodynamics stable  Check EKG  Ok for d/c home   Will continue ASA/Plavix for now  Place event monitor at  Discharge.   Additional recommendations per Dr. Muhammad:    Patient is seen and examined and findings are verified.  Patient denies any symptom of dizziness and lightheadedness.  No chest pain.    Hemodynamics are stable.  Patient is maintaining sinus rhythm and heart rate is in 70s to 80s beats per minute.    Normal S1 and S2.  No pericardial rub or murmur.  No abdominal exam tenderness.  No leg edema.    Patient is maintaining sinus rhythm and is not bradycardic.  Patient is off dobutamine.  At this stage I would not start Eliquis.  Continue aspirin and Plavix.  I will proceed with event monitor on discharge.  If patient had a recurrence of atrial fibrillation, I would consider Eliquis.  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  Patient is educated about symptom of bradycardia.  Blood pressure and heart rate monitoring is recommended after discharge.  I will see the patient in 1 month    Steve Muhammad MD  12/17/20  11:40 EST          Electronically signed by Steve Muhammad MD at 12/17/20 1141     Kendall Arellano MD at 12/16/20 1401                [facility] Medicine Services Daily Progress Note      Hospitalist Team  LOS 3 days      Patient Care Team:  Serenity Wren MD as PCP - General (Family Medicine)  William Patricio MD as  Consulting Physician (Neurology)    Patient Location: 2202/1      Subjective   Subjective   No complaints and feeling better.  Chief Complaint / Subjective  Chief Complaint   Patient presents with   • Numbness     tingling on left side and face.  Onset 30 minutes ago.          Brief Synopsis of Hospital Course/HPI  80-year-old male with known history of hypertension,  CVA, T2DM, hyperlipidemia, prostate cancer, hypothyroidism, and GERD.  He presented to [facility] ED complaining of left arm/left facial numbness.  Denies dysphagia, no dysarthria, slurred speech or speech impediment.  No reported headache, visual changes, no chest pain, shortness of breath dizziness or lightheadedness.  He denies fever, chills, no abdominal pain, nausea or vomiting.  .     On presentation, he was hypertensive.  CT of the head without contrast showed no acute intracranial finding, hemorrhage,but  revealed lacunar infarct in the right thalamus which appears to be chronic.  CTA of the head and neck with finding of left ICA with 50% stenosis and 80% stenosis on the right ICA.  Follow-up MRI of the brain on 12/20/2020 showed no definite H acute infarct or other intracranial abnormalities.  Laboratory notable for mild hypokalemia, otherwise, unremarkable.  X-ray showed no acute cardiopulmonary finding. He is admitted for suspected TIA versus ischemic stroke, and bilateral SIVAN.  He was maintained on stroke protocol with dual antiplatelet with aspirin/Plavix and neurologist/vascular surgeon seen in consultation. During hospitalization,  he complained of left ankle pain.  X-ray of the ankle obtained revealed fracture of the distal fibula and malleolus, most likely due to mechanical fall at home.  Vascular surgeon is recommending endarterectomy and will follow with orthopedic surgeon for evaluation and recommendation.      12/16/2020: Event noted for A. fib with slow ventricular rate with heart rate in the mid 30s and seen by cardiologist in  "consultation for possible PPM implant.   12/14/2020: Underwent right carotid endarterectomy by Dr. Kamran Fung         Objective   Objective    Seen and examined in follow-up.  Sitting comfortably in chair in no distress or discomfort.  Vital Signs  Temp:  [97.3 °F (36.3 °C)-98.5 °F (36.9 °C)] 97.8 °F (36.6 °C)  Heart Rate:  [47-92] 73  Resp:  [12-17] 13  BP: (107-177)/(32-99) 177/81  Oxygen Therapy  SpO2: 97 %  Pulse Oximetry Type: Continuous  Device (Oxygen Therapy): nasal cannula  Flow (L/min): 2  Flowsheet Rows      First Filed Value   Admission Height  175.3 cm (69\") Documented at 12/11/2020 1959   Admission Weight  95.7 kg (211 lb) Documented at 12/11/2020 1959        Intake & Output (last 3 days)       12/13 0701 - 12/14 0700 12/14 0701 - 12/15 0700 12/15 0701 - 12/16 0700 12/16 0701 - 12/17 0700    P.O. 680 120 480     I.V. (mL/kg)   152 (1.6) 400 (4.1)    Total Intake(mL/kg) 680 (7) 120 (1.3) 632 (6.5) 400 (4.1)    Urine (mL/kg/hr)        Total Output        Net +680 +120 +632 +400            Urine Unmeasured Occurrence 1 x  1 x         Lines, Drains & Airways    Active LDAs     Name:   Placement date:   Placement time:   Site:   Days:    Peripheral IV 09/24/20 0830 Anterior;Distal;Right;Upper Arm   09/24/20    0830    Arm   83                Physical Exam  Constitutional:       Appearance: Normal appearance.    HENT:      Head: Normocephalic and atraumatic.      Right Ear: External ear normal.      Left Ear: External ear normal.      Nose: Nose normal.      Mouth/Throat:      Mouth: Mucous membranes are moist.   Eyes:      Extraocular Movements: Extraocular movements intact.   Neck: Right endarterectomy site is clean with no redness or drainage     Musculoskeletal: Normal range of motion and neck supple.   Cardiovascular:      Rate and Rhythm: Normal rate and regular rhythm.      Pulses: Normal pulses.      Heart sounds: Murmur present.   Pulmonary:      Effort: Pulmonary effort is normal.      Breath " sounds: Normal breath sounds.   Abdominal:      Palpations: Abdomen is soft.   Genitourinary:     Comments: deferred  Musculoskeletal: Normal range of motion.   Skin: Left ankle slightly swollen with decreased range of motion due to pain     General: Skin is warm and dry.   Neurological:      General: No focal deficit present.      Mental Status: He is alert and oriented to person, place, and time. Mental status is at baseline.   Psychiatric:         Mood and Affect: Mood normal.         Behavior: Behavior normal.         Thought Content: Thought content normal.              Wounds (last 24 hours)      LDA Wound     Row Name 12/16/20 1336 12/16/20 1308 12/16/20 0800       Wound 12/14/20 0830 Right neck    Wound - Properties Group Placement Date: 12/14/20  -CW Placement Time: 0830  -CW Present on Hospital Admission: N  -CW Side: Right  -CW Location: neck  -CW    Dressing Appearance  --  --  open to air  -BH    Closure  --  --  Approximated;Liquid skin adhesive  -BH    Base  --  --  clean;dry  -BH    Drainage Amount  --  --  none  -BH    Retired Wound - Properties Group Date first assessed: 12/14/20 -CW Time first assessed: 0830  -CW Present on Hospital Admission: N  -CW Side: Right  -CW Location: neck  -CW       Wound 12/16/20 1248 Left anterior ankle Incision    Wound - Properties Group Placement Date: 12/16/20 -LJ Placement Time: 1248 -LJ Side: Left  -LJ Orientation: anterior  -LJ Location: ankle  -LJ Primary Wound Type: Incision  -LJ Additional Comments: staples, xeroform gauze, fluffs, kerlix wrap, and ace cam walker boot  -LJ    Dressing Appearance  dry;intact;no drainage  -AH  dry;intact;no drainage  -AH  --    Closure  VANDANA Staples, xeroform, 4x4's, kerlix, Ace  -AH  VANDANA [place], xeroform, 4x4's, kerlix, Ace  -AH  --    Retired Wound - Properties Group Date first assessed: 12/16/20 -LJ Time first assessed: 1248 -LJ Side: Left  -LJ Location: ankle  -LJ Primary Wound Type: Incision  -LJ Additional Comments:  staples, xeroform gauze, fluffs, kerlix wrap, and ace cam walker boot  -LJ    Row Name 12/16/20 0400 12/16/20 0000 12/15/20 2000       Wound 12/14/20 0830 Right neck    Wound - Properties Group Placement Date: 12/14/20  -CW Placement Time: 0830  -CW Present on Hospital Admission: N  -CW Side: Right  -CW Location: neck  -CW    Dressing Appearance  --  --  open to air  -AM    Closure  Approximated;Liquid skin adhesive  -AM  Approximated;Liquid skin adhesive  -AM  Approximated;Liquid skin adhesive  -AM    Base  clean;dry  -AM  clean;dry  -AM  clean;dry  -AM    Drainage Amount  none  -AM  none  -AM  none  -AM    Care, Wound  --  --  cleansed with;antimicrobial agent applied  -AM    Dressing Care  --  --  open to air  -AM    Retired Wound - Properties Group Date first assessed: 12/14/20  -CW Time first assessed: 0830  -CW Present on Hospital Admission: N  -CW Side: Right  -CW Location: neck  -CW    Row Name 12/15/20 1600             Wound 12/14/20 0830 Right neck    Wound - Properties Group Placement Date: 12/14/20  -CW Placement Time: 0830  -CW Present on Hospital Admission: N  -CW Side: Right  -CW Location: neck  -CW    Closure  Approximated;Liquid skin adhesive  -MC      Base  clean;dry  -MC      Drainage Amount  none  -MC      Dressing Care  open to air  -MC      Retired Wound - Properties Group Date first assessed: 12/14/20  -CW Time first assessed: 0830  -CW Present on Hospital Admission: N  -CW Side: Right  -CW Location: neck  -CW      User Key  (r) = Recorded By, (t) = Taken By, (c) = Cosigned By    Initials Name Provider Type    Susanne King, RN Registered Nurse    Carrie Hi, RN Registered Nurse    Shelia Triana, RN Registered Nurse    Dylon Duenas, RN Registered Nurse    Flores King RN Registered Nurse    Larisa Cosby RN Registered Nurse          Procedures:    Procedure(s):  ANKLE OPEN REDUCTION INTERNAL FIXATION          Results Review:     I reviewed the patient's new  clinical results.      Lab Results (last 24 hours)     Procedure Component Value Units Date/Time    POC Glucose Once [[id number]]  (Abnormal) Collected: 12/16/20 1310    Specimen: Blood Updated: 12/16/20 1313     Glucose 172 mg/dL      Comment: Serial Number: [id number]:  [id number]       POC Glucose Once [[id number]]  (Abnormal) Collected: 12/16/20 1128    Specimen: Blood Updated: 12/16/20 1138     Glucose 165 mg/dL      Comment: Serial Number: [id number]:  [id number]       POC Glucose Once [[id number]]  (Abnormal) Collected: 12/16/20 0806    Specimen: Blood Updated: 12/16/20 0807     Glucose 155 mg/dL      Comment: Serial Number: [id number]:  [id number]       Basic Metabolic Panel [[id number]]  (Abnormal) Collected: 12/16/20 0412    Specimen: Blood Updated: 12/16/20 0446     Glucose 166 mg/dL      BUN 41 mg/dL      Creatinine 1.54 mg/dL      Sodium 136 mmol/L      Potassium 4.1 mmol/L      Chloride 106 mmol/L      CO2 20.0 mmol/L      Calcium 8.5 mg/dL      eGFR Non African Amer 44 mL/min/1.73      BUN/Creatinine Ratio 26.6     Anion Gap 10.0 mmol/L     Narrative:      GFR Normal >60  Chronic Kidney Disease <60  Kidney Failure <15      CBC & Differential [[id number]]  (Abnormal) Collected: 12/16/20 0412    Specimen: Blood Updated: 12/16/20 0421    Narrative:      The following orders were created for panel order CBC & Differential.  Procedure                               Abnormality         Status                     ---------                               -----------         ------                     CBC Auto Differential[[id number]]        Abnormal            Final result                 Please view results for these tests on the individual orders.    CBC Auto Differential [[id number]]  (Abnormal) Collected: 12/16/20 0412    Specimen: Blood Updated: 12/16/20 0421     WBC 9.10 10*3/mm3      RBC 3.90 10*6/mm3      Hemoglobin 11.8 g/dL      Hematocrit 36.4 %      MCV 93.2 fL      MCH 30.2 pg      MCHC  32.4 g/dL      RDW 14.5 %      RDW-SD 46.8 fl      MPV 8.8 fL      Platelets 221 10*3/mm3      Neutrophil % 63.8 %      Lymphocyte % 18.9 %      Monocyte % 15.2 %      Eosinophil % 1.4 %      Basophil % 0.7 %      Neutrophils, Absolute 5.80 10*3/mm3      Lymphocytes, Absolute 1.70 10*3/mm3      Monocytes, Absolute 1.40 10*3/mm3      Eosinophils, Absolute 0.10 10*3/mm3      Basophils, Absolute 0.10 10*3/mm3      nRBC 0.1 /100 WBC     POC Glucose Once [[id number]]  (Abnormal) Collected: 12/15/20 1949    Specimen: Blood Updated: 12/15/20 1951     Glucose 194 mg/dL      Comment: Serial Number: [id number]:  [id number]       Extra Tubes [[id number]] Collected: 12/15/20 1631    Specimen: Blood, Venous Line Updated: 12/15/20 1745    Narrative:      The following orders were created for panel order Extra Tubes.  Procedure                               Abnormality         Status                     ---------                               -----------         ------                     Lavender Top[[id number]]                                     Final result               Gold Top - SST[[id number]]                                   Final result               Green Top (Gel)[[id number]]                                  Final result                 Please view results for these tests on the individual orders.    Lavender Top [[id number]] Collected: 12/15/20 1631    Specimen: Blood Updated: 12/15/20 1745     Extra Tube hold for add-on     Comment: Auto resulted       Gold Top - SST [[id number]] Collected: 12/15/20 1631    Specimen: Blood Updated: 12/15/20 1745     Extra Tube Hold for add-ons.     Comment: Auto resulted.       Green Top (Gel) [[id number]] Collected: 12/15/20 1631    Specimen: Blood Updated: 12/15/20 1745     Extra Tube Hold for add-ons.     Comment: Auto resulted.       Protime-INR [[id number]]  (Normal) Collected: 12/15/20 1631    Specimen: Blood Updated: 12/15/20 1708     Protime 10.3 Seconds      INR 0.93    aPTT  [[id number]]  (Normal) Collected: 12/15/20 1631    Specimen: Blood Updated: 12/15/20 1708     PTT 24.5 seconds     POC Glucose Once [[id number]]  (Abnormal) Collected: 12/15/20 1634    Specimen: Blood Updated: 12/15/20 1637     Glucose 178 mg/dL      Comment: Serial Number: [id number]:  [id number]           No results found for: HGBA1C  Results from last 7 days   Lab Units 12/15/20  1631 12/11/20 1959   INR  0.93 0.95           No results found for: LIPASE  Lab Results   Component Value Date    CHOL 162 12/12/2020    TRIG 93 12/12/2020    HDL 48 12/12/2020    LDL 97 12/12/2020       No results found for: INTRAOP, PREDX, FINALDX, COMDX    Microbiology Results (last 10 days)     Procedure Component Value - Date/Time    COVID PRE-OP / PRE-PROCEDURE SCREENING ORDER (NO ISOLATION) - Swab, Nasopharynx [[id number]]  (Normal) Collected: 12/12/20 1817    Lab Status: Final result Specimen: Swab from Nasopharynx Updated: 12/12/20 1915    Narrative:      The following orders were created for panel order COVID PRE-OP / PRE-PROCEDURE SCREENING ORDER (NO ISOLATION) - Swab, Nasopharynx.  Procedure                               Abnormality         Status                     ---------                               -----------         ------                     COVID-19,CEPHEID,COR/LORI...[[id number]]  Normal              Final result                 Please view results for these tests on the individual orders.    COVID-19,CEPHEID,COR/LORI/PAD IN-HOUSE(OR EMERGENT/ADD-ON),NP SWAB IN TRANSPORT MEDIA 3-4 HR TAT - Swab, Nasopharynx [[id number]]  (Normal) Collected: 12/12/20 1817    Lab Status: Final result Specimen: Swab from Nasopharynx Updated: 12/12/20 1915     COVID19 Not Detected    Narrative:      Fact sheet for providers: https://www.fda.gov/media/375646/download     Fact sheet for patients: https://www.fda.gov/media/960968/download          ECG/EMG Results (most recent)     Procedure Component Value Units Date/Time    ECG 12 Lead  [[id number]] Collected: 12/11/20 2017     Updated: 12/11/20 2022     QT Interval 383 ms     Narrative:      HEART RATE= 75  bpm  RR Interval= 804  ms  IA Interval= 92  ms  P Horizontal Axis= 221  deg  P Front Axis= 0  deg  QRSD Interval= 113  ms  QT Interval= 383  ms  QRS Axis= -37  deg  T Wave Axis= 166  deg  - ABNORMAL ECG -  Sinus rhythm  Repol abnrm suggests ischemia, diffuse leads  No previous ECG available for comparison  Electronically Signed By:   Date and Time of Study: 2020-12-11 20:17:32    Adult Transthoracic Echo Complete W/ Cont if Necessary Per Protocol (With Agitated Saline) [[id number]] Collected: 12/12/20 0905     Updated: 12/12/20 1313     BSA 2.2 m^2      RVIDd 2.8 cm      IVSd 1.1 cm      LVIDd 4.9 cm      LVIDs 3.4 cm      LVPWd 1.2 cm      IVS/LVPW 0.95     FS 30.8 %      EDV(Teich) 114.3 ml      ESV(Teich) 47.7 ml      EF(Teich) 58.3 %      EDV(cubed) 119.7 ml      ESV(cubed) 39.6 ml      EF(cubed) 66.9 %      LV mass(C)d 223.3 grams      LV mass(C)dI 103.3 grams/m^2      SV(Teich) 66.6 ml      SI(Teich) 30.8 ml/m^2      SV(cubed) 80.1 ml      SI(cubed) 37.0 ml/m^2      Ao root diam 3.3 cm      Ao root area 8.5 cm^2      ACS 1.9 cm      asc Aorta Diam 2.9 cm      LVOT diam 2.1 cm      LVOT area 3.3 cm^2      RVOT diam 2.2 cm      RVOT area 4.0 cm^2      EDV(MOD-sp4) 69.0 ml      ESV(MOD-sp4) 24.3 ml      EF(MOD-sp4) 64.8 %      SV(MOD-sp4) 44.7 ml      SI(MOD-sp4) 20.7 ml/m^2      Ao root area (BSA corrected) 1.5     LV Delgado Vol (BSA corrected) 31.9 ml/m^2      LV Sys Vol (BSA corrected) 11.2 ml/m^2      Aortic R-R 1.1 sec      Aortic HR 56.3 BPM      MV E max britney 96.8 cm/sec      MV A max britney 108.4 cm/sec      MV E/A 0.89     MV V2 max 125.8 cm/sec      MV max PG 6.3 mmHg      MV V2 mean 79.9 cm/sec      MV mean PG 2.8 mmHg      MV V2 VTI 33.7 cm      MVA(VTI) 3.4 cm^2      MV dec slope 435.1 cm/sec^2      MV dec time 0.22 sec      Ao pk britney 340.0 cm/sec      Ao max PG 46.2 mmHg      Ao max PG  (full) 37.9 mmHg      Ao V2 mean 238.7 cm/sec      Ao mean PG 25.3 mmHg      Ao mean PG (full) 20.6 mmHg      Ao V2 VTI 74.5 cm      SAL(I,A) 1.5 cm^2      SAL(I,D) 1.5 cm^2      SAL(V,A) 1.4 cm^2      SAL(V,D) 1.4 cm^2      AI max britney 402.5 cm/sec      AI max PG 64.8 mmHg      AI dec slope 249.5 cm/sec^2      AI dec time 1.6 sec      AI P1/2t 472.5 msec      LV V1 max PG 8.3 mmHg      LV V1 mean PG 4.7 mmHg      LV V1 max 144.4 cm/sec      LV V1 mean 103.3 cm/sec      LV V1 VTI 34.2 cm      CO(Ao) 35.6 l/min      CI(Ao) 16.5 l/min/m^2      SV(Ao) 632.0 ml      SI(Ao) 292.3 ml/m^2      CO(LVOT) 6.4 l/min      CI(LVOT) 3.0 l/min/m^2      SV(LVOT) 113.5 ml      SV(RVOT) 93.2 ml      SI(LVOT) 52.5 ml/m^2      PA V2 max 116.8 cm/sec      PA max PG 5.5 mmHg      PA max PG (full) 0.41 mmHg      PA V2 mean 82.0 cm/sec      PA mean PG 3.0 mmHg      PA mean PG (full) 0.54 mmHg      PA V2 VTI 25.3 cm      PVA(I,A) 3.7 cm^2       CV ECHO SANJAY - PVA(I,D) 3.7 cm^2      BH CV ECHO SANJAY - PVA(V,A) 3.8 cm^2       CV ECHO SANJAY - PVA(V,D) 3.8 cm^2      PA acc time 0.13 sec      RV V1 max PG 5.1 mmHg      RV V1 mean PG 2.5 mmHg      RV V1 max 112.4 cm/sec      RV V1 mean 73.6 cm/sec      RV V1 VTI 23.5 cm      TR max britney 330.3 cm/sec      RVSP(TR) 46.6 mmHg      RAP systole 3.0 mmHg      PA pr(Accel) 22.5 mmHg      Qp/Qs 0.82      CV ECHO SANJAY - BZI_BMI 28.1 kilograms/m^2      BH CV ECHO SANJAY - BSA([facility]) 2.2 m^2      BH CV ECHO SANJAY - BZI_METRIC_WEIGHT 93.9 kg      BH CV ECHO SANJAY - BZI_METRIC_HEIGHT 182.9 cm      EF(MOD-bp) 65.0 %      LA dimension(2D) 4.7 cm      Echo EF Estimated 65 %     Narrative:      · Estimated left ventricular EF = 65% Left ventricular systolic function   is normal.  · Left ventricular diastolic dysfunction is noted.  · The right atrial cavity is mildly dilated.  · Moderate aortic valve stenosis is present.  · Estimated right ventricular systolic pressure from tricuspid   regurgitation is moderately  elevated (45-55 mmHg).       ECG 12 Lead [[id number]] Collected: 12/13/20 1107     Updated: 12/13/20 1607     QT Interval 418 ms     Narrative:      HEART RATE= 60  bpm  RR Interval= 996  ms  ME Interval= 296  ms  P Horizontal Axis= -17  deg  P Front Axis= 46  deg  QRSD Interval= 107  ms  QT Interval= 418  ms  QRS Axis= -41  deg  T Wave Axis= -8  deg  - ABNORMAL ECG -  Sinus rhythm  Prolonged ME interval  Inferior infarct, old  When compared with ECG of 11-Dec-2020 20:17:32,  Sinus rate is slower  Electronically Signed By: Steve Swain (LORI) 13-Dec-2020 16:07:07  Date and Time of Study: 2020-12-13 11:07:57    ECG 12 Lead [[id number]] Collected: 12/15/20 0859     Updated: 12/15/20 0901     QT Interval 452 ms     Narrative:      HEART RATE= 47  bpm  RR Interval= 1271  ms  ME Interval=   ms  P Horizontal Axis=   deg  P Front Axis=   deg  QRSD Interval= 110  ms  QT Interval= 452  ms  QRS Axis= -22  deg  T Wave Axis= 69  deg  - ABNORMAL ECG -  Atrial fibrillation  Borderline ST elevation, anterior leads  When compared with ECG of 13-Dec-2020 11:07:57,  No significant change  Electronically Signed By:   Date and Time of Study: 2020-12-15 08:59:00    ECG 12 Lead [[id number]] Collected: 12/16/20 0944     Updated: 12/16/20 0950     QT Interval 419 ms     Narrative:      HEART RATE= 66  bpm  RR Interval= 908  ms  ME Interval=   ms  P Horizontal Axis=   deg  P Front Axis=   deg  QRSD Interval= 106  ms  QT Interval= 419  ms  QRS Axis= -23  deg  T Wave Axis= 116  deg  - ABNORMAL ECG -  Atrial fibrillation  Abnormal T, consider ischemia, lateral leads  When compared with ECG of 15-Dec-2020 8:59:00,  Significant repolarization change  Electronically Signed By:   Date and Time of Study: 2020-12-16 09:44:18          Results for orders placed during the hospital encounter of 12/11/20   Intra-Op Duplex Carotid Right Lmt CAR    Narrative · Imaging indicates a normal intra-op exam.          Results for orders placed during the  hospital encounter of 12/11/20   Adult Transthoracic Echo Complete W/ Cont if Necessary Per Protocol (With Agitated Saline)    Narrative · Estimated left ventricular EF = 65% Left ventricular systolic function   is normal.  · Left ventricular diastolic dysfunction is noted.  · The right atrial cavity is mildly dilated.  · Moderate aortic valve stenosis is present.  · Estimated right ventricular systolic pressure from tricuspid   regurgitation is moderately elevated (45-55 mmHg).          Xr Ankle 2 View Left    Result Date: 12/12/2020  1.Oblique fracture of the distal fibula with mild lateral displacement. 2.Suspected minimally displaced fracture of the posterior malleolus. 3.Mild osteoarthritis. 4.Calcific atherosclerosis.  Electronically Signed By-Dylon Cedillo MD On:12/12/2020 1:05 PM This report was finalized on [id number] by  Dylon Cedillo MD.    Ct Angiogram Neck    Result Date: 12/14/2020   1. Approximately 50% stenosis of the right internal carotid artery in its proximal portion. Approximately 80% stenosis of the left internal carotid artery in its proximal portion. 2. Small vertebrobasilar system. The left vertebral artery terminates in the left posterior inferior cerebellar artery. 3. Significant right M2 stenosis. 4. Multiple stenoses throughout the posterior cerebral arteries bilaterally.  Electronically Signed By-Sg Tyson MD On:12/14/2020 9:21 AM This report was finalized on [id number] by  Sg Tyson MD.    Mri Brain Without Contrast    Result Date: 12/12/2020  1.No definite MRI findings of acute infarction or other acute intracranial abnormality at this time. 2.Volume loss and suspected mild to moderate chronic small vessel ischemic changes with remote lacunar infarct in the right basal ganglion.   Electronically Signed By-Dylon Cedillo MD On:12/12/2020 1:33 PM This report was finalized on [id number] by  Dylon Cedillo MD.    Xr Chest 1 View    Result Date: 12/11/2020  1.  No evidence of  active disease.   Electronically Signed By-Nikki Bright MD On:12/11/2020 8:43 PM This report was finalized on [id number] by  Nikki Bright MD.    Ct Angiogram Head    Result Date: 12/14/2020   1. Approximately 50% stenosis of the right internal carotid artery in its proximal portion. Approximately 80% stenosis of the left internal carotid artery in its proximal portion. 2. Small vertebrobasilar system. The left vertebral artery terminates in the left posterior inferior cerebellar artery. 3. Significant right M2 stenosis. 4. Multiple stenoses throughout the posterior cerebral arteries bilaterally.  Electronically Signed By-Sg Tyson MD On:12/14/2020 9:21 AM This report was finalized on [id number] by  Sg Tyson MD.    Ct Head Without Contrast Stroke Protocol    Result Date: 12/11/2020  1. Negative for hemorrhage or mass effect. 2. There is moderate cerebral and cerebellar atrophy. There is mild white matter abnormality which is favored to be due to chronic small vessel ischemia. 3. 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 4. If acute ischemia is clinically suspected, recommend consideration of brain MR.  Electronically Signed By-Nikki Bright MD On:12/11/2020 8:04 PM This report was finalized on [id number] by  Nikki Bright MD.          Xrays, labs reviewed personally by physician.    Medication Review:   I have reviewed the patient's current medication list      Scheduled Meds  amLODIPine, 10 mg, Oral, Daily  [MAR Hold] aspirin, 81 mg, Oral, Daily  [MAR Hold] atorvastatin, 80 mg, Oral, Nightly  [MAR Hold] clopidogrel, 75 mg, Oral, Daily  [MAR Hold] insulin lispro, 0-9 Units, Subcutaneous, TID AC  [MAR Hold] levothyroxine, 75 mcg, Oral, Daily  losartan, 50 mg, Oral, Daily  [MAR Hold] pantoprazole, 40 mg, Oral, Daily  [MAR Hold] sodium chloride, 3 mL, Intravenous, Q12H        Meds Infusions  DOBUTamine, 2.5 mcg/kg/min, Last Rate: 2.5 mcg/kg/min (12/15/20 1832)  lactated ringers, 1,000  mL, Last Rate: 1,000 mL (12/16/20 1122)        Meds PRN  •  [MAR Hold] acetaminophen **OR** [MAR Hold] acetaminophen  •  [MAR Hold] bisacodyl  •  [MAR Hold] Calcium Gluconate-NaCl **AND** [MAR Hold] calcium gluconate **AND** Calcium, Ionized  •  [MAR Hold] dextrose  •  [MAR Hold] dextrose  •  [MAR Hold] diphenhydrAMINE  •  [MAR Hold] glucagon (human recombinant)  •  hydrALAZINE  •  [MAR Hold] HYDROcodone-acetaminophen  •  [MAR Hold] HYDROmorphone **AND** [MAR Hold] naloxone  •  [MAR Hold] influenza vaccine  •  [MAR Hold] insulin lispro **AND** [MAR Hold] insulin lispro  •  [MAR Hold] magnesium sulfate **OR** [MAR Hold] magnesium sulfate **OR** [MAR Hold] magnesium sulfate  •  [MAR Hold] ondansetron  •  [MAR Hold] potassium chloride **OR** [MAR Hold] potassium chloride **OR** [MAR Hold] potassium chloride  •  [MAR Hold] potassium phosphate infusion greater than 15 mMoles **OR** [MAR Hold] potassium phosphate **OR** [MAR Hold] potassium phosphate  •  [MAR Hold] sodium chloride  •  [MAR Hold] sodium chloride        Assessment/Plan   Assessment/Plan     Active Hospital Problems    Diagnosis  POA   • **Symptomatic carotid artery stenosis, bilateral [I65.23]  Yes   • Closed displaced fracture of lateral malleolus of left fibula [S82.62XA]  Unknown   • TIA (transient ischemic attack) [G45.9]  Yes   • Essential hypertension [I10]  Yes   • Mixed hyperlipidemia [E78.2]  Yes   • Type 2 diabetes mellitus, without long-term current use of insulin (CMS/[facility]) [E11.9]  Yes      Resolved Hospital Problems   No resolved problems to display.       MEDICAL DECISION MAKING COMPLEXITY BY PROBLEM:     Suspected TIA      -MRI of the brain showed no acute infarct or other intracranial abnormality      -on aspirin/Plavix/Lipitor, and followed by neurologist    Atrial fibrillation with SSS       -cardiologist consulted for possible PPM implant        -currently on dobutamine     Symptomatic bilateral SIVAN      -S/p endarterectomy on 12/14/2020  , by Dr Fung      - on aspirin/Plavix/Lipitor     Left ankle pain with swelling/suspected  fracture      -plan for ORIF today          -x-ray of the ankle on 12/12/2020 showed oblique fracture of the distal fibula with mild lateral displacement and suspected posterior malleolus fracture.     Essential hypertension       -Amlodipine/losartan     T2DM     -cont SSI     Mixed hyperlipidemia     -Lipitor      Hypothyroidism       -Levothyroxine     Obesity      -encourage lifestyle changes       VTE Prophylaxis -   Mechanical Order History:      Ordered        12/14/20 1047  Place Sequential Compression Device  Once         12/14/20 1047  Maintain Sequential Compression Device  Continuous         12/12/20 0255  Place Sequential Compression Device  Once         12/12/20 0255  Maintain Sequential Compression Device  Continuous                 Pharmalogical Order History:      Ordered     Dose Route Frequency Stop    12/14/20 0716  heparin (porcine) injection  Status:  Discontinued      -- -- As Needed 12/14/20 1050                  Code Status -   Code Status and Medical Interventions:   Ordered at: 12/11/20 2055     Code Status:    CPR     Medical Interventions (Level of Support Prior to Arrest):    Full       Discharge Planning      Electronically signed by Kenadll Arellano MD, 12/16/20, 14:01 EST.  [facility] Hospitalist Team        Electronically signed by Kendall Arellano MD at 12/16/20 2732

## 2020-12-18 ENCOUNTER — TELEPHONE (OUTPATIENT)
Dept: ORTHOPEDIC SURGERY | Facility: CLINIC | Age: 80
End: 2020-12-18

## 2020-12-18 ENCOUNTER — TRANSITIONAL CARE MANAGEMENT TELEPHONE ENCOUNTER (OUTPATIENT)
Dept: CALL CENTER | Facility: HOSPITAL | Age: 80
End: 2020-12-18

## 2020-12-18 LAB — QT INTERVAL: 400 MS

## 2020-12-18 RX ORDER — ATORVASTATIN CALCIUM 40 MG/1
40 TABLET, FILM COATED ORAL NIGHTLY
Qty: 90 TABLET | Refills: 1 | Status: SHIPPED | OUTPATIENT
Start: 2020-12-18 | End: 2021-03-11

## 2020-12-18 NOTE — PROGRESS NOTES
Case Management Discharge Note           Provided Post Acute Provider List?: N/A  N/A Provider List Comment: no needs identified at this time    Selected Continued Care - Discharged on 12/17/2020 Admission date: 12/11/2020 - Discharge disposition: Home or Self Care                 Final Discharge Disposition Code: 01 - home or self-care

## 2020-12-18 NOTE — OUTREACH NOTE
Call Center TCM Note      Responses   [facility] patient discharged from?  [facility]   Does the patient have one of the following disease processes/diagnoses(primary or secondary)?  Stroke (TIA)   TCM attempt successful?  Yes [Pt and spouse, Elba]   Call start time  1240   Call end time  1249   Discharge diagnosis  TIA-Carotid endarterectomy/ankle open reduction/internal fixation    Person spoke with today (if not patient) and relationship  Elba-spouse    Meds reviewed with patient/caregiver?  Yes   Is the patient having any side effects they believe may be caused by any medication additions or changes?  No   Does the patient have all medications ordered at discharge?  Yes   Is the patient taking all medications as directed (includes completed medication regime)?  Yes   Comments regarding appointments  Appt with surgeon on 1/4/20   Does the patient have a primary care provider?   Yes   Does the patient have an appointment with their PCP within 7 days of discharge?  Yes   Comments regarding PCP  Hospital d/c f/u appt is on 12/23/20 at 3:15 pm    Has the patient kept scheduled appointments due by today?  N/A   What DME was ordered?  [place] for walker   Has all DME been delivered?  Yes   Psychosocial issues?  No   Does the patient require any assistance with activities of daily living such as eating, bathing, dressing, walking, etc.?  No   Does the patient have any residual symptoms from stroke/TIA?  No   Does the patient understand the diet ordered at discharge?  Yes   Comments  Pt is wearing a holter monitor for a month    Did the patient receive a copy of their discharge instructions?  Yes   Nursing interventions  Reviewed instructions with patient   What is the patient's perception of their health status since discharge?  Worsening [Pt is still having pain. He didn't hurt in the hospital like he's hurting now. ]   Nursing interventions  Nurse provided patient education   Is the patient able to teach back FAST for  Stroke?  No [Provided education]   Is the patient/caregiver able to teach back the risk factors for a stroke?  History of TIAs, Smoking   Is the patient/caregiver able to teach back signs and symptoms related to disease process for when to call PCP?  Yes   If the patient is a current smoker, are they able to teach back resources for cessation?  Not a smoker   Is the patient/caregiver able to teach back the hierarchy of who to call/visit for symptoms/problems? PCP, Specialist, Home health nurse, Urgent Care, ED, 911  Yes   TCM call completed?  Yes          Jennifer Marie RN    12/18/2020, 12:49 EST

## 2020-12-18 NOTE — TELEPHONE ENCOUNTER
Pt's wife called wanting a stronger pain med for pt, talked to clinical and they had me tell pt to alternate between ibuprofen and tylenol, along with pain med he was prescribed, Dr. Vasquez will not prescribe higher dose of his meds

## 2020-12-18 NOTE — OUTREACH NOTE
Prep Survey      Responses   [facility] patient discharged from?  [facility]   Is LACE score < 7 ?  No   Eligibility  [facility]   Hospital  [facility]   Date of Admission  12/11/20   Date of Discharge  12/17/20   Discharge Disposition  Home or Self Care   Discharge diagnosis  TIA-Carotid endarterectomy/ankle open reduction/internal fixation    Does the patient have one of the following disease processes/diagnoses(primary or secondary)?  Stroke (TIA)   Does the patient have Home health ordered?  No   Is there a DME ordered?  Yes   What DME was ordered?  [place] for walker   Prep survey completed?  Yes          Kristen Thompson RN

## 2020-12-23 ENCOUNTER — OFFICE VISIT (OUTPATIENT)
Dept: FAMILY MEDICINE CLINIC | Facility: CLINIC | Age: 80
End: 2020-12-23

## 2020-12-23 VITALS
RESPIRATION RATE: 16 BRPM | WEIGHT: 206 LBS | HEART RATE: 84 BPM | SYSTOLIC BLOOD PRESSURE: 113 MMHG | TEMPERATURE: 97.3 F | DIASTOLIC BLOOD PRESSURE: 69 MMHG | BODY MASS INDEX: 27.9 KG/M2 | OXYGEN SATURATION: 96 % | HEIGHT: 72 IN

## 2020-12-23 DIAGNOSIS — I65.23 SYMPTOMATIC CAROTID ARTERY STENOSIS, BILATERAL: ICD-10-CM

## 2020-12-23 DIAGNOSIS — E11.9 TYPE 2 DIABETES MELLITUS WITHOUT COMPLICATION, WITHOUT LONG-TERM CURRENT USE OF INSULIN (HCC): ICD-10-CM

## 2020-12-23 DIAGNOSIS — I49.5 SSS (SICK SINUS SYNDROME) (HCC): ICD-10-CM

## 2020-12-23 DIAGNOSIS — S82.62XA CLOSED DISPLACED FRACTURE OF LATERAL MALLEOLUS OF LEFT FIBULA, INITIAL ENCOUNTER: ICD-10-CM

## 2020-12-23 DIAGNOSIS — G45.9 TIA (TRANSIENT ISCHEMIC ATTACK): Primary | ICD-10-CM

## 2020-12-23 DIAGNOSIS — R04.0 EPISTAXIS: ICD-10-CM

## 2020-12-23 DIAGNOSIS — N18.30 STAGE 3 CHRONIC KIDNEY DISEASE, UNSPECIFIED WHETHER STAGE 3A OR 3B CKD (HCC): ICD-10-CM

## 2020-12-23 DIAGNOSIS — I10 ESSENTIAL HYPERTENSION: ICD-10-CM

## 2020-12-23 PROCEDURE — 99496 TRANSJ CARE MGMT HIGH F2F 7D: CPT | Performed by: FAMILY MEDICINE

## 2020-12-23 RX ORDER — TRAMADOL HYDROCHLORIDE 50 MG/1
50 TABLET ORAL 2 TIMES DAILY PRN
Qty: 20 TABLET | Refills: 0 | Status: SHIPPED | OUTPATIENT
Start: 2020-12-23 | End: 2021-01-20

## 2020-12-23 NOTE — PROGRESS NOTES
Transitional Care Follow Up Visit  Subjective      Chief Complaint   Patient presents with   • Hospital Follow Up Visit   • Transient Ischemic Attack   • Atrial Fibrillation   • Hypertension   • Ankle Injury       Alen Ratliff is a 80 y.o. male who presents for a transitional care management visit.    Within 48 business hours after discharge our office contacted him via telephone to coordinate his care and needs.      I reviewed and discussed the details of that call along with the discharge summary, hospital problems, inpatient lab results, inpatient diagnostic studies, and consultation reports with Alen.     Current outpatient and discharge medications have been reconciled for the patient.  Reviewed by: Serenity Wren MD      Date of TCM Phone Call 12/17/2020   Hospitals in [place]   Date of Admission 12/11/2020   Date of Discharge 12/17/2020   Discharge Disposition Home or Self Care     Risk for Readmission (LACE) Score: 13 (12/17/2020  6:00 AM)      History of Present Illness   Course During Hospital Stay: He was recently admitted to [facility].  He went to the ER after he started noticing some numbness in his left hand and also on the left side of his lips.  On the way to the hospital while getting into a car he tripped and fell and injured his ankle and ultimately he found out that he broke his ankle.  Hospital evaluation did not show any acute stroke, however CT of the head/MRI of the head showed signs of previous stroke.  Carotid Doppler ultrasound showed 50% stenosis on the left and 80% stenosis on the right.  Vascular surgery was consulted and he ended up with right-sided carotid endarterectomy.  His hospital stay was complicated with a atrial fibrillation run.  He was evaluated by cardiologist.  He was started on baby aspirin, which was added to Plavix which he was previously on.  He also had some issues with bradycardia and was evaluated for possible pacemaker placement.  He ultimately  undergone a open revision of the left ankle.  He tells me that he has some discomfort in his left ankle since the surgery and upon hospital discharge he was advised to just take Tylenol or ibuprofen over-the-counter, but does not seem to be helping especially at night and he is not able to rest.  He also tells me that he had COVID-19 test while in the hospital and since then he has been having some nasal bleeding from the left nostril, he would like that to be checked out.  He denies any chest pains or shortness of breath.  He denies any dizziness or syncope.  He denies nausea, vomiting, or diarrhea.     The following portions of the patient's history were reviewed and updated as appropriate: allergies, current medications, past family history, past medical history, past social history, past surgical history and problem list.    Review of Systems   Constitutional: Negative for chills, fatigue and fever.   Eyes: Negative for photophobia and visual disturbance.   Respiratory: Negative for cough, chest tightness, shortness of breath, wheezing and stridor.    Cardiovascular: Negative for chest pain, palpitations and leg swelling.   Gastrointestinal: Negative for abdominal pain, constipation, diarrhea, nausea and vomiting.   Endocrine: Negative for cold intolerance, heat intolerance, polydipsia and polyphagia.   Musculoskeletal: Positive for arthralgias and gait problem. Negative for back pain, joint swelling and myalgias.   Skin: Negative for color change, pallor, rash and wound.   Neurological: Negative for dizziness, syncope, speech difficulty, light-headedness, numbness and headaches.   Hematological: Negative for adenopathy. Does not bruise/bleed easily.   Psychiatric/Behavioral: Positive for sleep disturbance. Negative for self-injury. The patient is not nervous/anxious.      Vitals:    12/23/20 1509   BP: 113/69   BP Location: Left arm   Patient Position: Sitting   Cuff Size: Large Adult   Pulse: 84   Resp: 16  "  Temp: 97.3 °F (36.3 °C)   TempSrc: Temporal   SpO2: 96%   Weight: 93.4 kg (206 lb)   Height: 182.9 cm (72\")         Objective   Physical Exam  Vitals signs and nursing note reviewed.   Constitutional:       Appearance: Normal appearance. He is well-developed.      Comments: He is walking with a walker.   HENT:      Head: Normocephalic and atraumatic.   Eyes:      Conjunctiva/sclera: Conjunctivae normal.      Pupils: Pupils are equal, round, and reactive to light.   Neck:      Musculoskeletal: Normal range of motion and neck supple.   Cardiovascular:      Rate and Rhythm: Normal rate and regular rhythm.      Heart sounds: Murmur present. No gallop.    Pulmonary:      Effort: Pulmonary effort is normal. No respiratory distress.      Breath sounds: Normal breath sounds. No wheezing, rhonchi or rales.   Chest:      Chest wall: No tenderness.   Musculoskeletal: Normal range of motion.         General: No swelling or deformity.      Comments: There is a CAM Walker to the left foot.   Skin:     General: Skin is warm and dry.      Comments: There is a well-healing wound on the right side of the neck from a recent carotid endarterectomy.  No signs of infection, no drainage, no bleeding.   Neurological:      General: No focal deficit present.      Mental Status: He is alert and oriented to person, place, and time.         Assessment/Plan   Diagnoses and all orders for this visit:    1. TIA (transient ischemic attack) (Primary)    2. Symptomatic carotid artery stenosis, bilateral  -     CBC Auto Differential  -     Comprehensive Metabolic Panel    3. Essential hypertension    4. Closed displaced fracture of lateral malleolus of left fibula, initial encounter  -     traMADol (ULTRAM) 50 MG tablet; Take 1 tablet by mouth 2 (Two) Times a Day As Needed for Moderate Pain .  Dispense: 20 tablet; Refill: 0    5. SSS (sick sinus syndrome) (CMS/HCC)    6. Stage 3 chronic kidney disease, unspecified whether stage 3a or 3b CKD    7. " Type 2 diabetes mellitus without complication, without long-term current use of insulin (CMS/[facility])    8. Epistaxis    I reviewed his medical problems and his medications.  I also reviewed his available hospital records.  His evaluation and testing did not show any signs of acute stroke and his symptoms of left-sided numbness had resolved fairly quickly.  His work-up however showed previous stroke.  His carotid Doppler showed 80% stenosis on the right and he ultimately had carotid endarterectomy on the right side and has been recovering well.  His hospital course was complicated with a atrial fibrillation run and bounces of bradycardia.  He currently has cardiac monitor on and he is being evaluated for possible pacemaker placement.  He is currently not on any anticoagulation pertaining to his atrial fibrillation.  Upon my records review cardiology is possibly considering this in his atrial fibrillation becomes chronic.  His exam today reveals regular rhythm and I doubt he is in atrial fibrillation at this point.  If it comes to his left ankle fracture he had a revision by podiatry and he is scheduled for follow-up in 2 weeks.  I gave him prescription for some tramadol pills due to some pain especially at night so he can rest better.  If it comes to his nasal bleedings I advised for him to start using saline nasal spray.  If this is not improving he will need to see the ENT for evaluation.  I reviewed his medications in details.  I will be rechecking some labs.

## 2020-12-28 ENCOUNTER — READMISSION MANAGEMENT (OUTPATIENT)
Dept: CALL CENTER | Facility: HOSPITAL | Age: 80
End: 2020-12-28

## 2020-12-28 NOTE — OUTREACH NOTE
Stroke Week 2 Survey      Responses   [facility] facility patient discharged from?  [facility]   Does the patient have one of the following disease processes/diagnoses(primary or secondary)?  Stroke (TIA)   Week 2 attempt successful?  No   Unsuccessful attempts  Attempt 1          Jennifer Marie RN

## 2020-12-29 ENCOUNTER — LAB (OUTPATIENT)
Dept: LAB | Facility: HOSPITAL | Age: 80
End: 2020-12-29

## 2020-12-29 ENCOUNTER — APPOINTMENT (OUTPATIENT)
Dept: VASCULAR SURGERY | Facility: HOSPITAL | Age: 80
End: 2020-12-29

## 2020-12-29 ENCOUNTER — TELEPHONE (OUTPATIENT)
Dept: FAMILY MEDICINE CLINIC | Facility: CLINIC | Age: 80
End: 2020-12-29

## 2020-12-29 ENCOUNTER — READMISSION MANAGEMENT (OUTPATIENT)
Dept: CALL CENTER | Facility: HOSPITAL | Age: 80
End: 2020-12-29

## 2020-12-29 DIAGNOSIS — R04.0 EPISTAXIS: Primary | ICD-10-CM

## 2020-12-29 LAB
ALBUMIN SERPL-MCNC: 4.1 G/DL (ref 3.5–5.2)
ALBUMIN/GLOB SERPL: 1.5 G/DL
ALP SERPL-CCNC: 86 U/L (ref 39–117)
ALT SERPL W P-5'-P-CCNC: 14 U/L (ref 1–41)
ANION GAP SERPL CALCULATED.3IONS-SCNC: 9.2 MMOL/L (ref 5–15)
AST SERPL-CCNC: 16 U/L (ref 1–40)
BASOPHILS # BLD AUTO: 0.05 10*3/MM3 (ref 0–0.2)
BASOPHILS NFR BLD AUTO: 0.5 % (ref 0–1.5)
BILIRUB SERPL-MCNC: 0.4 MG/DL (ref 0–1.2)
BUN SERPL-MCNC: 24 MG/DL (ref 8–23)
BUN/CREAT SERPL: 18.5 (ref 7–25)
CALCIUM SPEC-SCNC: 9.6 MG/DL (ref 8.6–10.5)
CHLORIDE SERPL-SCNC: 106 MMOL/L (ref 98–107)
CO2 SERPL-SCNC: 23.8 MMOL/L (ref 22–29)
CREAT SERPL-MCNC: 1.3 MG/DL (ref 0.76–1.27)
DEPRECATED RDW RBC AUTO: 44.6 FL (ref 37–54)
EOSINOPHIL # BLD AUTO: 0.15 10*3/MM3 (ref 0–0.4)
EOSINOPHIL NFR BLD AUTO: 1.5 % (ref 0.3–6.2)
ERYTHROCYTE [DISTWIDTH] IN BLOOD BY AUTOMATED COUNT: 13.1 % (ref 12.3–15.4)
GFR SERPL CREATININE-BSD FRML MDRD: 53 ML/MIN/1.73
GLOBULIN UR ELPH-MCNC: 2.8 GM/DL
GLUCOSE SERPL-MCNC: 136 MG/DL (ref 65–99)
HCT VFR BLD AUTO: 40.3 % (ref 37.5–51)
HGB BLD-MCNC: 13 G/DL (ref 13–17.7)
IMM GRANULOCYTES # BLD AUTO: 0.05 10*3/MM3 (ref 0–0.05)
IMM GRANULOCYTES NFR BLD AUTO: 0.5 % (ref 0–0.5)
LYMPHOCYTES # BLD AUTO: 1.72 10*3/MM3 (ref 0.7–3.1)
LYMPHOCYTES NFR BLD AUTO: 16.7 % (ref 19.6–45.3)
MCH RBC QN AUTO: 29.7 PG (ref 26.6–33)
MCHC RBC AUTO-ENTMCNC: 32.3 G/DL (ref 31.5–35.7)
MCV RBC AUTO: 92.2 FL (ref 79–97)
MONOCYTES # BLD AUTO: 1.02 10*3/MM3 (ref 0.1–0.9)
MONOCYTES NFR BLD AUTO: 9.9 % (ref 5–12)
NEUTROPHILS NFR BLD AUTO: 7.33 10*3/MM3 (ref 1.7–7)
NEUTROPHILS NFR BLD AUTO: 70.9 % (ref 42.7–76)
NRBC BLD AUTO-RTO: 0 /100 WBC (ref 0–0.2)
PLATELET # BLD AUTO: 368 10*3/MM3 (ref 140–450)
PMV BLD AUTO: 10.5 FL (ref 6–12)
POTASSIUM SERPL-SCNC: 4.2 MMOL/L (ref 3.5–5.2)
PROT SERPL-MCNC: 6.9 G/DL (ref 6–8.5)
QT INTERVAL: 419 MS
QT INTERVAL: 452 MS
RBC # BLD AUTO: 4.37 10*6/MM3 (ref 4.14–5.8)
SODIUM SERPL-SCNC: 139 MMOL/L (ref 136–145)
WBC # BLD AUTO: 10.32 10*3/MM3 (ref 3.4–10.8)

## 2020-12-29 PROCEDURE — G0463 HOSPITAL OUTPT CLINIC VISIT: HCPCS

## 2020-12-29 PROCEDURE — 80053 COMPREHEN METABOLIC PANEL: CPT | Performed by: FAMILY MEDICINE

## 2020-12-29 PROCEDURE — 36415 COLL VENOUS BLD VENIPUNCTURE: CPT | Performed by: FAMILY MEDICINE

## 2020-12-29 PROCEDURE — 85025 COMPLETE CBC W/AUTO DIFF WBC: CPT | Performed by: FAMILY MEDICINE

## 2020-12-29 NOTE — OUTREACH NOTE
Stroke Week 2 Survey      Responses   [facility] patient discharged from?  [facility]   Does the patient have one of the following disease processes/diagnoses(primary or secondary)?  Stroke (TIA)   Week 2 attempt successful?  Yes   Call start time  1104   Call end time  1110   Meds reviewed with patient/caregiver?  Yes   Is the patient taking all medications as directed (includes completed medication regime)?  Yes   Has the patient kept scheduled appointments due by today?  Yes   Does the patient require any assistance with activities of daily living such as eating, bathing, dressing, walking, etc.?  Yes   Does the patient have any residual symptoms from stroke/TIA?  No   Does the patient understand the diet ordered at discharge?  Yes   What is the patient's perception of their health status since discharge?  Same   Is the patient able to teach back FAST for Stroke?  Yes   Is the patient/caregiver able to teach back the risk factors for a stroke?  High blood pressure-goal below 120/80, History of TIAs, Smoking, High Cholesterol, Physical inactivity and obesity, History of Afib   Week 2 call completed?  Yes   Revoked  No further contact(revokes)-requires comment   Is the patient interested in additional calls from an ambulatory ?  NOTE:  applies to high risk patients requiring additional follow-up.  No   Graduated/Revoked comments  He and his wife do not wan [facility] calls at this time. says he is doing same will see Drs. not wanting to speak to nurse          Rose Mary Negrete RN

## 2020-12-31 ENCOUNTER — OFFICE VISIT (OUTPATIENT)
Dept: PODIATRY | Facility: CLINIC | Age: 80
End: 2020-12-31

## 2020-12-31 VITALS
DIASTOLIC BLOOD PRESSURE: 71 MMHG | SYSTOLIC BLOOD PRESSURE: 131 MMHG | HEART RATE: 66 BPM | BODY MASS INDEX: 27.9 KG/M2 | HEIGHT: 72 IN | WEIGHT: 206 LBS

## 2020-12-31 DIAGNOSIS — S82.62XD CLOSED DISPLACED FRACTURE OF LATERAL MALLEOLUS OF LEFT FIBULA WITH ROUTINE HEALING, SUBSEQUENT ENCOUNTER: Primary | ICD-10-CM

## 2020-12-31 PROCEDURE — 99024 POSTOP FOLLOW-UP VISIT: CPT | Performed by: PODIATRIST

## 2020-12-31 NOTE — PROGRESS NOTES
"12/31/2020  Foot and Ankle Surgery - Established Patient/Follow-up  Provider: Dr. Alen Vasquez DPM  Location: [facility]    Subjective:  Alen Ratliff is a 80 y.o. male.     Chief Complaint   Patient presents with   • Left Foot - Follow-up       HPI: Patient returns 2 weeks after ORIF of lateral malleolus fracture.  He states that he has been doing well.  Pain has subsided.  He has tried to remain off weightbearing as much as possible.    Allergies   Allergen Reactions   • Azithromycin Unknown - High Severity       Current Outpatient Medications on File Prior to Visit   Medication Sig Dispense Refill   • amLODIPine (NORVASC) 10 MG tablet TAKE 1 TABLET EVERY DAY 90 tablet 1   • aspirin 81 MG chewable tablet Chew 1 tablet Daily. 30 tablet 1   • atorvastatin (LIPITOR) 40 MG tablet TAKE 1 TABLET BY MOUTH EVERY NIGHT. 90 tablet 1   • clopidogrel (PLAVIX) 75 MG tablet TAKE 1 TABLET EVERY DAY 90 tablet 1   • fluticasone (FLONASE) 50 MCG/ACT nasal spray 2 sprays into the nostril(s) as directed by provider Daily As Needed.     • glucose blood test strip 1 each by Other route Daily. Use as instructed     • Januvia 50 MG tablet TAKE 1 TABLET EVERY DAY 90 tablet 1   • Jardiance 10 MG tablet TAKE 1 TABLET EVERY DAY 90 tablet 1   • levothyroxine (SYNTHROID, LEVOTHROID) 75 MCG tablet TAKE 1 TABLET EVERY DAY 90 tablet 1   • losartan (COZAAR) 50 MG tablet TAKE 1 TABLET EVERY DAY 90 tablet 1   • pantoprazole (PROTONIX) 40 MG EC tablet TAKE 1 TABLET EVERY DAY 90 tablet 1   • traMADol (ULTRAM) 50 MG tablet Take 1 tablet by mouth 2 (Two) Times a Day As Needed for Moderate Pain . 20 tablet 0     No current facility-administered medications on file prior to visit.        Objective   /71   Pulse 66   Ht 182.9 cm (72\")   Wt 93.4 kg (206 lb)   BMI 27.94 kg/m²     Foot/Ankle Exam:       General:   Appearance: appears stated age and healthy    Orientation: AAOx3    Affect: appropriate    Gait: antalgic  "     VASCULAR      Left Foot Vascularity   Normal vascular exam    Dorsalis pedis:  2+  Posterior tibial:  2+  Skin Temperature: warm    Edema Gradin+  CFT:  < 3 seconds  Pedal Hair Growth:  Present      NEUROLOGIC     Left Foot Neurologic   Light touch sensation:  Normal  Hot/cold sensation: normal    Achilles reflex:  2+     MUSCULOSKELETAL      Left Foot Musculoskeletal   Ecchymosis:  None  Tenderness: lateral malleolus    Arch:  Normal     DERMATOLOGIC     Left Foot Dermatologic   Skin: skin intact        Left Foot Additional Comments: Incision site is dry and stable with intact staples.  No evidence of dehiscence or infection      Assessment/Plan   Diagnoses and all orders for this visit:    1. Closed displaced fracture of lateral malleolus of left fibula with routine healing, subsequent encounter (Primary)      Patient returns for follow-up on left ankle fracture after ORIF.  Staples were removed today without complication.  No concerning features are noted.  I have recommended that he remain off weightbearing and remain in the cam boot.  I would like him to start gentle range of motion exercises.  He is to follow-up in 2 weeks for reevaluation and imaging.    No orders of the defined types were placed in this encounter.         Note is dictated utilizing voice recognition software. Unfortunately this leads to occasional typographical errors. I apologize in advance if the situation occurs. If questions occur please do not hesitate to call our office.

## 2021-01-11 ENCOUNTER — TRANSCRIBE ORDERS (OUTPATIENT)
Dept: ADMINISTRATIVE | Facility: HOSPITAL | Age: 81
End: 2021-01-11

## 2021-01-11 DIAGNOSIS — I65.23 BILATERAL CAROTID ARTERY OCCLUSION: Primary | ICD-10-CM

## 2021-01-18 LAB
Lab: 22
TOAL ENROLLMENT DAYS: 30

## 2021-01-18 PROCEDURE — 93228 REMOTE 30 DAY ECG REV/REPORT: CPT | Performed by: INTERNAL MEDICINE

## 2021-01-20 ENCOUNTER — OFFICE VISIT (OUTPATIENT)
Dept: PODIATRY | Facility: CLINIC | Age: 81
End: 2021-01-20

## 2021-01-20 VITALS
BODY MASS INDEX: 27.9 KG/M2 | WEIGHT: 206 LBS | SYSTOLIC BLOOD PRESSURE: 130 MMHG | HEART RATE: 61 BPM | DIASTOLIC BLOOD PRESSURE: 72 MMHG | HEIGHT: 72 IN

## 2021-01-20 DIAGNOSIS — S82.62XD CLOSED DISPLACED FRACTURE OF LATERAL MALLEOLUS OF LEFT FIBULA WITH ROUTINE HEALING, SUBSEQUENT ENCOUNTER: Primary | ICD-10-CM

## 2021-01-20 PROCEDURE — 99024 POSTOP FOLLOW-UP VISIT: CPT | Performed by: PODIATRIST

## 2021-01-21 ENCOUNTER — OFFICE VISIT (OUTPATIENT)
Dept: CARDIOLOGY | Facility: CLINIC | Age: 81
End: 2021-01-21

## 2021-01-21 VITALS
BODY MASS INDEX: 27.77 KG/M2 | SYSTOLIC BLOOD PRESSURE: 118 MMHG | HEART RATE: 73 BPM | WEIGHT: 205 LBS | HEIGHT: 72 IN | DIASTOLIC BLOOD PRESSURE: 62 MMHG

## 2021-01-21 DIAGNOSIS — I63.9 CEREBROVASCULAR ACCIDENT (CVA), UNSPECIFIED MECHANISM (HCC): ICD-10-CM

## 2021-01-21 DIAGNOSIS — R01.1 HEART MURMUR: Primary | ICD-10-CM

## 2021-01-21 DIAGNOSIS — E11.9 TYPE 2 DIABETES MELLITUS WITHOUT COMPLICATION, WITHOUT LONG-TERM CURRENT USE OF INSULIN (HCC): ICD-10-CM

## 2021-01-21 DIAGNOSIS — I10 ESSENTIAL HYPERTENSION: ICD-10-CM

## 2021-01-21 DIAGNOSIS — E78.2 MIXED HYPERLIPIDEMIA: ICD-10-CM

## 2021-01-21 DIAGNOSIS — I49.5 SSS (SICK SINUS SYNDROME) (HCC): ICD-10-CM

## 2021-01-21 PROCEDURE — 99214 OFFICE O/P EST MOD 30 MIN: CPT | Performed by: INTERNAL MEDICINE

## 2021-01-21 NOTE — PROGRESS NOTES
"01/20/2021  Foot and Ankle Surgery - Established Patient/Follow-up  Provider: Dr. Alen Vasquez DPM  Location: [facility]    Subjective:  Alen Ratliff is a 80 y.o. male.     Chief Complaint   Patient presents with   • Left Ankle - Follow-up, Post-op       HPI: Patient returns approximately 4 weeks after open reduction internal fixation of his left ankle fracture.  He states that he is doing quite well.  He has remained in the cam boot with mostly off weightbearing activity.  Denies any new issues.    Allergies   Allergen Reactions   • Azithromycin Unknown - High Severity       Current Outpatient Medications on File Prior to Visit   Medication Sig Dispense Refill   • amLODIPine (NORVASC) 10 MG tablet TAKE 1 TABLET EVERY DAY 90 tablet 1   • aspirin 81 MG chewable tablet Chew 1 tablet Daily. 30 tablet 1   • atorvastatin (LIPITOR) 40 MG tablet TAKE 1 TABLET BY MOUTH EVERY NIGHT. 90 tablet 1   • clopidogrel (PLAVIX) 75 MG tablet TAKE 1 TABLET EVERY DAY 90 tablet 1   • fluticasone (FLONASE) 50 MCG/ACT nasal spray 2 sprays into the nostril(s) as directed by provider Daily As Needed.     • glucose blood test strip 1 each by Other route Daily. Use as instructed     • Januvia 50 MG tablet TAKE 1 TABLET EVERY DAY 90 tablet 1   • Jardiance 10 MG tablet TAKE 1 TABLET EVERY DAY 90 tablet 1   • levothyroxine (SYNTHROID, LEVOTHROID) 75 MCG tablet TAKE 1 TABLET EVERY DAY 90 tablet 1   • losartan (COZAAR) 50 MG tablet TAKE 1 TABLET EVERY DAY 90 tablet 1   • pantoprazole (PROTONIX) 40 MG EC tablet TAKE 1 TABLET EVERY DAY 90 tablet 1     No current facility-administered medications on file prior to visit.        Objective   /72   Pulse 61   Ht 182.9 cm (72\")   Wt 93.4 kg (206 lb)   BMI 27.94 kg/m²     General:   Appearance: appears stated age and healthy    Orientation: AAOx3    Affect: appropriate    Gait: antalgic       VASCULAR       Left Foot Vascularity   Normal vascular exam    Dorsalis pedis:  " 2+  Posterior tibial:  2+  Skin Temperature: warm    Edema Gradin+  CFT:  < 3 seconds  Pedal Hair Growth:  Present      NEUROLOGIC      Left Foot Neurologic   Light touch sensation:  Normal  Hot/cold sensation: normal    Achilles reflex:  2+      MUSCULOSKELETAL       Left Foot Musculoskeletal   Ecchymosis:  None  Tenderness: lateral malleolus    Arch:  Normal      DERMATOLOGIC      Left Foot Dermatologic   Skin: Incision site is well-healed.  Moderate xerosis to the foot.    Assessment/Plan   Diagnoses and all orders for this visit:    1. Closed displaced fracture of lateral malleolus of left fibula with routine healing, subsequent encounter (Primary)  -     XR Ankle 3+ View Left      Patient returns for evaluation of his left ankle.  Imaging was reviewed showing stable internal fixation and interval healing of the fracture.  At this time, I have asked that he start partial weightbearing activity in the cam boot and gradually progressed to full weightbearing as tolerated.  I would like him to resume range of motion and manual therapy exercises on a daily basis.  He is to continue to avoid excessive activity.  I would like to see him in 4 weeks for reevaluation and imaging    Orders Placed This Encounter   Procedures   • XR Ankle 3+ View Left     Order Specific Question:   Reason for Exam:     Answer:   Left ankle post op F/U RM 13 NWB     Order Specific Question:   Does this patient have a diabetic monitoring/medication delivering device on?     Answer:   No          Note is dictated utilizing voice recognition software. Unfortunately this leads to occasional typographical errors. I apologize in advance if the situation occurs. If questions occur please do not hesitate to call our office.

## 2021-01-21 NOTE — PROGRESS NOTES
Date of Office Visit: 2021  Encounter Provider: Dr. Steve Muhammad  Place of Service: [facility] [place]  Patient Name: Alen Ratliff  :1940  Serenity Wren MD    Chief Complaint   Patient presents with   • Heart Murmur     hospital follow up   • Hypertension   • Hyperlipidemia   • Diabetes     History of Present Illness    I am pleased to see Mr. Rao in my office today as a follow-up.    As you know, patient is 80-year-old white gentleman whose past medical history is significant for hypertension, hyperlipidemia, diabetes mellitus, history of cardiac murmur, who came today for follow-up.    In 2020, patient was seen in my office for symptom of shortness of breath and relative bradycardia.  Patient also had symptom of occasional chest pain.  Patient underwent stress test which showed moderate inferior/septal fixed defect with small amount of joseph-infarct ischemia.  Echocardiogram showed normal left ventricular size and function and LVEF was 55 to 60%.  Mild LVH was noted.  Calcified aortic valve was noted with mild to moderate aortic valve stenosis with aortic valve area of 1.3 cm².  Holter monitor showed sinus bradycardia with episode of relative bradycardia.  No significant pause or heart rate less than 40 was noted.    In 2020, patient was admitted at [facility] with slurring of speech and possible TIA.  Neurological work-up showed high-grade stenosis of right carotid artery.  Patient underwent endarterectomy.  Postoperatively patient had atrial fibrillation with controlled heart rate.  Patient spontaneously cardioverted.  Patient was started on aspirin and Plavix.  Patient was discharged on event monitor.  Event monitor showed predominantly sinus rhythm.  Patient had average heart rate of 60 bpm.  No significant atrial fibrillation burden noted.    Patient is overall doing well.  Patient is recovering from his recent surgery on left ankle as  well as right carotid endarterectomy.  Patient has not had any episode of chest pain or palpitation.  No syncope or presyncope.  No leg edema.    I discussed in detail with patient about his condition and especially tachybradycardia/sick sinus syndrome.  I believe he has this condition.  I told him about this condition.  I also educated him about symptoms related to this condition.  Patient is advised to call me if he is more lightheaded dizzy or syncope or presyncope occurs.  Fall precaution discussed.        Past Medical History:   Diagnosis Date   • Allergic rhinitis    • Diabetes (CMS/HCC)    • GERD (gastroesophageal reflux disease)    • Heart murmur    • Hyperlipidemia    • Hypertension    • Hypothyroidism    • Prostate cancer (CMS/HCC)    • Stroke (CMS/[facility])          Past Surgical History:   Procedure Laterality Date   • ANKLE OPEN REDUCTION INTERNAL FIXATION Left 12/16/2020    Procedure: ANKLE OPEN REDUCTION INTERNAL FIXATION;  Surgeon: CAROLE Vasquez DPM;  Location: [facility];  Service: Podiatry;  Laterality: Left;   • BACK SURGERY  1994   • CAROTID ENDARTERECTOMY Right 12/14/2020    Procedure: CAROTID ENDARTERECTOMY;  Surgeon: Toby Fung MD;  Location: [facility];  Service: Vascular;  Laterality: Right;   • COLONOSCOPY  08/25/2015   • PROSTATECTOMY  2001    due to cancer           Current Outpatient Medications:   •  amLODIPine (NORVASC) 10 MG tablet, TAKE 1 TABLET EVERY DAY, Disp: 90 tablet, Rfl: 1  •  aspirin 81 MG chewable tablet, Chew 1 tablet Daily., Disp: 30 tablet, Rfl: 1  •  atorvastatin (LIPITOR) 40 MG tablet, TAKE 1 TABLET BY MOUTH EVERY NIGHT., Disp: 90 tablet, Rfl: 1  •  clopidogrel (PLAVIX) 75 MG tablet, TAKE 1 TABLET EVERY DAY, Disp: 90 tablet, Rfl: 1  •  fluticasone (FLONASE) 50 MCG/ACT nasal spray, 2 sprays into the nostril(s) as directed by provider Daily As Needed., Disp: , Rfl:   •  glucose blood test strip, 1 each by Other route Daily. Use as instructed, Disp: , Rfl:  "  •  Januvia 50 MG tablet, TAKE 1 TABLET EVERY DAY, Disp: 90 tablet, Rfl: 1  •  Jardiance 10 MG tablet, TAKE 1 TABLET EVERY DAY, Disp: 90 tablet, Rfl: 1  •  levothyroxine (SYNTHROID, LEVOTHROID) 75 MCG tablet, TAKE 1 TABLET EVERY DAY, Disp: 90 tablet, Rfl: 1  •  losartan (COZAAR) 50 MG tablet, TAKE 1 TABLET EVERY DAY, Disp: 90 tablet, Rfl: 1  •  pantoprazole (PROTONIX) 40 MG EC tablet, TAKE 1 TABLET EVERY DAY, Disp: 90 tablet, Rfl: 1      Social History     Socioeconomic History   • Marital status:      Spouse name: Not on file   • Number of children: Not on file   • Years of education: Not on file   • Highest education level: Not on file   Tobacco Use   • Smoking status: Never Smoker   • Smokeless tobacco: Never Used   Substance and Sexual Activity   • Alcohol use: Not Currently   • Drug use: Never   • Sexual activity: Defer         Review of Systems   Constitution: Negative for chills and fever.   HENT: Negative for ear discharge and nosebleeds.    Eyes: Negative for discharge and redness.   Cardiovascular: Negative for chest pain, orthopnea, palpitations, paroxysmal nocturnal dyspnea and syncope.   Respiratory: Positive for shortness of breath. Negative for cough and wheezing.    Endocrine: Negative for heat intolerance.   Skin: Negative for rash.   Musculoskeletal: Positive for arthritis and joint pain. Negative for myalgias.   Gastrointestinal: Negative for abdominal pain, melena, nausea and vomiting.   Genitourinary: Negative for dysuria and hematuria.   Neurological: Negative for dizziness, light-headedness, numbness and tremors.   Psychiatric/Behavioral: Negative for depression. The patient is not nervous/anxious.        Procedures    Procedures    No orders to display           Objective:    /62   Pulse 73   Ht 182.9 cm (72.01\")   Wt 93 kg (205 lb)   BMI 27.80 kg/m²         Constitutional:       Appearance: Well-developed.   Eyes:      General: No scleral icterus.        Right eye: No " discharge.   HENT:      Head: Normocephalic and atraumatic.   Neck:      Thyroid: No thyromegaly.      Lymphadenopathy: No cervical adenopathy.   Pulmonary:      Effort: Pulmonary effort is normal. No respiratory distress.      Breath sounds: Normal breath sounds. No wheezing. No rales.   Cardiovascular:      Normal rate. Regular rhythm.      No gallop.   Abdominal:      Tenderness: There is no abdominal tenderness.   Skin:     Findings: No erythema or rash.   Neurological:      Mental Status: Alert and oriented to person, place, and time.             Assessment:       Diagnosis Plan   1. Heart murmur     2. Cerebrovascular accident (CVA), unspecified mechanism (CMS/[facility])     3. Essential hypertension     4. Mixed hyperlipidemia     5. Type 2 diabetes mellitus without complication, without long-term current use of insulin (CMS/[facility])     6. SSS (sick sinus syndrome) (CMS/[facility])              Plan:       Assessment and plan/MDM:    1.  Paroxysmal atrial fibrillation:    Patient had an episode of atrial fibrillation in the hospital but event monitor was unremarkable.  If he has further episode of atrial fibrillation, he would be a candidate for Eliquis.  I discussed in detail with the patient.    2.  Tachybradycardia syndrome/sick sinus syndrome:    I suspect patient has underlying sick sinus syndrome and it is causing relative bradycardia.  I would recommend to continue to observe.  His event monitor did not show any significant pause or bradycardia requiring permanent pacemaker.  However patient is educated about this condition and symptoms.  Fall precaution discussed.    3.  Hypertension:    Blood pressure is well controlled.    4.  Abnormal stress test:    Patient has abnormal stress test but he is relatively asymptomatic.  If he gets symptom of chest pain I would proceed with cardiac catheterization.    5.  Recent CVA:    Patient underwent right carotid endarterectomy.

## 2021-02-17 ENCOUNTER — OFFICE VISIT (OUTPATIENT)
Dept: PODIATRY | Facility: CLINIC | Age: 81
End: 2021-02-17

## 2021-02-17 VITALS
WEIGHT: 213 LBS | SYSTOLIC BLOOD PRESSURE: 150 MMHG | HEIGHT: 72 IN | BODY MASS INDEX: 28.85 KG/M2 | DIASTOLIC BLOOD PRESSURE: 73 MMHG | HEART RATE: 80 BPM

## 2021-02-17 DIAGNOSIS — S82.62XD CLOSED DISPLACED FRACTURE OF LATERAL MALLEOLUS OF LEFT FIBULA WITH ROUTINE HEALING, SUBSEQUENT ENCOUNTER: Primary | ICD-10-CM

## 2021-02-17 PROCEDURE — 99024 POSTOP FOLLOW-UP VISIT: CPT | Performed by: PODIATRIST

## 2021-02-18 ENCOUNTER — TELEPHONE (OUTPATIENT)
Dept: FAMILY MEDICINE CLINIC | Facility: CLINIC | Age: 81
End: 2021-02-18

## 2021-02-18 DIAGNOSIS — E11.9 TYPE 2 DIABETES MELLITUS WITHOUT COMPLICATION, WITHOUT LONG-TERM CURRENT USE OF INSULIN (HCC): ICD-10-CM

## 2021-02-18 DIAGNOSIS — E03.9 ACQUIRED HYPOTHYROIDISM: ICD-10-CM

## 2021-02-18 DIAGNOSIS — E78.2 MIXED HYPERLIPIDEMIA: Primary | ICD-10-CM

## 2021-02-18 NOTE — PROGRESS NOTES
"02/17/2021  Foot and Ankle Surgery - Established Patient/Follow-up  Provider: Dr. Alen Vasquez DPM  Location: [facility]    Subjective:  Alen Ratliff is a 80 y.o. male.     Chief Complaint   Patient presents with   • Left Ankle - Post-op, Follow-up       HPI: Patient returns approximately 8 weeks after ORIF.  He states that he is doing quite well and denies any significant pain.  He has mild limitation and states that the ankle is stiff.  He has been performing at home exercises    Allergies   Allergen Reactions   • Azithromycin Unknown - High Severity       Current Outpatient Medications on File Prior to Visit   Medication Sig Dispense Refill   • amLODIPine (NORVASC) 10 MG tablet TAKE 1 TABLET EVERY DAY 90 tablet 1   • aspirin 81 MG chewable tablet Chew 1 tablet Daily. 30 tablet 1   • atorvastatin (LIPITOR) 40 MG tablet TAKE 1 TABLET BY MOUTH EVERY NIGHT. 90 tablet 1   • clopidogrel (PLAVIX) 75 MG tablet TAKE 1 TABLET EVERY DAY 90 tablet 1   • fluticasone (FLONASE) 50 MCG/ACT nasal spray 2 sprays into the nostril(s) as directed by provider Daily As Needed.     • glucose blood test strip 1 each by Other route Daily. Use as instructed     • Januvia 50 MG tablet TAKE 1 TABLET EVERY DAY 90 tablet 1   • Jardiance 10 MG tablet TAKE 1 TABLET EVERY DAY 90 tablet 1   • levothyroxine (SYNTHROID, LEVOTHROID) 75 MCG tablet TAKE 1 TABLET EVERY DAY 90 tablet 1   • losartan (COZAAR) 50 MG tablet TAKE 1 TABLET EVERY DAY 90 tablet 1   • pantoprazole (PROTONIX) 40 MG EC tablet TAKE 1 TABLET EVERY DAY 90 tablet 1     No current facility-administered medications on file prior to visit.        Objective   /73 (BP Location: Left arm, Patient Position: Sitting, Cuff Size: Large Adult)   Pulse 80   Ht 182.9 cm (72\")   Wt 96.6 kg (213 lb)   BMI 28.89 kg/m²     General:   Appearance: appears stated age and healthy    Orientation: AAOx3    Affect: appropriate    Gait: Mildly antalgic  VASCULAR       Left Foot " Vascularity   Normal vascular exam    Dorsalis pedis:  2+  Posterior tibial:  2+  Skin Temperature: warm    Edema Gradin+  CFT:  < 3 seconds  Pedal Hair Growth:  Present      NEUROLOGIC      Left Foot Neurologic   Light touch sensation:  Normal  Hot/cold sensation: normal    Achilles reflex:  2+      MUSCULOSKELETAL       Left Foot Musculoskeletal   Ecchymosis:  None  Tenderness: Resolved  Arch:  Normal  Range of motion is somewhat limited with ankle dorsiflexion.  No crepitus.  No pain with palpation      DERMATOLOGIC      Left Foot Dermatologic   Skin: Incision site is well-healed.     Assessment/Plan   Diagnoses and all orders for this visit:    1. Closed displaced fracture of lateral malleolus of left fibula with routine healing, subsequent encounter (Primary)  -     XR Ankle 3+ View Left      Patient returns for follow-up after ankle ORIF.  He states that he is doing well but continues to have mild stiffness involving the ankle.  Imaging was reviewed showing stable internal fixation and interval healing across the fracture site.  We did discuss further treatment options including formal physical therapy.  He feels that he will continue to improve with that home exercises.  I did review proper range of motion and manual therapy exercises.  He understands and agrees.  Patient may gradually return to baseline activity.  He is to call with any additional issues or concerns.  He has opted to see me on an as-needed basis.    Orders Placed This Encounter   Procedures   • XR Ankle 3+ View Left     Scheduling Instructions:       15      0933      Left ankle xr wb     Order Specific Question:   Reason for Exam:     Answer:   post op Ankle Open Reduction Internal Fixation 2020     Order Specific Question:   Does this patient have a diabetic monitoring/medication delivering device on?     Answer:   No          Note is dictated utilizing voice recognition software. Unfortunately this leads to occasional  typographical errors. I apologize in advance if the situation occurs. If questions occur please do not hesitate to call our office.

## 2021-02-18 NOTE — TELEPHONE ENCOUNTER
Pt will be going to [facility] [employer] [facility] for labs next week but will need orders please. Thanks.

## 2021-02-24 ENCOUNTER — LAB (OUTPATIENT)
Dept: LAB | Facility: HOSPITAL | Age: 81
End: 2021-02-24

## 2021-02-24 LAB
ALBUMIN SERPL-MCNC: 4.3 G/DL (ref 3.5–5.2)
ALBUMIN UR-MCNC: 25.2 MG/DL
ALBUMIN/GLOB SERPL: 1.8 G/DL
ALP SERPL-CCNC: 68 U/L (ref 39–117)
ALT SERPL W P-5'-P-CCNC: 20 U/L (ref 1–41)
ANION GAP SERPL CALCULATED.3IONS-SCNC: 11.6 MMOL/L (ref 5–15)
AST SERPL-CCNC: 21 U/L (ref 1–40)
BASOPHILS # BLD AUTO: 0.05 10*3/MM3 (ref 0–0.2)
BASOPHILS NFR BLD AUTO: 0.8 % (ref 0–1.5)
BILIRUB SERPL-MCNC: 0.5 MG/DL (ref 0–1.2)
BUN SERPL-MCNC: 23 MG/DL (ref 8–23)
BUN/CREAT SERPL: 18.3 (ref 7–25)
CALCIUM SPEC-SCNC: 9 MG/DL (ref 8.6–10.5)
CHLORIDE SERPL-SCNC: 106 MMOL/L (ref 98–107)
CHOLEST SERPL-MCNC: 163 MG/DL (ref 0–200)
CO2 SERPL-SCNC: 24.4 MMOL/L (ref 22–29)
CREAT SERPL-MCNC: 1.26 MG/DL (ref 0.76–1.27)
CREAT UR-MCNC: 151.5 MG/DL
DEPRECATED RDW RBC AUTO: 46.3 FL (ref 37–54)
EOSINOPHIL # BLD AUTO: 0.12 10*3/MM3 (ref 0–0.4)
EOSINOPHIL NFR BLD AUTO: 1.8 % (ref 0.3–6.2)
ERYTHROCYTE [DISTWIDTH] IN BLOOD BY AUTOMATED COUNT: 13.5 % (ref 12.3–15.4)
GFR SERPL CREATININE-BSD FRML MDRD: 55 ML/MIN/1.73
GLOBULIN UR ELPH-MCNC: 2.4 GM/DL
GLUCOSE SERPL-MCNC: 139 MG/DL (ref 65–99)
HBA1C MFR BLD: 6.8 % (ref 3.5–5.6)
HCT VFR BLD AUTO: 44.9 % (ref 37.5–51)
HDLC SERPL-MCNC: 41 MG/DL (ref 40–60)
HGB BLD-MCNC: 14.4 G/DL (ref 13–17.7)
IMM GRANULOCYTES # BLD AUTO: 0.02 10*3/MM3 (ref 0–0.05)
IMM GRANULOCYTES NFR BLD AUTO: 0.3 % (ref 0–0.5)
LDLC SERPL CALC-MCNC: 102 MG/DL (ref 0–100)
LDLC/HDLC SERPL: 2.43 {RATIO}
LYMPHOCYTES # BLD AUTO: 1.48 10*3/MM3 (ref 0.7–3.1)
LYMPHOCYTES NFR BLD AUTO: 22.3 % (ref 19.6–45.3)
MCH RBC QN AUTO: 29.9 PG (ref 26.6–33)
MCHC RBC AUTO-ENTMCNC: 32.1 G/DL (ref 31.5–35.7)
MCV RBC AUTO: 93.3 FL (ref 79–97)
MICROALBUMIN/CREAT UR: 166.3 MG/G
MONOCYTES # BLD AUTO: 0.79 10*3/MM3 (ref 0.1–0.9)
MONOCYTES NFR BLD AUTO: 11.9 % (ref 5–12)
NEUTROPHILS NFR BLD AUTO: 4.19 10*3/MM3 (ref 1.7–7)
NEUTROPHILS NFR BLD AUTO: 62.9 % (ref 42.7–76)
NRBC BLD AUTO-RTO: 0 /100 WBC (ref 0–0.2)
PLATELET # BLD AUTO: 225 10*3/MM3 (ref 140–450)
PMV BLD AUTO: 10.9 FL (ref 6–12)
POTASSIUM SERPL-SCNC: 4.1 MMOL/L (ref 3.5–5.2)
PROT SERPL-MCNC: 6.7 G/DL (ref 6–8.5)
RBC # BLD AUTO: 4.81 10*6/MM3 (ref 4.14–5.8)
SODIUM SERPL-SCNC: 142 MMOL/L (ref 136–145)
TRIGL SERPL-MCNC: 112 MG/DL (ref 0–150)
TSH SERPL DL<=0.05 MIU/L-ACNC: 3.93 UIU/ML (ref 0.27–4.2)
VLDLC SERPL-MCNC: 20 MG/DL (ref 5–40)
WBC # BLD AUTO: 6.65 10*3/MM3 (ref 3.4–10.8)

## 2021-02-24 PROCEDURE — 83036 HEMOGLOBIN GLYCOSYLATED A1C: CPT | Performed by: FAMILY MEDICINE

## 2021-02-24 PROCEDURE — 82043 UR ALBUMIN QUANTITATIVE: CPT | Performed by: FAMILY MEDICINE

## 2021-02-24 PROCEDURE — 80061 LIPID PANEL: CPT | Performed by: FAMILY MEDICINE

## 2021-02-24 PROCEDURE — 82570 ASSAY OF URINE CREATININE: CPT | Performed by: FAMILY MEDICINE

## 2021-02-24 PROCEDURE — 80053 COMPREHEN METABOLIC PANEL: CPT | Performed by: FAMILY MEDICINE

## 2021-02-24 PROCEDURE — 85025 COMPLETE CBC W/AUTO DIFF WBC: CPT | Performed by: FAMILY MEDICINE

## 2021-02-24 PROCEDURE — 36415 COLL VENOUS BLD VENIPUNCTURE: CPT | Performed by: FAMILY MEDICINE

## 2021-02-24 PROCEDURE — 84443 ASSAY THYROID STIM HORMONE: CPT | Performed by: FAMILY MEDICINE

## 2021-03-11 ENCOUNTER — OFFICE VISIT (OUTPATIENT)
Dept: FAMILY MEDICINE CLINIC | Facility: CLINIC | Age: 81
End: 2021-03-11

## 2021-03-11 VITALS
DIASTOLIC BLOOD PRESSURE: 68 MMHG | HEART RATE: 62 BPM | WEIGHT: 210 LBS | BODY MASS INDEX: 28.44 KG/M2 | OXYGEN SATURATION: 97 % | HEIGHT: 72 IN | RESPIRATION RATE: 16 BRPM | SYSTOLIC BLOOD PRESSURE: 121 MMHG | TEMPERATURE: 97.4 F

## 2021-03-11 DIAGNOSIS — E11.9 TYPE 2 DIABETES MELLITUS WITHOUT COMPLICATION, WITHOUT LONG-TERM CURRENT USE OF INSULIN (HCC): Primary | ICD-10-CM

## 2021-03-11 DIAGNOSIS — E78.2 MIXED HYPERLIPIDEMIA: ICD-10-CM

## 2021-03-11 DIAGNOSIS — E03.9 ACQUIRED HYPOTHYROIDISM: ICD-10-CM

## 2021-03-11 DIAGNOSIS — I65.23 SYMPTOMATIC CAROTID ARTERY STENOSIS, BILATERAL: ICD-10-CM

## 2021-03-11 DIAGNOSIS — I10 ESSENTIAL HYPERTENSION: ICD-10-CM

## 2021-03-11 PROCEDURE — 99214 OFFICE O/P EST MOD 30 MIN: CPT | Performed by: FAMILY MEDICINE

## 2021-03-11 RX ORDER — EMPAGLIFLOZIN 10 MG/1
1 TABLET, FILM COATED ORAL DAILY
Qty: 90 TABLET | Refills: 1 | Status: SHIPPED | OUTPATIENT
Start: 2021-03-11 | End: 2021-05-06 | Stop reason: SDUPTHER

## 2021-03-11 RX ORDER — AMLODIPINE BESYLATE 10 MG/1
10 TABLET ORAL DAILY
Qty: 90 TABLET | Refills: 1 | Status: SHIPPED | OUTPATIENT
Start: 2021-03-11 | End: 2021-05-06 | Stop reason: SDUPTHER

## 2021-03-11 RX ORDER — ROSUVASTATIN CALCIUM 40 MG/1
40 TABLET, COATED ORAL DAILY
Qty: 90 TABLET | Refills: 1 | Status: SHIPPED | OUTPATIENT
Start: 2021-03-11 | End: 2021-03-11 | Stop reason: SDUPTHER

## 2021-03-11 RX ORDER — ROSUVASTATIN CALCIUM 40 MG/1
40 TABLET, COATED ORAL DAILY
Qty: 90 TABLET | Refills: 1 | Status: SHIPPED | OUTPATIENT
Start: 2021-03-11 | End: 2021-03-22

## 2021-03-11 NOTE — PROGRESS NOTES
Subjective   Chief Complaint   Patient presents with   • Follow-up   • Diabetes   • Hypertension   • Hyperlipidemia   • Hypothyroidism     Alen Ratliff is a 80 y.o. male.     Patient Care Team:  Serenity Wren MD as PCP - General (Family Medicine)  Steve Muhammad MD as Consulting Physician (Cardiology)  CAROLE Vasquez DPM as Consulting Physician (Podiatry)  Toby Fung MD as Consulting Physician (Vascular Surgery)    He is coming in today to follow-up on his chronic medical problems and his medications.  He also wants to discuss his recent labs.  We are addressing his diabetes, hypertension, hyperlipidemia, hypothyroidism, history of previous stroke, carotid artery disease and heart issues.  He apparently had another TIA at the end of 2020 and subsequently was seen by a vascular specialist and had right-sided endarterectomy.  He is doing well now.  He takes his medicines as directed and denies any issues or side effects. Patient denies any chest pain, shortness of breath, dizziness, nausea, vomiting, or diarrhea. No visual issues reported. No headaches, numbness or tingling. No urinary issues reported like urgency, frequency, or discomfort upon urination. Patient has good appetite and denies any weight changes. No swelling reported.  No rashes or any other skin issues reported.  Patient denies polyuria or polydipsia as well as heat or cold intolerance.  No emotional issues or insomnia.         The following portions of the patient's history were reviewed and updated as appropriate: allergies, current medications, past family history, past medical history, past social history, past surgical history and problem list.  Past Medical History:   Diagnosis Date   • Allergic rhinitis    • Diabetes (CMS/[facility])    • GERD (gastroesophageal reflux disease)    • Heart murmur    • Hyperlipidemia    • Hypertension    • Hypothyroidism    • Prostate cancer (CMS/[facility])    • Stroke (CMS/[facility])      Past Surgical History:  "  Procedure Laterality Date   • ANKLE OPEN REDUCTION INTERNAL FIXATION Left 12/16/2020    Procedure: ANKLE OPEN REDUCTION INTERNAL FIXATION;  Surgeon: CAROLE Vasquez DPM;  Location: [facility];  Service: Podiatry;  Laterality: Left;   • BACK SURGERY  1994   • CAROTID ENDARTERECTOMY Right 12/14/2020    Procedure: CAROTID ENDARTERECTOMY;  Surgeon: Toby Fung MD;  Location: [facility];  Service: Vascular;  Laterality: Right;   • COLONOSCOPY  08/25/2015   • PROSTATECTOMY  2001    due to cancer     The patient has a family history of  Family History   Problem Relation Age of Onset   • Hypertension Other    • Heart attack Mother    • Heart disease Mother    • Heart attack Father    • Heart disease Father      Social History     Socioeconomic History   • Marital status:      Spouse name: Not on file   • Number of children: Not on file   • Years of education: Not on file   • Highest education level: Not on file   Tobacco Use   • Smoking status: Never Smoker   • Smokeless tobacco: Never Used   Vaping Use   • Vaping Use: Never used   Substance and Sexual Activity   • Alcohol use: Not Currently   • Drug use: Never   • Sexual activity: Defer       Review of Systems   Constitutional: Negative for activity change, fatigue and fever.   Respiratory: Negative for cough, shortness of breath and wheezing.    Cardiovascular: Negative for chest pain, palpitations and leg swelling.   Gastrointestinal: Negative for constipation, diarrhea and indigestion.   Skin: Negative for color change, dry skin and rash.   Neurological: Negative for tremors and headache.     Visit Vitals  /68 (BP Location: Left arm, Patient Position: Sitting, Cuff Size: Adult)   Pulse 62   Temp 97.4 °F (36.3 °C) (Temporal)   Resp 16   Ht 182.9 cm (72\")   Wt 95.3 kg (210 lb)   SpO2 97%   BMI 28.48 kg/m²       Current Outpatient Medications:   •  amLODIPine (NORVASC) 10 MG tablet, Take 1 tablet by mouth Daily., Disp: 90 tablet, Rfl: 1  • "  aspirin 81 MG chewable tablet, Chew 1 tablet Daily., Disp: 30 tablet, Rfl: 1  •  clopidogrel (PLAVIX) 75 MG tablet, TAKE 1 TABLET EVERY DAY, Disp: 90 tablet, Rfl: 1  •  fluticasone (FLONASE) 50 MCG/ACT nasal spray, 2 sprays into the nostril(s) as directed by provider Daily As Needed., Disp: , Rfl:   •  glucose blood test strip, 1 each by Other route Daily. Use as instructed, Disp: , Rfl:   •  Januvia 50 MG tablet, TAKE 1 TABLET EVERY DAY, Disp: 90 tablet, Rfl: 1  •  Jardiance 10 MG tablet, Take 10 mg by mouth Daily., Disp: 90 tablet, Rfl: 1  •  levothyroxine (SYNTHROID, LEVOTHROID) 75 MCG tablet, TAKE 1 TABLET EVERY DAY, Disp: 90 tablet, Rfl: 1  •  losartan (COZAAR) 50 MG tablet, TAKE 1 TABLET EVERY DAY, Disp: 90 tablet, Rfl: 1  •  pantoprazole (PROTONIX) 40 MG EC tablet, TAKE 1 TABLET EVERY DAY, Disp: 90 tablet, Rfl: 1  •  rosuvastatin (Crestor) 40 MG tablet, Take 1 tablet by mouth Daily., Disp: 90 tablet, Rfl: 1    Objective   Physical Exam  Vitals and nursing note reviewed.   Constitutional:       General: He is not in acute distress.     Appearance: Normal appearance. He is well-developed. He is not ill-appearing.   HENT:      Head: Normocephalic and atraumatic.   Cardiovascular:      Rate and Rhythm: Normal rate and regular rhythm.      Heart sounds: Normal heart sounds. No murmur. No gallop.    Pulmonary:      Effort: Pulmonary effort is normal. No respiratory distress.      Breath sounds: Normal breath sounds. No wheezing, rhonchi or rales.   Chest:      Chest wall: No tenderness.   Musculoskeletal:      Cervical back: Normal range of motion and neck supple.   Neurological:      General: No focal deficit present.      Mental Status: He is alert and oriented to person, place, and time. Mental status is at baseline.   Psychiatric:         Mood and Affect: Mood normal.         FOLLOWING LABS WERE REVIEWED TODAY:  CMP    CMP 12/16/20 12/29/20 2/24/21   Glucose 166 (A) 136 (A) 139 (A)   BUN 41 (A) 24 (A) 23    Creatinine 1.54 (A) 1.30 (A) 1.26   eGFR Non  Am 44 (A) 53 (A) 55 (A)   Sodium 136 139 142   Potassium 4.1 4.2 4.1   Chloride 106 106 106   Calcium 8.5 (A) 9.6 9.0   Albumin  4.10 4.30   Total Bilirubin  0.4 0.5   Alkaline Phosphatase  86 68   AST (SGOT)  16 21   ALT (SGPT)  14 20   (A) Abnormal value            CBC    CBC 12/16/20 12/29/20 2/24/21   WBC 9.10 10.32 6.65   RBC 3.90 (A) 4.37 4.81   Hemoglobin 11.8 (A) 13.0 14.4   Hematocrit 36.4 (A) 40.3 44.9   MCV 93.2 92.2 93.3   MCH 30.2 29.7 29.9   MCHC 32.4 32.3 32.1   RDW 14.5 13.1 13.5   Platelets 221 368 225   (A) Abnormal value            Lipid Panel    Lipid Panel 8/27/20 12/12/20 2/24/21   Total Cholesterol 131 162 163   Triglycerides 91 93 112   HDL Cholesterol 37 (A) 48 41   VLDL Cholesterol 18.2 17 20   LDL Cholesterol  76 97 102 (A)   LDL/HDL Ratio 2.05 1.99 2.43   (A) Abnormal value            TSH    TSH 8/27/20 12/12/20 2/24/21   TSH 3.040 3.710 3.930           Most Recent A1C    HGBA1C Most Recent 2/24/21   Hemoglobin A1C 6.8 (A)   (A) Abnormal value              Assessment/Plan   Diagnoses and all orders for this visit:    1. Type 2 diabetes mellitus without complication, without long-term current use of insulin (CMS/[facility]) (Primary)  -     Jardiance 10 MG tablet; Take 10 mg by mouth Daily.  Dispense: 90 tablet; Refill: 1    2. Essential hypertension  -     amLODIPine (NORVASC) 10 MG tablet; Take 1 tablet by mouth Daily.  Dispense: 90 tablet; Refill: 1    3. Acquired hypothyroidism    4. Mixed hyperlipidemia  -     Discontinue: rosuvastatin (Crestor) 40 MG tablet; Take 1 tablet by mouth Daily.  Dispense: 90 tablet; Refill: 1  -     rosuvastatin (Crestor) 40 MG tablet; Take 1 tablet by mouth Daily.  Dispense: 90 tablet; Refill: 1    5. Symptomatic carotid artery stenosis, bilateral      I reviewed his medical problems and his medications.  I also reviewed and discuss his recent labs.  We also discussed his carotid artery disease and his  another TIA in 12/2020.  He is a status post right endarterectomy on 12/14/2020.  His fasting lipid panel looks good, however his LDL should definitely be pushed below 70 considering his medical risks.  He is currently on atorvastatin 40 mg and I will be switching this to a more potent statin, which is Crestor 40 mg.  We also discussed COVID-19 vaccination which was recommended.  All questions were answered.      Return in about 6 months (around 9/11/2021) for Medicare Wellness.    Requested Prescriptions     Signed Prescriptions Disp Refills   • rosuvastatin (Crestor) 40 MG tablet 90 tablet 1     Sig: Take 1 tablet by mouth Daily.   • Jardiance 10 MG tablet 90 tablet 1     Sig: Take 10 mg by mouth Daily.   • amLODIPine (NORVASC) 10 MG tablet 90 tablet 1     Sig: Take 1 tablet by mouth Daily.

## 2021-03-17 ENCOUNTER — OFFICE VISIT (OUTPATIENT)
Dept: PODIATRY | Facility: CLINIC | Age: 81
End: 2021-03-17

## 2021-03-17 VITALS
HEIGHT: 72 IN | DIASTOLIC BLOOD PRESSURE: 74 MMHG | HEART RATE: 65 BPM | SYSTOLIC BLOOD PRESSURE: 147 MMHG | BODY MASS INDEX: 28.31 KG/M2 | WEIGHT: 209 LBS

## 2021-03-17 DIAGNOSIS — M25.372 LEFT ANKLE INSTABILITY: ICD-10-CM

## 2021-03-17 DIAGNOSIS — M79.671 RIGHT FOOT PAIN: Primary | ICD-10-CM

## 2021-03-17 PROCEDURE — 99213 OFFICE O/P EST LOW 20 MIN: CPT | Performed by: PODIATRIST

## 2021-03-18 NOTE — PROGRESS NOTES
"03/17/2021  Foot and Ankle Surgery - Established Patient/Follow-up  Provider: Dr. Alen Vasquez DPM  Location: [facility]    Subjective:  Alen Ratliff is a 80 y.o. male.     Chief Complaint   Patient presents with   • Left Ankle - Pain, Follow-up   • Right Foot - Pain       HPI: Patient returns with continued issues involving the left ankle and also complains of right foot pain.  Patient states that left ankle is quite stiff and feels like it is going to give out on him.  He has discomfort to the lateral aspect of the ankle.  He also complains of increased pain with activity to the right foot.  He feels that it could be due to compensation.  He denies any recent injuries to either lower extremity.    Allergies   Allergen Reactions   • Azithromycin Unknown - High Severity       Current Outpatient Medications on File Prior to Visit   Medication Sig Dispense Refill   • amLODIPine (NORVASC) 10 MG tablet Take 1 tablet by mouth Daily. 90 tablet 1   • aspirin 81 MG chewable tablet Chew 1 tablet Daily. 30 tablet 1   • clopidogrel (PLAVIX) 75 MG tablet TAKE 1 TABLET EVERY DAY 90 tablet 1   • fluticasone (FLONASE) 50 MCG/ACT nasal spray 2 sprays into the nostril(s) as directed by provider Daily As Needed.     • glucose blood test strip 1 each by Other route Daily. Use as instructed     • Januvia 50 MG tablet TAKE 1 TABLET EVERY DAY 90 tablet 1   • Jardiance 10 MG tablet Take 10 mg by mouth Daily. 90 tablet 1   • levothyroxine (SYNTHROID, LEVOTHROID) 75 MCG tablet TAKE 1 TABLET EVERY DAY 90 tablet 1   • losartan (COZAAR) 50 MG tablet TAKE 1 TABLET EVERY DAY 90 tablet 1   • pantoprazole (PROTONIX) 40 MG EC tablet TAKE 1 TABLET EVERY DAY 90 tablet 1   • rosuvastatin (Crestor) 40 MG tablet Take 1 tablet by mouth Daily. 90 tablet 1     No current facility-administered medications on file prior to visit.       Objective   /74   Pulse 65   Ht 182.9 cm (72\")   Wt 94.8 kg (209 lb)   BMI 28.35 kg/m² "     General:   Appearance: appears stated age and healthy    Orientation: AAOx3    Affect: appropriate    Gait: Mildly antalgic  VASCULAR        Foot Vascularity   Normal vascular exam    Dorsalis pedis:  2+  Posterior tibial:  2+  Skin Temperature: warm    Edema Gradin+  CFT:  < 3 seconds  Pedal Hair Growth:  Present      NEUROLOGIC      Foot Neurologic   Light touch sensation:  Normal  Hot/cold sensation: normal    Achilles reflex:  2+      MUSCULOSKELETAL       Foot Musculoskeletal   Ecchymosis:  None  Tenderness: Resolved  Arch:  Normal  Range of motion is somewhat limited with ankle dorsiflexion.  No crepitus.  No pain with palpation  Mild discomfort with palpation to the dorsal and plantar aspect of the right midfoot.  Mild discomfort to the plantar medial calcaneal tuberosity.    Moderate rigidity and guarding to the left ankle joint.       DERMATOLOGIC      Foot Dermatologic   Skin: No open wounds or signs of infection       Assessment/Plan   Diagnoses and all orders for this visit:    1. Right foot pain (Primary)  -     XR Foot 3+ View Right  -     Ambulatory Referral to Physical Therapy Evaluate and treat    2. Left ankle instability  -     XR Ankle 3+ View Left  -     Ambulatory Referral to Physical Therapy Evaluate and treat      Patient returns for issues involving the left ankle and right foot.  His right foot pain does appear to be mostly to the midfoot and plantar aspect.  Imaging was reviewed showing no obvious fracture or dislocation.  I explained that this is likely secondary to compensation for his left ankle and how he is walking.  Patient understands and agrees.  As for his left ankle, I have dispensed a lace up ankle brace and spent greater than 15 minutes reviewing the proper use and effects.  I have also recommended that he start formal physical therapy for this issue.  I would like him to continue exercises at home.  He may increase his activity as tolerated.  He is to follow-up in 6  weeks for reevaluation    Orders Placed This Encounter   Procedures   • XR Ankle 3+ View Left     Order Specific Question:   Reason for Exam:     Answer:   Left ankle weakness has had surgery in the past RM 14 WB     Order Specific Question:   Does this patient have a diabetic monitoring/medication delivering device on?     Answer:   No   • XR Foot 3+ View Right     Weight Bearing     Order Specific Question:   Reason for Exam:     Answer:   right foot pain he has a lumpon the top of the foot RM 14 WB   • Ambulatory Referral to Physical Therapy Evaluate and treat     Referral Priority:   Routine     Referral Type:   Physical Therapy     Referral Reason:   Specialty Services Required     Requested Specialty:   Physical Therapy     Number of Visits Requested:   1          Note is dictated utilizing voice recognition software. Unfortunately this leads to occasional typographical errors. I apologize in advance if the situation occurs. If questions occur please do not hesitate to call our office.

## 2021-03-22 ENCOUNTER — OFFICE VISIT (OUTPATIENT)
Dept: CARDIOLOGY | Facility: CLINIC | Age: 81
End: 2021-03-22

## 2021-03-22 VITALS
HEART RATE: 82 BPM | HEIGHT: 72 IN | SYSTOLIC BLOOD PRESSURE: 136 MMHG | DIASTOLIC BLOOD PRESSURE: 74 MMHG | BODY MASS INDEX: 28.31 KG/M2 | WEIGHT: 209 LBS

## 2021-03-22 DIAGNOSIS — R01.1 HEART MURMUR: Primary | ICD-10-CM

## 2021-03-22 DIAGNOSIS — E11.9 TYPE 2 DIABETES MELLITUS WITHOUT COMPLICATION, WITHOUT LONG-TERM CURRENT USE OF INSULIN (HCC): ICD-10-CM

## 2021-03-22 DIAGNOSIS — I10 ESSENTIAL HYPERTENSION: ICD-10-CM

## 2021-03-22 DIAGNOSIS — I63.9 CEREBROVASCULAR ACCIDENT (CVA), UNSPECIFIED MECHANISM (HCC): ICD-10-CM

## 2021-03-22 DIAGNOSIS — E78.2 MIXED HYPERLIPIDEMIA: ICD-10-CM

## 2021-03-22 PROCEDURE — 99213 OFFICE O/P EST LOW 20 MIN: CPT | Performed by: INTERNAL MEDICINE

## 2021-03-22 RX ORDER — ATORVASTATIN CALCIUM 40 MG/1
40 TABLET, FILM COATED ORAL DAILY
COMMUNITY
End: 2021-03-24

## 2021-03-22 NOTE — PROGRESS NOTES
Date of Office Visit: 2021  Encounter Provider: Dr. Steve Muhammad  Place of Service: [facility] [place]  Patient Name: Alen Ratliff  :1940  Serenity Wren MD    Chief Complaint   Patient presents with   • Heart Murmur     2 month follow up   • Hypertension   • Hyperlipidemia   • Diabetes   • Stroke     History of Present Illness    I am pleased to see Mr. Rao in my office today as a follow-up.    As you know, patient is 80-year-old white gentleman whose past medical history is significant for hypertension, hyperlipidemia, diabetes mellitus, history of cardiac murmur, who came today for follow-up.    In 2020, patient was seen in my office for symptom of shortness of breath and relative bradycardia.  Patient also had symptom of occasional chest pain.  Patient underwent stress test which showed moderate inferior/septal fixed defect with small amount of joseph-infarct ischemia.  Echocardiogram showed normal left ventricular size and function and LVEF was 55 to 60%.  Mild LVH was noted.  Calcified aortic valve was noted with mild to moderate aortic valve stenosis with aortic valve area of 1.3 cm².  Holter monitor showed sinus bradycardia with episode of relative bradycardia.  No significant pause or heart rate less than 40 was noted.    In 2020, patient was admitted at [facility] with slurring of speech and possible TIA.  Neurological work-up showed high-grade stenosis of right carotid artery.  Patient underwent endarterectomy.  Postoperatively patient had atrial fibrillation with controlled heart rate.  Patient spontaneously cardioverted.  Patient was started on aspirin and Plavix.  Patient was discharged on event monitor.  Event monitor showed predominantly sinus rhythm.  Patient had average heart rate of 60 bpm.  No significant atrial fibrillation burden noted.    This is 2 months follow-up appointment for the patient.  Patient reports that he is  doing well.  He does not report any palpitation or recurrence of atrial fibrillation.  Patient does not have any fatigue and tiredness.  Patient denies any dizziness or lightheadedness or syncope or presyncope.  No chest pain.  No shortness of breath.  Patient is definitely not very active.    I advised the patient to increase aerobic activity.  He has brace in his left ankle.  I think spinning exercises or bicycle stationary would be better for him.  Patient does not report any symptom related to relative bradycardia.  His heart rate is much better now.  Patient has mild aortic stenosis.  I recommend to repeat echocardiogram in 1 to 2 years.  Blood pressure monitoring is recommended which is desirable in control at this stage.        Past Medical History:   Diagnosis Date   • Allergic rhinitis    • Diabetes (CMS/[facility])    • GERD (gastroesophageal reflux disease)    • Heart murmur    • Hyperlipidemia    • Hypertension    • Hypothyroidism    • Prostate cancer (CMS/HCC)    • Stroke (CMS/HCC)          Past Surgical History:   Procedure Laterality Date   • ANKLE OPEN REDUCTION INTERNAL FIXATION Left 12/16/2020    Procedure: ANKLE OPEN REDUCTION INTERNAL FIXATION;  Surgeon: CAROLE Vasquez DPM;  Location: [facility] MA[facility] OR;  Service: Podiatry;  Laterality: Left;   • BACK SURGERY  1994   • CAROTID ENDARTERECTOMY Right 12/14/2020    Procedure: CAROTID ENDARTERECTOMY;  Surgeon: Toby Fung MD;  Location: [facility] OR;  Service: Vascular;  Laterality: Right;   • COLONOSCOPY  08/25/2015   • PROSTATECTOMY  2001    due to cancer           Current Outpatient Medications:   •  amLODIPine (NORVASC) 10 MG tablet, Take 1 tablet by mouth Daily., Disp: 90 tablet, Rfl: 1  •  aspirin 81 MG chewable tablet, Chew 1 tablet Daily., Disp: 30 tablet, Rfl: 1  •  atorvastatin (LIPITOR) 40 MG tablet, Take 40 mg by mouth Daily., Disp: , Rfl:   •  clopidogrel (PLAVIX) 75 MG tablet, TAKE 1 TABLET EVERY DAY, Disp: 90 tablet, Rfl: 1  •   fluticasone (FLONASE) 50 MCG/ACT nasal spray, 2 sprays into the nostril(s) as directed by provider Daily As Needed., Disp: , Rfl:   •  glucose blood test strip, 1 each by Other route Daily. Use as instructed, Disp: , Rfl:   •  Januvia 50 MG tablet, TAKE 1 TABLET EVERY DAY, Disp: 90 tablet, Rfl: 1  •  Jardiance 10 MG tablet, Take 10 mg by mouth Daily., Disp: 90 tablet, Rfl: 1  •  levothyroxine (SYNTHROID, LEVOTHROID) 75 MCG tablet, TAKE 1 TABLET EVERY DAY, Disp: 90 tablet, Rfl: 1  •  losartan (COZAAR) 50 MG tablet, TAKE 1 TABLET EVERY DAY, Disp: 90 tablet, Rfl: 1  •  pantoprazole (PROTONIX) 40 MG EC tablet, TAKE 1 TABLET EVERY DAY, Disp: 90 tablet, Rfl: 1      Social History     Socioeconomic History   • Marital status:      Spouse name: Not on file   • Number of children: Not on file   • Years of education: Not on file   • Highest education level: Not on file   Tobacco Use   • Smoking status: Never Smoker   • Smokeless tobacco: Never Used   Vaping Use   • Vaping Use: Never used   Substance and Sexual Activity   • Alcohol use: Not Currently   • Drug use: Never   • Sexual activity: Defer         Review of Systems   Constitutional: Negative for chills and fever.   HENT: Negative for ear discharge and nosebleeds.    Eyes: Negative for discharge and redness.   Cardiovascular: Negative for chest pain, orthopnea, palpitations, paroxysmal nocturnal dyspnea and syncope.   Respiratory: Positive for shortness of breath. Negative for cough and wheezing.    Endocrine: Negative for heat intolerance.   Skin: Negative for rash.   Musculoskeletal: Negative for arthritis and myalgias.   Gastrointestinal: Negative for abdominal pain, melena, nausea and vomiting.   Genitourinary: Negative for dysuria and hematuria.   Neurological: Negative for dizziness, light-headedness, numbness and tremors.   Psychiatric/Behavioral: Negative for depression. The patient is not nervous/anxious.        Procedures    Procedures    No orders to  "display           Objective:    /74   Pulse 82   Ht 182.9 cm (72.01\")   Wt 94.8 kg (209 lb)   BMI 28.34 kg/m²         Constitutional:       Appearance: Well-developed.   Eyes:      General: No scleral icterus.        Right eye: No discharge.   HENT:      Head: Normocephalic and atraumatic.   Neck:      Thyroid: No thyromegaly.      Lymphadenopathy: No cervical adenopathy.   Pulmonary:      Effort: Pulmonary effort is normal. No respiratory distress.      Breath sounds: Normal breath sounds. No wheezing. No rales.   Cardiovascular:      Normal rate. Regular rhythm.      Murmurs: There is a grade 2/6 systolic murmur.      No gallop.   Abdominal:      Tenderness: There is no abdominal tenderness.   Skin:     Findings: No erythema or rash.   Neurological:      Mental Status: Alert and oriented to person, place, and time.             Assessment:       Diagnosis Plan   1. Heart murmur     2. Cerebrovascular accident (CVA), unspecified mechanism (CMS/[facility])     3. Essential hypertension     4. Mixed hyperlipidemia     5. Type 2 diabetes mellitus without complication, without long-term current use of insulin (CMS/[facility])              Plan:       MDM:    1.  Aortic stenosis:    Recommend echocardiogram in 1 to 2 years.    2.  Relative bradycardia:    Patient has tachybradycardia syndrome and had bradycardia in the past but now his heart rate is controlled.  Event monitor showed lowest heart rate was in 40s.  But at present patient is asymptomatic and doing well.    3.  Hypertension:    Blood pressure is very well controlled.  Continue to observe and continue current treatment    4.  Abnormal stress test:    Patient had abnormal stress test with small amount of joseph-infarct ischemia.  Clinically patient does not report any symptom of angina pectoris I would continue to observe    5.  Diabetes mellitus:    Diet modification discussed.  Further recommendation as per PCP.  "

## 2021-03-24 ENCOUNTER — TREATMENT (OUTPATIENT)
Dept: PHYSICAL THERAPY | Facility: CLINIC | Age: 81
End: 2021-03-24

## 2021-03-24 ENCOUNTER — TELEPHONE (OUTPATIENT)
Dept: FAMILY MEDICINE CLINIC | Facility: CLINIC | Age: 81
End: 2021-03-24

## 2021-03-24 DIAGNOSIS — M25.372 LEFT ANKLE INSTABILITY: ICD-10-CM

## 2021-03-24 DIAGNOSIS — R26.2 DIFFICULTY IN WALKING: ICD-10-CM

## 2021-03-24 DIAGNOSIS — M79.671 RIGHT FOOT PAIN: Primary | ICD-10-CM

## 2021-03-24 DIAGNOSIS — E78.2 MIXED HYPERLIPIDEMIA: Primary | ICD-10-CM

## 2021-03-24 PROCEDURE — 97162 PT EVAL MOD COMPLEX 30 MIN: CPT | Performed by: PHYSICAL THERAPIST

## 2021-03-24 PROCEDURE — 97110 THERAPEUTIC EXERCISES: CPT | Performed by: PHYSICAL THERAPIST

## 2021-03-24 RX ORDER — ROSUVASTATIN CALCIUM 40 MG/1
40 TABLET, COATED ORAL DAILY
Qty: 90 TABLET | Refills: 1 | Status: SHIPPED | OUTPATIENT
Start: 2021-03-24 | End: 2021-04-07 | Stop reason: SDUPTHER

## 2021-03-24 NOTE — TELEPHONE ENCOUNTER
PATIENTS WIFE IS CALLING IN SHE WOULD LIKE TO GET A CALLBACK TO DISCUSS PATIENTS CHOLESTEROL MEDICATION. SHE STATES THAT THERE WAS SOMETHING ABOUT CHANGING TO CRESTOR BECAUSE THEY NEVER HEARD ANYTHING BACK ABOUT THAT.    PLEASE ADVISE     CALLBACK NUMBER   [phone]

## 2021-03-24 NOTE — PROGRESS NOTES
Physical Therapy Initial Evaluation and Plan of Care    Patient: Alen Ratliff   : 1940  Diagnosis/ICD-10 Code:  Right foot pain [M79.671]  Referring practitioner: CAROLE Vasquez DPM  Date of Initial Visit: 3/24/2021  Today's Date: 3/24/2021  Patient seen for 1 sessions           Subjective Questionnaire: LEFS: 69      Subjective Evaluation    History of Present Illness  Mechanism of injury: The patient reports that around 2020 he had a TIA and fell and fractured the left ankle in two places. Patient had a boot for a few weeks and was taken out. His most recent x-ray showed adequate healing. His left ankle has been feeling unstable since the fracture if he isn't wearing his brace. He states that since his fall, his left knee has been bothering him some too and he has clicking/popping into the knee. He states that his right ankle/foot is doing well, he does have a small bump on it but he states Dr. Vasquez said it was a pore.     Walking for long periods of time, ascending/descending stairs, sleeping throughout the night, and turning while walking tend to aggravate symptoms into the ankle. Rest and ankle brace tend to reduce symptoms into the ankle. Patient reports that he does feel some numbness into the front of the left ankle.       Patient Occupation: retired from security Quality of life: good    Pain  Current pain ratin  At best pain ratin  At worst pain ratin  Quality: sharp  Relieving factors: support and rest  Aggravating factors: ambulation, squatting, lifting, stairs, prolonged positioning, movement, standing and sleeping  Progression: improved    Social Support  Lives in: apartment    Diagnostic Tests  X-ray: normal (fracture is healing)    Treatments  Previous treatment: immobilization  Patient Goals  Patient goals for therapy: decreased pain, improved balance, increased motion, increased strength, independence with ADLs/IADLs and return to sport/leisure  activities             Objective          Observations   Left Ankle/Foot   Positive for edema.       Palpation   Left   Hypertonic in the anterior tibialis, lateral gastrocnemius, medial gastrocnemius, peroneus and soleus.     Active Range of Motion   Left Ankle/Foot   Dorsiflexion (ke): 5 degrees   Plantar flexion: 35 degrees   Inversion: 10 degrees   Eversion: 10 degrees     Right Ankle/Foot   Dorsiflexion (ke): 0 degrees   Plantar flexion: WFL  Inversion: WFL  Eversion: WFL    Joint Play   Left Ankle/Foot  Hypomobile in the talocrural joint and subtalar joint.     Strength/Myotome Testing     Left Hip   Planes of Motion   Flexion: 5    Right Hip   Planes of Motion   Flexion: 5    Left Knee   Flexion: 5  Extension: 5    Right Knee   Flexion: 5  Extension: 5    Left Ankle/Foot   Dorsiflexion: 4+  Inversion: 4  Eversion: 4    Right Ankle/Foot   Normal strength    Ambulation     Quality of Movement During Gait     Knee    Knee (Left): Positive increased ER tibial torsion.     Ankle    Ankle (Left): Positive pronated.     Foot Alignment    Foot Alignment (Left): Positive flat foot.           Assessment & Plan     Assessment  Impairments: abnormal coordination, abnormal gait, abnormal muscle firing, abnormal muscle tone, abnormal or restricted ROM, activity intolerance, impaired physical strength, lacks appropriate home exercise program and pain with function  Assessment details: The patient is a 80 y.o. male who presents to physical therapy today with orders to address L ankle instability and pain in the R foot. Upon initial evaluation, the patient demonstrates the following impairments: increased pain, increased muscle tone, abnormal gait pattern, decreased strength, decreased joint mobility, and decreased ROM. Due to these impairments, the patient is unable to stand or walk for long periods of time, ascend/descend stairs, and sleep throughout the night without an increase in symptoms. The patient would benefit from  skilled PT services to address functional limitations and impairments and to improve patient quality of life.      Prognosis: good  Functional Limitations: carrying objects, lifting, walking, pulling, pushing, uncomfortable because of pain and standing  Goals  Plan Goals: STG's: 2 weeks   Patient will report pain level at worse </= 4/10 for improved tolerance to ADLs and functional mobility  Patient will require <3 tactile or verbal cues for proper mechanics during completion of his HEP      LTG's: By Discharge  Patient will report pain levels at worst </= 2/10 for improved tolerance to ADLs and functional mobility  Patient will be able to ambulate unlimited distances without an assistive device and no increased pain  Patient will be able to complete single leg stance on stable surface with no UE support, with supervision for durations > 30 seconds on BLE to decrease risk of falls  Patient will report improved levels of overall function as demonstrated by an increase in his reported level of function score on the LEFS to >/= 75/80    Patient will report improvement in their tolerance to sleeping and report waking up </= 1x/night per positional discomfort  Patient will require no tactile or verbal cues for proper mechanics during completion of his HEP for final independence         Plan  Therapy options: will be seen for skilled physical therapy services  Planned modality interventions: cryotherapy, electrical stimulation/Russian stimulation, TENS, thermotherapy (hydrocollator packs), ultrasound and dry needling  Planned therapy interventions: ADL retraining, balance/weight-bearing training, fine motor coordination training, flexibility, functional ROM exercises, gait training, home exercise program, IADL retraining, joint mobilization, manual therapy, neuromuscular re-education, postural training, soft tissue mobilization, spinal/joint mobilization, strengthening, stretching and therapeutic activities  Duration in  visits: 16  Treatment plan discussed with: patient        History # of Personal Factors and/or Comorbidities: MODERATE (1-2)  Examination of Body System(s): # of elements: MODERATE (3)  Clinical Presentation: EVOLVING  Clinical Decision Making: MODERATE      Timed:         Manual Therapy:    4     mins  67002;     Therapeutic Exercise:    8     mins  71726;     Neuromuscular Rachel:        mins  66447;    Therapeutic Activity:          mins  65821;     Gait Training:           mins  88299;     Ultrasound:          mins  91502;    Ionto                                   mins   23128  Self Care                            mins   53239  Canalith Repos         mins 48138      Un-Timed:  Electrical Stimulation:         mins  83701 ( );  Dry Needling          mins self-pay  Traction          mins 88958  Low Eval          Mins  08357  Mod Eval     25     Mins  63011  High Eval                            Mins  79035  Re-Eval                               mins  47465        Timed Treatment:   12   mins   Total Treatment:     37   mins    PT SIGNATURE: Dunia Camacho (Schwartz), PT   DATE TREATMENT INITIATED: 3/24/2021    Medicare Initial Certification  Certification Period: 6/22/2021  I certify that the therapy services are furnished while this patient is under my care.  The services outlined above are required by this patient, and will be reviewed every 90 days.     PHYSICIAN: CAROLE Vasquez DPM      DATE:     Please sign and return via fax to [phone].. Thank you, [facility].

## 2021-03-24 NOTE — TELEPHONE ENCOUNTER
Changing the cholesterol medicine from atorvastatin to rosuvastatin, which is a generic Crestor was discussed at the appointment and I sent prescription in.  Looking at his chart now it looks like this prescription did not go through and the pharmacy never received it.  I just sent a new prescription for him to his mail in order pharmacy.  Thank you.

## 2021-03-31 ENCOUNTER — TREATMENT (OUTPATIENT)
Dept: PHYSICAL THERAPY | Facility: CLINIC | Age: 81
End: 2021-03-31

## 2021-03-31 DIAGNOSIS — R26.2 DIFFICULTY IN WALKING: ICD-10-CM

## 2021-03-31 DIAGNOSIS — M25.372 LEFT ANKLE INSTABILITY: ICD-10-CM

## 2021-03-31 DIAGNOSIS — M79.671 RIGHT FOOT PAIN: Primary | ICD-10-CM

## 2021-03-31 PROCEDURE — 97112 NEUROMUSCULAR REEDUCATION: CPT | Performed by: PHYSICAL THERAPIST

## 2021-03-31 PROCEDURE — 97110 THERAPEUTIC EXERCISES: CPT | Performed by: PHYSICAL THERAPIST

## 2021-03-31 PROCEDURE — 97140 MANUAL THERAPY 1/> REGIONS: CPT | Performed by: PHYSICAL THERAPIST

## 2021-03-31 NOTE — PROGRESS NOTES
Physical Therapy Daily Progress Note    VISIT#: 2    Subjective   Alen Ratliff reports that he feels his foot is getting looser, the exercises have been going okay at home. He will sometimes get pain in the lateral shin area when resting at night.  Pain Ratin    Objective     See Exercise, Manual, and Modality Logs for complete treatment.     Patient Education: muscle fatigue, exercise cueing and rationale    Assessment & Plan     Assessment  Assessment details: The patient tolerated treatment well today with no increase in symptoms. Did demonstrate an improvement in passive DF compared to previous visit. PT to continue progressing per patient tolerance.        Progress per Plan of Care and Progress strengthening /stabilization /functional activity          Timed:         Manual Therapy:    20     mins  41217;     Therapeutic Exercise:    10     mins  85766;     Neuromuscular Rachel:    10    mins  58569;    Therapeutic Activity:          mins  05280;     Gait Training:           mins  60644;     Ultrasound:          mins  69008;    Ionto                                   mins   27862  Self Care                            mins   02621  Canalith Repos                   mins  31317    Un-Timed:  Electrical Stimulation:         mins  37492 ( );  Dry Needling          mins self-pay  Traction          mins 99583  Low Eval          Mins  93555  Mod Eval          Mins  42877  High Eval                            Mins  83523  Re-Eval                               mins  74881    Timed Treatment:   40   mins   Total Treatment:     40   mins    Dunia Camacho (Schwartz), PT  Physical Therapist  [place] License # [id number]

## 2021-04-02 ENCOUNTER — TREATMENT (OUTPATIENT)
Dept: PHYSICAL THERAPY | Facility: CLINIC | Age: 81
End: 2021-04-02

## 2021-04-02 DIAGNOSIS — M79.671 RIGHT FOOT PAIN: Primary | ICD-10-CM

## 2021-04-02 DIAGNOSIS — R26.2 DIFFICULTY IN WALKING: ICD-10-CM

## 2021-04-02 DIAGNOSIS — M25.372 LEFT ANKLE INSTABILITY: ICD-10-CM

## 2021-04-02 PROCEDURE — 97530 THERAPEUTIC ACTIVITIES: CPT | Performed by: PHYSICAL THERAPIST

## 2021-04-02 PROCEDURE — 97110 THERAPEUTIC EXERCISES: CPT | Performed by: PHYSICAL THERAPIST

## 2021-04-02 PROCEDURE — 97140 MANUAL THERAPY 1/> REGIONS: CPT | Performed by: PHYSICAL THERAPIST

## 2021-04-02 NOTE — PROGRESS NOTES
"Physical Therapy Daily Progress Note    VISIT#: 3    Subjective   Alen Ratliff reports that his foot felt looser after therapy the other day. The only pain he has is a little \"zing\" at night in the foot.  Pain Ratin    Objective     See Exercise, Manual, and Modality Logs for complete treatment.     Patient Education: exercise cueing and rationale    Assessment & Plan     Assessment  Assessment details: The patient demonstrated hypomobility into DF of the talocrural joint which responded well to grade III mobilizations. Patient tolerated added exercises well today with no increase in symptoms; did note slight discomfort in the knee with the bike but no pain.        Progress per Plan of Care and Progress strengthening /stabilization /functional activity          Timed:         Manual Therapy:    12     mins  29912;     Therapeutic Exercise:    8     mins  85300;     Neuromuscular Rachel:        mins  79057;    Therapeutic Activity:     20     mins  57373;     Gait Training:           mins  89309;     Ultrasound:          mins  42948;    Ionto                                   mins   91109  Self Care                            mins   80764  Canalith Repos                   mins  70398    Un-Timed:  Electrical Stimulation:         mins  01271 ( );  Dry Needling          mins self-pay  Traction          mins 38692  Low Eval          Mins  43341  Mod Eval          Mins  41028  High Eval                            Mins  24596  Re-Eval                               mins  66398    Timed Treatment:   40   mins   Total Treatment:     40   mins    Dunia Camacho PT ([place]wartz)  Physical Therapist  [place] License # [id number]  "

## 2021-04-05 ENCOUNTER — TELEPHONE (OUTPATIENT)
Dept: FAMILY MEDICINE CLINIC | Facility: CLINIC | Age: 81
End: 2021-04-05

## 2021-04-05 ENCOUNTER — TREATMENT (OUTPATIENT)
Dept: PHYSICAL THERAPY | Facility: CLINIC | Age: 81
End: 2021-04-05

## 2021-04-05 DIAGNOSIS — R26.2 DIFFICULTY IN WALKING: ICD-10-CM

## 2021-04-05 DIAGNOSIS — M79.671 RIGHT FOOT PAIN: Primary | ICD-10-CM

## 2021-04-05 DIAGNOSIS — M25.372 LEFT ANKLE INSTABILITY: ICD-10-CM

## 2021-04-05 PROCEDURE — 97530 THERAPEUTIC ACTIVITIES: CPT | Performed by: PHYSICAL THERAPIST

## 2021-04-05 PROCEDURE — 97110 THERAPEUTIC EXERCISES: CPT | Performed by: PHYSICAL THERAPIST

## 2021-04-05 PROCEDURE — 97140 MANUAL THERAPY 1/> REGIONS: CPT | Performed by: PHYSICAL THERAPIST

## 2021-04-05 NOTE — PROGRESS NOTES
Physical Therapy Daily Progress Note    VISIT#: 4    Subjective   Alen Ratliff reports that he feels his ankle is getting more stable even without the brace. He still gets some pain at night.  Pain Ratin    Objective     See Exercise, Manual, and Modality Logs for complete treatment.     Patient Education: ankle stability, exercise rationale    Assessment & Plan     Assessment  Assessment details: Patient continues to demonstrate hypomobility in the talocrural joint with DF. Patient tolerated treatment well today with no increase in symptoms. PT to continue progressing per patient tolerance.        Progress per Plan of Care and Progress strengthening /stabilization /functional activity          Timed:         Manual Therapy:    14     mins  70509;     Therapeutic Exercise:    10     mins  08323;     Neuromuscular Rachel:        mins  69672;    Therapeutic Activity:     18     mins  73062;     Gait Training:           mins  55444;     Ultrasound:          mins  48316;    Ionto                                   mins   55507  Self Care                            mins   41892  Canalith Repos                   mins  04279    Un-Timed:  Electrical Stimulation:         mins  15044 ( );  Dry Needling          mins self-pay  Traction          mins 77331  Low Eval          Mins  26645  Mod Eval          Mins  53152  High Eval                            Mins  97180  Re-Eval                               mins  06698    Timed Treatment:  42    mins   Total Treatment:     42   mins    Dunia Camacho (Schwartz), PT  Physical Therapist  [place] License # [id number]

## 2021-04-06 ENCOUNTER — TELEPHONE (OUTPATIENT)
Dept: FAMILY MEDICINE CLINIC | Facility: CLINIC | Age: 81
End: 2021-04-06

## 2021-04-06 DIAGNOSIS — N50.819 PAIN IN TESTICLE, UNSPECIFIED LATERALITY: Primary | ICD-10-CM

## 2021-04-06 NOTE — TELEPHONE ENCOUNTER
#1       Well here is what this call was about: He had a bowel movement today and yesterday and there was blood in stool and in water in toilet. Back in December when he was in the hospital they put him back on a baby aspirin. He had been taken off of the baby aspirin due to causing pin holes in his intestines.    #2       They think he might need a referral to urologist because his penis goes all the way in. He is having some pain in his right testicle.

## 2021-04-06 NOTE — TELEPHONE ENCOUNTER
Chiara, can you please call his wife.  I think she wants to talk about the change of the cholesterol medicine which was made recently from atorvastatin to rosuvastatin.  Thank you.

## 2021-04-06 NOTE — TELEPHONE ENCOUNTER
Provider: WELLINGTON GABRIEL    Caller: DEWEY VIERA    Relationship to Patient: WIFE    Phone Number: [phone]    Reason for Call: PATIENT'S WIFE WISHES TO SPEAK TO [place] REGARDING A MEDICAL ISSUE OF HER HUSBANDS.  SHE ALSO HAS PHARMACY INFORMATION THAT SHE NEEDS TO GIVE TO THEM.

## 2021-04-06 NOTE — TELEPHONE ENCOUNTER
#1  He was started back on baby aspirin due to stroke.  If he had issues with some intestinal bleedings due to aspirin in the past this possibly might be an issue even now.  However small amount of fresh blood on the toilet paper or dripping into the toilet might be a sign of the hemorrhoid issues.  Did he have any black stools or bigger amount of blood mixed with stool?  If this is the case then he would probably need to see the GI for further evaluation.  #2 I put referral into his chart to see the urologist.

## 2021-04-07 ENCOUNTER — APPOINTMENT (OUTPATIENT)
Dept: VASCULAR SURGERY | Facility: HOSPITAL | Age: 81
End: 2021-04-07

## 2021-04-07 ENCOUNTER — HOSPITAL ENCOUNTER (OUTPATIENT)
Dept: CARDIOLOGY | Facility: HOSPITAL | Age: 81
Discharge: HOME | End: 2021-04-07

## 2021-04-07 DIAGNOSIS — E78.2 MIXED HYPERLIPIDEMIA: ICD-10-CM

## 2021-04-07 DIAGNOSIS — I65.23 BILATERAL CAROTID ARTERY OCCLUSION: ICD-10-CM

## 2021-04-07 LAB
BH CV XLRA MEAS LEFT CCA RATIO VEL: 71.3 CM/SEC
BH CV XLRA MEAS LEFT DIST CCA EDV: 9.4 CM/SEC
BH CV XLRA MEAS LEFT DIST CCA PSV: 71.3 CM/SEC
BH CV XLRA MEAS LEFT DIST ICA EDV: -14.9 CM/SEC
BH CV XLRA MEAS LEFT DIST ICA PSV: -69.7 CM/SEC
BH CV XLRA MEAS LEFT ICA RATIO VEL: -100 CM/SEC
BH CV XLRA MEAS LEFT ICA/CCA RATIO: -1.4
BH CV XLRA MEAS LEFT PROX CCA EDV: 12.5 CM/SEC
BH CV XLRA MEAS LEFT PROX CCA PSV: 92.5 CM/SEC
BH CV XLRA MEAS LEFT PROX ECA PSV: -166 CM/SEC
BH CV XLRA MEAS LEFT PROX ICA EDV: -18.2 CM/SEC
BH CV XLRA MEAS LEFT PROX ICA PSV: -100 CM/SEC
BH CV XLRA MEAS LEFT PROX SCLA PSV: 172 CM/SEC
BH CV XLRA MEAS LEFT VERTEBRAL A EDV: 10.5 CM/SEC
BH CV XLRA MEAS LEFT VERTEBRAL A PSV: 72.9 CM/SEC
BH CV XLRA MEAS RIGHT CCA RATIO VEL: 93.8 CM/SEC
BH CV XLRA MEAS RIGHT DIST CCA EDV: 9.9 CM/SEC
BH CV XLRA MEAS RIGHT DIST CCA PSV: 75.2 CM/SEC
BH CV XLRA MEAS RIGHT DIST ICA EDV: -19.2 CM/SEC
BH CV XLRA MEAS RIGHT DIST ICA PSV: -75.2 CM/SEC
BH CV XLRA MEAS RIGHT ICA RATIO VEL: -75.2 CM/SEC
BH CV XLRA MEAS RIGHT ICA/CCA RATIO: -0.8
BH CV XLRA MEAS RIGHT PROX CCA EDV: 7.5 CM/SEC
BH CV XLRA MEAS RIGHT PROX CCA PSV: 93.8 CM/SEC
BH CV XLRA MEAS RIGHT PROX ECA PSV: -103 CM/SEC
BH CV XLRA MEAS RIGHT PROX ICA EDV: -8.7 CM/SEC
BH CV XLRA MEAS RIGHT PROX ICA PSV: -72.1 CM/SEC
BH CV XLRA MEAS RIGHT PROX SCLA PSV: 115 CM/SEC
BH CV XLRA MEAS RIGHT VERTEBRAL A EDV: 8.9 CM/SEC
BH CV XLRA MEAS RIGHT VERTEBRAL A PSV: 45.2 CM/SEC
LEFT ARM BP: NORMAL MMHG
RIGHT ARM BP: NORMAL MMHG

## 2021-04-07 PROCEDURE — G0463 HOSPITAL OUTPT CLINIC VISIT: HCPCS

## 2021-04-07 PROCEDURE — 93880 EXTRACRANIAL BILAT STUDY: CPT

## 2021-04-07 RX ORDER — ROSUVASTATIN CALCIUM 40 MG/1
40 TABLET, COATED ORAL DAILY
Qty: 90 TABLET | Refills: 1 | Status: SHIPPED | OUTPATIENT
Start: 2021-04-07 | End: 2021-05-06

## 2021-04-07 NOTE — TELEPHONE ENCOUNTER
Please advise the patient that I sent prescription for his rosuvastatin to his new pharmacy.  If it comes to the GI issues and some blood noted from the rectum if this continues he will need to schedule in person evaluation either here or possibly see the GI.  Thank you.

## 2021-04-07 NOTE — TELEPHONE ENCOUNTER
Notified Elba.  Elba said water in toilet was red, and day before bowel movement he had strings of blood come out (like clots she said).    Also said the Mail order now is:  [facility]  Phone #  [phone]    Fax # [phone]  Translation:  actual pharmacy name matching phone and fax numbers is--[facility]  Needs the Rosuvastatin sent to [place].

## 2021-04-08 ENCOUNTER — TREATMENT (OUTPATIENT)
Dept: PHYSICAL THERAPY | Facility: CLINIC | Age: 81
End: 2021-04-08

## 2021-04-08 DIAGNOSIS — M25.372 LEFT ANKLE INSTABILITY: ICD-10-CM

## 2021-04-08 DIAGNOSIS — R26.2 DIFFICULTY IN WALKING: ICD-10-CM

## 2021-04-08 DIAGNOSIS — M79.671 RIGHT FOOT PAIN: Primary | ICD-10-CM

## 2021-04-08 PROCEDURE — 97140 MANUAL THERAPY 1/> REGIONS: CPT | Performed by: PHYSICAL THERAPIST

## 2021-04-08 PROCEDURE — 97110 THERAPEUTIC EXERCISES: CPT | Performed by: PHYSICAL THERAPIST

## 2021-04-08 PROCEDURE — 97530 THERAPEUTIC ACTIVITIES: CPT | Performed by: PHYSICAL THERAPIST

## 2021-04-08 NOTE — PROGRESS NOTES
Physical Therapy Daily Progress Note    VISIT#: 5    Subjective   Alen Ratliff reports that his ankle is feeling about the same, still really tight. Midway through the exercises, his lower leg gets really tired.  Pain Ratin    Objective     See Exercise, Manual, and Modality Logs for complete treatment.     Patient Education: popping/clicking in the ankle, exercise cueing and rationale    Assessment & Plan     Assessment  Assessment details: Patient with continued hypomobility in the left talocrural joint with DF, responded well to grade III mobilizations. Patient tolerated additional exercises for hip stability well today with no increase in discomfort in left ankle.        Progress per Plan of Care and Progress strengthening /stabilization /functional activity          Timed:         Manual Therapy:    13     mins  88739;     Therapeutic Exercise:    10     mins  54278;     Neuromuscular Rachel:        mins  56931;    Therapeutic Activity:     17     mins  39593;     Gait Training:           mins  18623;     Ultrasound:          mins  48874;    Ionto                                   mins   86914  Self Care                            mins   85354  Canalith Repos                   mins  15846    Un-Timed:  Electrical Stimulation:         mins  13983 ( );  Dry Needling          mins self-pay  Traction          mins 34889  Low Eval          Mins  41649  Mod Eval          Mins  84662  High Eval                            Mins  11821  Re-Eval                               mins  21248    Timed Treatment:   40   mins   Total Treatment:     40   mins    Dunia Camacho PT (Schwartz)  Physical Therapist  [place] License # [id number]

## 2021-04-09 ENCOUNTER — TRANSCRIBE ORDERS (OUTPATIENT)
Dept: ADMINISTRATIVE | Facility: HOSPITAL | Age: 81
End: 2021-04-09

## 2021-04-09 DIAGNOSIS — I65.23 BILATERAL CAROTID ARTERY OCCLUSION: Primary | ICD-10-CM

## 2021-04-12 ENCOUNTER — TREATMENT (OUTPATIENT)
Dept: PHYSICAL THERAPY | Facility: CLINIC | Age: 81
End: 2021-04-12

## 2021-04-12 DIAGNOSIS — M25.372 LEFT ANKLE INSTABILITY: ICD-10-CM

## 2021-04-12 DIAGNOSIS — R26.2 DIFFICULTY IN WALKING: ICD-10-CM

## 2021-04-12 DIAGNOSIS — M79.671 RIGHT FOOT PAIN: Primary | ICD-10-CM

## 2021-04-12 PROCEDURE — 97110 THERAPEUTIC EXERCISES: CPT | Performed by: PHYSICAL THERAPIST

## 2021-04-12 PROCEDURE — 97530 THERAPEUTIC ACTIVITIES: CPT | Performed by: PHYSICAL THERAPIST

## 2021-04-12 PROCEDURE — 97140 MANUAL THERAPY 1/> REGIONS: CPT | Performed by: PHYSICAL THERAPIST

## 2021-04-12 NOTE — PROGRESS NOTES
Physical Therapy Daily Progress Note    VISIT#: 6    Subjective   Alen Ratliff reports that his foot feels like it is loosening up a bit but he feels that his foot is still tight. His right foot has been getting a little tight lately as well.  Pain Ratin    Objective     See Exercise, Manual, and Modality Logs for complete treatment.     Patient Education: exercise cueing and rationale    Assessment & Plan     Assessment  Assessment details: The patient continues to demonstrate hypomobility in the left talocrural joint which responds well to in-session joint mobilization techniques. Patient tolerated addition of 4-way ankle exercise well for ankle stabilization.        Progress per Plan of Care and Progress strengthening /stabilization /functional activity          Timed:         Manual Therapy:    14     mins  17764;     Therapeutic Exercise:    14     mins  01729;     Neuromuscular Rachel:        mins  69808;    Therapeutic Activity:     15     mins  43039;     Gait Training:          mins  24586;     Ultrasound:          mins  65838;    Ionto                                   mins   61884  Self Care                            mins   62751  Canalith Repos                   mins  91703    Un-Timed:  Electrical Stimulation:         mins  17211 ( );  Dry Needling          mins self-pay  Traction          mins 53092  Low Eval          Mins  32924  Mod Eval          Mins  64085  High Eval                            Mins  22409  Re-Eval                               mins  65286    Timed Treatment:   43   mins   Total Treatment:     43   mins    Dunia Camacho PT (Schwartz)  Physical Therapist  [place] License # [id number]

## 2021-04-15 ENCOUNTER — TREATMENT (OUTPATIENT)
Dept: PHYSICAL THERAPY | Facility: CLINIC | Age: 81
End: 2021-04-15

## 2021-04-15 DIAGNOSIS — R26.2 DIFFICULTY IN WALKING: ICD-10-CM

## 2021-04-15 DIAGNOSIS — M79.671 RIGHT FOOT PAIN: Primary | ICD-10-CM

## 2021-04-15 DIAGNOSIS — M25.372 LEFT ANKLE INSTABILITY: ICD-10-CM

## 2021-04-15 PROCEDURE — 97110 THERAPEUTIC EXERCISES: CPT | Performed by: PHYSICAL THERAPIST

## 2021-04-15 PROCEDURE — 97530 THERAPEUTIC ACTIVITIES: CPT | Performed by: PHYSICAL THERAPIST

## 2021-04-15 PROCEDURE — 97140 MANUAL THERAPY 1/> REGIONS: CPT | Performed by: PHYSICAL THERAPIST

## 2021-04-15 NOTE — PROGRESS NOTES
Physical Therapy Daily Progress Note    VISIT#: 7    Subjective   Alen Ratliff reports that his ankle is doing very well, no changes from the last therapy session and no pain today.  Pain Ratin    Objective     See Exercise, Manual, and Modality Logs for complete treatment.     Patient Education: exercise cueing and rationale    Assessment & Plan     Assessment  Assessment details: Patient demonstrated an improvement in active and passive DF at today's visit compared to previous visits. Patient tolerated exercises and manual therapy well today with no increase in pain or discomfort. PT to continue progressing per patient tolerance.        Progress per Plan of Care and Progress strengthening /stabilization /functional activity          Timed:         Manual Therapy:    14     mins  70442;     Therapeutic Exercise:    10     mins  43518;     Neuromuscular Rachel:        mins  25438;    Therapeutic Activity:     16     mins  41289;     Gait Training:           mins  03948;     Ultrasound:          mins  17559;    Ionto                                   mins   00281  Self Care                            mins   18822  Canalith Repos                   mins  15168    Un-Timed:  Electrical Stimulation:         mins  71318 ( );  Dry Needling          mins self-pay  Traction          mins 32910  Low Eval          Mins  98655  Mod Eval          Mins  44568  High Eval                            Mins  58993  Re-Eval                               mins  49181    Timed Treatment:   40   mins   Total Treatment:     40   mins    Dunia Camacho (Schwartz), PT  Physical Therapist  [place] License # [id number]

## 2021-04-19 ENCOUNTER — TREATMENT (OUTPATIENT)
Dept: PHYSICAL THERAPY | Facility: CLINIC | Age: 81
End: 2021-04-19

## 2021-04-19 DIAGNOSIS — M25.372 LEFT ANKLE INSTABILITY: ICD-10-CM

## 2021-04-19 DIAGNOSIS — R26.2 DIFFICULTY IN WALKING: ICD-10-CM

## 2021-04-19 DIAGNOSIS — M79.671 RIGHT FOOT PAIN: Primary | ICD-10-CM

## 2021-04-19 PROCEDURE — 97530 THERAPEUTIC ACTIVITIES: CPT | Performed by: PHYSICAL THERAPIST

## 2021-04-19 PROCEDURE — 97140 MANUAL THERAPY 1/> REGIONS: CPT | Performed by: PHYSICAL THERAPIST

## 2021-04-19 PROCEDURE — 97110 THERAPEUTIC EXERCISES: CPT | Performed by: PHYSICAL THERAPIST

## 2021-04-19 NOTE — PROGRESS NOTES
Physical Therapy Daily Progress Note    VISIT#: 8    Subjective   Alen Ratliff reports that he can feel some popping in the front of the left foot when doing some of the exercises. It does not hurt, he just has a little discomfort in the foot.  Pain Ratin    Objective     See Exercise, Manual, and Modality Logs for complete treatment.     Patient Education: exercise cueing, possible reasons for popping    Assessment & Plan     Assessment  Assessment details: Patient able to demonstrate neutral DF on the left ankle at today's visit prior to manual therapy techniques. Patient tolerated addition of single leg stance well, did require use of BUE for safety and balance.        Progress per Plan of Care and Progress strengthening /stabilization /functional activity          Timed:         Manual Therapy:    13     mins  34781;     Therapeutic Exercise:    12     mins  76544;     Neuromuscular Rachel:        mins  69004;    Therapeutic Activity:     15     mins  80026;     Gait Training:           mins  97283;     Ultrasound:          mins  25227;    Ionto                                   mins   44568  Self Care                            mins   25913  Canalith Repos                   mins  19898    Un-Timed:  Electrical Stimulation:         mins  43003 ( );  Dry Needling          mins self-pay  Traction          mins 71113  Low Eval          Mins  83242  Mod Eval          Mins  00865  High Eval                            Mins  58454  Re-Eval                               mins  33739    Timed Treatment:   40   mins   Total Treatment:     40   mins    Dunia Camacho PT (Schwartz)  Physical Therapist  [place] License # [id number]

## 2021-04-22 ENCOUNTER — TREATMENT (OUTPATIENT)
Dept: PHYSICAL THERAPY | Facility: CLINIC | Age: 81
End: 2021-04-22

## 2021-04-22 DIAGNOSIS — M25.372 LEFT ANKLE INSTABILITY: ICD-10-CM

## 2021-04-22 DIAGNOSIS — M79.671 RIGHT FOOT PAIN: Primary | ICD-10-CM

## 2021-04-22 DIAGNOSIS — R26.2 DIFFICULTY IN WALKING: ICD-10-CM

## 2021-04-22 PROCEDURE — 97530 THERAPEUTIC ACTIVITIES: CPT | Performed by: PHYSICAL THERAPIST

## 2021-04-22 PROCEDURE — 97140 MANUAL THERAPY 1/> REGIONS: CPT | Performed by: PHYSICAL THERAPIST

## 2021-04-22 PROCEDURE — 97110 THERAPEUTIC EXERCISES: CPT | Performed by: PHYSICAL THERAPIST

## 2021-04-22 NOTE — PROGRESS NOTES
Physical Therapy Daily Progress Note    VISIT#: 9    Subjective   Alen Ratliff reports that since Monday night he has had a tight pressure sensation over the left ankle. He states it feels like when you tape too tight around the ankle.  Pain Ratin    Objective     See Exercise, Manual, and Modality Logs for complete treatment.     Patient Education: swelling and ROM, ways to reduce swelling and stiffness, HEP    Assessment & Plan     Assessment  Assessment details: Patient presents to therapy today with a slight increase in swelling into the left ankle and foot. Patient demonstrated increase in hypomobility of the left talocrural which did respond well to joint mobilization technique. Patient and PT discussed importance of increasing HEP and stretches if foot/ankle feels stiff.        Progress per Plan of Care and Progress strengthening /stabilization /functional activity          Timed:         Manual Therapy:    13     mins  36812;     Therapeutic Exercise:    10     mins  45933;     Neuromuscular Rachel:        mins  23513;    Therapeutic Activity:     17     mins  85227;     Gait Training:           mins  97573;     Ultrasound:          mins  15014;    Ionto                                 mins   23919  Self Care                            mins   01518  Canalith Repos                   mins  43223    Un-Timed:  Electrical Stimulation:         mins  97094 ( );  Dry Needling          mins self-pay  Traction          mins 69903  Low Eval          Mins  44815  Mod Eval          Mins  59622  High Eval                            Mins  98042  Re-Eval                               mins  92727    Timed Treatment:   40   mins   Total Treatment:     40   mins    Dunia Camacho PT  Physical Therapist  [place] License # [id number]

## 2021-04-26 ENCOUNTER — TREATMENT (OUTPATIENT)
Dept: PHYSICAL THERAPY | Facility: CLINIC | Age: 81
End: 2021-04-26

## 2021-04-26 DIAGNOSIS — R26.2 DIFFICULTY IN WALKING: ICD-10-CM

## 2021-04-26 DIAGNOSIS — M79.671 RIGHT FOOT PAIN: Primary | ICD-10-CM

## 2021-04-26 DIAGNOSIS — M25.372 LEFT ANKLE INSTABILITY: ICD-10-CM

## 2021-04-26 PROCEDURE — 97140 MANUAL THERAPY 1/> REGIONS: CPT | Performed by: PHYSICAL THERAPIST

## 2021-04-26 PROCEDURE — 97110 THERAPEUTIC EXERCISES: CPT | Performed by: PHYSICAL THERAPIST

## 2021-04-26 PROCEDURE — 97530 THERAPEUTIC ACTIVITIES: CPT | Performed by: PHYSICAL THERAPIST

## 2021-04-26 NOTE — PROGRESS NOTES
Physical Therapy Daily Progress Note    VISIT#: 10    Subjective   Alen Ratliff reports that he feels like his foot/ankle has been loosening up since the last therapy session. He has been walking a lot more which he states seems to help.  Pain Ratin    Objective          Palpation   Left   Hypertonic in the anterior tibialis and posterior tibialis.     Active Range of Motion   Left Ankle/Foot   Dorsiflexion (ke): 0 degrees   Plantar flexion: WFL  Inversion: WFL  Eversion: WFL    Joint Play   Left Ankle/Foot  Hypomobile in the talocrural joint and subtalar joint.     Strength/Myotome Testing     Left Ankle/Foot   Dorsiflexion: 4+  Plantar flexion: 4+  Inversion: 4+  Eversion: 4+        See Exercise, Manual, and Modality Logs for complete treatment.     Patient Education: POC    Assessment & Plan     Assessment  Assessment details: The patient is making good progress in PT at this time, making improvements in strength, ROM, and joint mobility. The patient is now able to stand for longer periods of time and perform ADLs with little to no increase in symptoms. However, the patient continues to demonstrate the following impairments: increased tenderness to palpation, decreased strength, decreased ROM, and decreased joint mobility. Due to these impairments, the patient is unable to walk for long periods of time, ascend/descend stairs, and balance on uneven surfaces without difficulty. The patient would continue to benefit from skilled PT services to address remaining functional limitations and impairments and to improve patient quality of life.       Goals  Plan Goals: Plan Goals: STG's: 2 weeks   Patient will report pain level at worse </= 4/10 for improved tolerance to ADLs and functional mobility-MET  Patient will require <3 tactile or verbal cues for proper mechanics during completion of his HEP-MET    LTG's: By Discharge  Patient will report pain levels at worst </= 2/10 for improved tolerance to ADLs and  functional mobility-MET  Patient will be able to ambulate unlimited distances without an assistive device and no increased pain-NOT MET  Patient will be able to complete single leg stance on stable surface with no UE support, with supervision for durations > 30 seconds on BLE to decrease risk of falls-MET  Patient will report improved levels of overall function as demonstrated by an increase in his reported level of function score on the LEFS to >/= 75/80-NOT MET    Patient will report improvement in their tolerance to sleeping and report waking up </= 1x/night per positional discomfort-MET  Patient will require no tactile or verbal cues for proper mechanics during completion of his HEP for final independence-NOT MET    Plan  Therapy options: will be seen for skilled physical therapy services  Duration in visits: 10  Treatment plan discussed with: patient        Progress per Plan of Care and Progress strengthening /stabilization /functional activity          Timed:         Manual Therapy:    18     mins  69467;     Therapeutic Exercise:    10     mins  86007;     Neuromuscular Rachel:        mins  42922;    Therapeutic Activity:     12     mins  45825;     Gait Training:           mins  76324;     Ultrasound:          mins  19351;    Ionto                                   mins   80987  Self Care                            mins   88242  Canalith Repos                   mins  39336    Un-Timed:  Electrical Stimulation:         mins  02993 ( );  Dry Needling          mins self-pay  Traction          mins 85099  Low Eval          Mins  14288  Mod Eval          Mins  76329  High Eval                            Mins  76045  Re-Eval                               mins  40526    Timed Treatment:   40   mins   Total Treatment:     40   mins    Dunia Camacho PT  Physical Therapist  [place] License # [id number]

## 2021-04-28 ENCOUNTER — OFFICE VISIT (OUTPATIENT)
Dept: PODIATRY | Facility: CLINIC | Age: 81
End: 2021-04-28

## 2021-04-28 VITALS
HEIGHT: 72 IN | SYSTOLIC BLOOD PRESSURE: 144 MMHG | DIASTOLIC BLOOD PRESSURE: 70 MMHG | HEART RATE: 68 BPM | WEIGHT: 206 LBS | BODY MASS INDEX: 27.9 KG/M2

## 2021-04-28 DIAGNOSIS — M79.671 RIGHT FOOT PAIN: Primary | ICD-10-CM

## 2021-04-28 DIAGNOSIS — E11.42 DM TYPE 2 WITH DIABETIC PERIPHERAL NEUROPATHY (HCC): ICD-10-CM

## 2021-04-28 DIAGNOSIS — M25.372 LEFT ANKLE INSTABILITY: ICD-10-CM

## 2021-04-28 PROCEDURE — 99213 OFFICE O/P EST LOW 20 MIN: CPT | Performed by: PODIATRIST

## 2021-04-29 ENCOUNTER — TELEPHONE (OUTPATIENT)
Dept: PHYSICAL THERAPY | Facility: CLINIC | Age: 81
End: 2021-04-29

## 2021-04-29 NOTE — PROGRESS NOTES
"04/28/2021  Foot and Ankle Surgery - Established Patient/Follow-up  Provider: Dr. Alen Vasquez DPM  Location: [facility]    Subjective:  Alen Ratliff is a 80 y.o. male.     Chief Complaint   Patient presents with   • Left Ankle - Follow-up   • Right Foot - Follow-up       HPI: Patient returns for follow-up regarding his right foot and left ankle.  He states that the right foot is substantially better.  He has no significant limitation is able to perform daily activities as needed.  His left ankle has continued to improve.  He has been participating with formal physical therapy and gradually making gains.  He does complain of intermittent numbness and tingling involving the dorsal aspect of the foot and ankle.    Allergies   Allergen Reactions   • Azithromycin Unknown - High Severity       Current Outpatient Medications on File Prior to Visit   Medication Sig Dispense Refill   • amLODIPine (NORVASC) 10 MG tablet Take 1 tablet by mouth Daily. 90 tablet 1   • aspirin 81 MG chewable tablet Chew 1 tablet Daily. 30 tablet 1   • clopidogrel (PLAVIX) 75 MG tablet TAKE 1 TABLET EVERY DAY 90 tablet 1   • fluticasone (FLONASE) 50 MCG/ACT nasal spray 2 sprays into the nostril(s) as directed by provider Daily As Needed.     • glucose blood test strip 1 each by Other route Daily. Use as instructed     • Januvia 50 MG tablet TAKE 1 TABLET EVERY DAY 90 tablet 1   • Jardiance 10 MG tablet Take 10 mg by mouth Daily. 90 tablet 1   • levothyroxine (SYNTHROID, LEVOTHROID) 75 MCG tablet TAKE 1 TABLET EVERY DAY 90 tablet 1   • losartan (COZAAR) 50 MG tablet TAKE 1 TABLET EVERY DAY 90 tablet 1   • pantoprazole (PROTONIX) 40 MG EC tablet TAKE 1 TABLET EVERY DAY 90 tablet 1   • rosuvastatin (CRESTOR) 40 MG tablet Take 1 tablet by mouth Daily. 90 tablet 1     No current facility-administered medications on file prior to visit.       Objective   /70   Pulse 68   Ht 182.9 cm (72\")   Wt 93.4 kg (206 lb)   BMI 27.94 " kg/m²     General:   Appearance: appears stated age and healthy    Orientation: AAOx3    Affect: appropriate    Gait: Mildly antalgic  VASCULAR        Foot Vascularity   Normal vascular exam    Dorsalis pedis:  2+  Posterior tibial:  2+  Skin Temperature: warm    Edema Gradin+  CFT:  < 3 seconds  Pedal Hair Growth:  Present      NEUROLOGIC      Foot Neurologic   Light touch sensation:  Normal  Hot/cold sensation: normal    Achilles reflex:  2+      MUSCULOSKELETAL       Foot Musculoskeletal   Ecchymosis:  None  Tenderness: Resolved  Arch:  Normal  Range of motion has significantly improved.  Minimal discomfort with palpation involving the anterior lateral aspect of the ankle.      DERMATOLOGIC      Foot Dermatologic   Skin: No open wounds or signs of infection    Assessment/Plan   Diagnoses and all orders for this visit:    1. Right foot pain (Primary)    2. Left ankle instability    3. 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  The right foot is substantially better.  He has noticed improvement with the left ankle but does complain of new issues of numbness and tingling at times.  He states that these are intermittently noticed.  Given that the range of motion has significantly improved, I explained that this could be secondary to continued improvement at the level of the ankle or it could be subject to his diabetes.  His most recent A1c was 6.8%.  He is well controlled and I do not see any other significant concerning features; however, I have asked that he continue his stretching and manual therapy exercises.  We did discuss the importance of glycemic control and daily foot checks.  I would like to see him in 6 weeks for reevaluation.    No orders of the defined types were placed in this encounter.         Note is dictated utilizing voice recognition software. Unfortunately this leads to occasional typographical errors. I apologize  in advance if the situation occurs. If questions occur please do not hesitate to call our office.

## 2021-05-04 ENCOUNTER — TREATMENT (OUTPATIENT)
Dept: PHYSICAL THERAPY | Facility: CLINIC | Age: 81
End: 2021-05-04

## 2021-05-04 DIAGNOSIS — R26.2 DIFFICULTY IN WALKING: ICD-10-CM

## 2021-05-04 DIAGNOSIS — M79.671 RIGHT FOOT PAIN: Primary | ICD-10-CM

## 2021-05-04 DIAGNOSIS — M25.372 LEFT ANKLE INSTABILITY: ICD-10-CM

## 2021-05-04 PROCEDURE — 97530 THERAPEUTIC ACTIVITIES: CPT | Performed by: PHYSICAL THERAPIST

## 2021-05-04 PROCEDURE — 97110 THERAPEUTIC EXERCISES: CPT | Performed by: PHYSICAL THERAPIST

## 2021-05-04 PROCEDURE — 97140 MANUAL THERAPY 1/> REGIONS: CPT | Performed by: PHYSICAL THERAPIST

## 2021-05-04 NOTE — PROGRESS NOTES
Physical Therapy Daily Progress Note/Discharge Summary    VISIT#: 11    Subjective   Alen Ratliff reports that last week he had a vertigo attack and could not make it in to PT. The ankle is feeling stiff but he hasn't had any pain. He states it doesn't feel uneven or unstable.  Pain Ratin    Objective     See Exercise, Manual, and Modality Logs for complete treatment.     Patient Education: discharge summary, HEP    Assessment & Plan     Assessment  Assessment details: The patient made excellent progress in PT, making improvements in strength, ROM, joint mobility, and pain reduction. He is now able to stand and walk for long periods of time and ascend/descend stairs without an increase in symptoms. He is being discharged today due to meeting all therapy and personal goals and completion of current PT program. Patient was given extensive HEP for continued self care.    Goals  Plan Goals: STG's: 2 weeks   Patient will report pain level at worse </= 4/10 for improved tolerance to ADLs and functional mobility-MET  Patient will require <3 tactile or verbal cues for proper mechanics during completion of his HEP-MET    LTG's: By Discharge  Patient will report pain levels at worst </= 2/10 for improved tolerance to ADLs and functional mobility-MET  Patient will be able to ambulate unlimited distances without an assistive device and no increased pain-MET  Patient will be able to complete single leg stance on stable surface with no UE support, with supervision for durations > 30 seconds on BLE to decrease risk of falls-MET  Patient will report improved levels of overall function as demonstrated by an increase in his reported level of function score on the LEFS to >/= 75/80-MET    Patient will report improvement in their tolerance to sleeping and report waking up </= 1x/night per positional discomfort-MET  Patient will require no tactile or verbal cues for proper mechanics during completion of his HEP for final  independence-MET    Plan  Plan details: Discharge with HEP        Other: D/C with HEP        Timed:         Manual Therapy:    18     mins  04945;     Therapeutic Exercise:    10     mins  10468;     Neuromuscular Rachel:        mins  96534;    Therapeutic Activity:     13     mins  32301;     Gait Training:           mins  89356;     Ultrasound:          mins  98523;    Ionto                                   mins   07298  Self Care                            mins   12032  Canalith Repos                   mins  78198    Un-Timed:  Electrical Stimulation:         mins  40190 ( );  Dry Needling          mins self-pay  Traction          mins 98844  Low Eval         Mins  10278  Mod Eval          Mins  08447  High Eval                            Mins  33947  Re-Eval                               mins  61844    Timed Treatment:   41   mins   Total Treatment:     41   mins    Dunia Camacho PT  Physical Therapist  [place] License # [id number]

## 2021-05-06 ENCOUNTER — TELEPHONE (OUTPATIENT)
Dept: FAMILY MEDICINE CLINIC | Facility: CLINIC | Age: 81
End: 2021-05-06

## 2021-05-06 DIAGNOSIS — I10 ESSENTIAL HYPERTENSION: ICD-10-CM

## 2021-05-06 DIAGNOSIS — E11.9 TYPE 2 DIABETES MELLITUS WITHOUT COMPLICATION, WITHOUT LONG-TERM CURRENT USE OF INSULIN (HCC): ICD-10-CM

## 2021-05-06 RX ORDER — CLOPIDOGREL BISULFATE 75 MG/1
75 TABLET ORAL DAILY
Qty: 90 TABLET | Refills: 1 | Status: SHIPPED | OUTPATIENT
Start: 2021-05-06 | End: 2021-07-21

## 2021-05-06 RX ORDER — LOSARTAN POTASSIUM 50 MG/1
50 TABLET ORAL DAILY
Qty: 90 TABLET | Refills: 1 | Status: SHIPPED | OUTPATIENT
Start: 2021-05-06 | End: 2021-09-24 | Stop reason: SDUPTHER

## 2021-05-06 RX ORDER — ATORVASTATIN CALCIUM 40 MG/1
40 TABLET, FILM COATED ORAL DAILY
Qty: 90 TABLET | Refills: 1 | Status: SHIPPED | OUTPATIENT
Start: 2021-05-06 | End: 2021-10-21

## 2021-05-06 RX ORDER — PANTOPRAZOLE SODIUM 40 MG/1
40 TABLET, DELAYED RELEASE ORAL DAILY
Qty: 90 TABLET | Refills: 1 | Status: SHIPPED | OUTPATIENT
Start: 2021-05-06 | End: 2021-12-09

## 2021-05-06 RX ORDER — LEVOTHYROXINE SODIUM 0.07 MG/1
75 TABLET ORAL DAILY
Qty: 90 TABLET | Refills: 1 | Status: SHIPPED | OUTPATIENT
Start: 2021-05-06 | End: 2021-09-24

## 2021-05-06 RX ORDER — AMLODIPINE BESYLATE 10 MG/1
10 TABLET ORAL DAILY
Qty: 90 TABLET | Refills: 1 | Status: SHIPPED | OUTPATIENT
Start: 2021-05-06 | End: 2021-09-24 | Stop reason: SDUPTHER

## 2021-05-06 RX ORDER — EMPAGLIFLOZIN 10 MG/1
1 TABLET, FILM COATED ORAL DAILY
Qty: 90 TABLET | Refills: 1 | Status: SHIPPED | OUTPATIENT
Start: 2021-05-06 | End: 2021-12-09

## 2021-05-06 NOTE — TELEPHONE ENCOUNTER
If he has side effects to rosuvastatin, which is his new cholesterol medicine I recommend to simply go back on the previous cholesterol medicine which was atorvastatin.  I sent a new prescription to his pharmacy.  Thank you.

## 2021-05-06 NOTE — TELEPHONE ENCOUNTER
Caller: Elba Ratliff    Relationship: Emergency Contact    Best call back number: [phone]    Who are you requesting to speak with (clinical staff, provider,  specific staff member): NURSE      What was the call regarding: PATIENT STATES THAT HE HAD A REACTION TO THE NEW CHOLESTEROL MEDICATION (SEVERE HEADACHE AND DIZZINESS). PATIENT WOULD LIKE TO GO BACK TO THE OLD MEDICATION ATORVASTATIN 40 MG OR SOMETHING DIFFERENT IF THE DOCTOR THINKS SOMETHING ELSE WOULD BE BETTER. PLEASE CALL AND ADVISE.     Do you require a callback: YES    CONFIRMED PHARMACY:  [facility] - [place], [place] - [address] - [phone]  - [phone]   [phone]

## 2021-05-06 NOTE — TELEPHONE ENCOUNTER
Wife notified. Asking if the rest of his meds can be sent to [facility], this is their new mail order pharmacy

## 2021-06-09 ENCOUNTER — OFFICE VISIT (OUTPATIENT)
Dept: PODIATRY | Facility: CLINIC | Age: 81
End: 2021-06-09

## 2021-06-09 VITALS — HEART RATE: 66 BPM | SYSTOLIC BLOOD PRESSURE: 152 MMHG | DIASTOLIC BLOOD PRESSURE: 75 MMHG

## 2021-06-09 DIAGNOSIS — M25.372 LEFT ANKLE INSTABILITY: Primary | ICD-10-CM

## 2021-06-09 DIAGNOSIS — E11.42 DM TYPE 2 WITH DIABETIC PERIPHERAL NEUROPATHY (HCC): ICD-10-CM

## 2021-06-09 PROCEDURE — 99213 OFFICE O/P EST LOW 20 MIN: CPT | Performed by: PODIATRIST

## 2021-06-09 NOTE — TELEPHONE ENCOUNTER
Caller: Elba Ratliff    Relationship: Emergency Contact    Best call back number:[phone]    Medication needed:   Requested Prescriptions     Pending Prescriptions Disp Refills   • glucose blood test strip       Si each by Other route Daily. Use as instructed       When do you need the refill by: 21    What additional details did the patient provide when requesting the medication: ONE TOUCH ULTRA     Does the patient have less than a 3 day supply:  [] Yes  [x] No    What is the patient's preferred pharmacy: [facility] - [place], [place] - [address] - [phone] [facility] [phone] FX

## 2021-06-10 NOTE — PROGRESS NOTES
2021  Foot and Ankle Surgery - Established Patient/Follow-up  Provider: Dr. Alen Vasquez DPM  Location: [facility]    Subjective:  Alen Ratliff is a 80 y.o. male.     Chief Complaint   Patient presents with   • Right Foot - Follow-up   • Left Ankle - Follow-up       HPI: Patient returns for evaluation of his ankle pain.  He states that he is doing substantially better.  He denies any additional issues or concerns.  He has been performing at home exercises on a routine basis.      Allergies   Allergen Reactions   • Azithromycin Unknown - High Severity       Current Outpatient Medications on File Prior to Visit   Medication Sig Dispense Refill   • amLODIPine (NORVASC) 10 MG tablet Take 1 tablet by mouth Daily. 90 tablet 1   • aspirin 81 MG chewable tablet Chew 1 tablet Daily. 30 tablet 1   • atorvastatin (Lipitor) 40 MG tablet Take 1 tablet by mouth Daily. 90 tablet 1   • clopidogrel (PLAVIX) 75 MG tablet Take 1 tablet by mouth Daily. 90 tablet 1   • fluticasone (FLONASE) 50 MCG/ACT nasal spray 2 sprays into the nostril(s) as directed by provider Daily As Needed.     • Jardiance 10 MG tablet Take 10 mg by mouth Daily. 90 tablet 1   • levothyroxine (SYNTHROID, LEVOTHROID) 75 MCG tablet Take 1 tablet by mouth Daily. 90 tablet 1   • losartan (COZAAR) 50 MG tablet Take 1 tablet by mouth Daily. 90 tablet 1   • pantoprazole (PROTONIX) 40 MG EC tablet Take 1 tablet by mouth Daily. 90 tablet 1   • SITagliptin (Januvia) 50 MG tablet Take 1 tablet by mouth Daily. 90 tablet 1     No current facility-administered medications on file prior to visit.       Objective   /75   Pulse 66     General:   Appearance: appears stated age and healthy    Orientation: AAOx3    Affect: appropriate    Gait: Mildly antalgic  VASCULAR        Foot Vascularity   Normal vascular exam    Dorsalis pedis:  2+  Posterior tibial:  2+  Skin Temperature: warm    Edema Gradin+  CFT:  < 3 seconds  Pedal Hair  Growth:  Present      NEUROLOGIC      Foot Neurologic   Light touch sensation:  Normal  Hot/cold sensation: normal    Achilles reflex:  2+      MUSCULOSKELETAL       Foot Musculoskeletal   Ecchymosis:  None  Tenderness: Resolved  Arch:  Normal  Range of motion has significantly improved.  Minimal discomfort with palpation involving the anterior lateral aspect of the ankle.      DERMATOLOGIC      Foot Dermatologic   Skin: No open wounds or signs of infection    Assessment/Plan   Diagnoses and all orders for this visit:    1. Left ankle instability (Primary)    2. 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  He continues to have mild discomfort involving the ankle regions but overall is doing quite well.  He has noticed improvement with the stretching and manual therapy exercises.  He continues to have mild numbness involving the left ankle region.  I have asked that he continue the exercises as discussed.  I would also like him to obtain a pair of over-the-counter arch supports with metatarsal pad for forefoot balancing.  I do think that this will help with his daily activities.  He is to call with any new issues or concerns.  Patient has opted to see me on an as-needed basis at this time.      No orders of the defined types were placed in this encounter.         Note is dictated utilizing voice recognition software. Unfortunately this leads to occasional typographical errors. I apologize in advance if the situation occurs. If questions occur please do not hesitate to call our office.

## 2021-07-07 ENCOUNTER — APPOINTMENT (OUTPATIENT)
Dept: VASCULAR SURGERY | Facility: HOSPITAL | Age: 81
End: 2021-07-07

## 2021-07-07 ENCOUNTER — APPOINTMENT (OUTPATIENT)
Dept: CARDIOLOGY | Facility: HOSPITAL | Age: 81
End: 2021-07-07

## 2021-07-20 ENCOUNTER — HOSPITAL ENCOUNTER (OUTPATIENT)
Dept: CARDIOLOGY | Facility: HOSPITAL | Age: 81
Discharge: HOME | End: 2021-07-20

## 2021-07-20 ENCOUNTER — APPOINTMENT (OUTPATIENT)
Dept: VASCULAR SURGERY | Facility: HOSPITAL | Age: 81
End: 2021-07-20

## 2021-07-20 DIAGNOSIS — I65.23 BILATERAL CAROTID ARTERY OCCLUSION: ICD-10-CM

## 2021-07-20 LAB
BH CV XLRA MEAS LEFT BULB EDV: -14.7 CM/SEC
BH CV XLRA MEAS LEFT BULB PSV: -102 CM/SEC
BH CV XLRA MEAS LEFT CCA RATIO VEL: 133 CM/SEC
BH CV XLRA MEAS LEFT DIST CCA EDV: 17.3 CM/SEC
BH CV XLRA MEAS LEFT DIST CCA PSV: 74.5 CM/SEC
BH CV XLRA MEAS LEFT DIST ICA EDV: -21.3 CM/SEC
BH CV XLRA MEAS LEFT DIST ICA PSV: -69.5 CM/SEC
BH CV XLRA MEAS LEFT ICA RATIO VEL: -80.7 CM/SEC
BH CV XLRA MEAS LEFT ICA/CCA RATIO: -0.61
BH CV XLRA MEAS LEFT PROX CCA EDV: 21.7 CM/SEC
BH CV XLRA MEAS LEFT PROX CCA PSV: 133 CM/SEC
BH CV XLRA MEAS LEFT PROX ECA PSV: -155 CM/SEC
BH CV XLRA MEAS LEFT PROX ICA EDV: -18 CM/SEC
BH CV XLRA MEAS LEFT PROX ICA PSV: -80.7 CM/SEC
BH CV XLRA MEAS LEFT PROX SCLA PSV: 415 CM/SEC
BH CV XLRA MEAS LEFT VERTEBRAL A PSV: -65.1 CM/SEC
BH CV XLRA MEAS RIGHT BULB EDV: -10.4 CM/SEC
BH CV XLRA MEAS RIGHT BULB PSV: -51 CM/SEC
BH CV XLRA MEAS RIGHT CCA RATIO VEL: 101 CM/SEC
BH CV XLRA MEAS RIGHT DIST CCA EDV: 14.7 CM/SEC
BH CV XLRA MEAS RIGHT DIST CCA PSV: 97 CM/SEC
BH CV XLRA MEAS RIGHT DIST ICA EDV: -27.5 CM/SEC
BH CV XLRA MEAS RIGHT DIST ICA PSV: -87.8 CM/SEC
BH CV XLRA MEAS RIGHT ICA RATIO VEL: -87.8 CM/SEC
BH CV XLRA MEAS RIGHT ICA/CCA RATIO: -0.87
BH CV XLRA MEAS RIGHT PROX CCA EDV: 18.2 CM/SEC
BH CV XLRA MEAS RIGHT PROX CCA PSV: 101 CM/SEC
BH CV XLRA MEAS RIGHT PROX ECA PSV: -106 CM/SEC
BH CV XLRA MEAS RIGHT PROX ICA EDV: -24.7 CM/SEC
BH CV XLRA MEAS RIGHT PROX ICA PSV: -82.3 CM/SEC
BH CV XLRA MEAS RIGHT PROX SCLA PSV: 232 CM/SEC
BH CV XLRA MEAS RIGHT VERTEBRAL A PSV: -47.7 CM/SEC
LEFT ARM BP: NORMAL MMHG
MAXIMAL PREDICTED HEART RATE: 139 BPM
RIGHT ARM BP: NORMAL MMHG
STRESS TARGET HR: 118 BPM

## 2021-07-20 PROCEDURE — G0463 HOSPITAL OUTPT CLINIC VISIT: HCPCS

## 2021-07-20 PROCEDURE — 93880 EXTRACRANIAL BILAT STUDY: CPT

## 2021-07-20 NOTE — PROGRESS NOTES
Date of Office Visit: 2021  Encounter Provider: Dr. Steve Muhammad  Place of Service: [facility] [place]  Patient Name: Alen Ratliff  :1940  Serenity Wren MD    Chief Complaint   Patient presents with   • Heart Murmur     4 month follow up   • Stroke   • Hypertension   • Hyperlipidemia   • Diabetes     History of Present Illness    I am pleased to see Mr. Rao in my office today as a follow-up.    As you know, patient is 80-year-old white gentleman whose past medical history is significant for hypertension, hyperlipidemia, diabetes mellitus, history of cardiac murmur, who came today for follow-up.    In 2020, patient was seen in my office for symptom of shortness of breath and relative bradycardia.  Patient also had symptom of occasional chest pain.  Patient underwent stress test which showed moderate inferior/septal fixed defect with small amount of joseph-infarct ischemia.  Echocardiogram showed normal left ventricular size and function and LVEF was 55 to 60%.  Mild LVH was noted.  Calcified aortic valve was noted with mild to moderate aortic valve stenosis with aortic valve area of 1.3 cm².  Holter monitor showed sinus bradycardia with episode of relative bradycardia.  No significant pause or heart rate less than 40 was noted.    In 2020, patient was admitted at [facility] with slurring of speech and possible TIA.  Neurological work-up showed high-grade stenosis of right carotid artery.  Patient underwent endarterectomy.  Postoperatively patient had atrial fibrillation with controlled heart rate.  Patient spontaneously cardioverted.  Patient was started on aspirin and Plavix.  Patient was discharged on event monitor.  Event monitor showed predominantly sinus rhythm.  Patient had average heart rate of 60 bpm.  No significant atrial fibrillation burden noted.    Since the previous visit, patient is overall doing well.  Patient denies any symptom  of chest pain or tightness or heaviness.  No orthopnea PND no syncope or presyncope.  No leg edema noted.    EKG showed sinus rhythm with first-degree AV block and heart rate of 58 bpm    At present patient is doing well.  Blood pressure is very well controlled.  No evidence of atrial fibrillation.  Continue aspirin.  Blood pressure is controlled.  Patient is advised to increase aerobic activity and blood pressure monitoring is recommended.  Patient has mild aortic stenosis.  Repeat echocardiogram in 1 year.        Past Medical History:   Diagnosis Date   • Allergic rhinitis    • Diabetes (CMS/HCC)    • GERD (gastroesophageal reflux disease)    • Heart murmur    • Hyperlipidemia    • Hypertension    • Hypothyroidism    • Prostate cancer (CMS/HCC)    • Stroke (CMS/HCC)          Past Surgical History:   Procedure Laterality Date   • ANKLE OPEN REDUCTION INTERNAL FIXATION Left 12/16/2020    Procedure: ANKLE OPEN REDUCTION INTERNAL FIXATION;  Surgeon: CAROLE Vasquez DPM;  Location: [facility] OR;  Service: Podiatry;  Laterality: Left;   • BACK SURGERY  1994   • CAROTID ENDARTERECTOMY Right 12/14/2020    Procedure: CAROTID ENDARTERECTOMY;  Surgeon: Toby Fung MD;  Location: [facility] OR;  Service: Vascular;  Laterality: Right;   • COLONOSCOPY  08/25/2015   • PROSTATECTOMY  2001    due to cancer           Current Outpatient Medications:   •  amLODIPine (NORVASC) 10 MG tablet, Take 1 tablet by mouth Daily., Disp: 90 tablet, Rfl: 1  •  aspirin 81 MG chewable tablet, Chew 1 tablet Daily., Disp: 30 tablet, Rfl: 1  •  atorvastatin (Lipitor) 40 MG tablet, Take 1 tablet by mouth Daily., Disp: 90 tablet, Rfl: 1  •  fluticasone (FLONASE) 50 MCG/ACT nasal spray, 2 sprays into the nostril(s) as directed by provider Daily As Needed., Disp: , Rfl:   •  glucose blood test strip, 1 each by Other route Daily. Dg: E11.9, Disp: 100 each, Rfl: 1  •  Jardiance 10 MG tablet, Take 10 mg by mouth Daily., Disp: 90 tablet, Rfl:  1  •  levothyroxine (SYNTHROID, LEVOTHROID) 75 MCG tablet, Take 1 tablet by mouth Daily., Disp: 90 tablet, Rfl: 1  •  losartan (COZAAR) 50 MG tablet, Take 1 tablet by mouth Daily., Disp: 90 tablet, Rfl: 1  •  pantoprazole (PROTONIX) 40 MG EC tablet, Take 1 tablet by mouth Daily., Disp: 90 tablet, Rfl: 1  •  SITagliptin (Januvia) 50 MG tablet, Take 1 tablet by mouth Daily., Disp: 90 tablet, Rfl: 1      Social History     Socioeconomic History   • Marital status:      Spouse name: Not on file   • Number of children: Not on file   • Years of education: Not on file   • Highest education level: Not on file   Tobacco Use   • Smoking status: Never Smoker   • Smokeless tobacco: Never Used   Vaping Use   • Vaping Use: Never used   Substance and Sexual Activity   • Alcohol use: Not Currently   • Drug use: Never   • Sexual activity: Defer         Review of Systems   Constitutional: Negative for chills and fever.   HENT: Negative for ear discharge and nosebleeds.    Eyes: Negative for discharge and redness.   Cardiovascular: Negative for chest pain, orthopnea, palpitations, paroxysmal nocturnal dyspnea and syncope.   Respiratory: Negative for cough, shortness of breath and wheezing.    Endocrine: Negative for heat intolerance.   Skin: Negative for rash.   Musculoskeletal: Positive for arthritis and joint pain. Negative for myalgias.   Gastrointestinal: Negative for abdominal pain, melena, nausea and vomiting.   Genitourinary: Negative for dysuria and hematuria.   Neurological: Negative for dizziness, light-headedness, numbness and tremors.   Psychiatric/Behavioral: Negative for depression. The patient is not nervous/anxious.        Procedures      ECG 12 Lead    Date/Time: 7/21/2021 3:55 PM  Performed by: Steve Muhammad MD  Authorized by: Steve Muhammad MD   Comparison: compared with previous ECG   Similar to previous ECG  Rhythm: sinus rhythm and sinus bradycardia  Conduction: 1st degree AV block    Clinical impression:  "abnormal EKG            ECG 12 Lead    (Results Pending)           Objective:    /72   Pulse 58   Ht 182.9 cm (72.01\")   Wt 94.8 kg (209 lb)   BMI 28.34 kg/m²         Constitutional:       Appearance: Well-developed.   Eyes:      General: No scleral icterus.        Right eye: No discharge.   HENT:      Head: Normocephalic and atraumatic.   Neck:      Thyroid: No thyromegaly.      Lymphadenopathy: No cervical adenopathy.   Pulmonary:      Effort: Pulmonary effort is normal. No respiratory distress.      Breath sounds: Normal breath sounds. No wheezing. No rales.   Cardiovascular:      Normal rate. Regular rhythm.      No gallop.   Abdominal:      Tenderness: There is no abdominal tenderness.   Skin:     Findings: No erythema or rash.   Neurological:      Mental Status: Alert and oriented to person, place, and time.             Assessment:       Diagnosis Plan   1. Heart murmur  ECG 12 Lead   2. Cerebrovascular accident (CVA), unspecified mechanism (CMS/HCC)  ECG 12 Lead   3. Essential hypertension  ECG 12 Lead   4. Mixed hyperlipidemia  ECG 12 Lead   5. Type 2 diabetes mellitus without complication, without long-term current use of insulin (CMS/HCC)  ECG 12 Lead   6. SSS (sick sinus syndrome) (CMS/HCC)  ECG 12 Lead            Plan:       MDM:    1.  Aortic stenosis:    I would repeat echocardiogram in 1 to year.    2.  Hypertension:    Blood pressure is very well controlled    3.  Sinus bradycardia:    I suspect sick sinus syndrome.  Continue to observe    4.  Hyperlipidemia:    Patient is on Lipitor.  Repeat lipid panel in future  "

## 2021-07-21 ENCOUNTER — OFFICE VISIT (OUTPATIENT)
Dept: CARDIOLOGY | Facility: CLINIC | Age: 81
End: 2021-07-21

## 2021-07-21 ENCOUNTER — TELEPHONE (OUTPATIENT)
Dept: CARDIOLOGY | Facility: CLINIC | Age: 81
End: 2021-07-21

## 2021-07-21 VITALS
HEART RATE: 58 BPM | HEIGHT: 72 IN | BODY MASS INDEX: 28.31 KG/M2 | SYSTOLIC BLOOD PRESSURE: 134 MMHG | WEIGHT: 209 LBS | DIASTOLIC BLOOD PRESSURE: 72 MMHG

## 2021-07-21 DIAGNOSIS — E11.9 TYPE 2 DIABETES MELLITUS WITHOUT COMPLICATION, WITHOUT LONG-TERM CURRENT USE OF INSULIN (HCC): ICD-10-CM

## 2021-07-21 DIAGNOSIS — I49.5 SSS (SICK SINUS SYNDROME) (HCC): ICD-10-CM

## 2021-07-21 DIAGNOSIS — E78.2 MIXED HYPERLIPIDEMIA: ICD-10-CM

## 2021-07-21 DIAGNOSIS — I63.9 CEREBROVASCULAR ACCIDENT (CVA), UNSPECIFIED MECHANISM (HCC): ICD-10-CM

## 2021-07-21 DIAGNOSIS — I10 ESSENTIAL HYPERTENSION: ICD-10-CM

## 2021-07-21 DIAGNOSIS — R01.1 HEART MURMUR: Primary | ICD-10-CM

## 2021-07-21 PROCEDURE — 99214 OFFICE O/P EST MOD 30 MIN: CPT | Performed by: INTERNAL MEDICINE

## 2021-07-21 PROCEDURE — 93000 ELECTROCARDIOGRAM COMPLETE: CPT | Performed by: INTERNAL MEDICINE

## 2021-07-21 NOTE — TELEPHONE ENCOUNTER
Patient was told to call back with a medication he had stopped and the reason why. The medication was Plavix in April was when Dr. Fung's nurse had told him it was okay since he was on an aspirin and another blood thinner he no longer needed to take the Plavix.

## 2021-09-14 ENCOUNTER — LAB (OUTPATIENT)
Dept: FAMILY MEDICINE CLINIC | Facility: CLINIC | Age: 81
End: 2021-09-14

## 2021-09-14 ENCOUNTER — TELEPHONE (OUTPATIENT)
Dept: FAMILY MEDICINE CLINIC | Facility: CLINIC | Age: 81
End: 2021-09-14

## 2021-09-14 DIAGNOSIS — E78.2 MIXED HYPERLIPIDEMIA: ICD-10-CM

## 2021-09-14 DIAGNOSIS — E11.9 TYPE 2 DIABETES MELLITUS WITHOUT COMPLICATION, WITHOUT LONG-TERM CURRENT USE OF INSULIN (HCC): ICD-10-CM

## 2021-09-14 DIAGNOSIS — E03.9 ACQUIRED HYPOTHYROIDISM: ICD-10-CM

## 2021-09-14 DIAGNOSIS — I10 ESSENTIAL HYPERTENSION: Primary | ICD-10-CM

## 2021-09-14 LAB
ALBUMIN SERPL-MCNC: 4.3 G/DL (ref 3.5–5.2)
ALBUMIN UR-MCNC: 11.1 MG/DL
ALBUMIN/GLOB SERPL: 1.6 G/DL
ALP SERPL-CCNC: 62 U/L (ref 39–117)
ALT SERPL W P-5'-P-CCNC: 25 U/L (ref 1–41)
ANION GAP SERPL CALCULATED.3IONS-SCNC: 13 MMOL/L (ref 5–15)
AST SERPL-CCNC: 21 U/L (ref 1–40)
BASOPHILS # BLD AUTO: 0.06 10*3/MM3 (ref 0–0.2)
BASOPHILS NFR BLD AUTO: 0.9 % (ref 0–1.5)
BILIRUB SERPL-MCNC: 0.6 MG/DL (ref 0–1.2)
BUN SERPL-MCNC: 24 MG/DL (ref 8–23)
BUN/CREAT SERPL: 18.5 (ref 7–25)
CALCIUM SPEC-SCNC: 9.5 MG/DL (ref 8.6–10.5)
CHLORIDE SERPL-SCNC: 106 MMOL/L (ref 98–107)
CHOLEST SERPL-MCNC: 135 MG/DL (ref 0–200)
CO2 SERPL-SCNC: 24 MMOL/L (ref 22–29)
CREAT SERPL-MCNC: 1.3 MG/DL (ref 0.76–1.27)
CREAT UR-MCNC: 109.7 MG/DL
DEPRECATED RDW RBC AUTO: 44.4 FL (ref 37–54)
EOSINOPHIL # BLD AUTO: 0.1 10*3/MM3 (ref 0–0.4)
EOSINOPHIL NFR BLD AUTO: 1.5 % (ref 0.3–6.2)
ERYTHROCYTE [DISTWIDTH] IN BLOOD BY AUTOMATED COUNT: 13.2 % (ref 12.3–15.4)
GFR SERPL CREATININE-BSD FRML MDRD: 53 ML/MIN/1.73
GLOBULIN UR ELPH-MCNC: 2.7 GM/DL
GLUCOSE SERPL-MCNC: 135 MG/DL (ref 65–99)
HBA1C MFR BLD: 7.3 % (ref 3.5–5.6)
HCT VFR BLD AUTO: 43.4 % (ref 37.5–51)
HDLC SERPL-MCNC: 37 MG/DL (ref 40–60)
HGB BLD-MCNC: 14.5 G/DL (ref 13–17.7)
IMM GRANULOCYTES # BLD AUTO: 0.02 10*3/MM3 (ref 0–0.05)
IMM GRANULOCYTES NFR BLD AUTO: 0.3 % (ref 0–0.5)
LDLC SERPL CALC-MCNC: 78 MG/DL (ref 0–100)
LDLC/HDLC SERPL: 2.05 {RATIO}
LYMPHOCYTES # BLD AUTO: 1.93 10*3/MM3 (ref 0.7–3.1)
LYMPHOCYTES NFR BLD AUTO: 28.2 % (ref 19.6–45.3)
MCH RBC QN AUTO: 30.4 PG (ref 26.6–33)
MCHC RBC AUTO-ENTMCNC: 33.4 G/DL (ref 31.5–35.7)
MCV RBC AUTO: 91 FL (ref 79–97)
MICROALBUMIN/CREAT UR: 101.2 MG/G
MONOCYTES # BLD AUTO: 0.74 10*3/MM3 (ref 0.1–0.9)
MONOCYTES NFR BLD AUTO: 10.8 % (ref 5–12)
NEUTROPHILS NFR BLD AUTO: 3.99 10*3/MM3 (ref 1.7–7)
NEUTROPHILS NFR BLD AUTO: 58.3 % (ref 42.7–76)
NRBC BLD AUTO-RTO: 0 /100 WBC (ref 0–0.2)
PLATELET # BLD AUTO: 218 10*3/MM3 (ref 140–450)
PMV BLD AUTO: 11.1 FL (ref 6–12)
POTASSIUM SERPL-SCNC: 3.8 MMOL/L (ref 3.5–5.2)
PROT SERPL-MCNC: 7 G/DL (ref 6–8.5)
RBC # BLD AUTO: 4.77 10*6/MM3 (ref 4.14–5.8)
SODIUM SERPL-SCNC: 143 MMOL/L (ref 136–145)
TRIGL SERPL-MCNC: 110 MG/DL (ref 0–150)
TSH SERPL DL<=0.05 MIU/L-ACNC: 5.96 UIU/ML (ref 0.27–4.2)
VLDLC SERPL-MCNC: 20 MG/DL (ref 5–40)
WBC # BLD AUTO: 6.84 10*3/MM3 (ref 3.4–10.8)

## 2021-09-14 PROCEDURE — 82043 UR ALBUMIN QUANTITATIVE: CPT | Performed by: FAMILY MEDICINE

## 2021-09-14 PROCEDURE — 36415 COLL VENOUS BLD VENIPUNCTURE: CPT | Performed by: FAMILY MEDICINE

## 2021-09-14 PROCEDURE — 80053 COMPREHEN METABOLIC PANEL: CPT | Performed by: FAMILY MEDICINE

## 2021-09-14 PROCEDURE — 83036 HEMOGLOBIN GLYCOSYLATED A1C: CPT | Performed by: FAMILY MEDICINE

## 2021-09-14 PROCEDURE — 80061 LIPID PANEL: CPT | Performed by: FAMILY MEDICINE

## 2021-09-14 PROCEDURE — 82570 ASSAY OF URINE CREATININE: CPT | Performed by: FAMILY MEDICINE

## 2021-09-14 PROCEDURE — 85025 COMPLETE CBC W/AUTO DIFF WBC: CPT | Performed by: FAMILY MEDICINE

## 2021-09-14 PROCEDURE — 84443 ASSAY THYROID STIM HORMONE: CPT | Performed by: FAMILY MEDICINE

## 2021-09-21 NOTE — PROGRESS NOTES
Date of Office Visit: 2021  Encounter Provider: Dr. Steve Muhammad  Place of Service: [facility] [place]  Patient Name: Alen Ratliff  :1940  Serenity Wren MD    Chief Complaint   Patient presents with   • Heart Murmur     2 month follow up   • Hypertension   • Hyperlipidemia   • Stroke   • Diabetes     History of Present Illness    I am pleased to see Mr. Rao in my office today as a follow-up.    As you know, patient is 81-year-old white gentleman whose past medical history is significant for hypertension, hyperlipidemia, diabetes mellitus, history of cardiac murmur, who came today for follow-up.    In 2020, patient was seen in my office for symptom of shortness of breath and relative bradycardia.  Patient also had symptom of occasional chest pain.  Patient underwent stress test which showed moderate inferior/septal fixed defect with small amount of joseph-infarct ischemia.  Echocardiogram showed normal left ventricular size and function and LVEF was 55 to 60%.  Mild LVH was noted.  Calcified aortic valve was noted with mild to moderate aortic valve stenosis with aortic valve area of 1.3 cm².  Holter monitor showed sinus bradycardia with episode of relative bradycardia.  No significant pause or heart rate less than 40 was noted.    In 2020, patient was admitted at [facility] with slurring of speech and possible TIA.  Neurological work-up showed high-grade stenosis of right carotid artery.  Patient underwent endarterectomy.  Postoperatively patient had atrial fibrillation with controlled heart rate.  Patient spontaneously cardioverted.  Patient was started on aspirin and Plavix.  Patient was discharged on event monitor.  Event monitor showed predominantly sinus rhythm.  Patient had average heart rate of 60 bpm.  No significant atrial fibrillation burden noted.    This is 2 months follow-up appointment for the patient.  Patient denies any symptom of  angina pectoris.  Patient have mild shortness of breath.  Patient denies any dizziness or lightheadedness.  No blackout.  No syncope or presyncope.  Patient has no orthopnea PND no leg edema.    At this stage, patient is doing fairly well.  At this stage I will continue to observe.  I would repeat echocardiogram next year.  At present heart rate is in 60s.  Patient blood pressure is slightly elevated but I would not aggressively control blood pressure in this gentleman because of risk of fall.  Blood pressure monitoring is recommended at home      Past Medical History:   Diagnosis Date   • Allergic rhinitis    • Diabetes (CMS/HCC)    • GERD (gastroesophageal reflux disease)    • Heart murmur    • Hyperlipidemia    • Hypertension    • Hypothyroidism    • Prostate cancer (CMS/HCC)    • Stroke (CMS/HCC)          Past Surgical History:   Procedure Laterality Date   • ANKLE OPEN REDUCTION INTERNAL FIXATION Left 12/16/2020    Procedure: ANKLE OPEN REDUCTION INTERNAL FIXATION;  Surgeon: CAROLE Vasquez DPM;  Location: [facility];  Service: Podiatry;  Laterality: Left;   • BACK SURGERY  1994   • CAROTID ENDARTERECTOMY Right 12/14/2020    Procedure: CAROTID ENDARTERECTOMY;  Surgeon: Toby Fung MD;  Location: [facility];  Service: Vascular;  Laterality: Right;   • COLONOSCOPY  08/25/2015   • PROSTATECTOMY  2001    due to cancer           Current Outpatient Medications:   •  amLODIPine (NORVASC) 10 MG tablet, Take 1 tablet by mouth Daily., Disp: 90 tablet, Rfl: 1  •  aspirin 81 MG chewable tablet, Chew 1 tablet Daily., Disp: 30 tablet, Rfl: 1  •  atorvastatin (Lipitor) 40 MG tablet, Take 1 tablet by mouth Daily., Disp: 90 tablet, Rfl: 1  •  fluticasone (FLONASE) 50 MCG/ACT nasal spray, 2 sprays into the nostril(s) as directed by provider Daily As Needed., Disp: , Rfl:   •  glucose blood test strip, 1 each by Other route Daily. Dg: E11.9, Disp: 100 each, Rfl: 1  •  Jardiance 10 MG tablet, Take 10 mg by mouth  "Daily., Disp: 90 tablet, Rfl: 1  •  levothyroxine (SYNTHROID, LEVOTHROID) 75 MCG tablet, Take 1 tablet by mouth Daily., Disp: 90 tablet, Rfl: 1  •  losartan (COZAAR) 50 MG tablet, Take 1 tablet by mouth Daily., Disp: 90 tablet, Rfl: 1  •  pantoprazole (PROTONIX) 40 MG EC tablet, Take 1 tablet by mouth Daily., Disp: 90 tablet, Rfl: 1  •  SITagliptin (Januvia) 50 MG tablet, Take 1 tablet by mouth Daily., Disp: 90 tablet, Rfl: 1      Social History     Socioeconomic History   • Marital status:      Spouse name: Not on file   • Number of children: Not on file   • Years of education: Not on file   • Highest education level: Not on file   Tobacco Use   • Smoking status: Never Smoker   • Smokeless tobacco: Never Used   Vaping Use   • Vaping Use: Never used   Substance and Sexual Activity   • Alcohol use: Not Currently   • Drug use: Never   • Sexual activity: Defer         Review of Systems   Constitutional: Negative for chills and fever.   HENT: Negative for ear discharge and nosebleeds.    Eyes: Negative for discharge and redness.   Cardiovascular: Negative for chest pain, orthopnea, palpitations, paroxysmal nocturnal dyspnea and syncope.   Respiratory: Positive for shortness of breath. Negative for cough and wheezing.    Endocrine: Negative for heat intolerance.   Skin: Negative for rash.   Musculoskeletal: Negative for arthritis and myalgias.   Gastrointestinal: Negative for abdominal pain, melena, nausea and vomiting.   Genitourinary: Negative for dysuria and hematuria.   Neurological: Negative for dizziness, light-headedness, numbness and tremors.   Psychiatric/Behavioral: Negative for depression. The patient is not nervous/anxious.        Procedures    Procedures    No orders to display           Objective:    /78   Pulse 64   Ht 182.9 cm (72.01\")   Wt 94.8 kg (209 lb)   BMI 28.34 kg/m²         Constitutional:       Appearance: Well-developed.   Eyes:      General: No scleral icterus.        Right " eye: No discharge.   HENT:      Head: Normocephalic and atraumatic.   Neck:      Thyroid: No thyromegaly.      Lymphadenopathy: No cervical adenopathy.   Pulmonary:      Effort: Pulmonary effort is normal. No respiratory distress.      Breath sounds: Normal breath sounds. No wheezing. No rales.   Cardiovascular:      Normal rate. Regular rhythm.      Murmurs: There is a grade 2/6 systolic murmur.      No gallop.   Abdominal:      Tenderness: There is no abdominal tenderness.   Skin:     Findings: No erythema or rash.   Neurological:      Mental Status: Alert and oriented to person, place, and time.             Assessment:       Diagnosis Plan   1. Heart murmur     2. Cerebrovascular accident (CVA), unspecified mechanism (CMS/[facility])     3. Essential hypertension     4. Mixed hyperlipidemia     5. Type 2 diabetes mellitus without complication, without long-term current use of insulin (CMS/[facility])     6. SSS (sick sinus syndrome) (CMS/[facility])              Plan:       MDM:    1.  Aortic stenosis:    I would recommend that patient should repeat echocardiogram in 1 year    2.  Bradycardia:    Patient at present is asymptomatic.  Continue to observe.  I suspect sick sinus syndrome underlying cause of bradycardia.    3.  Hypertension:    Blood pressure is slightly elevated however I would recommend observation    4.  Diabetes mellitus:    Patient is on Jardiance continue to monitor.  Diet modification discussed    5.  Hyperlipidemia:    Patient is on Lipitor.

## 2021-09-22 ENCOUNTER — OFFICE VISIT (OUTPATIENT)
Dept: CARDIOLOGY | Facility: CLINIC | Age: 81
End: 2021-09-22

## 2021-09-22 VITALS
HEIGHT: 72 IN | HEART RATE: 64 BPM | BODY MASS INDEX: 28.31 KG/M2 | WEIGHT: 209 LBS | DIASTOLIC BLOOD PRESSURE: 78 MMHG | SYSTOLIC BLOOD PRESSURE: 146 MMHG

## 2021-09-22 DIAGNOSIS — E11.9 TYPE 2 DIABETES MELLITUS WITHOUT COMPLICATION, WITHOUT LONG-TERM CURRENT USE OF INSULIN (HCC): ICD-10-CM

## 2021-09-22 DIAGNOSIS — I10 ESSENTIAL HYPERTENSION: ICD-10-CM

## 2021-09-22 DIAGNOSIS — I63.9 CEREBROVASCULAR ACCIDENT (CVA), UNSPECIFIED MECHANISM (HCC): ICD-10-CM

## 2021-09-22 DIAGNOSIS — E78.2 MIXED HYPERLIPIDEMIA: ICD-10-CM

## 2021-09-22 DIAGNOSIS — I49.5 SSS (SICK SINUS SYNDROME) (HCC): ICD-10-CM

## 2021-09-22 DIAGNOSIS — R01.1 HEART MURMUR: Primary | ICD-10-CM

## 2021-09-22 PROCEDURE — 99213 OFFICE O/P EST LOW 20 MIN: CPT | Performed by: INTERNAL MEDICINE

## 2021-09-24 ENCOUNTER — OFFICE VISIT (OUTPATIENT)
Dept: FAMILY MEDICINE CLINIC | Facility: CLINIC | Age: 81
End: 2021-09-24

## 2021-09-24 VITALS
DIASTOLIC BLOOD PRESSURE: 75 MMHG | WEIGHT: 208.6 LBS | BODY MASS INDEX: 28.25 KG/M2 | HEIGHT: 72 IN | TEMPERATURE: 97.3 F | OXYGEN SATURATION: 96 % | SYSTOLIC BLOOD PRESSURE: 136 MMHG | HEART RATE: 56 BPM

## 2021-09-24 DIAGNOSIS — I10 ESSENTIAL HYPERTENSION: ICD-10-CM

## 2021-09-24 DIAGNOSIS — M65.322 TRIGGER INDEX FINGER OF LEFT HAND: ICD-10-CM

## 2021-09-24 DIAGNOSIS — Z00.00 MEDICARE ANNUAL WELLNESS VISIT, SUBSEQUENT: Primary | ICD-10-CM

## 2021-09-24 DIAGNOSIS — E03.9 ACQUIRED HYPOTHYROIDISM: ICD-10-CM

## 2021-09-24 PROCEDURE — 1170F FXNL STATUS ASSESSED: CPT | Performed by: FAMILY MEDICINE

## 2021-09-24 PROCEDURE — G0439 PPPS, SUBSEQ VISIT: HCPCS | Performed by: FAMILY MEDICINE

## 2021-09-24 PROCEDURE — 1159F MED LIST DOCD IN RCRD: CPT | Performed by: FAMILY MEDICINE

## 2021-09-24 RX ORDER — LOSARTAN POTASSIUM 50 MG/1
50 TABLET ORAL DAILY
Qty: 90 TABLET | Refills: 1 | Status: SHIPPED | OUTPATIENT
Start: 2021-09-24 | End: 2022-06-19

## 2021-09-24 RX ORDER — LEVOTHYROXINE SODIUM 88 UG/1
88 TABLET ORAL DAILY
Qty: 90 TABLET | Refills: 1 | Status: SHIPPED | OUTPATIENT
Start: 2021-09-24 | End: 2022-03-10

## 2021-09-24 RX ORDER — AMLODIPINE BESYLATE 10 MG/1
10 TABLET ORAL DAILY
Qty: 90 TABLET | Refills: 1 | Status: SHIPPED | OUTPATIENT
Start: 2021-09-24 | End: 2022-04-11

## 2021-09-24 NOTE — PROGRESS NOTES
Subsequent Medicare Wellness Visit   The ABC's of the Annual Wellness Visit    Chief Complaint   Patient presents with   • Medicare Wellness-subsequent   • Diabetes   • Hypertension   • Hypothyroidism   • Hyperlipidemia   • Med Refill       HPI:  Alen Ratliff, -1940, is a 81 y.o. male who presents for a Subsequent Medicare Wellness Visit.  He reports to be doing overall pretty well, he is being treated for several medical problems including diabetes, hypertension, hyperlipidemia, hypothyroidism, and heart issues.  He recently had fasting blood work done and he would like to review these results.  He had a stroke about a year ago, he has recovered pretty well, he lives with his wife, no depressive symptoms, no recent falls reported. Patient does not report any chest pain, shortness of breath, dizziness, nausea, vomiting, or diarrhea, visual issues, headaches, numbness or tingling. No urinary issues reported like urgency, frequency, or discomfort upon urination.  No significant weight changes reported.  No swelling reported.  No rashes or any other skin issues reported. No emotional issues or insomnia.      Recent Hospitalizations:  No hospitalization(s) within the last year..    Current Medical Providers:  Patient Care Team:  Serenity Wren MD as PCP - General (Family Medicine)  Steve Muhammad MD as Consulting Physician (Cardiology)  CAROLE Vasquez DPM as Consulting Physician (Podiatry)  Toby Fung MD as Consulting Physician (Vascular Surgery)    Health Habits and Functional and Cognitive Screening and Depression Screening:  Functional & Cognitive Status 2021   Do you have difficulty preparing food and eating? No   Do you have difficulty bathing yourself, getting dressed or grooming yourself? No   Do you have difficulty using the toilet? No   Do you have difficulty moving around from place to place? No   Do you have trouble with steps or getting out of a bed or a chair? No   Current  Diet Diabetic Diet   Dental Exam Not up to date   Eye Exam Not up to date   Exercise (times per week) 0 times per week   Current Exercises Include No Regular Exercise   Do you need help using the phone?  No   Are you deaf or do you have serious difficulty hearing?  No   Do you need help with transportation? No   Do you need help shopping? No   Do you need help preparing meals?  No   Do you need help with housework?  No   Do you need help with laundry? No   Do you need help taking your medications? No   Do you need help managing money? No   Do you ever drive or ride in a car without wearing a seat belt? No   Have you felt unusual stress, anger or loneliness in the last month? No   Who do you live with? Spouse   If you need help, do you have trouble finding someone available to you? No   Have you been bothered in the last four weeks by sexual problems? No   Do you have difficulty concentrating, remembering or making decisions? No       Compared to one year ago, the patient feels his physical health is the same and his mental health is the same.    Depression Screen:  PHQ-2/PHQ-9 Depression Screening 9/24/2021   Little interest or pleasure in doing things 0   Feeling down, depressed, or hopeless 0   Trouble falling or staying asleep, or sleeping too much -   Feeling tired or having little energy -   Poor appetite or overeating -   Feeling bad about yourself - or that you are a failure or have let yourself or your family down -   Trouble concentrating on things, such as reading the newspaper or watching television -   Moving or speaking so slowly that other people could have noticed. Or the opposite - being so fidgety or restless that you have been moving around a lot more than usual -   Thoughts that you would be better off dead, or of hurting yourself in some way -   Total Score 0   If you checked off any problems, how difficult have these problems made it for you to do your work, take care of things at home, or get  along with other people? -         Past Medical/Family/Social History:  The following portions of the patient's history were reviewed and updated as appropriate: allergies, current medications, past family history, past medical history, past social history, past surgical history and problem list.    Allergies   Allergen Reactions   • Azithromycin Unknown - High Severity         Current Outpatient Medications:   •  amLODIPine (NORVASC) 10 MG tablet, Take 1 tablet by mouth Daily., Disp: 90 tablet, Rfl: 1  •  aspirin 81 MG chewable tablet, Chew 1 tablet Daily., Disp: 30 tablet, Rfl: 1  •  atorvastatin (Lipitor) 40 MG tablet, Take 1 tablet by mouth Daily., Disp: 90 tablet, Rfl: 1  •  fluticasone (FLONASE) 50 MCG/ACT nasal spray, 2 sprays into the nostril(s) as directed by provider Daily As Needed., Disp: , Rfl:   •  glucose blood test strip, 1 each by Other route Daily. Dg: E11.9, Disp: 100 each, Rfl: 1  •  Jardiance 10 MG tablet, Take 10 mg by mouth Daily., Disp: 90 tablet, Rfl: 1  •  levothyroxine (Synthroid) 88 MCG tablet, Take 1 tablet by mouth Daily., Disp: 90 tablet, Rfl: 1  •  losartan (COZAAR) 50 MG tablet, Take 1 tablet by mouth Daily., Disp: 90 tablet, Rfl: 1  •  pantoprazole (PROTONIX) 40 MG EC tablet, Take 1 tablet by mouth Daily., Disp: 90 tablet, Rfl: 1  •  SITagliptin (Januvia) 50 MG tablet, Take 1 tablet by mouth Daily., Disp: 90 tablet, Rfl: 1    Aspirin use counseling: Taking ASA appropriately as indicated    Current medication list contains no high risk medications. Some potential harmful drug interactions identified. Plan of action: monitoring    Family History   Problem Relation Age of Onset   • Hypertension Other    • Heart attack Mother    • Heart disease Mother    • Heart attack Father    • Heart disease Father        Social History     Tobacco Use   • Smoking status: Never Smoker   • Smokeless tobacco: Never Used   Substance Use Topics   • Alcohol use: Not Currently       Past Surgical History:  "  Procedure Laterality Date   • ANKLE OPEN REDUCTION INTERNAL FIXATION Left 12/16/2020    Procedure: ANKLE OPEN REDUCTION INTERNAL FIXATION;  Surgeon: CAROLE Vasquez DPM;  Location: [facility] MAIN OR;  Service: Podiatry;  Laterality: Left;   • BACK SURGERY  1994   • CAROTID ENDARTERECTOMY Right 12/14/2020    Procedure: CAROTID ENDARTERECTOMY;  Surgeon: Toby Fung MD;  Location: [facility] MAIN OR;  Service: Vascular;  Laterality: Right;   • COLONOSCOPY  08/25/2015   • PROSTATECTOMY  2001    due to cancer       Patient Active Problem List   Diagnosis   • Heart murmur   • Stroke (cerebrum) (CMS/[facility])   • Essential hypertension   • Mixed hyperlipidemia   • Acquired hypothyroidism   • Type 2 diabetes mellitus, without long-term current use of insulin (CMS/[facility])   • GERD (gastroesophageal reflux disease)   • Seasonal allergic rhinitis due to pollen   • CKD (chronic kidney disease) stage 3, GFR 30-59 ml/min (CMS/[facility])   • History of prostate cancer   • TIA (transient ischemic attack)   • Symptomatic carotid artery stenosis, bilateral   • Closed displaced fracture of lateral malleolus of left fibula   • SSS (sick sinus syndrome) (CMS/[facility])   • Epistaxis   • Medicare annual wellness visit, subsequent   • Trigger index finger of left hand       Review of Systems   Constitutional: Negative for activity change, fatigue and fever.   Respiratory: Negative for cough, shortness of breath and wheezing.    Cardiovascular: Negative for chest pain, palpitations and leg swelling.   Gastrointestinal: Negative for constipation, diarrhea and indigestion.   Skin: Negative for color change, dry skin and rash.   Neurological: Negative for tremors and headache.       Objective     Vitals:    09/24/21 0926   BP: 136/75   BP Location: Left arm   Patient Position: Sitting   Cuff Size: Adult   Pulse: 56   Temp: 97.3 °F (36.3 °C)   SpO2: 96%   Weight: 94.6 kg (208 lb 9.6 oz)   Height: 182.9 cm (72.01\")   PainSc: 0-No pain       Patient's Body " mass index is 28.28 kg/m². indicating that he is obese (BMI >30). Obesity-related health conditions include the following: hypertension, diabetes mellitus, dyslipidemias, GERD and osteoarthritis. Obesity is unchanged. BMI is is above average; BMI management plan is completed. We discussed portion control and increasing exercise..      No exam data present    The patient has potential evidence of mild cognitive impairment. 2/3 items were correctly remembered at 5 minutes.    Physical Exam  Vitals and nursing note reviewed.   Constitutional:       General: He is not in acute distress.     Appearance: Normal appearance. He is well-developed. He is not ill-appearing.   HENT:      Head: Normocephalic and atraumatic.   Cardiovascular:      Rate and Rhythm: Normal rate and regular rhythm.      Heart sounds: Normal heart sounds. No murmur heard.   No gallop.    Pulmonary:      Effort: Pulmonary effort is normal. No respiratory distress.      Breath sounds: Normal breath sounds. No wheezing, rhonchi or rales.   Chest:      Chest wall: No tenderness.   Musculoskeletal:      Cervical back: Normal range of motion and neck supple.   Neurological:      General: No focal deficit present.      Mental Status: He is alert and oriented to person, place, and time. Mental status is at baseline.   Psychiatric:         Mood and Affect: Mood normal.         Recent Lab Results:     Lab Results   Component Value Date    CHOL 135 09/14/2021    TRIG 110 09/14/2021    HDL 37 (L) 09/14/2021    VLDL 20 09/14/2021    LDLHDL 2.05 09/14/2021       Assessment/Plan   Age-appropriate Screening Schedule:  Refer to the list below for future screening recommendations based on patient's age, sex and/or medical conditions.      Health Maintenance   Topic Date Due   • DIABETIC EYE EXAM  Never done   • INFLUENZA VACCINE  10/01/2021   • HEMOGLOBIN A1C  03/14/2022   • LIPID PANEL  09/14/2022   • URINE MICROALBUMIN  09/14/2022   • TDAP/TD VACCINES (2 - Td or  Tdap) 10/29/2029   • ZOSTER VACCINE  Completed       Medicare Risks and Personalized Health Plan:  Advance Directive Discussion  Breast Cancer/Mammogram Screening  Cardiovascular risk  Colon Cancer Screening  Dementia/Memory   Depression/Dysphoria  Diabetic Lab Screening   Fall Risk  Immunizations Discussed/Encouraged (specific immunizations; Influenza and Pneumococcal 23 )  Obesity/Overweight       CMS-Preventive Services Quick Reference  Medicare Preventive Services Addressed:  Annual Wellness Visit (AWV)  Bone Density Measurements  Cardiovascular Disease Screening Tests (may do this order every 5 years in beneficiaries without signs or symptoms of cardiovascular disease)  Colorectal Cancer Screening, Colonoscopy  Prostate Cancer Screening     Advance Care Planning:  ACP discussion was held with the patient during this visit. Patient has an advance directive (not in EMR), copy requested.    Diagnoses and all orders for this visit:    1. Medicare annual wellness visit, subsequent (Primary)    2. Essential hypertension  -     losartan (COZAAR) 50 MG tablet; Take 1 tablet by mouth Daily.  Dispense: 90 tablet; Refill: 1  -     amLODIPine (NORVASC) 10 MG tablet; Take 1 tablet by mouth Daily.  Dispense: 90 tablet; Refill: 1    3. Acquired hypothyroidism  -     levothyroxine (Synthroid) 88 MCG tablet; Take 1 tablet by mouth Daily.  Dispense: 90 tablet; Refill: 1    4. Trigger index finger of left hand  -     Ambulatory Referral to Orthopedic Surgery      Medicare wellness exam was done today.  I reviewed his medical problems and his medications.  I also reviewed and discussed his recent labs.  His TSH is elevated and his thyroid is not well controlled.  I will be increasing his levothyroxine from 75 mcg to 88 mcg.  I also reviewed his health maintenance.  His last colonoscopy was in 8/2018.  He is fully vaccinated against COVID-19.  He also completed his Shingrix vaccination series.  He will need Pneumovax, but we are  currently out of this vaccination and he will contact us down the road or he can check with his pharmacy.  Healthy lifestyle was reinforced.  Fall prevention was stressed.    An After Visit Summary and PPPS with all of these plans were given to the patient.      Follow Up:  Return in about 6 months (around 3/24/2022) for Next scheduled follow up.        Requested Prescriptions     Signed Prescriptions Disp Refills   • levothyroxine (Synthroid) 88 MCG tablet 90 tablet 1     Sig: Take 1 tablet by mouth Daily.   • losartan (COZAAR) 50 MG tablet 90 tablet 1     Sig: Take 1 tablet by mouth Daily.   • amLODIPine (NORVASC) 10 MG tablet 90 tablet 1     Sig: Take 1 tablet by mouth Daily.

## 2021-10-04 ENCOUNTER — TRANSCRIBE ORDERS (OUTPATIENT)
Dept: ADMINISTRATIVE | Facility: HOSPITAL | Age: 81
End: 2021-10-04

## 2021-10-04 DIAGNOSIS — I65.23 BILATERAL CAROTID ARTERY OCCLUSION: Primary | ICD-10-CM

## 2021-10-21 RX ORDER — ATORVASTATIN CALCIUM 40 MG/1
TABLET, FILM COATED ORAL
Qty: 90 TABLET | Refills: 1 | Status: SHIPPED | OUTPATIENT
Start: 2021-10-21 | End: 2022-06-19

## 2021-12-09 DIAGNOSIS — E11.9 TYPE 2 DIABETES MELLITUS WITHOUT COMPLICATION, WITHOUT LONG-TERM CURRENT USE OF INSULIN (HCC): ICD-10-CM

## 2021-12-09 RX ORDER — EMPAGLIFLOZIN 10 MG/1
TABLET, FILM COATED ORAL
Qty: 90 TABLET | Refills: 1 | Status: SHIPPED | OUTPATIENT
Start: 2021-12-09 | End: 2022-06-19

## 2021-12-09 RX ORDER — PANTOPRAZOLE SODIUM 40 MG/1
TABLET, DELAYED RELEASE ORAL
Qty: 90 TABLET | Refills: 1 | Status: SHIPPED | OUTPATIENT
Start: 2021-12-09 | End: 2022-04-11

## 2021-12-27 RX ORDER — SITAGLIPTIN 50 MG/1
TABLET, FILM COATED ORAL
Qty: 90 TABLET | Refills: 0 | Status: SHIPPED | OUTPATIENT
Start: 2021-12-27 | End: 2022-03-10

## 2022-01-20 ENCOUNTER — APPOINTMENT (OUTPATIENT)
Dept: VASCULAR SURGERY | Facility: HOSPITAL | Age: 82
End: 2022-01-20

## 2022-01-20 ENCOUNTER — HOSPITAL ENCOUNTER (OUTPATIENT)
Dept: CARDIOLOGY | Facility: HOSPITAL | Age: 82
Discharge: HOME | End: 2022-01-20

## 2022-01-20 DIAGNOSIS — I65.23 BILATERAL CAROTID ARTERY OCCLUSION: ICD-10-CM

## 2022-01-20 LAB
BH CV XLRA MEAS LEFT BULB PSV: 93.2 CM/SEC
BH CV XLRA MEAS LEFT CCA RATIO VEL: 85.5 CM/SEC
BH CV XLRA MEAS LEFT DIST CCA EDV: 16.1 CM/SEC
BH CV XLRA MEAS LEFT DIST CCA PSV: 85.5 CM/SEC
BH CV XLRA MEAS LEFT DIST ICA EDV: -11 CM/SEC
BH CV XLRA MEAS LEFT DIST ICA PSV: -65.4 CM/SEC
BH CV XLRA MEAS LEFT ICA RATIO VEL: -93.9 CM/SEC
BH CV XLRA MEAS LEFT ICA/CCA RATIO: -1.1
BH CV XLRA MEAS LEFT PROX CCA EDV: 9.8 CM/SEC
BH CV XLRA MEAS LEFT PROX CCA PSV: 82 CM/SEC
BH CV XLRA MEAS LEFT PROX ECA PSV: -133 CM/SEC
BH CV XLRA MEAS LEFT PROX ICA EDV: -21.7 CM/SEC
BH CV XLRA MEAS LEFT PROX ICA PSV: -93.9 CM/SEC
BH CV XLRA MEAS LEFT PROX SCLA PSV: 210 CM/SEC
BH CV XLRA MEAS LEFT VERTEBRAL A PSV: -60.2 CM/SEC
BH CV XLRA MEAS RIGHT BULB PSV: -54.7 CM/SEC
BH CV XLRA MEAS RIGHT CCA RATIO VEL: 85.1 CM/SEC
BH CV XLRA MEAS RIGHT DIST CCA EDV: 11.2 CM/SEC
BH CV XLRA MEAS RIGHT DIST CCA PSV: 85.1 CM/SEC
BH CV XLRA MEAS RIGHT DIST ICA EDV: -18.4 CM/SEC
BH CV XLRA MEAS RIGHT DIST ICA PSV: -89.1 CM/SEC
BH CV XLRA MEAS RIGHT ICA RATIO VEL: -93.8 CM/SEC
BH CV XLRA MEAS RIGHT ICA/CCA RATIO: -1.1
BH CV XLRA MEAS RIGHT PROX CCA EDV: 16.8 CM/SEC
BH CV XLRA MEAS RIGHT PROX CCA PSV: 85.1 CM/SEC
BH CV XLRA MEAS RIGHT PROX ECA PSV: -87 CM/SEC
BH CV XLRA MEAS RIGHT PROX ICA EDV: -13 CM/SEC
BH CV XLRA MEAS RIGHT PROX ICA PSV: -93.8 CM/SEC
BH CV XLRA MEAS RIGHT PROX SCLA PSV: 263 CM/SEC
BH CV XLRA MEAS RIGHT VERTEBRAL A PSV: -46.5 CM/SEC
LEFT ARM BP: NORMAL MMHG
MAXIMAL PREDICTED HEART RATE: 138 BPM
RIGHT ARM BP: NORMAL MMHG
STRESS TARGET HR: 117 BPM

## 2022-01-20 PROCEDURE — G0463 HOSPITAL OUTPT CLINIC VISIT: HCPCS

## 2022-01-20 PROCEDURE — 93880 EXTRACRANIAL BILAT STUDY: CPT

## 2022-03-10 DIAGNOSIS — E03.9 ACQUIRED HYPOTHYROIDISM: ICD-10-CM

## 2022-03-10 RX ORDER — LEVOTHYROXINE SODIUM 88 MCG
TABLET ORAL
Qty: 90 TABLET | Refills: 0 | Status: SHIPPED | OUTPATIENT
Start: 2022-03-10 | End: 2022-06-08

## 2022-03-10 RX ORDER — SITAGLIPTIN 50 MG/1
TABLET, FILM COATED ORAL
Qty: 90 TABLET | Refills: 0 | Status: SHIPPED | OUTPATIENT
Start: 2022-03-10 | End: 2022-06-19

## 2022-03-30 ENCOUNTER — TELEPHONE (OUTPATIENT)
Dept: FAMILY MEDICINE CLINIC | Facility: CLINIC | Age: 82
End: 2022-03-30

## 2022-03-30 DIAGNOSIS — E03.9 ACQUIRED HYPOTHYROIDISM: ICD-10-CM

## 2022-03-30 DIAGNOSIS — E78.2 MIXED HYPERLIPIDEMIA: Primary | ICD-10-CM

## 2022-03-30 DIAGNOSIS — E11.9 TYPE 2 DIABETES MELLITUS WITHOUT COMPLICATION, WITHOUT LONG-TERM CURRENT USE OF INSULIN: ICD-10-CM

## 2022-03-31 ENCOUNTER — LAB (OUTPATIENT)
Dept: FAMILY MEDICINE CLINIC | Facility: CLINIC | Age: 82
End: 2022-03-31

## 2022-03-31 LAB
ALBUMIN SERPL-MCNC: 4.4 G/DL (ref 3.5–5.2)
ALBUMIN/GLOB SERPL: 1.8 G/DL
ALP SERPL-CCNC: 54 U/L (ref 39–117)
ALT SERPL W P-5'-P-CCNC: 24 U/L (ref 1–41)
ANION GAP SERPL CALCULATED.3IONS-SCNC: 9.7 MMOL/L (ref 5–15)
AST SERPL-CCNC: 28 U/L (ref 1–40)
BILIRUB SERPL-MCNC: 0.7 MG/DL (ref 0–1.2)
BUN SERPL-MCNC: 26 MG/DL (ref 8–23)
BUN/CREAT SERPL: 19.3 (ref 7–25)
CALCIUM SPEC-SCNC: 9.4 MG/DL (ref 8.6–10.5)
CHLORIDE SERPL-SCNC: 108 MMOL/L (ref 98–107)
CHOLEST SERPL-MCNC: 129 MG/DL (ref 0–200)
CO2 SERPL-SCNC: 24.3 MMOL/L (ref 22–29)
CREAT SERPL-MCNC: 1.35 MG/DL (ref 0.76–1.27)
EGFRCR SERPLBLD CKD-EPI 2021: 52.4 ML/MIN/1.73
GLOBULIN UR ELPH-MCNC: 2.4 GM/DL
GLUCOSE SERPL-MCNC: 122 MG/DL (ref 65–99)
HBA1C MFR BLD: 6.6 % (ref 3.5–5.6)
HDLC SERPL-MCNC: 40 MG/DL (ref 40–60)
LDLC SERPL CALC-MCNC: 72 MG/DL (ref 0–100)
LDLC/HDLC SERPL: 1.8 {RATIO}
POTASSIUM SERPL-SCNC: 4.1 MMOL/L (ref 3.5–5.2)
PROT SERPL-MCNC: 6.8 G/DL (ref 6–8.5)
SODIUM SERPL-SCNC: 142 MMOL/L (ref 136–145)
T4 FREE SERPL-MCNC: 1.3 NG/DL (ref 0.93–1.7)
TRIGL SERPL-MCNC: 85 MG/DL (ref 0–150)
TSH SERPL DL<=0.05 MIU/L-ACNC: 3.59 UIU/ML (ref 0.27–4.2)
VLDLC SERPL-MCNC: 17 MG/DL (ref 5–40)

## 2022-03-31 PROCEDURE — 84443 ASSAY THYROID STIM HORMONE: CPT | Performed by: FAMILY MEDICINE

## 2022-03-31 PROCEDURE — 36415 COLL VENOUS BLD VENIPUNCTURE: CPT | Performed by: FAMILY MEDICINE

## 2022-03-31 PROCEDURE — 83036 HEMOGLOBIN GLYCOSYLATED A1C: CPT | Performed by: FAMILY MEDICINE

## 2022-03-31 PROCEDURE — 80061 LIPID PANEL: CPT | Performed by: FAMILY MEDICINE

## 2022-03-31 PROCEDURE — 84439 ASSAY OF FREE THYROXINE: CPT | Performed by: FAMILY MEDICINE

## 2022-03-31 PROCEDURE — 80053 COMPREHEN METABOLIC PANEL: CPT | Performed by: FAMILY MEDICINE

## 2022-04-06 ENCOUNTER — OFFICE VISIT (OUTPATIENT)
Dept: FAMILY MEDICINE CLINIC | Facility: CLINIC | Age: 82
End: 2022-04-06

## 2022-04-06 VITALS
HEART RATE: 96 BPM | HEIGHT: 72 IN | RESPIRATION RATE: 16 BRPM | TEMPERATURE: 98.2 F | SYSTOLIC BLOOD PRESSURE: 131 MMHG | WEIGHT: 205.6 LBS | OXYGEN SATURATION: 98 % | BODY MASS INDEX: 27.85 KG/M2 | DIASTOLIC BLOOD PRESSURE: 65 MMHG

## 2022-04-06 DIAGNOSIS — N18.31 STAGE 3A CHRONIC KIDNEY DISEASE: ICD-10-CM

## 2022-04-06 DIAGNOSIS — E11.42 DM TYPE 2 WITH DIABETIC PERIPHERAL NEUROPATHY: ICD-10-CM

## 2022-04-06 DIAGNOSIS — Z00.00 MEDICARE ANNUAL WELLNESS VISIT, SUBSEQUENT: Primary | ICD-10-CM

## 2022-04-06 DIAGNOSIS — Z12.11 COLON CANCER SCREENING: ICD-10-CM

## 2022-04-06 PROBLEM — I49.5 SSS (SICK SINUS SYNDROME): Status: RESOLVED | Noted: 2020-12-23 | Resolved: 2022-04-06

## 2022-04-06 PROCEDURE — 1170F FXNL STATUS ASSESSED: CPT | Performed by: FAMILY MEDICINE

## 2022-04-06 PROCEDURE — 1126F AMNT PAIN NOTED NONE PRSNT: CPT | Performed by: FAMILY MEDICINE

## 2022-04-06 PROCEDURE — G0439 PPPS, SUBSEQ VISIT: HCPCS | Performed by: FAMILY MEDICINE

## 2022-04-06 PROCEDURE — 1159F MED LIST DOCD IN RCRD: CPT | Performed by: FAMILY MEDICINE

## 2022-04-06 NOTE — PROGRESS NOTES
Subsequent Medicare Wellness Visit   The ABC's of the Annual Wellness Visit    Chief Complaint   Patient presents with   • Medicare Wellness-subsequent       HPI:  Alen Ratliff, -1940, is a 81 y.o. male who presents for a Subsequent Medicare Wellness Visit.  He reports to be doing well and he denies any new issues or concerns.  He lives with his wife, he is fully independent with his activities of daily living.  No significant memory problems reported.  No depression reported.  He is followed by cardiologist.  He is also being treated for several medical issues including diabetes, hypertension, hyperlipidemia, and hypothyroidism.  He recently had blood work done and these results are being discussed and reviewed today.    Recent Hospitalizations:  No hospitalization(s) within the last year..    Current Medical Providers:  Patient Care Team:  Serenity Wren MD as PCP - General (Family Medicine)  Steve Muhammad MD as Consulting Physician (Cardiology)  CAROLE Vasquez DPM as Consulting Physician (Podiatry)  Toby Fung MD as Consulting Physician (Vascular Surgery)    Health Habits and Functional and Cognitive Screening and Depression Screening:  Functional & Cognitive Status 2022   Do you have difficulty preparing food and eating? No   Do you have difficulty bathing yourself, getting dressed or grooming yourself? No   Do you have difficulty using the toilet? No   Do you have difficulty moving around from place to place? No   Do you have trouble with steps or getting out of a bed or a chair? No   Current Diet Diabetic Diet   Dental Exam Up to date   Eye Exam Up to date   Exercise (times per week) 0 times per week   Current Exercises Include No Regular Exercise   Do you need help using the phone?  No   Are you deaf or do you have serious difficulty hearing?  No   Do you need help with transportation? No   Do you need help shopping? No   Do you need help preparing meals?  No   Do you need  help with housework?  No   Do you need help with laundry? No   Do you need help taking your medications? No   Do you need help managing money? No   Do you ever drive or ride in a car without wearing a seat belt? No   Have you felt unusual stress, anger or loneliness in the last month? No   Who do you live with? Spouse   If you need help, do you have trouble finding someone available to you? No   Have you been bothered in the last four weeks by sexual problems? -   Do you have difficulty concentrating, remembering or making decisions? No       Compared to one year ago, the patient feels his physical health is the same and his mental health is the same.    Depression Screen:  PHQ-2/PHQ-9 Depression Screening 4/6/2022   Retired PHQ-9 Total Score -   Retired Total Score -   Little Interest or Pleasure in Doing Things 0-->not at all   Feeling Down, Depressed or Hopeless 0-->not at all   PHQ-9: Brief Depression Severity Measure Score 0         Past Medical/Family/Social History:  The following portions of the patient's history were reviewed and updated as appropriate: allergies, current medications, past family history, past medical history, past social history, past surgical history and problem list.    Allergies   Allergen Reactions   • Azithromycin Unknown - High Severity         Current Outpatient Medications:   •  amLODIPine (NORVASC) 10 MG tablet, Take 1 tablet by mouth Daily., Disp: 90 tablet, Rfl: 1  •  aspirin 81 MG chewable tablet, Chew 1 tablet Daily., Disp: 30 tablet, Rfl: 1  •  atorvastatin (LIPITOR) 40 MG tablet, TAKE 1 TABLET DAILY, Disp: 90 tablet, Rfl: 1  •  fluticasone (FLONASE) 50 MCG/ACT nasal spray, 2 sprays into the nostril(s) as directed by provider Daily As Needed., Disp: , Rfl:   •  glucose blood test strip, 1 each by Other route Daily. Dg: E11.9, Disp: 100 each, Rfl: 1  •  Januvia 50 MG tablet, TAKE 1 TABLET DAILY, Disp: 90 tablet, Rfl: 0  •  Jardiance 10 MG tablet tablet, TAKE 1 TABLET DAILY,  Disp: 90 tablet, Rfl: 1  •  losartan (COZAAR) 50 MG tablet, Take 1 tablet by mouth Daily., Disp: 90 tablet, Rfl: 1  •  pantoprazole (PROTONIX) 40 MG EC tablet, TAKE 1 TABLET DAILY, Disp: 90 tablet, Rfl: 1  •  Synthroid 88 MCG tablet, TAKE 1 TABLET DAILY, Disp: 90 tablet, Rfl: 0    Aspirin use counseling: Taking ASA appropriately as indicated    Current medication list contains no high risk medications.  No harmful drug interactions have been identified.     Family History   Problem Relation Age of Onset   • Hypertension Other    • Heart attack Mother    • Heart disease Mother    • Heart attack Father    • Heart disease Father        Social History     Tobacco Use   • Smoking status: Never Smoker   • Smokeless tobacco: Never Used   Substance Use Topics   • Alcohol use: Not Currently       Past Surgical History:   Procedure Laterality Date   • ANKLE OPEN REDUCTION INTERNAL FIXATION Left 12/16/2020    Procedure: ANKLE OPEN REDUCTION INTERNAL FIXATION;  Surgeon: CAROLE Vasquez DPM;  Location: [facility];  Service: Podiatry;  Laterality: Left;   • BACK SURGERY  1994   • CAROTID ENDARTERECTOMY Right 12/14/2020    Procedure: CAROTID ENDARTERECTOMY;  Surgeon: Toby Fung MD;  Location: [facility] OR;  Service: Vascular;  Laterality: Right;   • COLONOSCOPY  08/25/2015   • PROSTATECTOMY  2001    due to cancer       Patient Active Problem List   Diagnosis   • Heart murmur   • Stroke (cerebrum) ([facility])   • Essential hypertension   • Mixed hyperlipidemia   • Acquired hypothyroidism   • Type 2 diabetes mellitus, without long-term current use of insulin ([facility])   • GERD (gastroesophageal reflux disease)   • Seasonal allergic rhinitis due to pollen   • CKD (chronic kidney disease) stage 3, GFR 30-59 ml/min ([facility])   • History of prostate cancer   • TIA (transient ischemic attack)   • Symptomatic carotid artery stenosis, bilateral   • Closed displaced fracture of lateral malleolus of left fibula   • Epistaxis   • Medicare  "annual wellness visit, subsequent   • Trigger index finger of left hand   • Colon cancer screening   • DM type 2 with diabetic peripheral neuropathy (HCC)       Review of Systems   Constitutional: Negative for activity change, fatigue and fever.   Respiratory: Negative for cough, shortness of breath and wheezing.    Cardiovascular: Negative for chest pain, palpitations and leg swelling.   Gastrointestinal: Negative for constipation, diarrhea and indigestion.   Skin: Negative for color change, dry skin and rash.   Neurological: Negative for tremors and headache.       Objective     Vitals:    04/06/22 1017 04/06/22 1028   BP: 131/65    BP Location: Left arm    Patient Position: Sitting    Cuff Size: Adult    Pulse: 96    Resp: 16    Temp: 98.2 °F (36.8 °C)    TempSrc: Temporal    SpO2: (!) 58% 98%   Weight: 93.3 kg (205 lb 9.6 oz)    Height: 182.9 cm (72.01\")    PainSc: 0-No pain        Patient's Body mass index is 27.88 kg/m². indicating that he is obese (BMI >30). Obesity-related health conditions include the following: hypertension, diabetes mellitus and dyslipidemias. Obesity is unchanged. BMI is is above average; BMI management plan is completed. We discussed portion control and increasing exercise..      No exam data present    The patient has no evidence of cognitve impairment.     Physical Exam  Vitals and nursing note reviewed.   Constitutional:       General: He is not in acute distress.     Appearance: Normal appearance. He is well-developed. He is not ill-appearing.   HENT:      Head: Normocephalic and atraumatic.   Cardiovascular:      Rate and Rhythm: Normal rate and regular rhythm.      Heart sounds: Murmur heard.     No gallop.   Pulmonary:      Effort: Pulmonary effort is normal. No respiratory distress.      Breath sounds: Normal breath sounds. No wheezing, rhonchi or rales.   Chest:      Chest wall: No tenderness.   Musculoskeletal:      Cervical back: Normal range of motion and neck supple. "   Neurological:      General: No focal deficit present.      Mental Status: He is alert and oriented to person, place, and time. Mental status is at baseline.   Psychiatric:         Mood and Affect: Mood normal.         Recent Lab Results:     Lab Results   Component Value Date    CHOL 129 03/31/2022    TRIG 85 03/31/2022    HDL 40 03/31/2022    VLDL 17 03/31/2022    LDLHDL 1.80 03/31/2022       Assessment/Plan   Age-appropriate Screening Schedule:  Refer to the list below for future screening recommendations based on patient's age, sex and/or medical conditions.      Health Maintenance   Topic Date Due   • DIABETIC EYE EXAM  Never done   • INFLUENZA VACCINE  08/01/2022   • URINE MICROALBUMIN  09/14/2022   • HEMOGLOBIN A1C  09/30/2022   • LIPID PANEL  03/31/2023   • TDAP/TD VACCINES (2 - Td or Tdap) 10/29/2029   • ZOSTER VACCINE  Completed       Medicare Risks and Personalized Health Plan:  Advance Directive Discussion  Breast Cancer/Mammogram Screening  Cardiovascular risk  Chronic Pain   Colon Cancer Screening  Dementia/Memory   Depression/Dysphoria  Diabetic Lab Screening   Prostate Cancer Screening       CMS-Preventive Services Quick Reference  Medicare Preventive Services Addressed:  Annual Wellness Visit (AWV)  Bone Density Measurements  Cardiovascular Disease Screening Tests (may do this order every 5 years in beneficiaries without signs or symptoms of cardiovascular disease)  Colorectal Cancer Screening, Colonoscopy  Prostate Cancer Screening     Advance Care Planning:  ACP discussion was held with the patient during this visit. Patient has an advance directive (not in EMR), copy requested.    Diagnoses and all orders for this visit:    1. Medicare annual wellness visit, subsequent (Primary)    2. DM type 2 with diabetic peripheral neuropathy (HCC)    3. Stage 3a chronic kidney disease (HCC)    4. Colon cancer screening  -     Ambulatory Referral For Screening Colonoscopy    Medicare wellness exam was done  today.  I reviewed his medical problems and his medications.  We also reviewed and discussed his recent labs.  His chronic medical problems are stable and well-controlled.  His last colonoscopy was in 2015 and he tells me that he was at that time advised to schedule a 3-year follow-up colonoscopy.  A referral was given and he will be scheduling that.  He is vaccinated against COVID-19 including the booster shot.  Healthy lifestyle was reinforced.    An After Visit Summary and PPPS with all of these plans were given to the patient.      Follow Up:  Return in about 6 months (around 10/6/2022) for Next scheduled follow up.        Requested Prescriptions      No prescriptions requested or ordered in this encounter

## 2022-04-11 DIAGNOSIS — I10 ESSENTIAL HYPERTENSION: ICD-10-CM

## 2022-04-11 RX ORDER — PANTOPRAZOLE SODIUM 40 MG/1
TABLET, DELAYED RELEASE ORAL
Qty: 90 TABLET | Refills: 1 | Status: SHIPPED | OUTPATIENT
Start: 2022-04-11 | End: 2022-10-06 | Stop reason: SDUPTHER

## 2022-04-11 RX ORDER — AMLODIPINE BESYLATE 10 MG/1
TABLET ORAL
Qty: 90 TABLET | Refills: 1 | Status: SHIPPED | OUTPATIENT
Start: 2022-04-11 | End: 2022-10-06 | Stop reason: SDUPTHER

## 2022-04-17 DIAGNOSIS — E78.2 MIXED HYPERLIPIDEMIA: ICD-10-CM

## 2022-04-18 RX ORDER — ROSUVASTATIN CALCIUM 40 MG/1
TABLET, COATED ORAL
Qty: 90 TABLET | Refills: 1 | OUTPATIENT
Start: 2022-04-18

## 2022-05-09 ENCOUNTER — TRANSCRIBE ORDERS (OUTPATIENT)
Dept: ADMINISTRATIVE | Facility: HOSPITAL | Age: 82
End: 2022-05-09

## 2022-05-09 DIAGNOSIS — I65.23 BILATERAL CAROTID ARTERY OCCLUSION: Primary | ICD-10-CM

## 2022-05-17 ENCOUNTER — TELEPHONE (OUTPATIENT)
Dept: FAMILY MEDICINE CLINIC | Facility: CLINIC | Age: 82
End: 2022-05-17

## 2022-05-17 RX ORDER — LEVOTHYROXINE SODIUM 75 MCG
TABLET ORAL
Qty: 90 TABLET | Refills: 1 | OUTPATIENT
Start: 2022-05-17

## 2022-05-17 RX ORDER — CLOPIDOGREL BISULFATE 75 MG/1
TABLET ORAL
Qty: 90 TABLET | Refills: 1 | OUTPATIENT
Start: 2022-05-17

## 2022-05-17 NOTE — TELEPHONE ENCOUNTER
I have received a request from the pharmacy to refill Plavix, but this medication is not listed on his medication list anymore.  Please clarify with the patient.  Thank you.

## 2022-06-08 DIAGNOSIS — E03.9 ACQUIRED HYPOTHYROIDISM: ICD-10-CM

## 2022-06-08 RX ORDER — LEVOTHYROXINE SODIUM 88 MCG
TABLET ORAL
Qty: 90 TABLET | Refills: 0 | Status: SHIPPED | OUTPATIENT
Start: 2022-06-08 | End: 2022-09-05

## 2022-06-14 NOTE — PROGRESS NOTES
Date of Office Visit: 06/15/2022  Encounter Provider: Dr. Steve Muhammad  Place of Service: [facility] CARDIOLOGY [place]  Patient Name: Alen Ratliff  :1940  Serenity Wrne MD    Chief Complaint   Patient presents with   • Heart Murmur   • Hypertension   • Hyperlipidemia   • Diabetes   • Stroke   • Follow-up     History of Present Illness    I am pleased to see Mr. Rao in my office today as a follow-up.    As you know, patient is 81-year-old white gentleman whose past medical history is significant for hypertension, hyperlipidemia, diabetes mellitus, history of cardiac murmur, who came today for follow-up.    In 2020, patient was seen in my office for symptom of shortness of breath and relative bradycardia.  Patient also had symptom of occasional chest pain.  Patient underwent stress test which showed moderate inferior/septal fixed defect with small amount of joseph-infarct ischemia.  Echocardiogram showed normal left ventricular size and function and LVEF was 55 to 60%.  Mild LVH was noted.  Calcified aortic valve was noted with mild to moderate aortic valve stenosis with aortic valve area of 1.3 cm².  Holter monitor showed sinus bradycardia with episode of relative bradycardia.  No significant pause or heart rate less than 40 was noted.    In 2020, patient was admitted at [facility] with slurring of speech and possible TIA.  Neurological work-up showed high-grade stenosis of right carotid artery.  Patient underwent endarterectomy.  Postoperatively patient had atrial fibrillation with controlled heart rate.  Patient spontaneously cardioverted.  Patient was started on aspirin and Plavix.  Patient was discharged on event monitor.  Event monitor showed predominantly sinus rhythm.  Patient had average heart rate of 60 bpm.  No significant atrial fibrillation burden noted.    Patient came today for 9-month follow-up.  Overall patient is stable.  Patient denies any  symptom of chest pain or tightness or heaviness.  No orthopnea PND no syncope or presyncope.  No leg edema noted.  Patient does complain of shortness of breath on mild to moderate activity    EKG showed normal sinus rhythm.  Heart rate is 56 bpm first-degree AV block was noted.  Interventricular conduction delay present.  Nonspecific T wave inversion noted.    Patient brought the blood pressure logbook and most of the blood pressures are within desirable range.  I will continue current treatment.  He has relative bradycardia.  I suspect sick sinus syndrome but patient is relatively asymptomatic recommend observation I would also proceed with echocardiogram to assess aortic stenosis.  Patient had mild to moderate aortic stenosis previously.        Past Medical History:   Diagnosis Date   • Allergic rhinitis    • Diabetes (HCC)    • GERD (gastroesophageal reflux disease)    • Heart murmur    • Hyperlipidemia    • Hypertension    • Hypothyroidism    • Prostate cancer (HCC)    • Stroke (HCC)          Past Surgical History:   Procedure Laterality Date   • ANKLE OPEN REDUCTION INTERNAL FIXATION Left 12/16/2020    Procedure: ANKLE OPEN REDUCTION INTERNAL FIXATION;  Surgeon: CAROLE Vasquez DPM;  Location: [facility];  Service: Podiatry;  Laterality: Left;   • BACK SURGERY  1994   • CAROTID ENDARTERECTOMY Right 12/14/2020    Procedure: CAROTID ENDARTERECTOMY;  Surgeon: Toby Fung MD;  Location: [facility];  Service: Vascular;  Laterality: Right;   • COLONOSCOPY  08/25/2015   • PROSTATECTOMY  2001    due to cancer           Current Outpatient Medications:   •  amLODIPine (NORVASC) 10 MG tablet, TAKE 1 TABLET DAILY, Disp: 90 tablet, Rfl: 1  •  aspirin 81 MG chewable tablet, Chew 1 tablet Daily., Disp: 30 tablet, Rfl: 1  •  atorvastatin (LIPITOR) 40 MG tablet, TAKE 1 TABLET DAILY, Disp: 90 tablet, Rfl: 1  •  fluticasone (FLONASE) 50 MCG/ACT nasal spray, 2 sprays into the nostril(s) as directed by provider  Daily As Needed., Disp: , Rfl:   •  glucose blood test strip, 1 each by Other route Daily. Dg: E11.9, Disp: 100 each, Rfl: 1  •  Januvia 50 MG tablet, TAKE 1 TABLET DAILY, Disp: 90 tablet, Rfl: 0  •  Jardiance 10 MG tablet tablet, TAKE 1 TABLET DAILY, Disp: 90 tablet, Rfl: 1  •  losartan (COZAAR) 50 MG tablet, Take 1 tablet by mouth Daily., Disp: 90 tablet, Rfl: 1  •  pantoprazole (PROTONIX) 40 MG EC tablet, TAKE 1 TABLET DAILY, Disp: 90 tablet, Rfl: 1  •  Synthroid 88 MCG tablet, TAKE 1 TABLET DAILY, Disp: 90 tablet, Rfl: 0      Social History     Socioeconomic History   • Marital status:    Tobacco Use   • Smoking status: Never Smoker   • Smokeless tobacco: Never Used   Vaping Use   • Vaping Use: Never used   Substance and Sexual Activity   • Alcohol use: Not Currently   • Drug use: Never   • Sexual activity: Defer         Review of Systems   Constitutional: Negative for chills and fever.   HENT: Negative for ear discharge and nosebleeds.    Eyes: Negative for discharge and redness.   Cardiovascular: Negative for chest pain, orthopnea, palpitations, paroxysmal nocturnal dyspnea and syncope.   Respiratory: Positive for shortness of breath. Negative for cough and wheezing.    Endocrine: Negative for heat intolerance.   Skin: Negative for rash.   Musculoskeletal: Positive for arthritis and joint pain. Negative for myalgias.   Gastrointestinal: Negative for abdominal pain, melena, nausea and vomiting.   Genitourinary: Negative for dysuria and hematuria.   Neurological: Negative for dizziness, light-headedness, numbness and tremors.   Psychiatric/Behavioral: Negative for depression. The patient is not nervous/anxious.        Procedures      ECG 12 Lead    Date/Time: 6/15/2022 10:47 AM  Performed by: Steve Muhammad MD  Authorized by: Steve Muhammad MD   Comparison: compared with previous ECG   Similar to previous ECG  Rhythm: sinus bradycardia  Conduction: 1st degree AV block  Other findings: non-specific ST-T wave  "changes and T wave abnormality    Clinical impression: abnormal EKG            ECG 12 Lead    (Results Pending)           Objective:    /72 (BP Location: Left arm, Patient Position: Sitting, Cuff Size: Large Adult)   Pulse 56   Ht 182.9 cm (72.01\")   Wt 93.4 kg (206 lb)   SpO2 98%   BMI 27.93 kg/m²         Constitutional:       Appearance: Well-developed.   Eyes:      General: No scleral icterus.        Right eye: No discharge.   HENT:      Head: Normocephalic and atraumatic.   Neck:      Thyroid: No thyromegaly.      Lymphadenopathy: No cervical adenopathy.   Pulmonary:      Effort: Pulmonary effort is normal. No respiratory distress.      Breath sounds: Normal breath sounds. No wheezing. No rales.   Cardiovascular:      Normal rate. Regular rhythm.      Murmurs: There is a systolic murmur.      No gallop.   Abdominal:      Tenderness: There is no abdominal tenderness.   Skin:     Findings: No erythema or rash.   Neurological:      Mental Status: Alert and oriented to person, place, and time.             Assessment:       Diagnosis Plan   1. DM type 2 with diabetic peripheral neuropathy (HCC)  ECG 12 Lead    Adult Transthoracic Echo Complete W/ Cont if Necessary Per Protocol   2. Essential hypertension  ECG 12 Lead    Adult Transthoracic Echo Complete W/ Cont if Necessary Per Protocol   3. Heart murmur  ECG 12 Lead    Adult Transthoracic Echo Complete W/ Cont if Necessary Per Protocol   4. Mixed hyperlipidemia  ECG 12 Lead    Adult Transthoracic Echo Complete W/ Cont if Necessary Per Protocol   5. Cerebrovascular accident (CVA), unspecified mechanism (HCC)  ECG 12 Lead    Adult Transthoracic Echo Complete W/ Cont if Necessary Per Protocol            Plan:       MDM:    1.  Aortic stenosis:    I would repeat echocardiogram to assess the severity.  Previously patient had mild to moderate aortic stenosis about 2 years ago.    2.  Shortness of breath:    Patient is advised to monitor his symptoms.  I will " proceed with echocardiogram    3.  Hypertension:    Blood pressure is controlled current treatment would be continued    4.  Hyperlipidemia:    Patient is on Lipitor.  Recent LDL is 72 which is desirable.    5.  Diabetes mellitus:    Patient is on Jardiance and Januvia.  Diet modification recommended.

## 2022-06-15 ENCOUNTER — OFFICE VISIT (OUTPATIENT)
Dept: CARDIOLOGY | Facility: CLINIC | Age: 82
End: 2022-06-15

## 2022-06-15 VITALS
DIASTOLIC BLOOD PRESSURE: 72 MMHG | HEIGHT: 72 IN | SYSTOLIC BLOOD PRESSURE: 118 MMHG | OXYGEN SATURATION: 98 % | WEIGHT: 206 LBS | BODY MASS INDEX: 27.9 KG/M2 | HEART RATE: 56 BPM

## 2022-06-15 DIAGNOSIS — R01.1 HEART MURMUR: ICD-10-CM

## 2022-06-15 DIAGNOSIS — I63.9 CEREBROVASCULAR ACCIDENT (CVA), UNSPECIFIED MECHANISM: ICD-10-CM

## 2022-06-15 DIAGNOSIS — E11.42 DM TYPE 2 WITH DIABETIC PERIPHERAL NEUROPATHY: Primary | ICD-10-CM

## 2022-06-15 DIAGNOSIS — E78.2 MIXED HYPERLIPIDEMIA: ICD-10-CM

## 2022-06-15 DIAGNOSIS — I10 ESSENTIAL HYPERTENSION: ICD-10-CM

## 2022-06-15 PROCEDURE — 99214 OFFICE O/P EST MOD 30 MIN: CPT | Performed by: INTERNAL MEDICINE

## 2022-06-15 PROCEDURE — 93000 ELECTROCARDIOGRAM COMPLETE: CPT | Performed by: INTERNAL MEDICINE

## 2022-06-19 DIAGNOSIS — E11.9 TYPE 2 DIABETES MELLITUS WITHOUT COMPLICATION, WITHOUT LONG-TERM CURRENT USE OF INSULIN: ICD-10-CM

## 2022-06-19 DIAGNOSIS — I10 ESSENTIAL HYPERTENSION: ICD-10-CM

## 2022-06-19 RX ORDER — BLOOD SUGAR DIAGNOSTIC
STRIP MISCELLANEOUS
Qty: 100 EACH | Refills: 1 | Status: SHIPPED | OUTPATIENT
Start: 2022-06-19 | End: 2022-12-05

## 2022-06-19 RX ORDER — SITAGLIPTIN 50 MG/1
TABLET, FILM COATED ORAL
Qty: 90 TABLET | Refills: 0 | Status: SHIPPED | OUTPATIENT
Start: 2022-06-19 | End: 2022-09-23

## 2022-06-19 RX ORDER — LOSARTAN POTASSIUM 50 MG/1
TABLET ORAL
Qty: 90 TABLET | Refills: 1 | Status: SHIPPED | OUTPATIENT
Start: 2022-06-19 | End: 2022-12-22

## 2022-06-19 RX ORDER — EMPAGLIFLOZIN 10 MG/1
TABLET, FILM COATED ORAL
Qty: 90 TABLET | Refills: 1 | Status: SHIPPED | OUTPATIENT
Start: 2022-06-19 | End: 2022-12-05

## 2022-06-19 RX ORDER — ATORVASTATIN CALCIUM 40 MG/1
TABLET, FILM COATED ORAL
Qty: 90 TABLET | Refills: 1 | Status: SHIPPED | OUTPATIENT
Start: 2022-06-19 | End: 2022-12-22

## 2022-07-13 ENCOUNTER — HOSPITAL ENCOUNTER (OUTPATIENT)
Dept: CARDIOLOGY | Facility: HOSPITAL | Age: 82
Discharge: HOME | End: 2022-07-13
Admitting: INTERNAL MEDICINE

## 2022-07-13 DIAGNOSIS — I10 ESSENTIAL HYPERTENSION: ICD-10-CM

## 2022-07-13 DIAGNOSIS — I63.9 CEREBROVASCULAR ACCIDENT (CVA), UNSPECIFIED MECHANISM: ICD-10-CM

## 2022-07-13 DIAGNOSIS — R01.1 HEART MURMUR: ICD-10-CM

## 2022-07-13 DIAGNOSIS — E78.2 MIXED HYPERLIPIDEMIA: ICD-10-CM

## 2022-07-13 DIAGNOSIS — E11.42 DM TYPE 2 WITH DIABETIC PERIPHERAL NEUROPATHY: ICD-10-CM

## 2022-07-13 PROCEDURE — 93306 TTE W/DOPPLER COMPLETE: CPT

## 2022-07-13 PROCEDURE — 93306 TTE W/DOPPLER COMPLETE: CPT | Performed by: INTERNAL MEDICINE

## 2022-07-14 LAB
BH CV ECHO MEAS - ACS: 1.62 CM
BH CV ECHO MEAS - AI P1/2T: 967.2 MSEC
BH CV ECHO MEAS - AO MAX PG: 48.1 MMHG
BH CV ECHO MEAS - AO MEAN PG: 29.2 MMHG
BH CV ECHO MEAS - AO ROOT DIAM: 3.5 CM
BH CV ECHO MEAS - AO V2 MAX: 346.3 CM/SEC
BH CV ECHO MEAS - AO V2 VTI: 77.8 CM
BH CV ECHO MEAS - AVA(I,D): 1.38 CM2
BH CV ECHO MEAS - EDV(CUBED): 51.7 ML
BH CV ECHO MEAS - EDV(MOD-SP4): 102.3 ML
BH CV ECHO MEAS - EF(MOD-BP): 71 %
BH CV ECHO MEAS - EF(MOD-SP4): 71.3 %
BH CV ECHO MEAS - ESV(CUBED): 17.8 ML
BH CV ECHO MEAS - ESV(MOD-SP4): 29.3 ML
BH CV ECHO MEAS - FS: 29.9 %
BH CV ECHO MEAS - IVS/LVPW: 0.96 CM
BH CV ECHO MEAS - IVSD: 1.16 CM
BH CV ECHO MEAS - LA A2CS (ATRIAL LENGTH): 4.2 CM
BH CV ECHO MEAS - LV DIASTOLIC VOL/BSA (35-75): 48.7 CM2
BH CV ECHO MEAS - LV MASS(C)D: 144.9 GRAMS
BH CV ECHO MEAS - LV MAX PG: 4.8 MMHG
BH CV ECHO MEAS - LV MEAN PG: 2.8 MMHG
BH CV ECHO MEAS - LV SYSTOLIC VOL/BSA (12-30): 14 CM2
BH CV ECHO MEAS - LV V1 MAX: 110.1 CM/SEC
BH CV ECHO MEAS - LV V1 VTI: 30.2 CM
BH CV ECHO MEAS - LVIDD: 3.7 CM
BH CV ECHO MEAS - LVIDS: 2.6 CM
BH CV ECHO MEAS - LVOT AREA: 3.6 CM2
BH CV ECHO MEAS - LVOT DIAM: 2.13 CM
BH CV ECHO MEAS - LVPWD: 1.2 CM
BH CV ECHO MEAS - MR MAX PG: 69.2 MMHG
BH CV ECHO MEAS - MR MAX VEL: 415.9 CM/SEC
BH CV ECHO MEAS - MV A MAX VEL: 103.9 CM/SEC
BH CV ECHO MEAS - MV DEC SLOPE: 371.9 CM/SEC2
BH CV ECHO MEAS - MV DEC TIME: 0.23 MSEC
BH CV ECHO MEAS - MV E MAX VEL: 84.5 CM/SEC
BH CV ECHO MEAS - MV E/A: 0.81
BH CV ECHO MEAS - MV MAX PG: 4.9 MMHG
BH CV ECHO MEAS - MV MEAN PG: 2.15 MMHG
BH CV ECHO MEAS - MV V2 VTI: 35.9 CM
BH CV ECHO MEAS - MVA(VTI): 3 CM2
BH CV ECHO MEAS - PA V2 MAX: 131.9 CM/SEC
BH CV ECHO MEAS - RV MAX PG: 4.9 MMHG
BH CV ECHO MEAS - RV V1 MAX: 110.9 CM/SEC
BH CV ECHO MEAS - RV V1 VTI: 25.6 CM
BH CV ECHO MEAS - RVDD: 2.22 CM
BH CV ECHO MEAS - SI(MOD-SP4): 34.7 ML/M2
BH CV ECHO MEAS - SV(LVOT): 107.6 ML
BH CV ECHO MEAS - SV(MOD-SP4): 72.9 ML
BH CV ECHO MEAS - TR MAX PG: 26.9 MMHG
BH CV ECHO MEAS - TR MAX VEL: 259 CM/SEC
MAXIMAL PREDICTED HEART RATE: 138 BPM
STRESS TARGET HR: 117 BPM

## 2022-08-09 NOTE — PROGRESS NOTES
Date of Office Visit: 08/10/2022  Encounter Provider: Dr. Steve Muhammad  Place of Service: [facility] CARDIOLOGY [place]  Patient Name: Alen Ratliff  :1940  Serenity Wren MD    Chief Complaint   Patient presents with   • Stroke   • Hypertension   • Hyperlipidemia   • Diabetes   • Follow-up   • Heart Murmur     History of Present Illness    I am pleased to see Mr. aRo in my office today as a follow-up.    As you know, patient is 82-year-old white gentleman whose past medical history is significant for hypertension, hyperlipidemia, diabetes mellitus, history of cardiac murmur, who came today for follow-up.    In 2020, patient was seen in my office for symptom of shortness of breath and relative bradycardia.  Patient also had symptom of occasional chest pain.  Patient underwent stress test which showed moderate inferior/septal fixed defect with small amount of joseph-infarct ischemia.  Echocardiogram showed normal left ventricular size and function and LVEF was 55 to 60%.  Mild LVH was noted.  Calcified aortic valve was noted with mild to moderate aortic valve stenosis with aortic valve area of 1.3 cm².  Holter monitor showed sinus bradycardia with episode of relative bradycardia.  No significant pause or heart rate less than 40 was noted.    In 2020, patient was admitted at [facility] with slurring of speech and possible TIA.  Neurological work-up showed high-grade stenosis of right carotid artery.  Patient underwent endarterectomy.  Postoperatively patient had atrial fibrillation with controlled heart rate.  Patient spontaneously cardioverted.  Patient was started on aspirin and Plavix.  Patient was discharged on event monitor.  Event monitor showed predominantly sinus rhythm.  Patient had average heart rate of 60 bpm.  No significant atrial fibrillation burden noted.    In 2022, patient underwent echocardiogram which showed EF of 55 to 60%.  Patient was  noted to have moderate to severe aortic regurgitation.  Peak aortic velocity was 3.8 m/s with peak gradient of 56 mmHg with mean gradient of 30 mmHg.    Patient came today for further discussion.  Patient does complain of mild shortness of breath.  Patient complains of fatigue and tiredness.  No dizziness or lightheadedness no chest pain.    At this stage I would recommend to proceed with CHRISTIANO to assess the aortic valve and severity.  Patient may need cardiac catheterization in future.      Past Medical History:   Diagnosis Date   • Allergic rhinitis    • Diabetes (HCC)    • GERD (gastroesophageal reflux disease)    • Heart murmur    • Hyperlipidemia    • Hypertension    • Hypothyroidism    • Prostate cancer (HCC)    • Stroke (HCC)          Past Surgical History:   Procedure Laterality Date   • ANKLE OPEN REDUCTION INTERNAL FIXATION Left 12/16/2020    Procedure: ANKLE OPEN REDUCTION INTERNAL FIXATION;  Surgeon: CAROLE Vasquez DPM;  Location: [facility];  Service: Podiatry;  Laterality: Left;   • BACK SURGERY  1994   • CAROTID ENDARTERECTOMY Right 12/14/2020    Procedure: CAROTID ENDARTERECTOMY;  Surgeon: Toby Fung MD;  Location: [facility];  Service: Vascular;  Laterality: Right;   • COLONOSCOPY  08/25/2015   • PROSTATECTOMY  2001    due to cancer           Current Outpatient Medications:   •  amLODIPine (NORVASC) 10 MG tablet, TAKE 1 TABLET DAILY, Disp: 90 tablet, Rfl: 1  •  aspirin 81 MG chewable tablet, Chew 1 tablet Daily., Disp: 30 tablet, Rfl: 1  •  atorvastatin (LIPITOR) 40 MG tablet, TAKE 1 TABLET DAILY, Disp: 90 tablet, Rfl: 1  •  fluticasone (FLONASE) 50 MCG/ACT nasal spray, 2 sprays into the nostril(s) as directed by provider Daily As Needed., Disp: , Rfl:   •  Januvia 50 MG tablet, TAKE 1 TABLET DAILY, Disp: 90 tablet, Rfl: 0  •  Jardiance 10 MG tablet tablet, TAKE 1 TABLET DAILY, Disp: 90 tablet, Rfl: 1  •  losartan (COZAAR) 50 MG tablet, TAKE 1 TABLET DAILY, Disp: 90 tablet, Rfl:  "1  •  OneTouch Ultra test strip, USE AND DISCARD 1 TEST     STRIP DAILY., Disp: 100 each, Rfl: 1  •  pantoprazole (PROTONIX) 40 MG EC tablet, TAKE 1 TABLET DAILY, Disp: 90 tablet, Rfl: 1  •  Synthroid 88 MCG tablet, TAKE 1 TABLET DAILY, Disp: 90 tablet, Rfl: 0      Social History     Socioeconomic History   • Marital status:    Tobacco Use   • Smoking status: Never Smoker   • Smokeless tobacco: Never Used   Vaping Use   • Vaping Use: Never used   Substance and Sexual Activity   • Alcohol use: Not Currently   • Drug use: Never   • Sexual activity: Defer         Review of Systems   Constitutional: Positive for malaise/fatigue. Negative for chills and fever.   HENT: Negative for ear discharge and nosebleeds.    Eyes: Negative for discharge and redness.   Cardiovascular: Negative for chest pain, orthopnea, palpitations, paroxysmal nocturnal dyspnea and syncope.   Respiratory: Positive for shortness of breath. Negative for cough and wheezing.    Endocrine: Negative for heat intolerance.   Skin: Negative for rash.   Musculoskeletal: Negative for arthritis and myalgias.   Gastrointestinal: Negative for abdominal pain, melena, nausea and vomiting.   Genitourinary: Negative for dysuria and hematuria.   Neurological: Negative for dizziness, light-headedness, numbness and tremors.   Psychiatric/Behavioral: Negative for depression. The patient is not nervous/anxious.        Procedures    Procedures    No orders to display           Objective:    /74 (BP Location: Left arm, Patient Position: Sitting, Cuff Size: Large Adult)   Pulse 64   Ht 182.9 cm (72.01\")   Wt 93 kg (205 lb)   SpO2 98%   BMI 27.80 kg/m²         Constitutional:       Appearance: Well-developed.   Eyes:      General: No scleral icterus.        Right eye: No discharge.   HENT:      Head: Normocephalic and atraumatic.   Neck:      Thyroid: No thyromegaly.      Lymphadenopathy: No cervical adenopathy.   Pulmonary:      Effort: Pulmonary effort is " normal. No respiratory distress.      Breath sounds: Normal breath sounds. No wheezing. No rales.   Cardiovascular:      Normal rate. Regular rhythm.      Murmurs: There is a systolic murmur.      No gallop.   Abdominal:      Tenderness: There is no abdominal tenderness.   Skin:     Findings: No erythema or rash.   Neurological:      Mental Status: Alert and oriented to person, place, and time.             Assessment:       Diagnosis Plan   1. Type 2 diabetes mellitus without complication, without long-term current use of insulin (HCC)  Adult Transesophageal Echo (CHRISTIANO) W/ Cont if Necessary Per Protocol   2. Essential hypertension  Adult Transesophageal Echo (CHRISTIANO) W/ Cont if Necessary Per Protocol   3. Heart murmur  Adult Transesophageal Echo (CHRISTIANO) W/ Cont if Necessary Per Protocol   4. Mixed hyperlipidemia  Adult Transesophageal Echo (CHRISTIANO) W/ Cont if Necessary Per Protocol   5. Cerebrovascular accident (CVA), unspecified mechanism (HCC)  Adult Transesophageal Echo (CHRISTIANO) W/ Cont if Necessary Per Protocol   6. Aortic valve stenosis, etiology of cardiac valve disease unspecified  Adult Transesophageal Echo (CHRISTIANO) W/ Cont if Necessary Per Protocol            Plan:       MDM:    1.  Aortic stenosis:    I would recommend to proceed with CHRISTIANO to assess the aortic valve and a severity for stenosis.    2.  Hypertension:    Blood pressure is very well controlled    3.  Hyperlipidemia:    Patient is on Lipitor.  Repeat iron in future.

## 2022-08-10 ENCOUNTER — OFFICE VISIT (OUTPATIENT)
Dept: CARDIOLOGY | Facility: CLINIC | Age: 82
End: 2022-08-10

## 2022-08-10 VITALS
WEIGHT: 205 LBS | SYSTOLIC BLOOD PRESSURE: 122 MMHG | HEART RATE: 64 BPM | DIASTOLIC BLOOD PRESSURE: 74 MMHG | HEIGHT: 72 IN | BODY MASS INDEX: 27.77 KG/M2 | OXYGEN SATURATION: 98 %

## 2022-08-10 DIAGNOSIS — R01.1 HEART MURMUR: ICD-10-CM

## 2022-08-10 DIAGNOSIS — I63.9 CEREBROVASCULAR ACCIDENT (CVA), UNSPECIFIED MECHANISM: ICD-10-CM

## 2022-08-10 DIAGNOSIS — E11.9 TYPE 2 DIABETES MELLITUS WITHOUT COMPLICATION, WITHOUT LONG-TERM CURRENT USE OF INSULIN: Primary | ICD-10-CM

## 2022-08-10 DIAGNOSIS — Z01.818 PRE-OP EXAMINATION: Primary | ICD-10-CM

## 2022-08-10 DIAGNOSIS — I35.0 AORTIC VALVE STENOSIS, ETIOLOGY OF CARDIAC VALVE DISEASE UNSPECIFIED: ICD-10-CM

## 2022-08-10 DIAGNOSIS — I10 ESSENTIAL HYPERTENSION: ICD-10-CM

## 2022-08-10 DIAGNOSIS — E78.2 MIXED HYPERLIPIDEMIA: ICD-10-CM

## 2022-08-10 PROCEDURE — 99213 OFFICE O/P EST LOW 20 MIN: CPT | Performed by: INTERNAL MEDICINE

## 2022-08-15 ENCOUNTER — TELEPHONE (OUTPATIENT)
Dept: CARDIOLOGY | Facility: CLINIC | Age: 82
End: 2022-08-15

## 2022-08-15 NOTE — TELEPHONE ENCOUNTER
Caller: DEWEY    Relationship: WIFE     Best call back number: [phone]    What is the best time to reach you: ANYTIME    Who are you requesting to speak with (clinical staff, provider,  specific staff member): CLINICAL STAFF    What was the call regarding: QUESTIONS ABOUT THE PROCEDURE ON THE 30TH AND WHAT WILL IF SOMETHING IS FOUND. ALSO ABOUT STOPPING BLOOD THINNERS BEFORE THE PROCEDURE    Do you require a callback: YES

## 2022-08-15 NOTE — TELEPHONE ENCOUNTER
Lisa patient is having CHRISTIANO can you call the patient and tell him what to do for the procedure

## 2022-08-27 ENCOUNTER — TRANSCRIBE ORDERS (OUTPATIENT)
Dept: ADMINISTRATIVE | Facility: HOSPITAL | Age: 82
End: 2022-08-27

## 2022-08-27 ENCOUNTER — LAB (OUTPATIENT)
Dept: LAB | Facility: HOSPITAL | Age: 82
End: 2022-08-27

## 2022-08-27 DIAGNOSIS — Z01.818 PRE-OP TESTING: ICD-10-CM

## 2022-08-27 DIAGNOSIS — Z01.818 PRE-OP TESTING: Primary | ICD-10-CM

## 2022-08-27 LAB
ANION GAP SERPL CALCULATED.3IONS-SCNC: 12.9 MMOL/L (ref 5–15)
APTT PPP: 27.1 SECONDS (ref 24–31)
BASOPHILS # BLD AUTO: 0.04 10*3/MM3 (ref 0–0.2)
BASOPHILS NFR BLD AUTO: 0.5 % (ref 0–1.5)
BUN SERPL-MCNC: 28 MG/DL (ref 8–23)
BUN/CREAT SERPL: 19.4 (ref 7–25)
CALCIUM SPEC-SCNC: 9.4 MG/DL (ref 8.6–10.5)
CHLORIDE SERPL-SCNC: 107 MMOL/L (ref 98–107)
CO2 SERPL-SCNC: 24.1 MMOL/L (ref 22–29)
CREAT SERPL-MCNC: 1.44 MG/DL (ref 0.76–1.27)
DEPRECATED RDW RBC AUTO: 46.6 FL (ref 37–54)
EGFRCR SERPLBLD CKD-EPI 2021: 48.2 ML/MIN/1.73
EOSINOPHIL # BLD AUTO: 0.09 10*3/MM3 (ref 0–0.4)
EOSINOPHIL NFR BLD AUTO: 1.2 % (ref 0.3–6.2)
ERYTHROCYTE [DISTWIDTH] IN BLOOD BY AUTOMATED COUNT: 13.6 % (ref 12.3–15.4)
GLUCOSE SERPL-MCNC: 125 MG/DL (ref 65–99)
HCT VFR BLD AUTO: 45 % (ref 37.5–51)
HGB BLD-MCNC: 14.6 G/DL (ref 13–17.7)
IMM GRANULOCYTES # BLD AUTO: 0.02 10*3/MM3 (ref 0–0.05)
IMM GRANULOCYTES NFR BLD AUTO: 0.3 % (ref 0–0.5)
INR PPP: 1.02 (ref 0.93–1.1)
LYMPHOCYTES # BLD AUTO: 1.58 10*3/MM3 (ref 0.7–3.1)
LYMPHOCYTES NFR BLD AUTO: 20.7 % (ref 19.6–45.3)
MCH RBC QN AUTO: 30.5 PG (ref 26.6–33)
MCHC RBC AUTO-ENTMCNC: 32.4 G/DL (ref 31.5–35.7)
MCV RBC AUTO: 93.9 FL (ref 79–97)
MONOCYTES # BLD AUTO: 0.89 10*3/MM3 (ref 0.1–0.9)
MONOCYTES NFR BLD AUTO: 11.6 % (ref 5–12)
NEUTROPHILS NFR BLD AUTO: 5.02 10*3/MM3 (ref 1.7–7)
NEUTROPHILS NFR BLD AUTO: 65.7 % (ref 42.7–76)
NRBC BLD AUTO-RTO: 0 /100 WBC (ref 0–0.2)
PLATELET # BLD AUTO: 200 10*3/MM3 (ref 140–450)
PMV BLD AUTO: 10.6 FL (ref 6–12)
POTASSIUM SERPL-SCNC: 4.4 MMOL/L (ref 3.5–5.2)
PROTHROMBIN TIME: 10.5 SECONDS (ref 9.6–11.7)
RBC # BLD AUTO: 4.79 10*6/MM3 (ref 4.14–5.8)
SARS-COV-2 ORF1AB RESP QL NAA+PROBE: NOT DETECTED
SODIUM SERPL-SCNC: 144 MMOL/L (ref 136–145)
WBC NRBC COR # BLD: 7.64 10*3/MM3 (ref 3.4–10.8)

## 2022-08-27 PROCEDURE — C9803 HOPD COVID-19 SPEC COLLECT: HCPCS

## 2022-08-27 PROCEDURE — 85730 THROMBOPLASTIN TIME PARTIAL: CPT

## 2022-08-27 PROCEDURE — 85610 PROTHROMBIN TIME: CPT

## 2022-08-27 PROCEDURE — 85025 COMPLETE CBC W/AUTO DIFF WBC: CPT

## 2022-08-27 PROCEDURE — 36415 COLL VENOUS BLD VENIPUNCTURE: CPT

## 2022-08-27 PROCEDURE — U0004 COV-19 TEST NON-CDC HGH THRU: HCPCS

## 2022-08-27 PROCEDURE — 80048 BASIC METABOLIC PNL TOTAL CA: CPT

## 2022-08-29 ENCOUNTER — ANESTHESIA EVENT (OUTPATIENT)
Dept: CARDIOLOGY | Facility: HOSPITAL | Age: 82
End: 2022-08-29

## 2022-08-30 ENCOUNTER — HOSPITAL ENCOUNTER (OUTPATIENT)
Dept: CARDIOLOGY | Facility: HOSPITAL | Age: 82
Discharge: HOME | End: 2022-08-30

## 2022-08-30 ENCOUNTER — ANESTHESIA (OUTPATIENT)
Dept: CARDIOLOGY | Facility: HOSPITAL | Age: 82
End: 2022-08-30

## 2022-08-30 VITALS — SYSTOLIC BLOOD PRESSURE: 97 MMHG | DIASTOLIC BLOOD PRESSURE: 59 MMHG | OXYGEN SATURATION: 97 %

## 2022-08-30 VITALS
OXYGEN SATURATION: 98 % | RESPIRATION RATE: 20 BRPM | WEIGHT: 204.59 LBS | BODY MASS INDEX: 31.01 KG/M2 | SYSTOLIC BLOOD PRESSURE: 142 MMHG | DIASTOLIC BLOOD PRESSURE: 38 MMHG | TEMPERATURE: 97.9 F | HEIGHT: 68 IN | HEART RATE: 54 BPM

## 2022-08-30 DIAGNOSIS — I63.9 CEREBROVASCULAR ACCIDENT (CVA), UNSPECIFIED MECHANISM: ICD-10-CM

## 2022-08-30 DIAGNOSIS — I10 ESSENTIAL HYPERTENSION: ICD-10-CM

## 2022-08-30 DIAGNOSIS — I35.0 AORTIC VALVE STENOSIS, ETIOLOGY OF CARDIAC VALVE DISEASE UNSPECIFIED: ICD-10-CM

## 2022-08-30 DIAGNOSIS — R01.1 HEART MURMUR: ICD-10-CM

## 2022-08-30 DIAGNOSIS — E11.9 TYPE 2 DIABETES MELLITUS WITHOUT COMPLICATION, WITHOUT LONG-TERM CURRENT USE OF INSULIN: ICD-10-CM

## 2022-08-30 DIAGNOSIS — E78.2 MIXED HYPERLIPIDEMIA: ICD-10-CM

## 2022-08-30 PROCEDURE — 93312 ECHO TRANSESOPHAGEAL: CPT

## 2022-08-30 PROCEDURE — 93320 DOPPLER ECHO COMPLETE: CPT

## 2022-08-30 PROCEDURE — 93312 ECHO TRANSESOPHAGEAL: CPT | Performed by: INTERNAL MEDICINE

## 2022-08-30 PROCEDURE — 93325 DOPPLER ECHO COLOR FLOW MAPG: CPT

## 2022-08-30 PROCEDURE — 93325 DOPPLER ECHO COLOR FLOW MAPG: CPT | Performed by: INTERNAL MEDICINE

## 2022-08-30 PROCEDURE — 25010000002 PROPOFOL 500 MG/50ML EMULSION: Performed by: ANESTHESIOLOGY

## 2022-08-30 PROCEDURE — 93320 DOPPLER ECHO COMPLETE: CPT | Performed by: INTERNAL MEDICINE

## 2022-08-30 RX ORDER — PROPOFOL 10 MG/ML
INJECTION, EMULSION INTRAVENOUS AS NEEDED
Status: DISCONTINUED | OUTPATIENT
Start: 2022-08-30 | End: 2022-08-30 | Stop reason: SURG

## 2022-08-30 RX ORDER — SODIUM CHLORIDE 0.9 % (FLUSH) 0.9 %
10 SYRINGE (ML) INJECTION AS NEEDED
Status: DISCONTINUED | OUTPATIENT
Start: 2022-08-30 | End: 2022-09-01 | Stop reason: HOSPADM

## 2022-08-30 RX ORDER — SODIUM CHLORIDE 9 MG/ML
9 INJECTION, SOLUTION INTRAVENOUS CONTINUOUS PRN
Status: DISCONTINUED | OUTPATIENT
Start: 2022-08-30 | End: 2022-09-01 | Stop reason: HOSPADM

## 2022-08-30 RX ORDER — SODIUM CHLORIDE 9 MG/ML
INJECTION, SOLUTION INTRAVENOUS CONTINUOUS PRN
Status: DISCONTINUED | OUTPATIENT
Start: 2022-08-30 | End: 2022-08-30 | Stop reason: SURG

## 2022-08-30 RX ORDER — MIDAZOLAM HYDROCHLORIDE 1 MG/ML
0.5 INJECTION INTRAMUSCULAR; INTRAVENOUS
Status: DISCONTINUED | OUTPATIENT
Start: 2022-08-30 | End: 2022-09-01 | Stop reason: HOSPADM

## 2022-08-30 RX ORDER — SODIUM CHLORIDE 0.9 % (FLUSH) 0.9 %
10 SYRINGE (ML) INJECTION EVERY 12 HOURS SCHEDULED
Status: DISCONTINUED | OUTPATIENT
Start: 2022-08-30 | End: 2022-09-01 | Stop reason: HOSPADM

## 2022-08-30 RX ADMIN — PROPOFOL 250 MG: 10 INJECTION, EMULSION INTRAVENOUS at 14:37

## 2022-08-30 RX ADMIN — SODIUM CHLORIDE: 0.9 INJECTION, SOLUTION INTRAVENOUS at 14:34

## 2022-08-30 NOTE — ANESTHESIA PREPROCEDURE EVALUATION
Anesthesia Evaluation     Patient summary reviewed and Nursing notes reviewed   NPO Solid Status: > 8 hours             Airway   Mallampati: I  TM distance: >3 FB  Neck ROM: full  No difficulty expected  Dental - normal exam     Pulmonary - negative pulmonary ROS and normal exam   Cardiovascular - normal exam    (+) hypertension, valvular problems/murmurs AS,       Neuro/Psych  (+) TIA, CVA residual symptoms,    GI/Hepatic/Renal/Endo    (+)  GERD,  renal disease CRI, diabetes mellitus,     Musculoskeletal (-) negative ROS    Abdominal  - normal exam    Bowel sounds: normal.   Substance History - negative use     OB/GYN negative ob/gyn ROS         Other        ROS/Med Hx Other: R SIDED NUMBNESS FOLLOWING CVA                  Anesthesia Plan    ASA 3     MAC     intravenous induction     Anesthetic plan, risks, benefits, and alternatives have been provided, discussed and informed consent has been obtained with: patient.    Plan discussed with CRNA and CAA.        CODE STATUS:

## 2022-09-04 LAB
BH CV ECHO MEAS - AO MAX PG: 73.7 MMHG
BH CV ECHO MEAS - AO MEAN PG: 30.8 MMHG
BH CV ECHO MEAS - AO V2 MAX: 429.2 CM/SEC
BH CV ECHO MEAS - AO V2 VTI: 86 CM
BH CV ECHO MEAS - AVA(I,D): 0.59 CM2
BH CV ECHO MEAS - LV MAX PG: 3.4 MMHG
BH CV ECHO MEAS - LV MEAN PG: 2.34 MMHG
BH CV ECHO MEAS - LV V1 MAX: 92.9 CM/SEC
BH CV ECHO MEAS - LV V1 VTI: 20 CM
BH CV ECHO MEAS - LVOT AREA: 2.5 CM2
BH CV ECHO MEAS - LVOT DIAM: 1.79 CM
BH CV ECHO MEAS - SV(LVOT): 50.5 ML
MAXIMAL PREDICTED HEART RATE: 137 BPM
STRESS TARGET HR: 116 BPM

## 2022-09-05 DIAGNOSIS — E03.9 ACQUIRED HYPOTHYROIDISM: ICD-10-CM

## 2022-09-05 RX ORDER — LEVOTHYROXINE SODIUM 88 MCG
TABLET ORAL
Qty: 90 TABLET | Refills: 0 | Status: SHIPPED | OUTPATIENT
Start: 2022-09-05 | End: 2022-12-05

## 2022-09-13 NOTE — H&P (VIEW-ONLY)
Date of Office Visit: 2022  Encounter Provider: Dr. Steve Muhammad  Place of Service: [facility] CARDIOLOGY [place]  Patient Name: Alen Ratliff  :1940  Serenity Wren MD    Chief Complaint   Patient presents with   • Heart Murmur   • Stroke   • Hypertension   • Hyperlipidemia   • Diabetes   • Follow-up     History of Present Illness    I am pleased to see Mr. Rao in my office today as a follow-up.    As you know, patient is 82-year-old white gentleman whose past medical history is significant for hypertension, hyperlipidemia, diabetes mellitus, history of cardiac murmur, who came today for follow-up.    In 2020, patient was seen in my office for symptom of shortness of breath and relative bradycardia.  Patient also had symptom of occasional chest pain.  Patient underwent stress test which showed moderate inferior/septal fixed defect with small amount of joseph-infarct ischemia.  Echocardiogram showed normal left ventricular size and function and LVEF was 55 to 60%.  Mild LVH was noted.  Calcified aortic valve was noted with mild to moderate aortic valve stenosis with aortic valve area of 1.3 cm².  Holter monitor showed sinus bradycardia with episode of relative bradycardia.  No significant pause or heart rate less than 40 was noted.    In 2020, patient was admitted at [facility] with slurring of speech and possible TIA.  Neurological work-up showed high-grade stenosis of right carotid artery.  Patient underwent endarterectomy.  Postoperatively patient had atrial fibrillation with controlled heart rate.  Patient spontaneously cardioverted.  Patient was started on aspirin and Plavix.  Patient was discharged on event monitor.  Event monitor showed predominantly sinus rhythm.  Patient had average heart rate of 60 bpm.  No significant atrial fibrillation burden noted.    In 2022, patient underwent echocardiogram which showed EF of 55 to 60%.  Patient was  noted to have moderate to severe aortic regurgitation.  Peak aortic velocity was 3.8 m/s with peak gradient of 56 mmHg with mean gradient of 30 mmHg.    In August 2022, patient underwent CHRISTIANO and it showed EF of 60 to 65%.  Severe aortic stenosis with valve area of 0.59 cm.  Was noted.  Peak velocity was 4.3 m/s with peak gradient of 74 mmHg and mean gradient of 31 mmHg.    Patient came today for having further discussion.  He has appointment with Dr. Villafana on September 30, 2022.  I would recommend to proceed with cardiac catheterization which will be arranged on September 27, 2022.  Patient is complaining of mild shortness of breath and fatigue and decrease in exercise capacity.  Patient denies any chest pain.  No orthopnea PND no syncope or presyncope.    I would recommend to proceed with cardiac catheterization and depending on the results patient would go for further recommendation for either TAVR or SAVR.        Past Medical History:   Diagnosis Date   • Allergic rhinitis    • Diabetes (HCC)    • GERD (gastroesophageal reflux disease)    • Heart murmur    • Hyperlipidemia    • Hypertension    • Hypothyroidism    • Prostate cancer (HCC)    • Stroke (HCC)          Past Surgical History:   Procedure Laterality Date   • ANKLE OPEN REDUCTION INTERNAL FIXATION Left 12/16/2020    Procedure: ANKLE OPEN REDUCTION INTERNAL FIXATION;  Surgeon: CAROLE Vasquez DPM;  Location: [facility] OR;  Service: Podiatry;  Laterality: Left;   • BACK SURGERY  1994   • CAROTID ENDARTERECTOMY Right 12/14/2020    Procedure: CAROTID ENDARTERECTOMY;  Surgeon: Toby Fung MD;  Location: [facility] OR;  Service: Vascular;  Laterality: Right;   • COLONOSCOPY  08/25/2015   • PROSTATECTOMY  2001    due to cancer           Current Outpatient Medications:   •  amLODIPine (NORVASC) 10 MG tablet, TAKE 1 TABLET DAILY, Disp: 90 tablet, Rfl: 1  •  aspirin 81 MG chewable tablet, Chew 1 tablet Daily., Disp: 30 tablet, Rfl: 1  •  atorvastatin  (LIPITOR) 40 MG tablet, TAKE 1 TABLET DAILY, Disp: 90 tablet, Rfl: 1  •  fluticasone (FLONASE) 50 MCG/ACT nasal spray, 2 sprays into the nostril(s) as directed by provider Daily As Needed., Disp: , Rfl:   •  Januvia 50 MG tablet, TAKE 1 TABLET DAILY, Disp: 90 tablet, Rfl: 0  •  Jardiance 10 MG tablet tablet, TAKE 1 TABLET DAILY, Disp: 90 tablet, Rfl: 1  •  losartan (COZAAR) 50 MG tablet, TAKE 1 TABLET DAILY, Disp: 90 tablet, Rfl: 1  •  OneTouch Ultra test strip, USE AND DISCARD 1 TEST     STRIP DAILY., Disp: 100 each, Rfl: 1  •  pantoprazole (PROTONIX) 40 MG EC tablet, TAKE 1 TABLET DAILY, Disp: 90 tablet, Rfl: 1  •  Synthroid 88 MCG tablet, TAKE 1 TABLET DAILY, Disp: 90 tablet, Rfl: 0      Social History     Socioeconomic History   • Marital status:    Tobacco Use   • Smoking status: Never Smoker   • Smokeless tobacco: Never Used   Vaping Use   • Vaping Use: Never used   Substance and Sexual Activity   • Alcohol use: Not Currently   • Drug use: Never   • Sexual activity: Defer         Review of Systems   Constitutional: Negative for chills and fever.   HENT: Negative for ear discharge and nosebleeds.    Eyes: Negative for discharge and redness.   Cardiovascular: Negative for chest pain, orthopnea, palpitations, paroxysmal nocturnal dyspnea and syncope.   Respiratory: Positive for shortness of breath. Negative for cough and wheezing.    Endocrine: Negative for heat intolerance.   Skin: Negative for rash.   Musculoskeletal: Positive for arthritis and joint pain. Negative for myalgias.   Gastrointestinal: Negative for abdominal pain, melena, nausea and vomiting.   Genitourinary: Negative for dysuria and hematuria.   Neurological: Negative for dizziness, light-headedness, numbness and tremors.   Psychiatric/Behavioral: Negative for depression. The patient is not nervous/anxious.        Procedures    Procedures    ECG 12 Lead    (Results Pending)           Objective:    /62 (BP Location: Left arm, Patient  "Position: Sitting, Cuff Size: Large Adult)   Pulse 60   Ht 173.4 cm (68.27\")   Wt 93 kg (205 lb)   SpO2 98%   BMI 30.93 kg/m²         Constitutional:       Appearance: Well-developed.   Eyes:      General: No scleral icterus.        Right eye: No discharge.   HENT:      Head: Normocephalic and atraumatic.   Neck:      Thyroid: No thyromegaly.      Lymphadenopathy: No cervical adenopathy.   Pulmonary:      Effort: Pulmonary effort is normal. No respiratory distress.      Breath sounds: Normal breath sounds. No wheezing. No rales.   Cardiovascular:      Normal rate. Regular rhythm.      No gallop.   Abdominal:      Tenderness: There is no abdominal tenderness.   Skin:     Findings: No erythema or rash.   Neurological:      Mental Status: Alert and oriented to person, place, and time.             Assessment:       Diagnosis Plan   1. TIA (transient ischemic attack)     2. Cerebrovascular accident (CVA), unspecified mechanism (HCC)     3. Mixed hyperlipidemia     4. Heart murmur     5. Essential hypertension  Case Request Cath Lab: Left Heart Cath    CBC (No Diff)    Basic Metabolic Panel    Protime-INR    aPTT    ECG 12 Lead   6. Type 2 diabetes mellitus without complication, without long-term current use of insulin (HCC)  Case Request Cath Lab: Left Heart Cath    CBC (No Diff)    Basic Metabolic Panel    Protime-INR    aPTT    ECG 12 Lead   7. Aortic stenosis, severe  Case Request Cath Lab: Left Heart Cath    CBC (No Diff)    Basic Metabolic Panel    Protime-INR    aPTT    ECG 12 Lead   8. Exertional shortness of breath   aPTT            Plan:       MDM:    1.  Severe aortic stenosis:    I would recommend to proceed with cardiac catheterization.  CHRISTIANO confirmed severe aortic stenosis    2.  Hypertension:    Blood pressure is very well controlled    3.  Diabetes mellitus:    Patient is on Jardiance.    4.  Shortness of breath:    Most likely cause of shortness of breath is underlying severe aortic stenosis.  " Further recommendation after results of cardiac catheterization

## 2022-09-13 NOTE — PROGRESS NOTES
Date of Office Visit: 2022  Encounter Provider: Dr. Steve Muhammad  Place of Service: [facility] CARDIOLOGY [place]  Patient Name: Alen Ratliff  :1940  Serenity Wren MD    Chief Complaint   Patient presents with   • Heart Murmur   • Stroke   • Hypertension   • Hyperlipidemia   • Diabetes   • Follow-up     History of Present Illness    I am pleased to see Mr. Rao in my office today as a follow-up.    As you know, patient is 82-year-old white gentleman whose past medical history is significant for hypertension, hyperlipidemia, diabetes mellitus, history of cardiac murmur, who came today for follow-up.    In 2020, patient was seen in my office for symptom of shortness of breath and relative bradycardia.  Patient also had symptom of occasional chest pain.  Patient underwent stress test which showed moderate inferior/septal fixed defect with small amount of joseph-infarct ischemia.  Echocardiogram showed normal left ventricular size and function and LVEF was 55 to 60%.  Mild LVH was noted.  Calcified aortic valve was noted with mild to moderate aortic valve stenosis with aortic valve area of 1.3 cm².  Holter monitor showed sinus bradycardia with episode of relative bradycardia.  No significant pause or heart rate less than 40 was noted.    In 2020, patient was admitted at [facility] with slurring of speech and possible TIA.  Neurological work-up showed high-grade stenosis of right carotid artery.  Patient underwent endarterectomy.  Postoperatively patient had atrial fibrillation with controlled heart rate.  Patient spontaneously cardioverted.  Patient was started on aspirin and Plavix.  Patient was discharged on event monitor.  Event monitor showed predominantly sinus rhythm.  Patient had average heart rate of 60 bpm.  No significant atrial fibrillation burden noted.    In 2022, patient underwent echocardiogram which showed EF of 55 to 60%.  Patient was  noted to have moderate to severe aortic regurgitation.  Peak aortic velocity was 3.8 m/s with peak gradient of 56 mmHg with mean gradient of 30 mmHg.    In August 2022, patient underwent CHRISTIANO and it showed EF of 60 to 65%.  Severe aortic stenosis with valve area of 0.59 cm.  Was noted.  Peak velocity was 4.3 m/s with peak gradient of 74 mmHg and mean gradient of 31 mmHg.    Patient came today for having further discussion.  He has appointment with Dr. Villafana on September 30, 2022.  I would recommend to proceed with cardiac catheterization which will be arranged on September 27, 2022.  Patient is complaining of mild shortness of breath and fatigue and decrease in exercise capacity.  Patient denies any chest pain.  No orthopnea PND no syncope or presyncope.    I would recommend to proceed with cardiac catheterization and depending on the results patient would go for further recommendation for either TAVR or SAVR.        Past Medical History:   Diagnosis Date   • Allergic rhinitis    • Diabetes (HCC)    • GERD (gastroesophageal reflux disease)    • Heart murmur    • Hyperlipidemia    • Hypertension    • Hypothyroidism    • Prostate cancer (HCC)    • Stroke (HCC)          Past Surgical History:   Procedure Laterality Date   • ANKLE OPEN REDUCTION INTERNAL FIXATION Left 12/16/2020    Procedure: ANKLE OPEN REDUCTION INTERNAL FIXATION;  Surgeon: CAROLE Vasquez DPM;  Location: [facility] OR;  Service: Podiatry;  Laterality: Left;   • BACK SURGERY  1994   • CAROTID ENDARTERECTOMY Right 12/14/2020    Procedure: CAROTID ENDARTERECTOMY;  Surgeon: Toby Fung MD;  Location: [facility] OR;  Service: Vascular;  Laterality: Right;   • COLONOSCOPY  08/25/2015   • PROSTATECTOMY  2001    due to cancer           Current Outpatient Medications:   •  amLODIPine (NORVASC) 10 MG tablet, TAKE 1 TABLET DAILY, Disp: 90 tablet, Rfl: 1  •  aspirin 81 MG chewable tablet, Chew 1 tablet Daily., Disp: 30 tablet, Rfl: 1  •  atorvastatin  (LIPITOR) 40 MG tablet, TAKE 1 TABLET DAILY, Disp: 90 tablet, Rfl: 1  •  fluticasone (FLONASE) 50 MCG/ACT nasal spray, 2 sprays into the nostril(s) as directed by provider Daily As Needed., Disp: , Rfl:   •  Januvia 50 MG tablet, TAKE 1 TABLET DAILY, Disp: 90 tablet, Rfl: 0  •  Jardiance 10 MG tablet tablet, TAKE 1 TABLET DAILY, Disp: 90 tablet, Rfl: 1  •  losartan (COZAAR) 50 MG tablet, TAKE 1 TABLET DAILY, Disp: 90 tablet, Rfl: 1  •  OneTouch Ultra test strip, USE AND DISCARD 1 TEST     STRIP DAILY., Disp: 100 each, Rfl: 1  •  pantoprazole (PROTONIX) 40 MG EC tablet, TAKE 1 TABLET DAILY, Disp: 90 tablet, Rfl: 1  •  Synthroid 88 MCG tablet, TAKE 1 TABLET DAILY, Disp: 90 tablet, Rfl: 0      Social History     Socioeconomic History   • Marital status:    Tobacco Use   • Smoking status: Never Smoker   • Smokeless tobacco: Never Used   Vaping Use   • Vaping Use: Never used   Substance and Sexual Activity   • Alcohol use: Not Currently   • Drug use: Never   • Sexual activity: Defer         Review of Systems   Constitutional: Negative for chills and fever.   HENT: Negative for ear discharge and nosebleeds.    Eyes: Negative for discharge and redness.   Cardiovascular: Negative for chest pain, orthopnea, palpitations, paroxysmal nocturnal dyspnea and syncope.   Respiratory: Positive for shortness of breath. Negative for cough and wheezing.    Endocrine: Negative for heat intolerance.   Skin: Negative for rash.   Musculoskeletal: Positive for arthritis and joint pain. Negative for myalgias.   Gastrointestinal: Negative for abdominal pain, melena, nausea and vomiting.   Genitourinary: Negative for dysuria and hematuria.   Neurological: Negative for dizziness, light-headedness, numbness and tremors.   Psychiatric/Behavioral: Negative for depression. The patient is not nervous/anxious.        Procedures    Procedures    ECG 12 Lead    (Results Pending)           Objective:    /62 (BP Location: Left arm, Patient  "Position: Sitting, Cuff Size: Large Adult)   Pulse 60   Ht 173.4 cm (68.27\")   Wt 93 kg (205 lb)   SpO2 98%   BMI 30.93 kg/m²         Constitutional:       Appearance: Well-developed.   Eyes:      General: No scleral icterus.        Right eye: No discharge.   HENT:      Head: Normocephalic and atraumatic.   Neck:      Thyroid: No thyromegaly.      Lymphadenopathy: No cervical adenopathy.   Pulmonary:      Effort: Pulmonary effort is normal. No respiratory distress.      Breath sounds: Normal breath sounds. No wheezing. No rales.   Cardiovascular:      Normal rate. Regular rhythm.      No gallop.   Abdominal:      Tenderness: There is no abdominal tenderness.   Skin:     Findings: No erythema or rash.   Neurological:      Mental Status: Alert and oriented to person, place, and time.             Assessment:       Diagnosis Plan   1. TIA (transient ischemic attack)     2. Cerebrovascular accident (CVA), unspecified mechanism (HCC)     3. Mixed hyperlipidemia     4. Heart murmur     5. Essential hypertension  Case Request Cath Lab: Left Heart Cath    CBC (No Diff)    Basic Metabolic Panel    Protime-INR    aPTT    ECG 12 Lead   6. Type 2 diabetes mellitus without complication, without long-term current use of insulin (HCC)  Case Request Cath Lab: Left Heart Cath    CBC (No Diff)    Basic Metabolic Panel    Protime-INR    aPTT    ECG 12 Lead   7. Aortic stenosis, severe  Case Request Cath Lab: Left Heart Cath    CBC (No Diff)    Basic Metabolic Panel    Protime-INR    aPTT    ECG 12 Lead   8. Exertional shortness of breath   aPTT            Plan:       MDM:    1.  Severe aortic stenosis:    I would recommend to proceed with cardiac catheterization.  CHRISTIANO confirmed severe aortic stenosis    2.  Hypertension:    Blood pressure is very well controlled    3.  Diabetes mellitus:    Patient is on Jardiance.    4.  Shortness of breath:    Most likely cause of shortness of breath is underlying severe aortic stenosis.  " Further recommendation after results of cardiac catheterization

## 2022-09-14 ENCOUNTER — OFFICE VISIT (OUTPATIENT)
Dept: CARDIOLOGY | Facility: CLINIC | Age: 82
End: 2022-09-14

## 2022-09-14 VITALS
BODY MASS INDEX: 31.07 KG/M2 | SYSTOLIC BLOOD PRESSURE: 124 MMHG | WEIGHT: 205 LBS | HEIGHT: 68 IN | DIASTOLIC BLOOD PRESSURE: 62 MMHG | OXYGEN SATURATION: 98 % | HEART RATE: 60 BPM

## 2022-09-14 DIAGNOSIS — I63.9 CEREBROVASCULAR ACCIDENT (CVA), UNSPECIFIED MECHANISM: ICD-10-CM

## 2022-09-14 DIAGNOSIS — G45.9 TIA (TRANSIENT ISCHEMIC ATTACK): Primary | ICD-10-CM

## 2022-09-14 DIAGNOSIS — I35.0 AORTIC STENOSIS, SEVERE: ICD-10-CM

## 2022-09-14 DIAGNOSIS — R01.1 HEART MURMUR: ICD-10-CM

## 2022-09-14 DIAGNOSIS — E11.9 TYPE 2 DIABETES MELLITUS WITHOUT COMPLICATION, WITHOUT LONG-TERM CURRENT USE OF INSULIN: ICD-10-CM

## 2022-09-14 DIAGNOSIS — R06.02 EXERTIONAL SHORTNESS OF BREATH: ICD-10-CM

## 2022-09-14 DIAGNOSIS — E78.2 MIXED HYPERLIPIDEMIA: ICD-10-CM

## 2022-09-14 DIAGNOSIS — I10 ESSENTIAL HYPERTENSION: ICD-10-CM

## 2022-09-14 PROCEDURE — 99214 OFFICE O/P EST MOD 30 MIN: CPT | Performed by: INTERNAL MEDICINE

## 2022-09-23 RX ORDER — SITAGLIPTIN 50 MG/1
TABLET, FILM COATED ORAL
Qty: 90 TABLET | Refills: 0 | Status: SHIPPED | OUTPATIENT
Start: 2022-09-23 | End: 2022-12-22

## 2022-09-24 ENCOUNTER — HOSPITAL ENCOUNTER (OUTPATIENT)
Dept: CARDIOLOGY | Facility: HOSPITAL | Age: 82
Discharge: HOME | End: 2022-09-24

## 2022-09-24 ENCOUNTER — LAB (OUTPATIENT)
Dept: LAB | Facility: HOSPITAL | Age: 82
End: 2022-09-24

## 2022-09-24 DIAGNOSIS — I10 ESSENTIAL HYPERTENSION: ICD-10-CM

## 2022-09-24 DIAGNOSIS — I35.0 AORTIC STENOSIS, SEVERE: ICD-10-CM

## 2022-09-24 DIAGNOSIS — R06.02 EXERTIONAL SHORTNESS OF BREATH: ICD-10-CM

## 2022-09-24 DIAGNOSIS — E11.9 TYPE 2 DIABETES MELLITUS WITHOUT COMPLICATION, WITHOUT LONG-TERM CURRENT USE OF INSULIN: ICD-10-CM

## 2022-09-24 LAB
ANION GAP SERPL CALCULATED.3IONS-SCNC: 11 MMOL/L (ref 5–15)
APTT PPP: 26.5 SECONDS (ref 24–31)
BUN SERPL-MCNC: 20 MG/DL (ref 8–23)
BUN/CREAT SERPL: 19 (ref 7–25)
CALCIUM SPEC-SCNC: 9.2 MG/DL (ref 8.6–10.5)
CHLORIDE SERPL-SCNC: 107 MMOL/L (ref 98–107)
CO2 SERPL-SCNC: 24 MMOL/L (ref 22–29)
CREAT SERPL-MCNC: 1.05 MG/DL (ref 0.76–1.27)
DEPRECATED RDW RBC AUTO: 46.9 FL (ref 37–54)
EGFRCR SERPLBLD CKD-EPI 2021: 70.4 ML/MIN/1.73
ERYTHROCYTE [DISTWIDTH] IN BLOOD BY AUTOMATED COUNT: 13.1 % (ref 12.3–15.4)
GLUCOSE SERPL-MCNC: 136 MG/DL (ref 65–99)
HCT VFR BLD AUTO: 45.1 % (ref 37.5–51)
HGB BLD-MCNC: 14.8 G/DL (ref 13–17.7)
INR PPP: 1.03 (ref 0.93–1.1)
MCH RBC QN AUTO: 31.6 PG (ref 26.6–33)
MCHC RBC AUTO-ENTMCNC: 32.8 G/DL (ref 31.5–35.7)
MCV RBC AUTO: 96.2 FL (ref 79–97)
PLATELET # BLD AUTO: 169 10*3/MM3 (ref 140–450)
PMV BLD AUTO: 11.2 FL (ref 6–12)
POTASSIUM SERPL-SCNC: 3.9 MMOL/L (ref 3.5–5.2)
PROTHROMBIN TIME: 10.6 SECONDS (ref 9.6–11.7)
RBC # BLD AUTO: 4.69 10*6/MM3 (ref 4.14–5.8)
SARS-COV-2 ORF1AB RESP QL NAA+PROBE: NOT DETECTED
SODIUM SERPL-SCNC: 142 MMOL/L (ref 136–145)
WBC NRBC COR # BLD: 6.09 10*3/MM3 (ref 3.4–10.8)

## 2022-09-24 PROCEDURE — 85730 THROMBOPLASTIN TIME PARTIAL: CPT

## 2022-09-24 PROCEDURE — 85027 COMPLETE CBC AUTOMATED: CPT

## 2022-09-24 PROCEDURE — 93005 ELECTROCARDIOGRAM TRACING: CPT | Performed by: INTERNAL MEDICINE

## 2022-09-24 PROCEDURE — 85610 PROTHROMBIN TIME: CPT

## 2022-09-24 PROCEDURE — U0004 COV-19 TEST NON-CDC HGH THRU: HCPCS

## 2022-09-24 PROCEDURE — 80048 BASIC METABOLIC PNL TOTAL CA: CPT

## 2022-09-24 PROCEDURE — 93010 ELECTROCARDIOGRAM REPORT: CPT | Performed by: INTERNAL MEDICINE

## 2022-09-24 PROCEDURE — C9803 HOPD COVID-19 SPEC COLLECT: HCPCS

## 2022-09-24 PROCEDURE — 36415 COLL VENOUS BLD VENIPUNCTURE: CPT

## 2022-09-27 ENCOUNTER — HOSPITAL ENCOUNTER (OUTPATIENT)
Facility: HOSPITAL | Age: 82
Setting detail: HOSPITAL OUTPATIENT SURGERY
Discharge: HOME | End: 2022-09-27
Attending: INTERNAL MEDICINE | Admitting: INTERNAL MEDICINE

## 2022-09-27 VITALS
SYSTOLIC BLOOD PRESSURE: 146 MMHG | HEART RATE: 56 BPM | BODY MASS INDEX: 30.56 KG/M2 | RESPIRATION RATE: 16 BRPM | DIASTOLIC BLOOD PRESSURE: 69 MMHG | TEMPERATURE: 98.4 F | OXYGEN SATURATION: 96 % | HEIGHT: 69 IN | WEIGHT: 206.35 LBS

## 2022-09-27 DIAGNOSIS — E11.9 TYPE 2 DIABETES MELLITUS WITHOUT COMPLICATION, WITHOUT LONG-TERM CURRENT USE OF INSULIN: ICD-10-CM

## 2022-09-27 DIAGNOSIS — I10 ESSENTIAL HYPERTENSION: ICD-10-CM

## 2022-09-27 DIAGNOSIS — I35.0 AORTIC STENOSIS, SEVERE: ICD-10-CM

## 2022-09-27 LAB
GLUCOSE BLDC GLUCOMTR-MCNC: 111 MG/DL (ref 70–105)
GLUCOSE BLDC GLUCOMTR-MCNC: 137 MG/DL (ref 70–105)

## 2022-09-27 PROCEDURE — 25010000002 FENTANYL CITRATE (PF) 100 MCG/2ML SOLUTION: Performed by: INTERNAL MEDICINE

## 2022-09-27 PROCEDURE — 25010000002 MIDAZOLAM PER 1 MG: Performed by: INTERNAL MEDICINE

## 2022-09-27 PROCEDURE — C1894 INTRO/SHEATH, NON-LASER: HCPCS | Performed by: INTERNAL MEDICINE

## 2022-09-27 PROCEDURE — 99153 MOD SED SAME PHYS/QHP EA: CPT | Performed by: INTERNAL MEDICINE

## 2022-09-27 PROCEDURE — 99152 MOD SED SAME PHYS/QHP 5/>YRS: CPT | Performed by: INTERNAL MEDICINE

## 2022-09-27 PROCEDURE — C1769 GUIDE WIRE: HCPCS | Performed by: INTERNAL MEDICINE

## 2022-09-27 PROCEDURE — 82962 GLUCOSE BLOOD TEST: CPT

## 2022-09-27 PROCEDURE — 0 IOPAMIDOL PER 1 ML: Performed by: INTERNAL MEDICINE

## 2022-09-27 PROCEDURE — 93458 L HRT ARTERY/VENTRICLE ANGIO: CPT | Performed by: INTERNAL MEDICINE

## 2022-09-27 RX ORDER — FENTANYL CITRATE 50 UG/ML
INJECTION, SOLUTION INTRAMUSCULAR; INTRAVENOUS AS NEEDED
Status: DISCONTINUED | OUTPATIENT
Start: 2022-09-27 | End: 2022-09-27 | Stop reason: HOSPADM

## 2022-09-27 RX ORDER — SODIUM CHLORIDE 9 MG/ML
250 INJECTION, SOLUTION INTRAVENOUS ONCE AS NEEDED
Status: DISCONTINUED | OUTPATIENT
Start: 2022-09-27 | End: 2022-09-27 | Stop reason: HOSPADM

## 2022-09-27 RX ORDER — MIDAZOLAM HYDROCHLORIDE 1 MG/ML
INJECTION INTRAMUSCULAR; INTRAVENOUS AS NEEDED
Status: DISCONTINUED | OUTPATIENT
Start: 2022-09-27 | End: 2022-09-27 | Stop reason: HOSPADM

## 2022-09-27 RX ORDER — LIDOCAINE HYDROCHLORIDE 20 MG/ML
INJECTION, SOLUTION INFILTRATION; PERINEURAL AS NEEDED
Status: DISCONTINUED | OUTPATIENT
Start: 2022-09-27 | End: 2022-09-27 | Stop reason: HOSPADM

## 2022-09-27 RX ORDER — ONDANSETRON 2 MG/ML
4 INJECTION INTRAMUSCULAR; INTRAVENOUS EVERY 6 HOURS PRN
Status: DISCONTINUED | OUTPATIENT
Start: 2022-09-27 | End: 2022-09-27 | Stop reason: HOSPADM

## 2022-09-27 RX ORDER — DIPHENHYDRAMINE HCL 25 MG
25 CAPSULE ORAL EVERY 6 HOURS PRN
Status: DISCONTINUED | OUTPATIENT
Start: 2022-09-27 | End: 2022-09-27 | Stop reason: HOSPADM

## 2022-09-27 RX ORDER — ACETAMINOPHEN 325 MG/1
650 TABLET ORAL EVERY 4 HOURS PRN
Status: DISCONTINUED | OUTPATIENT
Start: 2022-09-27 | End: 2022-09-27 | Stop reason: HOSPADM

## 2022-09-27 RX ORDER — ALUMINA, MAGNESIA, AND SIMETHICONE 2400; 2400; 240 MG/30ML; MG/30ML; MG/30ML
15 SUSPENSION ORAL EVERY 6 HOURS PRN
Status: DISCONTINUED | OUTPATIENT
Start: 2022-09-27 | End: 2022-09-27 | Stop reason: HOSPADM

## 2022-09-27 RX ORDER — ONDANSETRON 4 MG/1
4 TABLET, FILM COATED ORAL EVERY 6 HOURS PRN
Status: DISCONTINUED | OUTPATIENT
Start: 2022-09-27 | End: 2022-09-27 | Stop reason: HOSPADM

## 2022-09-27 NOTE — DISCHARGE INSTRUCTIONS
Post Cath Instructions      Specific Physician Instructions: ***    Drink plenty of fluids for the next 24 hours.  This helps to eliminate the dye used in your procedure through urination.  You may resume a normal diet; however, try to avoid foods that would cause gas or constipation.    Sedative medication given to you during your catheterization may decrease your judgement and reaction time for up to 24-48 hours.  Therefore:  DO NOT drive or operate hazardous machinery (48 hours)  DO NOT consume alcoholic beverages  DO NOT make any important/legal decisions  Have someone stay with you for at least 24 hours    To allow proper healing and prevent bleeding, the following activities are to be strictly avoided for the next 24-48 hours:  Excessive bending at wound site  Straining (anything that would tense up muscles around the affected puncture site)  Lifting objects greater than 5 pounds, pushing, or pulling for 5 days  For Groin Cases:  Refrain from sexual activity  Refrain from running or vigorous walking  No prolonged sitting or standing  Limit stair climbing as much as possible    Keep the puncture site clean and dry.  You may remove the dressing tomorrow and replace it with a band-aid for at least one additional day.  Gently clean the site with mild soap and water.  No scrubbing/rubbing and lightly pat the area dry.  Showers are acceptable; however, avoid submerging in water (tub baths, hot tubs, swimming pools, dishwater, etc…) for at least one week.  The site should be completely healed before resuming these activities to reduce the risk of infection.  Check the site often.  Watch for signs and symptoms of infection and notify your physician if any of the following occur:  Bleeding or an increase in swelling at the puncture site  Fever  Increased soreness around puncture site  Foul odor or significant drainage from the puncture site  Swelling, redness, or warmth at the puncture site    **A bruise or small “pea  sized” lump under the skin at the puncture site is not unusual.  This should disappear within 3-4 weeks.**  CONTACT YOUR PHYSICIAN OR CALL 911 IF YOU EXPERIENCE ANY OF THE FOLLOWING:  Increased angina (chest pain) or frequent sensations of pressure, burning, pain, or other discomfort in the chest, arm, jaws, or stomach  Lightheadedness, dizziness, faint feeling, sweating, or difficulty breathing  Odd sensation changes like numbness, tingling, coldness, or pain in the arm or leg in which the catheter was inserted  Limb in which the catheter was inserted becomes pale/bluish in color    IMPORTANT:  Although this occurs very rarely, if you should develop bright red or excessive bleeding, feel a “pop” inside at the insertion site, or notice a sudden increase in swelling larger than a walnut, you should call 911.  Hold continuous firm pressure to the access site until emergency personnel arrive.  It is best if someone else can do this for you.

## 2022-09-28 ENCOUNTER — TELEPHONE (OUTPATIENT)
Dept: FAMILY MEDICINE CLINIC | Facility: CLINIC | Age: 82
End: 2022-09-28

## 2022-09-28 DIAGNOSIS — E11.9 TYPE 2 DIABETES MELLITUS WITHOUT COMPLICATION, WITHOUT LONG-TERM CURRENT USE OF INSULIN: ICD-10-CM

## 2022-09-28 DIAGNOSIS — E03.9 ACQUIRED HYPOTHYROIDISM: ICD-10-CM

## 2022-09-28 DIAGNOSIS — E78.2 MIXED HYPERLIPIDEMIA: ICD-10-CM

## 2022-09-28 DIAGNOSIS — I10 ESSENTIAL HYPERTENSION: Primary | ICD-10-CM

## 2022-09-29 ENCOUNTER — OFFICE VISIT (OUTPATIENT)
Dept: CARDIAC SURGERY | Facility: CLINIC | Age: 82
End: 2022-09-29

## 2022-09-29 ENCOUNTER — OFFICE (OUTPATIENT)
Dept: URBAN - METROPOLITAN AREA CLINIC 64 | Facility: CLINIC | Age: 82
End: 2022-09-29

## 2022-09-29 ENCOUNTER — TELEPHONE (OUTPATIENT)
Dept: FAMILY MEDICINE CLINIC | Facility: CLINIC | Age: 82
End: 2022-09-29

## 2022-09-29 VITALS
SYSTOLIC BLOOD PRESSURE: 133 MMHG | BODY MASS INDEX: 28.56 KG/M2 | HEIGHT: 71 IN | DIASTOLIC BLOOD PRESSURE: 66 MMHG | WEIGHT: 204 LBS | TEMPERATURE: 97.3 F | OXYGEN SATURATION: 98 % | HEART RATE: 56 BPM | RESPIRATION RATE: 20 BRPM

## 2022-09-29 VITALS
SYSTOLIC BLOOD PRESSURE: 142 MMHG | DIASTOLIC BLOOD PRESSURE: 70 MMHG | WEIGHT: 206 LBS | HEIGHT: 71 IN | HEART RATE: 61 BPM

## 2022-09-29 DIAGNOSIS — I73.9 CLAUDICATION: ICD-10-CM

## 2022-09-29 DIAGNOSIS — I35.0 AORTIC STENOSIS, SEVERE: ICD-10-CM

## 2022-09-29 DIAGNOSIS — E11.42 DM TYPE 2 WITH DIABETIC PERIPHERAL NEUROPATHY: ICD-10-CM

## 2022-09-29 DIAGNOSIS — Z86.010 PERSONAL HISTORY OF COLONIC POLYPS: ICD-10-CM

## 2022-09-29 DIAGNOSIS — Z01.818 PREOP TESTING: ICD-10-CM

## 2022-09-29 DIAGNOSIS — R01.1 HEART MURMUR: ICD-10-CM

## 2022-09-29 DIAGNOSIS — I63.9 CEREBROVASCULAR ACCIDENT (CVA), UNSPECIFIED MECHANISM: ICD-10-CM

## 2022-09-29 DIAGNOSIS — E78.2 MIXED HYPERLIPIDEMIA: ICD-10-CM

## 2022-09-29 DIAGNOSIS — I65.23 SYMPTOMATIC CAROTID ARTERY STENOSIS, BILATERAL: ICD-10-CM

## 2022-09-29 DIAGNOSIS — K21.9 GASTROESOPHAGEAL REFLUX DISEASE, UNSPECIFIED WHETHER ESOPHAGITIS PRESENT: ICD-10-CM

## 2022-09-29 DIAGNOSIS — R06.00 DYSPNEA, UNSPECIFIED TYPE: ICD-10-CM

## 2022-09-29 DIAGNOSIS — G45.9 TIA (TRANSIENT ISCHEMIC ATTACK): ICD-10-CM

## 2022-09-29 DIAGNOSIS — I10 ESSENTIAL HYPERTENSION: Primary | ICD-10-CM

## 2022-09-29 DIAGNOSIS — E11.9 TYPE 2 DIABETES MELLITUS WITHOUT COMPLICATION, WITHOUT LONG-TERM CURRENT USE OF INSULIN: ICD-10-CM

## 2022-09-29 DIAGNOSIS — N18.31 STAGE 3A CHRONIC KIDNEY DISEASE: ICD-10-CM

## 2022-09-29 DIAGNOSIS — I25.10 CORONARY ARTERY STENOSIS: ICD-10-CM

## 2022-09-29 DIAGNOSIS — E03.9 ACQUIRED HYPOTHYROIDISM: ICD-10-CM

## 2022-09-29 PROCEDURE — 99205 OFFICE O/P NEW HI 60 MIN: CPT | Performed by: THORACIC SURGERY (CARDIOTHORACIC VASCULAR SURGERY)

## 2022-09-29 PROCEDURE — 99204 OFFICE O/P NEW MOD 45 MIN: CPT | Performed by: INTERNAL MEDICINE

## 2022-09-29 NOTE — TELEPHONE ENCOUNTER
Hub staff attempted to follow warm transfer process and was unsuccessful     Caller: DR DRESSLER    Relationship to patient: [facility]    Best call back number:[phone]    Patient is needing: DOCTOR IS REQUESTING LAST COLONOSCOPY.  PATIENT IS AT THE CLINIC.

## 2022-09-30 LAB — QT INTERVAL: 437 MS

## 2022-10-01 NOTE — PROGRESS NOTES
10/1/2022    Seen on 9/29/2022    Chief Complaint     Dyspnea, evaluation of aortic stenosis and coronary artery disease    History of Present Illness:       Dear  No ref. provider found and Colleagues,  It was nice to see Alen MAGALI Ratliff in consultation at your request. He is a 82 y.o. male with  who has developed hypertension, hyperlipidemia, prostatic cancer, CKD 3, hypothyroidism, GERD unknowns cerebrovascular disease status post right carotid endarterectomy and residual asymptomatic moderate to severe left internal carotid artery stenosis who has developed progressive shortness of breath and relative bradycardia.  He states that he also has occasional chest tightness.  He underwent a stress test which showed moderate inferior/septal fixed defect with small amount of joseph-infarct ischemia.  The ejection fraction was around 55 to 60%.  There was mild LVH.  The aortic valve was stenotic with a valve area 1.3 cm².  The studies were performed in August 2020.  At that time he had a Holter monitoring that showed sinus bradycardia with an episode relative bradycardia.  No significant pauses were noticed.  In December 2020 he was admitted to the hospital with slurring speech and possible TIA.  He had a high-grade stenosis of the right carotid artery.  He underwent endarterectomy.  He had atrial fibrillation postoperatively.  The patient cardioverted spontaneously.  Kept on aspirin and Plavix. He denies syncope, TIA, orthopnea or PND.  In July 2020, he underwent follow-up echocardiogram that showed an ejection fraction of 55% with moderate to severe aortic stenosis with a peak velocity of 3.8 years/s, peak gradient of 56 m of mercury and a mean gradient of 30 mmHg.  A CHRISTIANO follow in August 2022 that showed severe aortic stenosis with a valve area of point 6 cm², peak velocity of 4.3 m/s, peak gradient of 74 mmHg and mean gradient of 31 mmHg.  For completion he had a cardiac cath that showed a 70% proximal LAD  stenosis, 99% proximal left circumflex stenosis and 100% RCA with collaterals from left to right.  He has no family history of aneurysms, dissections or connective tissue disorders.      Patient Active Problem List   Diagnosis   • Heart murmur   • Stroke (cerebrum) ([facility])   • Essential hypertension   • Mixed hyperlipidemia   • Acquired hypothyroidism   • Type 2 diabetes mellitus, without long-term current use of insulin ([facility])   • GERD (gastroesophageal reflux disease)   • Seasonal allergic rhinitis due to pollen   • CKD (chronic kidney disease) stage 3, GFR 30-59 ml/min ([facility])   • History of prostate cancer   • TIA (transient ischemic attack)   • Symptomatic carotid artery stenosis, bilateral   • Closed displaced fracture of lateral malleolus of left fibula   • Epistaxis   • Medicare annual wellness visit, subsequent   • Trigger index finger of left hand   • Colon cancer screening   • DM type 2 with diabetic peripheral neuropathy ([facility])   • Aortic stenosis, severe       Past Medical History:   Diagnosis Date   • Allergic rhinitis    • Aortic stenosis    • Diabetes ([facility])    • GERD (gastroesophageal reflux disease)    • Heart murmur    • Hyperlipidemia    • Hypertension    • Hypothyroidism    • Prostate cancer ([facility])    • Stroke ([facility])        Past Surgical History:   Procedure Laterality Date   • ANKLE OPEN REDUCTION INTERNAL FIXATION Left 12/16/2020    Procedure: ANKLE OPEN REDUCTION INTERNAL FIXATION;  Surgeon: CAROLE Vasquez DPM;  Location: [facility] MAIN OR;  Service: Podiatry;  Laterality: Left;   • BACK SURGERY  1994   • CARDIAC CATHETERIZATION Right 9/27/2022    Procedure: Left Heart Cath;  Surgeon: Steve Muhammad MD;  Location: [facility] CATH INVASIVE LOCATION;  Service: Cardiovascular;  Laterality: Right;   • CAROTID ENDARTERECTOMY Right 12/14/2020    Procedure: CAROTID ENDARTERECTOMY;  Surgeon: Toby Fung MD;  Location: [facility] MAIN OR;  Service: Vascular;  Laterality: Right;   • COLONOSCOPY  08/25/2015   •  PROSTATECTOMY  2001    due to cancer       Allergies   Allergen Reactions   • Azithromycin Unknown - High Severity   • Tramadol Unknown - High Severity         Current Outpatient Medications:   •  amLODIPine (NORVASC) 10 MG tablet, TAKE 1 TABLET DAILY, Disp: 90 tablet, Rfl: 1  •  aspirin 81 MG chewable tablet, Chew 1 tablet Daily., Disp: 30 tablet, Rfl: 1  •  atorvastatin (LIPITOR) 40 MG tablet, TAKE 1 TABLET DAILY, Disp: 90 tablet, Rfl: 1  •  fluticasone (FLONASE) 50 MCG/ACT nasal spray, 2 sprays into the nostril(s) as directed by provider Daily As Needed., Disp: , Rfl:   •  Januvia 50 MG tablet, TAKE 1 TABLET DAILY, Disp: 90 tablet, Rfl: 0  •  Jardiance 10 MG tablet tablet, TAKE 1 TABLET DAILY, Disp: 90 tablet, Rfl: 1  •  losartan (COZAAR) 50 MG tablet, TAKE 1 TABLET DAILY, Disp: 90 tablet, Rfl: 1  •  OneTouch Ultra test strip, USE AND DISCARD 1 TEST     STRIP DAILY., Disp: 100 each, Rfl: 1  •  pantoprazole (PROTONIX) 40 MG EC tablet, TAKE 1 TABLET DAILY, Disp: 90 tablet, Rfl: 1  •  Synthroid 88 MCG tablet, TAKE 1 TABLET DAILY, Disp: 90 tablet, Rfl: 0    Social History     Socioeconomic History   • Marital status:    Tobacco Use   • Smoking status: Never Smoker   • Smokeless tobacco: Never Used   Vaping Use   • Vaping Use: Never used   Substance and Sexual Activity   • Alcohol use: Not Currently   • Drug use: Never       Family History   Problem Relation Age of Onset   • Hypertension Other    • Heart attack Mother    • Heart disease Mother    • Heart attack Father    • Heart disease Father      Review of Systems   Constitutional: Positive for fatigue.   Respiratory: Positive for chest tightness and shortness of breath.    Cardiovascular: Negative for chest pain, palpitations and leg swelling.   Genitourinary: Positive for difficulty urinating. Negative for flank pain.   Neurological: Negative for dizziness, syncope and weakness.   Psychiatric/Behavioral: Negative for agitation and confusion.   All other  systems reviewed and are negative.      Physical Exam:    Vital Signs:  Weight: 92.5 kg (204 lb)   Body mass index is 28.45 kg/m².  Temp: 97.3 °F (36.3 °C)   Heart Rate: 56   BP: 133/66     Constitutional:       Appearance: Well-developed.   Eyes:      Conjunctiva/sclera: Conjunctivae normal.      Pupils: Pupils are equal, round, and reactive to light.   HENT:      Head: Normocephalic and atraumatic.      Nose: Nose normal.   Neck:      Thyroid: No thyromegaly.      Vascular: No JVD.      Lymphadenopathy: No cervical adenopathy.   Pulmonary:      Effort: Pulmonary effort is normal.      Breath sounds: Normal breath sounds. No rales.   Cardiovascular:      PMI at left midclavicular line. Normal rate. Regular rhythm.      Murmurs: There is a grade 3/6 harsh midsystolic murmur at the URSB, radiating to the neck.      No gallop.   Pulses:     Intact distal pulses. No decreased pulses.   Edema:     Peripheral edema present.     Ankle: bilateral trace edema of the ankle.  Abdominal:      General: Bowel sounds are normal. There is no distension.      Palpations: Abdomen is soft. There is no abdominal mass.      Tenderness: There is no abdominal tenderness.   Musculoskeletal: Normal range of motion.         General: No tenderness or deformity.      Cervical back: Normal range of motion and neck supple. Skin:     General: Skin is warm and dry.      Findings: No erythema or rash.   Neurological:      Mental Status: Alert and oriented to person, place, and time.      Deep Tendon Reflexes: Reflexes are normal and symmetric.   Psychiatric:         Behavior: Behavior normal.     CEA scar in the right neck well-healed     Assessment:     Problems Addressed this Visit        Cardiac and Vasculature    Essential hypertension - Primary    Mixed hyperlipidemia    Symptomatic carotid artery stenosis, bilateral    Aortic stenosis, severe       Endocrine and Metabolic    Acquired hypothyroidism    Type 2 diabetes mellitus, without  long-term current use of insulin (HCC)    DM type 2 with diabetic peripheral neuropathy ([facility]CC)       Gastrointestinal Abdominal     GERD (gastroesophageal reflux disease)       Genitourinary and Reproductive     CKD (chronic kidney disease) stage 3, GFR 30-59 ml/min ([facility])       Neuro    Stroke (cerebrum) ([facility])    TIA (transient ischemic attack)      Diagnoses       Codes Comments    Essential hypertension    -  Primary ICD-10-CM: I10  ICD-9-CM: 401.9     Mixed hyperlipidemia     ICD-10-CM: E78.2  ICD-9-CM: 272.2     Symptomatic carotid artery stenosis, bilateral     ICD-10-CM: I65.23  ICD-9-CM: 433.10, 433.30     Aortic stenosis, severe     ICD-10-CM: I35.0  ICD-9-CM: 424.1     Type 2 diabetes mellitus without complication, without long-term current use of insulin ([facility])     ICD-10-CM: E11.9  ICD-9-CM: 250.00     DM type 2 with diabetic peripheral neuropathy ([facility])     ICD-10-CM: E11.42  ICD-9-CM: 250.60, 357.2     Gastroesophageal reflux disease, unspecified whether esophagitis present     ICD-10-CM: K21.9  ICD-9-CM: 530.81     Stage 3a chronic kidney disease ([facility])     ICD-10-CM: N18.31  ICD-9-CM: 585.3     Cerebrovascular accident (CVA), unspecified mechanism ([facility])     ICD-10-CM: I63.9  ICD-9-CM: 434.91     TIA (transient ischemic attack)     ICD-10-CM: G45.9  ICD-9-CM: 435.9     Acquired hypothyroidism     ICD-10-CM: E03.9  ICD-9-CM: 244.9           Assessment/recommendation:     Severe nonrheumatic calcific aortic stenosis, symptomatic with progression of dyspnea.  Has objective evidence of severity and progression of symptoms.  He also has multivessel coronary artery disease with bifurcation lesions and 100% occluded RCA with collaterals.  He has active ischemia in the inferior wall and joseph-infarction ischemia as well.  He has an episode of atrial fibrillation 2 years ago postoperatively that resolved with medications but no intermittent or establish atrial fibrillation.  He does have bradycardia and occasionally  lightheadedness.  He has CKD 3 with a highest creatinine of 1.4 within the last year but the most recent is 1.0.  He is diabetic but without insulin use.  He has cerebrovascular disease and a recent right carotid endarterectomy and he has residual moderate to severe disease on the left side.  He has multiple factors for increased comorbidity after the heart operation but unfortunately without a complete revascularization I believe his symptoms can continue.  It is not clear to me whether he has calcification of the ascending aorta, and if that is the case then surgery should be reconsidered.  As now, I recommend biologic aortic valve replacement, CABG x3 with JOSEFA and left atrial appendage ligation and aggressive management to prevent atrial fibrillation postoperatively.  The indication of surgery is based to prevent adverse events, symptomatic relief and to prolong survival.  I quoted an operative mortality of less than 4% and risk of complications of less than 10 to 15%.  Increased risk of stroke and renal insufficiency given his comorbidities.  Ascending aorta is calcific then TAVR and high risk PCI should be considered, but I am not sure that she will be the first option.  He will need pulmonary function tests, room air ABG and a chest CT without contrast and at that point I will complete the recommendation.  I explained to the patient and family the findings, my recommendation and the plan of evaluation to complete the recommendation hopefully in the next week or so.  They verbalized understanding and they wish to proceed      Thank you for allowing me to participate in his care.    Regards,    Fabio Villafana M.D.    I spent over 65 minutes before, during and after the office visit in reviewing the records, examining the patient, reviewing and interpreting myself the echocardiogram, the CHRISTIANO, the cardiac cath, previous CTAs of the neck and head, and other information, spent time in discussing the findings and  options with the patient and family, discussing my recommendation of combined AVR CABG, discussing the risk and benefits and complications as clear in the STS PROM tool, and also spent time in documenting in the electronic record

## 2022-10-03 ENCOUNTER — LAB (OUTPATIENT)
Dept: FAMILY MEDICINE CLINIC | Facility: CLINIC | Age: 82
End: 2022-10-03

## 2022-10-03 LAB
ALBUMIN SERPL-MCNC: 4.3 G/DL (ref 3.5–5.2)
ALBUMIN UR-MCNC: 29.6 MG/DL
ALBUMIN/GLOB SERPL: 1.9 G/DL
ALP SERPL-CCNC: 59 U/L (ref 39–117)
ALT SERPL W P-5'-P-CCNC: 32 U/L (ref 1–41)
ANION GAP SERPL CALCULATED.3IONS-SCNC: 12.7 MMOL/L (ref 5–15)
AST SERPL-CCNC: 27 U/L (ref 1–40)
BILIRUB SERPL-MCNC: 0.6 MG/DL (ref 0–1.2)
BUN SERPL-MCNC: 24 MG/DL (ref 8–23)
BUN/CREAT SERPL: 19.7 (ref 7–25)
CALCIUM SPEC-SCNC: 9.7 MG/DL (ref 8.6–10.5)
CHLORIDE SERPL-SCNC: 104 MMOL/L (ref 98–107)
CHOLEST SERPL-MCNC: 145 MG/DL (ref 0–200)
CO2 SERPL-SCNC: 26.3 MMOL/L (ref 22–29)
CREAT SERPL-MCNC: 1.22 MG/DL (ref 0.76–1.27)
CREAT UR-MCNC: 72.6 MG/DL
EGFRCR SERPLBLD CKD-EPI 2021: 58.8 ML/MIN/1.73
GLOBULIN UR ELPH-MCNC: 2.3 GM/DL
GLUCOSE SERPL-MCNC: 126 MG/DL (ref 65–99)
HBA1C MFR BLD: 6.6 % (ref 3.5–5.6)
HDLC SERPL-MCNC: 41 MG/DL (ref 40–60)
LDLC SERPL CALC-MCNC: 86 MG/DL (ref 0–100)
LDLC/HDLC SERPL: 2.07 {RATIO}
MICROALBUMIN/CREAT UR: 407.7 MG/G
POTASSIUM SERPL-SCNC: 4.2 MMOL/L (ref 3.5–5.2)
PROT SERPL-MCNC: 6.6 G/DL (ref 6–8.5)
SODIUM SERPL-SCNC: 143 MMOL/L (ref 136–145)
T4 FREE SERPL-MCNC: 1.26 NG/DL (ref 0.93–1.7)
TRIGL SERPL-MCNC: 96 MG/DL (ref 0–150)
TSH SERPL DL<=0.05 MIU/L-ACNC: 4.59 UIU/ML (ref 0.27–4.2)
VLDLC SERPL-MCNC: 18 MG/DL (ref 5–40)

## 2022-10-03 PROCEDURE — 36415 COLL VENOUS BLD VENIPUNCTURE: CPT | Performed by: FAMILY MEDICINE

## 2022-10-03 PROCEDURE — 82570 ASSAY OF URINE CREATININE: CPT | Performed by: FAMILY MEDICINE

## 2022-10-03 PROCEDURE — 83036 HEMOGLOBIN GLYCOSYLATED A1C: CPT | Performed by: FAMILY MEDICINE

## 2022-10-03 PROCEDURE — 84443 ASSAY THYROID STIM HORMONE: CPT | Performed by: FAMILY MEDICINE

## 2022-10-03 PROCEDURE — 80061 LIPID PANEL: CPT | Performed by: FAMILY MEDICINE

## 2022-10-03 PROCEDURE — 82043 UR ALBUMIN QUANTITATIVE: CPT | Performed by: FAMILY MEDICINE

## 2022-10-03 PROCEDURE — 84439 ASSAY OF FREE THYROXINE: CPT | Performed by: FAMILY MEDICINE

## 2022-10-03 PROCEDURE — 80053 COMPREHEN METABOLIC PANEL: CPT | Performed by: FAMILY MEDICINE

## 2022-10-06 ENCOUNTER — OFFICE VISIT (OUTPATIENT)
Dept: FAMILY MEDICINE CLINIC | Facility: CLINIC | Age: 82
End: 2022-10-06

## 2022-10-06 VITALS
BODY MASS INDEX: 28.48 KG/M2 | HEART RATE: 60 BPM | HEIGHT: 71 IN | TEMPERATURE: 98.2 F | DIASTOLIC BLOOD PRESSURE: 77 MMHG | OXYGEN SATURATION: 97 % | RESPIRATION RATE: 16 BRPM | SYSTOLIC BLOOD PRESSURE: 149 MMHG | WEIGHT: 203.4 LBS

## 2022-10-06 DIAGNOSIS — I25.10 CORONARY ARTERY DISEASE INVOLVING NATIVE CORONARY ARTERY OF NATIVE HEART WITHOUT ANGINA PECTORIS: ICD-10-CM

## 2022-10-06 DIAGNOSIS — I10 ESSENTIAL HYPERTENSION: ICD-10-CM

## 2022-10-06 DIAGNOSIS — K21.9 GASTROESOPHAGEAL REFLUX DISEASE, UNSPECIFIED WHETHER ESOPHAGITIS PRESENT: ICD-10-CM

## 2022-10-06 DIAGNOSIS — Z23 NEED FOR VACCINATION: ICD-10-CM

## 2022-10-06 DIAGNOSIS — E03.9 ACQUIRED HYPOTHYROIDISM: Primary | ICD-10-CM

## 2022-10-06 PROCEDURE — 99214 OFFICE O/P EST MOD 30 MIN: CPT | Performed by: FAMILY MEDICINE

## 2022-10-06 PROCEDURE — G0009 ADMIN PNEUMOCOCCAL VACCINE: HCPCS | Performed by: FAMILY MEDICINE

## 2022-10-06 PROCEDURE — G0008 ADMIN INFLUENZA VIRUS VAC: HCPCS | Performed by: FAMILY MEDICINE

## 2022-10-06 PROCEDURE — 90677 PCV20 VACCINE IM: CPT | Performed by: FAMILY MEDICINE

## 2022-10-06 PROCEDURE — 90662 IIV NO PRSV INCREASED AG IM: CPT | Performed by: FAMILY MEDICINE

## 2022-10-06 RX ORDER — AMLODIPINE BESYLATE 10 MG/1
10 TABLET ORAL DAILY
Qty: 90 TABLET | Refills: 1 | Status: ON HOLD | OUTPATIENT
Start: 2022-10-06 | End: 2023-02-21 | Stop reason: SDUPTHER

## 2022-10-06 RX ORDER — PANTOPRAZOLE SODIUM 40 MG/1
40 TABLET, DELAYED RELEASE ORAL DAILY
Qty: 90 TABLET | Refills: 1 | Status: SHIPPED | OUTPATIENT
Start: 2022-10-06

## 2022-10-06 NOTE — PROGRESS NOTES
Subjective   Chief Complaint   Patient presents with   • Diabetes   • Hypertension   • Hyperlipidemia   • Follow-up   • Med Refill   • Coronary Artery Disease   • Hypothyroidism     Alen Ratliff is a 82 y.o. male.     Patient Care Team:  Serenity Wren MD as PCP - General (Family Medicine)  Steve Muhammad MD as Consulting Physician (Cardiology)  CAROLE Vasquez DPM as Consulting Physician (Podiatry)  Toby Fung MD as Consulting Physician (Vascular Surgery)    History of Present Illness  He is coming in today to follow-up on his chronic medical problems and his medications including diabetes, hypertension, hyperlipidemia, hypothyroidism, GI issues, and CAD.  He recently had heart catheter done which showed severe disease in 3 vessels and he is planned for a 3 vessel bypass in 11/2022.  He reports taking all of his medicines as directed and denies any issues or side effects. Patient does not report any chest pain, shortness of breath, dizziness, nausea, vomiting, or diarrhea, visual issues, headaches, numbness or tingling. No urinary issues reported like urgency, frequency, or discomfort upon urination.  No significant weight changes reported.  No swelling reported.  No rashes or any other skin issues reported. No emotional issues or insomnia.       The following portions of the patient's history were reviewed and updated as appropriate: allergies, current medications, past family history, past medical history, past social history, past surgical history and problem list.  Past Medical History:   Diagnosis Date   • Allergic rhinitis    • Aortic stenosis    • Diabetes (HCC)    • GERD (gastroesophageal reflux disease)    • Heart murmur    • Hyperlipidemia    • Hypertension    • Hypothyroidism    • Prostate cancer (HCC)    • Stroke (HCC)      Past Surgical History:   Procedure Laterality Date   • ANKLE OPEN REDUCTION INTERNAL FIXATION Left 12/16/2020    Procedure: ANKLE OPEN REDUCTION INTERNAL  "FIXATION;  Surgeon: CAROLE Vasquez DPM;  Location: [facility] MAIN OR;  Service: Podiatry;  Laterality: Left;   • BACK SURGERY  1994   • CARDIAC CATHETERIZATION Right 9/27/2022    Procedure: Left Heart Cath;  Surgeon: Steve Muhammad MD;  Location: [facility] CATH INVASIVE LOCATION;  Service: Cardiovascular;  Laterality: Right;   • CAROTID ENDARTERECTOMY Right 12/14/2020    Procedure: CAROTID ENDARTERECTOMY;  Surgeon: Toby Fung MD;  Location: [facility] MAIN OR;  Service: Vascular;  Laterality: Right;   • COLONOSCOPY  08/25/2015   • PROSTATECTOMY  2001    due to cancer     The patient has a family history of  Family History   Problem Relation Age of Onset   • Hypertension Other    • Heart attack Mother    • Heart disease Mother    • Heart attack Father    • Heart disease Father      Social History     Socioeconomic History   • Marital status:    Tobacco Use   • Smoking status: Never Smoker   • Smokeless tobacco: Never Used   Vaping Use   • Vaping Use: Never used   Substance and Sexual Activity   • Alcohol use: Not Currently   • Drug use: Never       Review of Systems   Constitutional: Negative for activity change, fatigue and fever.   Respiratory: Negative for cough, shortness of breath and wheezing.    Cardiovascular: Negative for chest pain, palpitations and leg swelling.   Gastrointestinal: Negative for constipation, diarrhea and indigestion.   Skin: Negative for color change, dry skin and rash.   Neurological: Negative for tremors and headache.     Visit Vitals  /77 (BP Location: Left arm, Patient Position: Sitting, Cuff Size: Adult)   Pulse 60   Temp 98.2 °F (36.8 °C)   Resp 16   Ht 180.3 cm (70.98\")   Wt 92.3 kg (203 lb 6.4 oz)   SpO2 97%   BMI 28.38 kg/m²       Current Outpatient Medications:   •  amLODIPine (NORVASC) 10 MG tablet, Take 1 tablet by mouth Daily., Disp: 90 tablet, Rfl: 1  •  aspirin 81 MG chewable tablet, Chew 1 tablet Daily., Disp: 30 tablet, Rfl: 1  •  atorvastatin (LIPITOR) 40 " MG tablet, TAKE 1 TABLET DAILY, Disp: 90 tablet, Rfl: 1  •  fluticasone (FLONASE) 50 MCG/ACT nasal spray, 2 sprays into the nostril(s) as directed by provider Daily As Needed., Disp: , Rfl:   •  Januvia 50 MG tablet, TAKE 1 TABLET DAILY, Disp: 90 tablet, Rfl: 0  •  Jardiance 10 MG tablet tablet, TAKE 1 TABLET DAILY, Disp: 90 tablet, Rfl: 1  •  losartan (COZAAR) 50 MG tablet, TAKE 1 TABLET DAILY, Disp: 90 tablet, Rfl: 1  •  OneTouch Ultra test strip, USE AND DISCARD 1 TEST     STRIP DAILY., Disp: 100 each, Rfl: 1  •  pantoprazole (PROTONIX) 40 MG EC tablet, Take 1 tablet by mouth Daily., Disp: 90 tablet, Rfl: 1  •  Synthroid 88 MCG tablet, TAKE 1 TABLET DAILY, Disp: 90 tablet, Rfl: 0    Objective   Physical Exam  Vitals and nursing note reviewed.   Constitutional:       General: He is not in acute distress.     Appearance: Normal appearance. He is well-developed. He is not ill-appearing.   HENT:      Head: Normocephalic and atraumatic.   Cardiovascular:      Rate and Rhythm: Normal rate and regular rhythm.      Heart sounds: Murmur heard.     No gallop.   Pulmonary:      Effort: Pulmonary effort is normal. No respiratory distress.      Breath sounds: Normal breath sounds. No wheezing, rhonchi or rales.   Chest:      Chest wall: No tenderness.   Musculoskeletal:      Cervical back: Normal range of motion and neck supple.   Neurological:      General: No focal deficit present.      Mental Status: He is alert and oriented to person, place, and time. Mental status is at baseline.   Psychiatric:         Mood and Affect: Mood normal.           FOLLOWING LABS WERE REVIEWED TODAY:  CMP    CMP 8/27/22 9/24/22 10/3/22   Glucose 125 (A) 136 (A) 126 (A)   BUN 28 (A) 20 24 (A)   Creatinine 1.44 (A) 1.05 1.22   Sodium 144 142 143   Potassium 4.4 3.9 4.2   Chloride 107 107 104   Calcium 9.4 9.2 9.7   Albumin   4.30   Total Bilirubin   0.6   Alkaline Phosphatase   59   AST (SGOT)   27   ALT (SGPT)   32   (A) Abnormal value             Lipid Panel    Lipid Panel 3/31/22 10/3/22   Total Cholesterol 129 145   Triglycerides 85 96   HDL Cholesterol 40 41   VLDL Cholesterol 17 18   LDL Cholesterol  72 86   LDL/HDL Ratio 1.80 2.07           TSH    TSH 3/31/22 10/3/22   TSH 3.590 4.590 (A)   (A) Abnormal value            Most Recent A1C    HGBA1C Most Recent 10/3/22   Hemoglobin A1C 6.6 (A)   (A) Abnormal value            Microalbumin    Microalbumin 10/3/22   Microalbumin, Urine 29.6             Assessment & Plan   Diagnoses and all orders for this visit:    1. Acquired hypothyroidism (Primary)    2. Essential hypertension  -     amLODIPine (NORVASC) 10 MG tablet; Take 1 tablet by mouth Daily.  Dispense: 90 tablet; Refill: 1    3. Gastroesophageal reflux disease, unspecified whether esophagitis present  -     pantoprazole (PROTONIX) 40 MG EC tablet; Take 1 tablet by mouth Daily.  Dispense: 90 tablet; Refill: 1    4. Coronary artery disease involving native coronary artery of native heart without angina pectoris    5. Need for vaccination  -     Pneumococcal Conjugate Vaccine 20-Valent (PCV20)  -     Fluzone High-Dose 65+yrs ([id number])      I reviewed his medical problems and his medications and I also reviewed and discussed his recent blood work results.  His TSH is slightly elevated, his free T4 is within normal limits and on upper side of normal limits.  He is currently on levothyroxine 88 mcg and I advised for him for now to continue the medication at the same dose as he does not show any symptoms of hypothyroidism.  I will reevaluate at his regular follow-up in 6 months.  Patient is also to follow-up with his cardiologist and he is planned to have a triple CABG in 11/2022.  Immunization was updated.        Return in about 6 months (around 4/6/2023) for Next scheduled follow up.    Requested Prescriptions     Signed Prescriptions Disp Refills   • amLODIPine (NORVASC) 10 MG tablet 90 tablet 1     Sig: Take 1 tablet by mouth Daily.   •  pantoprazole (PROTONIX) 40 MG EC tablet 90 tablet 1     Sig: Take 1 tablet by mouth Daily.

## 2022-10-19 ENCOUNTER — TELEPHONE (OUTPATIENT)
Dept: CARDIAC SURGERY | Facility: CLINIC | Age: 82
End: 2022-10-19

## 2022-10-19 NOTE — TELEPHONE ENCOUNTER
Caller: EvyDewey    Relationship: Emergency Contact    Best call back number: [phone]    What is the best time to reach you: ANYTIME     Who are you requesting to speak with (clinical staff, provider,  specific staff member): CLINICAL     Do you know the name of the person who called: DEWEY    What was the call regarding: THE PATIENT HAS TESTING ON 11/14/22 AND IS HAVING QUESTIONS ON WHAT MEDICATIONS HE CAN TAKE AND WHAT HE CAN AND CAN'T EAT OR DRINK.    Do you require a callback: YES

## 2022-11-14 ENCOUNTER — HOSPITAL ENCOUNTER (OUTPATIENT)
Dept: CARDIOLOGY | Facility: HOSPITAL | Age: 82
Discharge: HOME | End: 2022-11-14

## 2022-11-14 ENCOUNTER — HOSPITAL ENCOUNTER (OUTPATIENT)
Dept: CT IMAGING | Facility: HOSPITAL | Age: 82
Discharge: HOME | End: 2022-11-14

## 2022-11-14 ENCOUNTER — HOSPITAL ENCOUNTER (OUTPATIENT)
Dept: RESPIRATORY THERAPY | Facility: HOSPITAL | Age: 82
Discharge: HOME | End: 2022-11-14

## 2022-11-14 DIAGNOSIS — I25.10 CORONARY ARTERY STENOSIS: ICD-10-CM

## 2022-11-14 DIAGNOSIS — R06.00 DYSPNEA, UNSPECIFIED TYPE: ICD-10-CM

## 2022-11-14 DIAGNOSIS — I35.0 AORTIC STENOSIS, SEVERE: ICD-10-CM

## 2022-11-14 DIAGNOSIS — Z01.818 PREOP TESTING: ICD-10-CM

## 2022-11-14 DIAGNOSIS — R01.1 HEART MURMUR: ICD-10-CM

## 2022-11-14 DIAGNOSIS — I73.9 CLAUDICATION: ICD-10-CM

## 2022-11-14 DIAGNOSIS — I65.23 SYMPTOMATIC CAROTID ARTERY STENOSIS, BILATERAL: ICD-10-CM

## 2022-11-14 DIAGNOSIS — I10 ESSENTIAL HYPERTENSION: ICD-10-CM

## 2022-11-14 LAB
ARTERIAL PATENCY WRIST A: ABNORMAL
ATMOSPHERIC PRESS: ABNORMAL MM[HG]
BASE EXCESS BLDA CALC-SCNC: -1 MMOL/L (ref 0–3)
BDY SITE: ABNORMAL
BH CV XLRA MEAS - DIST GSV THIGH DIST LEFT: 0.16 CM
BH CV XLRA MEAS - DIST GSV THIGH DIST RIGHT: 0.07 CM
BH CV XLRA MEAS - GSV ANKLE DIST LEFT: 0.21 CM
BH CV XLRA MEAS - GSV ANKLE DIST RIGHT: 0.14 CM
BH CV XLRA MEAS - MID GSV CALF LEFT: 0.1 CM
BH CV XLRA MEAS - MID GSV CALF RIGHT: 0.16 CM
BH CV XLRA MEAS - MID GSV THIGH  LEFT: 0.18 CM
BH CV XLRA MEAS - MID GSV THIGH  RIGHT: 0.11 CM
BH CV XLRA MEAS - PROX GSV CALF DIST LEFT: 0.1 CM
BH CV XLRA MEAS - PROX GSV CALF DIST RIGHT: 0.16 CM
BH CV XLRA MEAS - PROX GSV THIGH  LEFT: 0.21 CM
BH CV XLRA MEAS - PROX GSV THIGH  RIGHT: 0.38 CM
CO2 BLDA-SCNC: 24 MMOL/L (ref 22–29)
HCO3 BLDA-SCNC: 22.9 MMOL/L (ref 21–28)
HEMODILUTION: NO
INHALED O2 CONCENTRATION: 21 %
MAXIMAL PREDICTED HEART RATE: 137 BPM
MODALITY: ABNORMAL
PCO2 BLDA: 35.2 MM HG (ref 35–48)
PH BLDA: 7.42 PH UNITS (ref 7.35–7.45)
PO2 BLDA: 97.5 MM HG (ref 83–108)
SAO2 % BLDCOA: 97.8 % (ref 94–98)
STRESS TARGET HR: 116 BPM

## 2022-11-14 PROCEDURE — 36600 WITHDRAWAL OF ARTERIAL BLOOD: CPT

## 2022-11-14 PROCEDURE — 94729 DIFFUSING CAPACITY: CPT

## 2022-11-14 PROCEDURE — 94010 BREATHING CAPACITY TEST: CPT

## 2022-11-14 PROCEDURE — 82803 BLOOD GASES ANY COMBINATION: CPT

## 2022-11-14 PROCEDURE — 94727 GAS DIL/WSHOT DETER LNG VOL: CPT

## 2022-11-14 PROCEDURE — 93970 EXTREMITY STUDY: CPT

## 2022-11-14 PROCEDURE — 71250 CT THORAX DX C-: CPT

## 2022-11-15 NOTE — TELEPHONE ENCOUNTER
Impression: Exudative macular degeneration, with active choroidal neovascularization, right eye: H35.2233. Plan: Active CNVM OD  - minimal IRF - extend to 8 weeks and observe closely Avastin 3/3 OD administered today without complication. R/B/A discussed at length. Follow-up in 8 weeks for dilated exam, possible FA/ICG Patient awaiting low vision referral
PATIENT WIFE STATED  WAS GOING TO PRESCRIBE SOMETHING NEW FOR THE PATIENT  TO THE PHARMACY, PATIENT WIFE IS UNSURE AS TO WHAT IT WAS JUST STATED IT NEEDED TO BE RAN THROUGH NEW / CURRENT INSURANCE . PLEASE CALL PATIENT TO DISCUSS FURTHER PATIENT WAS UNSURE OF SOME DETAILS.     CALLBACK NUMBER - [phone]  
Per wife, they no longer use [employer]. She is going to call and set up an account with their new mail order pharmacy and will call back to let us know which one they now use   
Please advise the patient that changed his atorvastatin to rosuvastatin and prescription was sent to his pharmacy.  This was discussed at the appointment.  Thank you.  
See phone note from 4/6/2021  
46

## 2022-11-16 ENCOUNTER — TELEPHONE (OUTPATIENT)
Dept: CARDIOLOGY | Facility: CLINIC | Age: 82
End: 2022-11-16

## 2022-11-16 NOTE — TELEPHONE ENCOUNTER
Caller: Elba Ratliff    Relationship: Emergency Contact    Best call back number: [phone]    What is the best time to reach you: ANYTIME    Who are you requesting to speak with (clinical staff, provider,  specific staff member): CLINICAL     What was the call regarding: PT WIFE CALLED IN ON PT BEHALF - PT HAS APPT ON 11.23.22 FOR 6MONTH FU - PT WIFE INQUIRING IF APPT IS STILL NEEDED AS DR NAYAK IS RUNNING TESTS FOR UPCOMING PROCEDURE - IF APPT CAN BE CANCELLED, SHE IS ALSO ASKING IF PT NEEDS TO BE SEEN BY DR LONGO BEFORE THE PROCEDURE TO CHECK IN OR IF CARE WITH DR NAYAK IS ENOUGH    Do you require a callback: YES PLEASE

## 2022-11-28 ENCOUNTER — TELEPHONE (OUTPATIENT)
Dept: CARDIOLOGY | Facility: CLINIC | Age: 82
End: 2022-11-28

## 2022-11-28 ENCOUNTER — TELEPHONE (OUTPATIENT)
Dept: CARDIAC SURGERY | Facility: CLINIC | Age: 82
End: 2022-11-28

## 2022-11-28 NOTE — TELEPHONE ENCOUNTER
Notified pt that Dr. Villafana has reviewed all testing and that he does not have an adequate vein for bypass. Dr. Villafana discussed with Dr. Muhammad and Dr. Muhammad would like to see the pt in office. Pt will call Dr. Muhammad to schedule appt.

## 2022-11-28 NOTE — TELEPHONE ENCOUNTER
Caller: Elba Ratliff    Relationship to patient: Emergency Contact    Best call back number: [phone]    Patient is needing: PT SAW DR NAYAK, HE WILL NOT DO SURGERY UNTIL PT SEES DR. LONGO AND SHE STATED HAVE STENTS PLACED.  I DID NOT SEE ANY APTS UNTIL NEXT YEAR.      ALSO THEY NEED TO GO [facility] FOR A  AND LEAVE ON Friday. THE DRIVE IS 14 HOURS DAUGHTER IS DRIVING BUT THEY WANT TO SEE IF DR LONGO THINKS IT WILL BE OK [place] [place] FOR [place] TO TRAVEL THE 14 HOURS THERE AND BACK AND FOR THE .      MARKING AS HIGH DUE TO  ON Friday AND DRIVE TIME IS IN A COUPLE OF DAYS,     PLEASE CALL TO ADVISE OF APT AND IF PT CAN TRAVEL.     THANK YOU.

## 2022-12-04 DIAGNOSIS — E11.9 TYPE 2 DIABETES MELLITUS WITHOUT COMPLICATION, WITHOUT LONG-TERM CURRENT USE OF INSULIN: ICD-10-CM

## 2022-12-04 DIAGNOSIS — E03.9 ACQUIRED HYPOTHYROIDISM: ICD-10-CM

## 2022-12-05 RX ORDER — LEVOTHYROXINE SODIUM 88 MCG
TABLET ORAL
Qty: 90 TABLET | Refills: 0 | Status: ON HOLD | OUTPATIENT
Start: 2022-12-05 | End: 2023-02-20

## 2022-12-05 RX ORDER — BLOOD SUGAR DIAGNOSTIC
STRIP MISCELLANEOUS
Qty: 100 EACH | Refills: 1 | Status: ON HOLD | OUTPATIENT
Start: 2022-12-05 | End: 2023-02-20

## 2022-12-05 RX ORDER — EMPAGLIFLOZIN 10 MG/1
TABLET, FILM COATED ORAL
Qty: 90 TABLET | Refills: 1 | Status: ON HOLD | OUTPATIENT
Start: 2022-12-05 | End: 2023-02-20

## 2022-12-12 ENCOUNTER — TELEPHONE (OUTPATIENT)
Dept: FAMILY MEDICINE CLINIC | Facility: CLINIC | Age: 82
End: 2022-12-12

## 2022-12-12 NOTE — TELEPHONE ENCOUNTER
Please advise the patient that he would qualify for the treatment with Paxlovid.  This medication is approved for the treatment of COVID-19 infection and should be initiated within the first 5 days of the symptoms onset.  It can potentially interact with certain medications.  I reviewed his medication list, please confirm with the patient that the list we have is up-to-date and he does not take anything else which is not on that list so I can safely prescribe Paxlovid for him.  Which pharmacy?

## 2022-12-12 NOTE — TELEPHONE ENCOUNTER
Caller: Dewey Ratliff    Relationship to patient: Emergency Contact    Best call back number: [phone]    Patient is needing: DEWEY STATES THAT THE PATIENT TESTED POSITIVE TODAY FOR COVID, AND WOULD LIKE MEDICATION CALLED IN TO TREAT IT.  PLEASE CONTACT PATIENT TO ADVISE WHEN MEDICATION WILL BE CALLED INTO [facility] [address] IN [place].

## 2022-12-12 NOTE — TELEPHONE ENCOUNTER
Patient medication list was reviewed and accurate with the wife. They understood isolation protocol. He uses [facility] [address] in [place]

## 2022-12-12 NOTE — TELEPHONE ENCOUNTER
Please advise the patient that I sent prescription for Paxlovid to his pharmacy.  He needs to follow the direction and take 2 pills 2 times a day for 5 days.  I recommend for the patient to discontinue cholesterol medicine while he takes Paxlovid and restart cholesterol medicine once he is done with a course of Paxlovid.  Continue closely monitor his symptoms and he will need to be reevaluated if any concerns.  Thank you.

## 2022-12-15 ENCOUNTER — OFFICE VISIT (OUTPATIENT)
Dept: CARDIOLOGY | Facility: CLINIC | Age: 82
End: 2022-12-15

## 2022-12-15 VITALS — BODY MASS INDEX: 28.42 KG/M2 | HEIGHT: 71 IN | WEIGHT: 203 LBS

## 2022-12-15 DIAGNOSIS — I35.0 AORTIC STENOSIS, SEVERE: Primary | ICD-10-CM

## 2022-12-15 DIAGNOSIS — I25.10 CORONARY ARTERY DISEASE INVOLVING NATIVE CORONARY ARTERY OF NATIVE HEART WITHOUT ANGINA PECTORIS: ICD-10-CM

## 2022-12-15 DIAGNOSIS — I10 ESSENTIAL HYPERTENSION: ICD-10-CM

## 2022-12-15 DIAGNOSIS — N18.31 STAGE 3A CHRONIC KIDNEY DISEASE: ICD-10-CM

## 2022-12-15 DIAGNOSIS — E78.2 MIXED HYPERLIPIDEMIA: ICD-10-CM

## 2022-12-15 PROCEDURE — 99442 PR PHYS/QHP TELEPHONE EVALUATION 11-20 MIN: CPT | Performed by: NURSE PRACTITIONER

## 2022-12-16 PROBLEM — E11.9 TYPE 2 DIABETES MELLITUS WITHOUT COMPLICATION: Status: ACTIVE | Noted: 2021-10-19

## 2022-12-16 NOTE — PROGRESS NOTES
CARDIOLOGY   TELE-VISIT        You have chosen to receive care through a telephone visit today. Do you consent to use a telephone visit for your medical care today? Yes        Primary Care Provider:   Serenity Wren MD        Reason for follow up:    Aortic stenosis severe  Coronary artery disease  Hypertension  Dyslipidemia      Subjective       CC:    Complains of shortness of breath and activity intolerance    History of Present Illness    Alen Ratliff is a 82 y.o. male patient is an 82-year-old male who is routinely seen by Dr. Muhammad.  He is known to have coronary artery disease, aortic stenosis, hypertension, dyslipidemia, CKD, diabetes, PAD with previous carotid endarterectomy, Paroxysmal atrial fibrillation    Patient had previous echocardiogram showing aortic stenosis which is now progressed to severe.  He underwent cardiac catheterization in September 2022.  He had multivessel coronary artery disease and was referred to cardiothoracic surgery for multivessel bypass surgery as well as surgical aortic valve replacement.    The patient underwent evaluation by cardiothoracic surgery duplex vein mapping showed an adequate size veins for bypass.  Patient was referred back to Dr. Muhammad for consideration for PCI and then transcatheter aortic valve replacement to follow PCI.    Patient reports ongoing activity intolerance and dyspnea with exertion.  He is not currently having chest pain.      Past Medical History:   Diagnosis Date   • Allergic rhinitis    • Aortic stenosis    • Coronary artery disease    • Diabetes (HCC)    • GERD (gastroesophageal reflux disease)    • Heart murmur    • Hyperlipidemia    • Hypertension    • Hypothyroidism    • Prostate cancer (HCC)    • Stroke (HCC)        Past Surgical History:   Procedure Laterality Date   • ANKLE OPEN REDUCTION INTERNAL FIXATION Left 12/16/2020    Procedure: ANKLE OPEN REDUCTION INTERNAL FIXATION;  Surgeon: CAROLE Vasquez DPM;  Location: [facility]  OR;  Service: Podiatry;  Laterality: Left;   • BACK SURGERY  1994   • CARDIAC CATHETERIZATION Right 9/27/2022    Procedure: Left Heart Cath;  Surgeon: Steve Muhammad MD;  Location: [facility] CATH INVASIVE LOCATION;  Service: Cardiovascular;  Laterality: Right;   • CAROTID ENDARTERECTOMY Right 12/14/2020    Procedure: CAROTID ENDARTERECTOMY;  Surgeon: Toby Fung MD;  Location: [facility] MAIN OR;  Service: Vascular;  Laterality: Right;   • COLONOSCOPY  08/25/2015   • PROSTATECTOMY  2001    due to cancer         Current Outpatient Medications:   •  amLODIPine (NORVASC) 10 MG tablet, Take 1 tablet by mouth Daily., Disp: 90 tablet, Rfl: 1  •  aspirin 81 MG chewable tablet, Chew 1 tablet Daily., Disp: 30 tablet, Rfl: 1  •  atorvastatin (LIPITOR) 40 MG tablet, TAKE 1 TABLET DAILY, Disp: 90 tablet, Rfl: 1  •  fluticasone (FLONASE) 50 MCG/ACT nasal spray, 2 sprays into the nostril(s) as directed by provider Daily As Needed., Disp: , Rfl:   •  Januvia 50 MG tablet, TAKE 1 TABLET DAILY, Disp: 90 tablet, Rfl: 0  •  Jardiance 10 MG tablet tablet, TAKE 1 TABLET DAILY, Disp: 90 tablet, Rfl: 1  •  losartan (COZAAR) 50 MG tablet, TAKE 1 TABLET DAILY, Disp: 90 tablet, Rfl: 1  •  Nirmatrelvir&Ritonavir 300/100 (PAXLOVID) 20 x 150 MG & 10 x 100MG tablet therapy pack tablet, Take 2 tablets by mouth 2 (Two) Times a Day for 5 days., Disp: 20 tablet, Rfl: 0  •  OneTouch Ultra test strip, USE AND DISCARD 1 TEST     STRIP DAILY., Disp: 100 each, Rfl: 1  •  pantoprazole (PROTONIX) 40 MG EC tablet, Take 1 tablet by mouth Daily., Disp: 90 tablet, Rfl: 1  •  Synthroid 88 MCG tablet, TAKE 1 TABLET DAILY, Disp: 90 tablet, Rfl: 0    Social History     Socioeconomic History   • Marital status:    Tobacco Use   • Smoking status: Never   • Smokeless tobacco: Never   Vaping Use   • Vaping Use: Never used   Substance and Sexual Activity   • Alcohol use: Not Currently   • Drug use: Never   • Sexual activity: Defer       Family History   Problem  "Relation Age of Onset   • Hypertension Other    • Heart attack Mother    • Heart disease Mother    • Heart attack Father    • Heart disease Father        The following portions of the patient's history were reviewed and updated as appropriate:  allergies, current medications, family history, social history, surgical history and problem list.     Review of Systems   Constitutional: Positive for malaise/fatigue.   Cardiovascular: Positive for dyspnea on exertion.   Respiratory: Positive for shortness of breath.    Musculoskeletal: Positive for arthritis.   Neurological: Positive for weakness.   All other systems reviewed and are negative.      Ht 180.3 cm (70.98\")   Wt 92.1 kg (203 lb)   BMI 28.33 kg/m²     Objective         Diagnoses and all orders for this visit:    1. Aortic stenosis, severe (Primary)  Comments:  Very aortic stenosis by echocardiogram August 2022.  Undergoing surgical work-up for surgical AVR versus TAVR    2. Coronary artery disease involving native coronary artery of native heart without angina pectoris  Comments:  Recent cardiac catheterization 9/27/2022 showing 60 to 70% LAD stenosis, 99% left circumflex stenosis and %    3. Essential hypertension  Comments:  Blood pressures have been stable on current medical therapy    4. Mixed hyperlipidemia  Comments:  Treated with statin therapy    5. Stage 3a chronic kidney disease (HCC)      The case will be reviewed with Dr. Muhammad.  The patient will be scheduled for cardiac catheterization with presumed PCI to the left circumflex and possible FFR of the LAD    This visit has been rescheduled as a phone visit to comply with patient safety concerns in accordance with CDC recommendations. Total time of discussion was 12 minutes.  "

## 2022-12-16 NOTE — H&P (VIEW-ONLY)
CARDIOLOGY   TELE-VISIT        You have chosen to receive care through a telephone visit today. Do you consent to use a telephone visit for your medical care today? Yes        Primary Care Provider:   Serenity Wren MD        Reason for follow up:    Aortic stenosis severe  Coronary artery disease  Hypertension  Dyslipidemia      Subjective       CC:    Complains of shortness of breath and activity intolerance    History of Present Illness    Alen Ratliff is a 82 y.o. male patient is an 82-year-old male who is routinely seen by Dr. Muhammad.  He is known to have coronary artery disease, aortic stenosis, hypertension, dyslipidemia, CKD, diabetes, PAD with previous carotid endarterectomy, Paroxysmal atrial fibrillation    Patient had previous echocardiogram showing aortic stenosis which is now progressed to severe.  He underwent cardiac catheterization in September 2022.  He had multivessel coronary artery disease and was referred to cardiothoracic surgery for multivessel bypass surgery as well as surgical aortic valve replacement.    The patient underwent evaluation by cardiothoracic surgery duplex vein mapping showed an adequate size veins for bypass.  Patient was referred back to Dr. Muhammad for consideration for PCI and then transcatheter aortic valve replacement to follow PCI.    Patient reports ongoing activity intolerance and dyspnea with exertion.  He is not currently having chest pain.      Past Medical History:   Diagnosis Date   • Allergic rhinitis    • Aortic stenosis    • Coronary artery disease    • Diabetes (HCC)    • GERD (gastroesophageal reflux disease)    • Heart murmur    • Hyperlipidemia    • Hypertension    • Hypothyroidism    • Prostate cancer (HCC)    • Stroke (HCC)        Past Surgical History:   Procedure Laterality Date   • ANKLE OPEN REDUCTION INTERNAL FIXATION Left 12/16/2020    Procedure: ANKLE OPEN REDUCTION INTERNAL FIXATION;  Surgeon: CAROLE Vasquez DPM;  Location: [facility]  OR;  Service: Podiatry;  Laterality: Left;   • BACK SURGERY  1994   • CARDIAC CATHETERIZATION Right 9/27/2022    Procedure: Left Heart Cath;  Surgeon: Steve Muhammad MD;  Location: [facility] CATH INVASIVE LOCATION;  Service: Cardiovascular;  Laterality: Right;   • CAROTID ENDARTERECTOMY Right 12/14/2020    Procedure: CAROTID ENDARTERECTOMY;  Surgeon: Toby Fung MD;  Location: [facility] MAIN OR;  Service: Vascular;  Laterality: Right;   • COLONOSCOPY  08/25/2015   • PROSTATECTOMY  2001    due to cancer         Current Outpatient Medications:   •  amLODIPine (NORVASC) 10 MG tablet, Take 1 tablet by mouth Daily., Disp: 90 tablet, Rfl: 1  •  aspirin 81 MG chewable tablet, Chew 1 tablet Daily., Disp: 30 tablet, Rfl: 1  •  atorvastatin (LIPITOR) 40 MG tablet, TAKE 1 TABLET DAILY, Disp: 90 tablet, Rfl: 1  •  fluticasone (FLONASE) 50 MCG/ACT nasal spray, 2 sprays into the nostril(s) as directed by provider Daily As Needed., Disp: , Rfl:   •  Januvia 50 MG tablet, TAKE 1 TABLET DAILY, Disp: 90 tablet, Rfl: 0  •  Jardiance 10 MG tablet tablet, TAKE 1 TABLET DAILY, Disp: 90 tablet, Rfl: 1  •  losartan (COZAAR) 50 MG tablet, TAKE 1 TABLET DAILY, Disp: 90 tablet, Rfl: 1  •  Nirmatrelvir&Ritonavir 300/100 (PAXLOVID) 20 x 150 MG & 10 x 100MG tablet therapy pack tablet, Take 2 tablets by mouth 2 (Two) Times a Day for 5 days., Disp: 20 tablet, Rfl: 0  •  OneTouch Ultra test strip, USE AND DISCARD 1 TEST     STRIP DAILY., Disp: 100 each, Rfl: 1  •  pantoprazole (PROTONIX) 40 MG EC tablet, Take 1 tablet by mouth Daily., Disp: 90 tablet, Rfl: 1  •  Synthroid 88 MCG tablet, TAKE 1 TABLET DAILY, Disp: 90 tablet, Rfl: 0    Social History     Socioeconomic History   • Marital status:    Tobacco Use   • Smoking status: Never   • Smokeless tobacco: Never   Vaping Use   • Vaping Use: Never used   Substance and Sexual Activity   • Alcohol use: Not Currently   • Drug use: Never   • Sexual activity: Defer       Family History   Problem  "Relation Age of Onset   • Hypertension Other    • Heart attack Mother    • Heart disease Mother    • Heart attack Father    • Heart disease Father        The following portions of the patient's history were reviewed and updated as appropriate:  allergies, current medications, family history, social history, surgical history and problem list.     Review of Systems   Constitutional: Positive for malaise/fatigue.   Cardiovascular: Positive for dyspnea on exertion.   Respiratory: Positive for shortness of breath.    Musculoskeletal: Positive for arthritis.   Neurological: Positive for weakness.   All other systems reviewed and are negative.      Ht 180.3 cm (70.98\")   Wt 92.1 kg (203 lb)   BMI 28.33 kg/m²     Objective         Diagnoses and all orders for this visit:    1. Aortic stenosis, severe (Primary)  Comments:  Very aortic stenosis by echocardiogram August 2022.  Undergoing surgical work-up for surgical AVR versus TAVR    2. Coronary artery disease involving native coronary artery of native heart without angina pectoris  Comments:  Recent cardiac catheterization 9/27/2022 showing 60 to 70% LAD stenosis, 99% left circumflex stenosis and %    3. Essential hypertension  Comments:  Blood pressures have been stable on current medical therapy    4. Mixed hyperlipidemia  Comments:  Treated with statin therapy    5. Stage 3a chronic kidney disease (HCC)      The case will be reviewed with Dr. Muhammad.  The patient will be scheduled for cardiac catheterization with presumed PCI to the left circumflex and possible FFR of the LAD    This visit has been rescheduled as a phone visit to comply with patient safety concerns in accordance with CDC recommendations. Total time of discussion was 12 minutes.  "

## 2022-12-20 DIAGNOSIS — I25.118 CORONARY ARTERY DISEASE OF NATIVE ARTERY OF NATIVE HEART WITH STABLE ANGINA PECTORIS: Primary | ICD-10-CM

## 2022-12-20 DIAGNOSIS — I48.0 PAF (PAROXYSMAL ATRIAL FIBRILLATION): ICD-10-CM

## 2022-12-20 DIAGNOSIS — I35.0 AORTIC STENOSIS, SEVERE: ICD-10-CM

## 2022-12-20 NOTE — TELEPHONE ENCOUNTER
If he is not getting any better I recommend for him to be seen and evaluated.  He can either make an appointment with me or go to the urgent care.  Thank you.

## 2022-12-20 NOTE — TELEPHONE ENCOUNTER
Patient was not seen at [facility] but spouse was/ he has same symptoms/ I made him an appt with you for tomorrow

## 2022-12-20 NOTE — TELEPHONE ENCOUNTER
Caller: Elba Ratliff    Relationship to patient: Emergency Contact    Best call back number:[phone]    Date of positive COVID19 test: 12/12/2022    COVID19 symptoms: CONGESTION, CHILLS, 2-3 WEEKS    Additional information or concerns: PATIENT IS NOT GETTING ANY BETTER AND WOULD LIKE A CALL TO DISCUSS NEXT STEPS    What is the patients preferred pharmacy:   [facility]/pharmacy #[id number] - [address] [phone] [place] [phone] FX  [phone]

## 2022-12-21 ENCOUNTER — OFFICE VISIT (OUTPATIENT)
Dept: FAMILY MEDICINE CLINIC | Facility: CLINIC | Age: 82
End: 2022-12-21

## 2022-12-21 VITALS
HEIGHT: 71 IN | WEIGHT: 203 LBS | SYSTOLIC BLOOD PRESSURE: 133 MMHG | OXYGEN SATURATION: 95 % | RESPIRATION RATE: 16 BRPM | HEART RATE: 60 BPM | TEMPERATURE: 97.4 F | DIASTOLIC BLOOD PRESSURE: 77 MMHG | BODY MASS INDEX: 28.42 KG/M2

## 2022-12-21 DIAGNOSIS — J01.00 SUBACUTE MAXILLARY SINUSITIS: Primary | ICD-10-CM

## 2022-12-21 PROBLEM — I48.0 PAF (PAROXYSMAL ATRIAL FIBRILLATION): Status: ACTIVE | Noted: 2022-12-21

## 2022-12-21 PROCEDURE — 99213 OFFICE O/P EST LOW 20 MIN: CPT | Performed by: FAMILY MEDICINE

## 2022-12-21 RX ORDER — AMOXICILLIN AND CLAVULANATE POTASSIUM 875; 125 MG/1; MG/1
1 TABLET, FILM COATED ORAL 2 TIMES DAILY
Qty: 20 TABLET | Refills: 0 | Status: ON HOLD | OUTPATIENT
Start: 2022-12-21 | End: 2023-01-12

## 2022-12-21 NOTE — PROGRESS NOTES
Subjective   Chief Complaint   Patient presents with   • Cough   • Nasal Congestion     Alen Ratliff is a 82 y.o. male.     Patient Care Team:  Serenity Wren MD as PCP - General (Family Medicine)  Steve Muhammad MD as Consulting Physician (Cardiology)  CAROLE Vasquez DPM as Consulting Physician (Podiatry)  Toby Fung MD as Consulting Physician (Vascular Surgery)    History of Present Illness  He is coming in today due to upper respiratory symptoms.  He tested positive for COVID-19 about 11 days ago.  He was treated with a course of Paxil but.  He has been having a lot of congestion in his sinuses and has been coughing up somehow thick sputum.  No fever, chest pains, or difficulty breathing are being reported.  No vomiting or diarrhea.  He is able to eat and drink.       The following portions of the patient's history were reviewed and updated as appropriate: allergies, current medications, past family history, past medical history, past social history, past surgical history and problem list.  Past Medical History:   Diagnosis Date   • Allergic rhinitis    • Aortic stenosis    • Coronary artery disease    • Diabetes (HCC)    • GERD (gastroesophageal reflux disease)    • Heart murmur    • Hyperlipidemia    • Hypertension    • Hypothyroidism    • Prostate cancer (HCC)    • Stroke (HCC)      Past Surgical History:   Procedure Laterality Date   • ANKLE OPEN REDUCTION INTERNAL FIXATION Left 12/16/2020    Procedure: ANKLE OPEN REDUCTION INTERNAL FIXATION;  Surgeon: CAROLE Vasquez DPM;  Location: [facility] OR;  Service: Podiatry;  Laterality: Left;   • BACK SURGERY  1994   • CARDIAC CATHETERIZATION Right 9/27/2022    Procedure: Left Heart Cath;  Surgeon: Steve Muhammad MD;  Location: [facility] CATH INVASIVE LOCATION;  Service: Cardiovascular;  Laterality: Right;   • CAROTID ENDARTERECTOMY Right 12/14/2020    Procedure: CAROTID ENDARTERECTOMY;  Surgeon: Toby Fung MD;  Location: [facility]  "OR;  Service: Vascular;  Laterality: Right;   • COLONOSCOPY  08/25/2015   • PROSTATECTOMY  2001    due to cancer     The patient has a family history of  Family History   Problem Relation Age of Onset   • Hypertension Other    • Heart attack Mother    • Heart disease Mother    • Heart attack Father    • Heart disease Father      Social History     Socioeconomic History   • Marital status:    Tobacco Use   • Smoking status: Never   • Smokeless tobacco: Never   Vaping Use   • Vaping Use: Never used   Substance and Sexual Activity   • Alcohol use: Not Currently   • Drug use: Never   • Sexual activity: Defer       Review of Systems   Constitutional: Negative for activity change, appetite change, chills and fever.   HENT: Positive for congestion and postnasal drip. Negative for ear pain, sinus pressure, sore throat and swollen glands.    Respiratory: Positive for cough. Negative for choking, chest tightness, shortness of breath, wheezing and stridor.    Cardiovascular: Negative for chest pain.   Skin: Negative for dry skin and rash.     Visit Vitals  /77 (BP Location: Left arm, Patient Position: Sitting, Cuff Size: Adult)   Pulse 60   Temp 97.4 °F (36.3 °C) (Temporal)   Resp 16   Ht 180.3 cm (71\")   Wt 92.1 kg (203 lb)   SpO2 95%   BMI 28.31 kg/m²       Current Outpatient Medications:   •  amLODIPine (NORVASC) 10 MG tablet, Take 1 tablet by mouth Daily., Disp: 90 tablet, Rfl: 1  •  aspirin 81 MG chewable tablet, Chew 1 tablet Daily., Disp: 30 tablet, Rfl: 1  •  atorvastatin (LIPITOR) 40 MG tablet, TAKE 1 TABLET DAILY, Disp: 90 tablet, Rfl: 1  •  fluticasone (FLONASE) 50 MCG/ACT nasal spray, 2 sprays into the nostril(s) as directed by provider Daily As Needed., Disp: , Rfl:   •  Januvia 50 MG tablet, TAKE 1 TABLET DAILY, Disp: 90 tablet, Rfl: 0  •  Jardiance 10 MG tablet tablet, TAKE 1 TABLET DAILY, Disp: 90 tablet, Rfl: 1  •  losartan (COZAAR) 50 MG tablet, TAKE 1 TABLET DAILY, Disp: 90 tablet, Rfl: 1  •  " OneTouch Ultra test strip, USE AND DISCARD 1 TEST     STRIP DAILY., Disp: 100 each, Rfl: 1  •  pantoprazole (PROTONIX) 40 MG EC tablet, Take 1 tablet by mouth Daily., Disp: 90 tablet, Rfl: 1  •  Synthroid 88 MCG tablet, TAKE 1 TABLET DAILY, Disp: 90 tablet, Rfl: 0  •  amoxicillin-clavulanate (Augmentin) 875-125 MG per tablet, Take 1 tablet by mouth 2 (Two) Times a Day., Disp: 20 tablet, Rfl: 0    Objective   Physical Exam  Vitals and nursing note reviewed.   Constitutional:       General: He is not in acute distress.     Appearance: He is well-developed.   HENT:      Head: Normocephalic and atraumatic.      Right Ear: External ear normal.      Left Ear: External ear normal.      Nose: Congestion present.      Mouth/Throat:      Pharynx: Posterior oropharyngeal erythema present. No oropharyngeal exudate.   Eyes:      Conjunctiva/sclera: Conjunctivae normal.      Pupils: Pupils are equal, round, and reactive to light.   Cardiovascular:      Rate and Rhythm: Normal rate and regular rhythm.      Heart sounds: Murmur heard.   Pulmonary:      Effort: Pulmonary effort is normal. No respiratory distress.      Breath sounds: Normal breath sounds. No wheezing or rales.   Musculoskeletal:      Cervical back: Normal range of motion and neck supple.   Skin:     General: Skin is warm and dry.      Findings: No rash.           Assessment & Plan   Diagnoses and all orders for this visit:    1. Subacute maxillary sinusitis (Primary)  -     amoxicillin-clavulanate (Augmentin) 875-125 MG per tablet; Take 1 tablet by mouth 2 (Two) Times a Day.  Dispense: 20 tablet; Refill: 0      I will be starting him on Augmentin.  He may continue over-the-counter Mucinex as needed.  He is to monitor his symptoms and contact us back if any concerns.        Return if symptoms worsen or fail to improve, for Recheck.    Requested Prescriptions     Signed Prescriptions Disp Refills   • amoxicillin-clavulanate (Augmentin) 875-125 MG per tablet 20 tablet 0      Sig: Take 1 tablet by mouth 2 (Two) Times a Day.

## 2022-12-22 DIAGNOSIS — I10 ESSENTIAL HYPERTENSION: ICD-10-CM

## 2022-12-22 RX ORDER — SITAGLIPTIN 50 MG/1
TABLET, FILM COATED ORAL
Qty: 90 TABLET | Refills: 0 | Status: ON HOLD | OUTPATIENT
Start: 2022-12-22 | End: 2023-02-20

## 2022-12-22 RX ORDER — LOSARTAN POTASSIUM 50 MG/1
TABLET ORAL
Qty: 90 TABLET | Refills: 1 | Status: ON HOLD | OUTPATIENT
Start: 2022-12-22 | End: 2023-02-20

## 2022-12-22 RX ORDER — ATORVASTATIN CALCIUM 40 MG/1
TABLET, FILM COATED ORAL
Qty: 90 TABLET | Refills: 1 | Status: ON HOLD | OUTPATIENT
Start: 2022-12-22 | End: 2023-02-20

## 2023-01-10 ENCOUNTER — LAB (OUTPATIENT)
Dept: LAB | Facility: HOSPITAL | Age: 83
End: 2023-01-10
Payer: MEDICARE

## 2023-01-10 DIAGNOSIS — I25.118 CORONARY ARTERY DISEASE OF NATIVE ARTERY OF NATIVE HEART WITH STABLE ANGINA PECTORIS: ICD-10-CM

## 2023-01-10 DIAGNOSIS — I35.0 AORTIC STENOSIS, SEVERE: ICD-10-CM

## 2023-01-10 DIAGNOSIS — I48.0 PAF (PAROXYSMAL ATRIAL FIBRILLATION): ICD-10-CM

## 2023-01-10 LAB
ANION GAP SERPL CALCULATED.3IONS-SCNC: 12 MMOL/L (ref 5–15)
APTT PPP: 26.5 SECONDS (ref 24–31)
BUN SERPL-MCNC: 27 MG/DL (ref 8–23)
BUN/CREAT SERPL: 21.1 (ref 7–25)
CALCIUM SPEC-SCNC: 9.2 MG/DL (ref 8.6–10.5)
CHLORIDE SERPL-SCNC: 107 MMOL/L (ref 98–107)
CO2 SERPL-SCNC: 23 MMOL/L (ref 22–29)
CREAT SERPL-MCNC: 1.28 MG/DL (ref 0.76–1.27)
DEPRECATED RDW RBC AUTO: 42.7 FL (ref 37–54)
EGFRCR SERPLBLD CKD-EPI 2021: 55.5 ML/MIN/1.73
ERYTHROCYTE [DISTWIDTH] IN BLOOD BY AUTOMATED COUNT: 12.9 % (ref 12.3–15.4)
GLUCOSE SERPL-MCNC: 135 MG/DL (ref 65–99)
HCT VFR BLD AUTO: 43.9 % (ref 37.5–51)
HGB BLD-MCNC: 14.8 G/DL (ref 13–17.7)
INR PPP: 1 (ref 0.93–1.1)
MCH RBC QN AUTO: 30.1 PG (ref 26.6–33)
MCHC RBC AUTO-ENTMCNC: 33.7 G/DL (ref 31.5–35.7)
MCV RBC AUTO: 89.2 FL (ref 79–97)
PLATELET # BLD AUTO: 214 10*3/MM3 (ref 140–450)
PMV BLD AUTO: 10.9 FL (ref 6–12)
POTASSIUM SERPL-SCNC: 4.1 MMOL/L (ref 3.5–5.2)
PROTHROMBIN TIME: 10.3 SECONDS (ref 9.6–11.7)
RBC # BLD AUTO: 4.92 10*6/MM3 (ref 4.14–5.8)
SARS-COV-2 RNA PNL SPEC NAA+PROBE: NOT DETECTED
SODIUM SERPL-SCNC: 142 MMOL/L (ref 136–145)
WBC NRBC COR # BLD: 6.93 10*3/MM3 (ref 3.4–10.8)

## 2023-01-10 PROCEDURE — 80048 BASIC METABOLIC PNL TOTAL CA: CPT

## 2023-01-10 PROCEDURE — 85027 COMPLETE CBC AUTOMATED: CPT

## 2023-01-10 PROCEDURE — 36415 COLL VENOUS BLD VENIPUNCTURE: CPT

## 2023-01-10 PROCEDURE — 87635 SARS-COV-2 COVID-19 AMP PRB: CPT

## 2023-01-10 PROCEDURE — 85730 THROMBOPLASTIN TIME PARTIAL: CPT

## 2023-01-10 PROCEDURE — C9803 HOPD COVID-19 SPEC COLLECT: HCPCS

## 2023-01-10 PROCEDURE — 85610 PROTHROMBIN TIME: CPT

## 2023-01-12 ENCOUNTER — HOSPITAL ENCOUNTER (OUTPATIENT)
Facility: HOSPITAL | Age: 83
Discharge: HOME | End: 2023-01-13
Attending: INTERNAL MEDICINE | Admitting: INTERNAL MEDICINE
Payer: MEDICARE

## 2023-01-12 DIAGNOSIS — I35.0 AORTIC STENOSIS, SEVERE: ICD-10-CM

## 2023-01-12 DIAGNOSIS — I48.0 PAF (PAROXYSMAL ATRIAL FIBRILLATION): ICD-10-CM

## 2023-01-12 DIAGNOSIS — I25.118 CORONARY ARTERY DISEASE OF NATIVE ARTERY OF NATIVE HEART WITH STABLE ANGINA PECTORIS: ICD-10-CM

## 2023-01-12 LAB
ACT BLD: 155 SECONDS (ref 89–137)
ACT BLD: 167 SECONDS (ref 89–137)
ACT BLD: 233 SECONDS (ref 89–137)

## 2023-01-12 PROCEDURE — 25010000002 FENTANYL CITRATE (PF) 100 MCG/2ML SOLUTION: Performed by: INTERNAL MEDICINE

## 2023-01-12 PROCEDURE — G0378 HOSPITAL OBSERVATION PER HR: HCPCS

## 2023-01-12 PROCEDURE — 25010000002 MIDAZOLAM PER 1 MG: Performed by: INTERNAL MEDICINE

## 2023-01-12 PROCEDURE — 99153 MOD SED SAME PHYS/QHP EA: CPT | Performed by: INTERNAL MEDICINE

## 2023-01-12 PROCEDURE — 80061 LIPID PANEL: CPT | Performed by: INTERNAL MEDICINE

## 2023-01-12 PROCEDURE — 0 LIDOCAINE 1 % SOLUTION: Performed by: INTERNAL MEDICINE

## 2023-01-12 PROCEDURE — 85027 COMPLETE CBC AUTOMATED: CPT | Performed by: INTERNAL MEDICINE

## 2023-01-12 PROCEDURE — C1769 GUIDE WIRE: HCPCS | Performed by: INTERNAL MEDICINE

## 2023-01-12 PROCEDURE — C1725 CATH, TRANSLUMIN NON-LASER: HCPCS | Performed by: INTERNAL MEDICINE

## 2023-01-12 PROCEDURE — 25010000002 HEPARIN (PORCINE) PER 1000 UNITS: Performed by: INTERNAL MEDICINE

## 2023-01-12 PROCEDURE — A9270 NON-COVERED ITEM OR SERVICE: HCPCS | Performed by: INTERNAL MEDICINE

## 2023-01-12 PROCEDURE — 99152 MOD SED SAME PHYS/QHP 5/>YRS: CPT | Performed by: INTERNAL MEDICINE

## 2023-01-12 PROCEDURE — 63710000001 CLOPIDOGREL 75 MG TABLET: Performed by: INTERNAL MEDICINE

## 2023-01-12 PROCEDURE — 92928 PRQ TCAT PLMT NTRAC ST 1 LES: CPT | Performed by: INTERNAL MEDICINE

## 2023-01-12 PROCEDURE — 63710000001 LOSARTAN 50 MG TABLET: Performed by: INTERNAL MEDICINE

## 2023-01-12 PROCEDURE — 25010000002 EPTIFIBATIDE 20 MG/10ML SOLUTION: Performed by: INTERNAL MEDICINE

## 2023-01-12 PROCEDURE — C9600 PERC DRUG-EL COR STENT SING: HCPCS | Performed by: INTERNAL MEDICINE

## 2023-01-12 PROCEDURE — 63710000001 ASPIRIN 325 MG TABLET: Performed by: INTERNAL MEDICINE

## 2023-01-12 PROCEDURE — 85347 COAGULATION TIME ACTIVATED: CPT

## 2023-01-12 PROCEDURE — 0 IOPAMIDOL PER 1 ML: Performed by: INTERNAL MEDICINE

## 2023-01-12 PROCEDURE — C1887 CATHETER, GUIDING: HCPCS | Performed by: INTERNAL MEDICINE

## 2023-01-12 PROCEDURE — C1894 INTRO/SHEATH, NON-LASER: HCPCS | Performed by: INTERNAL MEDICINE

## 2023-01-12 PROCEDURE — 93005 ELECTROCARDIOGRAM TRACING: CPT | Performed by: INTERNAL MEDICINE

## 2023-01-12 PROCEDURE — C1874 STENT, COATED/COV W/DEL SYS: HCPCS | Performed by: INTERNAL MEDICINE

## 2023-01-12 PROCEDURE — 80048 BASIC METABOLIC PNL TOTAL CA: CPT | Performed by: INTERNAL MEDICINE

## 2023-01-12 DEVICE — XIENCE SKYPOINT™ EVEROLIMUS ELUTING CORONARY STENT SYSTEM 2.50 MM X 15 MM / RAPID-EXCHANGE
Type: IMPLANTABLE DEVICE | Site: CORONARY | Status: FUNCTIONAL
Brand: XIENCE SKYPOINT™

## 2023-01-12 RX ORDER — SODIUM CHLORIDE 9 MG/ML
250 INJECTION, SOLUTION INTRAVENOUS ONCE AS NEEDED
Status: DISCONTINUED | OUTPATIENT
Start: 2023-01-12 | End: 2023-01-13 | Stop reason: HOSPADM

## 2023-01-12 RX ORDER — FENTANYL CITRATE 50 UG/ML
INJECTION, SOLUTION INTRAMUSCULAR; INTRAVENOUS
Status: DISCONTINUED | OUTPATIENT
Start: 2023-01-12 | End: 2023-01-12 | Stop reason: HOSPADM

## 2023-01-12 RX ORDER — DIPHENHYDRAMINE HCL 25 MG
25 CAPSULE ORAL EVERY 6 HOURS PRN
Status: DISCONTINUED | OUTPATIENT
Start: 2023-01-12 | End: 2023-01-13 | Stop reason: HOSPADM

## 2023-01-12 RX ORDER — ACETAMINOPHEN 325 MG/1
650 TABLET ORAL EVERY 4 HOURS PRN
Status: DISCONTINUED | OUTPATIENT
Start: 2023-01-12 | End: 2023-01-13 | Stop reason: HOSPADM

## 2023-01-12 RX ORDER — NITROGLYCERIN 5 MG/ML
INJECTION, SOLUTION INTRAVENOUS
Status: DISCONTINUED | OUTPATIENT
Start: 2023-01-12 | End: 2023-01-12 | Stop reason: HOSPADM

## 2023-01-12 RX ORDER — ASPIRIN 325 MG
TABLET ORAL
Status: DISCONTINUED | OUTPATIENT
Start: 2023-01-12 | End: 2023-01-12 | Stop reason: HOSPADM

## 2023-01-12 RX ORDER — ATORVASTATIN CALCIUM 40 MG/1
40 TABLET, FILM COATED ORAL DAILY
Status: DISCONTINUED | OUTPATIENT
Start: 2023-01-13 | End: 2023-01-13 | Stop reason: HOSPADM

## 2023-01-12 RX ORDER — HEPARIN SODIUM 1000 [USP'U]/ML
INJECTION, SOLUTION INTRAVENOUS; SUBCUTANEOUS
Status: DISCONTINUED | OUTPATIENT
Start: 2023-01-12 | End: 2023-01-12 | Stop reason: HOSPADM

## 2023-01-12 RX ORDER — AMLODIPINE BESYLATE 5 MG/1
10 TABLET ORAL DAILY
Status: DISCONTINUED | OUTPATIENT
Start: 2023-01-13 | End: 2023-01-13 | Stop reason: HOSPADM

## 2023-01-12 RX ORDER — PANTOPRAZOLE SODIUM 40 MG/1
40 TABLET, DELAYED RELEASE ORAL DAILY
Status: DISCONTINUED | OUTPATIENT
Start: 2023-01-13 | End: 2023-01-13 | Stop reason: HOSPADM

## 2023-01-12 RX ORDER — ASPIRIN 81 MG/1
81 TABLET, CHEWABLE ORAL DAILY
Status: DISCONTINUED | OUTPATIENT
Start: 2023-01-13 | End: 2023-01-13 | Stop reason: HOSPADM

## 2023-01-12 RX ORDER — LIDOCAINE HYDROCHLORIDE 10 MG/ML
INJECTION, SOLUTION INFILTRATION; PERINEURAL
Status: DISCONTINUED | OUTPATIENT
Start: 2023-01-12 | End: 2023-01-12 | Stop reason: HOSPADM

## 2023-01-12 RX ORDER — CLOPIDOGREL BISULFATE 75 MG/1
TABLET ORAL
Status: DISCONTINUED | OUTPATIENT
Start: 2023-01-12 | End: 2023-01-12 | Stop reason: HOSPADM

## 2023-01-12 RX ORDER — LEVOTHYROXINE SODIUM 88 UG/1
88 TABLET ORAL
Status: DISCONTINUED | OUTPATIENT
Start: 2023-01-13 | End: 2023-01-13 | Stop reason: HOSPADM

## 2023-01-12 RX ORDER — CLOPIDOGREL BISULFATE 75 MG/1
75 TABLET ORAL DAILY
Status: DISCONTINUED | OUTPATIENT
Start: 2023-01-13 | End: 2023-01-13 | Stop reason: HOSPADM

## 2023-01-12 RX ORDER — LOSARTAN POTASSIUM 50 MG/1
50 TABLET ORAL DAILY
Status: DISCONTINUED | OUTPATIENT
Start: 2023-01-12 | End: 2023-01-13 | Stop reason: HOSPADM

## 2023-01-12 RX ORDER — EPTIFIBATIDE 2 MG/ML
INJECTION, SOLUTION INTRAVENOUS
Status: DISCONTINUED | OUTPATIENT
Start: 2023-01-12 | End: 2023-01-12 | Stop reason: HOSPADM

## 2023-01-12 RX ORDER — MIDAZOLAM HYDROCHLORIDE 1 MG/ML
INJECTION INTRAMUSCULAR; INTRAVENOUS
Status: DISCONTINUED | OUTPATIENT
Start: 2023-01-12 | End: 2023-01-12 | Stop reason: HOSPADM

## 2023-01-12 RX ADMIN — LOSARTAN POTASSIUM 50 MG: 50 TABLET, FILM COATED ORAL at 15:08

## 2023-01-12 NOTE — Clinical Note
Transparent Dressing was used to secure the sheath post procedure.  Sheath Left Intact after the procedure.  Pressure Bag was used to stabalize the sheath post procedure.

## 2023-01-12 NOTE — Clinical Note
First balloon inflation max pressure = 14 fabiano. First balloon inflation duration = 16 seconds. Second inflation of balloon - Max pressure = 12 fabiano. 2nd Inflation of balloon - Duration = 15 seconds.

## 2023-01-12 NOTE — CASE MANAGEMENT/SOCIAL WORK
Discharge Planning Assessment   David     Patient Name: Alen Ratliff  MRN: [id number]  Today's Date: 1/12/2023    Admit Date: 1/12/2023    Plan: D/C Plan: Anticipate routine home with family.   Discharge Needs Assessment     Row Name 01/12/23 1420       Living Environment    People in Home spouse    Current Living Arrangements apartment    Primary Care Provided by self    Provides Primary Care For no one    Family Caregiver if Needed child(preston), adult;spouse    Quality of Family Relationships helpful    Able to Return to Prior Arrangements yes       Resource/Environmental Concerns    Resource/Environmental Concerns none    Transportation Concerns none       Transition Planning    Patient/Family Anticipates Transition to home with family    Patient/Family Anticipated Services at Transition none    Transportation Anticipated family or friend will provide       Discharge Needs Assessment    Readmission Within the Last 30 Days no previous admission in last 30 days    Equipment Currently Used at Home glucometer    Concerns to be Addressed denies needs/concerns at this time    Anticipated Changes Related to Illness none    Equipment Needed After Discharge none               Discharge Plan     Row Name 01/12/23 1421       Plan    Plan D/C Plan: Anticipate routine home with family.    Patient/Family in Agreement with Plan yes    Plan Comments  spoke to patient at bedside wearing mask and keeping distance greater than 6 feet and spent less than 15 minutes in room. Patient lives with spouse, is IADLs and drives. PCP and pharmacy verified-denies any difficulty affording meds. Patient denies any d/c or home health needs at this time. GRANT  letter reviewed with spouse and patient, verbal consent and copy left at bedside. Family will provide transport at d/c. Barrier to D/C: cardiac cath today, anticipate d/c once cleared by cardiology.                Expected Discharge Date and Time     Expected Discharge  Date Expected Discharge Time    Jan 13, 2023          Demographic Summary     Row Name 01/12/23 1420       General Information    Admission Type observation    Arrived From procedure suite    Referral Source admission list    Reason for Consult discharge planning    Preferred Language English       Contact Information    Permission Granted to Share Info With                Functional Status     Row Name 01/12/23 1420       Functional Status    Usual Activity Tolerance good    Current Activity Tolerance good       Functional Status, IADL    Medications independent    Meal Preparation independent    Housekeeping independent    Laundry independent    Shopping independent       Mental Status    General Appearance WDL WDL       Mental Status Summary    Recent Changes in Mental Status/Cognitive Functioning no changes                   Patient Forms     Row Name 01/12/23 1423       Patient Forms    Important Message from Medicare ([facility]) --  GRANT 1/12/23    Patient Observation Letter Delivered  1/12/23    Delivered to Patient    Method of delivery In person  reviewed with patient and spouse, verbal consent and copy left at bedside              Met with patient in room wearing PPE: mask.    Maintained distance greater than six feet and spent less than 15 minutes in the room.      JACKSON HuN, RN    [address] [address]    Office: [phone]  Fax: [phone]

## 2023-01-12 NOTE — PLAN OF CARE
Goal Outcome Evaluation:  Plan of Care Reviewed With: patient        Progress: improving  Outcome Evaluation: Recovering after heart cath

## 2023-01-12 NOTE — CONSULTS
[facility] [place]

## 2023-01-12 NOTE — Clinical Note
First balloon inflation max pressure = 10 fabiano. First balloon inflation duration = 12 seconds. Second inflation of balloon - Max pressure = 12 fabiano. 2nd Inflation of balloon - Duration = 12 seconds.

## 2023-01-13 ENCOUNTER — READMISSION MANAGEMENT (OUTPATIENT)
Dept: CALL CENTER | Facility: HOSPITAL | Age: 83
End: 2023-01-13
Payer: MEDICARE

## 2023-01-13 VITALS
WEIGHT: 201.72 LBS | DIASTOLIC BLOOD PRESSURE: 68 MMHG | RESPIRATION RATE: 20 BRPM | OXYGEN SATURATION: 95 % | HEART RATE: 57 BPM | BODY MASS INDEX: 30.57 KG/M2 | SYSTOLIC BLOOD PRESSURE: 153 MMHG | HEIGHT: 68 IN | TEMPERATURE: 98 F

## 2023-01-13 PROBLEM — Z98.61 S/P PTCA (PERCUTANEOUS TRANSLUMINAL CORONARY ANGIOPLASTY): Status: ACTIVE | Noted: 2023-01-13

## 2023-01-13 LAB
ANION GAP SERPL CALCULATED.3IONS-SCNC: 10 MMOL/L (ref 5–15)
BUN SERPL-MCNC: 28 MG/DL (ref 8–23)
BUN/CREAT SERPL: 20.9 (ref 7–25)
CALCIUM SPEC-SCNC: 8.9 MG/DL (ref 8.6–10.5)
CHLORIDE SERPL-SCNC: 107 MMOL/L (ref 98–107)
CHOLEST SERPL-MCNC: 129 MG/DL (ref 0–200)
CO2 SERPL-SCNC: 24 MMOL/L (ref 22–29)
CREAT SERPL-MCNC: 1.34 MG/DL (ref 0.76–1.27)
DEPRECATED RDW RBC AUTO: 47.3 FL (ref 37–54)
EGFRCR SERPLBLD CKD-EPI 2021: 52.6 ML/MIN/1.73
ERYTHROCYTE [DISTWIDTH] IN BLOOD BY AUTOMATED COUNT: 14.6 % (ref 12.3–15.4)
GLUCOSE SERPL-MCNC: 147 MG/DL (ref 65–99)
HCT VFR BLD AUTO: 39.5 % (ref 37.5–51)
HDLC SERPL-MCNC: 37 MG/DL (ref 40–60)
HGB BLD-MCNC: 12.8 G/DL (ref 13–17.7)
LDLC SERPL CALC-MCNC: 66 MG/DL (ref 0–100)
LDLC/HDLC SERPL: 1.68 {RATIO}
MCH RBC QN AUTO: 30.1 PG (ref 26.6–33)
MCHC RBC AUTO-ENTMCNC: 32.3 G/DL (ref 31.5–35.7)
MCV RBC AUTO: 93.2 FL (ref 79–97)
PLATELET # BLD AUTO: 193 10*3/MM3 (ref 140–450)
PMV BLD AUTO: 9 FL (ref 6–12)
POTASSIUM SERPL-SCNC: 4 MMOL/L (ref 3.5–5.2)
RBC # BLD AUTO: 4.24 10*6/MM3 (ref 4.14–5.8)
SODIUM SERPL-SCNC: 141 MMOL/L (ref 136–145)
TRIGL SERPL-MCNC: 150 MG/DL (ref 0–150)
VLDLC SERPL-MCNC: 26 MG/DL (ref 5–40)
WBC NRBC COR # BLD: 6.9 10*3/MM3 (ref 3.4–10.8)

## 2023-01-13 PROCEDURE — G0378 HOSPITAL OBSERVATION PER HR: HCPCS

## 2023-01-13 PROCEDURE — 63710000001 AMLODIPINE 5 MG TABLET: Performed by: INTERNAL MEDICINE

## 2023-01-13 PROCEDURE — 63710000001 LEVOTHYROXINE 88 MCG TABLET: Performed by: INTERNAL MEDICINE

## 2023-01-13 PROCEDURE — 93010 ELECTROCARDIOGRAM REPORT: CPT | Performed by: INTERNAL MEDICINE

## 2023-01-13 PROCEDURE — A9270 NON-COVERED ITEM OR SERVICE: HCPCS | Performed by: INTERNAL MEDICINE

## 2023-01-13 PROCEDURE — 63710000001 CLOPIDOGREL 75 MG TABLET: Performed by: INTERNAL MEDICINE

## 2023-01-13 PROCEDURE — 63710000001 PANTOPRAZOLE 40 MG TABLET DELAYED-RELEASE: Performed by: INTERNAL MEDICINE

## 2023-01-13 PROCEDURE — 63710000001 LOSARTAN 50 MG TABLET: Performed by: INTERNAL MEDICINE

## 2023-01-13 PROCEDURE — 63710000001 EMPAGLIFLOZIN 10 MG TABLET: Performed by: INTERNAL MEDICINE

## 2023-01-13 PROCEDURE — 93005 ELECTROCARDIOGRAM TRACING: CPT | Performed by: INTERNAL MEDICINE

## 2023-01-13 PROCEDURE — 63710000001 ATORVASTATIN 40 MG TABLET: Performed by: INTERNAL MEDICINE

## 2023-01-13 PROCEDURE — 63710000001 ASPIRIN 81 MG CHEWABLE TABLET: Performed by: INTERNAL MEDICINE

## 2023-01-13 PROCEDURE — 99239 HOSP IP/OBS DSCHRG MGMT >30: CPT | Performed by: INTERNAL MEDICINE

## 2023-01-13 RX ORDER — CLOPIDOGREL BISULFATE 75 MG/1
75 TABLET ORAL DAILY
Qty: 90 TABLET | Refills: 3 | Status: SHIPPED | OUTPATIENT
Start: 2023-01-13

## 2023-01-13 RX ORDER — CLOPIDOGREL BISULFATE 75 MG/1
75 TABLET ORAL DAILY
Qty: 30 TABLET | Refills: 5 | Status: SHIPPED | OUTPATIENT
Start: 2023-01-13 | End: 2023-01-13 | Stop reason: SDUPTHER

## 2023-01-13 RX ADMIN — CLOPIDOGREL BISULFATE 75 MG: 75 TABLET ORAL at 09:56

## 2023-01-13 RX ADMIN — LOSARTAN POTASSIUM 50 MG: 50 TABLET, FILM COATED ORAL at 09:56

## 2023-01-13 RX ADMIN — ASPIRIN 81 MG CHEWABLE TABLET 81 MG: 81 TABLET CHEWABLE at 09:56

## 2023-01-13 RX ADMIN — EMPAGLIFLOZIN 10 MG: 10 TABLET, FILM COATED ORAL at 09:56

## 2023-01-13 RX ADMIN — AMLODIPINE BESYLATE 10 MG: 5 TABLET ORAL at 09:56

## 2023-01-13 RX ADMIN — PANTOPRAZOLE SODIUM 40 MG: 40 TABLET, DELAYED RELEASE ORAL at 09:56

## 2023-01-13 RX ADMIN — LEVOTHYROXINE SODIUM 88 MCG: 0.09 TABLET ORAL at 06:29

## 2023-01-13 RX ADMIN — ATORVASTATIN CALCIUM 40 MG: 40 TABLET, FILM COATED ORAL at 09:56

## 2023-01-13 NOTE — NURSING NOTE
Met w/ Mr. Ratliff and his wife, Elba, at bedside for an introduction to the structural heart team.  Education given on AS, & procedures available for treatment.  Educational handouts including a shared decision making pamphlet & contact information given.  Will continue to follow pt and arrange further work up.

## 2023-01-13 NOTE — DISCHARGE SUMMARY
Date of Discharge:  1/13/2023    Discharge Diagnosis:   Coronary artery disease  Status post PCI to left circumflex  Severe symptomatic aortic stenosis  Hypertension  Diabetes      Presenting Problem/History of Present Illness    Active Hospital Problems    Diagnosis  POA   • **Coronary artery disease of native artery of native heart with stable angina pectoris ([facility]) [I25.118]  Yes   • PAF (paroxysmal atrial fibrillation) ([facility]) [I48.0]  Yes     Priority: High   • S/P PTCA (percutaneous transluminal coronary angioplasty) [Z98.61]  Not Applicable   • Coronary artery disease involving native coronary artery of native heart [I25.10]  Yes   • Aortic stenosis, severe [I35.0]  Yes      Resolved Hospital Problems   No resolved problems to display.              Hospital Course   82-year-old man who was electively admitted for cardiac catheterization for known coronary disease.  He was found to have severe aortic stenosis and a cardiac surgery consultation was obtained.  He was thought to be a high candidate for CABG and AVR and was therefore referred for PCI and TAVR.  He underwent successful drug-eluting stent placement to proximal left circumflex artery.  He has totally occluded RCA and diffuse nonobstructive disease in the LAD 50 to 60% throughout.  RCA is collateralized from the left system.  His access site appears benign.  He will be referred to the structural heart clinic for evaluation of TAVR.      Procedures Performed      Procedure(s):  Stent JENN coronary  -------------------    Percutaneous coronary intervention report     DATE OF PROCEDURE: 1/12/2023     INDICATION FOR PROCEDURE:     Angina pectoris  Shortness of breath  CAD  Severe aortic stenosis  Pre-TAVR revascularization     PROCEDURE PERFORMED:     Balloon angioplasty and stenting of proximal LCx coronary artery        PROCEDURE COMMENTS:      After diagnostic catheterization done earlier, we proceeded to the intervention of the LCx coronary  artery.     We advanced a 6 Italian XB 3.5 interventional guide and selective engagement of the left coronary artery was achieved.  We advanced interventional guidewire 0.014 whisper and crossed the lesion and placed in the distal part of the distal LCx coronary artery.     We advanced 2.25x12 compliant balloon and the pre-dilatation at nominal pressure.  After the predilatation, this balloon was removed and we advanced 2.5x15 drug-eluting stent and advanced and placed over the lesion and deployed at supra nominal pressure of 14 fabiano to achieve the caliber of 2.75 mm.  Stenosis decreased from 95 to 99% to less than 10%.  MARCK II flow was present prior to the intervention and MARCK-3 flow was present after the intervention.  No dissection, perforation or no reflow phenomena was noted.     Patient was given aspirin, Plavix and heparin during the procedure.  ACT at the end of procedure was 233.  Integrilin bolus was given     Patient tolerated the procedure well.        SUMMARY:      1.  Severe aortic stenosis  2.  High-grade stenosis of LCx with successful stenting prior to TAVR  3.  Nonobstructive disease of LAD.  4.  100% occlusion of RCA which is well collateralized from the left system     RECOMMENDATIONS:      Continue DAPT  Proceed with TAVR      Consults:     Consults     No orders found for last 30 day(s).          Pertinent Test Results:     cardiac graphics:      ECG:           Echocardiogram: Results for orders placed during the hospital encounter of 08/30/22    Adult Transesophageal Echo (CHRISTIANO) W/ Cont if Necessary Per Protocol    Interpretation Summary  · Left ventricular systolic function is normal.  · Left ventricular ejection fraction is 60 to 65%  · Left ventricular wall thickness is consistent with mild concentric hypertrophy.  · Left ventricular diastolic function is consistent with (grade I) impaired relaxation.  · Severe aortic valve stenosis is present. Aortic valve area is 0.59 cm2.  · Peak velocity  of the flow distal to the aortic valve is 429.2 cm/s. Aortic valve maximum pressure gradient is 73.7 mmHg. Aortic valve mean pressure gradient is 30.8 mmHg.  · Saline test results are negative.               LABS      CBC    Results from last 7 days   Lab Units 01/12/23  2354 01/10/23  0917   WBC 10*3/mm3 6.90 6.93   HEMOGLOBIN g/dL 12.8* 14.8   PLATELETS 10*3/mm3 193 214     BMP   Results from last 7 days   Lab Units 01/12/23  2354 01/10/23  0917   SODIUM mmol/L 141 142   POTASSIUM mmol/L 4.0 4.1   CHLORIDE mmol/L 107 107   CO2 mmol/L 24.0 23.0   BUN mg/dL 28* 27*   CREATININE mg/dL 1.34* 1.28*   GLUCOSE mg/dL 147* 135*     Cr Clearance Estimated Creatinine Clearance: 45.8 mL/min (A) (by C-G formula based on SCr of 1.34 mg/dL (H)).  Coag   Results from last 7 days   Lab Units 01/10/23  0917   INR  1.00   APTT seconds 26.5     HbA1C   Lab Results   Component Value Date    HGBA1C 6.6 (H) 10/03/2022    HGBA1C 6.6 (H) 03/31/2022    HGBA1C 7.3 (H) 09/14/2021     Blood Glucose No results found for: POCGLU  Infection     CMP   Results from last 7 days   Lab Units 01/12/23  2354 01/10/23  0917   SODIUM mmol/L 141 142   POTASSIUM mmol/L 4.0 4.1   CHLORIDE mmol/L 107 107   CO2 mmol/L 24.0 23.0   BUN mg/dL 28* 27*   CREATININE mg/dL 1.34* 1.28*   GLUCOSE mg/dL 147* 135*     ABG      UA      RHONA  No results found for: POCMETH, POCAMPHET, POCBARBITUR, POCBENZO, POCCOCAINE, POCOPIATES, POCOXYCODO, POCPHENCYC, POCPROPOXY, POCTHC, POCTRICYC  Lysis Labs   Results from last 7 days   Lab Units 01/12/23  2354 01/10/23  0917   INR   --  1.00   APTT seconds  --  26.5   HEMOGLOBIN g/dL 12.8* 14.8   PLATELETS 10*3/mm3 193 214   CREATININE mg/dL 1.34* 1.28*     Radiology(recent) No radiology results for the last day    Imaging Results (Last 24 Hours)     ** No results found for the last 24 hours. **                    Condition on Discharge:  Stable    Vital Signs    Temp:  [97.3 °F (36.3 °C)-98.6 °F (37 °C)] 97.5 °F (36.4 °C)  Heart Rate:   [49-73] 53  Resp:  [14-23] 17  BP: (120-164)/(52-85) 150/66  Arterial Line BP: (140-152)/(52-56) 140/52    Physical Exam:    Vitals reviewed.   Constitutional:       General: Not in acute distress.     Appearance: Normal appearance. Well-developed.   Eyes:      Pupils: Pupils are equal, round, and reactive to light.   HENT:      Head: Normocephalic and atraumatic.   Neck:      Vascular: No JVD.   Pulmonary:      Effort: Pulmonary effort is normal.      Breath sounds: Normal breath sounds.   Cardiovascular:      Normal rate. Regular rhythm.      Murmurs: There is a systolic murmur.      Comments: Right groin is soft without hematoma  Pulses:     Intact distal pulses.   Edema:     Peripheral edema absent.   Abdominal:      General: There is no distension.      Palpations: Abdomen is soft.      Tenderness: There is no abdominal tenderness.   Musculoskeletal: Normal range of motion.      Cervical back: Normal range of motion and neck supple. Skin:     General: Skin is warm and dry.   Neurological:      Mental Status: Alert and oriented to person, place, and time.         Discharge Disposition    Home or Self Care    Discharge Medications       Discharge Medications      New Medications      Instructions Start Date   clopidogrel 75 MG tablet  Commonly known as: PLAVIX   75 mg, Oral, Daily         Continue These Medications      Instructions Start Date   amLODIPine 10 MG tablet  Commonly known as: NORVASC   10 mg, Oral, Daily      aspirin 81 MG chewable tablet   81 mg, Oral, Daily      atorvastatin 40 MG tablet  Commonly known as: LIPITOR   TAKE 1 TABLET DAILY      fluticasone 50 MCG/ACT nasal spray  Commonly known as: FLONASE   2 sprays, Nasal, Daily PRN      Januvia 50 MG tablet  Generic drug: SITagliptin   TAKE 1 TABLET DAILY      Jardiance 10 MG tablet tablet  Generic drug: empagliflozin   TAKE 1 TABLET DAILY      losartan 50 MG tablet  Commonly known as: COZAAR   TAKE 1 TABLET DAILY      OneTouch Ultra test  strip  Generic drug: glucose blood   USE AND DISCARD 1 TEST     STRIP DAILY.      pantoprazole 40 MG EC tablet  Commonly known as: PROTONIX   40 mg, Oral, Daily      Synthroid 88 MCG tablet  Generic drug: levothyroxine   TAKE 1 TABLET DAILY             Discharge Diet:   Diet Instructions     Diet: Consistent Carbohydrate      Discharge Diet: Consistent Carbohydrate          Activity at Discharge:   Activity Instructions     Other Activity Instructions      Activity Instructions: No heavy lifting, pushing or pulling x 1 week          Follow-up Appointments    Future Appointments   Date Time Provider Department Center   1/25/2023 10:00 AM [facility] MACHINE 4/CLINIC  [facility]   1/25/2023 10:45 AM ROOM 1,  [facility] None   4/3/2023  8:10 AM LAB [facility]   4/7/2023 10:30 AM Serenity Wren MD [facility]     Additional Instructions for the Follow-ups that You Need to Schedule     Call MD With Problems / Concerns   As directed      Instructions: Call Dr. Muhammad's office with any problems or questions [phone]    Order Comments: Instructions: Call Dr. Muhammad's office with any problems or questions [phone]          Discharge Follow-up with Specialty: Cardiology; 1 Month   As directed      Specialty: Cardiology    Follow Up: 1 Month    Follow Up Details: See Dr. Muhammad in 1 month for f/u               Test Results Pending at Discharge    Plan to obtain a TAVR CTA and office visit in the structural heart clinic.     Electronically signed by Naveed Leonardo MD, 01/13/23, 2:49 PM EST.    Time: Discharge time 45 minutes.  Majority of the time was spent in education of patient and family regarding future plans regarding transcatheter aortic valve replacement and the work-up involved specially TAVR CTA.  I briefly explained the risk and benefit of the procedure to the patient and more thorough discussion will be held in the office.  He has chronic kidney disease and will have  repeat renal function testing as an outpatient.  Medication reconciliation was performed and follow-up appointments were made.

## 2023-01-13 NOTE — CASE MANAGEMENT/SOCIAL WORK
Case Management Discharge Note      Final Note: Home         Selected Continued Care - Admitted Since 1/12/2023    Transportation Services  Private: Car    Final Discharge Disposition Code: 01 - home or self-care

## 2023-01-14 NOTE — OUTREACH NOTE
Prep Survey    Flowsheet Row Responses   [facility] discharged from? [facility]   Is LACE score < 7 ? No   Eligibility [facility]   [facility]   Date of Admission 01/12/23   Date of Discharge 01/13/23   Discharge Disposition Home or Self Care   Discharge diagnosis Stent JENN coronary   Does the patient have one of the following disease processes/diagnoses(primary or secondary)? Other   Does the patient have Home health ordered? No   Is there a DME ordered? No   Prep survey completed? Yes          JOHNNY PA - Registered Nurse

## 2023-01-16 ENCOUNTER — TRANSITIONAL CARE MANAGEMENT TELEPHONE ENCOUNTER (OUTPATIENT)
Dept: CALL CENTER | Facility: HOSPITAL | Age: 83
End: 2023-01-16
Payer: MEDICARE

## 2023-01-16 ENCOUNTER — TELEPHONE (OUTPATIENT)
Dept: CARDIOLOGY | Facility: CLINIC | Age: 83
End: 2023-01-16

## 2023-01-16 ENCOUNTER — TELEPHONE (OUTPATIENT)
Dept: CARDIOLOGY | Facility: HOSPITAL | Age: 83
End: 2023-01-16

## 2023-01-16 NOTE — TELEPHONE ENCOUNTER
Telephoned pt & wife.  Notified of CT TAVR appointment available on 1/24/23.  Agreeable to appointment date/time.  Instructed to arrive at 9:30, check in at main registration, NPO 3 hours prior.  Pt/wife ARBEN.

## 2023-01-16 NOTE — TELEPHONE ENCOUNTER
Caller: SharikelElba    Relationship: Emergency Contact    Best call back number: [phone]    What is the best time to reach you: ANYTIME     Who are you requesting to speak with (clinical staff, provider,  specific staff member): ANYONE    What was the call regarding: PATIENT NEEDING CLARIFICATION FROM PROVIDER, WOULD LIKE TO KNOW IF HE NEEDS CT SCAN DONE PRIOR TO APPT ON 1.18.23.     Do you require a callback: YES

## 2023-01-16 NOTE — OUTREACH NOTE
Call Center TCM Note    Flowsheet Row Responses   [facility] patient discharged from? [facility]   Does the patient have one of the following disease processes/diagnoses(primary or secondary)? Other   TCM attempt successful? No  [Elba, spouse]   Unsuccessful attempts Attempt 1          Galilea Cavazos RN    1/16/2023, 09:58 EST

## 2023-01-16 NOTE — TELEPHONE ENCOUNTER
Please advise patient his CT scan does NOT have to be done before his appointment. Please keep appointment as scheduled.

## 2023-01-16 NOTE — OUTREACH NOTE
Call Center TCM Note    Flowsheet Row Responses   [facility] patient discharged from? [facility]   Does the patient have one of the following disease processes/diagnoses(primary or secondary)? Other   TCM attempt successful? No   Unsuccessful attempts Attempt 2          Galilea Cavazos RN    1/16/2023, 15:04 EST

## 2023-01-16 NOTE — PROGRESS NOTES
Encounter Date:01/18/2023      Patient ID: Alen Ratliff is a 82 y.o. male.    Chief Complaint:      History of Present Illness  82-year-old pleasant man, patient of Dr. Muhammad has been referred to the structural heart clinic for evaluation for transcatheter aortic valve replacement.  He has hypertension, hyperlipidemia, diabetes, coronary artery disease and aortic stenosis.  Recent cardiac catheterization showed severe three-vessel coronary artery disease including completely occluded RCA, 50 to 60% LAD diffuse disease, 90% proximal left circumflex disease. He also has severe aortic stenosis.  Echocardiogram showed severe aortic stenosis with valve area 0.6 cm², mean/peak gradient of 31/74 mmHg.  He was initially referred to cardiac surgery for CABG plus AVR however due to his advanced age he was thought to be at least intermediate risk for open procedure.  He subsequently underwent PCI with drug-eluting stent placement to the left circumflex artery.  He now comes in to discuss minimally invasive/transcatheter therapeutic options for aortic stenosis.  Today he comes in accompanied by his wife and complains of dyspnea on exertion and excessive fatigue.  His functional capacity has significantly diminished over the last 6 months.  He is now in NYHA class III.  He is unable to walk in the grocery store and is now using electronic scooter.  He also reports of some orthopnea but no PND.  He has leg edema.    The following portions of the patient's history were reviewed and updated as appropriate: allergies, current medications, past family history, past medical history, past social history, past surgical history and problem list.    Review of Systems   Constitutional: Negative for malaise/fatigue.   Cardiovascular: Positive for dyspnea on exertion. Negative for chest pain, leg swelling and palpitations.   Respiratory: Negative for cough and shortness of breath.    Gastrointestinal: Negative for abdominal pain, nausea  and vomiting.   Neurological: Positive for light-headedness. Negative for dizziness, focal weakness, headaches and numbness.   All other systems reviewed and are negative.        Current Outpatient Medications:   •  amLODIPine (NORVASC) 10 MG tablet, Take 1 tablet by mouth Daily., Disp: 90 tablet, Rfl: 1  •  aspirin 81 MG chewable tablet, Chew 1 tablet Daily., Disp: 30 tablet, Rfl: 1  •  atorvastatin (LIPITOR) 40 MG tablet, TAKE 1 TABLET DAILY, Disp: 90 tablet, Rfl: 1  •  clopidogrel (PLAVIX) 75 MG tablet, Take 1 tablet by mouth Daily., Disp: 90 tablet, Rfl: 3  •  fluticasone (FLONASE) 50 MCG/ACT nasal spray, 2 sprays into the nostril(s) as directed by provider Daily As Needed., Disp: , Rfl:   •  Januvia 50 MG tablet, TAKE 1 TABLET DAILY, Disp: 90 tablet, Rfl: 0  •  Jardiance 10 MG tablet tablet, TAKE 1 TABLET DAILY, Disp: 90 tablet, Rfl: 1  •  losartan (COZAAR) 50 MG tablet, TAKE 1 TABLET DAILY, Disp: 90 tablet, Rfl: 1  •  OneTouch Ultra test strip, USE AND DISCARD 1 TEST     STRIP DAILY., Disp: 100 each, Rfl: 1  •  pantoprazole (PROTONIX) 40 MG EC tablet, Take 1 tablet by mouth Daily., Disp: 90 tablet, Rfl: 1  •  Synthroid 88 MCG tablet, TAKE 1 TABLET DAILY, Disp: 90 tablet, Rfl: 0    Current outpatient and discharge medications have been reconciled for the patient.  Reviewed by: Naveed Leonardo MD       Allergies   Allergen Reactions   • Azithromycin Unknown - High Severity   • Tramadol Unknown - High Severity       Family History   Problem Relation Age of Onset   • Hypertension Other    • Heart attack Mother    • Heart disease Mother    • Heart attack Father    • Heart disease Father        Past Surgical History:   Procedure Laterality Date   • ANKLE OPEN REDUCTION INTERNAL FIXATION Left 12/16/2020    Procedure: ANKLE OPEN REDUCTION INTERNAL FIXATION;  Surgeon: CAROLE Vasquez DPM;  Location: [facility] MAIN OR;  Service: Podiatry;  Laterality: Left;   • BACK SURGERY  1994   • CARDIAC CATHETERIZATION Right 9/27/2022  "   Procedure: Left Heart Cath;  Surgeon: Steve Muhammad MD;  Location: [facility] CATH INVASIVE LOCATION;  Service: Cardiovascular;  Laterality: Right;   • CARDIAC CATHETERIZATION N/A 1/12/2023    Procedure: Stent JENN coronary;  Surgeon: Steve Muhammad MD;  Location: [facility] CATH INVASIVE LOCATION;  Service: Cardiovascular;  Laterality: N/A;   • CAROTID ENDARTERECTOMY Right 12/14/2020    Procedure: CAROTID ENDARTERECTOMY;  Surgeon: Toby Fung MD;  Location: [facility] MAIN OR;  Service: Vascular;  Laterality: Right;   • COLONOSCOPY  08/25/2015   • PROSTATECTOMY  2001    due to cancer       Past Medical History:   Diagnosis Date   • Allergic rhinitis    • Aortic stenosis    • Coronary artery disease    • Diabetes (HCC)    • GERD (gastroesophageal reflux disease)    • Heart murmur    • Hyperlipidemia    • Hypertension    • Hypothyroidism    • Prostate cancer (HCC)    • Stroke (HCC)        Family History   Problem Relation Age of Onset   • Hypertension Other    • Heart attack Mother    • Heart disease Mother    • Heart attack Father    • Heart disease Father        Social History     Socioeconomic History   • Marital status:    Tobacco Use   • Smoking status: Never   • Smokeless tobacco: Never   Vaping Use   • Vaping Use: Never used   Substance and Sexual Activity   • Alcohol use: Not Currently   • Drug use: Never   • Sexual activity: Defer         Procedures      Objective:       Physical Exam    /61   Pulse 60   Ht 172.7 cm (68\")   Wt 93 kg (205 lb)   BMI 31.17 kg/m²   The patient is alert, oriented and in no distress.    Vital signs as noted above.    Head and neck revealed no carotid bruits or jugular venous distension.  No thyromegaly or lymphadenopathy is present.    Lungs clear.  No wheezing.  Breath sounds are normal bilaterally.    Heart normal first and second heart sounds.  Late peaking systolic ejection murmur.  No pericardial rub is present.  No gallop is present.    Abdomen soft and " nontender.  No organomegaly is present.    Extremities revealed good peripheral pulses without any pedal edema.    Skin warm and dry.    Musculoskeletal system is grossly normal.    CNS grossly normal.           Diagnosis Plan   1. Aortic stenosis, severe        2. Coronary artery disease of native artery of native heart with stable angina pectoris ([facility])        3. TIA (transient ischemic attack)        4. PAF (paroxysmal atrial fibrillation) ([facility])        5. Essential hypertension        6. Mixed hyperlipidemia        7. Stage 3a chronic kidney disease ([facility])        8. Type 2 diabetes mellitus without complication, without long-term current use of insulin ([facility])        LAB RESULTS (LAST 7 DAYS)    CBC  Results from last 7 days   Lab Units 01/12/23  2354   WBC 10*3/mm3 6.90   RBC 10*6/mm3 4.24   HEMOGLOBIN g/dL 12.8*   HEMATOCRIT % 39.5   MCV fL 93.2   PLATELETS 10*3/mm3 193       BMP  Results from last 7 days   Lab Units 01/12/23  2354   SODIUM mmol/L 141   POTASSIUM mmol/L 4.0   CHLORIDE mmol/L 107   CO2 mmol/L 24.0   BUN mg/dL 28*   CREATININE mg/dL 1.34*   GLUCOSE mg/dL 147*       CMP   Results from last 7 days   Lab Units 01/12/23  2354   SODIUM mmol/L 141   POTASSIUM mmol/L 4.0   CHLORIDE mmol/L 107   CO2 mmol/L 24.0   BUN mg/dL 28*   CREATININE mg/dL 1.34*   GLUCOSE mg/dL 147*         BNP        TROPONIN        CoAg        Creatinine Clearance  Estimated Creatinine Clearance: 46.2 mL/min (A) (by C-G formula based on SCr of 1.34 mg/dL (H)).    ABG        Radiology  No radiology results for the last day        Assessment and Plan       Diagnoses and all orders for this visit:    1. Aortic stenosis, severe (Primary)  Nonrheumatic severe symptomatic aortic stenosis.  Risk of Mortality:  5.654%  Renal Failure:  8.541%  Permanent Stroke:  5.824%  Prolonged Ventilation:  19.838%  DSW Infection:  0.421%  Reoperation:  4.741%  Morbidity or Mortality:  26.630%  Short Length of Stay:  9.691%  Long Length of  Stay:  22.178%    I have explained the natural history of aortic stenosis  Discussed the risk and benefit of TAVR with the patient.  I have specifically discussed the risk of vascular complication, permanent pacemaker, stroke and death.  I have discussed multidisciplinary approach for management.  He is scheduled to have TAVR CTA next week after which we will proceed with scheduling the procedure.      2. Coronary artery disease of native artery of native heart with stable angina pectoris (HCC)  Status post PCI of left circumflex artery  completely occluded RCA, 50 to 60% LAD diffuse disease, 90% proximal left circumflex disease.  3. TIA (transient ischemic attack)  Continue dual antiplatelet therapy and high intensity statin  4. PAF (paroxysmal atrial fibrillation) (HCC)  It is unclear if he has atrial fibrillation or not.  If he does have A. fib he should be on full dose anticoagulation as his [id number] score is 7.  He is currently on dual antiplatelet therapy only.  5. Essential hypertension  Blood pressures well controlled on losartan, amlodipine  6. Mixed hyperlipidemia  Continue high intensity statin, Lipitor  Most recent lipid panel shows LDL of 63, HDL 37, total cholesterol 127, triglyceride 150.  7. Stage 3a chronic kidney disease (HCC)  Will need repeat renal function testing after TAVR CTA  8. Type 2 diabetes mellitus without complication, without long-term current use of insulin (HCC)  Continue Jardiance, Januvia

## 2023-01-17 ENCOUNTER — TRANSITIONAL CARE MANAGEMENT TELEPHONE ENCOUNTER (OUTPATIENT)
Dept: CALL CENTER | Facility: HOSPITAL | Age: 83
End: 2023-01-17
Payer: MEDICARE

## 2023-01-17 NOTE — OUTREACH NOTE
Call Center TCM Note    Flowsheet Row Responses   [facility] facility patient discharged from? [facility]   Does the patient have one of the following disease processes/diagnoses(primary or secondary)? Other   TCM attempt successful? No   Unsuccessful attempts Attempt 3   Wrap up additional comments D/C DX: Stent JENN coronary - Unable to reach pt x 3 attempts for TCM call. Pt is not yet sched for TCM APPT with PCP Dr Wren.          Elba Dai MA    1/17/2023, 16:14 EST

## 2023-01-18 ENCOUNTER — OFFICE VISIT (OUTPATIENT)
Dept: CARDIOLOGY | Facility: CLINIC | Age: 83
End: 2023-01-18
Payer: MEDICARE

## 2023-01-18 VITALS
HEART RATE: 60 BPM | SYSTOLIC BLOOD PRESSURE: 138 MMHG | HEIGHT: 68 IN | BODY MASS INDEX: 31.07 KG/M2 | WEIGHT: 205 LBS | DIASTOLIC BLOOD PRESSURE: 61 MMHG

## 2023-01-18 DIAGNOSIS — I25.118 CORONARY ARTERY DISEASE OF NATIVE ARTERY OF NATIVE HEART WITH STABLE ANGINA PECTORIS: ICD-10-CM

## 2023-01-18 DIAGNOSIS — I35.0 AORTIC STENOSIS, SEVERE: Primary | ICD-10-CM

## 2023-01-18 DIAGNOSIS — E11.9 TYPE 2 DIABETES MELLITUS WITHOUT COMPLICATION, WITHOUT LONG-TERM CURRENT USE OF INSULIN: ICD-10-CM

## 2023-01-18 DIAGNOSIS — I10 ESSENTIAL HYPERTENSION: ICD-10-CM

## 2023-01-18 DIAGNOSIS — I48.0 PAF (PAROXYSMAL ATRIAL FIBRILLATION): ICD-10-CM

## 2023-01-18 DIAGNOSIS — N18.31 STAGE 3A CHRONIC KIDNEY DISEASE: ICD-10-CM

## 2023-01-18 DIAGNOSIS — E78.2 MIXED HYPERLIPIDEMIA: ICD-10-CM

## 2023-01-18 DIAGNOSIS — G45.9 TIA (TRANSIENT ISCHEMIC ATTACK): ICD-10-CM

## 2023-01-18 PROCEDURE — 99214 OFFICE O/P EST MOD 30 MIN: CPT | Performed by: INTERNAL MEDICINE

## 2023-01-19 ENCOUNTER — PATIENT ROUNDING (BHMG ONLY) (OUTPATIENT)
Dept: CARDIOLOGY | Facility: CLINIC | Age: 83
End: 2023-01-19
Payer: MEDICARE

## 2023-01-21 LAB
QT INTERVAL: 405 MS
QT INTERVAL: 407 MS

## 2023-01-24 ENCOUNTER — HOSPITAL ENCOUNTER (OUTPATIENT)
Dept: CT IMAGING | Facility: HOSPITAL | Age: 83
Discharge: HOME | End: 2023-01-24
Admitting: NURSE PRACTITIONER
Payer: MEDICARE

## 2023-01-24 VITALS
SYSTOLIC BLOOD PRESSURE: 168 MMHG | RESPIRATION RATE: 22 BRPM | OXYGEN SATURATION: 97 % | DIASTOLIC BLOOD PRESSURE: 61 MMHG | HEART RATE: 64 BPM

## 2023-01-24 DIAGNOSIS — I35.0 AORTIC STENOSIS, SEVERE: ICD-10-CM

## 2023-01-24 PROCEDURE — 71275 CT ANGIOGRAPHY CHEST: CPT

## 2023-01-24 PROCEDURE — 0 IOPAMIDOL PER 1 ML: Performed by: NURSE PRACTITIONER

## 2023-01-24 PROCEDURE — 74174 CTA ABD&PLVS W/CONTRAST: CPT

## 2023-01-24 RX ADMIN — IOPAMIDOL 100 ML: 755 INJECTION, SOLUTION INTRAVENOUS at 10:07

## 2023-01-24 RX ADMIN — IOPAMIDOL 50 ML: 755 INJECTION, SOLUTION INTRAVENOUS at 10:06

## 2023-01-25 ENCOUNTER — HOSPITAL ENCOUNTER (OUTPATIENT)
Dept: CARDIOLOGY | Facility: HOSPITAL | Age: 83
Discharge: HOME | End: 2023-01-25
Payer: MEDICARE

## 2023-01-25 ENCOUNTER — APPOINTMENT (OUTPATIENT)
Dept: VASCULAR SURGERY | Facility: HOSPITAL | Age: 83
End: 2023-01-25
Payer: MEDICARE

## 2023-01-25 DIAGNOSIS — I65.23 BILATERAL CAROTID ARTERY OCCLUSION: ICD-10-CM

## 2023-01-25 LAB
BH CV XLRA MEAS LEFT DIST CCA EDV: 13.2 CM/SEC
BH CV XLRA MEAS LEFT DIST CCA PSV: 76.3 CM/SEC
BH CV XLRA MEAS LEFT DIST ICA EDV: -18.5 CM/SEC
BH CV XLRA MEAS LEFT DIST ICA PSV: -71.9 CM/SEC
BH CV XLRA MEAS LEFT ICA/CCA RATIO: -0.6
BH CV XLRA MEAS LEFT PROX CCA EDV: 16.5 CM/SEC
BH CV XLRA MEAS LEFT PROX CCA PSV: 135 CM/SEC
BH CV XLRA MEAS LEFT PROX ECA PSV: -158 CM/SEC
BH CV XLRA MEAS LEFT PROX ICA EDV: -22.4 CM/SEC
BH CV XLRA MEAS LEFT PROX ICA PSV: -81.3 CM/SEC
BH CV XLRA MEAS LEFT PROX SCLA PSV: 190 CM/SEC
BH CV XLRA MEAS LEFT VERTEBRAL A PSV: -38.4 CM/SEC
BH CV XLRA MEAS RIGHT DIST CCA EDV: 11.2 CM/SEC
BH CV XLRA MEAS RIGHT DIST CCA PSV: 69.6 CM/SEC
BH CV XLRA MEAS RIGHT DIST ICA EDV: -21.1 CM/SEC
BH CV XLRA MEAS RIGHT DIST ICA PSV: -78.9 CM/SEC
BH CV XLRA MEAS RIGHT ICA/CCA RATIO: -0.93
BH CV XLRA MEAS RIGHT PROX CCA EDV: 13 CM/SEC
BH CV XLRA MEAS RIGHT PROX CCA PSV: 85.1 CM/SEC
BH CV XLRA MEAS RIGHT PROX ECA PSV: -83.9 CM/SEC
BH CV XLRA MEAS RIGHT PROX ICA EDV: -10.6 CM/SEC
BH CV XLRA MEAS RIGHT PROX ICA PSV: -40 CM/SEC
BH CV XLRA MEAS RIGHT PROX SCLA PSV: 244 CM/SEC
BH CV XLRA MEAS RIGHT VERTEBRAL A PSV: -42.9 CM/SEC
MAXIMAL PREDICTED HEART RATE: 137 BPM
RIGHT ARM BP: NORMAL MMHG
STRESS TARGET HR: 116 BPM

## 2023-01-25 PROCEDURE — G0463 HOSPITAL OUTPT CLINIC VISIT: HCPCS

## 2023-01-25 PROCEDURE — 93880 EXTRACRANIAL BILAT STUDY: CPT

## 2023-02-03 ENCOUNTER — PREP FOR SURGERY (OUTPATIENT)
Dept: OTHER | Facility: HOSPITAL | Age: 83
End: 2023-02-03
Payer: MEDICARE

## 2023-02-03 DIAGNOSIS — I35.0 AORTIC STENOSIS, SEVERE: Primary | ICD-10-CM

## 2023-02-03 DIAGNOSIS — I50.32 CHRONIC DIASTOLIC CHF (CONGESTIVE HEART FAILURE), NYHA CLASS 3: ICD-10-CM

## 2023-02-03 RX ORDER — SODIUM CHLORIDE 9 MG/ML
40 INJECTION, SOLUTION INTRAVENOUS AS NEEDED
Status: CANCELLED | OUTPATIENT
Start: 2023-02-03

## 2023-02-03 RX ORDER — CHLORHEXIDINE GLUCONATE 500 MG/1
1 CLOTH TOPICAL EVERY 12 HOURS PRN
Status: CANCELLED | OUTPATIENT
Start: 2023-02-03

## 2023-02-03 RX ORDER — SODIUM CHLORIDE 0.9 % (FLUSH) 0.9 %
10 SYRINGE (ML) INJECTION AS NEEDED
Status: CANCELLED | OUTPATIENT
Start: 2023-02-03

## 2023-02-03 RX ORDER — SODIUM CHLORIDE 0.9 % (FLUSH) 0.9 %
10 SYRINGE (ML) INJECTION EVERY 12 HOURS SCHEDULED
Status: CANCELLED | OUTPATIENT
Start: 2023-02-03

## 2023-02-06 ENCOUNTER — TRANSCRIBE ORDERS (OUTPATIENT)
Dept: ADMINISTRATIVE | Facility: HOSPITAL | Age: 83
End: 2023-02-06
Payer: MEDICARE

## 2023-02-06 ENCOUNTER — TELEPHONE (OUTPATIENT)
Dept: CARDIOLOGY | Facility: HOSPITAL | Age: 83
End: 2023-02-06
Payer: MEDICARE

## 2023-02-06 ENCOUNTER — TELEPHONE (OUTPATIENT)
Dept: CARDIOLOGY | Facility: CLINIC | Age: 83
End: 2023-02-06

## 2023-02-06 DIAGNOSIS — I65.23 BILATERAL CAROTID ARTERY OCCLUSION: Primary | ICD-10-CM

## 2023-02-06 NOTE — TELEPHONE ENCOUNTER
I returned call to patient and his wife letting them know that patient's TAVR with Dr. Leonardo is scheduled for 2/20/23 and that he has a F/U appt with LARRY Poole on 3/9/23.   Patient and wife verbalize understanding and informed them that they would be receiving a call from Katie Nair RN regarding patient's TAVR. Patient and wife verbalize understanding.  Placed a call to Katie and she said will call patient and his wife regarding TAVR.

## 2023-02-06 NOTE — TELEPHONE ENCOUNTER
Telephoned Mr. Ratliff.  Notified of TAVR procedure scheduled on 2/20/23.  Pt scheduled for preop labs/tests/education on 2/15/23.  Instructed pt to not stop Plavix or Aspirin prior to the procedure.

## 2023-02-06 NOTE — TELEPHONE ENCOUNTER
Caller: [facility]    Relationship: SELF    Best call back number: [phone]    What is the best time to reach you: ANY      What was the call regarding: PT AND HIS WIFE WOULD LIKE A CALL BACK TO DISCUSS FLOWER'S TREATMENT PLAN.  THEY ARE CONCERNED ABOUT THE AMOUNT OF TESTING THAT IS BEING DONE WITHOUT ANY SORT OF RESOLUTION.      Do you require a callback: YES

## 2023-02-08 DIAGNOSIS — Z95.2 S/P TAVR (TRANSCATHETER AORTIC VALVE REPLACEMENT): ICD-10-CM

## 2023-02-08 DIAGNOSIS — I35.0 AORTIC STENOSIS, SEVERE: Primary | ICD-10-CM

## 2023-02-15 ENCOUNTER — HOSPITAL ENCOUNTER (OUTPATIENT)
Dept: GENERAL RADIOLOGY | Facility: HOSPITAL | Age: 83
Discharge: HOME | End: 2023-02-15
Payer: MEDICARE

## 2023-02-15 ENCOUNTER — HOSPITAL ENCOUNTER (OUTPATIENT)
Dept: CARDIOLOGY | Facility: HOSPITAL | Age: 83
Discharge: HOME | End: 2023-02-15
Payer: MEDICARE

## 2023-02-15 ENCOUNTER — LAB (OUTPATIENT)
Dept: LAB | Facility: HOSPITAL | Age: 83
End: 2023-02-15
Payer: MEDICARE

## 2023-02-15 DIAGNOSIS — I50.32 CHRONIC DIASTOLIC CHF (CONGESTIVE HEART FAILURE), NYHA CLASS 3: ICD-10-CM

## 2023-02-15 DIAGNOSIS — I35.0 AORTIC STENOSIS, SEVERE: ICD-10-CM

## 2023-02-15 LAB
ABO GROUP BLD: NORMAL
ALBUMIN SERPL-MCNC: 4.3 G/DL (ref 3.5–5.2)
ALBUMIN/GLOB SERPL: 1.7 G/DL
ALP SERPL-CCNC: 69 U/L (ref 39–117)
ALT SERPL W P-5'-P-CCNC: 23 U/L (ref 1–41)
ANION GAP SERPL CALCULATED.3IONS-SCNC: 8.4 MMOL/L (ref 5–15)
AST SERPL-CCNC: 25 U/L (ref 1–40)
BASOPHILS # BLD AUTO: 0.05 10*3/MM3 (ref 0–0.2)
BASOPHILS NFR BLD AUTO: 0.7 % (ref 0–1.5)
BILIRUB SERPL-MCNC: 0.5 MG/DL (ref 0–1.2)
BLD GP AB SCN SERPL QL: NEGATIVE
BUN SERPL-MCNC: 26 MG/DL (ref 8–23)
BUN/CREAT SERPL: 21.1 (ref 7–25)
CALCIUM SPEC-SCNC: 9.5 MG/DL (ref 8.6–10.5)
CHLORIDE SERPL-SCNC: 104 MMOL/L (ref 98–107)
CO2 SERPL-SCNC: 26.6 MMOL/L (ref 22–29)
CREAT SERPL-MCNC: 1.23 MG/DL (ref 0.76–1.27)
DEPRECATED RDW RBC AUTO: 46.1 FL (ref 37–54)
EGFRCR SERPLBLD CKD-EPI 2021: 58.3 ML/MIN/1.73
EOSINOPHIL # BLD AUTO: 0.08 10*3/MM3 (ref 0–0.4)
EOSINOPHIL NFR BLD AUTO: 1.1 % (ref 0.3–6.2)
ERYTHROCYTE [DISTWIDTH] IN BLOOD BY AUTOMATED COUNT: 13.5 % (ref 12.3–15.4)
GLOBULIN UR ELPH-MCNC: 2.6 GM/DL
GLUCOSE SERPL-MCNC: 138 MG/DL (ref 65–99)
HCT VFR BLD AUTO: 43.6 % (ref 37.5–51)
HGB BLD-MCNC: 14.3 G/DL (ref 13–17.7)
IMM GRANULOCYTES # BLD AUTO: 0.02 10*3/MM3 (ref 0–0.05)
IMM GRANULOCYTES NFR BLD AUTO: 0.3 % (ref 0–0.5)
INR PPP: 1.02 (ref 0.93–1.1)
LYMPHOCYTES # BLD AUTO: 1.4 10*3/MM3 (ref 0.7–3.1)
LYMPHOCYTES NFR BLD AUTO: 20.1 % (ref 19.6–45.3)
MCH RBC QN AUTO: 30.3 PG (ref 26.6–33)
MCHC RBC AUTO-ENTMCNC: 32.8 G/DL (ref 31.5–35.7)
MCV RBC AUTO: 92.4 FL (ref 79–97)
MONOCYTES # BLD AUTO: 0.83 10*3/MM3 (ref 0.1–0.9)
MONOCYTES NFR BLD AUTO: 11.9 % (ref 5–12)
MRSA DNA SPEC QL NAA+PROBE: NORMAL
NEUTROPHILS NFR BLD AUTO: 4.59 10*3/MM3 (ref 1.7–7)
NEUTROPHILS NFR BLD AUTO: 65.9 % (ref 42.7–76)
NRBC BLD AUTO-RTO: 0 /100 WBC (ref 0–0.2)
PLATELET # BLD AUTO: 200 10*3/MM3 (ref 140–450)
PMV BLD AUTO: 10.9 FL (ref 6–12)
POTASSIUM SERPL-SCNC: 3.8 MMOL/L (ref 3.5–5.2)
PROT SERPL-MCNC: 6.9 G/DL (ref 6–8.5)
PROTHROMBIN TIME: 10.5 SECONDS (ref 9.6–11.7)
RBC # BLD AUTO: 4.72 10*6/MM3 (ref 4.14–5.8)
RH BLD: POSITIVE
SARS-COV-2 ORF1AB RESP QL NAA+PROBE: NOT DETECTED
SODIUM SERPL-SCNC: 139 MMOL/L (ref 136–145)
T&S EXPIRATION DATE: NORMAL
WBC NRBC COR # BLD: 6.97 10*3/MM3 (ref 3.4–10.8)

## 2023-02-15 PROCEDURE — 87641 MR-STAPH DNA AMP PROBE: CPT

## 2023-02-15 PROCEDURE — 80053 COMPREHEN METABOLIC PANEL: CPT

## 2023-02-15 PROCEDURE — 85610 PROTHROMBIN TIME: CPT

## 2023-02-15 PROCEDURE — 93005 ELECTROCARDIOGRAM TRACING: CPT | Performed by: NURSE PRACTITIONER

## 2023-02-15 PROCEDURE — 85025 COMPLETE CBC W/AUTO DIFF WBC: CPT

## 2023-02-15 PROCEDURE — 36415 COLL VENOUS BLD VENIPUNCTURE: CPT

## 2023-02-15 PROCEDURE — 86923 COMPATIBILITY TEST ELECTRIC: CPT

## 2023-02-15 PROCEDURE — 86900 BLOOD TYPING SEROLOGIC ABO: CPT

## 2023-02-15 PROCEDURE — 86901 BLOOD TYPING SEROLOGIC RH(D): CPT

## 2023-02-15 PROCEDURE — C9803 HOPD COVID-19 SPEC COLLECT: HCPCS

## 2023-02-15 PROCEDURE — 93010 ELECTROCARDIOGRAM REPORT: CPT | Performed by: INTERNAL MEDICINE

## 2023-02-15 PROCEDURE — U0004 COV-19 TEST NON-CDC HGH THRU: HCPCS

## 2023-02-15 PROCEDURE — 71046 X-RAY EXAM CHEST 2 VIEWS: CPT

## 2023-02-15 PROCEDURE — 86850 RBC ANTIBODY SCREEN: CPT

## 2023-02-17 ENCOUNTER — TELEPHONE (OUTPATIENT)
Dept: CARDIOLOGY | Facility: HOSPITAL | Age: 83
End: 2023-02-17

## 2023-02-17 LAB — QT INTERVAL: 426 MS

## 2023-02-17 NOTE — TELEPHONE ENCOUNTER
Met with patient and wife, assisted with preop labs & tests.  Education on TAVR procedure with handouts.  KCCQ-12 and 5MWT completed.  Preop instructions given including NPO status, preop shower, medications.

## 2023-02-19 ENCOUNTER — ANESTHESIA EVENT (OUTPATIENT)
Dept: PERIOP | Facility: HOSPITAL | Age: 83
DRG: 267 | End: 2023-02-19
Payer: MEDICARE

## 2023-02-19 NOTE — ANESTHESIA PREPROCEDURE EVALUATION
Anesthesia Evaluation     Patient summary reviewed and Nursing notes reviewed   NPO Solid Status: > 8 hours  NPO Liquid Status: > 8 hours           Airway   Mallampati: II  TM distance: >3 FB  Neck ROM: full  No difficulty expected  Dental - normal exam     Pulmonary - negative pulmonary ROS and normal exam    breath sounds clear to auscultation  Cardiovascular - normal exam  Exercise tolerance: unable to assess    ECG reviewed  Rhythm: regular  Rate: normal    (+) hypertension, valvular problems/murmurs, CAD, cardiac stents more than 12 months ago dysrhythmias Paroxysmal Atrial Fib, CHF Diastolic >=55%, hyperlipidemia,  carotid artery disease carotid bilateral    ROS comment: Interpretation Summary    ? Left ventricular systolic function is normal.  ? Left ventricular ejection fraction is 60 to 65%  ? Left ventricular wall thickness is consistent with mild concentric hypertrophy.  ? Left ventricular diastolic function is consistent with (grade I) impaired relaxation.  ? Severe aortic valve stenosis is present. Aortic valve area is 0.59 cm2.  ? Peak velocity of the flow distal to the aortic valve is 429.2 cm/s. Aortic valve maximum pressure gradient is 73.7 mmHg. Aortic valve mean pressure gradient is 30.8 mmHg.  ? Saline test results are negative.     CATH     SUMMARY:      1.  Severe aortic stenosis  2.  High-grade stenosis of LCx with successful stenting prior to TAVR  3.  Nonobstructive disease of LAD.  4.  100% occlusion of RCA which is well collateralized from the left system      Neuro/Psych  (+) TIA, CVA, numbness,    GI/Hepatic/Renal/Endo    (+)  GERD,  renal disease CRI, diabetes mellitus, thyroid problem hypothyroidism    Musculoskeletal (-) negative ROS    Abdominal  - normal exam   Substance History - negative use     OB/GYN negative ob/gyn ROS         Other      history of cancer remission                  Anesthesia Plan    ASA 4     MAC, Elly, CVL and PAC     intravenous induction     Anesthetic  plan, risks, benefits, and alternatives have been provided, discussed and informed consent has been obtained with: patient.        CODE STATUS:

## 2023-02-20 ENCOUNTER — ANESTHESIA (OUTPATIENT)
Dept: PERIOP | Facility: HOSPITAL | Age: 83
DRG: 267 | End: 2023-02-20
Payer: MEDICARE

## 2023-02-20 ENCOUNTER — APPOINTMENT (OUTPATIENT)
Dept: CARDIOLOGY | Facility: HOSPITAL | Age: 83
DRG: 267 | End: 2023-02-20
Payer: MEDICARE

## 2023-02-20 ENCOUNTER — HOSPITAL ENCOUNTER (INPATIENT)
Facility: HOSPITAL | Age: 83
LOS: 1 days | Discharge: HOME | DRG: 267 | End: 2023-02-21
Attending: INTERNAL MEDICINE | Admitting: THORACIC SURGERY (CARDIOTHORACIC VASCULAR SURGERY)
Payer: MEDICARE

## 2023-02-20 DIAGNOSIS — I35.0 AORTIC STENOSIS, SEVERE: ICD-10-CM

## 2023-02-20 DIAGNOSIS — I10 ESSENTIAL HYPERTENSION: ICD-10-CM

## 2023-02-20 DIAGNOSIS — I50.32 CHRONIC DIASTOLIC CHF (CONGESTIVE HEART FAILURE), NYHA CLASS 3: ICD-10-CM

## 2023-02-20 LAB
ACT BLD: 305 SECONDS (ref 89–137)
BASE DEFICIT: ABNORMAL
BASE EXCESS BLDA CALC-SCNC: <0 MMOL/L (ref 0–3)
BH BB BLOOD EXPIRATION DATE: NORMAL
BH BB BLOOD EXPIRATION DATE: NORMAL
BH BB BLOOD TYPE BARCODE: 5100
BH BB BLOOD TYPE BARCODE: 5100
BH BB DISPENSE STATUS: NORMAL
BH BB DISPENSE STATUS: NORMAL
BH BB PRODUCT CODE: NORMAL
BH BB PRODUCT CODE: NORMAL
BH BB UNIT NUMBER: NORMAL
BH BB UNIT NUMBER: NORMAL
CA-I BLDA-SCNC: 1.18 MMOL/L (ref 1.12–1.32)
CO2 BLDA-SCNC: 24 MMOL/L (ref 23–27)
CROSSMATCH INTERPRETATION: NORMAL
CROSSMATCH INTERPRETATION: NORMAL
GLUCOSE BLDC GLUCOMTR-MCNC: 117 MG/DL (ref 70–105)
GLUCOSE BLDC GLUCOMTR-MCNC: 135 MG/DL (ref 70–105)
GLUCOSE BLDC GLUCOMTR-MCNC: 148 MG/DL (ref 70–105)
HCO3 BLDA-SCNC: 23 MMOL/L (ref 22–26)
HCT VFR BLDA CALC: 36 % (ref 38–51)
HGB BLDA-MCNC: 12.2 G/DL (ref 12–17)
PCO2 BLDA: 42.1 MM HG (ref 35–45)
PH BLDA: 7.34 PH UNITS (ref 7.35–7.45)
PO2 BLDA: 161 MM HG (ref 80–105)
POTASSIUM BLDA-SCNC: 3.6 MMOL/L (ref 3.5–4.9)
SAO2 % BLDCOA: 99 % (ref 95–98)
SODIUM BLD-SCNC: 144 MMOL/L (ref 138–146)
UNIT  ABO: NORMAL
UNIT  ABO: NORMAL
UNIT  RH: NORMAL
UNIT  RH: NORMAL

## 2023-02-20 PROCEDURE — 93325 DOPPLER ECHO COLOR FLOW MAPG: CPT | Performed by: INTERNAL MEDICINE

## 2023-02-20 PROCEDURE — 85347 COAGULATION TIME ACTIVATED: CPT

## 2023-02-20 PROCEDURE — C1894 INTRO/SHEATH, NON-LASER: HCPCS | Performed by: INTERNAL MEDICINE

## 2023-02-20 PROCEDURE — C1889 IMPLANT/INSERT DEVICE, NOC: HCPCS | Performed by: INTERNAL MEDICINE

## 2023-02-20 PROCEDURE — 82947 ASSAY GLUCOSE BLOOD QUANT: CPT

## 2023-02-20 PROCEDURE — 02RF38Z REPLACEMENT OF AORTIC VALVE WITH ZOOPLASTIC TISSUE, PERCUTANEOUS APPROACH: ICD-10-PCS | Performed by: THORACIC SURGERY (CARDIOTHORACIC VASCULAR SURGERY)

## 2023-02-20 PROCEDURE — 82962 GLUCOSE BLOOD TEST: CPT

## 2023-02-20 PROCEDURE — C1769 GUIDE WIRE: HCPCS | Performed by: INTERNAL MEDICINE

## 2023-02-20 PROCEDURE — S0260 H&P FOR SURGERY: HCPCS | Performed by: INTERNAL MEDICINE

## 2023-02-20 PROCEDURE — 93321 DOPPLER ECHO F-UP/LMTD STD: CPT | Performed by: INTERNAL MEDICINE

## 2023-02-20 PROCEDURE — 93308 TTE F-UP OR LMTD: CPT

## 2023-02-20 PROCEDURE — 82330 ASSAY OF CALCIUM: CPT

## 2023-02-20 PROCEDURE — 0 IOPAMIDOL PER 1 ML: Performed by: INTERNAL MEDICINE

## 2023-02-20 PROCEDURE — B41D1ZZ FLUOROSCOPY OF AORTA AND BILATERAL LOWER EXTREMITY ARTERIES USING LOW OSMOLAR CONTRAST: ICD-10-PCS | Performed by: INTERNAL MEDICINE

## 2023-02-20 PROCEDURE — 84295 ASSAY OF SERUM SODIUM: CPT

## 2023-02-20 PROCEDURE — 027F3ZZ DILATION OF AORTIC VALVE, PERCUTANEOUS APPROACH: ICD-10-PCS | Performed by: INTERNAL MEDICINE

## 2023-02-20 PROCEDURE — 84132 ASSAY OF SERUM POTASSIUM: CPT

## 2023-02-20 PROCEDURE — 93005 ELECTROCARDIOGRAM TRACING: CPT | Performed by: INTERNAL MEDICINE

## 2023-02-20 PROCEDURE — 82803 BLOOD GASES ANY COMBINATION: CPT

## 2023-02-20 PROCEDURE — 25010000002 FENTANYL CITRATE (PF) 100 MCG/2ML SOLUTION: Performed by: ANESTHESIOLOGY

## 2023-02-20 PROCEDURE — 85014 HEMATOCRIT: CPT

## 2023-02-20 PROCEDURE — 93325 DOPPLER ECHO COLOR FLOW MAPG: CPT

## 2023-02-20 PROCEDURE — 25010000002 PROPOFOL 10 MG/ML EMULSION: Performed by: ANESTHESIOLOGY

## 2023-02-20 PROCEDURE — C1760 CLOSURE DEV, VASC: HCPCS | Performed by: INTERNAL MEDICINE

## 2023-02-20 PROCEDURE — 25010000002 MIDAZOLAM PER 1 MG: Performed by: ANESTHESIOLOGY

## 2023-02-20 PROCEDURE — 93308 TTE F-UP OR LMTD: CPT | Performed by: INTERNAL MEDICINE

## 2023-02-20 PROCEDURE — 93321 DOPPLER ECHO F-UP/LMTD STD: CPT

## 2023-02-20 PROCEDURE — 33361 REPLACE AORTIC VALVE PERQ: CPT | Performed by: THORACIC SURGERY (CARDIOTHORACIC VASCULAR SURGERY)

## 2023-02-20 PROCEDURE — 25010000002 CEFAZOLIN PER 500 MG: Performed by: NURSE PRACTITIONER

## 2023-02-20 PROCEDURE — C1725 CATH, TRANSLUMIN NON-LASER: HCPCS | Performed by: INTERNAL MEDICINE

## 2023-02-20 DEVICE — VLV EVOLUTFX-29 COMM US
Type: IMPLANTABLE DEVICE | Site: HEART | Status: FUNCTIONAL
Brand: EVOLUT™ FX

## 2023-02-20 RX ORDER — LORAZEPAM 2 MG/ML
1 INJECTION INTRAMUSCULAR
Status: DISCONTINUED | OUTPATIENT
Start: 2023-02-20 | End: 2023-02-20

## 2023-02-20 RX ORDER — SODIUM CHLORIDE 9 MG/ML
40 INJECTION, SOLUTION INTRAVENOUS AS NEEDED
Status: DISCONTINUED | OUTPATIENT
Start: 2023-02-20 | End: 2023-02-20 | Stop reason: HOSPADM

## 2023-02-20 RX ORDER — AMLODIPINE BESYLATE 5 MG/1
5 TABLET ORAL
Status: DISCONTINUED | OUTPATIENT
Start: 2023-02-20 | End: 2023-02-21 | Stop reason: HOSPADM

## 2023-02-20 RX ORDER — GLYCOPYRROLATE 0.2 MG/ML
INJECTION INTRAMUSCULAR; INTRAVENOUS AS NEEDED
Status: DISCONTINUED | OUTPATIENT
Start: 2023-02-20 | End: 2023-02-20 | Stop reason: SURG

## 2023-02-20 RX ORDER — NOREPINEPHRINE BIT/0.9 % NACL 8 MG/250ML
.02-.3 INFUSION BOTTLE (ML) INTRAVENOUS
Status: DISCONTINUED | OUTPATIENT
Start: 2023-02-20 | End: 2023-02-20

## 2023-02-20 RX ORDER — ACETAMINOPHEN 325 MG/1
650 TABLET ORAL EVERY 4 HOURS PRN
Status: DISCONTINUED | OUTPATIENT
Start: 2023-02-20 | End: 2023-02-21 | Stop reason: HOSPADM

## 2023-02-20 RX ORDER — NALOXONE HCL 0.4 MG/ML
0.4 VIAL (ML) INJECTION AS NEEDED
Status: DISCONTINUED | OUTPATIENT
Start: 2023-02-20 | End: 2023-02-21 | Stop reason: HOSPADM

## 2023-02-20 RX ORDER — LEVOTHYROXINE SODIUM 88 UG/1
88 TABLET ORAL DAILY
COMMUNITY
End: 2023-02-25

## 2023-02-20 RX ORDER — ATORVASTATIN CALCIUM 40 MG/1
40 TABLET, FILM COATED ORAL NIGHTLY
Status: DISCONTINUED | OUTPATIENT
Start: 2023-02-20 | End: 2023-02-21 | Stop reason: HOSPADM

## 2023-02-20 RX ORDER — MEPERIDINE HYDROCHLORIDE 25 MG/ML
12.5 INJECTION INTRAMUSCULAR; INTRAVENOUS; SUBCUTANEOUS
Status: DISCONTINUED | OUTPATIENT
Start: 2023-02-20 | End: 2023-02-20

## 2023-02-20 RX ORDER — SODIUM CHLORIDE 0.9 % (FLUSH) 0.9 %
10 SYRINGE (ML) INJECTION EVERY 12 HOURS SCHEDULED
Status: DISCONTINUED | OUTPATIENT
Start: 2023-02-20 | End: 2023-02-20 | Stop reason: HOSPADM

## 2023-02-20 RX ORDER — NALBUPHINE HCL 10 MG/ML
2 AMPUL (ML) INJECTION EVERY 4 HOURS PRN
Status: DISCONTINUED | OUTPATIENT
Start: 2023-02-20 | End: 2023-02-20

## 2023-02-20 RX ORDER — ATORVASTATIN CALCIUM 40 MG/1
40 TABLET, FILM COATED ORAL DAILY
COMMUNITY

## 2023-02-20 RX ORDER — CHLORHEXIDINE GLUCONATE 500 MG/1
1 CLOTH TOPICAL EVERY 12 HOURS PRN
Status: DISCONTINUED | OUTPATIENT
Start: 2023-02-20 | End: 2023-02-20

## 2023-02-20 RX ORDER — SODIUM CHLORIDE 9 MG/ML
30 INJECTION, SOLUTION INTRAVENOUS CONTINUOUS
Status: DISCONTINUED | OUTPATIENT
Start: 2023-02-20 | End: 2023-02-20

## 2023-02-20 RX ORDER — LEVOTHYROXINE SODIUM 0.05 MG/1
50 TABLET ORAL DAILY
Status: ON HOLD | COMMUNITY
End: 2023-02-20

## 2023-02-20 RX ORDER — FLUMAZENIL 0.1 MG/ML
0.2 INJECTION INTRAVENOUS AS NEEDED
Status: DISCONTINUED | OUTPATIENT
Start: 2023-02-20 | End: 2023-02-20

## 2023-02-20 RX ORDER — MIDAZOLAM HYDROCHLORIDE 1 MG/ML
1 INJECTION INTRAMUSCULAR; INTRAVENOUS
Status: DISCONTINUED | OUTPATIENT
Start: 2023-02-20 | End: 2023-02-20

## 2023-02-20 RX ORDER — PANTOPRAZOLE SODIUM 40 MG/1
40 TABLET, DELAYED RELEASE ORAL DAILY
Status: DISCONTINUED | OUTPATIENT
Start: 2023-02-21 | End: 2023-02-21 | Stop reason: HOSPADM

## 2023-02-20 RX ORDER — ONDANSETRON 2 MG/ML
4 INJECTION INTRAMUSCULAR; INTRAVENOUS EVERY 6 HOURS PRN
Status: DISCONTINUED | OUTPATIENT
Start: 2023-02-20 | End: 2023-02-21 | Stop reason: HOSPADM

## 2023-02-20 RX ORDER — ALBUTEROL SULFATE 2.5 MG/3ML
2.5 SOLUTION RESPIRATORY (INHALATION) ONCE AS NEEDED
Status: DISCONTINUED | OUTPATIENT
Start: 2023-02-20 | End: 2023-02-21 | Stop reason: HOSPADM

## 2023-02-20 RX ORDER — SODIUM CHLORIDE 0.9 % (FLUSH) 0.9 %
10 SYRINGE (ML) INJECTION EVERY 12 HOURS SCHEDULED
Status: DISCONTINUED | OUTPATIENT
Start: 2023-02-20 | End: 2023-02-21 | Stop reason: HOSPADM

## 2023-02-20 RX ORDER — SODIUM CHLORIDE 9 MG/ML
40 INJECTION, SOLUTION INTRAVENOUS AS NEEDED
Status: DISCONTINUED | OUTPATIENT
Start: 2023-02-20 | End: 2023-02-21 | Stop reason: HOSPADM

## 2023-02-20 RX ORDER — MIDAZOLAM HYDROCHLORIDE 1 MG/ML
INJECTION INTRAMUSCULAR; INTRAVENOUS AS NEEDED
Status: DISCONTINUED | OUTPATIENT
Start: 2023-02-20 | End: 2023-02-20 | Stop reason: SURG

## 2023-02-20 RX ORDER — CLOPIDOGREL BISULFATE 75 MG/1
75 TABLET ORAL DAILY
Status: DISCONTINUED | OUTPATIENT
Start: 2023-02-21 | End: 2023-02-21 | Stop reason: HOSPADM

## 2023-02-20 RX ORDER — PHENYLEPHRINE HCL IN 0.9% NACL 1 MG/10 ML
SYRINGE (ML) INTRAVENOUS AS NEEDED
Status: DISCONTINUED | OUTPATIENT
Start: 2023-02-20 | End: 2023-02-20 | Stop reason: SURG

## 2023-02-20 RX ORDER — LOSARTAN POTASSIUM 50 MG/1
50 TABLET ORAL DAILY
Status: ON HOLD | COMMUNITY
End: 2023-02-21 | Stop reason: SDUPTHER

## 2023-02-20 RX ORDER — NICARDIPINE HYDROCHLORIDE 2.5 MG/ML
INJECTION INTRAVENOUS AS NEEDED
Status: DISCONTINUED | OUTPATIENT
Start: 2023-02-20 | End: 2023-02-20 | Stop reason: SURG

## 2023-02-20 RX ORDER — DOCUSATE SODIUM 100 MG/1
100 CAPSULE, LIQUID FILLED ORAL 2 TIMES DAILY PRN
Status: DISCONTINUED | OUTPATIENT
Start: 2023-02-20 | End: 2023-02-21 | Stop reason: HOSPADM

## 2023-02-20 RX ORDER — BISACODYL 10 MG
10 SUPPOSITORY, RECTAL RECTAL DAILY PRN
Status: DISCONTINUED | OUTPATIENT
Start: 2023-02-21 | End: 2023-02-21 | Stop reason: HOSPADM

## 2023-02-20 RX ORDER — DIPHENHYDRAMINE HYDROCHLORIDE 50 MG/ML
12.5 INJECTION INTRAMUSCULAR; INTRAVENOUS
Status: DISCONTINUED | OUTPATIENT
Start: 2023-02-20 | End: 2023-02-21 | Stop reason: HOSPADM

## 2023-02-20 RX ORDER — SODIUM CHLORIDE 9 MG/ML
30 INJECTION, SOLUTION INTRAVENOUS CONTINUOUS
Status: DISPENSED | OUTPATIENT
Start: 2023-02-20 | End: 2023-02-20

## 2023-02-20 RX ORDER — NALBUPHINE HCL 10 MG/ML
10 AMPUL (ML) INJECTION EVERY 4 HOURS PRN
Status: DISCONTINUED | OUTPATIENT
Start: 2023-02-20 | End: 2023-02-20

## 2023-02-20 RX ORDER — LEVOTHYROXINE SODIUM 88 UG/1
88 TABLET ORAL
Status: DISCONTINUED | OUTPATIENT
Start: 2023-02-21 | End: 2023-02-21 | Stop reason: HOSPADM

## 2023-02-20 RX ORDER — ASPIRIN 81 MG/1
81 TABLET, CHEWABLE ORAL DAILY
Status: DISCONTINUED | OUTPATIENT
Start: 2023-02-21 | End: 2023-02-21 | Stop reason: HOSPADM

## 2023-02-20 RX ORDER — HYDROCODONE BITARTRATE AND ACETAMINOPHEN 5; 325 MG/1; MG/1
1 TABLET ORAL EVERY 6 HOURS PRN
Status: DISCONTINUED | OUTPATIENT
Start: 2023-02-20 | End: 2023-02-21 | Stop reason: HOSPADM

## 2023-02-20 RX ORDER — AMLODIPINE BESYLATE 5 MG/1
5 TABLET ORAL
Status: DISCONTINUED | OUTPATIENT
Start: 2023-02-21 | End: 2023-02-20

## 2023-02-20 RX ORDER — SODIUM CHLORIDE 0.9 % (FLUSH) 0.9 %
10 SYRINGE (ML) INJECTION AS NEEDED
Status: DISCONTINUED | OUTPATIENT
Start: 2023-02-20 | End: 2023-02-21 | Stop reason: HOSPADM

## 2023-02-20 RX ORDER — KETAMINE HCL IN NACL, ISO-OSM 100MG/10ML
SYRINGE (ML) INJECTION AS NEEDED
Status: DISCONTINUED | OUTPATIENT
Start: 2023-02-20 | End: 2023-02-20 | Stop reason: SURG

## 2023-02-20 RX ORDER — LIDOCAINE HYDROCHLORIDE 20 MG/ML
INJECTION, SOLUTION INFILTRATION; PERINEURAL
Status: DISCONTINUED | OUTPATIENT
Start: 2023-02-20 | End: 2023-02-20 | Stop reason: HOSPADM

## 2023-02-20 RX ORDER — NITROGLYCERIN 20 MG/100ML
10-50 INJECTION INTRAVENOUS
Status: DISCONTINUED | OUTPATIENT
Start: 2023-02-20 | End: 2023-02-20

## 2023-02-20 RX ORDER — SODIUM CHLORIDE 0.9 % (FLUSH) 0.9 %
10 SYRINGE (ML) INJECTION AS NEEDED
Status: DISCONTINUED | OUTPATIENT
Start: 2023-02-20 | End: 2023-02-20 | Stop reason: HOSPADM

## 2023-02-20 RX ORDER — FENTANYL CITRATE 50 UG/ML
INJECTION, SOLUTION INTRAMUSCULAR; INTRAVENOUS AS NEEDED
Status: DISCONTINUED | OUTPATIENT
Start: 2023-02-20 | End: 2023-02-20 | Stop reason: SURG

## 2023-02-20 RX ORDER — ONDANSETRON 2 MG/ML
4 INJECTION INTRAMUSCULAR; INTRAVENOUS ONCE AS NEEDED
Status: DISCONTINUED | OUTPATIENT
Start: 2023-02-20 | End: 2023-02-21 | Stop reason: HOSPADM

## 2023-02-20 RX ADMIN — SODIUM CHLORIDE 40 ML: 9 INJECTION, SOLUTION INTRAVENOUS at 09:52

## 2023-02-20 RX ADMIN — Medication 10 ML: at 22:15

## 2023-02-20 RX ADMIN — Medication 10 ML: at 13:27

## 2023-02-20 RX ADMIN — ATORVASTATIN CALCIUM 40 MG: 40 TABLET, FILM COATED ORAL at 20:29

## 2023-02-20 RX ADMIN — SODIUM CHLORIDE 40 ML: 9 INJECTION, SOLUTION INTRAVENOUS at 09:53

## 2023-02-20 RX ADMIN — Medication 100 MCG: at 11:41

## 2023-02-20 RX ADMIN — Medication 20 MG: at 10:51

## 2023-02-20 RX ADMIN — FENTANYL CITRATE 25 MCG: 50 INJECTION INTRAMUSCULAR; INTRAVENOUS at 11:18

## 2023-02-20 RX ADMIN — FENTANYL CITRATE 50 MCG: 50 INJECTION INTRAMUSCULAR; INTRAVENOUS at 11:57

## 2023-02-20 RX ADMIN — PROPOFOL INJECTABLE EMULSION 40 MCG/KG/MIN: 10 INJECTION, EMULSION INTRAVENOUS at 10:58

## 2023-02-20 RX ADMIN — SODIUM CHLORIDE 100 ML/HR: 9 INJECTION, SOLUTION INTRAVENOUS at 10:25

## 2023-02-20 RX ADMIN — AMLODIPINE BESYLATE 5 MG: 5 TABLET ORAL at 23:01

## 2023-02-20 RX ADMIN — SODIUM CHLORIDE 30 ML/HR: 9 INJECTION, SOLUTION INTRAVENOUS at 16:07

## 2023-02-20 RX ADMIN — HYDROCODONE BITARTRATE AND ACETAMINOPHEN 1 TABLET: 5; 325 TABLET ORAL at 22:11

## 2023-02-20 RX ADMIN — CEFAZOLIN 2 G: 2 INJECTION, POWDER, FOR SOLUTION INTRAMUSCULAR; INTRAVENOUS at 10:40

## 2023-02-20 RX ADMIN — MIDAZOLAM HYDROCHLORIDE 5 MG: 1 INJECTION, SOLUTION INTRAMUSCULAR; INTRAVENOUS at 10:41

## 2023-02-20 RX ADMIN — GLYCOPYRROLATE 0.2 MCG: 0.2 INJECTION INTRAMUSCULAR; INTRAVENOUS at 10:59

## 2023-02-20 RX ADMIN — NICARDIPINE HYDROCHLORIDE 0.5 MG: 25 INJECTION INTRAVENOUS at 11:05

## 2023-02-20 RX ADMIN — FENTANYL CITRATE 25 MCG: 50 INJECTION INTRAMUSCULAR; INTRAVENOUS at 11:28

## 2023-02-20 RX ADMIN — EMPAGLIFLOZIN 10 MG: 10 TABLET, FILM COATED ORAL at 18:07

## 2023-02-20 RX ADMIN — Medication 30 MG: at 10:41

## 2023-02-20 NOTE — ANESTHESIA POSTPROCEDURE EVALUATION
Patient: Alen Ratliff    Procedure Summary     Date: 02/20/23 Room / Location: [facility] OR 13 / [facility] HYBRID OR    Anesthesia Start: 1040 Anesthesia Stop: 1228    Procedures:       Transfemoral Transcatheter Aortic Valve Replacement      TRANSCATHETER AORTIC VALVE REPLACEMENT Diagnosis:       Aortic stenosis, severe      Chronic diastolic CHF (congestive heart failure), NYHA class 3 (HCC)      (Aortic stenosis, severe [I35.0])      (Chronic diastolic CHF (congestive heart failure), NYHA class 3 (HCC) [I50.32])    Surgeons: Naveed Leonardo MD; Fabio Villafana MD Provider: Chucky Jenkins MD    Anesthesia Type: MAC, Elly, CVL, PAC ASA Status: 4          Anesthesia Type: MAC, Elly, CVL, PAC    Vitals  Vitals Value Taken Time   /67 02/20/23 1304   Temp     Pulse 49 02/20/23 1349   Resp 14 02/20/23 1250   SpO2 97 % 02/20/23 1349   Vitals shown include unvalidated device data.        Post Anesthesia Care and Evaluation    Patient location during evaluation: PACU  Patient participation: complete - patient participated  Level of consciousness: awake  Pain scale: See nurse's notes for pain score.  Pain management: adequate    Airway patency: patent  Anesthetic complications: No anesthetic complications  PONV Status: none  Cardiovascular status: acceptable  Respiratory status: acceptable and spontaneous ventilation  Hydration status: acceptable    Comments: Patient seen and examined postoperatively; vital signs stable; SpO2 greater than or equal to 90%; cardiopulmonary status stable; nausea/vomiting adequately controlled; pain adequately controlled; no apparent anesthesia complications; patient discharged from anesthesia care when discharge criteria were met

## 2023-02-21 ENCOUNTER — APPOINTMENT (OUTPATIENT)
Dept: CARDIOLOGY | Facility: HOSPITAL | Age: 83
DRG: 267 | End: 2023-02-21
Payer: MEDICARE

## 2023-02-21 ENCOUNTER — APPOINTMENT (OUTPATIENT)
Dept: GENERAL RADIOLOGY | Facility: HOSPITAL | Age: 83
DRG: 267 | End: 2023-02-21
Payer: MEDICARE

## 2023-02-21 ENCOUNTER — READMISSION MANAGEMENT (OUTPATIENT)
Dept: CALL CENTER | Facility: HOSPITAL | Age: 83
End: 2023-02-21
Payer: MEDICARE

## 2023-02-21 VITALS
RESPIRATION RATE: 14 BRPM | OXYGEN SATURATION: 97 % | DIASTOLIC BLOOD PRESSURE: 66 MMHG | TEMPERATURE: 98.8 F | WEIGHT: 208 LBS | SYSTOLIC BLOOD PRESSURE: 141 MMHG | BODY MASS INDEX: 30.81 KG/M2 | HEART RATE: 63 BPM | HEIGHT: 69 IN

## 2023-02-21 DIAGNOSIS — I35.0 AORTIC STENOSIS, SEVERE: Primary | ICD-10-CM

## 2023-02-21 DIAGNOSIS — Z95.2 S/P TAVR (TRANSCATHETER AORTIC VALVE REPLACEMENT): ICD-10-CM

## 2023-02-21 PROBLEM — E11.9 TYPE 2 DIABETES MELLITUS, WITHOUT LONG-TERM CURRENT USE OF INSULIN: Chronic | Status: ACTIVE | Noted: 2020-06-03

## 2023-02-21 PROBLEM — I10 ESSENTIAL HYPERTENSION: Chronic | Status: ACTIVE | Noted: 2020-06-03

## 2023-02-21 PROBLEM — E78.2 MIXED HYPERLIPIDEMIA: Chronic | Status: ACTIVE | Noted: 2020-06-03

## 2023-02-21 LAB
ANION GAP SERPL CALCULATED.3IONS-SCNC: 12 MMOL/L (ref 5–15)
BH CV ECHO MEAS - AO MAX PG: 43.7 MMHG
BH CV ECHO MEAS - AO MAX PG: 9.7 MMHG
BH CV ECHO MEAS - AO MEAN PG: 19.9 MMHG
BH CV ECHO MEAS - AO MEAN PG: 5 MMHG
BH CV ECHO MEAS - AO V2 MAX: 156 CM/SEC
BH CV ECHO MEAS - AO V2 MAX: 299.4 CM/SEC
BH CV ECHO MEAS - AO V2 VTI: 36.1 CM
BH CV ECHO MEAS - AO V2 VTI: 69.2 CM
BH CV ECHO MEAS - AVA(I,D): 0.87 CM2
BH CV ECHO MEAS - AVA(I,D): 2.07 CM2
BH CV ECHO MEAS - LV MAX PG: 4.2 MMHG
BH CV ECHO MEAS - LV MAX PG: 7 MMHG
BH CV ECHO MEAS - LV MEAN PG: 2.5 MMHG
BH CV ECHO MEAS - LV MEAN PG: 4 MMHG
BH CV ECHO MEAS - LV V1 MAX: 101.9 CM/SEC
BH CV ECHO MEAS - LV V1 MAX: 132 CM/SEC
BH CV ECHO MEAS - LV V1 VTI: 23.2 CM
BH CV ECHO MEAS - LV V1 VTI: 26.3 CM
BH CV ECHO MEAS - LVOT AREA: 2.6 CM2
BH CV ECHO MEAS - LVOT AREA: 2.8 CM2
BH CV ECHO MEAS - LVOT DIAM: 1.81 CM
BH CV ECHO MEAS - LVOT DIAM: 1.9 CM
BH CV ECHO MEAS - SV(LVOT): 59.9 ML
BH CV ECHO MEAS - SV(LVOT): 74.6 ML
BUN SERPL-MCNC: 22 MG/DL (ref 8–23)
BUN/CREAT SERPL: 22.4 (ref 7–25)
CALCIUM SPEC-SCNC: 8.4 MG/DL (ref 8.6–10.5)
CHLORIDE SERPL-SCNC: 108 MMOL/L (ref 98–107)
CO2 SERPL-SCNC: 22 MMOL/L (ref 22–29)
CREAT SERPL-MCNC: 0.98 MG/DL (ref 0.76–1.27)
DEPRECATED RDW RBC AUTO: 49.4 FL (ref 37–54)
EGFRCR SERPLBLD CKD-EPI 2021: 76.5 ML/MIN/1.73
ERYTHROCYTE [DISTWIDTH] IN BLOOD BY AUTOMATED COUNT: 14.9 % (ref 12.3–15.4)
GLUCOSE BLDC GLUCOMTR-MCNC: 120 MG/DL (ref 70–105)
GLUCOSE SERPL-MCNC: 109 MG/DL (ref 65–99)
HCT VFR BLD AUTO: 38.5 % (ref 37.5–51)
HGB BLD-MCNC: 12.7 G/DL (ref 13–17.7)
MAGNESIUM SERPL-MCNC: 1.1 MG/DL (ref 1.6–2.4)
MAXIMAL PREDICTED HEART RATE: 137 BPM
MAXIMAL PREDICTED HEART RATE: 137 BPM
MCH RBC QN AUTO: 30.3 PG (ref 26.6–33)
MCHC RBC AUTO-ENTMCNC: 33.1 G/DL (ref 31.5–35.7)
MCV RBC AUTO: 91.6 FL (ref 79–97)
PLATELET # BLD AUTO: 162 10*3/MM3 (ref 140–450)
PMV BLD AUTO: 8.8 FL (ref 6–12)
POTASSIUM SERPL-SCNC: 3.8 MMOL/L (ref 3.5–5.2)
RBC # BLD AUTO: 4.2 10*6/MM3 (ref 4.14–5.8)
SODIUM SERPL-SCNC: 142 MMOL/L (ref 136–145)
STRESS TARGET HR: 116 BPM
STRESS TARGET HR: 116 BPM
WBC NRBC COR # BLD: 11.5 10*3/MM3 (ref 3.4–10.8)

## 2023-02-21 PROCEDURE — 93325 DOPPLER ECHO COLOR FLOW MAPG: CPT

## 2023-02-21 PROCEDURE — 25010000002 MAGNESIUM SULFATE 2 GM/50ML SOLUTION: Performed by: INTERNAL MEDICINE

## 2023-02-21 PROCEDURE — 80048 BASIC METABOLIC PNL TOTAL CA: CPT | Performed by: INTERNAL MEDICINE

## 2023-02-21 PROCEDURE — 93308 TTE F-UP OR LMTD: CPT

## 2023-02-21 PROCEDURE — 83735 ASSAY OF MAGNESIUM: CPT | Performed by: INTERNAL MEDICINE

## 2023-02-21 PROCEDURE — 71045 X-RAY EXAM CHEST 1 VIEW: CPT

## 2023-02-21 PROCEDURE — 82962 GLUCOSE BLOOD TEST: CPT

## 2023-02-21 PROCEDURE — 93321 DOPPLER ECHO F-UP/LMTD STD: CPT

## 2023-02-21 PROCEDURE — 93325 DOPPLER ECHO COLOR FLOW MAPG: CPT | Performed by: INTERNAL MEDICINE

## 2023-02-21 PROCEDURE — 99239 HOSP IP/OBS DSCHRG MGMT >30: CPT | Performed by: INTERNAL MEDICINE

## 2023-02-21 PROCEDURE — 93321 DOPPLER ECHO F-UP/LMTD STD: CPT | Performed by: INTERNAL MEDICINE

## 2023-02-21 PROCEDURE — 33361 REPLACE AORTIC VALVE PERQ: CPT | Performed by: INTERNAL MEDICINE

## 2023-02-21 PROCEDURE — 99232 SBSQ HOSP IP/OBS MODERATE 35: CPT | Performed by: THORACIC SURGERY (CARDIOTHORACIC VASCULAR SURGERY)

## 2023-02-21 PROCEDURE — 85027 COMPLETE CBC AUTOMATED: CPT | Performed by: INTERNAL MEDICINE

## 2023-02-21 PROCEDURE — 93005 ELECTROCARDIOGRAM TRACING: CPT | Performed by: INTERNAL MEDICINE

## 2023-02-21 PROCEDURE — 93308 TTE F-UP OR LMTD: CPT | Performed by: INTERNAL MEDICINE

## 2023-02-21 PROCEDURE — 93010 ELECTROCARDIOGRAM REPORT: CPT | Performed by: INTERNAL MEDICINE

## 2023-02-21 RX ORDER — MAGNESIUM SULFATE HEPTAHYDRATE 40 MG/ML
2 INJECTION, SOLUTION INTRAVENOUS AS NEEDED
Status: DISCONTINUED | OUTPATIENT
Start: 2023-02-21 | End: 2023-02-21 | Stop reason: HOSPADM

## 2023-02-21 RX ORDER — MAGNESIUM SULFATE 1 G/100ML
1 INJECTION INTRAVENOUS AS NEEDED
Status: DISCONTINUED | OUTPATIENT
Start: 2023-02-21 | End: 2023-02-21 | Stop reason: HOSPADM

## 2023-02-21 RX ORDER — LOSARTAN POTASSIUM 25 MG/1
25 TABLET ORAL DAILY
Qty: 90 TABLET | Refills: 3 | Status: SHIPPED | OUTPATIENT
Start: 2023-02-21 | End: 2023-03-20

## 2023-02-21 RX ORDER — MAGNESIUM SULFATE HEPTAHYDRATE 40 MG/ML
2 INJECTION, SOLUTION INTRAVENOUS ONCE
Status: COMPLETED | OUTPATIENT
Start: 2023-02-21 | End: 2023-02-21

## 2023-02-21 RX ORDER — POTASSIUM CHLORIDE 20 MEQ/1
40 TABLET, EXTENDED RELEASE ORAL AS NEEDED
Status: DISCONTINUED | OUTPATIENT
Start: 2023-02-21 | End: 2023-02-21 | Stop reason: HOSPADM

## 2023-02-21 RX ORDER — LOSARTAN POTASSIUM 25 MG/1
25 TABLET ORAL DAILY
Status: DISCONTINUED | OUTPATIENT
Start: 2023-02-21 | End: 2023-02-21 | Stop reason: HOSPADM

## 2023-02-21 RX ORDER — POTASSIUM CHLORIDE 1.5 G/1.77G
40 POWDER, FOR SOLUTION ORAL AS NEEDED
Status: DISCONTINUED | OUTPATIENT
Start: 2023-02-21 | End: 2023-02-21 | Stop reason: HOSPADM

## 2023-02-21 RX ORDER — AMLODIPINE BESYLATE 5 MG/1
5 TABLET ORAL DAILY
Qty: 90 TABLET | Refills: 3 | Status: SHIPPED | OUTPATIENT
Start: 2023-02-21 | End: 2023-02-23

## 2023-02-21 RX ADMIN — CLOPIDOGREL BISULFATE 75 MG: 75 TABLET ORAL at 08:02

## 2023-02-21 RX ADMIN — Medication 800 MG: at 11:38

## 2023-02-21 RX ADMIN — LEVOTHYROXINE SODIUM 88 MCG: 0.09 TABLET ORAL at 06:23

## 2023-02-21 RX ADMIN — MAGNESIUM SULFATE HEPTAHYDRATE 2 G: 2 INJECTION, SOLUTION INTRAVENOUS at 08:00

## 2023-02-21 RX ADMIN — PANTOPRAZOLE SODIUM 40 MG: 40 TABLET, DELAYED RELEASE ORAL at 08:02

## 2023-02-21 RX ADMIN — LOSARTAN POTASSIUM 25 MG: 25 TABLET, FILM COATED ORAL at 11:38

## 2023-02-21 RX ADMIN — EMPAGLIFLOZIN 10 MG: 10 TABLET, FILM COATED ORAL at 08:02

## 2023-02-21 RX ADMIN — ASPIRIN 81 MG CHEWABLE TABLET 81 MG: 81 TABLET CHEWABLE at 08:02

## 2023-02-21 RX ADMIN — Medication 10 ML: at 08:01

## 2023-02-21 RX ADMIN — ACETAMINOPHEN 650 MG: 325 TABLET, FILM COATED ORAL at 06:23

## 2023-02-22 ENCOUNTER — TRANSITIONAL CARE MANAGEMENT TELEPHONE ENCOUNTER (OUTPATIENT)
Dept: CALL CENTER | Facility: HOSPITAL | Age: 83
End: 2023-02-22
Payer: MEDICARE

## 2023-02-22 NOTE — OUTREACH NOTE
Call Center TCM Note    Flowsheet Row Responses   [facility] facility patient discharged from? [facility]   Does the patient have one of the following disease processes/diagnoses(primary or secondary)? Other   TCM attempt successful? No  [Elba-spouse ]   Unsuccessful attempts Attempt 2          Jennifer Marie RN    2/22/2023, 14:55 EST

## 2023-02-22 NOTE — OUTREACH NOTE
Call Center TCM Note    Flowsheet Row Responses   [facility] facility patient discharged from? [facility]   Does the patient have one of the following disease processes/diagnoses(primary or secondary)? Other   TCM attempt successful? No   Unsuccessful attempts Attempt 1          Elba Dai MA    2/22/2023, 13:30 EST

## 2023-02-22 NOTE — OUTREACH NOTE
Prep Survey    Flowsheet Row Responses   [facility] patient discharged from? [facility]   Is LACE score < 7 ? No   Eligibility [facility]   Date of Admission 02/20/23   Date of Discharge 02/21/23   Discharge Disposition Home or Self Care   Discharge diagnosis Transfemoral Transcatheter Aortic Valve Replacement   Does the patient have one of the following disease processes/diagnoses(primary or secondary)? Other   Does the patient have Home health ordered? No   Is there a DME ordered? No   Prep survey completed? Yes          Ele GHOSH - Registered Nurse

## 2023-02-23 ENCOUNTER — TRANSITIONAL CARE MANAGEMENT TELEPHONE ENCOUNTER (OUTPATIENT)
Dept: CALL CENTER | Facility: HOSPITAL | Age: 83
End: 2023-02-23
Payer: MEDICARE

## 2023-02-23 ENCOUNTER — TELEPHONE (OUTPATIENT)
Dept: CARDIOLOGY | Facility: CLINIC | Age: 83
End: 2023-02-23
Payer: MEDICARE

## 2023-02-23 DIAGNOSIS — I10 ESSENTIAL HYPERTENSION: ICD-10-CM

## 2023-02-23 RX ORDER — AMLODIPINE BESYLATE 10 MG/1
10 TABLET ORAL DAILY
Start: 2023-02-23

## 2023-02-23 NOTE — TELEPHONE ENCOUNTER
Returned call and spoke with patient's wife.  She said patient is resting comfortably now and said that patient was discharged from [facility] [place] on 2/21/23 after TAVR on 2/20/23.    She said patient did not have a H/A when dc'd [facility] [place].   Informed patient to take patient's B/P about an hour after his morning B/P meds while sitting in a chair.  She denies patient having any CP/SOA. Informed patient's wife  that I will notify Dr. Leonardo, but to also contact patient's PCP-Dr. Wren.  Patient's wife verbalizes understanding, and confirmed that patient is taking:  Amlodipine 5mg daily  Cozaar 25mg daily    Patient's wife denied patient starting on any new meds, but did say that patient's Amlodipine (used to be 10mg daily)  and Cozaar( used to be 50mg daily) were both decreased to half the prior dose when dc'd from [facility] on 2/21.    Please advise,    Thanks Melida

## 2023-02-23 NOTE — TELEPHONE ENCOUNTER
WIFE CALLING IN   HAD PROCEDURE AT HOSPITAL AND HAD A HEADACHE.AFTER HE CAME HOME HE WAS FINE. NOW HE IS HAVING HEADACHES AGAIN AND HIGH BLOOD PRESSURE THIS MORNING BEFORE MEDICATION IT /91 AFTER HE ATE AND MEDICATION IT /79 AND SHE RETAKEN IT A THIRD TIME AND IT /75. BUT STILL HAS THE HEADACHE SHE HAS GIVING HIM TYLENOL

## 2023-02-23 NOTE — TELEPHONE ENCOUNTER
I will increase his amlodipine back to 10 mg oral daily. He can take Tylenol 1 g oral every 6 hours as needed for pain. Please advise patient.

## 2023-02-23 NOTE — OUTREACH NOTE
Call Center TCM Note    Flowsheet Row Responses   [facility] facility patient discharged from? [facility]   Does the patient have one of the following disease processes/diagnoses(primary or secondary)? Other   TCM attempt successful? No   Unsuccessful attempts Attempt 3          Rhea Molina LPN    2/23/2023, 13:08 EST

## 2023-02-24 LAB
QT INTERVAL: 397 MS
QT INTERVAL: 555 MS

## 2023-02-24 NOTE — TELEPHONE ENCOUNTER
I placed a call to patient and spoke with patient's wife-Elba-and notified her that patient is to go back to Amlodipine 10mg daily and can take Tylenol 1G oral every 6hrs PRN for pain per LARRY Singleton orders.  Patient's [id number] understanding.

## 2023-02-25 RX ORDER — LEVOTHYROXINE SODIUM 88 MCG
TABLET ORAL
Qty: 90 TABLET | Refills: 0 | Status: SHIPPED | OUTPATIENT
Start: 2023-02-25

## 2023-02-27 ENCOUNTER — TELEPHONE (OUTPATIENT)
Dept: CARDIAC REHAB | Facility: HOSPITAL | Age: 83
End: 2023-02-27
Payer: MEDICARE

## 2023-02-27 LAB — ACT BLD: 131 SECONDS (ref 89–137)

## 2023-02-28 DIAGNOSIS — Z95.1 S/P CABG (CORONARY ARTERY BYPASS GRAFT): Primary | ICD-10-CM

## 2023-03-01 PROBLEM — Z86.73 HISTORY OF STROKE: Status: ACTIVE | Noted: 2020-12-11

## 2023-03-01 PROBLEM — I50.32 CHRONIC DIASTOLIC CHF (CONGESTIVE HEART FAILURE), NYHA CLASS 2: Status: ACTIVE | Noted: 2023-03-01

## 2023-03-01 NOTE — PROGRESS NOTES
Subjective:     Encounter Date:03/02/2023        Patient ID: Alen Ratliff  1940    Chief Complaint:  Shortness of breath    History of Present Illness    Alen Ratliff is a 82 y.o. male with a history of severe aortic stenosis who underwent transcatheter aortic valve replacement by Dr. Leonardo on 2/20/2023 at [facility]. The patient presents to the office today for follow-up EKG and wound check. He is accompanied by his wife. The patient denies any current complaints, including shortness of breath, dyspnea on exertion, leg swelling, chest pain, palpitations or dizziness. He states he feels good after having his procedure done. He is requesting to start vacuuming at home again. He states cardiac rehab reached out to him recently and set up an appointment for his initial evaluation.       Review of systems:  Review of Systems   Constitutional: Negative for malaise/fatigue.   Cardiovascular: Negative for chest pain, dyspnea on exertion, leg swelling and palpitations.   Respiratory: Negative for cough and shortness of breath.    Gastrointestinal: Negative for abdominal pain, nausea and vomiting.   Neurological: Negative for dizziness, focal weakness, headaches, light-headedness and numbness.   All other systems reviewed and are negative.        The following portions of the patient's history were reviewed and updated as appropriate: allergies, current medications, past family history, past medical history, past social history, past surgical history and problem list.    Past Medical History:  Past Medical History:   Diagnosis Date   • Allergic rhinitis    • Aortic stenosis    • Coronary artery disease    • Diabetes (HCC)    • GERD (gastroesophageal reflux disease)    • Heart murmur    • Hyperlipidemia    • Hypertension    • Hypothyroidism    • Prostate cancer (HCC)    • S/P TAVR (transcatheter aortic valve replacement) 2/20/2023   • Stroke (HCC)        Past Surgical History:  Past Surgical  History:   Procedure Laterality Date   • ANKLE OPEN REDUCTION INTERNAL FIXATION Left 12/16/2020    Procedure: ANKLE OPEN REDUCTION INTERNAL FIXATION;  Surgeon: CAROLE Vasquez DPM;  Location: [facility] MAIN OR;  Service: Podiatry;  Laterality: Left;   • BACK SURGERY  1994   • CARDIAC CATHETERIZATION Right 9/27/2022    Procedure: Left Heart Cath;  Surgeon: Steve Muhammad MD;  Location: [facility] CATH INVASIVE LOCATION;  Service: Cardiovascular;  Laterality: Right;   • CARDIAC CATHETERIZATION N/A 1/12/2023    Procedure: Stent JENN coronary;  Surgeon: Steve Muhammad MD;  Location: [facility] CATH INVASIVE LOCATION;  Service: Cardiovascular;  Laterality: N/A;   • CAROTID ENDARTERECTOMY Right 12/14/2020    Procedure: CAROTID ENDARTERECTOMY;  Surgeon: Toby Fung MD;  Location: [facility] MAIN OR;  Service: Vascular;  Laterality: Right;   • COLONOSCOPY  08/25/2015   • PROSTATECTOMY  2001    due to cancer        Allergies:  Allergies   Allergen Reactions   • Azithromycin Unknown - High Severity   • Tramadol Unknown - High Severity       Current Meds:     Current Outpatient Medications:   •  amLODIPine (NORVASC) 10 MG tablet, Take 1 tablet by mouth Daily., Disp: , Rfl:   •  aspirin 81 MG chewable tablet, Chew 1 tablet Daily., Disp: 30 tablet, Rfl: 1  •  atorvastatin (LIPITOR) 40 MG tablet, Take 40 mg by mouth Daily., Disp: , Rfl:   •  clopidogrel (PLAVIX) 75 MG tablet, Take 1 tablet by mouth Daily., Disp: 90 tablet, Rfl: 3  •  empagliflozin (JARDIANCE) 10 MG tablet tablet, Take 10 mg by mouth Daily., Disp: , Rfl:   •  fluticasone (FLONASE) 50 MCG/ACT nasal spray, 2 sprays into the nostril(s) as directed by provider Daily As Needed., Disp: , Rfl:   •  losartan (COZAAR) 25 MG tablet, Take 1 tablet by mouth Daily., Disp: 90 tablet, Rfl: 3  •  pantoprazole (PROTONIX) 40 MG EC tablet, Take 1 tablet by mouth Daily., Disp: 90 tablet, Rfl: 1  •  SITagliptin (JANUVIA) 50 MG tablet, Take 50 mg by mouth Daily., Disp: , Rfl:   •  Synthroid  88 MCG tablet, TAKE 1 TABLET DAILY, Disp: 90 tablet, Rfl: 0    Social History:   Social History     Tobacco Use   • Smoking status: Never   • Smokeless tobacco: Never   Substance Use Topics   • Alcohol use: Not Currently        Family History:  Family History   Problem Relation Age of Onset   • Hypertension Other    • Heart attack Mother    • Heart disease Mother    • Heart attack Father    • Heart disease Father                  Objective:         Vitals reviewed.   Constitutional:       Appearance: Normal appearance.   Eyes:      Conjunctiva/sclera: Conjunctivae normal.      Pupils: Pupils are equal, round, and reactive to light.   HENT:      Head: Normocephalic and atraumatic.    Mouth/Throat:      Mouth: Mucous membranes are moist.      Pharynx: Oropharynx is clear.   Pulmonary:      Effort: Pulmonary effort is normal.      Breath sounds: Normal breath sounds.   Cardiovascular:      PMI at left midclavicular line. Normal rate. Regular rhythm.   Pulses:     Intact distal pulses.   Edema:     Pretibial: bilateral trace edema of the pretibial area.  Abdominal:      General: Bowel sounds are normal. There is no distension.      Palpations: Abdomen is soft.      Tenderness: There is no abdominal tenderness.   Musculoskeletal: Normal range of motion.      Cervical back: Normal range of motion. Skin:     General: Skin is warm and dry.      Findings: Bruising present.      Comments: Bilateral groin sites soft and dry without erythema or drainage; bruising noted.    Neurological:      Mental Status: Alert, oriented to person, place, and time and oriented to person, place and time.   Psychiatric:         Mood and Affect: Mood normal.         Behavior: Behavior normal.         There were no vitals taken for this visit.      Lab Reviewed:     CBC        BMP        CMP       BNP        TROPONIN        CoAg        Creatinine Clearance  Estimated Creatinine Clearance: 64.7 mL/min (by C-G formula based on SCr of 0.98  mg/dL).    ABG        Radiology  No radiology results for the last day    Cardiology    ECG 12 Lead    Date/Time: 3/2/2023 11:09 AM  Performed by: Megan Allison APRN  Authorized by: Megan Allison APRN   Comparison: compared with previous ECG from 2/21/2023  Similar to previous ECG  Rhythm: sinus arrhythmia  Rate: normal  Conduction: left bundle branch block, 1st degree AV block and non-specific intraventricular conduction delay    Clinical impression: abnormal EKG  Comments: HR 68, , , QTc 471                    Assessment:         Diagnoses and all orders for this visit:    1. Aortic stenosis, severe (Primary)  Overview:  Added automatically from request for surgery [id number]      2. S/P TAVR (transcatheter aortic valve replacement)    3. Chronic diastolic CHF (congestive heart failure), NYHA class 2 (HCC)  Overview:  Added automatically from request for surgery [id number]      4. Coronary artery disease of native artery of native heart with stable angina pectoris ([facility])    5. Essential hypertension    6. Mixed hyperlipidemia    7. PAF (paroxysmal atrial fibrillation) (HCC)  Overview:  Added automatically from request for surgery [id number]      8. History of stroke    9. Type 2 diabetes mellitus without complication, without long-term current use of insulin ([facility])    10. Acquired hypothyroidism                Plan:       Severe aortic stenosis  Status post TAVR (transcatheter aortic valve replacement)  -12-lead EKG reviewed  -wound sites checked  -continue DAPT with aspirin and Plavix   -30 day and 1 year post-TAVR CBC, BMP, EKG, and 2D echo ordered  -30 day and 1 year post-TAVR appointments scheduled  -patient advised to resume normal activities as tolerated   -patient has an upcoming appointment with cardiac rehab for his initial evaluation     Chronic diastolic CHF (congestive heart failure), NYHA class 2   -patient appears euvolemic  -not on BB d/t h/o bradycardia   -continue Jardiance, amlodipine, and  losartan     Coronary artery disease of native artery of native heart with stable angina pectoris   Essential hypertension, chronic  Mixed hyperlipidemia  -s/p stent to LCx (1/12/23)  -continue DAPT, high intensity statin, amlodipine, and losartan   -not on BB d/t h/o bradycardia    PAF (paroxysmal atrial fibrillation)   -it is unclear whether or not he has a-fib  -currently sinus arrhythmia  -[id number] score 8  -currently on DAPT only  -recommend anticoagulation if he does have a-fib; will defer to primary cardiologist    History of stroke  History of carotid endarterectomy   -continue DAPT and statin     Type 2 diabetes mellitus without complication, without long-term current use of insulin   -A1c 6.6% on 10/3/22   -continue Jardiance and Januvia     Acquired hypothyroidism  -continue levothyroxine     Electronically signed by LARRY Poole, 03/02/23, 11:47 AM EST.

## 2023-03-02 ENCOUNTER — OFFICE VISIT (OUTPATIENT)
Dept: CARDIOLOGY | Facility: CLINIC | Age: 83
End: 2023-03-02
Payer: MEDICARE

## 2023-03-02 VITALS
WEIGHT: 206 LBS | HEIGHT: 69 IN | OXYGEN SATURATION: 96 % | SYSTOLIC BLOOD PRESSURE: 121 MMHG | HEART RATE: 81 BPM | DIASTOLIC BLOOD PRESSURE: 65 MMHG | BODY MASS INDEX: 30.51 KG/M2

## 2023-03-02 DIAGNOSIS — E11.9 TYPE 2 DIABETES MELLITUS WITHOUT COMPLICATION, WITHOUT LONG-TERM CURRENT USE OF INSULIN: ICD-10-CM

## 2023-03-02 DIAGNOSIS — I50.32 CHRONIC DIASTOLIC CHF (CONGESTIVE HEART FAILURE), NYHA CLASS 2: ICD-10-CM

## 2023-03-02 DIAGNOSIS — I25.118 CORONARY ARTERY DISEASE OF NATIVE ARTERY OF NATIVE HEART WITH STABLE ANGINA PECTORIS: ICD-10-CM

## 2023-03-02 DIAGNOSIS — I35.0 AORTIC STENOSIS, SEVERE: Primary | ICD-10-CM

## 2023-03-02 DIAGNOSIS — I10 ESSENTIAL HYPERTENSION: Chronic | ICD-10-CM

## 2023-03-02 DIAGNOSIS — Z86.73 HISTORY OF STROKE: ICD-10-CM

## 2023-03-02 DIAGNOSIS — E03.9 ACQUIRED HYPOTHYROIDISM: ICD-10-CM

## 2023-03-02 DIAGNOSIS — Z95.2 S/P TAVR (TRANSCATHETER AORTIC VALVE REPLACEMENT): ICD-10-CM

## 2023-03-02 DIAGNOSIS — I48.0 PAF (PAROXYSMAL ATRIAL FIBRILLATION): ICD-10-CM

## 2023-03-02 DIAGNOSIS — E78.2 MIXED HYPERLIPIDEMIA: Chronic | ICD-10-CM

## 2023-03-02 PROCEDURE — 93000 ELECTROCARDIOGRAM COMPLETE: CPT | Performed by: NURSE PRACTITIONER

## 2023-03-02 PROCEDURE — 99214 OFFICE O/P EST MOD 30 MIN: CPT | Performed by: NURSE PRACTITIONER

## 2023-03-13 ENCOUNTER — OFFICE VISIT (OUTPATIENT)
Dept: CARDIAC REHAB | Facility: HOSPITAL | Age: 83
End: 2023-03-13
Payer: MEDICARE

## 2023-03-13 DIAGNOSIS — Z95.2 S/P TAVR (TRANSCATHETER AORTIC VALVE REPLACEMENT): Primary | ICD-10-CM

## 2023-03-13 PROCEDURE — 93798 PHYS/QHP OP CAR RHAB W/ECG: CPT

## 2023-03-14 ENCOUNTER — TREATMENT (OUTPATIENT)
Dept: CARDIAC REHAB | Facility: HOSPITAL | Age: 83
End: 2023-03-14
Payer: MEDICARE

## 2023-03-14 DIAGNOSIS — Z95.2 S/P TAVR (TRANSCATHETER AORTIC VALVE REPLACEMENT): Primary | ICD-10-CM

## 2023-03-14 PROCEDURE — 93798 PHYS/QHP OP CAR RHAB W/ECG: CPT

## 2023-03-15 RX ORDER — SITAGLIPTIN 50 MG/1
TABLET, FILM COATED ORAL
Qty: 90 TABLET | Refills: 0 | Status: SHIPPED | OUTPATIENT
Start: 2023-03-15

## 2023-03-16 ENCOUNTER — TREATMENT (OUTPATIENT)
Dept: CARDIAC REHAB | Facility: HOSPITAL | Age: 83
End: 2023-03-16
Payer: MEDICARE

## 2023-03-16 ENCOUNTER — DOCUMENTATION (OUTPATIENT)
Dept: CARDIAC REHAB | Facility: HOSPITAL | Age: 83
End: 2023-03-16
Payer: MEDICARE

## 2023-03-16 DIAGNOSIS — Z95.2 S/P TAVR (TRANSCATHETER AORTIC VALVE REPLACEMENT): Primary | ICD-10-CM

## 2023-03-16 PROCEDURE — 93798 PHYS/QHP OP CAR RHAB W/ECG: CPT

## 2023-03-16 NOTE — PROGRESS NOTES
Spoke with Dr Leonardo's MA-Gladys regarding sending over cardiac rehab session reports again today via fax for Dr Leonardo to [facility].  BP remains elevated

## 2023-03-20 ENCOUNTER — TREATMENT (OUTPATIENT)
Dept: CARDIAC REHAB | Facility: HOSPITAL | Age: 83
End: 2023-03-20
Payer: MEDICARE

## 2023-03-20 DIAGNOSIS — Z95.2 S/P TAVR (TRANSCATHETER AORTIC VALVE REPLACEMENT): Primary | ICD-10-CM

## 2023-03-20 PROCEDURE — 93798 PHYS/QHP OP CAR RHAB W/ECG: CPT

## 2023-03-20 RX ORDER — LOSARTAN POTASSIUM 50 MG/1
50 TABLET ORAL DAILY
Start: 2023-03-20 | End: 2023-03-21

## 2023-03-21 ENCOUNTER — TELEPHONE (OUTPATIENT)
Dept: CARDIOLOGY | Facility: CLINIC | Age: 83
End: 2023-03-21
Payer: MEDICARE

## 2023-03-21 ENCOUNTER — TREATMENT (OUTPATIENT)
Dept: CARDIAC REHAB | Facility: HOSPITAL | Age: 83
End: 2023-03-21
Payer: MEDICARE

## 2023-03-21 DIAGNOSIS — Z95.2 S/P TAVR (TRANSCATHETER AORTIC VALVE REPLACEMENT): Primary | ICD-10-CM

## 2023-03-21 PROCEDURE — 93798 PHYS/QHP OP CAR RHAB W/ECG: CPT

## 2023-03-21 RX ORDER — LOSARTAN POTASSIUM 100 MG/1
100 TABLET ORAL DAILY
Qty: 90 TABLET | Refills: 3 | Status: SHIPPED
Start: 2023-03-21 | End: 2023-03-29 | Stop reason: SDUPTHER

## 2023-03-21 NOTE — TELEPHONE ENCOUNTER
I have reviewed documentation from cardiac rehab. BP has  Been uncontrolled consistently with starting BP in the 170s. I have increased his losartan to 100 mg. Please call and let him know.  He has PVCs on his monitor strips that they sent. Not on BB due to bradycardia.  Needs follow up with primary cardiologist also.

## 2023-03-21 NOTE — TELEPHONE ENCOUNTER
The patient's wife had contacted the valve coordinator, Katie Nair, yesterday, who then contacted me regarding his elevated blood pressures. I just increased his losartan from 25 mg to 50 mg yesterday. He was previously on 50 mg prior to having his TAVR. We had decreased his dose after his procedure because his BP was on the low side.

## 2023-03-23 ENCOUNTER — APPOINTMENT (OUTPATIENT)
Dept: CARDIAC REHAB | Facility: HOSPITAL | Age: 83
End: 2023-03-23
Payer: MEDICARE

## 2023-03-23 DIAGNOSIS — Z95.2 S/P TAVR (TRANSCATHETER AORTIC VALVE REPLACEMENT): Primary | ICD-10-CM

## 2023-03-23 PROCEDURE — 93798 PHYS/QHP OP CAR RHAB W/ECG: CPT

## 2023-03-27 ENCOUNTER — TREATMENT (OUTPATIENT)
Dept: CARDIAC REHAB | Facility: HOSPITAL | Age: 83
End: 2023-03-27
Payer: MEDICARE

## 2023-03-27 DIAGNOSIS — Z95.2 S/P TAVR (TRANSCATHETER AORTIC VALVE REPLACEMENT): Primary | ICD-10-CM

## 2023-03-27 DIAGNOSIS — Z95.1 S/P CABG (CORONARY ARTERY BYPASS GRAFT): ICD-10-CM

## 2023-03-27 PROCEDURE — 93798 PHYS/QHP OP CAR RHAB W/ECG: CPT

## 2023-03-28 ENCOUNTER — TREATMENT (OUTPATIENT)
Dept: CARDIAC REHAB | Facility: HOSPITAL | Age: 83
End: 2023-03-28
Payer: MEDICARE

## 2023-03-28 DIAGNOSIS — Z95.2 S/P TAVR (TRANSCATHETER AORTIC VALVE REPLACEMENT): Primary | ICD-10-CM

## 2023-03-28 DIAGNOSIS — Z95.1 S/P CABG (CORONARY ARTERY BYPASS GRAFT): ICD-10-CM

## 2023-03-28 PROCEDURE — 93798 PHYS/QHP OP CAR RHAB W/ECG: CPT

## 2023-03-29 RX ORDER — LOSARTAN POTASSIUM 100 MG/1
100 TABLET ORAL DAILY
Qty: 90 TABLET | Refills: 3 | Status: SHIPPED | OUTPATIENT
Start: 2023-03-29

## 2023-03-30 ENCOUNTER — TREATMENT (OUTPATIENT)
Dept: CARDIAC REHAB | Facility: HOSPITAL | Age: 83
End: 2023-03-30
Payer: MEDICARE

## 2023-03-30 DIAGNOSIS — Z95.2 S/P TAVR (TRANSCATHETER AORTIC VALVE REPLACEMENT): Primary | ICD-10-CM

## 2023-03-30 PROCEDURE — 93798 PHYS/QHP OP CAR RHAB W/ECG: CPT

## 2023-04-03 ENCOUNTER — LAB (OUTPATIENT)
Dept: FAMILY MEDICINE CLINIC | Facility: CLINIC | Age: 83
End: 2023-04-03
Payer: MEDICARE

## 2023-04-03 ENCOUNTER — TREATMENT (OUTPATIENT)
Dept: CARDIAC REHAB | Facility: HOSPITAL | Age: 83
End: 2023-04-03
Payer: MEDICARE

## 2023-04-03 DIAGNOSIS — I50.32 CHRONIC DIASTOLIC CHF (CONGESTIVE HEART FAILURE), NYHA CLASS 2: ICD-10-CM

## 2023-04-03 DIAGNOSIS — I35.0 AORTIC STENOSIS, SEVERE: ICD-10-CM

## 2023-04-03 DIAGNOSIS — Z95.2 S/P TAVR (TRANSCATHETER AORTIC VALVE REPLACEMENT): Primary | ICD-10-CM

## 2023-04-03 DIAGNOSIS — Z95.1 S/P CABG (CORONARY ARTERY BYPASS GRAFT): ICD-10-CM

## 2023-04-03 DIAGNOSIS — Z95.2 S/P TAVR (TRANSCATHETER AORTIC VALVE REPLACEMENT): ICD-10-CM

## 2023-04-03 LAB
ANION GAP SERPL CALCULATED.3IONS-SCNC: 10 MMOL/L (ref 5–15)
BUN SERPL-MCNC: 20 MG/DL (ref 8–23)
BUN/CREAT SERPL: 18.5 (ref 7–25)
CALCIUM SPEC-SCNC: 8.7 MG/DL (ref 8.6–10.5)
CHLORIDE SERPL-SCNC: 106 MMOL/L (ref 98–107)
CO2 SERPL-SCNC: 24 MMOL/L (ref 22–29)
CREAT SERPL-MCNC: 1.08 MG/DL (ref 0.76–1.27)
DEPRECATED RDW RBC AUTO: 44.8 FL (ref 37–54)
EGFRCR SERPLBLD CKD-EPI 2021: 68.1 ML/MIN/1.73
ERYTHROCYTE [DISTWIDTH] IN BLOOD BY AUTOMATED COUNT: 13.2 % (ref 12.3–15.4)
GLUCOSE SERPL-MCNC: 123 MG/DL (ref 65–99)
HCT VFR BLD AUTO: 42.4 % (ref 37.5–51)
HGB BLD-MCNC: 13.8 G/DL (ref 13–17.7)
MCH RBC QN AUTO: 30.1 PG (ref 26.6–33)
MCHC RBC AUTO-ENTMCNC: 32.5 G/DL (ref 31.5–35.7)
MCV RBC AUTO: 92.6 FL (ref 79–97)
PLATELET # BLD AUTO: 231 10*3/MM3 (ref 140–450)
PMV BLD AUTO: 10.7 FL (ref 6–12)
POTASSIUM SERPL-SCNC: 3.9 MMOL/L (ref 3.5–5.2)
RBC # BLD AUTO: 4.58 10*6/MM3 (ref 4.14–5.8)
SODIUM SERPL-SCNC: 140 MMOL/L (ref 136–145)
WBC NRBC COR # BLD: 6.56 10*3/MM3 (ref 3.4–10.8)

## 2023-04-03 PROCEDURE — 85027 COMPLETE CBC AUTOMATED: CPT | Performed by: NURSE PRACTITIONER

## 2023-04-03 PROCEDURE — 93798 PHYS/QHP OP CAR RHAB W/ECG: CPT

## 2023-04-03 PROCEDURE — 36415 COLL VENOUS BLD VENIPUNCTURE: CPT

## 2023-04-03 PROCEDURE — 80048 BASIC METABOLIC PNL TOTAL CA: CPT | Performed by: NURSE PRACTITIONER

## 2023-04-04 ENCOUNTER — TREATMENT (OUTPATIENT)
Dept: CARDIAC REHAB | Facility: HOSPITAL | Age: 83
End: 2023-04-04
Payer: MEDICARE

## 2023-04-04 DIAGNOSIS — Z95.2 S/P TAVR (TRANSCATHETER AORTIC VALVE REPLACEMENT): Primary | ICD-10-CM

## 2023-04-04 DIAGNOSIS — Z95.1 S/P CABG (CORONARY ARTERY BYPASS GRAFT): ICD-10-CM

## 2023-04-04 PROCEDURE — 93798 PHYS/QHP OP CAR RHAB W/ECG: CPT

## 2023-04-06 ENCOUNTER — TREATMENT (OUTPATIENT)
Dept: CARDIAC REHAB | Facility: HOSPITAL | Age: 83
End: 2023-04-06
Payer: MEDICARE

## 2023-04-06 DIAGNOSIS — Z95.1 S/P CABG (CORONARY ARTERY BYPASS GRAFT): Primary | ICD-10-CM

## 2023-04-06 DIAGNOSIS — Z95.2 S/P TAVR (TRANSCATHETER AORTIC VALVE REPLACEMENT): ICD-10-CM

## 2023-04-06 PROCEDURE — 93798 PHYS/QHP OP CAR RHAB W/ECG: CPT

## 2023-04-07 ENCOUNTER — LAB (OUTPATIENT)
Dept: FAMILY MEDICINE CLINIC | Facility: CLINIC | Age: 83
End: 2023-04-07
Payer: MEDICARE

## 2023-04-07 ENCOUNTER — OFFICE VISIT (OUTPATIENT)
Dept: FAMILY MEDICINE CLINIC | Facility: CLINIC | Age: 83
End: 2023-04-07
Payer: MEDICARE

## 2023-04-07 VITALS
BODY MASS INDEX: 28.22 KG/M2 | HEIGHT: 71 IN | DIASTOLIC BLOOD PRESSURE: 69 MMHG | OXYGEN SATURATION: 97 % | TEMPERATURE: 97.1 F | SYSTOLIC BLOOD PRESSURE: 145 MMHG | WEIGHT: 201.6 LBS | RESPIRATION RATE: 16 BRPM | HEART RATE: 59 BPM

## 2023-04-07 DIAGNOSIS — Z00.00 MEDICARE ANNUAL WELLNESS VISIT, SUBSEQUENT: Primary | ICD-10-CM

## 2023-04-07 DIAGNOSIS — E11.42 DM TYPE 2 WITH DIABETIC PERIPHERAL NEUROPATHY: ICD-10-CM

## 2023-04-07 LAB
ALBUMIN UR-MCNC: 19.3 MG/DL
CREAT UR-MCNC: 55.3 MG/DL
HBA1C MFR BLD: 6.9 % (ref 4.8–5.6)
MICROALBUMIN/CREAT UR: 349 MG/G
TSH SERPL DL<=0.05 MIU/L-ACNC: 2.16 UIU/ML (ref 0.27–4.2)

## 2023-04-07 PROCEDURE — 1170F FXNL STATUS ASSESSED: CPT | Performed by: FAMILY MEDICINE

## 2023-04-07 PROCEDURE — 3077F SYST BP >= 140 MM HG: CPT | Performed by: FAMILY MEDICINE

## 2023-04-07 PROCEDURE — 82570 ASSAY OF URINE CREATININE: CPT | Performed by: FAMILY MEDICINE

## 2023-04-07 PROCEDURE — 1160F RVW MEDS BY RX/DR IN RCRD: CPT | Performed by: FAMILY MEDICINE

## 2023-04-07 PROCEDURE — 3078F DIAST BP <80 MM HG: CPT | Performed by: FAMILY MEDICINE

## 2023-04-07 PROCEDURE — G0439 PPPS, SUBSEQ VISIT: HCPCS | Performed by: FAMILY MEDICINE

## 2023-04-07 PROCEDURE — 84443 ASSAY THYROID STIM HORMONE: CPT | Performed by: FAMILY MEDICINE

## 2023-04-07 PROCEDURE — 1159F MED LIST DOCD IN RCRD: CPT | Performed by: FAMILY MEDICINE

## 2023-04-07 PROCEDURE — 82043 UR ALBUMIN QUANTITATIVE: CPT | Performed by: FAMILY MEDICINE

## 2023-04-07 PROCEDURE — 83036 HEMOGLOBIN GLYCOSYLATED A1C: CPT | Performed by: FAMILY MEDICINE

## 2023-04-07 PROCEDURE — 36415 COLL VENOUS BLD VENIPUNCTURE: CPT | Performed by: FAMILY MEDICINE

## 2023-04-07 NOTE — PROGRESS NOTES
Subsequent Medicare Wellness Visit   The ABC's of the Annual Wellness Visit    Chief Complaint   Patient presents with   • Medicare Wellness-subsequent       HPI:  Alen Ratliff, -1940, is a 82 y.o. male who presents for a Subsequent Medicare Wellness Visit.  He lives with his wife, he is independent with his activities of daily living.  He is followed by cardiologist.  He underwent a TAVR procedure in 2023.  He has been recovering well from the procedure. Patient does not report any chest pain, shortness of breath, dizziness, nausea, vomiting, or diarrhea, visual issues, headaches, numbness or tingling. No urinary issues reported like urgency, frequency, or discomfort upon urination.  No significant weight changes reported.  No swelling reported.  No rashes or any other skin issues reported. No emotional issues or insomnia.    Recent Hospitalizations:  No hospitalization(s) within the last year..    Current Medical Providers:  Patient Care Team:  Serenity Wren MD as PCP - General (Family Medicine)  Steve Muhammad MD as Consulting Physician (Cardiology)  CAROLE Vasquez DPM as Consulting Physician (Podiatry)  Toby Fung MD as Consulting Physician (Vascular Surgery)  Naveed Leonardo MD as Cardiologist (Cardiology)    Health Habits and Functional and Cognitive Screening and Depression Screening:  Functional & Cognitive Status 2023   Do you have difficulty preparing food and eating? No   Do you have difficulty bathing yourself, getting dressed or grooming yourself? No   Do you have difficulty using the toilet? No   Do you have difficulty moving around from place to place? No   Do you have trouble with steps or getting out of a bed or a chair? No   Current Diet Diabetic Diet   Dental Exam Up to date   Eye Exam Up to date   Exercise (times per week) 0 times per week   Current Exercises Include No Regular Exercise   Do you need help using the phone?  No   Are you deaf or do you have serious  difficulty hearing?  No   Do you need help with transportation? No   Do you need help shopping? No   Do you need help preparing meals?  No   Do you need help with housework?  No   Do you need help with laundry? No   Do you need help taking your medications? No   Do you need help managing money? No   Do you ever drive or ride in a car without wearing a seat belt? No   Have you felt unusual stress, anger or loneliness in the last month? No   Who do you live with? Spouse   If you need help, do you have trouble finding someone available to you? No   Have you been bothered in the last four weeks by sexual problems? -   Do you have difficulty concentrating, remembering or making decisions? No       Compared to one year ago, the patient feels his physical health is the same and his mental health is the same.    Depression Screen:  PHQ-2/PHQ-9 Depression Screening 4/7/2023   Retired PHQ-9 Total Score -   Retired Total Score -   Little Interest or Pleasure in Doing Things 0-->not at all   Feeling Down, Depressed or Hopeless 0-->not at all   PHQ-9: Brief Depression Severity Measure Score 0         Past Medical/Family/Social History:  The following portions of the patient's history were reviewed and updated as appropriate: allergies, current medications, past family history, past medical history, past social history, past surgical history and problem list.    Allergies   Allergen Reactions   • Azithromycin Unknown - High Severity   • Tramadol Unknown - High Severity         Current Outpatient Medications:   •  amLODIPine (NORVASC) 10 MG tablet, Take 1 tablet by mouth Daily., Disp: , Rfl:   •  aspirin 81 MG chewable tablet, Chew 1 tablet Daily., Disp: 30 tablet, Rfl: 1  •  atorvastatin (LIPITOR) 40 MG tablet, Take 1 tablet by mouth Daily., Disp: , Rfl:   •  clopidogrel (PLAVIX) 75 MG tablet, Take 1 tablet by mouth Daily., Disp: 90 tablet, Rfl: 3  •  empagliflozin (JARDIANCE) 10 MG tablet tablet, Take 1 tablet by mouth Daily.,  Disp: , Rfl:   •  fluticasone (FLONASE) 50 MCG/ACT nasal spray, 2 sprays into the nostril(s) as directed by provider Daily As Needed., Disp: , Rfl:   •  Januvia 50 MG tablet, TAKE 1 TABLET DAILY, Disp: 90 tablet, Rfl: 0  •  losartan (COZAAR) 100 MG tablet, Take 1 tablet by mouth Daily., Disp: 90 tablet, Rfl: 3  •  pantoprazole (PROTONIX) 40 MG EC tablet, Take 1 tablet by mouth Daily., Disp: 90 tablet, Rfl: 1  •  Synthroid 88 MCG tablet, TAKE 1 TABLET DAILY, Disp: 90 tablet, Rfl: 0    Aspirin use counseling: Taking ASA appropriately as indicated    Current medication list contains no high risk medications.  No harmful drug interactions have been identified.     Family History   Problem Relation Age of Onset   • Hypertension Other    • Heart attack Mother    • Heart disease Mother    • Heart attack Father    • Heart disease Father        Social History     Tobacco Use   • Smoking status: Never   • Smokeless tobacco: Never   Substance Use Topics   • Alcohol use: Not Currently       Past Surgical History:   Procedure Laterality Date   • ANKLE OPEN REDUCTION INTERNAL FIXATION Left 12/16/2020    Procedure: ANKLE OPEN REDUCTION INTERNAL FIXATION;  Surgeon: CAROLE Vasquez DPM;  Location: [facility] MAIN OR;  Service: Podiatry;  Laterality: Left;   • AORTIC VALVE REPAIR/REPLACEMENT N/A 2/20/2023    Procedure: Transfemoral Transcatheter Aortic Valve Replacement;  Surgeon: Naveed Leonardo MD;  Location: [facility] HYBRID OR;  Service: Cardiovascular;  Laterality: N/A;   • AORTIC VALVE REPAIR/REPLACEMENT N/A 2/20/2023    Procedure: TRANSCATHETER AORTIC VALVE REPLACEMENT;  Surgeon: Fabio Villafana MD;  Location: [facility] HYBRID OR;  Service: Cardiothoracic;  Laterality: N/A;   • BACK SURGERY  1994   • CARDIAC CATHETERIZATION Right 9/27/2022    Procedure: Left Heart Cath;  Surgeon: Steve Muhammad MD;  Location: [facility] CATH INVASIVE LOCATION;  Service: Cardiovascular;  Laterality: Right;   • CARDIAC CATHETERIZATION N/A 1/12/2023     Procedure: Stent JENN coronary;  Surgeon: Steve Muhammad MD;  Location: [facility] CATH INVASIVE LOCATION;  Service: Cardiovascular;  Laterality: N/A;   • CAROTID ENDARTERECTOMY Right 12/14/2020    Procedure: CAROTID ENDARTERECTOMY;  Surgeon: Toby Fung MD;  Location: [facility] MAIN OR;  Service: Vascular;  Laterality: Right;   • COLONOSCOPY  08/25/2015   • PROSTATECTOMY  2001    due to cancer       Patient Active Problem List   Diagnosis   • Heart murmur   • Essential hypertension   • Mixed hyperlipidemia   • Acquired hypothyroidism   • GERD (gastroesophageal reflux disease)   • Seasonal allergic rhinitis due to pollen   • CKD (chronic kidney disease) stage 3, GFR 30-59 ml/min   • History of prostate cancer   • History of stroke   • Symptomatic carotid artery stenosis, bilateral   • Closed displaced fracture of lateral malleolus of left fibula   • Epistaxis   • Medicare annual wellness visit, subsequent   • Trigger index finger of left hand   • Colon cancer screening   • DM type 2 with diabetic peripheral neuropathy   • Aortic stenosis, severe   • Coronary artery disease involving native coronary artery of native heart   • Need for vaccination   • Type 2 diabetes mellitus without complication   • PAF (paroxysmal atrial fibrillation)   • Subacute maxillary sinusitis   • Coronary artery disease of native artery of native heart with stable angina pectoris   • S/P PTCA (percutaneous transluminal coronary angioplasty)   • Chronic diastolic CHF (congestive heart failure), NYHA class 2   • S/P TAVR (transcatheter aortic valve replacement)       Review of Systems   Constitutional: Negative for activity change, fatigue and fever.   Respiratory: Negative for shortness of breath and wheezing.    Cardiovascular: Negative for chest pain, palpitations and leg swelling.   Musculoskeletal: Negative for arthralgias and back pain.   Skin: Negative for rash.   Neurological: Negative for tremors and headache.       Objective  "    Vitals:    04/07/23 1045   BP: 145/69   BP Location: Left arm   Patient Position: Sitting   Cuff Size: Adult   Pulse: 59   Resp: 16   Temp: 97.1 °F (36.2 °C)   TempSrc: Infrared   SpO2: 97%   Weight: 91.4 kg (201 lb 9.6 oz)   Height: 180.3 cm (71\")   PainSc: 0-No pain       BMI is >= 25 and <30. (Overweight) The following options were offered after discussion;: exercise counseling/recommendations      No results found.    The patient has potential evidence of cognitive impairment. Clock drawing test was abnormal. (see scanned document).     Physical Exam  Vitals and nursing note reviewed.   Constitutional:       General: He is not in acute distress.     Appearance: Normal appearance. He is well-developed. He is not ill-appearing.   HENT:      Head: Normocephalic and atraumatic.   Cardiovascular:      Rate and Rhythm: Normal rate and regular rhythm.      Heart sounds: Normal heart sounds. No murmur heard.    No gallop.   Pulmonary:      Effort: Pulmonary effort is normal. No respiratory distress.      Breath sounds: Normal breath sounds. No wheezing, rhonchi or rales.   Chest:      Chest wall: No tenderness.   Musculoskeletal:      Cervical back: Normal range of motion and neck supple.   Neurological:      General: No focal deficit present.      Mental Status: He is alert and oriented to person, place, and time. Mental status is at baseline.   Psychiatric:         Mood and Affect: Mood normal.         Recent Lab Results:     Lab Results   Component Value Date    CHOL 129 01/12/2023    TRIG 150 01/12/2023    HDL 37 (L) 01/12/2023    VLDL 26 01/12/2023    LDLHDL 1.68 01/12/2023       Assessment & Plan   Age-appropriate Screening Schedule:  Refer to the list below for future screening recommendations based on patient's age, sex and/or medical conditions.      Health Maintenance   Topic Date Due   • COVID-19 Vaccine (4 - Booster for Pfizer series) 03/13/2022   • HEMOGLOBIN A1C  04/03/2023   • INFLUENZA VACCINE  " 08/01/2023   • URINE MICROALBUMIN  10/03/2023   • DIABETIC EYE EXAM  10/19/2023   • LIPID PANEL  01/12/2024   • ANNUAL WELLNESS VISIT  04/07/2024   • TDAP/TD VACCINES (2 - Td or Tdap) 10/29/2029   • COLORECTAL CANCER SCREENING  08/14/2030   • Pneumococcal Vaccine 65+  Completed   • ZOSTER VACCINE  Completed       Medicare Risks and Personalized Health Plan:  Advance Directive Discussion  Cardiovascular risk  Dementia/Memory   Depression/Dysphoria  Diabetic Lab Screening   Fall Risk  Immunizations Discussed/Encouraged (specific immunizations; COVID19 )  Obesity/Overweight       CMS-Preventive Services Quick Reference  Medicare Preventive Services Addressed:  Annual Wellness Visit (AWV)  Bone Density Measurements  Cardiovascular Disease Screening Tests (may do this order every 5 years in beneficiaries without signs or symptoms of cardiovascular disease)    Advance Care Planning:  ACP discussion was held with the patient during this visit. Patient has an advance directive in EMR which is still valid.     Diagnoses and all orders for this visit:    1. Medicare annual wellness visit, subsequent (Primary)    2. DM type 2 with diabetic peripheral neuropathy  -     Microalbumin / Creatinine Urine Ratio - Urine, Clean Catch  -     Hemoglobin A1c  -     TSH      Medicare wellness exam was done today.  I reviewed his health maintenance.  [place]  He is vaccinated and boosted against COVID-19.  He is up to speed with his pneumonia shots.  He already completed Shingrix vaccination series.  He is to follow-up with his cardiology office in near future.  Healthy lifestyle was reinforced.      An After Visit Summary and PPPS with all of these plans were given to the patient.      Follow Up:  Return in about 6 months (around 10/7/2023) for Next scheduled follow up.        Requested Prescriptions      No prescriptions requested or ordered in this encounter

## 2023-04-10 ENCOUNTER — TREATMENT (OUTPATIENT)
Dept: CARDIAC REHAB | Facility: HOSPITAL | Age: 83
End: 2023-04-10
Payer: MEDICARE

## 2023-04-10 DIAGNOSIS — Z95.1 S/P CABG (CORONARY ARTERY BYPASS GRAFT): Primary | ICD-10-CM

## 2023-04-10 DIAGNOSIS — Z95.2 S/P TAVR (TRANSCATHETER AORTIC VALVE REPLACEMENT): ICD-10-CM

## 2023-04-10 PROCEDURE — 93798 PHYS/QHP OP CAR RHAB W/ECG: CPT

## 2023-04-11 ENCOUNTER — APPOINTMENT (OUTPATIENT)
Dept: CARDIAC REHAB | Facility: HOSPITAL | Age: 83
End: 2023-04-11
Payer: MEDICARE

## 2023-04-11 DIAGNOSIS — Z95.1 S/P CABG (CORONARY ARTERY BYPASS GRAFT): Primary | ICD-10-CM

## 2023-04-11 DIAGNOSIS — Z95.2 S/P TAVR (TRANSCATHETER AORTIC VALVE REPLACEMENT): ICD-10-CM

## 2023-04-11 PROCEDURE — 93798 PHYS/QHP OP CAR RHAB W/ECG: CPT

## 2023-04-11 NOTE — PROGRESS NOTES
Date of Office Visit: 2023  Encounter Provider: Dr.Syed Muhammad  Place of Service: [facility] CARDIOLOGY [place]  Patient Name: Alen Ratliff  :1940  Serenity Wren MD    Chief Complaint   Patient presents with   • Atrial Fibrillation   • Coronary Artery Disease   • Heart Murmur   • Hypertension   • Hyperlipidemia   • Diabetes   • Follow-up     History of Present Illness:    I am pleased to see Mr. Rao in my office today as a follow-up.     As you know, patient is 82-year-old white gentleman whose past medical history is significant for hypertension, hyperlipidemia, diabetes mellitus, history of cardiac murmur, who came today for follow-up.     In 2020, patient was seen in my office for symptom of shortness of breath and relative bradycardia.  Patient also had symptom of occasional chest pain.  Patient underwent stress test which showed moderate inferior/septal fixed defect with small amount of joseph-infarct ischemia.  Echocardiogram showed normal left ventricular size and function and LVEF was 55 to 60%.  Mild LVH was noted.  Calcified aortic valve was noted with mild to moderate aortic valve stenosis with aortic valve area of 1.3 cm².  Holter monitor showed sinus bradycardia with episode of relative bradycardia.  No significant pause or heart rate less than 40 was noted.     In 2020, patient was admitted at [facility] with slurring of speech and possible TIA.  Neurological work-up showed high-grade stenosis of right carotid artery.  Patient underwent endarterectomy.  Postoperatively patient had atrial fibrillation with controlled heart rate.  Patient spontaneously cardioverted.  Patient was started on aspirin and Plavix.  Patient was discharged on event monitor.  Event monitor showed predominantly sinus rhythm.  Patient had average heart rate of 60 bpm.  No significant atrial fibrillation burden noted.     In 2022, patient underwent echocardiogram  which showed EF of 55 to 60%.  Patient was noted to have moderate to severe aortic regurgitation.  Peak aortic velocity was 3.8 m/s with peak gradient of 56 mmHg with mean gradient of 30 mmHg.     In August 2022, patient underwent CHRISTIANO and it showed EF of 60 to 65%.  Severe aortic stenosis with valve area of 0.59 cm was noted. Peak velocity was 4.3 m/s with peak gradient of 74 mmHg and mean gradient of 31 mmHg.    In September 2022, patient underwent cardiac catheterization which showed LAD was free of significant disease.  LCx has high-grade stenosis and RCA had 100% occlusion with collaterals.    In August 2022, I referred the patient to Dr. Villafana for possible TAVR/SAVR with possible CABG..  Initially he was thought to be a candidate for SAVR but subsequently Dr. Villafana recommended TAVR procedure.  In January 2023, patient underwent PCI and stenting of the LCx.  Patient was subsequently recommended to proceed with TAVR.  In February 2023, patient underwent TAVR valve replacement and postoperatively did well.    Patient is doing well after the TAVR procedure.  He believes that his energy and capacity for exercise has improved.  Patient is not short of breath as much.  Patient dizziness has improved.  Patient denies any orthopnea, PND, syncope or presyncope.    EKG showed sinus rhythm with first-degree AV block and sinus bradycardia with heart rate of 55 bpm.    I am overall pleased with the patient recovery and progress.  His blood pressure is high but he recently had increased his losartan as well as amlodipine.  I advised him to monitor the blood pressure and call my office in 2 months.  Patient also has underlying tachybradycardia is syndrome.  I suspect sick sinus syndrome.  Patient is not on any negative chronotropic agent.  I would recommend to avoid negative chronotropic agents for control of blood pressure in this patient because of risk of significant bradycardia.  Monitor his bradycardia.        Past  Medical History:   Diagnosis Date   • Allergic rhinitis    • Aortic stenosis    • Coronary artery disease    • Diabetes    • GERD (gastroesophageal reflux disease)    • Heart murmur    • Hyperlipidemia    • Hypertension    • Hypothyroidism    • Prostate cancer    • S/P TAVR (transcatheter aortic valve replacement) 2/20/2023   • Stroke          Past Surgical History:   Procedure Laterality Date   • ANKLE OPEN REDUCTION INTERNAL FIXATION Left 12/16/2020    Procedure: ANKLE OPEN REDUCTION INTERNAL FIXATION;  Surgeon: CAROLE Vasquez DPM;  Location: [facility] MAIN OR;  Service: Podiatry;  Laterality: Left;   • AORTIC VALVE REPAIR/REPLACEMENT N/A 02/20/2023    Procedure: Transfemoral Transcatheter Aortic Valve Replacement;  Surgeon: Naveed Leonardo MD;  Location: [facility] HYBRID OR;  Service: Cardiovascular;  Laterality: N/A;   • AORTIC VALVE REPAIR/REPLACEMENT N/A 02/20/2023    Procedure: TRANSCATHETER AORTIC VALVE REPLACEMENT;  Surgeon: Fabio Villafana MD;  Location: [facility] HYBRID OR;  Service: Cardiothoracic;  Laterality: N/A;   • BACK SURGERY  1994   • CARDIAC CATHETERIZATION Right 09/27/2022    Procedure: Left Heart Cath;  Surgeon: Steve Muhammad MD;  Location: [facility] CATH INVASIVE LOCATION;  Service: Cardiovascular;  Laterality: Right;   • CARDIAC CATHETERIZATION N/A 01/12/2023    Procedure: Stent JENN coronary;  Surgeon: Steve Muhammad MD;  Location: [facility] CATH INVASIVE LOCATION;  Service: Cardiovascular;  Laterality: N/A;   • CAROTID ENDARTERECTOMY Right 12/14/2020    Procedure: CAROTID ENDARTERECTOMY;  Surgeon: Toby Fung MD;  Location: [facility] MAIN OR;  Service: Vascular;  Laterality: Right;   • COLONOSCOPY  08/25/2015   • CORONARY STENT PLACEMENT     • PROSTATECTOMY  2001    due to cancer           Current Outpatient Medications:   •  amLODIPine (NORVASC) 10 MG tablet, Take 1 tablet by mouth Daily., Disp: , Rfl:   •  aspirin 81 MG chewable tablet, Chew 1 tablet Daily., Disp: 30 tablet, Rfl: 1  •   atorvastatin (LIPITOR) 40 MG tablet, Take 1 tablet by mouth Daily., Disp: , Rfl:   •  clopidogrel (PLAVIX) 75 MG tablet, Take 1 tablet by mouth Daily., Disp: 90 tablet, Rfl: 3  •  empagliflozin (JARDIANCE) 10 MG tablet tablet, Take 1 tablet by mouth Daily., Disp: , Rfl:   •  fluticasone (FLONASE) 50 MCG/ACT nasal spray, 2 sprays into the nostril(s) as directed by provider Daily As Needed., Disp: , Rfl:   •  Januvia 50 MG tablet, TAKE 1 TABLET DAILY, Disp: 90 tablet, Rfl: 0  •  losartan (COZAAR) 100 MG tablet, Take 1 tablet by mouth Daily., Disp: 90 tablet, Rfl: 3  •  pantoprazole (PROTONIX) 40 MG EC tablet, Take 1 tablet by mouth Daily., Disp: 90 tablet, Rfl: 1  •  Synthroid 88 MCG tablet, TAKE 1 TABLET DAILY, Disp: 90 tablet, Rfl: 0      Social History     Socioeconomic History   • Marital status:    Tobacco Use   • Smoking status: Never   • Smokeless tobacco: Never   Vaping Use   • Vaping Use: Never used   Substance and Sexual Activity   • Alcohol use: Not Currently   • Drug use: Never   • Sexual activity: Defer         Review of Systems   Constitutional: Negative for chills and fever.   HENT: Negative for ear discharge and nosebleeds.    Eyes: Negative for discharge and redness.   Cardiovascular: Negative for chest pain, orthopnea, palpitations, paroxysmal nocturnal dyspnea and syncope.   Respiratory: Positive for shortness of breath. Negative for cough and wheezing.    Endocrine: Negative for heat intolerance.   Skin: Negative for rash.   Musculoskeletal: Negative for arthritis and myalgias.   Gastrointestinal: Negative for abdominal pain, melena, nausea and vomiting.   Genitourinary: Negative for dysuria and hematuria.   Neurological: Negative for dizziness, light-headedness, numbness and tremors.   Psychiatric/Behavioral: Negative for depression. The patient is not nervous/anxious.        Procedures      ECG 12 Lead    Date/Time: 4/12/2023 11:15 AM  Performed by: Steve Muhammad MD  Authorized by: Jb  "Steve LAM MD   Comparison: compared with previous ECG   Similar to previous ECG  Rhythm: sinus rhythm and sinus bradycardia  Conduction: 1st degree AV block    Clinical impression: abnormal EKG            ECG 12 Lead    (Results Pending)           Objective:    /74 (BP Location: Right arm, Patient Position: Sitting, Cuff Size: Large Adult)   Pulse 55   Ht 180.3 cm (70.98\")   Wt 90.7 kg (200 lb)   SpO2 98%   BMI 27.91 kg/m²         Constitutional:       Appearance: Well-developed.   Eyes:      General: No scleral icterus.        Right eye: No discharge.   HENT:      Head: Normocephalic and atraumatic.   Neck:      Thyroid: No thyromegaly.      Lymphadenopathy: No cervical adenopathy.   Pulmonary:      Effort: Pulmonary effort is normal. No respiratory distress.      Breath sounds: Normal breath sounds. No wheezing. No rales.   Cardiovascular:      Normal rate. Regular rhythm.      No gallop.   Abdominal:      Tenderness: There is no abdominal tenderness.   Skin:     Findings: No erythema or rash.   Neurological:      Mental Status: Alert and oriented to person, place, and time.             Assessment:       Diagnosis Plan   1. Essential hypertension  ECG 12 Lead      2. Mixed hyperlipidemia  ECG 12 Lead      3. Heart murmur  ECG 12 Lead      4. Coronary artery disease involving native coronary artery of native heart without angina pectoris  ECG 12 Lead      5. PAF (paroxysmal atrial fibrillation)  ECG 12 Lead      6. Type 2 diabetes mellitus without complication, without long-term current use of insulin  ECG 12 Lead      7. Chronic diastolic CHF (congestive heart failure), NYHA class 2  ECG 12 Lead      8. Coronary artery disease of native artery of native heart with stable angina pectoris  ECG 12 Lead               Plan:       MDM:    1.  CAD:    Patient underwent coronary artery stenting of LCx.  He is doing well.    2.  Aortic stenosis s/p TAVR.    Patient underwent TAVR and he is doing well.  Patient is " doing cardiac rehab patient is feeling better    3.  Hypertension:    Blood pressure is high.  I recommended to increase losartan and amlodipine.  Monitor the blood pressure and bring the logbook    4.  Sinus bradycardia:    Patient has underlying sick sinus syndrome continue to monitor.  Avoid negative chronotropic agents

## 2023-04-12 ENCOUNTER — OFFICE VISIT (OUTPATIENT)
Dept: CARDIOLOGY | Facility: CLINIC | Age: 83
End: 2023-04-12
Payer: MEDICARE

## 2023-04-12 ENCOUNTER — HOSPITAL ENCOUNTER (OUTPATIENT)
Dept: CARDIOLOGY | Facility: HOSPITAL | Age: 83
Discharge: HOME | End: 2023-04-12
Payer: MEDICARE

## 2023-04-12 VITALS
HEIGHT: 71 IN | HEART RATE: 55 BPM | BODY MASS INDEX: 28 KG/M2 | SYSTOLIC BLOOD PRESSURE: 154 MMHG | OXYGEN SATURATION: 98 % | DIASTOLIC BLOOD PRESSURE: 74 MMHG | WEIGHT: 200 LBS

## 2023-04-12 VITALS
SYSTOLIC BLOOD PRESSURE: 170 MMHG | DIASTOLIC BLOOD PRESSURE: 63 MMHG | WEIGHT: 201 LBS | BODY MASS INDEX: 28.14 KG/M2 | HEART RATE: 70 BPM | HEIGHT: 71 IN

## 2023-04-12 DIAGNOSIS — R01.1 HEART MURMUR: ICD-10-CM

## 2023-04-12 DIAGNOSIS — I25.118 CORONARY ARTERY DISEASE OF NATIVE ARTERY OF NATIVE HEART WITH STABLE ANGINA PECTORIS: ICD-10-CM

## 2023-04-12 DIAGNOSIS — I25.10 CORONARY ARTERY DISEASE INVOLVING NATIVE CORONARY ARTERY OF NATIVE HEART WITHOUT ANGINA PECTORIS: ICD-10-CM

## 2023-04-12 DIAGNOSIS — Z95.2 S/P TAVR (TRANSCATHETER AORTIC VALVE REPLACEMENT): ICD-10-CM

## 2023-04-12 DIAGNOSIS — E78.2 MIXED HYPERLIPIDEMIA: Chronic | ICD-10-CM

## 2023-04-12 DIAGNOSIS — E11.9 TYPE 2 DIABETES MELLITUS WITHOUT COMPLICATION, WITHOUT LONG-TERM CURRENT USE OF INSULIN: ICD-10-CM

## 2023-04-12 DIAGNOSIS — I50.32 CHRONIC DIASTOLIC CHF (CONGESTIVE HEART FAILURE), NYHA CLASS 2: ICD-10-CM

## 2023-04-12 DIAGNOSIS — I48.0 PAF (PAROXYSMAL ATRIAL FIBRILLATION): ICD-10-CM

## 2023-04-12 DIAGNOSIS — I10 ESSENTIAL HYPERTENSION: Primary | Chronic | ICD-10-CM

## 2023-04-12 DIAGNOSIS — I35.0 AORTIC STENOSIS, SEVERE: ICD-10-CM

## 2023-04-12 PROCEDURE — 93306 TTE W/DOPPLER COMPLETE: CPT

## 2023-04-13 ENCOUNTER — TREATMENT (OUTPATIENT)
Dept: CARDIAC REHAB | Facility: HOSPITAL | Age: 83
End: 2023-04-13
Payer: MEDICARE

## 2023-04-13 DIAGNOSIS — Z95.1 S/P CABG (CORONARY ARTERY BYPASS GRAFT): Primary | ICD-10-CM

## 2023-04-13 PROCEDURE — 93798 PHYS/QHP OP CAR RHAB W/ECG: CPT

## 2023-04-17 ENCOUNTER — TREATMENT (OUTPATIENT)
Dept: CARDIAC REHAB | Facility: HOSPITAL | Age: 83
End: 2023-04-17
Payer: MEDICARE

## 2023-04-17 DIAGNOSIS — Z95.1 S/P CABG (CORONARY ARTERY BYPASS GRAFT): Primary | ICD-10-CM

## 2023-04-17 PROCEDURE — 93798 PHYS/QHP OP CAR RHAB W/ECG: CPT

## 2023-04-18 ENCOUNTER — TREATMENT (OUTPATIENT)
Dept: CARDIAC REHAB | Facility: HOSPITAL | Age: 83
End: 2023-04-18
Payer: MEDICARE

## 2023-04-18 DIAGNOSIS — Z95.1 S/P CABG (CORONARY ARTERY BYPASS GRAFT): Primary | ICD-10-CM

## 2023-04-18 PROCEDURE — 93798 PHYS/QHP OP CAR RHAB W/ECG: CPT

## 2023-04-20 ENCOUNTER — TREATMENT (OUTPATIENT)
Dept: CARDIAC REHAB | Facility: HOSPITAL | Age: 83
End: 2023-04-20
Payer: MEDICARE

## 2023-04-20 DIAGNOSIS — Z95.1 S/P CABG (CORONARY ARTERY BYPASS GRAFT): Primary | ICD-10-CM

## 2023-04-20 PROCEDURE — 93798 PHYS/QHP OP CAR RHAB W/ECG: CPT

## 2023-04-21 ENCOUNTER — TELEPHONE (OUTPATIENT)
Dept: CARDIOLOGY | Facility: HOSPITAL | Age: 83
End: 2023-04-21
Payer: MEDICARE

## 2023-04-21 LAB
BH CV ECHO MEAS - AI P1/2T: 1296 MSEC
BH CV ECHO MEAS - AO MAX PG: 14.9 MMHG
BH CV ECHO MEAS - AO MEAN PG: 7.7 MMHG
BH CV ECHO MEAS - AO ROOT DIAM: 2.8 CM
BH CV ECHO MEAS - AO V2 MAX: 193.1 CM/SEC
BH CV ECHO MEAS - AO V2 VTI: 45.1 CM
BH CV ECHO MEAS - AVA(I,D): 2.08 CM2
BH CV ECHO MEAS - EDV(CUBED): 151.3 ML
BH CV ECHO MEAS - EDV(MOD-SP2): 83.9 ML
BH CV ECHO MEAS - EDV(MOD-SP4): 83.9 ML
BH CV ECHO MEAS - EF(MOD-BP): 64 %
BH CV ECHO MEAS - EF(MOD-SP2): 65.6 %
BH CV ECHO MEAS - EF(MOD-SP4): 63.4 %
BH CV ECHO MEAS - ESV(MOD-SP2): 28.9 ML
BH CV ECHO MEAS - ESV(MOD-SP4): 30.7 ML
BH CV ECHO MEAS - FS: 70.5 %
BH CV ECHO MEAS - IVS/LVPW: 1.06 CM
BH CV ECHO MEAS - IVSD: 0.94 CM
BH CV ECHO MEAS - LA DIMENSION: 5 CM
BH CV ECHO MEAS - LV DIASTOLIC VOL/BSA (35-75): 39.7 CM2
BH CV ECHO MEAS - LV MASS(C)D: 179.8 GRAMS
BH CV ECHO MEAS - LV MAX PG: 5.8 MMHG
BH CV ECHO MEAS - LV MEAN PG: 3.2 MMHG
BH CV ECHO MEAS - LV SYSTOLIC VOL/BSA (12-30): 14.5 CM2
BH CV ECHO MEAS - LV V1 MAX: 120.6 CM/SEC
BH CV ECHO MEAS - LV V1 VTI: 29.3 CM
BH CV ECHO MEAS - LVIDD: 5.3 CM
BH CV ECHO MEAS - LVIDS: 1.57 CM
BH CV ECHO MEAS - LVOT AREA: 3.2 CM2
BH CV ECHO MEAS - LVOT DIAM: 2.02 CM
BH CV ECHO MEAS - LVPWD: 0.89 CM
BH CV ECHO MEAS - MR MAX PG: 143.8 MMHG
BH CV ECHO MEAS - MR MAX VEL: 599.5 CM/SEC
BH CV ECHO MEAS - MV A MAX VEL: 121.4 CM/SEC
BH CV ECHO MEAS - MV DEC TIME: 0.25 MSEC
BH CV ECHO MEAS - MV E MAX VEL: 105.2 CM/SEC
BH CV ECHO MEAS - MV E/A: 0.87
BH CV ECHO MEAS - MV MAX PG: 6.2 MMHG
BH CV ECHO MEAS - MV MEAN PG: 3.1 MMHG
BH CV ECHO MEAS - MV V2 VTI: 40.1 CM
BH CV ECHO MEAS - MVA(VTI): 2.34 CM2
BH CV ECHO MEAS - PA V2 MAX: 121.7 CM/SEC
BH CV ECHO MEAS - PULM DIAS VEL: 26 CM/SEC
BH CV ECHO MEAS - PULM S/D: 1.12
BH CV ECHO MEAS - PULM SYS VEL: 29.2 CM/SEC
BH CV ECHO MEAS - RAP SYSTOLE: 3 MMHG
BH CV ECHO MEAS - RV MAX PG: 2.6 MMHG
BH CV ECHO MEAS - RV V1 MAX: 80.2 CM/SEC
BH CV ECHO MEAS - RV V1 VTI: 17.7 CM
BH CV ECHO MEAS - RVSP: 31 MMHG
BH CV ECHO MEAS - SI(MOD-SP2): 26 ML/M2
BH CV ECHO MEAS - SI(MOD-SP4): 25.2 ML/M2
BH CV ECHO MEAS - SV(LVOT): 93.8 ML
BH CV ECHO MEAS - SV(MOD-SP2): 55 ML
BH CV ECHO MEAS - SV(MOD-SP4): 53.2 ML
BH CV ECHO MEAS - TR MAX PG: 28.4 MMHG
BH CV ECHO MEAS - TR MAX VEL: 264.1 CM/SEC
MAXIMAL PREDICTED HEART RATE: 137 BPM
STRESS TARGET HR: 116 BPM

## 2023-04-21 NOTE — TELEPHONE ENCOUNTER
Telephoned patient.  30 day post TAVR KCQ12 completed.  Pt states that he is attending cardiac rehab.

## 2023-04-24 ENCOUNTER — TREATMENT (OUTPATIENT)
Dept: CARDIAC REHAB | Facility: HOSPITAL | Age: 83
End: 2023-04-24
Payer: MEDICARE

## 2023-04-24 DIAGNOSIS — Z95.1 S/P CABG (CORONARY ARTERY BYPASS GRAFT): Primary | ICD-10-CM

## 2023-04-24 DIAGNOSIS — Z95.2 S/P TAVR (TRANSCATHETER AORTIC VALVE REPLACEMENT): ICD-10-CM

## 2023-04-24 PROCEDURE — 93798 PHYS/QHP OP CAR RHAB W/ECG: CPT

## 2023-04-25 ENCOUNTER — TREATMENT (OUTPATIENT)
Dept: CARDIAC REHAB | Facility: HOSPITAL | Age: 83
End: 2023-04-25
Payer: MEDICARE

## 2023-04-25 DIAGNOSIS — Z95.1 S/P CABG (CORONARY ARTERY BYPASS GRAFT): Primary | ICD-10-CM

## 2023-04-25 DIAGNOSIS — Z95.2 S/P TAVR (TRANSCATHETER AORTIC VALVE REPLACEMENT): ICD-10-CM

## 2023-04-25 DIAGNOSIS — Z95.2 S/P TAVR (TRANSCATHETER AORTIC VALVE REPLACEMENT): Primary | ICD-10-CM

## 2023-04-25 PROCEDURE — 93798 PHYS/QHP OP CAR RHAB W/ECG: CPT

## 2023-04-27 ENCOUNTER — TREATMENT (OUTPATIENT)
Dept: CARDIAC REHAB | Facility: HOSPITAL | Age: 83
End: 2023-04-27
Payer: MEDICARE

## 2023-04-27 DIAGNOSIS — Z95.1 S/P CABG (CORONARY ARTERY BYPASS GRAFT): ICD-10-CM

## 2023-04-27 DIAGNOSIS — Z95.2 S/P TAVR (TRANSCATHETER AORTIC VALVE REPLACEMENT): Primary | ICD-10-CM

## 2023-04-27 PROCEDURE — 93798 PHYS/QHP OP CAR RHAB W/ECG: CPT

## 2023-05-01 ENCOUNTER — APPOINTMENT (OUTPATIENT)
Dept: CARDIAC REHAB | Facility: HOSPITAL | Age: 83
End: 2023-05-01
Payer: MEDICARE

## 2023-05-02 ENCOUNTER — TELEPHONE (OUTPATIENT)
Dept: FAMILY MEDICINE CLINIC | Facility: CLINIC | Age: 83
End: 2023-05-02
Payer: MEDICARE

## 2023-05-02 NOTE — TELEPHONE ENCOUNTER
Caller: EvyDewey    Relationship: Emergency Contact    Best call back number: [phone]    PATIENT IS HAVING DIZZY SPELLS WHEN HE SITS UP IN BED OR MOVES AROUND. DEWEY IS ASKING IF HE NEEDS TO BE SEEN. PLEASE CALL AND ADVISE.

## 2023-05-03 ENCOUNTER — OFFICE VISIT (OUTPATIENT)
Dept: FAMILY MEDICINE CLINIC | Facility: CLINIC | Age: 83
End: 2023-05-03
Payer: MEDICARE

## 2023-05-03 VITALS
RESPIRATION RATE: 16 BRPM | TEMPERATURE: 98.2 F | HEIGHT: 71 IN | WEIGHT: 193.6 LBS | OXYGEN SATURATION: 97 % | BODY MASS INDEX: 27.1 KG/M2 | DIASTOLIC BLOOD PRESSURE: 52 MMHG | SYSTOLIC BLOOD PRESSURE: 117 MMHG | HEART RATE: 53 BPM

## 2023-05-03 DIAGNOSIS — K21.9 GASTROESOPHAGEAL REFLUX DISEASE, UNSPECIFIED WHETHER ESOPHAGITIS PRESENT: ICD-10-CM

## 2023-05-03 DIAGNOSIS — E03.9 ACQUIRED HYPOTHYROIDISM: ICD-10-CM

## 2023-05-03 DIAGNOSIS — R42 DIZZINESS: Primary | ICD-10-CM

## 2023-05-03 PROCEDURE — 3078F DIAST BP <80 MM HG: CPT | Performed by: FAMILY MEDICINE

## 2023-05-03 PROCEDURE — 99214 OFFICE O/P EST MOD 30 MIN: CPT | Performed by: FAMILY MEDICINE

## 2023-05-03 PROCEDURE — 3074F SYST BP LT 130 MM HG: CPT | Performed by: FAMILY MEDICINE

## 2023-05-03 RX ORDER — PANTOPRAZOLE SODIUM 40 MG/1
40 TABLET, DELAYED RELEASE ORAL DAILY
Qty: 90 TABLET | Refills: 1 | Status: SHIPPED | OUTPATIENT
Start: 2023-05-03 | End: 2023-05-03 | Stop reason: SDUPTHER

## 2023-05-03 RX ORDER — PANTOPRAZOLE SODIUM 40 MG/1
40 TABLET, DELAYED RELEASE ORAL DAILY
Qty: 90 TABLET | Refills: 1 | Status: SHIPPED | OUTPATIENT
Start: 2023-05-03

## 2023-05-03 RX ORDER — LEVOTHYROXINE SODIUM 88 UG/1
88 TABLET ORAL DAILY
Qty: 90 TABLET | Refills: 1 | Status: SHIPPED | OUTPATIENT
Start: 2023-05-03

## 2023-05-03 RX ORDER — LEVOTHYROXINE SODIUM 88 UG/1
88 TABLET ORAL DAILY
Qty: 90 TABLET | Refills: 1 | Status: SHIPPED | OUTPATIENT
Start: 2023-05-03 | End: 2023-05-03 | Stop reason: SDUPTHER

## 2023-05-03 NOTE — PROGRESS NOTES
Subjective   Chief Complaint   Patient presents with   • Dizziness     Started on Monday   • Hypertension   • GI Problem   • Hypothyroidism   • Med Refill     Alen Ratliff is a 82 y.o. male.     Patient Care Team:  Serenity Wren MD as PCP - General (Family Medicine)  Steve Muhammad MD as Consulting Physician (Cardiology)  CAROLE Vasquez DPM as Consulting Physician (Podiatry)  Toby Fung MD as Consulting Physician (Vascular Surgery)  Naveed Leonardo MD as Cardiologist (Cardiology)    History of Present Illness  He is coming in today due to some dizziness.  He is closely followed by cardiologist and he has been doing cardiac rehab 3 times a week, now this will be spread out to 2 times a week.  His last cardiac rehab was on 5/1/2023.  He did well and had no issues.  When he came home he felt somehow more tired.  Later on he woke up with dizziness, he had feeling of lightheadedness, but he denies any spinning sensations.  It lasted into the evening, but yesterday was better today and today he feels much better.  He did not check his blood pressure at that time.  He denies any weakness on one side of the body.  He denies any visual problems or syncope.  He previously had a stroke in 2017 and then in 12/2020 he was admitted to [facility] with some neurological symptoms and was diagnosed with a TIA.  He is being treated for multiple medical problems including CAD, diabetes, hypothyroidism, hypertension, and hyperlipidemia.       The following portions of the patient's history were reviewed and updated as appropriate: allergies, current medications, past family history, past medical history, past social history, past surgical history and problem list.  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   • Stroke      Past Surgical History:   Procedure Laterality Date   • ANKLE OPEN REDUCTION INTERNAL FIXATION Left 12/16/2020    Procedure: ANKLE OPEN REDUCTION INTERNAL FIXATION;  Surgeon: CAROLE Vasquez DPM;  Location: [facility] MAIN OR;  Service: Podiatry;  Laterality: Left;   • AORTIC VALVE REPAIR/REPLACEMENT N/A 02/20/2023    Procedure: Transfemoral Transcatheter Aortic Valve Replacement;  Surgeon: Naveed Leonardo MD;  Location: [facility] HYBRID OR;  Service: Cardiovascular;  Laterality: N/A;   • AORTIC VALVE REPAIR/REPLACEMENT N/A 02/20/2023    Procedure: TRANSCATHETER AORTIC VALVE REPLACEMENT;  Surgeon: Fabio Villafana MD;  Location: [facility] HYBRID OR;  Service: Cardiothoracic;  Laterality: N/A;   • BACK SURGERY  1994   • CARDIAC CATHETERIZATION Right 09/27/2022    Procedure: Left Heart Cath;  Surgeon: Steve Muhammad MD;  Location: [facility] CATH INVASIVE LOCATION;  Service: Cardiovascular;  Laterality: Right;   • CARDIAC CATHETERIZATION N/A 01/12/2023    Procedure: Stent JENN coronary;  Surgeon: Steve Muhammad MD;  Location: [facility] CATH INVASIVE LOCATION;  Service: Cardiovascular;  Laterality: N/A;   • CAROTID ENDARTERECTOMY Right 12/14/2020    Procedure: CAROTID ENDARTERECTOMY;  Surgeon: Toby Fung MD;  Location: [facility] MAIN OR;  Service: Vascular;  Laterality: Right;   • COLONOSCOPY  08/25/2015   • CORONARY STENT PLACEMENT     • PROSTATECTOMY  2001    due to cancer     The patient has a family history of  Family History   Problem Relation Age of Onset   • Hypertension Other    • Heart attack Mother    • Heart disease Mother    • Heart attack Father    • Heart disease Father      Social History     Socioeconomic History   • Marital status:    Tobacco Use   • Smoking status: Never   • Smokeless tobacco: Never   Vaping Use   • Vaping Use: Never used   Substance and Sexual Activity   • Alcohol use: Not Currently   •  "Drug use: Never   • Sexual activity: Defer       Review of Systems   Constitutional: Negative for activity change, fatigue and fever.   Respiratory: Negative for shortness of breath and wheezing.    Cardiovascular: Negative for chest pain, palpitations and leg swelling.   Musculoskeletal: Negative for arthralgias and back pain.   Skin: Negative for rash.   Neurological: Negative for tremors and headache.     Visit Vitals  /52 (BP Location: Left arm, Patient Position: Sitting, Cuff Size: Adult)   Pulse 53   Temp 98.2 °F (36.8 °C) (Infrared)   Resp 16   Ht 180.3 cm (71\")   Wt 87.8 kg (193 lb 9.6 oz)   SpO2 97%   BMI 27.00 kg/m²       BMI is >= 25 and <30. (Overweight) The following options were offered after discussion;: exercise counseling/recommendations      Current Outpatient Medications:   •  levothyroxine (Synthroid) 88 MCG tablet, Take 1 tablet by mouth Daily., Disp: 90 tablet, Rfl: 1  •  pantoprazole (PROTONIX) 40 MG EC tablet, Take 1 tablet by mouth Daily., Disp: 90 tablet, Rfl: 1  •  amLODIPine (NORVASC) 10 MG tablet, Take 1 tablet by mouth Daily., Disp: , Rfl:   •  aspirin 81 MG chewable tablet, Chew 1 tablet Daily., Disp: 30 tablet, Rfl: 1  •  atorvastatin (LIPITOR) 40 MG tablet, Take 1 tablet by mouth Daily., Disp: , Rfl:   •  clopidogrel (PLAVIX) 75 MG tablet, Take 1 tablet by mouth Daily., Disp: 90 tablet, Rfl: 3  •  empagliflozin (JARDIANCE) 10 MG tablet tablet, Take 1 tablet by mouth Daily., Disp: , Rfl:   •  fluticasone (FLONASE) 50 MCG/ACT nasal spray, 2 sprays into the nostril(s) as directed by provider Daily As Needed., Disp: , Rfl:   •  Januvia 50 MG tablet, TAKE 1 TABLET DAILY, Disp: 90 tablet, Rfl: 0  •  losartan (COZAAR) 100 MG tablet, Take 1 tablet by mouth Daily., Disp: 90 tablet, Rfl: 3    Objective   Physical Exam  Vitals and nursing note reviewed.   Constitutional:       General: He is not in acute distress.     Appearance: Normal appearance. He is well-developed. He is not " ill-appearing.   HENT:      Head: Normocephalic and atraumatic.   Cardiovascular:      Rate and Rhythm: Normal rate and regular rhythm.      Heart sounds: Normal heart sounds. No murmur heard.    No gallop.   Pulmonary:      Effort: Pulmonary effort is normal. No respiratory distress.      Breath sounds: Normal breath sounds. No wheezing, rhonchi or rales.   Chest:      Chest wall: No tenderness.   Musculoskeletal:      Cervical back: Normal range of motion and neck supple.   Neurological:      General: No focal deficit present.      Mental Status: He is alert and oriented to person, place, and time. Mental status is at baseline.      Cranial Nerves: No cranial nerve deficit.      Sensory: No sensory deficit.      Motor: No weakness.      Coordination: Coordination normal.      Gait: Gait normal.   Psychiatric:         Mood and Affect: Mood normal.         FOLLOWING LABS WERE REVIEWED TODAY:  CMP        2/15/2023    08:05 2/21/2023    05:26 4/3/2023    08:14   CMP   Glucose 138   109   123     BUN 26   22   20     Creatinine 1.23   0.98   1.08     EGFR 58.3   76.5   68.1     Sodium 139   142   140     Potassium 3.8   3.8   3.9     Chloride 104   108   106     Calcium 9.5   8.4   8.7     Total Protein 6.9       Albumin 4.3       Globulin 2.6       Total Bilirubin 0.5       Alkaline Phosphatase 69       AST (SGOT) 25       ALT (SGPT) 23       Albumin/Globulin Ratio 1.7       BUN/Creatinine Ratio 21.1   22.4   18.5     Anion Gap 8.4   12.0   10.0       CBC        2/20/2023    11:11 2/21/2023    05:26 4/3/2023    08:14   CBC   WBC  11.50   6.56     RBC  4.20   4.58     Hemoglobin 12.2   12.7   13.8     Hematocrit 36   38.5   42.4     MCV  91.6   92.6     MCH  30.3   30.1     MCHC  33.1   32.5     RDW  14.9   13.2     Platelets  162   231             Assessment & Plan   Diagnoses and all orders for this visit:    1. Dizziness (Primary)  -     CT Head Without Contrast; Future    2. Gastroesophageal reflux disease,  unspecified whether esophagitis present  -     Discontinue: pantoprazole (PROTONIX) 40 MG EC tablet; Take 1 tablet by mouth Daily.  Dispense: 90 tablet; Refill: 1  -     pantoprazole (PROTONIX) 40 MG EC tablet; Take 1 tablet by mouth Daily.  Dispense: 90 tablet; Refill: 1    3. Acquired hypothyroidism  -     Discontinue: levothyroxine (Synthroid) 88 MCG tablet; Take 1 tablet by mouth Daily.  Dispense: 90 tablet; Refill: 1  -     levothyroxine (Synthroid) 88 MCG tablet; Take 1 tablet by mouth Daily.  Dispense: 90 tablet; Refill: 1      I reviewed his symptoms and concerns.  He had a spell of dizziness 2 days ago which has pretty much resolved at this point.  His neurological exam is intact.  He denies any issues with weakness even at the time when he had dizziness.  Patient previously had a stroke in 2017 and then TIA in 12/2020.  I will be getting CT of the head.  He is to monitor his vital signs and blood sugar.  He will monitor the symptoms closely and contact us back if any concerns.  If any worsening or any concerns he possibly will need to go to the ER.        No follow-ups on file.    Requested Prescriptions     Signed Prescriptions Disp Refills   • levothyroxine (Synthroid) 88 MCG tablet 90 tablet 1     Sig: Take 1 tablet by mouth Daily.   • pantoprazole (PROTONIX) 40 MG EC tablet 90 tablet 1     Sig: Take 1 tablet by mouth Daily.

## 2023-05-05 ENCOUNTER — TELEPHONE (OUTPATIENT)
Dept: FAMILY MEDICINE CLINIC | Facility: CLINIC | Age: 83
End: 2023-05-05
Payer: MEDICARE

## 2023-05-08 ENCOUNTER — APPOINTMENT (OUTPATIENT)
Dept: CARDIAC REHAB | Facility: HOSPITAL | Age: 83
End: 2023-05-08

## 2023-05-08 DIAGNOSIS — I10 ESSENTIAL HYPERTENSION: ICD-10-CM

## 2023-05-08 RX ORDER — AMLODIPINE BESYLATE 10 MG/1
10 TABLET ORAL DAILY
Qty: 90 TABLET | Refills: 1 | Status: SHIPPED | OUTPATIENT
Start: 2023-05-08

## 2023-05-08 RX ORDER — CLOPIDOGREL BISULFATE 75 MG/1
75 TABLET ORAL DAILY
Qty: 90 TABLET | Refills: 3 | Status: SHIPPED | OUTPATIENT
Start: 2023-05-08

## 2023-05-08 NOTE — TELEPHONE ENCOUNTER
Caller: Elba Ratliff    Relationship: Emergency Contact    Best call back number: [phone]    Requested Prescriptions:   Requested Prescriptions     Pending Prescriptions Disp Refills   • amLODIPine (NORVASC) 10 MG tablet       Sig: Take 1 tablet by mouth Daily.        Pharmacy where request should be sent: [facility] - [place] [address] [address] [facility] - [phone]  - [phone] FX     Last office visit with prescribing clinician: 5/3/2023   Last telemedicine visit with prescribing clinician: 10/2/2023   Next office visit with prescribing clinician: 10/5/2023     Additional details provided by patient:     Does the patient have less than a 3 day supply:  [] Yes  [] No    Would you like a call back once the refill request has been completed: [x] Yes [] No    If the office needs to give you a call back, can they leave a voicemail: [x] Yes [] No    Cruz Ingram Rep   05/08/23 10:05 EDT

## 2023-05-08 NOTE — TELEPHONE ENCOUNTER
Caller: EvyAlen    Relationship: Self    Best call back number: [phone]    Requested Prescriptions:   Requested Prescriptions     Pending Prescriptions Disp Refills   • clopidogrel (PLAVIX) 75 MG tablet 90 tablet 3     Sig: Take 1 tablet by mouth Daily.        Pharmacy where request should be sent: [facility] - [place] [address] [address] [facility] - [phone]  - [phone] FX     Last office visit with prescribing clinician: 4/12/2023   Last telemedicine visit with prescribing clinician: 10/11/2023   Next office visit with prescribing clinician: 10/11/2023     Additional details provided by patient:     Does the patient have less than a 3 day supply:  [x] Yes  [] No    Would you like a call back once the refill request has been completed: [] Yes [x] No    If the office needs to give you a call back, can they leave a voicemail: [] Yes [x] No    Cruz Smith Rep   05/08/23 10:39 EDT

## 2023-05-11 ENCOUNTER — APPOINTMENT (OUTPATIENT)
Dept: CARDIAC REHAB | Facility: HOSPITAL | Age: 83
End: 2023-05-11

## 2023-05-15 RX ORDER — LOSARTAN POTASSIUM 100 MG/1
100 TABLET ORAL DAILY
Qty: 90 TABLET | Refills: 3 | Status: SHIPPED | OUTPATIENT
Start: 2023-05-15

## 2023-05-19 DIAGNOSIS — E03.9 ACQUIRED HYPOTHYROIDISM: ICD-10-CM

## 2023-05-19 DIAGNOSIS — K21.9 GASTROESOPHAGEAL REFLUX DISEASE, UNSPECIFIED WHETHER ESOPHAGITIS PRESENT: ICD-10-CM

## 2023-05-19 RX ORDER — PANTOPRAZOLE SODIUM 40 MG/1
TABLET, DELAYED RELEASE ORAL
Qty: 90 TABLET | Refills: 1 | Status: SHIPPED | OUTPATIENT
Start: 2023-05-19

## 2023-05-19 RX ORDER — LEVOTHYROXINE SODIUM 88 MCG
TABLET ORAL
Qty: 90 TABLET | Refills: 0 | Status: SHIPPED | OUTPATIENT
Start: 2023-05-19

## 2023-05-24 ENCOUNTER — HOSPITAL ENCOUNTER (OUTPATIENT)
Dept: CT IMAGING | Facility: HOSPITAL | Age: 83
Discharge: HOME | End: 2023-05-24
Admitting: FAMILY MEDICINE
Payer: MEDICARE

## 2023-05-24 DIAGNOSIS — R42 DIZZINESS: ICD-10-CM

## 2023-05-24 PROCEDURE — 70450 CT HEAD/BRAIN W/O DYE: CPT

## 2023-05-25 NOTE — PROGRESS NOTES
Patient was notified as well as spouse and they verbally understood. Per their request a copy of the report was sent  to ENT Dr. David Urbina who patient sees on 06/01/2023 for Dizziness

## 2023-06-07 RX ORDER — SITAGLIPTIN 50 MG/1
TABLET, FILM COATED ORAL
Qty: 90 TABLET | Refills: 0 | Status: SHIPPED | OUTPATIENT
Start: 2023-06-07

## 2023-06-07 RX ORDER — BLOOD SUGAR DIAGNOSTIC
STRIP MISCELLANEOUS
Qty: 100 EACH | Refills: 1 | Status: SHIPPED | OUTPATIENT
Start: 2023-06-07

## 2023-07-31 ENCOUNTER — APPOINTMENT (OUTPATIENT)
Dept: CT IMAGING | Facility: HOSPITAL | Age: 83
End: 2023-07-31
Payer: MEDICARE

## 2023-07-31 ENCOUNTER — HOSPITAL ENCOUNTER (OUTPATIENT)
Facility: HOSPITAL | Age: 83
Setting detail: OBSERVATION
Discharge: HOME | End: 2023-08-01
Attending: FAMILY MEDICINE
Payer: MEDICARE

## 2023-07-31 DIAGNOSIS — R29.90 STROKE-LIKE SYMPTOMS: Primary | ICD-10-CM

## 2023-07-31 DIAGNOSIS — Z95.3 STATUS POST TRANSCATHETER AORTIC VALVE REPLACEMENT (TAVR) USING BIOPROSTHESIS: ICD-10-CM

## 2023-07-31 DIAGNOSIS — R53.1 LEFT-SIDED WEAKNESS: ICD-10-CM

## 2023-07-31 LAB
ALBUMIN SERPL-MCNC: 4.1 G/DL (ref 3.5–5.2)
ALBUMIN/GLOB SERPL: 1.8 G/DL
ALP SERPL-CCNC: 68 U/L (ref 39–117)
ALT SERPL W P-5'-P-CCNC: 14 U/L (ref 1–41)
ANION GAP SERPL CALCULATED.3IONS-SCNC: 9.2 MMOL/L (ref 5–15)
AST SERPL-CCNC: 17 U/L (ref 1–40)
BASOPHILS # BLD AUTO: 0.05 10*3/MM3 (ref 0–0.2)
BASOPHILS NFR BLD AUTO: 0.6 % (ref 0–1.5)
BILIRUB SERPL-MCNC: 0.7 MG/DL (ref 0–1.2)
BILIRUB UR QL STRIP: NEGATIVE
BUN SERPL-MCNC: 30 MG/DL (ref 8–23)
BUN/CREAT SERPL: 24.4 (ref 7–25)
CALCIUM SPEC-SCNC: 9.7 MG/DL (ref 8.6–10.5)
CHLORIDE SERPL-SCNC: 106 MMOL/L (ref 98–107)
CLARITY UR: CLEAR
CO2 SERPL-SCNC: 23.8 MMOL/L (ref 22–29)
COLOR UR: YELLOW
CREAT SERPL-MCNC: 1.23 MG/DL (ref 0.76–1.27)
DEPRECATED RDW RBC AUTO: 46.9 FL (ref 37–54)
EGFRCR SERPLBLD CKD-EPI 2021: 58.3 ML/MIN/1.73
EOSINOPHIL # BLD AUTO: 0.06 10*3/MM3 (ref 0–0.4)
EOSINOPHIL NFR BLD AUTO: 0.8 % (ref 0.3–6.2)
ERYTHROCYTE [DISTWIDTH] IN BLOOD BY AUTOMATED COUNT: 13.7 % (ref 12.3–15.4)
GEN 5 2HR TROPONIN T REFLEX: 31 NG/L
GLOBULIN UR ELPH-MCNC: 2.3 GM/DL
GLUCOSE BLDC GLUCOMTR-MCNC: 120 MG/DL (ref 70–105)
GLUCOSE BLDC GLUCOMTR-MCNC: 160 MG/DL (ref 70–130)
GLUCOSE SERPL-MCNC: 164 MG/DL (ref 65–99)
GLUCOSE UR STRIP-MCNC: ABNORMAL MG/DL
HCT VFR BLD AUTO: 44.6 % (ref 37.5–51)
HGB BLD-MCNC: 14.1 G/DL (ref 13–17.7)
HGB UR QL STRIP.AUTO: ABNORMAL
IMM GRANULOCYTES # BLD AUTO: 0.01 10*3/MM3 (ref 0–0.05)
IMM GRANULOCYTES NFR BLD AUTO: 0.1 % (ref 0–0.5)
KETONES UR QL STRIP: NEGATIVE
LEUKOCYTE ESTERASE UR QL STRIP.AUTO: NEGATIVE
LYMPHOCYTES # BLD AUTO: 1.39 10*3/MM3 (ref 0.7–3.1)
LYMPHOCYTES NFR BLD AUTO: 17.7 % (ref 19.6–45.3)
MCH RBC QN AUTO: 28.8 PG (ref 26.6–33)
MCHC RBC AUTO-ENTMCNC: 31.6 G/DL (ref 31.5–35.7)
MCV RBC AUTO: 91.2 FL (ref 79–97)
MONOCYTES # BLD AUTO: 0.7 10*3/MM3 (ref 0.1–0.9)
MONOCYTES NFR BLD AUTO: 8.9 % (ref 5–12)
NEUTROPHILS NFR BLD AUTO: 5.65 10*3/MM3 (ref 1.7–7)
NEUTROPHILS NFR BLD AUTO: 71.9 % (ref 42.7–76)
NITRITE UR QL STRIP: NEGATIVE
NT-PROBNP SERPL-MCNC: 172.8 PG/ML (ref 0–1800)
PH UR STRIP.AUTO: 6 [PH] (ref 5–8)
PLATELET # BLD AUTO: 179 10*3/MM3 (ref 140–450)
PMV BLD AUTO: 10.7 FL (ref 6–12)
POTASSIUM SERPL-SCNC: 3.9 MMOL/L (ref 3.5–5.2)
PROT SERPL-MCNC: 6.4 G/DL (ref 6–8.5)
PROT UR QL STRIP: NEGATIVE
QT INTERVAL: 421 MS
RBC # BLD AUTO: 4.89 10*6/MM3 (ref 4.14–5.8)
SODIUM SERPL-SCNC: 139 MMOL/L (ref 136–145)
SP GR UR STRIP: <=1.005 (ref 1–1.03)
TROPONIN T DELTA: -2 NG/L
TROPONIN T SERPL HS-MCNC: 33 NG/L
UROBILINOGEN UR QL STRIP: ABNORMAL
WBC NRBC COR # BLD: 7.86 10*3/MM3 (ref 3.4–10.8)

## 2023-07-31 PROCEDURE — 82948 REAGENT STRIP/BLOOD GLUCOSE: CPT

## 2023-07-31 PROCEDURE — 36415 COLL VENOUS BLD VENIPUNCTURE: CPT

## 2023-07-31 PROCEDURE — 25510000001 IOPAMIDOL PER 1 ML: Performed by: PEDIATRICS

## 2023-07-31 PROCEDURE — 70450 CT HEAD/BRAIN W/O DYE: CPT

## 2023-07-31 PROCEDURE — G0378 HOSPITAL OBSERVATION PER HR: HCPCS

## 2023-07-31 PROCEDURE — 70498 CT ANGIOGRAPHY NECK: CPT

## 2023-07-31 PROCEDURE — 0042T HC CT CEREBRAL PERFUSION W/WO CONTRAST: CPT

## 2023-07-31 PROCEDURE — 99214 OFFICE O/P EST MOD 30 MIN: CPT | Performed by: STUDENT IN AN ORGANIZED HEALTH CARE EDUCATION/TRAINING PROGRAM

## 2023-07-31 PROCEDURE — 83880 ASSAY OF NATRIURETIC PEPTIDE: CPT | Performed by: PEDIATRICS

## 2023-07-31 PROCEDURE — 84484 ASSAY OF TROPONIN QUANT: CPT | Performed by: PEDIATRICS

## 2023-07-31 PROCEDURE — 93005 ELECTROCARDIOGRAM TRACING: CPT | Performed by: PEDIATRICS

## 2023-07-31 PROCEDURE — 85025 COMPLETE CBC W/AUTO DIFF WBC: CPT | Performed by: PEDIATRICS

## 2023-07-31 PROCEDURE — 80053 COMPREHEN METABOLIC PANEL: CPT | Performed by: PEDIATRICS

## 2023-07-31 PROCEDURE — 81003 URINALYSIS AUTO W/O SCOPE: CPT | Performed by: PEDIATRICS

## 2023-07-31 PROCEDURE — 93005 ELECTROCARDIOGRAM TRACING: CPT | Performed by: FAMILY MEDICINE

## 2023-07-31 PROCEDURE — 70496 CT ANGIOGRAPHY HEAD: CPT

## 2023-07-31 PROCEDURE — 99285 EMERGENCY DEPT VISIT HI MDM: CPT

## 2023-07-31 RX ORDER — ASPIRIN 81 MG/1
234 TABLET, CHEWABLE ORAL ONCE
Status: COMPLETED | OUTPATIENT
Start: 2023-07-31 | End: 2023-07-31

## 2023-07-31 RX ORDER — CLOPIDOGREL BISULFATE 75 MG/1
300 TABLET ORAL ONCE
Status: COMPLETED | OUTPATIENT
Start: 2023-07-31 | End: 2023-07-31

## 2023-07-31 RX ADMIN — IOPAMIDOL 150 ML: 755 INJECTION, SOLUTION INTRAVENOUS at 11:58

## 2023-07-31 RX ADMIN — ASPIRIN 243 MG: 81 TABLET, CHEWABLE ORAL at 13:57

## 2023-07-31 RX ADMIN — CLOPIDOGREL BISULFATE 300 MG: 75 TABLET ORAL at 13:57

## 2023-07-31 NOTE — ED NOTES
MD Camejo speaking with [facility] ER MD for ER to ER transfer. Per MD Rubio, patient to be admitted. Hospitalist paged.

## 2023-07-31 NOTE — Clinical Note
Level of Care: Med/Surg [1]   Admitting Physician: HUSEYIN MARIEE [[id number]]   Attending Physician: HUSEYIN MARIEE [[id number]]

## 2023-07-31 NOTE — CONSULTS
[facility]   Teleneurology Note    Patient Name: Alen Ratliff  : 1940  MRN: [id number]  Primary Care Physician: Serenity Wren MD  Referring Site:  ([facility])  Location of Neurologist: [facility]   Teleneurology Initial Data     Arrival Date Telestroke Site: 23 Arrival Time Telestroke Site: 105   Neurologist Evaluation Date: 23 Neurologist Evaluation Time: 1109   Date Last Known Well: 23 Time Last Known Well: 1027     History     Chief Complaint: left arm numbness  HPI: This 82-year-old with vascular risk factors including hypertension, recent valve replacement in February, takes aspirin 81 daily, who was sitting and reading a magazine, had an acute onset of left arm numbness, followed by heaviness and weakness.  He could not turn over the pages of the magazine.  On my evaluation in the emergency department via telemedicine, patient symptoms are back to normal.  No weakness no slurred speech.    Stroke Risk Factors/ Pertinent Data     Stroke risk factors: hypertension  Anticoagulants prior to arrival: none  Antiplatelets prior to arrival: aspirin  Statins prior to arrival: not applicable     Scoring Scales     Pre-Stroke Modified [place] Scale: 0 - No Symptoms at all.[place] Coma Scale  Best Eye Response: Spontaneous  Best Verbal Response: Oriented  Best Motor Response: Follows commands  [place] Coma Scale Score: 15Intracerebral Hemmorhage (ICH) Score  Theresa Coma Score: 13-15  Age>=80: yes     NIH Stroke Scale     NIHSS Performed Date: 23 NIHSS Performed Time: 1111   Interval: baseline  1a. Level of Consciousness: 0-->Alert, keenly responsive  1b. LOC Questions: 0-->Answers both questions correctly  1c. LOC Commands: 0-->Performs both tasks correctly  2. Best Gaze: 0-->Normal  3. Visual: 0-->No visual loss  4. Facial Palsy: 0-->Normal symmetrical movements  5a. Motor Arm, Left: 0-->No drift, limb holds 90 (or 45) degrees for full 10 secs  5b. Motor  Arm, Right: 0-->No drift, limb holds 90 (or 45) degrees for full 10 secs  6a. Motor Leg, Left: 0-->No drift, leg holds 30 degree position for full 5 secs  6b. Motor Leg, Right: 0-->No drift, leg holds 30 degree position for full 5 secs  7. Limb Ataxia: 0-->Absent  8. Sensory: 0-->Normal, no sensory loss  9. Best Language: 0-->No aphasia, normal  10. Dysarthria: 0-->Normal  11. Extinction and Inattention (formerly Neglect): 0-->No abnormality  Total (NIH Stroke Scale): 0     Review of Systems     Review of Systems  Constitutional: No fatigue  HENT: Negative for nosebleeds and rhinorrhea.    Eyes: Negative for redness.   Respiratory: Negative for cough.    Gastrointestinal: Negative for anal bleeding.   Endocrine: Negative for polydipsia.   Genitourinary: Negative for enuresis and urgency.   Musculoskeletal: Negative for joint swelling.   Neurological: Negative for tremors.   Psychiatric/Behavioral: Negative for hallucinations.    Objective   Exam     Exam performed with the help of support staff from the referring site  Neurological Exam  NEURO( Exam is performed with help of hospital staff at bed side and observed by me via audio-video interface)    MENTAL STATUS: AAOx3, memory intact, fund of knowledge appropriate    LANG/SPEECH: Naming and repetition intact, fluent, follows 3-step commands    CRANIAL NERVES:    Pupils equal and reactive, EOMI intact, no gaze deviation, no nystagmus  No facial droop, cough and gag intact, shoulder shrug intact, tongue midline     MOTOR:  Moves all extremities equally    SENSORY: Normal to light touch all throughout     COORDINATION: Normal finger to nose and heel to shin, no tremor, no dysmetria    STATION: Not assessed due to patient condition    GAIT: Not assessed due to patient condition   Result Review    Results          Personal review of CNS imaging:(Official report by radiologist pending)  Imaging  CT Imaging Review: CT Imaging reviewed, NO acute infarct/ hemorrhage  seen    Thrombolytic   Thrombolytics: thrombolytic not given  Thrombolytic Relative Exclusions for All Patients: Only minor non-disabling symptoms     Assessment & Plan   Assessment/ Plan     Assessment:  Acute Stroke Evaluation: Suspected ACUTE ischemic stroke   This is 82-year-old with history of hypertension, takes aspirin 81 daily who was at his usual state of health at around 10:27 AM today, had a sudden onset of left arm paresthesias and weakness.  On my evaluation by telemetry, patient symptoms have completely resolved and back to normal.  CT did not show any acute abnormality.  Recommend to get a CTA head and neck and CT perfusion evaluate for any acute pathology.  Recommend aspirin 325, 1 L normal saline fluid bolus.  Do not treat his blood pressure unless it is greater than 200 systolic.    Recommend transfer to [facility], if CTA and CT perfusion are normal-for stroke work-up.           Disposition     Disposition: The patient will be transfered to another institution for further evaluation and management  Reasons for Transfer: Other (see comments) (observation as the current site is a Free standing ED)    Medical Decision Making  Medical Data Reviewed: Data reviewed including: clinical labs, radiology and/or medical tests, Independent review of CNS images    IJuan Carlos MD, saw the patient on 07/31/23 at 1109 for an initial in-patient or emergency room telememedicine face to face consult using interactive technology for  . The location of the patient was  ([facility] ED). I was located at [place].    I have proceeded with this evaluation at the request of the referring practitioner as it is felt to be an emergency setting and no appropriate specialist is available to perform this evaluation. The originating hospital has reported that this is the correct patient and has obtained consent from the patient/surrogate to perform this telemedicine evaluation(including obtaining history,  performing examination and reviewing data provided by the patient an/or originating site of care provider)    I have introduced myself to the patient, provided my credentials, disclosed my location, and determined that, based on review of the patient's chart and discussion with the patient's primary team, telemedicine via a HIPAA compliant, real-time, face-to-face two-way, interactive audio and video platform is an appropriate and effective means of providing the service.    The patient/surrogate has a right to refuse this evaluation as they have been explained risks including potential loss of confidentiality, benefits, alternatives, and the potential need for subsequent face-to-face care. In this evaluation, we will be providing recommendations only.  The ultimate decision to follow or not to follow these recommendations will be left to the bedside treating/requesting practitioner.    The patient/surrogate has been notified that other healthcare professionals including technical person may be involved in this A/V evaluation.  All laws concerning confidentiality and patient access to medical records and copies of medical records apply to telemedicine.  The patient/surrogate has received the originating site's Health Notice of Privacy Practices.    Juan Carlos Curiel MD

## 2023-07-31 NOTE — [facility] PROVIDER NOTE
Emergency Medicine Evaluation Note  Subjective   History of Present Illness    HPI: Alen Ratliff is a 82 y.o. male who presents to the ED with left arm numbness and weakness that occurred spontaneously at 10:27 AM, states lasting 2 minutes, with spontaneous resolution.  Past medical history as described below, patient with history of previous strokes and TIAs.  Patient states previous stroke with lower extremity symptoms, unsure of specifics for previous TIA symptoms.  Patient currently on 75 mg clopidogrel, daily 81 mg aspirin.  No other thinners.  Patient states no current symptoms at time of presentation to the ED.  No OTC medications prior to arrival. No other concomitant symptoms. No other known aggravating or alleviating factors.      ROS  Review of Systems   HENT: Negative.     Eyes: Negative.    Respiratory: Negative.     Cardiovascular: Negative.    Gastrointestinal: Negative.    Endocrine: Negative.    Genitourinary: Negative.    Musculoskeletal: Negative.    Skin: Negative.    Neurological:  Positive for weakness and numbness. Negative for dizziness, tremors, seizures, syncope, facial asymmetry, speech difficulty, light-headedness and headaches.   Hematological: Negative.    Psychiatric/Behavioral: Negative.       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    Stroke      Past Surgical History:   Procedure Laterality Date    ANKLE OPEN REDUCTION INTERNAL FIXATION Left 12/16/2020    Procedure: ANKLE OPEN REDUCTION INTERNAL FIXATION;  Surgeon: CAROLE Vasquez DPM;  Location: [facility] OR;  Service: Podiatry;  Laterality: Left;    AORTIC VALVE REPAIR/REPLACEMENT N/A 02/20/2023    Procedure: Transfemoral  Transcatheter Aortic Valve Replacement;  Surgeon: Naveed Leonardo MD;  Location: [facility] HYBRID OR;  Service: Cardiovascular;  Laterality: N/A;    AORTIC VALVE REPAIR/REPLACEMENT N/A 02/20/2023    Procedure: TRANSCATHETER AORTIC VALVE REPLACEMENT;  Surgeon: Fabio Villafana MD;  Location: [facility] HYBRID OR;  Service: Cardiothoracic;  Laterality: N/A;    BACK SURGERY  1994    CARDIAC CATHETERIZATION Right 09/27/2022    Procedure: Left Heart Cath;  Surgeon: Steve Muhammad MD;  Location: [facility] CATH INVASIVE LOCATION;  Service: Cardiovascular;  Laterality: Right;    CARDIAC CATHETERIZATION N/A 01/12/2023    Procedure: Stent JENN coronary;  Surgeon: Steve Muhammad MD;  Location: [facility] CATH INVASIVE LOCATION;  Service: Cardiovascular;  Laterality: N/A;    CAROTID ENDARTERECTOMY Right 12/14/2020    Procedure: CAROTID ENDARTERECTOMY;  Surgeon: Toby Fung MD;  Location: [facility] MAIN OR;  Service: Vascular;  Laterality: Right;    COLONOSCOPY  08/25/2015    CORONARY STENT PLACEMENT      PROSTATECTOMY  2001    due to cancer     Social History     Tobacco Use    Smoking status: Never    Smokeless tobacco: Never   Vaping Use    Vaping Use: Never used   Substance Use Topics    Alcohol use: Not Currently    Drug use: Never     Family History   Problem Relation Age of Onset    Hypertension Other     Heart attack Mother     Heart disease Mother     Heart attack Father     Heart disease Father      Allergies   Allergen Reactions    Azithromycin Unknown - High Severity    Tramadol Unknown - High Severity     Current Outpatient Medications   Medication Instructions    amLODIPine (NORVASC) 10 mg, Oral, Daily    aspirin 81 mg, Oral, Daily    atorvastatin (LIPITOR) 40 MG tablet TAKE 1 TABLET DAILY    clopidogrel (PLAVIX) 75 mg, Oral, Daily    fluticasone (FLONASE) 50 MCG/ACT nasal spray 2 sprays, Nasal, Daily PRN    glucose blood (OneTouch Ultra) test strip USE AND DISCARD 1 TEST     STRIP DAILY.    Januvia 50 MG tablet TAKE 1 TABLET  DAILY    Jardiance 10 MG tablet tablet TAKE 1 TABLET DAILY    losartan (COZAAR) 100 mg, Oral, Daily    pantoprazole (PROTONIX) 40 MG EC tablet TAKE 1 TABLET DAILY    Synthroid 88 MCG tablet TAKE 1 TABLET DAILY       Objective   Physical Exam  Patient Vitals for the past 24 hrs:   BP Pulse Resp SpO2   07/31/23 1051 156/69 65 20 97 %     Physical Exam  Vitals and nursing note reviewed.   Constitutional:       General: He is not in acute distress.     Appearance: He is normal weight. He is not toxic-appearing.   HENT:      Head: Normocephalic and atraumatic.      Right Ear: Tympanic membrane, ear canal and external ear normal. There is no impacted cerumen.      Left Ear: Tympanic membrane, ear canal and external ear normal. There is no impacted cerumen.      Nose: Nose normal. No congestion.      Mouth/Throat:      Mouth: Mucous membranes are moist.      Pharynx: Oropharynx is clear.   Eyes:      General:         Right eye: No discharge.         Left eye: No discharge.      Extraocular Movements: Extraocular movements intact.      Conjunctiva/sclera: Conjunctivae normal.      Pupils: Pupils are equal, round, and reactive to light.   Cardiovascular:      Rate and Rhythm: Normal rate and regular rhythm.      Pulses: Normal pulses.      Heart sounds: Normal heart sounds.   Pulmonary:      Effort: Pulmonary effort is normal. No respiratory distress.      Breath sounds: Normal breath sounds. No stridor. No wheezing, rhonchi or rales.   Abdominal:      General: Abdomen is flat. There is no distension.      Palpations: Abdomen is soft.      Tenderness: There is no abdominal tenderness. There is no right CVA tenderness, left CVA tenderness, guarding or rebound.   Musculoskeletal:         General: No swelling, tenderness, deformity or signs of injury. Normal range of motion.      Cervical back: Normal range of motion and neck supple. No rigidity or tenderness.      Right lower leg: No edema.      Left lower leg: No edema.    Skin:     General: Skin is warm and dry.      Capillary Refill: Capillary refill takes less than 2 seconds.   Neurological:      General: No focal deficit present.      Mental Status: He is alert and oriented to person, place, and time.      Comments: Full strength and sensation upper and lower extremities bilaterally.  Cranial nerves intact.  Coordination intact.  Reflexes intact.  No clonus or hyperreflexia.  NIH of 0.  Patient visualized ambulating through department with stable and steady gait.   Psychiatric:         Mood and Affect: Mood normal.         Behavior: Behavior normal.       Results  Labs Reviewed   COMPREHENSIVE METABOLIC PANEL - Abnormal; Notable for the following components:       Result Value    Glucose 164 (*)     BUN 30 (*)     eGFR 58.3 (*)     All other components within normal limits    Narrative:     GFR Normal >60  Chronic Kidney Disease <60  Kidney Failure <15    The GFR formula is only valid for adults with stable renal function between ages 18 and 70.   TROPONIN - Abnormal; Notable for the following components:    HS Troponin T 33 (*)     All other components within normal limits    Narrative:     High Sensitive Troponin T Reference Range:  <10.0 ng/L- Negative Female for AMI  <15.0 ng/L- Negative Male for AMI  >=10 - Abnormal Female indicating possible myocardial injury.  >=15 - Abnormal Male indicating possible myocardial injury.   Clinicians would have to utilize clinical acumen, EKG, Troponin, and serial changes to determine if it is an Acute Myocardial Infarction or myocardial injury due to an underlying chronic condition.        URINALYSIS WITHOUT MICROSCOPIC (NO CULTURE) - Abnormal; Notable for the following components:    Glucose,  mg/dL (1+) (*)     Blood, UA Trace (*)     All other components within normal limits   CBC WITH AUTO DIFFERENTIAL - Abnormal; Notable for the following components:    Lymphocyte % 17.7 (*)     All other components within normal limits   HIGH  SENSITIVITIY TROPONIN T 2HR - Abnormal; Notable for the following components:    HS Troponin T 31 (*)     All other components within normal limits    Narrative:     High Sensitive Troponin T Reference Range:  <10.0 ng/L- Negative Female for AMI  <15.0 ng/L- Negative Male for AMI  >=10 - Abnormal Female indicating possible myocardial injury.  >=15 - Abnormal Male indicating possible myocardial injury.   Clinicians would have to utilize clinical acumen, EKG, Troponin, and serial changes to determine if it is an Acute Myocardial Infarction or myocardial injury due to an underlying chronic condition.        POCT GLUCOSE FINGERSTICK - Abnormal; Notable for the following components:    Glucose 160 (*)     All other components within normal limits   BNP (IN-HOUSE) - Normal    Narrative:     Among patients with dyspnea, NT-proBNP is highly sensitive for the detection of acute congestive heart failure. In addition NT-proBNP of <300 pg/ml effectively rules out acute congestive heart failure with 99% negative predictive value.    Results may be falsely decreased if patient taking Biotin.     POCT PROTIME - INR   CBC AND DIFFERENTIAL    Narrative:     The following orders were created for panel order CBC & Differential.  Procedure                               Abnormality         Status                     ---------                               -----------         ------                     CBC Auto Differential[[id number]]        Abnormal            Final result                 Please view results for these tests on the individual orders.     CT CEREBRAL PERFUSION WITH & WITHOUT CONTRAST   Final Result   Impression:   Negative CT cerebral perfusion study.            Electronically Signed: Lisa Barnard     7/31/2023 12:02 PM EDT     Workstation ID: [id number]      CT Angiogram Head   Final Result   1. 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   2. Moderate stenosis is suggested at the right  MCA bifurcation.   3. No acute thrombus or aneurysm is seen intracranially.   4. Less than 50% luminal stenosis in the proximal left internal carotid artery.   5. Right carotid endarterectomy. No high-grade stenosis of the right internal carotid artery   6. 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      Electronically Signed: Lisa Keenanmaría     7/31/2023 12:50 PM EDT     Workstation ID: [id number]      CT Angiogram Neck   Final Result   1. 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   2. Moderate stenosis is suggested at the right MCA bifurcation.   3. No acute thrombus or aneurysm is seen intracranially.   4. Less than 50% luminal stenosis in the proximal left internal carotid artery.   5. Right carotid endarterectomy. No high-grade stenosis of the right internal carotid artery   6. 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      Electronically Signed: Lisa Barnard     7/31/2023 12:50 PM EDT     Workstation ID: [id number]      CT Head Without Contrast Stroke Protocol   Final Result   Impression:      1. No acute intracranial findings.   2. 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   3. Moderate atrophy.            Electronically Signed: Lisa Keenanmaría     7/31/2023 11:05 AM EDT     Workstation ID: [id number]        The laboratory results, imaging results and other diagnostic exam results were reviewed in the EMR.     Procedures  Critical Care  Performed by: Warner Camejo DO  Authorized by: Warner Camejo DO     Critical care provider statement:     Critical care time (minutes):  35    Critical care was necessary to treat or prevent imminent or life-threatening deterioration of the following conditions:  CNS failure or compromise    Critical care was time spent personally by me on the following activities:  Ordering and review of  radiographic studies, ordering and review of laboratory studies and obtaining history from patient or surrogate    Care discussed with: accepting provider at another facility      EKG #1: normal EKG, normal sinus rhythm, rate 65, , , QTc 424, T wave inversions in aVL although no acute ST changes, no STEMI, no significant change from most recent on 4/12/2023.      EKG #2: Sinus rhythm with pause, rate 46, , , QTc 437.   upright T wave in aVL. No STEMI.      Medical Decision Making    Patient presents to the ED as described above.  Vital signs stable and unremarkable.  Physical exam as described above.  Given symptoms and timing, stroke alert called on patient's presentation, imaging obtained as described above, teleneurologist evaluation with ultimate recommendation to give 300 mg Plavix, give remainder of aspirin to give daily dose of 325 mg.  Initial labs with elevated troponin at 33 although stable on delta at 31, EKG as described above without acute ischemic changes, during course in the ED patient with sinus pauses although maintaining maps above 100 throughout course in the ED without intervention.  Remainder of labs unremarkable.  Teleneurologist Dr. Guardado recommends admission for MRI, neurology evaluation, initial discussion with ER for lateral transfer however no MRIs obtained out of ED at [facility], thus case discussed with hospitalist team who agrees with admission for further evaluation and management.  Patient and family at bedside were informed of results throughout course of multiple subsequent exams.  They verbalized understanding and agreement with treatment care plan. All questions answered.    Diagnosis  Final diagnoses:   Stroke-like symptoms       Disposition  ED Disposition       ED Disposition   Decision to Admit    Condition   --    Comment   Level of Care: Telemetry [5]   Admitting Physician: HUSEYIN MARIEE [[id number]   Attending Physician: HUSEYIN MARIEE  [[id number]]               No follow-up provider specified.

## 2023-08-01 ENCOUNTER — APPOINTMENT (OUTPATIENT)
Dept: MRI IMAGING | Facility: HOSPITAL | Age: 83
End: 2023-08-01
Payer: MEDICARE

## 2023-08-01 ENCOUNTER — READMISSION MANAGEMENT (OUTPATIENT)
Dept: CALL CENTER | Facility: HOSPITAL | Age: 83
End: 2023-08-01
Payer: MEDICARE

## 2023-08-01 VITALS
BODY MASS INDEX: 24.47 KG/M2 | RESPIRATION RATE: 15 BRPM | SYSTOLIC BLOOD PRESSURE: 141 MMHG | OXYGEN SATURATION: 97 % | WEIGHT: 174.76 LBS | DIASTOLIC BLOOD PRESSURE: 65 MMHG | HEIGHT: 71 IN | HEART RATE: 58 BPM | TEMPERATURE: 97.9 F

## 2023-08-01 PROBLEM — R53.1 LEFT-SIDED WEAKNESS: Status: ACTIVE | Noted: 2023-08-01

## 2023-08-01 PROBLEM — R00.1 BRADYCARDIA: Status: ACTIVE | Noted: 2023-08-01

## 2023-08-01 PROBLEM — R53.1 LEFT-SIDED WEAKNESS: Status: RESOLVED | Noted: 2023-08-01 | Resolved: 2023-08-01

## 2023-08-01 LAB
ALBUMIN SERPL-MCNC: 3.6 G/DL (ref 3.5–5.2)
ALBUMIN/GLOB SERPL: 1.3 G/DL
ALP SERPL-CCNC: 66 U/L (ref 39–117)
ALT SERPL W P-5'-P-CCNC: 14 U/L (ref 1–41)
ANION GAP SERPL CALCULATED.3IONS-SCNC: 12 MMOL/L (ref 5–15)
AST SERPL-CCNC: 15 U/L (ref 1–40)
BILIRUB SERPL-MCNC: 0.5 MG/DL (ref 0–1.2)
BUN SERPL-MCNC: 26 MG/DL (ref 8–23)
BUN/CREAT SERPL: 23 (ref 7–25)
CALCIUM SPEC-SCNC: 9.2 MG/DL (ref 8.6–10.5)
CHLORIDE SERPL-SCNC: 107 MMOL/L (ref 98–107)
CHOLEST SERPL-MCNC: 137 MG/DL (ref 0–200)
CO2 SERPL-SCNC: 24 MMOL/L (ref 22–29)
CREAT SERPL-MCNC: 1.13 MG/DL (ref 0.76–1.27)
DEPRECATED RDW RBC AUTO: 51.2 FL (ref 37–54)
EGFRCR SERPLBLD CKD-EPI 2021: 64.5 ML/MIN/1.73
ERYTHROCYTE [DISTWIDTH] IN BLOOD BY AUTOMATED COUNT: 15.2 % (ref 12.3–15.4)
GLOBULIN UR ELPH-MCNC: 2.8 GM/DL
GLUCOSE BLDC GLUCOMTR-MCNC: 116 MG/DL (ref 70–105)
GLUCOSE BLDC GLUCOMTR-MCNC: 116 MG/DL (ref 70–105)
GLUCOSE BLDC GLUCOMTR-MCNC: 141 MG/DL (ref 70–105)
GLUCOSE SERPL-MCNC: 137 MG/DL (ref 65–99)
HBA1C MFR BLD: 6.4 % (ref 4.8–5.6)
HCT VFR BLD AUTO: 43.4 % (ref 37.5–51)
HDLC SERPL-MCNC: 39 MG/DL (ref 40–60)
HGB BLD-MCNC: 14.3 G/DL (ref 13–17.7)
LDLC SERPL CALC-MCNC: 68 MG/DL (ref 0–100)
LDLC/HDLC SERPL: 1.61 {RATIO}
MCH RBC QN AUTO: 30.4 PG (ref 26.6–33)
MCHC RBC AUTO-ENTMCNC: 32.9 G/DL (ref 31.5–35.7)
MCV RBC AUTO: 92.4 FL (ref 79–97)
PA ADP PRP-ACNC: 186 PRU (ref 194–418)
PLATELET # BLD AUTO: 175 10*3/MM3 (ref 140–450)
PMV BLD AUTO: 8.9 FL (ref 6–12)
POTASSIUM SERPL-SCNC: 4.1 MMOL/L (ref 3.5–5.2)
PROT SERPL-MCNC: 6.4 G/DL (ref 6–8.5)
RBC # BLD AUTO: 4.69 10*6/MM3 (ref 4.14–5.8)
SODIUM SERPL-SCNC: 143 MMOL/L (ref 136–145)
TRIGL SERPL-MCNC: 177 MG/DL (ref 0–150)
TSH SERPL DL<=0.05 MIU/L-ACNC: 3.07 UIU/ML (ref 0.27–4.2)
VIT B12 BLD-MCNC: >2000 PG/ML (ref 211–946)
VLDLC SERPL-MCNC: 30 MG/DL (ref 5–40)
WBC NRBC COR # BLD: 5.9 10*3/MM3 (ref 3.4–10.8)

## 2023-08-01 PROCEDURE — G0378 HOSPITAL OBSERVATION PER HR: HCPCS

## 2023-08-01 PROCEDURE — 70551 MRI BRAIN STEM W/O DYE: CPT

## 2023-08-01 PROCEDURE — 85576 BLOOD PLATELET AGGREGATION: CPT | Performed by: NURSE PRACTITIONER

## 2023-08-01 PROCEDURE — 99214 OFFICE O/P EST MOD 30 MIN: CPT | Performed by: NURSE PRACTITIONER

## 2023-08-01 PROCEDURE — 82948 REAGENT STRIP/BLOOD GLUCOSE: CPT

## 2023-08-01 PROCEDURE — 84443 ASSAY THYROID STIM HORMONE: CPT | Performed by: INTERNAL MEDICINE

## 2023-08-01 PROCEDURE — 80053 COMPREHEN METABOLIC PANEL: CPT | Performed by: NURSE PRACTITIONER

## 2023-08-01 PROCEDURE — 85027 COMPLETE CBC AUTOMATED: CPT | Performed by: NURSE PRACTITIONER

## 2023-08-01 PROCEDURE — 83036 HEMOGLOBIN GLYCOSYLATED A1C: CPT | Performed by: NURSE PRACTITIONER

## 2023-08-01 PROCEDURE — 82607 VITAMIN B-12: CPT | Performed by: INTERNAL MEDICINE

## 2023-08-01 PROCEDURE — 80061 LIPID PANEL: CPT | Performed by: INTERNAL MEDICINE

## 2023-08-01 RX ORDER — ATORVASTATIN CALCIUM 40 MG/1
80 TABLET, FILM COATED ORAL NIGHTLY
Status: DISCONTINUED | OUTPATIENT
Start: 2023-08-01 | End: 2023-08-01 | Stop reason: HOSPADM

## 2023-08-01 RX ORDER — ASPIRIN 81 MG/1
81 TABLET, CHEWABLE ORAL DAILY
Status: DISCONTINUED | OUTPATIENT
Start: 2023-08-02 | End: 2023-08-01 | Stop reason: HOSPADM

## 2023-08-01 RX ORDER — NICOTINE POLACRILEX 4 MG
15 LOZENGE BUCCAL
Status: DISCONTINUED | OUTPATIENT
Start: 2023-08-01 | End: 2023-08-01 | Stop reason: HOSPADM

## 2023-08-01 RX ORDER — SODIUM CHLORIDE 9 MG/ML
40 INJECTION, SOLUTION INTRAVENOUS AS NEEDED
Status: DISCONTINUED | OUTPATIENT
Start: 2023-08-01 | End: 2023-08-01 | Stop reason: HOSPADM

## 2023-08-01 RX ORDER — INSULIN LISPRO 100 [IU]/ML
2-7 INJECTION, SOLUTION INTRAVENOUS; SUBCUTANEOUS EVERY 6 HOURS SCHEDULED
Status: DISCONTINUED | OUTPATIENT
Start: 2023-08-01 | End: 2023-08-01

## 2023-08-01 RX ORDER — PANTOPRAZOLE SODIUM 40 MG/1
40 TABLET, DELAYED RELEASE ORAL
Status: DISCONTINUED | OUTPATIENT
Start: 2023-08-01 | End: 2023-08-01 | Stop reason: HOSPADM

## 2023-08-01 RX ORDER — BISACODYL 10 MG
10 SUPPOSITORY, RECTAL RECTAL DAILY PRN
Status: DISCONTINUED | OUTPATIENT
Start: 2023-08-01 | End: 2023-08-01 | Stop reason: HOSPADM

## 2023-08-01 RX ORDER — CLOPIDOGREL BISULFATE 75 MG/1
75 TABLET ORAL DAILY
Status: DISCONTINUED | OUTPATIENT
Start: 2023-08-01 | End: 2023-08-01 | Stop reason: HOSPADM

## 2023-08-01 RX ORDER — SODIUM CHLORIDE 9 MG/ML
75 INJECTION, SOLUTION INTRAVENOUS CONTINUOUS
Status: DISCONTINUED | OUTPATIENT
Start: 2023-08-01 | End: 2023-08-01 | Stop reason: HOSPADM

## 2023-08-01 RX ORDER — AMLODIPINE BESYLATE 5 MG/1
10 TABLET ORAL DAILY
Status: DISCONTINUED | OUTPATIENT
Start: 2023-08-01 | End: 2023-08-01

## 2023-08-01 RX ORDER — SODIUM CHLORIDE 0.9 % (FLUSH) 0.9 %
10 SYRINGE (ML) INJECTION AS NEEDED
Status: DISCONTINUED | OUTPATIENT
Start: 2023-08-01 | End: 2023-08-01 | Stop reason: HOSPADM

## 2023-08-01 RX ORDER — LEVOTHYROXINE SODIUM 88 UG/1
88 TABLET ORAL
Status: DISCONTINUED | OUTPATIENT
Start: 2023-08-01 | End: 2023-08-01 | Stop reason: HOSPADM

## 2023-08-01 RX ORDER — SODIUM CHLORIDE 0.9 % (FLUSH) 0.9 %
10 SYRINGE (ML) INJECTION EVERY 12 HOURS SCHEDULED
Status: DISCONTINUED | OUTPATIENT
Start: 2023-08-01 | End: 2023-08-01 | Stop reason: HOSPADM

## 2023-08-01 RX ORDER — ASPIRIN 325 MG
325 TABLET ORAL DAILY
Status: DISCONTINUED | OUTPATIENT
Start: 2023-08-01 | End: 2023-08-01

## 2023-08-01 RX ORDER — ASPIRIN 300 MG/1
300 SUPPOSITORY RECTAL DAILY
Status: DISCONTINUED | OUTPATIENT
Start: 2023-08-01 | End: 2023-08-01

## 2023-08-01 RX ORDER — LOSARTAN POTASSIUM 50 MG/1
100 TABLET ORAL DAILY
Status: DISCONTINUED | OUTPATIENT
Start: 2023-08-01 | End: 2023-08-01 | Stop reason: HOSPADM

## 2023-08-01 RX ORDER — DEXTROSE MONOHYDRATE 25 G/50ML
25 INJECTION, SOLUTION INTRAVENOUS
Status: DISCONTINUED | OUTPATIENT
Start: 2023-08-01 | End: 2023-08-01 | Stop reason: HOSPADM

## 2023-08-01 RX ORDER — AMLODIPINE BESYLATE 5 MG/1
10 TABLET ORAL DAILY
Status: DISCONTINUED | OUTPATIENT
Start: 2023-08-01 | End: 2023-08-01 | Stop reason: HOSPADM

## 2023-08-01 RX ORDER — IBUPROFEN 600 MG/1
1 TABLET ORAL
Status: DISCONTINUED | OUTPATIENT
Start: 2023-08-01 | End: 2023-08-01 | Stop reason: HOSPADM

## 2023-08-01 RX ORDER — INSULIN LISPRO 100 [IU]/ML
2-7 INJECTION, SOLUTION INTRAVENOUS; SUBCUTANEOUS
Status: DISCONTINUED | OUTPATIENT
Start: 2023-08-01 | End: 2023-08-01 | Stop reason: HOSPADM

## 2023-08-01 RX ORDER — ONDANSETRON 2 MG/ML
4 INJECTION INTRAMUSCULAR; INTRAVENOUS EVERY 6 HOURS PRN
Status: DISCONTINUED | OUTPATIENT
Start: 2023-08-01 | End: 2023-08-01 | Stop reason: HOSPADM

## 2023-08-01 RX ADMIN — LOSARTAN POTASSIUM 100 MG: 50 TABLET, FILM COATED ORAL at 01:55

## 2023-08-01 RX ADMIN — Medication 10 ML: at 01:59

## 2023-08-01 RX ADMIN — ATORVASTATIN CALCIUM 80 MG: 40 TABLET, FILM COATED ORAL at 01:56

## 2023-08-01 RX ADMIN — SODIUM CHLORIDE 75 ML/HR: 9 INJECTION, SOLUTION INTRAVENOUS at 02:02

## 2023-08-01 RX ADMIN — Medication 10 ML: at 10:50

## 2023-08-01 RX ADMIN — PANTOPRAZOLE SODIUM 40 MG: 40 TABLET, DELAYED RELEASE ORAL at 10:49

## 2023-08-01 RX ADMIN — AMLODIPINE BESYLATE 10 MG: 5 TABLET ORAL at 01:55

## 2023-08-01 RX ADMIN — LEVOTHYROXINE SODIUM 88 MCG: 0.09 TABLET ORAL at 05:37

## 2023-08-01 RX ADMIN — ASPIRIN 325 MG: 325 TABLET ORAL at 10:48

## 2023-08-01 RX ADMIN — CLOPIDOGREL BISULFATE 75 MG: 75 TABLET ORAL at 10:49

## 2023-08-01 NOTE — H&P
"    [facility] Medicine Services      Patient Name: Alen Ratliff  : 1940  MRN: [id number]  Primary Care Physician:  Serenity Wren MD  Date of admission: 2023      Subjective      Chief Complaint: Left arm weakness.    History of Present Illness: Alen Ratliff is a 82 y.o. male with a past medical history of coronary artery disease, diabetes mellitus type 2, GERD, hyperlipidemia, hypertension, acquired hypothyroidism, prostate cancer, status post transcatheter aortic valve replacement,, paroxysmal atrial fibrillation, previous strokes and TIAs, chronic kidney disease stage III, chronic diastolic heart failure, known carotid artery stenosis who presented to [facility] on 2023 upon transfer from [facility] emergency department where he presented complaining of acute sudden onset of left arm numbness.  He was reading at that time was unable to turn the pages.  He reported heaviness and weakness.  This had occurred about 10:27 AM this morning.  He reported it lasted about 30 minutes with spontaneous resolution.  He denies any dizziness headache confusion blurry vision, chest pain or shortness of air.  He reports he had had a stroke in the past and presented with left leg numbness.  He denies any current complaints.  He is awake and alert and a good historian.  Wife at bedside.  Review of records shows he was admitted here in February for severe aortic stenosis and underwent a TAVR.  He underwent PCI with drug-eluting stent to the left circumflex.  He occasionally has bradycardic on the heart monitor however he denies any symptoms.High-sensitivity troponin 33 and 31 glucose 120, BUN 30 creatinine 1.23    CT head per radiology:    \"1. No acute intracranial findings.  2. 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  3. Moderate atrophy.  \"    CTA head and neck " "per radiology:    \"1. 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  2. Moderate stenosis is suggested at the right MCA bifurcation.  3. No acute thrombus or aneurysm is seen intracranially.  4. Less than 50% luminal stenosis in the proximal left internal carotid artery.  5. Right carotid endarterectomy. No high-grade stenosis of the right internal carotid artery  6. 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     CT per cerebral erfusion:    \"Negative CT cerebral perfusion study.  \"    It appears per records that he was seen by a neurologist possibly by telemedicine at outlying facility he suggested he be admitted here for MRI brain in a.m.  Stroke orders have been put in place, MRI brain ordered and he will be admitted for further evaluation and treatment.    Review of Systems   Constitutional: Negative.   HENT: Negative.     Eyes: Negative.    Cardiovascular: Negative.    Respiratory: Negative.     Endocrine: Negative.    Skin: Negative.    Musculoskeletal: Negative.    Gastrointestinal: Negative.    Genitourinary: Negative.    Neurological: Negative.    Psychiatric/Behavioral: Negative.     Allergic/Immunologic: Negative.    All other systems reviewed and are negative.     Personal History     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    Stroke        Past Surgical History:   Procedure Laterality Date    ANKLE OPEN REDUCTION INTERNAL FIXATION Left 12/16/2020    Procedure: ANKLE OPEN REDUCTION INTERNAL FIXATION;  Surgeon: CAROLE Vasquez DPM;  Location: [facility];  Service: Podiatry;  Laterality: Left;    AORTIC VALVE REPAIR/REPLACEMENT N/A 02/20/2023    Procedure: " Transfemoral Transcatheter Aortic Valve Replacement;  Surgeon: Naveed Leonardo MD;  Location: [facility] HYBRID OR;  Service: Cardiovascular;  Laterality: N/A;    AORTIC VALVE REPAIR/REPLACEMENT N/A 02/20/2023    Procedure: TRANSCATHETER AORTIC VALVE REPLACEMENT;  Surgeon: Fabio Villafana MD;  Location: [facility] HYBRID OR;  Service: Cardiothoracic;  Laterality: N/A;    BACK SURGERY  1994    CARDIAC CATHETERIZATION Right 09/27/2022    Procedure: Left Heart Cath;  Surgeon: Steve Muhammad MD;  Location: [facility] CATH INVASIVE LOCATION;  Service: Cardiovascular;  Laterality: Right;    CARDIAC CATHETERIZATION N/A 01/12/2023    Procedure: Stent JENN coronary;  Surgeon: Steve Muhammad MD;  Location: [facility] CATH INVASIVE LOCATION;  Service: Cardiovascular;  Laterality: N/A;    CAROTID ENDARTERECTOMY Right 12/14/2020    Procedure: CAROTID ENDARTERECTOMY;  Surgeon: Toby Fung MD;  Location: [facility] MAIN OR;  Service: Vascular;  Laterality: Right;    COLONOSCOPY  08/25/2015    CORONARY STENT PLACEMENT      PROSTATECTOMY  2001    due to cancer       Family History: family history includes Heart attack in his father and mother; Heart disease in his father and mother; Hypertension in an other family member. Otherwise pertinent FHx was reviewed and not pertinent to current issue.    Social History:  reports that he has never smoked. He has never used smokeless tobacco. He reports that he does not drink alcohol and does not use drugs.    Home Medications:  Prior to Admission Medications       Prescriptions Last Dose Informant Patient Reported? Taking?    amLODIPine (NORVASC) 10 MG tablet   No No    Take 1 tablet by mouth Daily.    aspirin 81 MG chewable tablet   No No    Chew 1 tablet Daily.    atorvastatin (LIPITOR) 40 MG tablet   No No    TAKE 1 TABLET DAILY    clopidogrel (PLAVIX) 75 MG tablet   No No    Take 1 tablet by mouth Daily.    fluticasone (FLONASE) 50 MCG/ACT nasal spray   Yes No    2 sprays into the nostril(s) as  directed by provider Daily As Needed.    glucose blood (OneTouch Ultra) test strip   No No    USE AND DISCARD 1 TEST     STRIP DAILY.    Januvia 50 MG tablet   No No    TAKE 1 TABLET DAILY    Jardiance 10 MG tablet tablet   No No    TAKE 1 TABLET DAILY    losartan (COZAAR) 100 MG tablet   No No    Take 1 tablet by mouth Daily.    pantoprazole (PROTONIX) 40 MG EC tablet   No No    TAKE 1 TABLET DAILY    Synthroid 88 MCG tablet   No No    TAKE 1 TABLET DAILY              Allergies:  Allergies   Allergen Reactions    Azithromycin Unknown - High Severity    Tramadol Unknown - High Severity       Objective      Vitals:   Temp:  [97.5 øF (36.4 øC)-98.1 øF (36.7 øC)] 98.1 øF (36.7 øC)  Heart Rate:  [40-68] 40  Resp:  [16-20] 17  BP: (140-174)/(62-79) 140/62    Physical Exam  Vitals reviewed.   Constitutional:       Appearance: Normal appearance. He is normal weight.   HENT:      Head: Normocephalic and atraumatic.      Right Ear: External ear normal.      Left Ear: External ear normal.      Nose: Nose normal.      Mouth/Throat:      Mouth: Mucous membranes are moist.   Eyes:      Extraocular Movements: Extraocular movements intact.   Cardiovascular:      Rate and Rhythm: Regular rhythm. Bradycardia present.      Pulses: Normal pulses.      Heart sounds: Normal heart sounds.   Pulmonary:      Effort: Pulmonary effort is normal.      Breath sounds: Normal breath sounds.   Abdominal:      Palpations: Abdomen is soft.   Genitourinary:     Comments: deferred  Musculoskeletal:         General: Normal range of motion.      Cervical back: Normal range of motion and neck supple.   Skin:     General: Skin is warm and dry.   Neurological:      General: No focal deficit present.      Mental Status: He is alert and oriented to person, place, and time.   Psychiatric:         Mood and Affect: Mood normal.         Behavior: Behavior normal.         Thought Content: Thought content normal.         Judgment: Judgment normal.        Result  "Review    Result Review:  I have personally reviewed the results from the time of this admission to 8/1/2023 03:12 EDT and agree with these findings:  [x]  Laboratory  []  Microbiology  [x]  Radiology  [x]  EKG/Telemetry   [x]  Cardiology/Vascular   []  Pathology  [x]  Old records  []  Other:  Most notable findings include: High-sensitivity troponin 33 and 31 glucose 120, BUN 30 creatinine 1.23    CT head per radiology:    \"1. No acute intracranial findings.  2. 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  3. Moderate atrophy.  \"    CTA head and neck per radiology:    \"1. 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  2. Moderate stenosis is suggested at the right MCA bifurcation.  3. No acute thrombus or aneurysm is seen intracranially.  4. Less than 50% luminal stenosis in the proximal left internal carotid artery.  5. Right carotid endarterectomy. No high-grade stenosis of the right internal carotid artery  6. 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     CT per cerebral erfusion:    \"Negative CT cerebral perfusion study.  \"             Assessment & Plan        Active Hospital Problems:  Active Hospital Problems    Diagnosis     **Left-sided weakness     Bradycardia     Type 2 diabetes mellitus without complication     Essential hypertension     Mixed hyperlipidemia     Chronic diastolic CHF (congestive heart failure), NYHA class 2     S/P TAVR (transcatheter aortic valve replacement)     PAF (paroxysmal atrial fibrillation)     Coronary artery disease involving native coronary artery of native heart     CKD (chronic kidney disease) stage 3, GFR 30-59 ml/min     Acquired hypothyroidism     GERD (gastroesophageal reflux disease)      Plan:     Left sided weakness , resolved , NIH 0, no acute findings per CT head, moderate to severe stenosis cerebral artery " segments MCA per radiology, MRI brain in a.m., stroke orders in place, neurology following on aspirin and statin    Bradycardia intermittent asymptomatic, history of TAVR chronic diastolic heart failure denies chest pain or shortness of air, consider cardiology consult if indicated, not on beta-blocker due to his cardiac monitoring    Type 2 diabetes mellitus without complication, hold home p.o. hypoglycemics for now add SSI as needed with Accu-Cheks ACHS, consistent carb heart healthy diet    Essential hypertension, controlled on home Norvasc and losartan reordered monitor BP    Chronic diastolic heart failure, stable no shortness of air    Paroxysmal atrial fibrillation, sinus rhythm on monitor PAC not on beta-blocker or anticoagulation    Coronary artery disease, denies chest pain or shortness of air, on home Plavix, aspirin and statin continuous cardiac monitoring    Chronic kidney disease stage III, stable avoid nephrotoxic meds trend renal function    Acquired hypothyroidism, on home Synthroid reordered TSH in a.m. due to bradycardia    GERD, on home PPI reordered      DVT prophylaxis:  Mechanical DVT prophylaxis orders are present.    CODE STATUS:    Code Status (Patient has no pulse and is not breathing): CPR (Attempt to Resuscitate)  Medical Interventions (Patient has pulse or is breathing): Full Support  Release to patient: Routine Release    Admission Status:  I believe this patient meets observation status.    I discussed the patient's findings and my recommendations with patient and wife.    This patient has been examined wearing appropriate Personal Protective Equipment     . 08/01/23      Signature: Electronically signed by LARRY Rosario, 08/01/23, 3:24 AM EDT.

## 2023-08-01 NOTE — PLAN OF CARE
Goal Outcome Evaluation:   Patient admit to room 241 this shift as stroke r/o.  He is alert and oriented and has no complaint of pain or distress.   All symptoms have resolved regarding left side numbness In the am.  Spouse is at bedside.  Continuing to monitor for the remainder of the shift.

## 2023-08-01 NOTE — ED NOTES
Called for update on transport.  [facility]

## 2023-08-01 NOTE — DISCHARGE SUMMARY
[facility] Medicine Services  Discharge Summary    Date of Service: 23  Patient Name: Alen Ratliff  : 1940  MRN: [id number]    Date of Admission: 2023  Discharge Diagnosis: left-sided weakness  Date of Discharge:  23  Primary Care Physician: Serenity Wren MD      Presenting Problem:   Stroke-like symptoms [R29.90]  Left-sided weakness [R53.1]    Active and Resolved Hospital Problems:  Active Hospital Problems    Diagnosis POA    Bradycardia [R00.1] Yes    Chronic diastolic CHF (congestive heart failure), NYHA class 2 [I50.32] Yes    S/P TAVR (transcatheter aortic valve replacement) [Z95.2] Not Applicable    PAF (paroxysmal atrial fibrillation) [I48.0] Yes    Coronary artery disease involving native coronary artery of native heart [I25.10] Yes    Type 2 diabetes mellitus without complication [E11.9] Yes    CKD (chronic kidney disease) stage 3, GFR 30-59 ml/min [N18.30] Yes    Essential hypertension [I10] Yes    Mixed hyperlipidemia [E78.2] Yes    Acquired hypothyroidism [E03.9] Yes    GERD (gastroesophageal reflux disease) [K21.9] Yes      Resolved Hospital Problems    Diagnosis POA    Left-sided weakness [R53.1] Yes         Hospital Course     Hospital Course:  Alen Rtaliff is a 82 y.o. male with past medical history of CAD, type II DM, GERD, hyperlipidemia, HTN, status post TAVR, paroxysmal A-fib, previous TIAs, chronic CKD, chronic diastolic heart failure, known carotid artery stenosis who presented to [facility] on 2023 as a transfer from [facility] emergency department where he presented with sudden onset of left arm numbness.  Patient states the episode of left arm numbness lasted approximately 30 minutes with spontaneous resolution. Patient denied any other complaints. Neurology was consulted.  CT head shows no acute intracranial findings, CT angiogram showed no acute thrombus or aneurysm seen intracranially.  CT cerebral  perfusion study was negative.MRI brain without contrast shows no evidence of acute ischemic change.  Patient was previously on dual antiplatelet therapy with aspirin and Plavix and neurology recommended checking P2 Y12 and considering changing Plavix to Brilinta without need for anticoagulation at this time.  Had a collaborative conversation with cardiology and given that patient had a probable TIA despite dual antiplatelet therapy and with history of paroxysmal A-fib and TAVR placement, Dr. Muhammad recommended discontinuing Plavix and initiating Xarelto 15 mg daily and to continue home aspirin and statin.  Neurology signed off pending echocardiogram and P2Y12 result which patient will have outpatient and stated they may follow-up with neurologist of choice.  Patient will have echocardiogram outpatient.  Patient in agreement with treatment plan.  Patient stable for discharge home today.      DISCHARGE Follow Up Recommendations for labs and diagnostics:   Echocardiogram ordered outpatient  Follow-up with PCP in 1 week  Follow-up with cardiology in 1 week  Follow-up with neurology per their recommendations      Day of Discharge     Vital Signs:  Temp:  [96.4 øF (35.8 øC)-98.1 øF (36.7 øC)] 97.9 øF (36.6 øC)  Heart Rate:  [40-68] 58  Resp:  [15-20] 15  BP: (137-174)/(62-79) 141/65    Physical Exam:  Physical Exam  Vitals and nursing note reviewed.   HENT:      Head: Normocephalic.   Eyes:      Extraocular Movements: Extraocular movements intact.      Pupils: Pupils are equal, round, and reactive to light.   Cardiovascular:      Rate and Rhythm: Normal rate and regular rhythm.   Pulmonary:      Effort: Pulmonary effort is normal.      Breath sounds: Normal breath sounds.   Abdominal:      General: Bowel sounds are normal.      Palpations: Abdomen is soft.   Musculoskeletal:         General: Normal range of motion.   Skin:     General: Skin is warm and dry.   Neurological:      Mental Status: He is alert and oriented to  person, place, and time.      Cranial Nerves: No dysarthria or facial asymmetry.      Sensory: Sensation is intact.      Motor: Motor function is intact. No weakness.      Coordination: Finger-Nose-Finger Test normal.   Psychiatric:         Mood and Affect: Mood normal.         Pertinent  and/or Most Recent Results     LAB RESULTS:      Lab 08/01/23  1118 07/31/23  1103   WBC 5.90 7.86   HEMOGLOBIN 14.3 14.1   HEMATOCRIT 43.4 44.6   PLATELETS 175 179   NEUTROS ABS  --  5.65   IMMATURE GRANS (ABS)  --  0.01   LYMPHS ABS  --  1.39   MONOS ABS  --  0.70   EOS ABS  --  0.06   MCV 92.4 91.2         Lab 08/01/23  1118 07/31/23  1103   SODIUM 143 139   POTASSIUM 4.1 3.9   CHLORIDE 107 106   CO2 24.0 23.8   ANION GAP 12.0 9.2   BUN 26* 30*   CREATININE 1.13 1.23   EGFR 64.5 58.3*   GLUCOSE 137* 164*   CALCIUM 9.2 9.7   HEMOGLOBIN A1C 6.40*  --    TSH 3.070  --          Lab 08/01/23  1118 07/31/23  1103   TOTAL PROTEIN 6.4 6.4   ALBUMIN 3.6 4.1   GLOBULIN 2.8 2.3   ALT (SGPT) 14 14   AST (SGOT) 15 17   BILIRUBIN 0.5 0.7   ALK PHOS 66 68         Lab 07/31/23  1301 07/31/23  1103   PROBNP  --  172.8   HSTROP T 31* 33*         Lab 08/01/23  1118   CHOLESTEROL 137   LDL CHOL 68   HDL CHOL 39*   TRIGLYCERIDES 177*         Lab 08/01/23  1118   VITAMIN B 12 >2,000*         Brief Urine Lab Results  (Last result in the past 365 days)        Color   Clarity   Blood   Leuk Est   Nitrite   Protein   CREAT   Urine HCG        07/31/23 1344 Yellow   Clear   Trace   Negative   Negative   Negative                 Microbiology Results (last 10 days)       ** No results found for the last 240 hours. **            CT Angiogram Neck    Result Date: 7/31/2023  Impression: 1. 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 2. Moderate stenosis is suggested at the right MCA bifurcation. 3. No acute thrombus or aneurysm is seen intracranially. 4. Less than 50% luminal stenosis in the proximal left internal carotid  artery. 5. Right carotid endarterectomy. No high-grade stenosis of the right internal carotid artery 6. 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 Electronically Signed: Lisa Barnard  7/31/2023 12:50 PM EDT  Workstation ID: [id number]    MRI Brain Without Contrast    Result Date: 8/1/2023  Impression: Impression: 1. No evidence of acute ischemic change 2. Atrophy and white matter changes compatible with small vessel ischemic disease in this age group. Findings not significant change accounting for differences in technique Electronically Signed: Alec Ayala  8/1/2023 10:33 AM EDT  Workstation ID: [id number]    CT Angiogram Head    Result Date: 7/31/2023  Impression: 1. 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 2. Moderate stenosis is suggested at the right MCA bifurcation. 3. No acute thrombus or aneurysm is seen intracranially. 4. Less than 50% luminal stenosis in the proximal left internal carotid artery. 5. Right carotid endarterectomy. No high-grade stenosis of the right internal carotid artery 6. 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 Electronically Signed: Lisa Barnard  7/31/2023 12:50 PM EDT  Workstation ID: [id number]    CT Head Without Contrast Stroke Protocol    Result Date: 7/31/2023  Impression: Impression: 1. No acute intracranial findings. 2. 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 3. Moderate atrophy. Electronically Signed: Lisa Barnard  7/31/2023 11:05 AM EDT  Workstation ID: [id number]    CT CEREBRAL PERFUSION WITH & WITHOUT CONTRAST    Result Date: 7/31/2023  Impression: Impression: Negative CT cerebral perfusion study. Electronically Signed: Lisa Barnard  7/31/2023 12:02 PM EDT  Workstation ID: [id number]     Results for orders placed during the hospital encounter of 01/25/23    Duplex carotid ultrasound  CAR    Interpretation Summary    Proximal right internal carotid artery is normal.    Proximal left internal carotid artery mild stenosis.      Results for orders placed during the hospital encounter of 01/25/23    Duplex carotid ultrasound CAR    Interpretation Summary    Proximal right internal carotid artery is normal.    Proximal left internal carotid artery mild stenosis.      Results for orders placed during the hospital encounter of 04/12/23    Adult Transthoracic Echo Complete W/ Color, Spectral and Contrast if Necessary Per Protocol    Interpretation Summary    Left ventricular systolic function is normal. Calculated left ventricular EF = 64% Left ventricular ejection fraction appears to be 61 - 65%.    Left ventricular diastolic function is consistent with (grade I) impaired relaxation.    There is a TAVR valve present.  It is well-seated with no AI and mean gradient of 7 mmHg.    Estimated right ventricular systolic pressure from tricuspid regurgitation is normal (<35 mmHg).      Labs Pending at Discharge:  Pending Labs       Order Current Status    P2Y12 Platelet Inhibition In process            Procedures Performed           Consults:   Consults       Date and Time Order Name Status Description    8/1/2023 12:26 AM Inpatient Neurology Consult Stroke Completed     7/31/2023 10:55 AM Inpatient Neurology Consult Stroke Completed               Discharge Details        Discharge Medications        New Medications        Instructions Start Date   rivaroxaban 15 MG tablet  Commonly known as: XARELTO   15 mg, Oral, Daily             Continue These Medications        Instructions Start Date   amLODIPine 10 MG tablet  Commonly known as: NORVASC   10 mg, Oral, Daily      aspirin 81 MG chewable tablet   81 mg, Oral, Daily      atorvastatin 40 MG tablet  Commonly known as: LIPITOR   TAKE 1 TABLET DAILY      fluticasone 50 MCG/ACT nasal spray  Commonly known as: FLONASE   2 sprays, Nasal, Daily PRN      Januvia 50 MG  tablet  Generic drug: SITagliptin   TAKE 1 TABLET DAILY      Jardiance 10 MG tablet tablet  Generic drug: empagliflozin   TAKE 1 TABLET DAILY      losartan 100 MG tablet  Commonly known as: COZAAR   100 mg, Oral, Daily      OneTouch Ultra test strip  Generic drug: glucose blood   USE AND DISCARD 1 TEST     STRIP DAILY.      pantoprazole 40 MG EC tablet  Commonly known as: PROTONIX   TAKE 1 TABLET DAILY      Synthroid 88 MCG tablet  Generic drug: levothyroxine   TAKE 1 TABLET DAILY             Stop These Medications      clopidogrel 75 MG tablet  Commonly known as: PLAVIX              Allergies   Allergen Reactions    Azithromycin Unknown - High Severity    Tramadol Unknown - High Severity         Discharge Disposition: stable to home  Home or Self Care    Diet:  Hospital:  Diet Order   Procedures    Diet: Cardiac Diets, Diabetic Diets; Healthy Heart (2-3 Na+); Consistent Carbohydrate; Texture: Regular Texture (IDDSI 7); Fluid Consistency: Thin (IDDSI 0)         Discharge Activity:   Activity Instructions       Activity as Tolerated                CODE STATUS:  Code Status and Medical Interventions:   Ordered at: 08/01/23 0026     Code Status (Patient has no pulse and is not breathing):    CPR (Attempt to Resuscitate)     Medical Interventions (Patient has pulse or is breathing):    Full Support     Release to patient:    Routine Release         Future Appointments   Date Time Provider Department Center   10/2/2023  8:00 AM LAB [facility]   10/4/2023 10:45 AM Serenity Wren MD [facility]   10/11/2023 11:00 AM Steve Muhammad MD [facility] NA P [facility]   1/31/2024 11:15 AM [facility] VASC MACHINE 4/CLINIC  [facility]   1/31/2024 12:00 PM ROOM 1,  [facility] VAS [facility] V [facility] None   2/14/2024 10:00 AM [facility] ECHO 1/OUTPATIENT  [facility]   2/14/2024 11:00 AM Steve Muhammad MD [facility] CAR NA P [facility] NA       Additional Instructions for the Follow-ups that You Need to Schedule       Discharge Follow-up  with PCP   As directed       Currently Documented PCP:    Serenity Wren MD    PCP Phone Number:    [phone]     Follow Up Details: Follow-up with PCP in one week. Patient to have outpatient ECHO to complete embolic stroke workup with the bubble study.        Discharge Follow-up with Specialty: Cardiology Dr. Muhammad; 1 Week   As directed      Specialty: Cardiology Dr. Muhammad   Follow Up: 1 Week   Follow Up Details: Patient to have outpatient ECHO to complete embolic stroke workup with the bubble study. Follow-up on newly prescribed Xarelto.                Time spent on Discharge including face to face service:  45 minutes    This patient has been examined wearing appropriate Personal Protective Equipment and discussed with  attending physician Dr. Gil . 08/01/23      Signature: Electronically signed by Ginny Pagan PA-C, 08/01/23, 18:50 EDT.  [facility] Hospitalist Team

## 2023-08-01 NOTE — NURSING NOTE
Patient admit to room 241 from [facility].  He is alert and oriented and is able to voice wants and needs to nurse.  No skin integrity issues observed at two nurse skin assessment.  Patient was able to answer MRI and admission questions appropriately.  Daly Velazco at bedside to evaluate patient. Home medications were verified by the nurse and put into [employer] system.  Patient declined PT, OT, ST services.  Passed Dysphagia screen.  Patient is up adlib without assistance and has no gait disturbance.  Spouse is at bedside.  She is staying the night at the bedside.  Patient to have MRI in am to r/o TIA.  Symptoms have resolved from  earlier in the morning and patient denies numbness or tingling in extremities.  NIHSS is 0 at the time of admit to Neuro Unit.  Will continue to monitor for the remainder of the shift.

## 2023-08-01 NOTE — CONSULTS
Primary Care Provider: Serenity Wren MD     Consult requested by:  Daly Buckner APRN     Reason for Consultation: Neurological evaluation     History taken from: patient chart RN    Chief complaint: Left arm weakness/numbness       SUBJECTIVE:    History of present illness: Background per H&P: Alen Ratliff is a 82 y.o. year old male who presented to [facility] on 7/31/2023 upon transfer from [facility] emergency department where he presented complaining of acute sudden onset of left arm numbness.  He was reading at that time was unable to turn the pages.  He reported heaviness and weakness.  This had occurred about 30 minutes prior to arrival.  He reported it lasted about 30 minutes with spontaneous resolution.  He denies any dizziness headache confusion blurry vision, chest pain or shortness of air.  He reports he had had a stroke in the past and presented with left leg numbness.  He denies any current complaints.  He is awake and alert and a good historian.  Wife at bedside.  Review of records shows he was admitted here in February for severe aortic stenosis and underwent a TAVR.  He underwent PCI with drug-eluting stent to the left circumflex.  He occasionally has bradycardic on the heart monitor however he denies any symptoms. High-sensitivity troponin 33 and 31 glucose 120, BUN 30 creatinine 1.23       Past medical history of coronary artery disease, diabetes mellitus type 2, GERD, hyperlipidemia, hypertension, acquired hypothyroidism, prostate cancer, status post transcatheter aortic valve replacement, paroxysmal atrial fibrillation, previous strokes and TIAs, chronic kidney disease stage III, chronic diastolic heart failure, known carotid artery stenosis   - Portions of the above HPI were copied from previous encounters and edited as appropriate. PMH as detailed below.     Pt denies any complaints at this time. He states he has experienced numbness in his arms on  occasion previously due to sitting in his recliner with pressure on his arms. He states this episode was different and did not cause that type of tingling/numbness. He describes radha numbness and dense weakness from the shoulder to the hand. No tingling or burning. No neck pain or radiating symptoms. No face or leg involvement. He denies any neck pain or injury. Symptoms resolved completely after about 30 minutes.   He takes ASA, Plavix, and Lipitor at home. No recent changes.     Review of Systems       PATIENT HISTORY:  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    Stroke    ,   Past Surgical History:   Procedure Laterality Date    ANKLE OPEN REDUCTION INTERNAL FIXATION Left 12/16/2020    Procedure: ANKLE OPEN REDUCTION INTERNAL FIXATION;  Surgeon: CAROLE Vasquez DPM;  Location: [facility] MAIN OR;  Service: Podiatry;  Laterality: Left;    AORTIC VALVE REPAIR/REPLACEMENT N/A 02/20/2023    Procedure: Transfemoral Transcatheter Aortic Valve Replacement;  Surgeon: Naveed Leonardo MD;  Location: [facility] HYBRID OR;  Service: Cardiovascular;  Laterality: N/A;    AORTIC VALVE REPAIR/REPLACEMENT N/A 02/20/2023    Procedure: TRANSCATHETER AORTIC VALVE REPLACEMENT;  Surgeon: Fabio Villafana MD;  Location: [facility] HYBRID OR;  Service: Cardiothoracic;  Laterality: N/A;    BACK SURGERY  1994    CARDIAC CATHETERIZATION Right 09/27/2022    Procedure: Left Heart Cath;  Surgeon: Steve Muhammad MD;  Location: [facility] CATH INVASIVE LOCATION;  Service: Cardiovascular;  Laterality: Right;    CARDIAC CATHETERIZATION N/A 01/12/2023    Procedure: Stent JENN coronary;  Surgeon: Steve Muhammad MD;  Location: [facility] CATH INVASIVE LOCATION;  Service: Cardiovascular;   Laterality: N/A;    CAROTID ENDARTERECTOMY Right 12/14/2020    Procedure: CAROTID ENDARTERECTOMY;  Surgeon: Toby Fung MD;  Location: [facility] MAIN OR;  Service: Vascular;  Laterality: Right;    COLONOSCOPY  08/25/2015    CORONARY STENT PLACEMENT      PROSTATECTOMY  2001    due to cancer   ,   Family History   Problem Relation Age of Onset    Hypertension Other     Heart attack Mother     Heart disease Mother     Heart attack Father     Heart disease Father    ,   Social History     Tobacco Use    Smoking status: Never    Smokeless tobacco: Never   Vaping Use    Vaping Use: Never used   Substance Use Topics    Alcohol use: Never    Drug use: Never   ,   Prior to Admission medications    Medication Sig Start Date End Date Taking? Authorizing Provider   amLODIPine (NORVASC) 10 MG tablet Take 1 tablet by mouth Daily. 5/8/23  Yes Serenity Wren MD   aspirin 81 MG chewable tablet Chew 1 tablet Daily. 12/18/20  Yes Kendall Arellano MD   atorvastatin (LIPITOR) 40 MG tablet TAKE 1 TABLET DAILY 7/1/23  Yes Serenity Wren MD   clopidogrel (PLAVIX) 75 MG tablet Take 1 tablet by mouth Daily. 5/8/23  Yes Steve Muhammad MD   glucose blood (OneTouch Ultra) test strip USE AND DISCARD 1 TEST     STRIP DAILY. 6/7/23  Yes Serenity Wren MD   Januvia 50 MG tablet TAKE 1 TABLET DAILY 6/7/23  Yes Serenity Wren MD   Jardiance 10 MG tablet tablet TAKE 1 TABLET DAILY 7/1/23  Yes Serenity Wren MD   losartan (COZAAR) 100 MG tablet Take 1 tablet by mouth Daily. 5/15/23  Yes Naveed Leonardo MD   pantoprazole (PROTONIX) 40 MG EC tablet TAKE 1 TABLET DAILY 5/19/23  Yes Serenity Wren MD   Synthroid 88 MCG tablet TAKE 1 TABLET DAILY 5/19/23  Yes Serenity Wren MD   fluticasone (FLONASE) 50 MCG/ACT nasal spray 2 sprays into the nostril(s) as directed by provider Daily As Needed.    Provider, MD Renee    Allergies:  Azithromycin and Tramadol    Current Facility-Administered Medications   Medication Dose Route  Frequency Provider Last Rate Last Admin    amLODIPine (NORVASC) tablet 10 mg  10 mg Oral Daily Daly Buckner APRN   10 mg at 08/01/23 0155    aspirin tablet 325 mg  325 mg Oral Daily Kinza-Daly Velazco APRN   325 mg at 08/01/23 1048    Or    aspirin suppository 300 mg  300 mg Rectal Daily Daly Buckner APRN        atorvastatin (LIPITOR) tablet 80 mg  80 mg Oral Nightly Daly Buckner APRN   80 mg at 08/01/23 0156    bisacodyl (DULCOLAX) suppository 10 mg  10 mg Rectal Daily PRN Daly Buckner APRN        clopidogrel (PLAVIX) tablet 75 mg  75 mg Oral Daily Kinza-Daly Velazco APRN   75 mg at 08/01/23 1049    dextrose (D50W) (25 g/50 mL) IV injection 25 g  25 g Intravenous Q15 Min PRN Daly Buckner APRN        dextrose (GLUTOSE) oral gel 15 g  15 g Oral Q15 Min PRN Daly Buckner APRN        glucagon (GLUCAGEN) injection 1 mg  1 mg Intramuscular Q15 Min PRN Daly Buckner APRN        insulin lispro (HUMALOG/ADMELOG) injection 2-7 Units  2-7 Units Subcutaneous 4x Daily AC & at Bedtime Maurice Duran MD        levothyroxine (SYNTHROID, LEVOTHROID) tablet 88 mcg  88 mcg Oral Q AM Daly Buckner APRN   88 mcg at 08/01/23 0537    losartan (COZAAR) tablet 100 mg  100 mg Oral Daily Kinza-Daly Velazco APRN   100 mg at 08/01/23 0155    ondansetron (ZOFRAN) injection 4 mg  4 mg Intravenous Q6H PRN Daly Buckner APRN        pantoprazole (PROTONIX) EC tablet 40 mg  40 mg Oral QAM AC Essing-Daly Velazco APRN   40 mg at 08/01/23 1049    sodium chloride 0.9 % flush 10 mL  10 mL Intravenous Q12H Daly Buckner APRN   10 mL at 08/01/23 1050    sodium chloride 0.9 % flush 10 mL  10 mL Intravenous PRN Soning-Daly Velazco APRN        sodium chloride 0.9 % infusion 40 mL  40 mL Intravenous PRN Soning-Daly Velazco APRN        sodium chloride 0.9 % infusion  75 mL/hr Intravenous Continuous Essing-Daly Velazco APRN 75 mL/hr at  08/01/23 0536 75 mL/hr at 08/01/23 0536        ________________________________________________________        OBJECTIVE:  Upon today's exam, pt is awake and alert. Wife at Bs.      Neurologic Exam  PHYSICAL EXAM:    CONSTITUTIONAL: The patient is in no apparent distress, bright awake and alert.      HEENT: There is no tenderness over the temporal arteries bilaterally. Normocephalic, atraumatic. TMJ open symmetrically without tenderness.    NECK: ROM normal, supple    RESPIRATORY: Breathing unlabored, Breath sounds are normal    CARDIAC: Regular rate and rhythm.     EXTREMITIES:  No clubbing, cyanosis or edema.    PSYCHIATRIC: Mood/affect normal, judgement normal, appropriate    NEUROLOGICAL:    Cognition:   Fully oriented.  Fund of knowledge excellent.  Concentration and attention normal.   Language normal with normal comprehension, fluent speech, intact repetition and naming.   Short and long term memory appears intact    Cranial nerves;    II - pupils bilaterally equal reacting to light,  No new Visual field deficits;  Fundoscopic exam- Not able to be done, non-dilated exam  III,IV,VI: EOMI with no diplopia. Covered eye test revealed slight malalignment with esotropia on the left  V: Normal facial sensations except decreased sensation around the jaw in the right (second to carotid surgery)  VII: No facial asymmetry,  VIII: No New hearing abnormality  IX, X, XI: normal gag and shoulder shrug;  XII: tongue is in the midline.    Sensory:  Intact to light touch in all extremities.     Motor: Strength 5/5 bilaterally upper and lower extremities. No involuntary movements present. Normal tone and bulk.  Deep tendon reflexes: 2/4 and symmetrical in biceps, brachioradialis, triceps, bilateral 2/4 knees and ankles. Both plantars are flexor.    Cerebellar: Finger to nose and mirror movements normal bilaterally.    Gait and balance: deferred    ________________________________________________________   RESULTS  REVIEW:    VITAL SIGNS:   Temp:  [96.4 øF (35.8 øC)-98.1 øF (36.7 øC)] 97.8 øF (36.6 øC)  Heart Rate:  [40-68] 51  Resp:  [15-20] 15  BP: (137-174)/(62-79) 137/63     LABS:      Lab 07/31/23  1103   WBC 7.86   HEMOGLOBIN 14.1   HEMATOCRIT 44.6   PLATELETS 179   NEUTROS ABS 5.65   IMMATURE GRANS (ABS) 0.01   LYMPHS ABS 1.39   MONOS ABS 0.70   EOS ABS 0.06   MCV 91.2         Lab 07/31/23  1103   SODIUM 139   POTASSIUM 3.9   CHLORIDE 106   CO2 23.8   ANION GAP 9.2   BUN 30*   CREATININE 1.23   EGFR 58.3*   GLUCOSE 164*   CALCIUM 9.7         Lab 07/31/23  1103   TOTAL PROTEIN 6.4   ALBUMIN 4.1   GLOBULIN 2.3   ALT (SGPT) 14   AST (SGOT) 17   BILIRUBIN 0.7   ALK PHOS 68         Lab 07/31/23  1301 07/31/23  1103   PROBNP  --  172.8   HSTROP T 31* 33*                 UA          7/31/2023    13:44   Urinalysis   Specific Gravity, UA <=1.005    Ketones, UA Negative    Blood, UA Trace    Leukocytes, UA Negative    Nitrite, UA Negative        Lab Results   Component Value Date    TSH 2.160 04/07/2023    LDL 66 01/12/2023    HGBA1C 6.90 (H) 04/07/2023       IMAGING STUDIES:  CT Angiogram Neck    Result Date: 7/31/2023  1. 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 2. Moderate stenosis is suggested at the right MCA bifurcation. 3. No acute thrombus or aneurysm is seen intracranially. 4. Less than 50% luminal stenosis in the proximal left internal carotid artery. 5. Right carotid endarterectomy. No high-grade stenosis of the right internal carotid artery 6. 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 Electronically Signed: Lisa Barnard  7/31/2023 12:50 PM EDT  Workstation ID: [id number]    MRI Brain Without Contrast    Result Date: 8/1/2023  Impression: 1. No evidence of acute ischemic change 2. Atrophy and white matter changes compatible with small vessel ischemic disease in this age group. Findings not significant change accounting for differences in technique  Electronically Signed: Alec Brownkin  8/1/2023 10:33 AM EDT  Workstation ID: [id number]    CT Angiogram Head    Result Date: 7/31/2023  1. 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 2. Moderate stenosis is suggested at the right MCA bifurcation. 3. No acute thrombus or aneurysm is seen intracranially. 4. Less than 50% luminal stenosis in the proximal left internal carotid artery. 5. Right carotid endarterectomy. No high-grade stenosis of the right internal carotid artery 6. 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 Electronically Signed: Lisa Barnard  7/31/2023 12:50 PM EDT  Workstation ID: [id number]    CT Head Without Contrast Stroke Protocol    Result Date: 7/31/2023  Impression: 1. No acute intracranial findings. 2. 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 3. Moderate atrophy. Electronically Signed: Lisa Barnard  7/31/2023 11:05 AM EDT  Workstation ID: [id number]    CT CEREBRAL PERFUSION WITH & WITHOUT CONTRAST    Result Date: 7/31/2023  Impression: Negative CT cerebral perfusion study. Electronically Signed: Lisa Barnard  7/31/2023 12:02 PM EDT  Workstation ID: [id number]     I reviewed the patient's new clinical results.    ________________________________________________________     PROBLEM LIST:    Left-sided weakness    Essential hypertension    Mixed hyperlipidemia    Acquired hypothyroidism    GERD (gastroesophageal reflux disease)    CKD (chronic kidney disease) stage 3, GFR 30-59 ml/min    Coronary artery disease involving native coronary artery of native heart    Type 2 diabetes mellitus without complication    PAF (paroxysmal atrial fibrillation)    Chronic diastolic CHF (congestive heart failure), NYHA class 2    S/P TAVR (transcatheter aortic valve replacement)    Bradycardia            ASSESSMENT/PLAN:  1. Transient left arm weakness/numbness,  resolved. Probable TIA. Radiculopathy is less likely with circumferential numbness and flaccid weakness involving the proximal and distal LUE.  - CTA head and neck: Moderate to severe stenoses in bilateral P1 posterior cerebral artery segments. Moderate stenosis is suggested at the right MCA bifurcation. No acute thrombus or aneurysm is seen intracranially. Less than 50% luminal stenosis in the proximal left internal carotid artery. Right carotid endarterectomy. No high-grade stenosis of the right internal carotid artery. 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  - MRI brain: No evidence of acute ischemic change. Atrophy and white matter changes compatible with small vessel ischemic disease in this age group. Findings not significant change accounting for differences in technique  - Echo: pending   - EKG: Sinus rhythm. Atrial premature complexes. Prolonged NJ interval. Incomplete left bundle branch block. Inferior infarct, old. Abnrm R prog, consider ASMI or lead placement  - Labs: A1C: 6.4, B12: >2000, LDL: 68, TSH: 3.07  - Antithrombotics: Pt is already on DAPT with ASA and Plavix--continue for now. Will check P2Y12 and consider changing Plavix to Brilinta pending result  - Statin: Increase home Lipitor to 80mg qhs  - PT/OT/ST as appropriate, fall precautions as appropriate, Neuro checks per protocol, DVT prophylaxis, Stroke education  - Pt is already on DAPT and statin therapy. Further stroke prevention should be aimed at risk factor modification. There is no indication for anticoagulation at this time. Pt has hx of afib postoperatively in 2020, but spontaneously converted and has maintained SR. No evidence of embolic strokes on MRI brain.   - If symptoms recur without evidence of stroke or if pt develops neck pain, consider further workup for cervical radiculopathy     2. Carotid stenosis. Moderate stenosis on the left, s/p right CEA ---asymptomatic  - ASA, Plavix, statin, risk factor  modification  - Continue to monitor outpt     3. Type 2 diabetes mellitus  - Strict glycemic control, SSI per primary    4. Hx of Atrial fibrillation, bradycardia  - telemetry  - Pt previously evaluated for stroke in 2020 and found to have right carotid stenosis for which he underwent a CEA. Postoperatively, he was found to have afib, but spontaneously converted and has maintained SR with relatively unremarkable event monitor completed outpt. Anticoagulation has been deferred for now. Pt follows Dr. Muhammad outpt      5. Hypertension  - Strict BP control, monitor per protocol    6. HLD  - Statin, recommend diet and lifestyle modifications    7. Chronic medical conditions: hypothyroidism (acquired), GERD, CKD, CHF, s/o TAVR, bradycardia  - Per primary     8. Chronic strokes (appears to be chronic small vessel disease, chronic basal ganglia strokes bilaterally, hx of TIAs  Modification of stroke risk factors:   - Blood pressure should be less than 130/80 outpatient, HbA1c less than 6.5, LDL less than 70; b12>500 and smoking cessation if applicable. We would be grateful if the primary team / primary care physician would keep a close watch on the above targets.  - Stroke education  - Follow up with neurologist of choice    Will sign off pending echocardiogram. Please follow P2Y12 result. Please call with questions or concerns.     I discussed the patient's findings and my recommendations with patient, family, nursing staff, and consulting provider    LARRY Cerna  08/01/23  11:41 EDT

## 2023-08-02 ENCOUNTER — DOCUMENTATION (OUTPATIENT)
Dept: INTERNAL MEDICINE | Facility: HOSPITAL | Age: 83
End: 2023-08-02
Payer: MEDICARE

## 2023-08-02 ENCOUNTER — OFFICE VISIT (OUTPATIENT)
Dept: FAMILY MEDICINE CLINIC | Facility: CLINIC | Age: 83
End: 2023-08-02
Payer: MEDICARE

## 2023-08-02 ENCOUNTER — TRANSITIONAL CARE MANAGEMENT TELEPHONE ENCOUNTER (OUTPATIENT)
Dept: CALL CENTER | Facility: HOSPITAL | Age: 83
End: 2023-08-02
Payer: MEDICARE

## 2023-08-02 VITALS
HEART RATE: 55 BPM | DIASTOLIC BLOOD PRESSURE: 53 MMHG | HEIGHT: 71 IN | OXYGEN SATURATION: 96 % | SYSTOLIC BLOOD PRESSURE: 97 MMHG | BODY MASS INDEX: 27.19 KG/M2 | RESPIRATION RATE: 16 BRPM | TEMPERATURE: 97.8 F | WEIGHT: 194.2 LBS

## 2023-08-02 DIAGNOSIS — E78.2 MIXED HYPERLIPIDEMIA: Chronic | ICD-10-CM

## 2023-08-02 DIAGNOSIS — I10 ESSENTIAL HYPERTENSION: Chronic | ICD-10-CM

## 2023-08-02 DIAGNOSIS — G45.9 TIA (TRANSIENT ISCHEMIC ATTACK): Primary | ICD-10-CM

## 2023-08-02 RX ORDER — ATORVASTATIN CALCIUM 80 MG/1
80 TABLET, FILM COATED ORAL DAILY
Qty: 90 TABLET | Refills: 0
Start: 2023-08-02

## 2023-08-02 NOTE — CASE MANAGEMENT/SOCIAL WORK
Case Management Discharge Note      Final Note: Home         Selected Continued Care - Discharged on 8/1/2023 Admission date: 7/31/2023 - Discharge disposition: Home or Self Care       Transportation Services  Private: Car    Final Discharge Disposition Code: 01 - home or self-care

## 2023-08-03 LAB — QT INTERVAL: 458 MS

## 2023-08-03 NOTE — ED NOTES
This screen was done at 13:45 prior to given medication or anything else     07/31/23 3049   Bedside Dysphagia Screenings   Reason for performing this screening Stroke (or rule-out)   Patient Factors Component (Dysphagia:Stroke or Rule-out)   Best Eye Response 4-->(E4) spontaneous   Best Motor Response 6-->(M6) obeys commands   Best Verbal Response 5-->(V5) oriented   Theresa Coma Scale Score 15   Is there Facial Asymmetry/Weakness? No   Is there Tongue Asymmetry/Weakness? No   Is there Palatal Asymmetry/Weakness? No   Patient Assessment Result Pass - Proceed to Water Test   Water Test Component (Bedside Dysphagia)   Drinks without stopping Yes   Voice clear after drinking Yes   Free from coughing / choking after drinking Yes   Bedside Dysphagia - Water Test - Result Pass   Bedside Dysphagia Screen - Overall Result   Bedside Dysphagia Screen - Overall Result Pass

## 2023-08-08 ENCOUNTER — HOSPITAL ENCOUNTER (OUTPATIENT)
Dept: CARDIOLOGY | Facility: HOSPITAL | Age: 83
Discharge: HOME | End: 2023-08-08
Payer: MEDICARE

## 2023-08-08 VITALS
DIASTOLIC BLOOD PRESSURE: 71 MMHG | HEIGHT: 70 IN | SYSTOLIC BLOOD PRESSURE: 131 MMHG | BODY MASS INDEX: 27.77 KG/M2 | WEIGHT: 194 LBS

## 2023-08-08 DIAGNOSIS — Z95.3 STATUS POST TRANSCATHETER AORTIC VALVE REPLACEMENT (TAVR) USING BIOPROSTHESIS: ICD-10-CM

## 2023-08-08 DIAGNOSIS — R53.1 LEFT-SIDED WEAKNESS: ICD-10-CM

## 2023-08-08 PROCEDURE — 93306 TTE W/DOPPLER COMPLETE: CPT

## 2023-08-10 ENCOUNTER — READMISSION MANAGEMENT (OUTPATIENT)
Dept: CALL CENTER | Facility: HOSPITAL | Age: 83
End: 2023-08-10
Payer: MEDICARE

## 2023-08-10 NOTE — OUTREACH NOTE
Stroke Week 2 Survey      Flowsheet Row Responses   Congregational facility patient discharged from? [facility]   Does the patient have one of the following disease processes/diagnoses(primary or secondary)? Stroke   Week 2 attempt successful? No   Unsuccessful attempts Attempt 1   Discharge diagnosis Left arm weakness/Suspected stroke            JANI LOMAX - Registered Nurse

## 2023-08-13 LAB
BH CV ECHO MEAS - AO MAX PG: 24.5 MMHG
BH CV ECHO MEAS - AO MEAN PG: 11.8 MMHG
BH CV ECHO MEAS - AO V2 MAX: 247.5 CM/SEC
BH CV ECHO MEAS - AO V2 VTI: 53.1 CM
BH CV ECHO MEAS - AVA(I,D): 1.39 CM2
BH CV ECHO MEAS - EDV(CUBED): 66.8 ML
BH CV ECHO MEAS - EDV(MOD-SP4): 67 ML
BH CV ECHO MEAS - EF(MOD-BP): 63 %
BH CV ECHO MEAS - EF(MOD-SP4): 62.9 %
BH CV ECHO MEAS - ESV(CUBED): 15.2 ML
BH CV ECHO MEAS - ESV(MOD-SP4): 24.8 ML
BH CV ECHO MEAS - FS: 39 %
BH CV ECHO MEAS - IVS/LVPW: 0.91 CM
BH CV ECHO MEAS - IVSD: 1.31 CM
BH CV ECHO MEAS - LA DIMENSION: 4.6 CM
BH CV ECHO MEAS - LV DIASTOLIC VOL/BSA (35-75): 32.5 CM2
BH CV ECHO MEAS - LV MASS(C)D: 206.4 GRAMS
BH CV ECHO MEAS - LV MAX PG: 6.7 MMHG
BH CV ECHO MEAS - LV MEAN PG: 4.1 MMHG
BH CV ECHO MEAS - LV SYSTOLIC VOL/BSA (12-30): 12.1 CM2
BH CV ECHO MEAS - LV V1 MAX: 129.5 CM/SEC
BH CV ECHO MEAS - LV V1 VTI: 30.9 CM
BH CV ECHO MEAS - LVIDD: 4.1 CM
BH CV ECHO MEAS - LVIDS: 2.48 CM
BH CV ECHO MEAS - LVOT AREA: 2.39 CM2
BH CV ECHO MEAS - LVOT DIAM: 1.74 CM
BH CV ECHO MEAS - LVPWD: 1.43 CM
BH CV ECHO MEAS - MR MAX PG: 100.9 MMHG
BH CV ECHO MEAS - MR MAX VEL: 500.6 CM/SEC
BH CV ECHO MEAS - MV A MAX VEL: 95.9 CM/SEC
BH CV ECHO MEAS - MV DEC SLOPE: 397.9 CM/SEC2
BH CV ECHO MEAS - MV DEC TIME: 0.18 MSEC
BH CV ECHO MEAS - MV E MAX VEL: 73.1 CM/SEC
BH CV ECHO MEAS - MV E/A: 0.76
BH CV ECHO MEAS - MV MAX PG: 5 MMHG
BH CV ECHO MEAS - MV MEAN PG: 2.9 MMHG
BH CV ECHO MEAS - MV V2 VTI: 28 CM
BH CV ECHO MEAS - MVA(VTI): 2.6 CM2
BH CV ECHO MEAS - PA ACC TIME: 0.09 SEC
BH CV ECHO MEAS - PA V2 MAX: 127.7 CM/SEC
BH CV ECHO MEAS - RAP SYSTOLE: 8 MMHG
BH CV ECHO MEAS - RV MAX PG: 3.2 MMHG
BH CV ECHO MEAS - RV V1 MAX: 89.8 CM/SEC
BH CV ECHO MEAS - RV V1 VTI: 22.5 CM
BH CV ECHO MEAS - RVSP: 27.5 MMHG
BH CV ECHO MEAS - SI(MOD-SP4): 20.4 ML/M2
BH CV ECHO MEAS - SV(LVOT): 73.9 ML
BH CV ECHO MEAS - SV(MOD-SP4): 42.1 ML
BH CV ECHO MEAS - TR MAX PG: 19.5 MMHG
BH CV ECHO MEAS - TR MAX VEL: 220.4 CM/SEC
BH CV ECHO SHUNT ASSESSMENT PERFORMED (HIDDEN SCRIPTING): 1

## 2023-08-14 ENCOUNTER — TELEPHONE (OUTPATIENT)
Dept: CARDIOLOGY | Facility: CLINIC | Age: 83
End: 2023-08-14
Payer: MEDICARE

## 2023-08-16 ENCOUNTER — READMISSION MANAGEMENT (OUTPATIENT)
Dept: CALL CENTER | Facility: HOSPITAL | Age: 83
End: 2023-08-16
Payer: MEDICARE

## 2023-08-16 NOTE — OUTREACH NOTE
Stroke Week 3 Survey      Flowsheet Row Responses   Religion facility patient discharged from? [facility]   Does the patient have one of the following disease processes/diagnoses(primary or secondary)? Stroke   Week 3 attempt successful? No   Unsuccessful attempts Attempt 1            Nikki LAM - Registered Nurse

## 2023-08-22 ENCOUNTER — READMISSION MANAGEMENT (OUTPATIENT)
Dept: CALL CENTER | Facility: HOSPITAL | Age: 83
End: 2023-08-22
Payer: MEDICARE

## 2023-08-22 NOTE — OUTREACH NOTE
Stroke Week 3 Survey      Flowsheet Row Responses   [facility] [place] facility patient discharged from? [facility]   Does the patient have one of the following disease processes/diagnoses(primary or secondary)? Stroke   Week 3 attempt successful? No   Unsuccessful attempts Attempt 2   Revoke Decline to participate            Angie COX - Licensed Nurse

## 2023-08-25 ENCOUNTER — TELEPHONE (OUTPATIENT)
Dept: FAMILY MEDICINE CLINIC | Facility: CLINIC | Age: 83
End: 2023-08-25
Payer: MEDICARE

## 2023-08-25 DIAGNOSIS — E78.2 MIXED HYPERLIPIDEMIA: Chronic | ICD-10-CM

## 2023-08-25 DIAGNOSIS — E03.9 ACQUIRED HYPOTHYROIDISM: Primary | ICD-10-CM

## 2023-08-25 DIAGNOSIS — E11.42 DM TYPE 2 WITH DIABETIC PERIPHERAL NEUROPATHY: ICD-10-CM

## 2023-08-25 DIAGNOSIS — I10 ESSENTIAL HYPERTENSION: Chronic | ICD-10-CM

## 2023-08-25 NOTE — TELEPHONE ENCOUNTER
PATIENT HAS A LAB APPT ON 10/2/23. I DO NOT SEE ORDERS IN THE CHART, CAN YOU PLEASE ADD ORDERS. THANK YOU!

## 2023-09-11 RX ORDER — SITAGLIPTIN 50 MG/1
TABLET, FILM COATED ORAL
Qty: 90 TABLET | Refills: 0 | Status: SHIPPED | OUTPATIENT
Start: 2023-09-11

## 2023-09-27 ENCOUNTER — CLINICAL SUPPORT (OUTPATIENT)
Dept: FAMILY MEDICINE CLINIC | Facility: CLINIC | Age: 83
End: 2023-09-27
Payer: MEDICARE

## 2023-09-27 DIAGNOSIS — Z23 NEED FOR VACCINATION: Primary | ICD-10-CM

## 2023-10-02 LAB
ALBUMIN SERPL-MCNC: 4.1 G/DL (ref 3.5–5.2)
ALBUMIN/GLOB SERPL: 1.4 G/DL
ALP SERPL-CCNC: 64 U/L (ref 39–117)
ALT SERPL W P-5'-P-CCNC: 22 U/L (ref 1–41)
ANION GAP SERPL CALCULATED.3IONS-SCNC: 13 MMOL/L (ref 5–15)
AST SERPL-CCNC: 24 U/L (ref 1–40)
BILIRUB SERPL-MCNC: 0.4 MG/DL (ref 0–1.2)
BUN SERPL-MCNC: 23 MG/DL (ref 8–23)
BUN/CREAT SERPL: 17.3 (ref 7–25)
CALCIUM SPEC-SCNC: 9.3 MG/DL (ref 8.6–10.5)
CHLORIDE SERPL-SCNC: 108 MMOL/L (ref 98–107)
CHOLEST SERPL-MCNC: 115 MG/DL (ref 0–200)
CO2 SERPL-SCNC: 24 MMOL/L (ref 22–29)
CREAT SERPL-MCNC: 1.33 MG/DL (ref 0.76–1.27)
EGFRCR SERPLBLD CKD-EPI 2021: 52.7 ML/MIN/1.73
GLOBULIN UR ELPH-MCNC: 2.9 GM/DL
GLUCOSE SERPL-MCNC: 121 MG/DL (ref 65–99)
HBA1C MFR BLD: 6.7 % (ref 4.8–5.6)
HDLC SERPL-MCNC: 42 MG/DL (ref 40–60)
LDLC SERPL CALC-MCNC: 56 MG/DL (ref 0–100)
LDLC/HDLC SERPL: 1.32 {RATIO}
POTASSIUM SERPL-SCNC: 4.1 MMOL/L (ref 3.5–5.2)
PROT SERPL-MCNC: 7 G/DL (ref 6–8.5)
SODIUM SERPL-SCNC: 145 MMOL/L (ref 136–145)
TRIGL SERPL-MCNC: 87 MG/DL (ref 0–150)
TSH SERPL DL<=0.05 MIU/L-ACNC: 4.93 UIU/ML (ref 0.27–4.2)
VLDLC SERPL-MCNC: 17 MG/DL (ref 5–40)

## 2023-10-02 PROCEDURE — 80061 LIPID PANEL: CPT | Performed by: FAMILY MEDICINE

## 2023-10-02 PROCEDURE — 83036 HEMOGLOBIN GLYCOSYLATED A1C: CPT | Performed by: FAMILY MEDICINE

## 2023-10-02 PROCEDURE — 80053 COMPREHEN METABOLIC PANEL: CPT | Performed by: FAMILY MEDICINE

## 2023-10-02 PROCEDURE — 84439 ASSAY OF FREE THYROXINE: CPT | Performed by: FAMILY MEDICINE

## 2023-10-02 PROCEDURE — 84443 ASSAY THYROID STIM HORMONE: CPT | Performed by: FAMILY MEDICINE

## 2023-10-03 DIAGNOSIS — E03.9 ACQUIRED HYPOTHYROIDISM: Primary | ICD-10-CM

## 2023-10-03 LAB — T4 FREE SERPL-MCNC: 1.37 NG/DL (ref 0.93–1.7)

## 2023-10-04 ENCOUNTER — OFFICE VISIT (OUTPATIENT)
Dept: FAMILY MEDICINE CLINIC | Facility: CLINIC | Age: 83
End: 2023-10-04
Payer: MEDICARE

## 2023-10-04 VITALS
DIASTOLIC BLOOD PRESSURE: 65 MMHG | TEMPERATURE: 98.2 F | WEIGHT: 194.4 LBS | SYSTOLIC BLOOD PRESSURE: 124 MMHG | OXYGEN SATURATION: 94 % | HEIGHT: 70 IN | RESPIRATION RATE: 16 BRPM | HEART RATE: 51 BPM | BODY MASS INDEX: 27.83 KG/M2

## 2023-10-04 DIAGNOSIS — I10 ESSENTIAL HYPERTENSION: Chronic | ICD-10-CM

## 2023-10-04 DIAGNOSIS — E11.42 DM TYPE 2 WITH DIABETIC PERIPHERAL NEUROPATHY: Primary | ICD-10-CM

## 2023-10-04 DIAGNOSIS — E78.2 MIXED HYPERLIPIDEMIA: Chronic | ICD-10-CM

## 2023-10-04 DIAGNOSIS — K21.9 GASTROESOPHAGEAL REFLUX DISEASE, UNSPECIFIED WHETHER ESOPHAGITIS PRESENT: ICD-10-CM

## 2023-10-04 DIAGNOSIS — E03.9 ACQUIRED HYPOTHYROIDISM: ICD-10-CM

## 2023-10-04 PROBLEM — Z23 NEED FOR VACCINATION: Status: RESOLVED | Noted: 2022-10-06 | Resolved: 2023-10-04

## 2023-10-04 PROBLEM — R04.0 EPISTAXIS: Status: RESOLVED | Noted: 2020-12-23 | Resolved: 2023-10-04

## 2023-10-04 PROBLEM — E11.9 TYPE 2 DIABETES MELLITUS WITHOUT COMPLICATION: Status: RESOLVED | Noted: 2021-10-19 | Resolved: 2023-10-04

## 2023-10-04 PROBLEM — J01.00 SUBACUTE MAXILLARY SINUSITIS: Status: RESOLVED | Noted: 2022-12-21 | Resolved: 2023-10-04

## 2023-10-04 PROCEDURE — 3078F DIAST BP <80 MM HG: CPT | Performed by: FAMILY MEDICINE

## 2023-10-04 PROCEDURE — 99214 OFFICE O/P EST MOD 30 MIN: CPT | Performed by: FAMILY MEDICINE

## 2023-10-04 PROCEDURE — 3074F SYST BP LT 130 MM HG: CPT | Performed by: FAMILY MEDICINE

## 2023-10-04 RX ORDER — AMLODIPINE BESYLATE 10 MG/1
10 TABLET ORAL DAILY
Qty: 90 TABLET | Refills: 1 | Status: SHIPPED | OUTPATIENT
Start: 2023-10-04

## 2023-10-04 RX ORDER — LEVOTHYROXINE SODIUM 88 UG/1
88 TABLET ORAL DAILY
Qty: 90 TABLET | Refills: 1 | Status: SHIPPED | OUTPATIENT
Start: 2023-10-04

## 2023-10-04 RX ORDER — PANTOPRAZOLE SODIUM 40 MG/1
40 TABLET, DELAYED RELEASE ORAL DAILY
Qty: 90 TABLET | Refills: 1 | Status: SHIPPED | OUTPATIENT
Start: 2023-10-04

## 2023-10-04 NOTE — PROGRESS NOTES
Subjective   Chief Complaint   Patient presents with    Follow-up    Med Refill    Hyperlipidemia    Hypertension    Hypothyroidism    Diabetes     Alen Ratliff is a 83 y.o. male.     Patient Care Team:  Serenity Wren MD as PCP - General (Family Medicine)  Steve Muhammad MD as Consulting Physician (Cardiology)  CAROLE Vasquez DPM as Consulting Physician (Podiatry)  Toby Fung MD as Consulting Physician (Vascular Surgery)  Naveed Leonardo MD as Cardiologist (Cardiology)    History of Present Illness  He is coming in today to follow-up on his chronic medical problems and his medications including diabetes, hypertension, hypothyroidism, and hyperlipidemia.  We are also reviewing and discussing his recent fasting blood work results.  Patient is followed by cardiologist on regular basis.  He takes medicines as directed and his wife who is here today for the appointment helps with his needs.  No issues are being reported. Patient does not report any chest pain, shortness of breath, dizziness, nausea, vomiting, or diarrhea, visual issues, headaches, numbness or tingling. No urinary issues reported like urgency, frequency, or discomfort upon urination.  No significant weight changes reported.  No swelling reported.  No rashes or any other skin issues reported. No emotional issues or insomnia.       The following portions of the patient's history were reviewed and updated as appropriate: allergies, current medications, past family history, past medical history, past social history, past surgical history, and problem list.  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    Stroke      Past Surgical History:   Procedure Laterality Date     ANKLE OPEN REDUCTION INTERNAL FIXATION Left 12/16/2020    Procedure: ANKLE OPEN REDUCTION INTERNAL FIXATION;  Surgeon: CAROLE Vasquez DPM;  Location: [facility] MAIN OR;  Service: Podiatry;  Laterality: Left;    AORTIC VALVE REPAIR/REPLACEMENT N/A 02/20/2023    Procedure: Transfemoral Transcatheter Aortic Valve Replacement;  Surgeon: Naveed Leonardo MD;  Location: [facility] HYBRID OR;  Service: Cardiovascular;  Laterality: N/A;    AORTIC VALVE REPAIR/REPLACEMENT N/A 02/20/2023    Procedure: TRANSCATHETER AORTIC VALVE REPLACEMENT;  Surgeon: Fabio Villafana MD;  Location: [facility] HYBRID OR;  Service: Cardiothoracic;  Laterality: N/A;    BACK SURGERY  1994    CARDIAC CATHETERIZATION Right 09/27/2022    Procedure: Left Heart Cath;  Surgeon: Steve Muhammad MD;  Location: [facility] CATH INVASIVE LOCATION;  Service: Cardiovascular;  Laterality: Right;    CARDIAC CATHETERIZATION N/A 01/12/2023    Procedure: Stent JENN coronary;  Surgeon: Steve Muhammad MD;  Location: [facility] CATH INVASIVE LOCATION;  Service: Cardiovascular;  Laterality: N/A;    CAROTID ENDARTERECTOMY Right 12/14/2020    Procedure: CAROTID ENDARTERECTOMY;  Surgeon: Toby Fung MD;  Location: [facility] MAIN OR;  Service: Vascular;  Laterality: Right;    COLONOSCOPY  08/25/2015    CORONARY STENT PLACEMENT      PROSTATECTOMY  2001    due to cancer     The patient has a family history of  Family History   Problem Relation Age of Onset    Hypertension Other     Heart attack Mother     Heart disease Mother     Heart attack Father     Heart disease Father      Social History     Socioeconomic History    Marital status:    Tobacco Use    Smoking status: Never    Smokeless tobacco: Never   Vaping Use    Vaping Use: Never used   Substance and Sexual Activity    Alcohol use: Never    Drug use: Never    Sexual activity: Defer       Review of Systems   Constitutional:  Negative for activity change, fatigue and fever.   Respiratory:  Negative for shortness of breath and  "wheezing.    Cardiovascular:  Negative for chest pain, palpitations and leg swelling.   Endocrine: Negative for polydipsia and polyphagia.   Musculoskeletal:  Negative for arthralgias and back pain.   Skin:  Negative for rash.   Neurological:  Negative for tremors and headache.   Visit Vitals  /65 (BP Location: Left arm, Patient Position: Sitting, Cuff Size: Adult)   Pulse 51   Temp 98.2 °F (36.8 °C) (Infrared)   Resp 16   Ht 177.8 cm (70\")   Wt 88.2 kg (194 lb 6.4 oz)   SpO2 94%   BMI 27.89 kg/m²            Current Outpatient Medications:     amLODIPine (NORVASC) 10 MG tablet, Take 1 tablet by mouth Daily., Disp: 90 tablet, Rfl: 1    aspirin 81 MG chewable tablet, Chew 1 tablet Daily., Disp: 30 tablet, Rfl: 1    atorvastatin (Lipitor) 80 MG tablet, Take 1 tablet by mouth Daily., Disp: 90 tablet, Rfl: 0    fluticasone (FLONASE) 50 MCG/ACT nasal spray, 2 sprays into the nostril(s) as directed by provider Daily As Needed., Disp: , Rfl:     glucose blood (OneTouch Ultra) test strip, USE AND DISCARD 1 TEST     STRIP DAILY., Disp: 100 each, Rfl: 1    Januvia 50 MG tablet, TAKE 1 TABLET DAILY, Disp: 90 tablet, Rfl: 0    Jardiance 10 MG tablet tablet, TAKE 1 TABLET DAILY, Disp: 90 tablet, Rfl: 1    levothyroxine (Synthroid) 88 MCG tablet, Take 1 tablet by mouth Daily., Disp: 90 tablet, Rfl: 1    losartan (COZAAR) 100 MG tablet, Take 1 tablet by mouth Daily., Disp: 90 tablet, Rfl: 3    pantoprazole (PROTONIX) 40 MG EC tablet, Take 1 tablet by mouth Daily., Disp: 90 tablet, Rfl: 1    rivaroxaban (XARELTO) 15 MG tablet, Take 1 tablet by mouth Daily., Disp: 90 tablet, Rfl: 1    Objective   Physical Exam  Vitals and nursing note reviewed.   Constitutional:       General: He is not in acute distress.     Appearance: Normal appearance. He is well-developed. He is not ill-appearing.   HENT:      Head: Normocephalic and atraumatic.   Cardiovascular:      Rate and Rhythm: Normal rate and regular rhythm.      Heart sounds: " Normal heart sounds. No murmur heard.    No gallop.   Pulmonary:      Effort: Pulmonary effort is normal. No respiratory distress.      Breath sounds: Normal breath sounds. No wheezing, rhonchi or rales.   Chest:      Chest wall: No tenderness.   Musculoskeletal:      Cervical back: Normal range of motion and neck supple.   Neurological:      General: No focal deficit present.      Mental Status: He is alert and oriented to person, place, and time. Mental status is at baseline.   Psychiatric:         Mood and Affect: Mood normal.       FOLLOWING LABS WERE REVIEWED TODAY:  CMP          7/31/2023    11:03 8/1/2023    11:18 10/2/2023    08:05   CMP   Glucose 164  137  121    BUN 30  26  23    Creatinine 1.23  1.13  1.33    EGFR 58.3  64.5  52.7    Sodium 139  143  145    Potassium 3.9  4.1  4.1    Chloride 106  107  108    Calcium 9.7  9.2  9.3    Total Protein 6.4  6.4  7.0    Albumin 4.1  3.6  4.1    Globulin 2.3  2.8  2.9    Total Bilirubin 0.7  0.5  0.4    Alkaline Phosphatase 68  66  64    AST (SGOT) 17  15  24    ALT (SGPT) 14  14  22    Albumin/Globulin Ratio 1.8  1.3  1.4    BUN/Creatinine Ratio 24.4  23.0  17.3    Anion Gap 9.2  12.0  13.0      Lipid Panel          1/12/2023    23:54 8/1/2023    11:18 10/2/2023    08:05   Lipid Panel   Total Cholesterol 129  137  115    Triglycerides 150  177  87    HDL Cholesterol 37  39  42    VLDL Cholesterol 26  30  17    LDL Cholesterol  66  68  56    LDL/HDL Ratio 1.68  1.61  1.32      TSH          4/7/2023    11:21 8/1/2023    11:18 10/2/2023    08:05   TSH   TSH 2.160  3.070  4.930      Most Recent A1C          10/2/2023    08:05   HGBA1C Most Recent   Hemoglobin A1C 6.70              Assessment & Plan   Diagnoses and all orders for this visit:    1. 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 Mixed hyperlipidemia    3. Essential hypertension  -     amLODIPine (NORVASC) 10 MG tablet; Take 1 tablet by mouth Daily.  Dispense: 90 tablet; Refill: 1    4.  Acquired hypothyroidism  -     levothyroxine (Synthroid) 88 MCG tablet; Take 1 tablet by mouth Daily.  Dispense: 90 tablet; Refill: 1    5. Gastroesophageal reflux disease, unspecified whether esophagitis present  -     pantoprazole (PROTONIX) 40 MG EC tablet; Take 1 tablet by mouth Daily.  Dispense: 90 tablet; Refill: 1      I reviewed his medical problems and his medications.  I also reviewed his recent fasting blood work results.  His creatinine is slightly elevated.  Patient is to stay off anti-inflammatories and hydration was reinforced.  His TSH is slightly elevated, but his free T4 is within normal limits.  I advised the patient to continue levothyroxine 88 mcg for now and I plan to recheck blood work at his follow-up appointment in 6 months.      Return in about 6 months (around 4/4/2024) for Medicare Wellness.    Requested Prescriptions     Signed Prescriptions Disp Refills    levothyroxine (Synthroid) 88 MCG tablet 90 tablet 1     Sig: Take 1 tablet by mouth Daily.    pantoprazole (PROTONIX) 40 MG EC tablet 90 tablet 1     Sig: Take 1 tablet by mouth Daily.    amLODIPine (NORVASC) 10 MG tablet 90 tablet 1     Sig: Take 1 tablet by mouth Daily.

## 2023-10-10 NOTE — PROGRESS NOTES
Date of Office Visit: 10/11/2023  Encounter Provider: Dr. Steve Muhammad  Place of Service: [facility] CARDIOLOGY [place]  Patient Name: Alen Ratliff  :1940  Serenity Wren MD    Chief Complaint   Patient presents with    Hypertension    Hyperlipidemia    Heart Murmur    Atrial Fibrillation    Coronary Artery Disease    Follow-up     History of Present Illness    I am pleased to see Mr. Rao in my office today as a follow-up.     As you know, patient is 83-year-old white gentleman whose past medical history is significant for hypertension, hyperlipidemia, diabetes mellitus, history of cardiac murmur, who came today for follow-up.     In 2020, patient was seen in my office for symptom of shortness of breath and relative bradycardia.  Patient also had symptom of occasional chest pain.  Patient underwent stress test which showed moderate inferior/septal fixed defect with small amount of joseph-infarct ischemia.  Echocardiogram showed normal left ventricular size and function and LVEF was 55 to 60%.  Mild LVH was noted.  Calcified aortic valve was noted with mild to moderate aortic valve stenosis with aortic valve area of 1.3 cmý.  Holter monitor showed sinus bradycardia with episode of relative bradycardia.  No significant pause or heart rate less than 40 was noted.     In 2020, patient was admitted at [facility] with slurring of speech and possible TIA.  Neurological work-up showed high-grade stenosis of right carotid artery.  Patient underwent endarterectomy.  Postoperatively patient had atrial fibrillation with controlled heart rate.  Patient spontaneously cardioverted.  Patient was started on aspirin and Plavix.  Patient was discharged on event monitor.  Event monitor showed predominantly sinus rhythm.  Patient had average heart rate of 60 bpm.  No significant atrial fibrillation burden noted.     In 2022, patient underwent echocardiogram which showed EF of 55  to 60%.  Patient was noted to have moderate to severe aortic regurgitation.  Peak aortic velocity was 3.8 m/s with peak gradient of 56 mmHg with mean gradient of 30 mmHg.     In August 2022, patient underwent CHRISTIANO and it showed EF of 60 to 65%.  Severe aortic stenosis with valve area of 0.59 cm was noted. Peak velocity was 4.3 m/s with peak gradient of 74 mmHg and mean gradient of 31 mmHg.    In September 2022, patient underwent cardiac catheterization which showed LAD was free of significant disease.  LCx has high-grade stenosis and RCA had 100% occlusion with collaterals.    In August 2022, I referred the patient to Dr. Vlilafana for possible TAVR/SAVR with possible CABG..  Initially he was thought to be a candidate for SAVR but subsequently Dr. Villafana recommended TAVR procedure.  In January 2023, patient underwent PCI and stenting of the LCx.  Patient was subsequently recommended to proceed with TAVR.  In February 2023, patient underwent TAVR valve replacement and postoperatively did well.    Patient came today for follow-up.  Overall patient is doing well.  He has not had any further episode of chest pain or tightness or heaviness.  He is fairly active and doing chores at home.  He does not do any aerobic exercises.  However during walking doing grocery he does not reproduce any shortness of breath or chest pain.  No orthopnea PND no syncope or presyncope.  No leg edema noted.    EKG showed sinus rhythm with heart rate of 62 bpm.  Intraventricular conduction delay noted.  LVH is present.    I am overall pleased with the patient condition but patient does have underlying sick sinus syndrome.  Currently heart rate is stable.  Patient is asymptomatic.  Patient is educated about bradycardia.  If there is recurrence of symptoms patient would need pacemaker.      Past Medical History:   Diagnosis Date    Allergic rhinitis     Aortic stenosis     Atrial fibrillation     Coronary artery disease     Diabetes     GERD  (gastroesophageal reflux disease)     Heart murmur     Hyperlipidemia     Hypertension     Hypothyroidism     Prostate cancer     S/P TAVR (transcatheter aortic valve replacement) 02/20/2023    Stroke          Past Surgical History:   Procedure Laterality Date    ANKLE OPEN REDUCTION INTERNAL FIXATION Left 12/16/2020    Procedure: ANKLE OPEN REDUCTION INTERNAL FIXATION;  Surgeon: CAROLE Vasquez DPM;  Location: [facility] MAIN OR;  Service: Podiatry;  Laterality: Left;    AORTIC VALVE REPAIR/REPLACEMENT N/A 02/20/2023    Procedure: Transfemoral Transcatheter Aortic Valve Replacement;  Surgeon: Naveed Leonardo MD;  Location: [facility] HYBRID OR;  Service: Cardiovascular;  Laterality: N/A;    AORTIC VALVE REPAIR/REPLACEMENT N/A 02/20/2023    Procedure: TRANSCATHETER AORTIC VALVE REPLACEMENT;  Surgeon: Fabio Villafana MD;  Location: [facility] HYBRID OR;  Service: Cardiothoracic;  Laterality: N/A;    BACK SURGERY  1994    CARDIAC CATHETERIZATION Right 09/27/2022    Procedure: Left Heart Cath;  Surgeon: Steve Muhammad MD;  Location: [facility] CATH INVASIVE LOCATION;  Service: Cardiovascular;  Laterality: Right;    CARDIAC CATHETERIZATION N/A 01/12/2023    Procedure: Stent JENN coronary;  Surgeon: Steve Muhammad MD;  Location: [facility] CATH INVASIVE LOCATION;  Service: Cardiovascular;  Laterality: N/A;    CAROTID ENDARTERECTOMY Right 12/14/2020    Procedure: CAROTID ENDARTERECTOMY;  Surgeon: Toby Fung MD;  Location: [facility] MAIN OR;  Service: Vascular;  Laterality: Right;    COLONOSCOPY  08/25/2015    CORONARY STENT PLACEMENT      PROSTATECTOMY  2001    due to cancer           Current Outpatient Medications:     amLODIPine (NORVASC) 10 MG tablet, Take 1 tablet by mouth Daily., Disp: 90 tablet, Rfl: 1    aspirin 81 MG chewable tablet, Chew 1 tablet Daily., Disp: 30 tablet, Rfl: 1    atorvastatin (Lipitor) 80 MG tablet, Take 1 tablet by mouth Daily., Disp: 90 tablet, Rfl: 0    fluticasone (FLONASE) 50 MCG/ACT nasal spray, 2  sprays into the nostril(s) as directed by provider Daily As Needed., Disp: , Rfl:     glucose blood (OneTouch Ultra) test strip, USE AND DISCARD 1 TEST     STRIP DAILY., Disp: 100 each, Rfl: 1    Januvia 50 MG tablet, TAKE 1 TABLET DAILY, Disp: 90 tablet, Rfl: 0    Jardiance 10 MG tablet tablet, TAKE 1 TABLET DAILY, Disp: 90 tablet, Rfl: 1    levothyroxine (Synthroid) 88 MCG tablet, Take 1 tablet by mouth Daily., Disp: 90 tablet, Rfl: 1    losartan (COZAAR) 100 MG tablet, Take 1 tablet by mouth Daily., Disp: 90 tablet, Rfl: 3    pantoprazole (PROTONIX) 40 MG EC tablet, Take 1 tablet by mouth Daily., Disp: 90 tablet, Rfl: 1    rivaroxaban (XARELTO) 15 MG tablet, Take 1 tablet by mouth Daily., Disp: 90 tablet, Rfl: 1      Social History     Socioeconomic History    Marital status:    Tobacco Use    Smoking status: Never    Smokeless tobacco: Never   Vaping Use    Vaping Use: Never used   Substance and Sexual Activity    Alcohol use: Never    Drug use: Never    Sexual activity: Defer         Review of Systems   Constitutional: Negative for chills and fever.   HENT:  Negative for ear discharge and nosebleeds.    Eyes:  Negative for discharge and redness.   Cardiovascular:  Negative for chest pain, orthopnea, palpitations, paroxysmal nocturnal dyspnea and syncope.   Respiratory:  Negative for cough, shortness of breath and wheezing.    Endocrine: Negative for heat intolerance.   Skin:  Negative for rash.   Musculoskeletal:  Negative for arthritis and myalgias.   Gastrointestinal:  Negative for abdominal pain, melena, nausea and vomiting.   Genitourinary:  Negative for dysuria and hematuria.   Neurological:  Negative for dizziness, light-headedness, numbness and tremors.   Psychiatric/Behavioral:  Negative for depression. The patient is not nervous/anxious.        Procedures      ECG 12 Lead    Date/Time: 10/11/2023 12:42 PM  Performed by: Steve Muhammad MD    Authorized by: Steve Muhammad MD  Comparison: compared  "with previous ECG   Similar to previous ECG  Rhythm: sinus rhythm and sinus bradycardia  Other findings: left ventricular hypertrophy    Clinical impression: abnormal EKG          ECG 12 Lead    (Results Pending)           Objective:    /58 (BP Location: Right arm, Patient Position: Sitting, Cuff Size: Large Adult)   Pulse 62   Ht 177.8 cm (70\")   Wt 86.6 kg (191 lb)   SpO2 97%   BMI 27.41 kg/mý         Constitutional:       Appearance: Well-developed.   Eyes:      General: No scleral icterus.        Right eye: No discharge.   HENT:      Head: Normocephalic and atraumatic.   Neck:      Thyroid: No thyromegaly.      Lymphadenopathy: No cervical adenopathy.   Pulmonary:      Effort: Pulmonary effort is normal. No respiratory distress.      Breath sounds: Normal breath sounds. No wheezing. No rales.   Cardiovascular:      Normal rate. Regular rhythm.      No gallop.    Edema:     Peripheral edema absent.   Abdominal:      Tenderness: There is no abdominal tenderness.   Skin:     Findings: No erythema or rash.   Neurological:      Mental Status: Alert and oriented to person, place, and time.             Assessment:       Diagnosis Plan   1. Essential hypertension  ECG 12 Lead      2. Mixed hyperlipidemia  ECG 12 Lead      3. Aortic stenosis, severe  ECG 12 Lead      4. Heart murmur  ECG 12 Lead      5. TIA (transient ischemic attack)  ECG 12 Lead      6. Chronic diastolic CHF (congestive heart failure), NYHA class 2  ECG 12 Lead      7. Coronary artery disease of native artery of native heart with stable angina pectoris  ECG 12 Lead      8. PAF (paroxysmal atrial fibrillation)  ECG 12 Lead               Plan:       MDM:    1.  Sinus bradycardia:    Patient has sinus bradycardia.  I suspect underlying sick sinus syndrome.  Currently he is asymptomatic continue to observe patient may need pacemaker in future    2.  Paroxysmal atrial fibrillation:    Patient is in sinus rhythm.  Patient is on Xarelto.  Continue " to observe    3.  Aortic valve disease s/p AVR:    Patient had TAVR.  Patient is doing well.  Repeat echocardiogram next year    4.  CAD: S/p coronary artery stenting:    Patient is clinically doing well.  Patient is on aspirin and Xarelto.  Continue current therapy    4.  Hypertension:    Blood pressure is stable continue amlodipine and losartan

## 2023-10-11 ENCOUNTER — OFFICE VISIT (OUTPATIENT)
Dept: CARDIOLOGY | Facility: CLINIC | Age: 83
End: 2023-10-11
Payer: MEDICARE

## 2023-10-11 VITALS
HEIGHT: 70 IN | OXYGEN SATURATION: 97 % | SYSTOLIC BLOOD PRESSURE: 134 MMHG | DIASTOLIC BLOOD PRESSURE: 58 MMHG | WEIGHT: 191 LBS | HEART RATE: 62 BPM | BODY MASS INDEX: 27.35 KG/M2

## 2023-10-11 DIAGNOSIS — G45.9 TIA (TRANSIENT ISCHEMIC ATTACK): ICD-10-CM

## 2023-10-11 DIAGNOSIS — I50.32 CHRONIC DIASTOLIC CHF (CONGESTIVE HEART FAILURE), NYHA CLASS 2: ICD-10-CM

## 2023-10-11 DIAGNOSIS — I25.118 CORONARY ARTERY DISEASE OF NATIVE ARTERY OF NATIVE HEART WITH STABLE ANGINA PECTORIS: ICD-10-CM

## 2023-10-11 DIAGNOSIS — E78.2 MIXED HYPERLIPIDEMIA: Chronic | ICD-10-CM

## 2023-10-11 DIAGNOSIS — I10 ESSENTIAL HYPERTENSION: Primary | Chronic | ICD-10-CM

## 2023-10-11 DIAGNOSIS — I35.0 AORTIC STENOSIS, SEVERE: ICD-10-CM

## 2023-10-11 DIAGNOSIS — I48.0 PAF (PAROXYSMAL ATRIAL FIBRILLATION): ICD-10-CM

## 2023-10-11 DIAGNOSIS — R01.1 HEART MURMUR: ICD-10-CM

## 2023-11-16 DIAGNOSIS — E78.2 MIXED HYPERLIPIDEMIA: Chronic | ICD-10-CM

## 2023-11-16 RX ORDER — ATORVASTATIN CALCIUM 80 MG/1
80 TABLET, FILM COATED ORAL DAILY
Qty: 90 TABLET | Refills: 1
Start: 2023-11-16

## 2023-11-16 NOTE — TELEPHONE ENCOUNTER
Caller: Elba Ratliff    Relationship: Emergency Contact    Best call back number: [phone]     Requested Prescriptions:   Requested Prescriptions     Pending Prescriptions Disp Refills    atorvastatin (Lipitor) 80 MG tablet       Sig: Take 1 tablet by mouth Daily.        Pharmacy where request should be sent: [facility] - [place] [address] [address] [facility] - [phone]  - [phone] FX     Last office visit with prescribing clinician: 10/4/2023   Last telemedicine visit with prescribing clinician: Visit date not found   Next office visit with prescribing clinician: 4/4/2024     Additional details provided by patient: PATIENT HAS ABOUT 5 DAYS OF MEDICINE    Does the patient have less than a 3 day supply:  [] Yes  [] No    Would you like a call back once the refill request has been completed: [] Yes [] No    If the office needs to give you a call back, can they leave a voicemail: [] Yes [] No    Cruz Garrido Rep   11/16/23 11:43 EST

## 2023-11-27 ENCOUNTER — TELEPHONE (OUTPATIENT)
Dept: FAMILY MEDICINE CLINIC | Facility: CLINIC | Age: 83
End: 2023-11-27

## 2023-11-27 DIAGNOSIS — E03.9 ACQUIRED HYPOTHYROIDISM: ICD-10-CM

## 2023-11-27 DIAGNOSIS — E78.2 MIXED HYPERLIPIDEMIA: Chronic | ICD-10-CM

## 2023-11-27 RX ORDER — ATORVASTATIN CALCIUM 80 MG/1
80 TABLET, FILM COATED ORAL DAILY
Qty: 90 TABLET | Refills: 1 | Status: SHIPPED | OUTPATIENT
Start: 2023-11-27

## 2023-11-27 RX ORDER — EMPAGLIFLOZIN 10 MG/1
10 TABLET, FILM COATED ORAL DAILY
Qty: 90 TABLET | Refills: 1 | Status: SHIPPED | OUTPATIENT
Start: 2023-11-27

## 2023-11-27 RX ORDER — LEVOTHYROXINE SODIUM 88 UG/1
88 TABLET ORAL DAILY
Qty: 90 TABLET | Refills: 1 | Status: SHIPPED | OUTPATIENT
Start: 2023-11-27

## 2023-11-27 NOTE — TELEPHONE ENCOUNTER
Pharmacy Name: [facility] - [place], [place] - [address] [facility] - [phone] [facility] [phone] FX     What medication are you calling in regards to:atorvastatin (Lipitor) 80 MG tablet      MEDICATION WAS SENT TO [facility] ON 11/16/2023. PATIENT IS OUT. COULD SOMEONE PLEASE RE SEND?

## 2023-12-19 ENCOUNTER — OFFICE VISIT (OUTPATIENT)
Dept: FAMILY MEDICINE CLINIC | Facility: CLINIC | Age: 83
End: 2023-12-19
Payer: MEDICARE

## 2023-12-19 ENCOUNTER — LAB (OUTPATIENT)
Dept: FAMILY MEDICINE CLINIC | Facility: CLINIC | Age: 83
End: 2023-12-19
Payer: MEDICARE

## 2023-12-19 VITALS
RESPIRATION RATE: 16 BRPM | BODY MASS INDEX: 27.02 KG/M2 | SYSTOLIC BLOOD PRESSURE: 156 MMHG | HEIGHT: 71 IN | WEIGHT: 193 LBS | TEMPERATURE: 97.5 F | DIASTOLIC BLOOD PRESSURE: 73 MMHG | HEART RATE: 63 BPM | OXYGEN SATURATION: 97 %

## 2023-12-19 DIAGNOSIS — L60.3 ONYCHODYSTROPHY: Primary | ICD-10-CM

## 2023-12-19 DIAGNOSIS — E03.9 ACQUIRED HYPOTHYROIDISM: ICD-10-CM

## 2023-12-19 LAB
ANION GAP SERPL CALCULATED.3IONS-SCNC: 9.6 MMOL/L (ref 5–15)
BUN SERPL-MCNC: 26 MG/DL (ref 8–23)
BUN/CREAT SERPL: 21 (ref 7–25)
CALCIUM SPEC-SCNC: 9.2 MG/DL (ref 8.6–10.5)
CHLORIDE SERPL-SCNC: 106 MMOL/L (ref 98–107)
CO2 SERPL-SCNC: 26.4 MMOL/L (ref 22–29)
CREAT SERPL-MCNC: 1.24 MG/DL (ref 0.76–1.27)
EGFRCR SERPLBLD CKD-EPI 2021: 57.3 ML/MIN/1.73
GLUCOSE SERPL-MCNC: 186 MG/DL (ref 65–99)
POTASSIUM SERPL-SCNC: 4.1 MMOL/L (ref 3.5–5.2)
SODIUM SERPL-SCNC: 142 MMOL/L (ref 136–145)
T4 FREE SERPL-MCNC: 1.36 NG/DL (ref 0.93–1.7)
TSH SERPL DL<=0.05 MIU/L-ACNC: 3.84 UIU/ML (ref 0.27–4.2)

## 2023-12-19 PROCEDURE — 99213 OFFICE O/P EST LOW 20 MIN: CPT | Performed by: FAMILY MEDICINE

## 2023-12-19 PROCEDURE — 36415 COLL VENOUS BLD VENIPUNCTURE: CPT | Performed by: FAMILY MEDICINE

## 2023-12-19 PROCEDURE — 80048 BASIC METABOLIC PNL TOTAL CA: CPT | Performed by: FAMILY MEDICINE

## 2023-12-19 PROCEDURE — 84443 ASSAY THYROID STIM HORMONE: CPT | Performed by: FAMILY MEDICINE

## 2023-12-19 PROCEDURE — 3077F SYST BP >= 140 MM HG: CPT | Performed by: FAMILY MEDICINE

## 2023-12-19 PROCEDURE — 3078F DIAST BP <80 MM HG: CPT | Performed by: FAMILY MEDICINE

## 2023-12-19 PROCEDURE — 84439 ASSAY OF FREE THYROXINE: CPT | Performed by: FAMILY MEDICINE

## 2023-12-19 NOTE — PROGRESS NOTES
Subjective   Chief Complaint   Patient presents with    left foot big toe fungus and millicent on bottom of left foot    Hypothyroidism     Alen Ratliff is a 83 y.o. male.     Patient Care Team:  Serenity Wren MD as PCP - General (Family Medicine)  Steve Muhammad MD as Consulting Physician (Cardiology)  CAROLE Vasquez DPM as Consulting Physician (Podiatry)  Toby Fung MD as Consulting Physician (Vascular Surgery)  Naveed Leonardo MD as Cardiologist (Cardiology)    History of Present Illness  He is coming in today with his wife due to toenail issues.  He is not particularly concerned about the left great toenail.  He reports that for a long time, definitely for over a year this has been thickened and kind of crooked.  It is not really causing much of the discomfort, but he has hard time to cut it.  He would like to see a podiatrist.  He also noted a callus on the side of the left foot.  He is a diabetic.  He has neuropathy and has been experiencing some numbness and tingling in his feet which is not new.  While in the office today we are also addressing his hypothyroidism.  He is due for the recheck of the blood work.  He takes his thyroid medication as directed.       The following portions of the patient's history were reviewed and updated as appropriate: allergies, current medications, past family history, past medical history, past social history, past surgical history, and problem list.  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    Stroke      Past Surgical History:   Procedure Laterality Date    ANKLE OPEN REDUCTION INTERNAL FIXATION Left 12/16/2020    Procedure: ANKLE OPEN REDUCTION INTERNAL  FIXATION;  Surgeon: CAROLE Vasquez DPM;  Location: [facility] MAIN OR;  Service: Podiatry;  Laterality: Left;    AORTIC VALVE REPAIR/REPLACEMENT N/A 02/20/2023    Procedure: Transfemoral Transcatheter Aortic Valve Replacement;  Surgeon: Naveed Leonardo MD;  Location: [facility] HYBRID OR;  Service: Cardiovascular;  Laterality: N/A;    AORTIC VALVE REPAIR/REPLACEMENT N/A 02/20/2023    Procedure: TRANSCATHETER AORTIC VALVE REPLACEMENT;  Surgeon: Fabio Villafana MD;  Location: [facility] HYBRID OR;  Service: Cardiothoracic;  Laterality: N/A;    BACK SURGERY  1994    CARDIAC CATHETERIZATION Right 09/27/2022    Procedure: Left Heart Cath;  Surgeon: Steve Muhammad MD;  Location: [facility] CATH INVASIVE LOCATION;  Service: Cardiovascular;  Laterality: Right;    CARDIAC CATHETERIZATION N/A 01/12/2023    Procedure: Stent JENN coronary;  Surgeon: Steve Muhammad MD;  Location: [facility] CATH INVASIVE LOCATION;  Service: Cardiovascular;  Laterality: N/A;    CAROTID ENDARTERECTOMY Right 12/14/2020    Procedure: CAROTID ENDARTERECTOMY;  Surgeon: Toby Fung MD;  Location: [facility] MAIN OR;  Service: Vascular;  Laterality: Right;    COLONOSCOPY  08/25/2015    CORONARY STENT PLACEMENT      PROSTATECTOMY  2001    due to cancer     The patient has a family history of  Family History   Problem Relation Age of Onset    Hypertension Other     Heart attack Mother     Heart disease Mother     Heart attack Father     Heart disease Father      Social History     Socioeconomic History    Marital status:    Tobacco Use    Smoking status: Never    Smokeless tobacco: Never   Vaping Use    Vaping Use: Never used   Substance and Sexual Activity    Alcohol use: Never    Drug use: Never    Sexual activity: Defer       Review of Systems   Constitutional:  Negative for activity change, fatigue and fever.   Respiratory:  Negative for shortness of breath and wheezing.    Cardiovascular:  Negative for chest pain, palpitations and leg swelling.  "  Musculoskeletal:  Negative for arthralgias and back pain.   Skin:  Negative for rash.   Neurological:  Positive for numbness. Negative for tremors and headache.     Visit Vitals  /73 (BP Location: Left arm, Patient Position: Sitting, Cuff Size: Adult)   Pulse 63   Temp 97.5 °F (36.4 °C) (Infrared)   Resp 16   Ht 180.3 cm (71\")   Wt 87.5 kg (193 lb)   SpO2 97%   BMI 26.92 kg/m²              Current Outpatient Medications:     amLODIPine (NORVASC) 10 MG tablet, Take 1 tablet by mouth Daily., Disp: 90 tablet, Rfl: 1    aspirin 81 MG chewable tablet, Chew 1 tablet Daily., Disp: 30 tablet, Rfl: 1    atorvastatin (Lipitor) 80 MG tablet, Take 1 tablet by mouth Daily., Disp: 90 tablet, Rfl: 1    fluticasone (FLONASE) 50 MCG/ACT nasal spray, 2 sprays into the nostril(s) as directed by provider Daily As Needed., Disp: , Rfl:     glucose blood (OneTouch Ultra) test strip, USE AND DISCARD 1 TEST     STRIP DAILY., Disp: 100 each, Rfl: 1    Jardiance 10 MG tablet tablet, Take 1 tablet by mouth Daily., Disp: 90 tablet, Rfl: 1    levothyroxine (Synthroid) 88 MCG tablet, Take 1 tablet by mouth Daily., Disp: 90 tablet, Rfl: 1    losartan (COZAAR) 100 MG tablet, Take 1 tablet by mouth Daily., Disp: 90 tablet, Rfl: 3    pantoprazole (PROTONIX) 40 MG EC tablet, Take 1 tablet by mouth Daily., Disp: 90 tablet, Rfl: 1    rivaroxaban (XARELTO) 15 MG tablet, Take 1 tablet by mouth Daily., Disp: 90 tablet, Rfl: 1    SITagliptin (Januvia) 50 MG tablet, Take 1 tablet by mouth Daily., Disp: 90 tablet, Rfl: 1    Objective   Physical Exam  Constitutional:       General: He is not in acute distress.     Appearance: Normal appearance. He is well-developed. He is not ill-appearing or diaphoretic.      Comments: Patient is in no distress, patient has normal voice and speech.  Normal respiratory effort.   HENT:      Head: Normocephalic and atraumatic.   Pulmonary:      Effort: Pulmonary effort is normal.   Musculoskeletal:      Cervical back: " Normal range of motion and neck supple.      Comments: Left foot was examined, the left great toenail is yellow and thickened.  No redness in the surrounding skin.  No drainage.  There is also a callus noted on the lateral side of the foot just at the area of the fifth metatarsal head.   Neurological:      General: No focal deficit present.      Mental Status: He is alert and oriented to person, place, and time. Mental status is at baseline.   Psychiatric:         Mood and Affect: Mood normal.       FOLLOWING LABS WERE REVIEWED TODAY:  TSH          4/7/2023    11:21 8/1/2023    11:18 10/2/2023    08:05   TSH   TSH 2.160  3.070  4.930         BMP          7/31/2023    11:03 8/1/2023    11:18 10/2/2023    08:05   BMP   BUN 30  26  23    Creatinine 1.23  1.13  1.33    Sodium 139  143  145    Potassium 3.9  4.1  4.1    Chloride 106  107  108    CO2 23.8  24.0  24.0    Calcium 9.7  9.2  9.3         Assessment & Plan   Diagnoses and all orders for this visit:    1. Onychodystrophy (Primary)  -     Ambulatory Referral to Podiatry    2. Acquired hypothyroidism  -     Basic Metabolic Panel  -     T4, Free  -     TSH      If it comes to his feet issues I will be referring her him to see a podiatrist for evaluation.  Footcare was discussed.  I also reviewed his previous labs and I will be rechecking some labs to follow-up on previously noted abnormalities.        Return in about 4 months (around 4/19/2024) for Medicare Wellness.    Requested Prescriptions      No prescriptions requested or ordered in this encounter

## 2023-12-25 RX ORDER — BLOOD SUGAR DIAGNOSTIC
STRIP MISCELLANEOUS
Qty: 100 EACH | Refills: 1 | Status: SHIPPED | OUTPATIENT
Start: 2023-12-25

## 2024-01-22 ENCOUNTER — TELEPHONE (OUTPATIENT)
Dept: CARDIOLOGY | Facility: HOSPITAL | Age: 84
End: 2024-01-22

## 2024-01-22 NOTE — TELEPHONE ENCOUNTER
Telephoned Mr. Ratliff.  Confirmed 1 year post TAVR appt/echo with Dr. Muhammad on 2/14/24.  [id number] completed.

## 2024-01-23 ENCOUNTER — OFFICE VISIT (OUTPATIENT)
Dept: PODIATRY | Facility: CLINIC | Age: 84
End: 2024-01-23
Payer: MEDICARE

## 2024-01-23 VITALS — WEIGHT: 193 LBS | HEIGHT: 71 IN | BODY MASS INDEX: 27.02 KG/M2 | RESPIRATION RATE: 20 BRPM

## 2024-01-23 DIAGNOSIS — E11.65 TYPE 2 DIABETES MELLITUS WITH HYPERGLYCEMIA, WITHOUT LONG-TERM CURRENT USE OF INSULIN: ICD-10-CM

## 2024-01-23 DIAGNOSIS — L60.3 ONYCHODYSTROPHY: Primary | ICD-10-CM

## 2024-01-23 RX ORDER — HYDROCHLOROTHIAZIDE 50 MG/1
50 TABLET ORAL EVERY MORNING
COMMUNITY
Start: 2024-01-09

## 2024-01-23 NOTE — PROGRESS NOTES
01/23/2024  Foot and Ankle Surgery - Established Patient/Follow-up  Provider: Dr. Alen Vasquez DPM  Location: [facility]    Subjective:  Alen Ratliff is a 83 y.o. male.     Chief Complaint   Patient presents with    Left Foot - Nail Problem     Great toe     Follow-up     THIERNO Wren MD 12/19/2023       HPI: The patient is an 83-year-old male who presents to the clinic for a new issue regarding his left great toe. He is accompanied by an adult female.    He was last seen in 2021. He denies any previous injury to his left great toe. He denies pain to his left great toe. The patient states he would like to get rid of his left great toenail. He notes his left great toenail looks half decent; however, when he had the fungus, it was difficult. He reports his left 5th toe was turning black; however, it fell off.    The patient is a diabetic. His last A1c was 6.7 percent. He is currently taking Xarelto.    Allergies   Allergen Reactions    Azithromycin Unknown - High Severity    Tramadol Unknown - High Severity       Current Outpatient Medications on File Prior to Visit   Medication Sig Dispense Refill    amLODIPine (NORVASC) 10 MG tablet Take 1 tablet by mouth Daily. 90 tablet 1    aspirin 81 MG chewable tablet Chew 1 tablet Daily. 30 tablet 1    atorvastatin (Lipitor) 80 MG tablet Take 1 tablet by mouth Daily. 90 tablet 1    fluticasone (FLONASE) 50 MCG/ACT nasal spray 2 sprays into the nostril(s) as directed by provider Daily As Needed.      hydroCHLOROthiazide (HYDRODIURIL) 50 MG tablet Take 1 tablet by mouth Every Morning.      Jardiance 10 MG tablet tablet Take 1 tablet by mouth Daily. 90 tablet 1    levothyroxine (Synthroid) 88 MCG tablet Take 1 tablet by mouth Daily. 90 tablet 1    losartan (COZAAR) 100 MG tablet Take 1 tablet by mouth Daily. 90 tablet 3    OneTouch Ultra test strip USE AND DISCARD 1 TEST     STRIP DAILY. 100 each 1    pantoprazole (PROTONIX) 40 MG EC tablet Take 1 tablet by mouth  "Daily. 90 tablet 1    rivaroxaban (XARELTO) 15 MG tablet Take 1 tablet by mouth Daily. 90 tablet 1    SITagliptin (Januvia) 50 MG tablet Take 1 tablet by mouth Daily. 90 tablet 1     No current facility-administered medications on file prior to visit.       Objective   Resp 20   Ht 180.3 cm (71\")   Wt 87.5 kg (193 lb)   BMI 26.92 kg/m²     Foot/Ankle Exam    GENERAL  Orientation:  AAOx3  Affect:  appropriate  Gait:  antalgic    VASCULAR     Left Foot Vascularity   Normal vascular exam    Dorsalis pedis:  2+  Posterior tibial:  2+  Skin temperature:  warm  Edema gradin+  CFT:  < 3 seconds  Pedal hair growth:  Present  Varicosities:  none     NEUROLOGIC     Left Foot Neurologic   Light touch sensation: normal  Hot/Cold sensation:  normal  Achilles reflex:  2+    MUSCULOSKELETAL     Left Foot Musculoskeletal   Arch:  Normal    DERMATOLOGIC        Left Foot Dermatologic   Skin  Left foot skin is intact.      Left foot additional comments: Incision site is dry and stable with intact staples.  No evidence of dehiscence or infection.    2024  Continued thickness and dystrophy involving the left hallux nail. No pain with palpation. No signs of infection. Mild discoloration consistent with onychomycosis. No open wounds. Neurovascular status is grossly intact. Range of motion, muscle strength is appropriate.      Assessment & Plan   Diagnoses and all orders for this visit:    1. Onychodystrophy (Primary)    2. Type 2 diabetes mellitus with hyperglycemia, without long-term current use of insulin        Patient presents to the office today for a new issue involving his left hallux. We discussed the etiology and biomechanics involved with his left great toe pain. I explained that his symptoms are due to repetitive stress or injury to the nail bed or the nail plate. We discussed treatment options, including surgical intervention to remove the nail and apply acid to prevent the nail from ever growing back. I " explained that it will take approximately 8 weeks for the nail bed to heal. I will provide him with an instruction sheet to follow. I explained that I am not overtly concerned from a diabetic perspective. I recommended that he speak with his primary care physician regarding discontinuing the Xarelto for 3 days prior to coming and see us to try to coordinate the efforts to remove the toenail. The patient will return to the office in 6 months for reevaluation. Greater than 20 minutes was spent before, during, and after evaluation for patient care.    No orders of the defined types were placed in this encounter.         Note is dictated utilizing voice recognition software. Unfortunately this leads to occasional typographical errors. I apologize in advance if the situation occurs. If questions occur please do not hesitate to call our office.    Transcribed from ambient dictation for CAROLE Vasquez DPM by Bobby Garcia.  01/23/24   10:55 EST    Patient or patient representative verbalized consent to the visit recording.  I have personally performed the services described in this document as transcribed by the above individual, and it is both accurate and complete.

## 2024-02-05 ENCOUNTER — HOSPITAL ENCOUNTER (OUTPATIENT)
Dept: CARDIOLOGY | Facility: HOSPITAL | Age: 84
Discharge: HOME | End: 2024-02-05
Payer: MEDICARE

## 2024-02-05 ENCOUNTER — APPOINTMENT (OUTPATIENT)
Dept: VASCULAR SURGERY | Facility: HOSPITAL | Age: 84
End: 2024-02-05
Payer: MEDICARE

## 2024-02-05 DIAGNOSIS — I65.23 BILATERAL CAROTID ARTERY OCCLUSION: ICD-10-CM

## 2024-02-05 LAB
BH CV XLRA MEAS LEFT DIST CCA EDV: 12.4 CM/SEC
BH CV XLRA MEAS LEFT DIST CCA PSV: 80.1 CM/SEC
BH CV XLRA MEAS LEFT DIST ICA EDV: -21.7 CM/SEC
BH CV XLRA MEAS LEFT DIST ICA PSV: -72.5 CM/SEC
BH CV XLRA MEAS LEFT ICA/CCA RATIO: -1.37
BH CV XLRA MEAS LEFT MID ICA EDV: -28.6 CM/SEC
BH CV XLRA MEAS LEFT MID ICA PSV: -110 CM/SEC
BH CV XLRA MEAS LEFT PROX CCA EDV: 16.5 CM/SEC
BH CV XLRA MEAS LEFT PROX CCA PSV: 118 CM/SEC
BH CV XLRA MEAS LEFT PROX ECA PSV: -125 CM/SEC
BH CV XLRA MEAS LEFT PROX ICA EDV: -19.7 CM/SEC
BH CV XLRA MEAS LEFT PROX ICA PSV: -81.6 CM/SEC
BH CV XLRA MEAS LEFT PROX SCLA PSV: 186 CM/SEC
BH CV XLRA MEAS LEFT VERTEBRAL A EDV: -8.3 CM/SEC
BH CV XLRA MEAS LEFT VERTEBRAL A PSV: -58 CM/SEC
BH CV XLRA MEAS RIGHT DIST CCA EDV: 12.4 CM/SEC
BH CV XLRA MEAS RIGHT DIST CCA PSV: 98.2 CM/SEC
BH CV XLRA MEAS RIGHT DIST ICA EDV: -13.2 CM/SEC
BH CV XLRA MEAS RIGHT DIST ICA PSV: -53 CM/SEC
BH CV XLRA MEAS RIGHT ICA/CCA RATIO: -0.63
BH CV XLRA MEAS RIGHT PROX CCA PSV: 101 CM/SEC
BH CV XLRA MEAS RIGHT PROX ECA EDV: -4.9 CM/SEC
BH CV XLRA MEAS RIGHT PROX ECA PSV: -81.1 CM/SEC
BH CV XLRA MEAS RIGHT PROX ICA EDV: -10.2 CM/SEC
BH CV XLRA MEAS RIGHT PROX ICA PSV: -61.5 CM/SEC
BH CV XLRA MEAS RIGHT PROX SCLA PSV: 101 CM/SEC
BH CV XLRA MEAS RIGHT VERTEBRAL A EDV: -9.3 CM/SEC
BH CV XLRA MEAS RIGHT VERTEBRAL A PSV: -90.9 CM/SEC
LEFT ARM BP: NORMAL MMHG
RIGHT ARM BP: NORMAL MMHG

## 2024-02-05 PROCEDURE — 93880 EXTRACRANIAL BILAT STUDY: CPT

## 2024-02-05 PROCEDURE — G0463 HOSPITAL OUTPT CLINIC VISIT: HCPCS

## 2024-02-14 ENCOUNTER — HOSPITAL ENCOUNTER (OUTPATIENT)
Dept: CARDIOLOGY | Facility: HOSPITAL | Age: 84
Discharge: HOME | End: 2024-02-14
Admitting: INTERNAL MEDICINE
Payer: MEDICARE

## 2024-02-14 ENCOUNTER — OFFICE VISIT (OUTPATIENT)
Dept: CARDIOLOGY | Facility: CLINIC | Age: 84
End: 2024-02-14
Payer: MEDICARE

## 2024-02-14 VITALS
HEIGHT: 71 IN | SYSTOLIC BLOOD PRESSURE: 137 MMHG | BODY MASS INDEX: 27.02 KG/M2 | WEIGHT: 193 LBS | DIASTOLIC BLOOD PRESSURE: 64 MMHG

## 2024-02-14 VITALS
WEIGHT: 195 LBS | OXYGEN SATURATION: 98 % | DIASTOLIC BLOOD PRESSURE: 58 MMHG | HEART RATE: 57 BPM | SYSTOLIC BLOOD PRESSURE: 115 MMHG | HEIGHT: 71 IN | BODY MASS INDEX: 27.3 KG/M2 | RESPIRATION RATE: 16 BRPM

## 2024-02-14 DIAGNOSIS — I10 ESSENTIAL HYPERTENSION: Primary | Chronic | ICD-10-CM

## 2024-02-14 DIAGNOSIS — R01.1 HEART MURMUR: ICD-10-CM

## 2024-02-14 DIAGNOSIS — I48.0 PAF (PAROXYSMAL ATRIAL FIBRILLATION): ICD-10-CM

## 2024-02-14 DIAGNOSIS — I35.0 AORTIC STENOSIS, SEVERE: ICD-10-CM

## 2024-02-14 DIAGNOSIS — E78.2 MIXED HYPERLIPIDEMIA: Chronic | ICD-10-CM

## 2024-02-14 DIAGNOSIS — Z95.2 S/P TAVR (TRANSCATHETER AORTIC VALVE REPLACEMENT): ICD-10-CM

## 2024-02-14 LAB
AORTIC DIMENSIONLESS INDEX: 0.65 (DI)
BH CV ECHO LEFT VENTRICLE GLOBAL LONGITUDINAL STRAIN: -17 %
BH CV ECHO MEAS - ACS: 1.8 CM
BH CV ECHO MEAS - AO MAX PG: 17.6 MMHG
BH CV ECHO MEAS - AO MEAN PG: 10 MMHG
BH CV ECHO MEAS - AO V2 MAX: 210 CM/SEC
BH CV ECHO MEAS - AO V2 VTI: 48 CM
BH CV ECHO MEAS - AVA(I,D): 1.83 CM2
BH CV ECHO MEAS - EDV(CUBED): 132.7 ML
BH CV ECHO MEAS - EDV(MOD-SP4): 74.5 ML
BH CV ECHO MEAS - EF(MOD-BP): 64 %
BH CV ECHO MEAS - EF(MOD-SP4): 64 %
BH CV ECHO MEAS - ESV(CUBED): 39.3 ML
BH CV ECHO MEAS - ESV(MOD-SP4): 26.8 ML
BH CV ECHO MEAS - FS: 33.3 %
BH CV ECHO MEAS - IVS/LVPW: 1.1 CM
BH CV ECHO MEAS - IVSD: 1.1 CM
BH CV ECHO MEAS - LA DIMENSION: 4.5 CM
BH CV ECHO MEAS - LAT PEAK E' VEL: 8.2 CM/SEC
BH CV ECHO MEAS - LV DIASTOLIC VOL/BSA (35-75): 35.9 CM2
BH CV ECHO MEAS - LV MASS(C)D: 200.8 GRAMS
BH CV ECHO MEAS - LV MAX PG: 8 MMHG
BH CV ECHO MEAS - LV MEAN PG: 5 MMHG
BH CV ECHO MEAS - LV SYSTOLIC VOL/BSA (12-30): 12.9 CM2
BH CV ECHO MEAS - LV V1 MAX: 141 CM/SEC
BH CV ECHO MEAS - LV V1 VTI: 34.6 CM
BH CV ECHO MEAS - LVIDD: 5.1 CM
BH CV ECHO MEAS - LVIDS: 3.4 CM
BH CV ECHO MEAS - LVOT AREA: 2.5 CM2
BH CV ECHO MEAS - LVOT DIAM: 1.8 CM
BH CV ECHO MEAS - LVPWD: 1 CM
BH CV ECHO MEAS - MED PEAK E' VEL: 5.6 CM/SEC
BH CV ECHO MEAS - MR MAX PG: 87.6 MMHG
BH CV ECHO MEAS - MR MAX VEL: 468 CM/SEC
BH CV ECHO MEAS - MV A MAX VEL: 101 CM/SEC
BH CV ECHO MEAS - MV DEC SLOPE: 386 CM/SEC2
BH CV ECHO MEAS - MV DEC TIME: 0.33 SEC
BH CV ECHO MEAS - MV E MAX VEL: 95.9 CM/SEC
BH CV ECHO MEAS - MV E/A: 0.95
BH CV ECHO MEAS - MV MAX PG: 5.8 MMHG
BH CV ECHO MEAS - MV MEAN PG: 3 MMHG
BH CV ECHO MEAS - MV P1/2T: 91.1 MSEC
BH CV ECHO MEAS - MV V2 VTI: 39.7 CM
BH CV ECHO MEAS - MVA(P1/2T): 2.42 CM2
BH CV ECHO MEAS - MVA(VTI): 2.22 CM2
BH CV ECHO MEAS - PA ACC TIME: 0.09 SEC
BH CV ECHO MEAS - PA V2 MAX: 108 CM/SEC
BH CV ECHO MEAS - PULM DIAS VEL: 47 CM/SEC
BH CV ECHO MEAS - PULM S/D: 1.36
BH CV ECHO MEAS - PULM SYS VEL: 64 CM/SEC
BH CV ECHO MEAS - RAP SYSTOLE: 3 MMHG
BH CV ECHO MEAS - RV MAX PG: 3.8 MMHG
BH CV ECHO MEAS - RV V1 MAX: 97.4 CM/SEC
BH CV ECHO MEAS - RV V1 VTI: 20.3 CM
BH CV ECHO MEAS - RVSP: 21.3 MMHG
BH CV ECHO MEAS - SI(MOD-SP4): 23 ML/M2
BH CV ECHO MEAS - SV(LVOT): 88 ML
BH CV ECHO MEAS - SV(MOD-SP4): 47.7 ML
BH CV ECHO MEAS - TAPSE (>1.6): 1.65 CM
BH CV ECHO MEAS - TR MAX PG: 18.3 MMHG
BH CV ECHO MEAS - TR MAX VEL: 214 CM/SEC
BH CV ECHO MEASUREMENTS AVERAGE E/E' RATIO: 13.9
BH CV XLRA - TDI S': 9.1 CM/SEC
LEFT ATRIUM VOLUME INDEX: 31.7 ML/M2

## 2024-02-14 PROCEDURE — 1159F MED LIST DOCD IN RCRD: CPT | Performed by: INTERNAL MEDICINE

## 2024-02-14 PROCEDURE — 99214 OFFICE O/P EST MOD 30 MIN: CPT | Performed by: INTERNAL MEDICINE

## 2024-02-14 PROCEDURE — 93306 TTE W/DOPPLER COMPLETE: CPT

## 2024-02-14 PROCEDURE — 1160F RVW MEDS BY RX/DR IN RCRD: CPT | Performed by: INTERNAL MEDICINE

## 2024-02-14 PROCEDURE — 93000 ELECTROCARDIOGRAM COMPLETE: CPT | Performed by: INTERNAL MEDICINE

## 2024-02-14 PROCEDURE — 3074F SYST BP LT 130 MM HG: CPT | Performed by: INTERNAL MEDICINE

## 2024-02-14 PROCEDURE — 3078F DIAST BP <80 MM HG: CPT | Performed by: INTERNAL MEDICINE

## 2024-02-14 PROCEDURE — 93356 MYOCRD STRAIN IMG SPCKL TRCK: CPT

## 2024-02-14 RX ORDER — AMLODIPINE BESYLATE 5 MG/1
5 TABLET ORAL DAILY
Qty: 90 TABLET | Refills: 1 | Status: SHIPPED | OUTPATIENT
Start: 2024-02-14

## 2024-02-27 ENCOUNTER — TELEPHONE (OUTPATIENT)
Dept: FAMILY MEDICINE CLINIC | Facility: CLINIC | Age: 84
End: 2024-02-27
Payer: MEDICARE

## 2024-02-27 DIAGNOSIS — I10 ESSENTIAL HYPERTENSION: Chronic | ICD-10-CM

## 2024-02-27 DIAGNOSIS — E03.9 ACQUIRED HYPOTHYROIDISM: ICD-10-CM

## 2024-02-27 DIAGNOSIS — E78.2 MIXED HYPERLIPIDEMIA: Chronic | ICD-10-CM

## 2024-02-27 DIAGNOSIS — E11.42 DM TYPE 2 WITH DIABETIC PERIPHERAL NEUROPATHY: ICD-10-CM

## 2024-02-27 DIAGNOSIS — Z85.46 HISTORY OF PROSTATE CANCER: Primary | ICD-10-CM

## 2024-02-27 NOTE — TELEPHONE ENCOUNTER
PATIENT HAS A LAB APPT ON 3/29/24. I DO NOT SEE ORDERS IN THE CHART, CAN YOU PLEASE ADD ORDERS. THANK YOU!

## 2024-03-04 DIAGNOSIS — G45.9 TIA (TRANSIENT ISCHEMIC ATTACK): ICD-10-CM

## 2024-03-04 RX ORDER — RIVAROXABAN 15 MG/1
TABLET, FILM COATED ORAL
Qty: 90 TABLET | Refills: 0 | Status: SHIPPED | OUTPATIENT
Start: 2024-03-04

## 2024-03-19 DIAGNOSIS — G45.9 TIA (TRANSIENT ISCHEMIC ATTACK): ICD-10-CM

## 2024-03-19 NOTE — TELEPHONE ENCOUNTER
Caller: Elba Ratliff    Relationship: Emergency Contact    Best call back number: [phone]    Requested Prescriptions:   Requested Prescriptions     Pending Prescriptions Disp Refills    rivaroxaban (Xarelto) 15 MG tablet 90 tablet 0        [facility] where request should be sent[facility], [address] [phone] [facility] [phone] FX     Last office visit with prescribing clinician: 12/19/2023   Last telemedicine visit with prescribing clinician: Visit date not found   Next office visit with prescribing clinician: 4/4/2024     Additional details provided by patient: PATIENT IS OUT OF THIS MEDICATION.     Does the patient have less than a 3 day supply:  [x] Yes  [] No    Would you like a call back once the refill request has been completed: [] Yes [x] No    If the office needs to give you a call back, can they leave a voicemail: [] Yes [x] No    Cruz Curran Rep   03/19/24 16:06 EDT

## 2024-03-26 ENCOUNTER — LAB (OUTPATIENT)
Dept: LAB | Facility: HOSPITAL | Age: 84
End: 2024-03-26
Payer: MEDICARE

## 2024-03-26 ENCOUNTER — TELEPHONE (OUTPATIENT)
Dept: CARDIOLOGY | Facility: CLINIC | Age: 84
End: 2024-03-26
Payer: MEDICARE

## 2024-03-26 DIAGNOSIS — Z85.46 HISTORY OF PROSTATE CANCER: ICD-10-CM

## 2024-03-26 DIAGNOSIS — I48.0 PAF (PAROXYSMAL ATRIAL FIBRILLATION): Primary | ICD-10-CM

## 2024-03-26 LAB
ALBUMIN SERPL-MCNC: 3.9 G/DL (ref 3.5–5.2)
ALBUMIN UR-MCNC: 5.8 MG/DL
ALBUMIN/GLOB SERPL: 1.4 G/DL
ALP SERPL-CCNC: 65 U/L (ref 39–117)
ALT SERPL W P-5'-P-CCNC: 25 U/L (ref 1–41)
ANION GAP SERPL CALCULATED.3IONS-SCNC: 14.3 MMOL/L (ref 5–15)
AST SERPL-CCNC: 23 U/L (ref 1–40)
BILIRUB SERPL-MCNC: 0.4 MG/DL (ref 0–1.2)
BUN SERPL-MCNC: 40 MG/DL (ref 8–23)
BUN/CREAT SERPL: 26.8 (ref 7–25)
CALCIUM SPEC-SCNC: 9.2 MG/DL (ref 8.6–10.5)
CHLORIDE SERPL-SCNC: 107 MMOL/L (ref 98–107)
CHOLEST SERPL-MCNC: 124 MG/DL (ref 0–200)
CO2 SERPL-SCNC: 21.7 MMOL/L (ref 22–29)
CREAT SERPL-MCNC: 1.49 MG/DL (ref 0.76–1.27)
CREAT UR-MCNC: 81.1 MG/DL
EGFRCR SERPLBLD CKD-EPI 2021: 46 ML/MIN/1.73
GLOBULIN UR ELPH-MCNC: 2.8 GM/DL
GLUCOSE SERPL-MCNC: 125 MG/DL (ref 65–99)
HBA1C MFR BLD: 7.1 % (ref 4.8–5.6)
HDLC SERPL-MCNC: 39 MG/DL (ref 40–60)
LDLC SERPL CALC-MCNC: 66 MG/DL (ref 0–100)
LDLC/HDLC SERPL: 1.65 {RATIO}
MICROALBUMIN/CREAT UR: 71.5 MG/G (ref 0–29)
POTASSIUM SERPL-SCNC: 4.4 MMOL/L (ref 3.5–5.2)
PROT SERPL-MCNC: 6.7 G/DL (ref 6–8.5)
PSA SERPL-MCNC: 0.1 NG/ML (ref 0–4)
SODIUM SERPL-SCNC: 143 MMOL/L (ref 136–145)
TRIGL SERPL-MCNC: 104 MG/DL (ref 0–150)
TSH SERPL DL<=0.05 MIU/L-ACNC: 4.77 UIU/ML (ref 0.27–4.2)
VLDLC SERPL-MCNC: 19 MG/DL (ref 5–40)

## 2024-03-26 PROCEDURE — 83036 HEMOGLOBIN GLYCOSYLATED A1C: CPT | Performed by: FAMILY MEDICINE

## 2024-03-26 PROCEDURE — 84153 ASSAY OF PSA TOTAL: CPT

## 2024-03-26 PROCEDURE — 82043 UR ALBUMIN QUANTITATIVE: CPT | Performed by: FAMILY MEDICINE

## 2024-03-26 PROCEDURE — 84443 ASSAY THYROID STIM HORMONE: CPT | Performed by: FAMILY MEDICINE

## 2024-03-26 PROCEDURE — 80053 COMPREHEN METABOLIC PANEL: CPT | Performed by: FAMILY MEDICINE

## 2024-03-26 PROCEDURE — 82570 ASSAY OF URINE CREATININE: CPT | Performed by: FAMILY MEDICINE

## 2024-03-26 PROCEDURE — 80061 LIPID PANEL: CPT | Performed by: FAMILY MEDICINE

## 2024-03-26 NOTE — TELEPHONE ENCOUNTER
Patient has still been having extreme fatigue and weakness. Can't make it through a whole store walking. Dr biswas told them to call when he has these issues. Also having dizziness. Falls asleep immediately once he sits down. No other symptoms. Does not have older BP readings. Current reading is 134/73 heart rate 57. Dr biswas said that he may need a pacemaker in the future.

## 2024-04-12 DIAGNOSIS — I48.0 PAF (PAROXYSMAL ATRIAL FIBRILLATION): Primary | ICD-10-CM

## 2024-04-18 ENCOUNTER — OFFICE VISIT (OUTPATIENT)
Dept: FAMILY MEDICINE CLINIC | Facility: CLINIC | Age: 84
End: 2024-04-18
Payer: MEDICARE

## 2024-04-18 VITALS
RESPIRATION RATE: 16 BRPM | OXYGEN SATURATION: 97 % | WEIGHT: 198.4 LBS | HEIGHT: 71 IN | DIASTOLIC BLOOD PRESSURE: 55 MMHG | TEMPERATURE: 98.9 F | HEART RATE: 64 BPM | BODY MASS INDEX: 27.77 KG/M2 | SYSTOLIC BLOOD PRESSURE: 111 MMHG

## 2024-04-18 DIAGNOSIS — E03.9 ACQUIRED HYPOTHYROIDISM: ICD-10-CM

## 2024-04-18 DIAGNOSIS — Z00.00 MEDICARE ANNUAL WELLNESS VISIT, SUBSEQUENT: Primary | ICD-10-CM

## 2024-04-18 PROCEDURE — 3078F DIAST BP <80 MM HG: CPT | Performed by: FAMILY MEDICINE

## 2024-04-18 PROCEDURE — 3074F SYST BP LT 130 MM HG: CPT | Performed by: FAMILY MEDICINE

## 2024-04-18 PROCEDURE — 1170F FXNL STATUS ASSESSED: CPT | Performed by: FAMILY MEDICINE

## 2024-04-18 PROCEDURE — G0439 PPPS, SUBSEQ VISIT: HCPCS | Performed by: FAMILY MEDICINE

## 2024-04-18 RX ORDER — LEVOTHYROXINE SODIUM 0.1 MG/1
100 TABLET ORAL DAILY
Qty: 90 TABLET | Refills: 0 | Status: SHIPPED | OUTPATIENT
Start: 2024-04-18

## 2024-04-18 NOTE — PROGRESS NOTES
Subsequent Medicare Wellness Visit   The ABC's of the Annual Wellness Visit    Chief Complaint   Patient presents with    Medicare Wellness-subsequent       HPI:  Alen Ratliff, -1940, is a 83 y.o. male who presents for a Subsequent Medicare Wellness Visit.  He lives with his wife who is here today for the appointment with the patient.  He is fully independent with his activities of daily living.  He has been experiencing some balance problems, but he denies any frequent falls.  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  He is scheduled for follow-up in near future.  He recently had fasting blood work done and these results are being reviewed.  No depression symptoms or any significant memory problems are being reported. Patient does not report any chest pain, shortness of breath, dizziness, nausea, vomiting, or diarrhea, visual issues, headaches, numbness or tingling. No urinary issues reported like urgency, frequency, or discomfort upon urination.  No significant weight changes reported.  No swelling reported.  No rashes or any other skin issues reported. No emotional issues or insomnia.    Recent Hospitalizations:  No hospitalization(s) within the last year..    Current Medical Providers:  Patient Care Team:  Serenity Wren MD as PCP - General (Family Medicine)  Steve Muhammad MD as Consulting Physician (Cardiology)  CAROLE Vasquez DPM as Consulting Physician (Podiatry)  Naveed Leonardo MD as Cardiologist (Cardiology)  David Urbina MD as Consulting Physician (Otolaryngology)  Toby Fung MD as Consulting Physician (Vascular Surgery)  Gregory Valle MD as Surgeon (Vascular Surgery)    Health Habits and Functional and Cognitive Screening and Depression Screenin/18/2024     9:58 AM   Functional & Cognitive Status   Do you have difficulty preparing  food and eating? No   Do you have difficulty bathing yourself, getting dressed or grooming yourself? No   Do you have difficulty using the toilet? No   Do you have difficulty moving around from place to place? No   Do you have trouble with steps or getting out of a bed or a chair? No   Current Diet Well Balanced Diet   Dental Exam Up to date   Eye Exam Up to date   Exercise (times per week) 0 times per week   Current Exercises Include No Regular Exercise   Do you need help using the phone?  No   Are you deaf or do you have serious difficulty hearing?  No   Do you need help to go to places out of walking distance? No   Do you need help shopping? No   Do you need help preparing meals?  No   Do you need help with housework?  No   Do you need help with laundry? No   Do you need help taking your medications? No   Do you need help managing money? No   Do you ever drive or ride in a car without wearing a seat belt? No   Have you felt unusual stress, anger or loneliness in the last month? No   Who do you live with? Spouse   If you need help, do you have trouble finding someone available to you? No   Do you have difficulty concentrating, remembering or making decisions? No       Compared to one year ago, the patient feels his physical health is the same and his mental health is the same.    Depression Screen:      4/18/2024     9:58 AM   PHQ-2/PHQ-9 Depression Screening   Little Interest or Pleasure in Doing Things 0-->not at all   Feeling Down, Depressed or Hopeless 0-->not at all   PHQ-9: Brief Depression Severity Measure Score 0         Past Medical/Family/Social History:  The following portions of the patient's history were reviewed and updated as appropriate: allergies, current medications, past family history, past medical history, past social history, past surgical history, and problem list.    Allergies   Allergen Reactions    Azithromycin Unknown - High Severity    Tramadol Unknown - High Severity         Current  Outpatient Medications:     amLODIPine (NORVASC) 5 MG tablet, Take 1 tablet by mouth Daily., Disp: 90 tablet, Rfl: 1    aspirin 81 MG chewable tablet, Chew 1 tablet Daily., Disp: 30 tablet, Rfl: 1    atorvastatin (Lipitor) 80 MG tablet, Take 1 tablet by mouth Daily., Disp: 90 tablet, Rfl: 1    fluticasone (FLONASE) 50 MCG/ACT nasal spray, 2 sprays into the nostril(s) as directed by provider Daily As Needed., Disp: , Rfl:     hydroCHLOROthiazide (HYDRODIURIL) 50 MG tablet, Take 1 tablet by mouth Every Morning., Disp: , Rfl:     Jardiance 10 MG tablet tablet, Take 1 tablet by mouth Daily., Disp: 90 tablet, Rfl: 1    levothyroxine (Synthroid) 100 MCG tablet, Take 1 tablet by mouth Daily., Disp: 90 tablet, Rfl: 0    losartan (COZAAR) 100 MG tablet, Take 1 tablet by mouth Daily., Disp: 90 tablet, Rfl: 3    OneTouch Ultra test strip, USE AND DISCARD 1 TEST     STRIP DAILY., Disp: 100 each, Rfl: 1    pantoprazole (PROTONIX) 40 MG EC tablet, Take 1 tablet by mouth Daily., Disp: 90 tablet, Rfl: 1    rivaroxaban (Xarelto) 15 MG tablet, Take 1 tablet by mouth Daily With Dinner., Disp: 90 tablet, Rfl: 1    SITagliptin (Januvia) 50 MG tablet, Take 1 tablet by mouth Daily., Disp: 90 tablet, Rfl: 1    Aspirin use counseling: Taking ASA appropriately as indicated    Current medication list contains no high risk medications.  No harmful drug interactions have been identified.     Family History   Problem Relation Age of Onset    Hypertension Other     Heart attack Mother     Heart disease Mother     Heart attack Father     Heart disease Father        Social History     Tobacco Use    Smoking status: Never     Passive exposure: Never    Smokeless tobacco: Never   Substance Use Topics    Alcohol use: Never       Past Surgical History:   Procedure Laterality Date    ANKLE OPEN REDUCTION INTERNAL FIXATION Left 12/16/2020    Procedure: ANKLE OPEN REDUCTION INTERNAL FIXATION;  Surgeon: CAROLE Vasquez DPM;  Location: [facility] MAIN OR;   Service: Podiatry;  Laterality: Left;    AORTIC VALVE REPAIR/REPLACEMENT N/A 02/20/2023    Procedure: Transfemoral Transcatheter Aortic Valve Replacement;  Surgeon: Naveed Leonardo MD;  Location: [facility] HYBRID OR;  Service: Cardiovascular;  Laterality: N/A;    AORTIC VALVE REPAIR/REPLACEMENT N/A 02/20/2023    Procedure: TRANSCATHETER AORTIC VALVE REPLACEMENT;  Surgeon: Fabio Villafana MD;  Location: [facility] HYBRID OR;  Service: Cardiothoracic;  Laterality: N/A;    BACK SURGERY  1994    CARDIAC CATHETERIZATION Right 09/27/2022    Procedure: Left Heart Cath;  Surgeon: Steve Muhammad MD;  Location: [facility] CATH INVASIVE LOCATION;  Service: Cardiovascular;  Laterality: Right;    CARDIAC CATHETERIZATION N/A 01/12/2023    Procedure: Stent JENN coronary;  Surgeon: Steve Muhammad MD;  Location: [facility] CATH INVASIVE LOCATION;  Service: Cardiovascular;  Laterality: N/A;    CAROTID ENDARTERECTOMY Right 12/14/2020    Procedure: CAROTID ENDARTERECTOMY;  Surgeon: Toby uFng MD;  Location: [facility] MAIN OR;  Service: Vascular;  Laterality: Right;    COLONOSCOPY  08/25/2015    CORONARY STENT PLACEMENT      PROSTATECTOMY  2001    due to cancer       Patient Active Problem List   Diagnosis    Heart murmur    Essential hypertension    Mixed hyperlipidemia    Acquired hypothyroidism    GERD (gastroesophageal reflux disease)    Seasonal allergic rhinitis due to pollen    CKD (chronic kidney disease) stage 3, GFR 30-59 ml/min    History of prostate cancer    History of stroke    Symptomatic carotid artery stenosis, bilateral    Closed displaced fracture of lateral malleolus of left fibula    Medicare annual wellness visit, subsequent    Trigger index finger of left hand    Colon cancer screening    DM type 2 with diabetic peripheral neuropathy    Aortic stenosis, severe    Coronary artery disease involving native coronary artery of native heart    PAF (paroxysmal atrial fibrillation)    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   Respiratory:  Negative for shortness of breath and wheezing.    Cardiovascular:  Negative for chest pain, palpitations and leg swelling.   Musculoskeletal:  Negative for arthralgias and back pain.   Skin:  Negative for rash.   Neurological:  Negative for tremors and headache.       Objective     Vitals:    04/18/24 1012   BP: 111/55   BP Location: Left arm   Patient Position: Sitting   Cuff Size: Adult   Pulse: 64   Resp: 16   Temp: 98.9 °F (37.2 °C)   TempSrc: Infrared   SpO2: 97%   Weight: 90 kg (198 lb 6.4 oz)   Height: 180.3 cm (71\")   PainSc: 0-No pain              No results found.    The patient has no evidence of cognitve impairment.     Physical Exam  Vitals and nursing note reviewed.   Constitutional:       General: He is not in acute distress.     Appearance: Normal appearance. He is well-developed. He is not ill-appearing.   HENT:      Head: Normocephalic and atraumatic.   Cardiovascular:      Rate and Rhythm: Normal rate and regular rhythm.      Heart sounds: Normal heart sounds. No murmur heard.     No gallop.   Pulmonary:      Effort: Pulmonary effort is normal. No respiratory distress.      Breath sounds: Normal breath sounds. No wheezing, rhonchi or rales.   Chest:      Chest wall: No tenderness.   Musculoskeletal:      Cervical back: Normal range of motion and neck supple.   Neurological:      General: No focal deficit present.      Mental Status: He is alert and oriented to person, place, and time. Mental status is at baseline.   Psychiatric:         Mood and Affect: Mood normal.         Recent Lab " Results:     Lab Results   Component Value Date    CHOL 124 03/26/2024    TRIG 104 03/26/2024    HDL 39 (L) 03/26/2024    VLDL 19 03/26/2024    LDLHDL 1.65 03/26/2024       Assessment & Plan   Age-appropriate Screening Schedule:  Refer to the list below for future screening recommendations based on patient's age, sex and/or medical conditions.      Health Maintenance   Topic Date Due    RSV Vaccine - Adults (1 - 1-dose 60+ series) Never done    COVID-19 Vaccine (4 - 2023-24 season) 04/18/2025 (Originally 9/1/2023)    BMI FOLLOWUP  05/03/2024    INFLUENZA VACCINE  08/01/2024    HEMOGLOBIN A1C  09/26/2024    DIABETIC EYE EXAM  12/18/2024    LIPID PANEL  03/26/2025    URINE MICROALBUMIN  03/26/2025    ANNUAL WELLNESS VISIT  04/18/2025    TDAP/TD VACCINES (2 - Td or Tdap) 10/29/2029    COLORECTAL CANCER SCREENING  08/14/2030    Pneumococcal Vaccine 65+  Completed    ZOSTER VACCINE  Completed       Medicare Risks and Personalized Health Plan:  Advance Directive Discussion  Cardiovascular risk  Colon Cancer Screening  Dementia/Memory   Depression/Dysphoria  Diabetic Lab Screening   Fall Risk  Immunizations Discussed/Encouraged (specific immunizations; Shingrix, COVID19, and RSV (Respiratory Syncytial Virus) )      CMS-Preventive Services Quick Reference  Medicare Preventive Services Addressed:  Annual Wellness Visit (AWV)  Bone Density Measurements  Cardiovascular Disease Screening Tests (may do this order every 5 years in beneficiaries without signs or symptoms of cardiovascular disease)  Colorectal Cancer Screening, Colonoscopy  Prostate Cancer Screening     Advance Care Planning:  ACP discussion was held with the patient during this visit. Patient has an advance directive in EMR which is still valid.     Diagnoses and all orders for this visit:    1. Medicare annual wellness visit, subsequent (Primary)    2. Acquired hypothyroidism  -     levothyroxine (Synthroid) 100 MCG tablet; Take 1 tablet by mouth Daily.   Dispense: 90 tablet; Refill: 0  -     TSH      Medicare wellness exam was done today.  I reviewed and discussed with the patient his recent fasting blood work results.  His TSH is elevated and his hypothyroidism is not well-controlled.  I will be increasing his levothyroxine from 88 mcg to 100 mcg.  I will recheck his blood work again in about 8 weeks.  He will follow-up with his ENT for further management of his Ménière's disease.  He had his last colonoscopy in 8/2023 and no recall colonoscopy was recommended.  Vaccination was also reviewed and recommended, healthy lifestyle was reinforced.  Fall prevention was stressed.    An After Visit Summary and PPPS with all of these plans were given to the patient.      Follow Up:  Return in about 6 months (around 10/18/2024).        Requested Prescriptions     Signed Prescriptions Disp Refills    levothyroxine (Synthroid) 100 MCG tablet 90 tablet 0     Sig: Take 1 tablet by mouth Daily.       Serenity Wren MD  04/18/2024

## 2024-04-23 ENCOUNTER — TELEPHONE (OUTPATIENT)
Dept: CARDIOLOGY | Facility: CLINIC | Age: 84
End: 2024-04-23
Payer: MEDICARE

## 2024-04-26 RX ORDER — EMPAGLIFLOZIN 10 MG/1
10 TABLET, FILM COATED ORAL DAILY
Qty: 90 TABLET | Refills: 0 | Status: SHIPPED | OUTPATIENT
Start: 2024-04-26

## 2024-04-27 RX ORDER — SITAGLIPTIN 50 MG/1
50 TABLET, FILM COATED ORAL DAILY
Qty: 90 TABLET | Refills: 1 | Status: SHIPPED | OUTPATIENT
Start: 2024-04-27

## 2024-04-29 NOTE — TELEPHONE ENCOUNTER
Name: Alen Ratliff      Relationship: Self      Best Callback Number:[phone]      HUB PROVIDED THE RELAY MESSAGE FROM THE OFFICE      PATIENT: HAS FURTHER QUESTIONS AND WOULD LIKE A CALL BACK AT THE FOLLOWING PHONE [phone]    ADDITIONAL INFORMATION: PT HAS QUESTIONS

## 2024-05-31 DIAGNOSIS — I65.23 BILATERAL CAROTID ARTERY OCCLUSION: Primary | ICD-10-CM

## 2024-06-07 DIAGNOSIS — E78.2 MIXED HYPERLIPIDEMIA: Chronic | ICD-10-CM

## 2024-06-07 RX ORDER — ATORVASTATIN CALCIUM 80 MG/1
80 TABLET, FILM COATED ORAL DAILY
Qty: 90 TABLET | Refills: 1 | Status: SHIPPED | OUTPATIENT
Start: 2024-06-07

## 2024-06-07 NOTE — TELEPHONE ENCOUNTER
Caller: Elba Ratliff    Relationship: Emergency Contact    Best call back number: [phone]     Requested Prescriptions:   Requested Prescriptions     Pending Prescriptions Disp Refills    atorvastatin (Lipitor) 80 MG tablet 90 tablet 1     Sig: Take 1 tablet by mouth Daily.        Ph[facility] be sent[facility], [address] [phone] [facility] [phone]      Last office visit with prescribing clinician: 4/18/2024   Last telemedicine visit with prescribing clinician: Visit date not found   Next office visit with prescribing clinician: 10/18/2024     Additional details provided by patient: PATIENT HAS ABOUT A WEEK OF MEDICINE    Does the patient have less than a 3 day supply:  [] Yes  [x] No    Would you like a call back once the refill request has been completed: [x] Yes [] No    If the office needs to give you a call back, can they leave a voicemail: [x] Yes [] No    Cruz Garrido   06/07/24 16:03 EDT

## 2024-06-10 ENCOUNTER — TELEPHONE (OUTPATIENT)
Dept: FAMILY MEDICINE CLINIC | Facility: CLINIC | Age: 84
End: 2024-06-10

## 2024-06-10 RX ORDER — LOSARTAN POTASSIUM 100 MG/1
100 TABLET ORAL DAILY
Qty: 90 TABLET | Refills: 1 | Status: SHIPPED | OUTPATIENT
Start: 2024-06-10 | End: 2024-06-10 | Stop reason: SDUPTHER

## 2024-06-10 RX ORDER — LOSARTAN POTASSIUM 100 MG/1
100 TABLET ORAL DAILY
Qty: 90 TABLET | Refills: 1 | Status: SHIPPED | OUTPATIENT
Start: 2024-06-10

## 2024-06-10 NOTE — TELEPHONE ENCOUNTER
Caller: Elba Ratliff    Relationship: Emergency Contact    Best call back number: [phone] (Home)     Requested Prescriptions:   atorvastatin (Lipitor) 80 MG tablet      losartan (COZAAR) 100 MG tablet     [facility] be sent[facility], [address] [phone] [facility] [phone]      Last office visit with prescribing clinician: 4/18/2024   Last telemedicine visit with prescribing clinician: Visit date not found   Next office visit with prescribing clinician: 10/18/2024     Additional details provided by patient: PATIENT CALLED TO REQUEST A MEDICATION REFILL ON MEDICATION. PATIENT HAS A 7 DAY SUPPLY LEFT.      Does the patient have less than a 3 day supply:  [] Yes  [x] No    Would you like a call back once the refill request has been completed: [] Yes [] No    If the office needs to give you a call back, can they leave a voicemail: [] Yes [] No    Cruz Anderson Rep   06/10/24 11:23 EDT         THANKS

## 2024-06-20 ENCOUNTER — LAB (OUTPATIENT)
Dept: FAMILY MEDICINE CLINIC | Facility: CLINIC | Age: 84
End: 2024-06-20
Payer: MEDICARE

## 2024-06-20 LAB — TSH SERPL DL<=0.05 MIU/L-ACNC: 2.09 UIU/ML (ref 0.27–4.2)

## 2024-06-20 PROCEDURE — 84443 ASSAY THYROID STIM HORMONE: CPT | Performed by: FAMILY MEDICINE

## 2024-07-15 DIAGNOSIS — E03.9 ACQUIRED HYPOTHYROIDISM: ICD-10-CM

## 2024-07-15 RX ORDER — LEVOTHYROXINE SODIUM 0.1 MG/1
100 TABLET ORAL DAILY
Qty: 90 TABLET | Refills: 1 | Status: SHIPPED | OUTPATIENT
Start: 2024-07-15

## 2024-07-15 NOTE — TELEPHONE ENCOUNTER
Caller: Elba Ratliff    Relationship: Emergency Contact    Best call back number:[phone]     Requested Prescriptions:   Requested Prescriptions     Pending Prescriptions Disp Refills    levothyroxine (Synthroid) 100 MCG tablet 90 tablet 0     Sig: Take 1 tablet by mouth Daily.        Pharmacy where request should be sent: [facility] #[id number] - [address] [phone] [facility] [phone]      Last office visit with prescribing clinician: 4/18/2024   Last telemedicine visit with prescribing clinician: Visit date not found   Next office visit with prescribing clinician: 10/18/2024     Additional details provided by patient: PATIENT HAS ENOUGH FOR 3 DAYS     Does the patient have less than a 3 day supply:  [] Yes  [x] No    Would you like a call back once the refill request has been completed: [] Yes [x] No    If the office needs to give you a call back, can they leave a voicemail: [] Yes [x] No    Cruz Serna Rep   07/15/24 11:27 EDT

## 2024-07-23 ENCOUNTER — OFFICE VISIT (OUTPATIENT)
Dept: PODIATRY | Facility: CLINIC | Age: 84
End: 2024-07-23
Payer: MEDICARE

## 2024-07-23 VITALS — OXYGEN SATURATION: 98 % | BODY MASS INDEX: 28 KG/M2 | HEIGHT: 71 IN | WEIGHT: 200 LBS | HEART RATE: 67 BPM

## 2024-07-23 DIAGNOSIS — E11.65 TYPE 2 DIABETES MELLITUS WITH HYPERGLYCEMIA, WITHOUT LONG-TERM CURRENT USE OF INSULIN: ICD-10-CM

## 2024-07-23 DIAGNOSIS — L60.3 ONYCHODYSTROPHY: Primary | ICD-10-CM

## 2024-07-23 RX ORDER — LOTILANER OPHTHALMIC SOLUTION 2.5 MG/ML
1 SOLUTION/ DROPS OPHTHALMIC 2 TIMES DAILY
COMMUNITY
Start: 2024-06-24

## 2024-07-24 DIAGNOSIS — K21.9 GASTROESOPHAGEAL REFLUX DISEASE, UNSPECIFIED WHETHER ESOPHAGITIS PRESENT: ICD-10-CM

## 2024-07-24 RX ORDER — PANTOPRAZOLE SODIUM 40 MG/1
40 TABLET, DELAYED RELEASE ORAL DAILY
Qty: 90 TABLET | Refills: 1 | Status: SHIPPED | OUTPATIENT
Start: 2024-07-24

## 2024-07-24 NOTE — TELEPHONE ENCOUNTER
Incoming Refill Request      Medication requested (name and dose):     pantoprazole (PROTONIX) 40 MG EC tablet  40 mg, Daily       Pharmacy where request should be sent: [facility][id number] [place] - [address] [place] [address] [phone] [place] [phone]     Additional details provided by patient: NONE    Best call back number: [phone]    Does the patient have less than a 3 day supply:  [x] Yes  [] No    Cruz Baxter Rep  07/24/24, 09:38 EDT

## 2024-07-24 NOTE — PROGRESS NOTES
07/23/2024  Foot and Ankle Surgery - Established Patient/Follow-up  Provider: Dr. Alen Vasquez DPM  Location: [facility]    Subjective:  Alen Ratliff is a 83 y.o. male.     Chief Complaint   Patient presents with    Left Foot - Nail Problem     Great toe     Right Foot - Nail Problem    Follow-up     Pcp  felix 04/18/24       History of Present Illness  The patient is a 24-year-old male who presents for evaluation of foot stiffness. He is accompanied by an adult female.    He continues to experience stiffness in his left foot, although he can move it. Occasionally, he experiences discomfort while walking. His right foot is unable to trim his nails due to the lack of a scissor. His blood sugar levels have been stable, with the last recorded level being around 138. His last A1c was 7.1. Since his last visit, he has not experienced any open wounds or infections. He does not engage in regular exercise or stretching exercises. Prolonged walking or standing results in a sensation of his back giving out.      Allergies   Allergen Reactions    Azithromycin Unknown - High Severity    Tramadol Unknown - High Severity       Current Outpatient Medications on File Prior to Visit   Medication Sig Dispense Refill    amLODIPine (NORVASC) 5 MG tablet Take 1 tablet by mouth Daily. 90 tablet 1    aspirin 81 MG chewable tablet Chew 1 tablet Daily. 30 tablet 1    atorvastatin (Lipitor) 80 MG tablet Take 1 tablet by mouth Daily. 90 tablet 1    fluticasone (FLONASE) 50 MCG/ACT nasal spray 2 sprays into the nostril(s) as directed by provider Daily As Needed.      Jardiance 10 MG tablet tablet TAKE 1 TABLET DAILY 90 tablet 0    levothyroxine (Synthroid) 100 MCG tablet Take 1 tablet by mouth Daily. 90 tablet 1    losartan (COZAAR) 100 MG tablet Take 1 tablet by mouth Daily. 90 tablet 1    OneTouch Ultra test strip USE AND DISCARD 1 TEST     STRIP DAILY. 100 each 1    pantoprazole (PROTONIX) 40 MG EC tablet Take 1 tablet  "by mouth Daily. 90 tablet 1    rivaroxaban (Xarelto) 15 MG tablet Take 1 tablet by mouth Daily With Dinner. 90 tablet 1    SITagliptin (Januvia) 50 MG tablet TAKE 1 TABLET DAILY 90 tablet 1    Xdemvy 0.25 % solution Administer 1 drop to both eyes 2 (Two) Times a Day.      hydroCHLOROthiazide (HYDRODIURIL) 50 MG tablet Take 1 tablet by mouth Every Morning. (Patient not taking: Reported on 2024)       No current facility-administered medications on file prior to visit.       Objective   Pulse 67   Ht 180.3 cm (71\")   Wt 90.7 kg (200 lb)   SpO2 98%   BMI 27.89 kg/m²     Foot/Ankle Exam  Physical Exam  GENERAL  Orientation:  AAOx3  Affect:  appropriate  Gait:  antalgic     VASCULAR      Left Foot Vascularity   Normal vascular exam    Dorsalis pedis:  2+  Posterior tibial:  2+  Skin temperature:  warm  Edema gradin+  CFT:  < 3 seconds  Pedal hair growth:  Present  Varicosities:  none     NEUROLOGIC      Left Foot Neurologic   Light touch sensation: normal  Hot/Cold sensation:  normal  Achilles reflex:  2+     MUSCULOSKELETAL      Left Foot Musculoskeletal   Arch:  Normal     DERMATOLOGIC          Left Foot Dermatologic   Skin  Left foot skin is intact.      Left foot additional comments: Incision site is dry and stable with intact staples.  No evidence of dehiscence or infection.     2024  Continued thickness and dystrophy involving the left hallux nail. No pain with palpation. No signs of infection. Mild discoloration consistent with onychomycosis. No open wounds. Neurovascular status is grossly intact. Range of motion, muscle strength is appropriate.     24: No open wounds or signs of infection.  Moderate thickness and dystrophy involving the nails.  Moderate soft tissue rigidity and equinus contracture.  No gross deformity or instability      Results  Laboratory Studies  Last blood sugar was around 138. Last A1c was 7.1.    Assessment & Plan   Diagnoses and all orders for this visit:    1. " Onychodystrophy (Primary)    2. Type 2 diabetes mellitus with hyperglycemia, without long-term current use of insulin      Assessment & Plan    The stiffness in his feet could be attributed to diabetes and normal aging. He was advised to wear supportive tennis shoes, such as Asics, New Balance, or Rahman, and to consider Skechers. Lower-impact exercises such as gym-based workouts, walking, biking, elliptical, swimming, and water aerobics were recommended 4 to 5 days a week. He was advised to avoid walking barefoot, wearing flip flops, and sandals. Stretching exercises were provided. Should his symptoms worsen, physical therapy will be considered.Explained importance of diabetic foot care, daily foot checks, and glycemic control. Patient should check both feet on a daily basis, monitor and control blood sugars, make sure that both feet and in between toes are towel dried after baths or showers. Avoid barefoot walking at all times.  I discussed wearing white socks and checking shoes before wearing them. Patient was given information on proper foot care. Call the office at the first signs of a wound or with signs of infection.Reviewed proper basic stretching and manual therapy exercises along with appropriate shoes and activity.  Discussed proper use and/or avoidance of OTC anti-inflammatories.  Patient is to call with any additional issues or concerns.  Greater than 20 minutes was spent before, during, and after evaluation for patient care.    Follow-up  He will follow up with Vivian in 3 months for diabetic foot check             Patient or patient representative verbalized consent for the use of Ambient Listening during the visit with  CAROLE Vasquez DPM for chart documentation. 7/24/2024  07:05 EDT    CAROLE Vasquez DPM

## 2024-07-31 DIAGNOSIS — G45.9 TIA (TRANSIENT ISCHEMIC ATTACK): ICD-10-CM

## 2024-07-31 RX ORDER — RIVAROXABAN 15 MG/1
TABLET, FILM COATED ORAL
Qty: 90 TABLET | Refills: 0 | Status: SHIPPED | OUTPATIENT
Start: 2024-07-31

## 2024-08-12 DIAGNOSIS — I10 ESSENTIAL HYPERTENSION: Chronic | ICD-10-CM

## 2024-08-14 RX ORDER — AMLODIPINE BESYLATE 5 MG/1
5 TABLET ORAL DAILY
Qty: 90 TABLET | Refills: 1 | Status: SHIPPED | OUTPATIENT
Start: 2024-08-14

## 2024-09-26 NOTE — TELEPHONE ENCOUNTER
Wife Elba called said his nose is still bleeding from when they tested him for COVID. They told her if it continued he needed to see ENT, she is calling for referral.   [Interpreters_IDNumber] : [id number] [Interpreters_FullName] : Helen [TWNoteComboBox1] : [place]

## 2024-10-07 ENCOUNTER — TELEPHONE (OUTPATIENT)
Dept: FAMILY MEDICINE CLINIC | Facility: CLINIC | Age: 84
End: 2024-10-07
Payer: MEDICARE

## 2024-10-07 DIAGNOSIS — E78.2 MIXED HYPERLIPIDEMIA: Primary | Chronic | ICD-10-CM

## 2024-10-07 DIAGNOSIS — E11.42 DM TYPE 2 WITH DIABETIC PERIPHERAL NEUROPATHY: ICD-10-CM

## 2024-10-07 DIAGNOSIS — E03.9 ACQUIRED HYPOTHYROIDISM: ICD-10-CM

## 2024-10-07 NOTE — TELEPHONE ENCOUNTER
PATIENT HAS A LAB APPT ON 10/10/24. I DO NOT SEE ORDERS IN THE CHART, CAN YOU PLEASE ADD ORDERS. THANK YOU!

## 2024-10-10 ENCOUNTER — LAB (OUTPATIENT)
Dept: FAMILY MEDICINE CLINIC | Facility: CLINIC | Age: 84
End: 2024-10-10
Payer: MEDICARE

## 2024-10-10 LAB
ALBUMIN SERPL-MCNC: 3.8 G/DL (ref 3.5–5.2)
ALBUMIN/GLOB SERPL: 1.5 G/DL
ALP SERPL-CCNC: 70 U/L (ref 39–117)
ALT SERPL W P-5'-P-CCNC: 22 U/L (ref 1–41)
ANION GAP SERPL CALCULATED.3IONS-SCNC: 7.3 MMOL/L (ref 5–15)
AST SERPL-CCNC: 28 U/L (ref 1–40)
BILIRUB SERPL-MCNC: 0.4 MG/DL (ref 0–1.2)
BUN SERPL-MCNC: 29 MG/DL (ref 8–23)
BUN/CREAT SERPL: 23.2 (ref 7–25)
CALCIUM SPEC-SCNC: 8.4 MG/DL (ref 8.6–10.5)
CHLORIDE SERPL-SCNC: 113 MMOL/L (ref 98–107)
CHOLEST SERPL-MCNC: 110 MG/DL (ref 0–200)
CO2 SERPL-SCNC: 24.7 MMOL/L (ref 22–29)
CREAT SERPL-MCNC: 1.25 MG/DL (ref 0.76–1.27)
EGFRCR SERPLBLD CKD-EPI 2021: 56.4 ML/MIN/1.73
GLOBULIN UR ELPH-MCNC: 2.5 GM/DL
GLUCOSE SERPL-MCNC: 133 MG/DL (ref 65–99)
HBA1C MFR BLD: 6.6 % (ref 4.8–5.6)
HDLC SERPL-MCNC: 37 MG/DL (ref 40–60)
LDLC SERPL CALC-MCNC: 59 MG/DL (ref 0–100)
LDLC/HDLC SERPL: 1.64 {RATIO}
POTASSIUM SERPL-SCNC: 4.3 MMOL/L (ref 3.5–5.2)
PROT SERPL-MCNC: 6.3 G/DL (ref 6–8.5)
SODIUM SERPL-SCNC: 145 MMOL/L (ref 136–145)
TRIGL SERPL-MCNC: 62 MG/DL (ref 0–150)
TSH SERPL DL<=0.05 MIU/L-ACNC: 5.42 UIU/ML (ref 0.27–4.2)
VLDLC SERPL-MCNC: 14 MG/DL (ref 5–40)

## 2024-10-10 PROCEDURE — 80061 LIPID PANEL: CPT | Performed by: FAMILY MEDICINE

## 2024-10-10 PROCEDURE — 36415 COLL VENOUS BLD VENIPUNCTURE: CPT | Performed by: FAMILY MEDICINE

## 2024-10-10 PROCEDURE — 80053 COMPREHEN METABOLIC PANEL: CPT | Performed by: FAMILY MEDICINE

## 2024-10-10 PROCEDURE — 84443 ASSAY THYROID STIM HORMONE: CPT | Performed by: FAMILY MEDICINE

## 2024-10-10 PROCEDURE — 83036 HEMOGLOBIN GLYCOSYLATED A1C: CPT | Performed by: FAMILY MEDICINE

## 2024-10-15 RX ORDER — SITAGLIPTIN 50 MG/1
50 TABLET, FILM COATED ORAL DAILY
Qty: 90 TABLET | Refills: 0 | Status: SHIPPED | OUTPATIENT
Start: 2024-10-15

## 2024-10-18 ENCOUNTER — TELEPHONE (OUTPATIENT)
Dept: FAMILY MEDICINE CLINIC | Facility: CLINIC | Age: 84
End: 2024-10-18

## 2024-10-18 ENCOUNTER — OFFICE VISIT (OUTPATIENT)
Dept: FAMILY MEDICINE CLINIC | Facility: CLINIC | Age: 84
End: 2024-10-18
Payer: MEDICARE

## 2024-10-18 VITALS
TEMPERATURE: 97.5 F | OXYGEN SATURATION: 94 % | DIASTOLIC BLOOD PRESSURE: 72 MMHG | RESPIRATION RATE: 18 BRPM | HEART RATE: 62 BPM | WEIGHT: 216 LBS | SYSTOLIC BLOOD PRESSURE: 120 MMHG | BODY MASS INDEX: 30.24 KG/M2 | HEIGHT: 71 IN

## 2024-10-18 DIAGNOSIS — I10 ESSENTIAL HYPERTENSION: Chronic | ICD-10-CM

## 2024-10-18 DIAGNOSIS — E03.9 ACQUIRED HYPOTHYROIDISM: Primary | ICD-10-CM

## 2024-10-18 DIAGNOSIS — Z23 NEEDS FLU SHOT: ICD-10-CM

## 2024-10-18 DIAGNOSIS — G89.29 CHRONIC PAIN OF RIGHT KNEE: ICD-10-CM

## 2024-10-18 DIAGNOSIS — N18.31 STAGE 3A CHRONIC KIDNEY DISEASE: ICD-10-CM

## 2024-10-18 DIAGNOSIS — K21.9 GASTROESOPHAGEAL REFLUX DISEASE, UNSPECIFIED WHETHER ESOPHAGITIS PRESENT: ICD-10-CM

## 2024-10-18 DIAGNOSIS — M25.561 CHRONIC PAIN OF RIGHT KNEE: ICD-10-CM

## 2024-10-18 DIAGNOSIS — E11.42 DM TYPE 2 WITH DIABETIC PERIPHERAL NEUROPATHY: ICD-10-CM

## 2024-10-18 PROCEDURE — G0008 ADMIN INFLUENZA VIRUS VAC: HCPCS | Performed by: FAMILY MEDICINE

## 2024-10-18 PROCEDURE — 3078F DIAST BP <80 MM HG: CPT | Performed by: FAMILY MEDICINE

## 2024-10-18 PROCEDURE — 3074F SYST BP LT 130 MM HG: CPT | Performed by: FAMILY MEDICINE

## 2024-10-18 PROCEDURE — 1126F AMNT PAIN NOTED NONE PRSNT: CPT | Performed by: FAMILY MEDICINE

## 2024-10-18 PROCEDURE — 90662 IIV NO PRSV INCREASED AG IM: CPT | Performed by: FAMILY MEDICINE

## 2024-10-18 PROCEDURE — 99214 OFFICE O/P EST MOD 30 MIN: CPT | Performed by: FAMILY MEDICINE

## 2024-10-18 RX ORDER — AMLODIPINE BESYLATE 5 MG/1
5 TABLET ORAL DAILY
Qty: 90 TABLET | Refills: 1 | Status: SHIPPED | OUTPATIENT
Start: 2024-10-18

## 2024-10-18 RX ORDER — PANTOPRAZOLE SODIUM 40 MG/1
40 TABLET, DELAYED RELEASE ORAL DAILY
Qty: 90 TABLET | Refills: 1 | Status: SHIPPED | OUTPATIENT
Start: 2024-10-18

## 2024-10-18 RX ORDER — LEVOTHYROXINE SODIUM 112 UG/1
112 TABLET ORAL DAILY
Qty: 90 TABLET | Refills: 0 | Status: SHIPPED | OUTPATIENT
Start: 2024-10-18

## 2024-10-18 NOTE — TELEPHONE ENCOUNTER
He is supposed to come for the thyroid recheck in 2 months, this order is already in his chart.  I will put the rest of the orders into his chart after his thyroid recheck.  Thank you.

## 2024-10-18 NOTE — PROGRESS NOTES
Injection  Injection performed in left deltoid by Danni Nair MA. Patient tolerated the procedure well without complications.  10/18/24   Danni Nair MA

## 2024-10-18 NOTE — TELEPHONE ENCOUNTER
PATIENT HAS A LAB APPT ON 4/15/2025. I DO NOT SEE ORDERS IN THE CHART, CAN YOU PLEASE ADD ORDERS. THANK YOU!

## 2024-10-18 NOTE — PROGRESS NOTES
Subjective   Chief Complaint   Patient presents with    Flu Vaccine    Follow-up    Diabetes    Hypertension    Hyperlipidemia    Hypothyroidism    Med Refill     Alen Ratliff is a 84 y.o. male.     Patient Care Team:  Serenity Wren MD as PCP - General (Family Medicine)  Steve Muhammad MD as Consulting Physician (Cardiology)  CAROLE Vasquez DPM as Consulting Physician (Podiatry)  Naveed Leonardo MD as Cardiologist (Cardiology)  David Urbina MD as Consulting Physician (Otolaryngology)  Gregory Valle MD as Surgeon (Vascular Surgery)  Gómez Arvizu MD as Consulting Physician (Ophthalmology)    History of Present Illness  He is coming in today to follow-up on his chronic medical problems and his medications including diabetes, hypertension, hyperlipidemia, and hypothyroidism.  He also recently had fasting blood work done and these results are being reviewed and discussed with the patient today.  His TSH is elevated consistent with under compensated hypothyroidism.  Patient currently is on levothyroxine 100 mcg a day and reports taking it as directed.  He is also followed by cardiology.  He is status post TAVR procedure about 2 years ago.  He also reports having some issues with the pain in the right knee and he takes Aleve as needed, but typically not more frequently than every other week.       The following portions of the patient's history were reviewed and updated as appropriate: allergies, current medications, past family history, past medical history, past social history, past surgical history, and problem list.  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    Stroke      Past Surgical History:    Procedure Laterality Date    ANKLE OPEN REDUCTION INTERNAL FIXATION Left 12/16/2020    Procedure: ANKLE OPEN REDUCTION INTERNAL FIXATION;  Surgeon: CAROLE Vasquez DPM;  Location: [facility] MAIN OR;  Service: Podiatry;  Laterality: Left;    AORTIC VALVE REPAIR/REPLACEMENT N/A 02/20/2023    Procedure: Transfemoral Transcatheter Aortic Valve Replacement;  Surgeon: Naveed Leonardo MD;  Location: [facility] HYBRID OR;  Service: Cardiovascular;  Laterality: N/A;    AORTIC VALVE REPAIR/REPLACEMENT N/A 02/20/2023    Procedure: TRANSCATHETER AORTIC VALVE REPLACEMENT;  Surgeon: Fabio Villafana MD;  Location: [facility] HYBRID OR;  Service: Cardiothoracic;  Laterality: N/A;    BACK SURGERY  1994    CARDIAC CATHETERIZATION Right 09/27/2022    Procedure: Left Heart Cath;  Surgeon: Steve Muhammad MD;  Location: [facility] CATH INVASIVE LOCATION;  Service: Cardiovascular;  Laterality: Right;    CARDIAC CATHETERIZATION N/A 01/12/2023    Procedure: Stent JENN coronary;  Surgeon: Steve Muhammad MD;  Location: [facility] CATH INVASIVE LOCATION;  Service: Cardiovascular;  Laterality: N/A;    CAROTID ENDARTERECTOMY Right 12/14/2020    Procedure: CAROTID ENDARTERECTOMY;  Surgeon: Toby Fung MD;  Location: [facility] MAIN OR;  Service: Vascular;  Laterality: Right;    COLONOSCOPY  08/25/2015    CORONARY STENT PLACEMENT      PROSTATECTOMY  2001    due to cancer     The patient has a family history of  Family History   Problem Relation Age of Onset    Hypertension Other     Heart attack Mother     Heart disease Mother     Heart attack Father     Heart disease Father      Social History     Socioeconomic History    Marital status:    Tobacco Use    Smoking status: Never     Passive exposure: Never    Smokeless tobacco: Never   Vaping Use    Vaping status: Never Used   Substance and Sexual Activity    Alcohol use: Never    Drug use: Never    Sexual activity: Defer       Review of Systems   Constitutional:  Negative for activity change, fatigue  "and fever.   Respiratory:  Negative for shortness of breath and wheezing.    Cardiovascular:  Negative for chest pain, palpitations and leg swelling.   Musculoskeletal:  Positive for arthralgias. Negative for back pain and gait problem.   Skin:  Negative for rash.   Neurological:  Negative for tremors and headache.     Visit Vitals  /72 (BP Location: Left arm, Patient Position: Sitting, Cuff Size: Large Adult)   Pulse 62   Temp 97.5 °F (36.4 °C) (Temporal)   Resp 18   Ht 180 cm (70.87\")   Wt 98 kg (216 lb)   SpO2 94%   BMI 30.24 kg/m²       BMI is >= 25 and <30. (Overweight) The following options were offered after discussion;: exercise counseling/recommendations      Current Outpatient Medications:     amLODIPine (NORVASC) 5 MG tablet, Take 1 tablet by mouth Daily., Disp: 90 tablet, Rfl: 1    aspirin 81 MG chewable tablet, Chew 1 tablet Daily., Disp: 30 tablet, Rfl: 1    atorvastatin (Lipitor) 80 MG tablet, Take 1 tablet by mouth Daily., Disp: 90 tablet, Rfl: 1    empagliflozin (Jardiance) 10 MG tablet tablet, Take 1 tablet by mouth Daily., Disp: 90 tablet, Rfl: 0    fluticasone (FLONASE) 50 MCG/ACT nasal spray, Administer 2 sprays into the nostril(s) as directed by provider Daily As Needed., Disp: , Rfl:     hydroCHLOROthiazide (HYDRODIURIL) 50 MG tablet, Take 1 tablet by mouth Every Morning., Disp: , Rfl:     losartan (COZAAR) 100 MG tablet, Take 1 tablet by mouth Daily., Disp: 90 tablet, Rfl: 1    OneTouch Ultra test strip, USE AND DISCARD 1 TEST     STRIP DAILY., Disp: 100 each, Rfl: 1    pantoprazole (PROTONIX) 40 MG EC tablet, Take 1 tablet by mouth Daily., Disp: 90 tablet, Rfl: 1    rivaroxaban (Xarelto) 15 MG tablet, TAKE ONE TABLET BY MOUTH EVERY DAY WITH DINNER, Disp: 90 tablet, Rfl: 0    SITagliptin (Januvia) 50 MG tablet, TAKE ONE TABLET BY MOUTH EVERY DAY, Disp: 90 tablet, Rfl: 0    Xdemvy 0.25 % solution, Administer 1 drop to both eyes 2 (Two) Times a Day., Disp: , Rfl:     levothyroxine " (Synthroid) 112 MCG tablet, Take 1 tablet by mouth Daily., Disp: 90 tablet, Rfl: 0    Objective   Physical Exam  Constitutional:       General: He is not in acute distress.     Appearance: Normal appearance. He is well-developed. He is not ill-appearing or diaphoretic.      Comments: Patient is in no distress, patient has normal voice and speech.  Normal respiratory effort.   HENT:      Head: Normocephalic and atraumatic.   Pulmonary:      Effort: Pulmonary effort is normal.   Musculoskeletal:      Cervical back: Normal range of motion and neck supple.   Neurological:      General: No focal deficit present.      Mental Status: He is alert and oriented to person, place, and time. Mental status is at baseline.   Psychiatric:         Mood and Affect: Mood normal.         FOLLOWING LABS WERE REVIEWED TODAY:  CMP          12/19/2023    10:39 3/26/2024    08:33 10/10/2024    09:05   CMP   Glucose 186  125  133    BUN 26  40  29    Creatinine 1.24  1.49  1.25    EGFR 57.3  46.0  56.4    Sodium 142  143  145    Potassium 4.1  4.4  4.3    Chloride 106  107  113    Calcium 9.2  9.2  8.4    Total Protein  6.7  6.3    Albumin  3.9  3.8    Globulin  2.8  2.5    Total Bilirubin  0.4  0.4    Alkaline Phosphatase  65  70    AST (SGOT)  23  28    ALT (SGPT)  25  22    Albumin/Globulin Ratio  1.4  1.5    BUN/Creatinine Ratio 21.0  26.8  23.2    Anion Gap 9.6  14.3  7.3      Lipid Panel          3/26/2024    08:33 10/10/2024    09:05   Lipid Panel   Total Cholesterol 124  110    Triglycerides 104  62    HDL Cholesterol 39  37    VLDL Cholesterol 19  14    LDL Cholesterol  66  59    LDL/HDL Ratio 1.65  1.64      TSH          3/26/2024    08:33 6/20/2024    10:16 10/10/2024    09:05   TSH   TSH 4.770  2.090  5.420      Most Recent A1C          10/10/2024    09:05   HGBA1C Most Recent   Hemoglobin A1C 6.60            Assessment & Plan   Diagnoses and all orders for this visit:    1. Acquired hypothyroidism (Primary)  -     levothyroxine  (Synthroid) 112 MCG tablet; Take 1 tablet by mouth Daily.  Dispense: 90 tablet; Refill: 0  -     TSH    2. Essential hypertension  -     amLODIPine (NORVASC) 5 MG tablet; Take 1 tablet by mouth Daily.  Dispense: 90 tablet; Refill: 1    3. DM type 2 with diabetic peripheral neuropathy  -     empagliflozin (Jardiance) 10 MG tablet tablet; Take 1 tablet by mouth Daily.  Dispense: 90 tablet; Refill: 0    4. Gastroesophageal reflux disease, unspecified whether esophagitis present  -     pantoprazole (PROTONIX) 40 MG EC tablet; Take 1 tablet by mouth Daily.  Dispense: 90 tablet; Refill: 1    5. Stage 3a chronic kidney disease    6. Chronic pain of right knee    7. Needs flu shot  -     Fluzone High-Dose 65+yrs        I reviewed his chronic medical problems and his medications as well as his recent fasting blood work results which were discussed with the patient today.  His TSH is elevated and his hypothyroidism is under compensated.  I will be increasing his levothyroxine from 100 mcg to 112 mcg and we will plan to recheck TSH again in 2 months.  I also addressed his chronic right knee pain which he has been dealing with for quite some time.  I advised the patient to avoid anti-inflammatories due to the fact that he is on chronic anticoagulation and due to his chronic kidney disease and acid reflux.  Patient may take over-the-counter Tylenol.  I also suggested to try over-the-counter Voltaren gel.  Immunization was also reviewed and updated and he was given a flu shot today.      Return in about 6 months (around 4/18/2025) for Medicare Wellness.    Requested Prescriptions     Signed Prescriptions Disp Refills    levothyroxine (Synthroid) 112 MCG tablet 90 tablet 0     Sig: Take 1 tablet by mouth Daily.    amLODIPine (NORVASC) 5 MG tablet 90 tablet 1     Sig: Take 1 tablet by mouth Daily.    pantoprazole (PROTONIX) 40 MG EC tablet 90 tablet 1     Sig: Take 1 tablet by mouth Daily.    empagliflozin (Jardiance) 10 MG tablet  tablet 90 tablet 0     Sig: Take 1 tablet by mouth Daily.       Serenity Wren MD  10/18/2024

## 2024-10-20 DIAGNOSIS — E78.2 MIXED HYPERLIPIDEMIA: Chronic | ICD-10-CM

## 2024-10-20 RX ORDER — ATORVASTATIN CALCIUM 80 MG/1
80 TABLET, FILM COATED ORAL DAILY
Qty: 90 TABLET | Refills: 1 | Status: SHIPPED | OUTPATIENT
Start: 2024-10-20

## 2024-10-30 DIAGNOSIS — G45.9 TIA (TRANSIENT ISCHEMIC ATTACK): ICD-10-CM

## 2024-10-30 RX ORDER — RIVAROXABAN 15 MG/1
TABLET, FILM COATED ORAL
Qty: 90 TABLET | Refills: 0 | Status: SHIPPED | OUTPATIENT
Start: 2024-10-30

## 2024-12-18 ENCOUNTER — LAB (OUTPATIENT)
Dept: FAMILY MEDICINE CLINIC | Facility: CLINIC | Age: 84
End: 2024-12-18
Payer: MEDICARE

## 2024-12-18 LAB — TSH SERPL DL<=0.05 MIU/L-ACNC: 3.07 UIU/ML (ref 0.27–4.2)

## 2024-12-18 PROCEDURE — 84443 ASSAY THYROID STIM HORMONE: CPT | Performed by: FAMILY MEDICINE

## 2024-12-31 ENCOUNTER — OFFICE VISIT (OUTPATIENT)
Age: 84
End: 2024-12-31
Payer: MEDICARE

## 2024-12-31 VITALS — WEIGHT: 216 LBS | BODY MASS INDEX: 30.24 KG/M2 | HEART RATE: 61 BPM | HEIGHT: 71 IN | OXYGEN SATURATION: 97 %

## 2024-12-31 DIAGNOSIS — M79.674 PAIN IN TOES OF BOTH FEET: ICD-10-CM

## 2024-12-31 DIAGNOSIS — M79.675 PAIN IN TOES OF BOTH FEET: ICD-10-CM

## 2024-12-31 DIAGNOSIS — L60.3 ONYCHODYSTROPHY: Primary | ICD-10-CM

## 2024-12-31 DIAGNOSIS — E11.65 TYPE 2 DIABETES MELLITUS WITH HYPERGLYCEMIA, WITHOUT LONG-TERM CURRENT USE OF INSULIN: ICD-10-CM

## 2024-12-31 DIAGNOSIS — E11.42 DIABETIC PERIPHERAL NEUROPATHY ASSOCIATED WITH TYPE 2 DIABETES MELLITUS: ICD-10-CM

## 2024-12-31 DIAGNOSIS — R26.81 UNSTEADINESS ON FEET: ICD-10-CM

## 2025-01-02 NOTE — PROGRESS NOTES
12/31/2024  Foot and Ankle Surgery - Established Patient/Follow-up  Provider: LARRY Lentz   Location: [facility]    Subjective:  Alen Ratliff is a 84 y.o. male.     Chief Complaint   Patient presents with    Left Foot - Callouses     DFC - left 5th metatarsal head    Right Foot - Follow-up     DFC    Follow-up     Serenity Wren MD - PCP - 10/18/2024       History of Present Illness  The patient is an 84-year-old male who presents for a routine diabetic foot check and nail care. He was referred by Dr. Vasquez.    He has been living with diabetes since the early 2000s, with his blood glucose levels generally well-managed, typically in the 6s range. However, he reports a recent increase to a level of 7. He reports no known issues with blood circulation to his feet but experiences intermittent numbness and cold sensations in his feet. He has a callus on the plantar aspect of his left foot, which was previously painful but is currently asymptomatic. He does not require assistance with footwear.    He also reports difficulty in trimming his toenails due to their hardness.    Supplemental Information  He has a history of ankle fracture, which was managed with hardware placement by Dr. Vasquez.       Allergies   Allergen Reactions    Azithromycin Unknown - High Severity    Tramadol Unknown - High Severity       Current Outpatient Medications on File Prior to Visit   Medication Sig Dispense Refill    amLODIPine (NORVASC) 5 MG tablet Take 1 tablet by mouth Daily. 90 tablet 1    aspirin 81 MG chewable tablet Chew 1 tablet Daily. 30 tablet 1    atorvastatin (LIPITOR) 80 MG tablet TAKE ONE TABLET BY MOUTH EVERY DAY 90 tablet 1    empagliflozin (Jardiance) 10 MG tablet tablet Take 1 tablet by mouth Daily. 90 tablet 0    fluticasone (FLONASE) 50 MCG/ACT nasal spray Administer 2 sprays into the nostril(s) as directed by provider Daily As Needed.      levothyroxine (Synthroid) 112 MCG tablet Take 1  "tablet by mouth Daily. 90 tablet 0    losartan (COZAAR) 100 MG tablet Take 1 tablet by mouth Daily. 90 tablet 1    OneTouch Ultra test strip USE AND DISCARD 1 TEST     STRIP DAILY. 100 each 1    pantoprazole (PROTONIX) 40 MG EC tablet Take 1 tablet by mouth Daily. 90 tablet 1    SITagliptin (Januvia) 50 MG tablet TAKE ONE TABLET BY MOUTH EVERY DAY 90 tablet 0    Xarelto 15 MG tablet TAKE ONE TABLET BY MOUTH EVERY DAY WITH DINNER 90 tablet 0    Xdemvy 0.25 % solution Administer 1 drop to both eyes 2 (Two) Times a Day.      hydroCHLOROthiazide (HYDRODIURIL) 50 MG tablet Take 1 tablet by mouth Every Morning. (Patient not taking: Reported on 12/31/2024)       No current facility-administered medications on file prior to visit.       Objective   Pulse 61   Ht 180 cm (70.87\")   Wt 98 kg (216 lb)   SpO2 97%   BMI 30.24 kg/m²     Foot/Ankle Exam    GENERAL  Diabetic foot exam performed    Appearance:  appears stated age  Orientation:  AAOx3  Affect:  appropriate  Gait:  unimpaired  Assistance:  independent  Right shoe gear: casual shoe and sock  Left shoe gear: casual shoe and sock    VASCULAR     Right Foot Vascularity   Dorsalis pedis:  2+  Posterior tibial:  2+  Skin temperature:  warm  Edema grading:  None  CFT:  < 3 seconds  Pedal hair growth:  Present  Varicosities:  mild varicosities     Left Foot Vascularity   Dorsalis pedis:  2+  Posterior tibial:  2+  Skin temperature:  warm  Edema grading:  None  CFT:  < 3 seconds  Pedal hair growth:  Present  Varicosities:  mild varicosities     NEUROLOGIC     Right Foot Neurologic   Light touch sensation: normal  Protective Sensation using Herrin-Mary Monofilament:   Sites intact: 4  Sites tested: 10     Left Foot Neurologic   Light touch sensation: normal  Protective Sensation using Herrin-Mary Monofilament:   Sites intact: 3  Sites tested: 10    MUSCULOSKELETAL     Right Foot Musculoskeletal   Ecchymosis:  none  Tenderness:  toenail problem       Left Foot " Musculoskeletal   Ecchymosis:  none  Tenderness:  toenail problem    MUSCLE STRENGTH     Right Foot Muscle Strength   Foot dorsiflexion:  5  Foot plantar flexion:  5     Left Foot Muscle Strength   Foot dorsiflexion:  5  Foot plantar flexion:  5    DERMATOLOGIC      Right Foot Dermatologic   Skin  Positive for dryness and skin changes.   Nails  1.  Positive for elongated, abnormal thickness and dystrophic nail.  2.  Positive for elongated, abnormal thickness and dystrophic nail.  3.  Positive for elongated, abnormal thickness and dystrophic nail.  4.  Positive for elongated, abnormal thickness and dystrophic nail.  5.  Positive for elongated, abnormal thickness and dystrophic nail.     Left Foot Dermatologic   Skin  Positive for dryness and skin changes.   Nails  1.  Positive for elongated, abnormal thickness and dystrophic nail.  2.  Positive for elongated, abnormal thickness and dystrophic nail.  3.  Positive for elongated, abnormal thickness and dystrophic nail.  4.  Positive for elongated, abnormally thick and dystrophic nail.  5.  Positive for elongated, abnormally thick and dystrophic nail.  Physical Exam  Specialty Testing  Monofilament test shows 4 out of 10 on the left side and 3 out of 10 on the right.       Results  Laboratory Studies  Blood sugar level is 7.     Assessment & Plan   Diagnoses and all orders for this visit:    1. Onychodystrophy (Primary)    2. Type 2 diabetes mellitus with hyperglycemia, without long-term current use of insulin    3. Diabetic peripheral neuropathy associated with type 2 diabetes mellitus    4. Unsteadiness on feet    5. Pain in toes of both feet      Assessment & Plan  1. Diabetic neuropathy.  His blood glucose levels are within the controlled range, albeit slightly elevated. The absence of significant calluses is noted. Monofilament test shows 4 out of 10 on the left side and 3 out of 10 on the right. He was educated about the importance of daily foot inspections due to  his diminished sensation, a consequence of neuropathy. He was advised to avoid walking barefoot, thoroughly check his footwear before use, and consider wearing white socks to easily detect any bleeding or discharge. Gradual increase in wear time for new footwear was recommended to prevent blister formation. A diabetic foot handout will be provided for further guidance.  Patient is at moderate risk for pedal complication related to diabetes.Explained importance of diabetic foot care, daily foot checks, and glycemic control. Patient should check both feet on a daily basis, monitor and control blood sugars, make sure that both feet and in between toes are towel dried after baths or showers. Avoid barefoot walking at all times.  I discussed wearing white socks and checking shoes before wearing them. Patient was given information on proper foot care. Call the office at the first signs of a wound or with signs of infection.     Nail debridement: Both feet x10    Consent and time out was performed before proceeding with the procedure. Nails were debrided with a nail nipper without complication.  No anesthesia was required.  Indications for procedure were thickened, dystrophic, and fungal appearing nails which are difficult to trim.  Proper self-care and technique was discussed with patient.  Patient was stable after procedure.     2. Nail care.  He reports difficulty managing his toenails due to their hardness and his decreased hand strength.    Follow-up  The patient will follow up in 3 months.    PROCEDURE  The patient has a history of ankle fracture, which was managed with hardware placement by Dr. Vasquez.       No orders of the defined types were placed in this encounter.         Patient or patient representative verbalized consent for the use of Ambient Listening during the visit with  LARRY Jean for chart documentation. 1/2/2025  11:46 EST

## 2025-01-09 RX ORDER — SITAGLIPTIN 50 MG/1
50 TABLET, FILM COATED ORAL DAILY
Qty: 90 TABLET | Refills: 0 | Status: SHIPPED | OUTPATIENT
Start: 2025-01-09

## 2025-01-20 DIAGNOSIS — E03.9 ACQUIRED HYPOTHYROIDISM: ICD-10-CM

## 2025-01-20 RX ORDER — LEVOTHYROXINE SODIUM 112 UG/1
112 TABLET ORAL DAILY
Qty: 90 TABLET | Refills: 1 | Status: SHIPPED | OUTPATIENT
Start: 2025-01-20

## 2025-01-20 NOTE — TELEPHONE ENCOUNTER
Caller: Alen Ratliff    Relationship: Self    Best call back number: [phone]     Requested Prescriptions:   Requested Prescriptions     Pending Prescriptions Disp Refills    levothyroxine (Synthroid) 112 MCG tablet 90 tablet 0     Sig: Take 1 tablet by mouth Daily.        [facility], [address] [phone] [facility] [phone] [facility]     Last office visit with prescribing clinician: 10/18/2024   Last telemedicine visit with prescribing clinician: Visit date not found   Next office visit with prescribing clinician: 4/22/2025     Additional details provided by patient: PATIENT HAS EIGHT DAYS LEFT OF PRESCRIPTION    Does the patient have less than a 3 day supply:  [] Yes  [x] No      Cruz Bowen Rep   01/20/25 14:19 EST

## 2025-01-28 NOTE — PROGRESS NOTES
Date of Office Visit: 2025  Encounter Provider: Dr. Steve Muhammad  Place of Service: [facility] CARDIOLOGY [place]  Patient Name: Alen Ratliff  :1940  Serenity Wren MD    Chief Complaint   Patient presents with    Hypertension    Hyperlipidemia    Follow-up    Atrial Fibrillation     History of Present Illness    I am pleased to see Mr. Rao in my office today as a follow-up.     As you know, patient is 84-year-old white gentleman whose past medical history is significant for hypertension, hyperlipidemia, diabetes mellitus, history of cardiac murmur, who came today for follow-up.     In 2020, patient was seen in my office for symptom of shortness of breath and relative bradycardia.  Patient also had symptom of occasional chest pain.  Patient underwent stress test which showed moderate inferior/septal fixed defect with small amount of joseph-infarct ischemia.  Echocardiogram showed normal left ventricular size and function and LVEF was 55 to 60%.  Mild LVH was noted.  Calcified aortic valve was noted with mild to moderate aortic valve stenosis with aortic valve area of 1.3 cm².  Holter monitor showed sinus bradycardia with episode of relative bradycardia.  No significant pause or heart rate less than 40 was noted.     In 2020, patient was admitted at [facility] with slurring of speech and possible TIA.  Neurological work-up showed high-grade stenosis of right carotid artery.  Patient underwent endarterectomy.  Postoperatively patient had atrial fibrillation with controlled heart rate.  Patient spontaneously cardioverted.  Patient was started on aspirin and Plavix.  Patient was discharged on event monitor.  Event monitor showed predominantly sinus rhythm.  Patient had average heart rate of 60 bpm.  No significant atrial fibrillation burden noted.     In 2022, patient underwent echocardiogram which showed EF of 55 to 60%.  Patient was noted to have  moderate to severe aortic regurgitation.  Peak aortic velocity was 3.8 m/s with peak gradient of 56 mmHg with mean gradient of 30 mmHg.     In August 2022, patient underwent CHRISTIANO and it showed EF of 60 to 65%.  Severe aortic stenosis with valve area of 0.59 cm was noted. Peak velocity was 4.3 m/s with peak gradient of 74 mmHg and mean gradient of 31 mmHg.    In September 2022, patient underwent cardiac catheterization which showed LAD was free of significant disease.  LCx has high-grade stenosis and RCA had 100% occlusion with collaterals.    In August 2022, I referred the patient to Dr. Vilalfana for possible TAVR/SAVR with possible CABG..  Initially he was thought to be a candidate for SAVR but subsequently Dr. Villafana recommended TAVR procedure.  In January 2023, patient underwent PCI and stenting of the LCx.  Patient was subsequently recommended to proceed with TAVR.  In February 2023, patient underwent TAVR valve replacement and postoperatively did well.    In March 2024, patient underwent MCOT and patient was noted to have frequent episode of relative bradycardia.  Average heart rate was 56 but no significant pause was noted.  No atrial fibrillation was present.    Patient is overall doing well.  However patient complaining of fatigue and tiredness.  Patient has not fallen down.  No syncope or presyncope.  Patient does complain of episode of extreme tired feeling and sleepiness.  He watches TV and then loses interest and sleeps.    EKG showed sinus bradycardia with heart rate of 52 bpm.  First-degree AV block is noted.    I will refer the patient to Dr. Robi Nelson for pacemaker implantation.  I suspect patient has underlying sick sinus syndrome.  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    Stroke          Past Surgical History:   Procedure Laterality Date    ANKLE OPEN REDUCTION INTERNAL FIXATION Left 12/16/2020    Procedure: ANKLE OPEN REDUCTION INTERNAL FIXATION;  Surgeon: CAROLE Vasquez DPM;  Location: [facility] MAIN OR;  Service: Podiatry;  Laterality: Left;    AORTIC VALVE REPAIR/REPLACEMENT N/A 02/20/2023    Procedure: Transfemoral Transcatheter Aortic Valve Replacement;  Surgeon: Naveed Leonardo MD;  Location: [facility] HYBRID OR;  Service: Cardiovascular;  Laterality: N/A;    AORTIC VALVE REPAIR/REPLACEMENT N/A 02/20/2023    Procedure: TRANSCATHETER AORTIC VALVE REPLACEMENT;  Surgeon: Fabio Villafana MD;  Location: [facility] HYBRID OR;  Service: Cardiothoracic;  Laterality: N/A;    BACK SURGERY  1994    CARDIAC CATHETERIZATION Right 09/27/2022    Procedure: Left Heart Cath;  Surgeon: Steve Muhammad MD;  Location: [facility] CATH INVASIVE LOCATION;  Service: Cardiovascular;  Laterality: Right;    CARDIAC CATHETERIZATION N/A 01/12/2023    Procedure: Stent JENN coronary;  Surgeon: Steve Muhammad MD;  Location: [facility] CATH INVASIVE LOCATION;  Service: Cardiovascular;  Laterality: N/A;    CAROTID ENDARTERECTOMY Right 12/14/2020    Procedure: CAROTID ENDARTERECTOMY;  Surgeon: Toby Fung MD;  Location: [facility] MAIN OR;  Service: Vascular;  Laterality: Right;    COLONOSCOPY  08/25/2015    CORONARY STENT PLACEMENT      PROSTATECTOMY  2001    due to cancer           Current Outpatient Medications:     amLODIPine (NORVASC) 5 MG tablet, Take 1 tablet by mouth Daily., Disp: 90 tablet, Rfl: 1    aspirin 81 MG chewable tablet, Chew 1 tablet Daily., Disp: 30 tablet, Rfl: 1    atorvastatin (LIPITOR) 80 MG tablet, TAKE ONE TABLET BY MOUTH EVERY DAY, Disp: 90 tablet, Rfl: 1    empagliflozin (Jardiance) 10 MG tablet tablet, Take 1 tablet by mouth Daily., Disp: 90  tablet, Rfl: 0    fluticasone (FLONASE) 50 MCG/ACT nasal spray, Administer 2 sprays into the nostril(s) as directed by provider Daily As Needed., Disp: , Rfl:     Januvia 50 MG tablet, TAKE ONE TABLET BY MOUTH EVERY DAY, Disp: 90 tablet, Rfl: 0    levothyroxine (Synthroid) 112 MCG tablet, Take 1 tablet by mouth Daily., Disp: 90 tablet, Rfl: 1    losartan (COZAAR) 100 MG tablet, Take 1 tablet by mouth Daily., Disp: 90 tablet, Rfl: 1    OneTouch Ultra test strip, USE AND DISCARD 1 TEST     STRIP DAILY., Disp: 100 each, Rfl: 1    pantoprazole (PROTONIX) 40 MG EC tablet, Take 1 tablet by mouth Daily., Disp: 90 tablet, Rfl: 1    Xarelto 15 MG tablet, TAKE ONE TABLET BY MOUTH EVERY DAY WITH DINNER, Disp: 90 tablet, Rfl: 0    Xdemvy 0.25 % solution, Administer 1 drop to both eyes 2 (Two) Times a Day., Disp: , Rfl:       Social History     Socioeconomic History    Marital status:    Tobacco Use    Smoking status: Never     Passive exposure: Never    Smokeless tobacco: Never   Vaping Use    Vaping status: Never Used   Substance and Sexual Activity    Alcohol use: Never    Drug use: Never    Sexual activity: Defer         Review of Systems   Constitutional: Negative for chills and fever.   HENT:  Negative for ear discharge and nosebleeds.    Eyes:  Negative for discharge and redness.   Cardiovascular:  Negative for chest pain, orthopnea, palpitations, paroxysmal nocturnal dyspnea and syncope.   Respiratory:  Negative for cough, shortness of breath and wheezing.    Endocrine: Negative for heat intolerance.   Skin:  Negative for rash.   Musculoskeletal:  Negative for arthritis and myalgias.   Gastrointestinal:  Negative for abdominal pain, melena, nausea and vomiting.   Genitourinary:  Negative for dysuria and hematuria.   Neurological:  Negative for dizziness, light-headedness, numbness and tremors.   Psychiatric/Behavioral:  Negative for depression. The patient is not nervous/anxious.        Procedures      ECG 12  "Lead    Date/Time: 1/29/2025 4:31 PM  Performed by: Steve Muhammad MD    Authorized by: Steve Muhammad MD  Comparison: compared with previous ECG   Similar to previous ECG  Rhythm: sinus rhythm    Clinical impression: normal ECG          ECG 12 Lead    (Results Pending)           Objective:    /76 (BP Location: Right arm, Patient Position: Sitting, Cuff Size: Large Adult)   Pulse 52   Resp 18   Ht 180 cm (70.87\")   Wt 90.3 kg (199 lb)   SpO2 97%   BMI 27.86 kg/m²         Constitutional:       Appearance: Well-developed.   Eyes:      General: No scleral icterus.        Right eye: No discharge.   HENT:      Head: Normocephalic and atraumatic.   Neck:      Thyroid: No thyromegaly.      Lymphadenopathy: No cervical adenopathy.   Pulmonary:      Effort: Pulmonary effort is normal. No respiratory distress.      Breath sounds: Normal breath sounds. No wheezing. No rales.   Cardiovascular:      Normal rate. Regular rhythm.      No gallop.    Edema:     Peripheral edema absent.   Abdominal:      Tenderness: There is no abdominal tenderness.   Skin:     Findings: No erythema or rash.   Neurological:      Mental Status: Alert and oriented to person, place, and time.             Assessment:       Diagnosis Plan   1. Essential hypertension  ECG 12 Lead    Ambulatory Referral to Cardiac Electrophysiology      2. Mixed hyperlipidemia  ECG 12 Lead    Ambulatory Referral to Cardiac Electrophysiology      3. PAF (paroxysmal atrial fibrillation)  ECG 12 Lead    Ambulatory Referral to Cardiac Electrophysiology      4. Bradycardia  Ambulatory Referral to Cardiac Electrophysiology      5. Aortic stenosis, severe        6. Coronary artery disease of native artery of native heart with stable angina pectoris        7. S/P PTCA (percutaneous transluminal coronary angioplasty)        8. S/P TAVR (transcatheter aortic valve replacement)                 Plan:       MDM:    1.  Bradycardia:    I suspect that patient has underlying sick " sinus syndrome proceed with EP consultation for possible pacemaker patient is symptomatic    2.  Aortic stenosis s/p TAVR:    Repeat echocardiogram next year    3.  Hypertension:    Blood pressure is controlled current treatment would be continued    4.  CAD/coronary artery stenting:    Patient is on aspirin and Xarelto.    5.  Mixed hyperlipidemia:    Patient is on Lipitor repeat lipid panel testing

## 2025-01-29 ENCOUNTER — TELEPHONE (OUTPATIENT)
Dept: FAMILY MEDICINE CLINIC | Facility: CLINIC | Age: 85
End: 2025-01-29

## 2025-01-29 ENCOUNTER — OFFICE VISIT (OUTPATIENT)
Dept: CARDIOLOGY | Facility: CLINIC | Age: 85
End: 2025-01-29
Payer: MEDICARE

## 2025-01-29 VITALS
DIASTOLIC BLOOD PRESSURE: 76 MMHG | RESPIRATION RATE: 18 BRPM | BODY MASS INDEX: 27.86 KG/M2 | OXYGEN SATURATION: 97 % | SYSTOLIC BLOOD PRESSURE: 130 MMHG | HEART RATE: 52 BPM | HEIGHT: 71 IN | WEIGHT: 199 LBS

## 2025-01-29 DIAGNOSIS — Z98.61 S/P PTCA (PERCUTANEOUS TRANSLUMINAL CORONARY ANGIOPLASTY): ICD-10-CM

## 2025-01-29 DIAGNOSIS — I48.0 PAF (PAROXYSMAL ATRIAL FIBRILLATION): ICD-10-CM

## 2025-01-29 DIAGNOSIS — R00.1 BRADYCARDIA: ICD-10-CM

## 2025-01-29 DIAGNOSIS — I35.0 AORTIC STENOSIS, SEVERE: ICD-10-CM

## 2025-01-29 DIAGNOSIS — I25.118 CORONARY ARTERY DISEASE OF NATIVE ARTERY OF NATIVE HEART WITH STABLE ANGINA PECTORIS: ICD-10-CM

## 2025-01-29 DIAGNOSIS — Z95.2 S/P TAVR (TRANSCATHETER AORTIC VALVE REPLACEMENT): ICD-10-CM

## 2025-01-29 DIAGNOSIS — I10 ESSENTIAL HYPERTENSION: Primary | Chronic | ICD-10-CM

## 2025-01-29 DIAGNOSIS — E78.2 MIXED HYPERLIPIDEMIA: Chronic | ICD-10-CM

## 2025-01-29 PROCEDURE — 99214 OFFICE O/P EST MOD 30 MIN: CPT | Performed by: INTERNAL MEDICINE

## 2025-01-29 PROCEDURE — 1159F MED LIST DOCD IN RCRD: CPT | Performed by: INTERNAL MEDICINE

## 2025-01-29 PROCEDURE — 3075F SYST BP GE 130 - 139MM HG: CPT | Performed by: INTERNAL MEDICINE

## 2025-01-29 PROCEDURE — 93000 ELECTROCARDIOGRAM COMPLETE: CPT | Performed by: INTERNAL MEDICINE

## 2025-01-29 PROCEDURE — 1160F RVW MEDS BY RX/DR IN RCRD: CPT | Performed by: INTERNAL MEDICINE

## 2025-01-29 PROCEDURE — 3078F DIAST BP <80 MM HG: CPT | Performed by: INTERNAL MEDICINE

## 2025-01-29 NOTE — TELEPHONE ENCOUNTER
Caller: Elba Ratliff    Relationship: Emergency Contact    Best call back number: [phone]    PATIENT HAVING SIDE EFFECTS TO JARDIANCE AND DIDN'T KNOW IF HE NEEDED AN APPOINTMENT WITH DR GABRIEL TO DISCUSS THIS OR CAN THEY DO IT OVER THE PHONE.    SIDE EFFECTS:   DIZZINESS  DISORIENTATION  GROIN PAIN    IT HAS BEEN GOING ON FOR A FEW WEEKS OFF AND ON.     THEY HAD SEEN A COMMERCIAL ON TV LAST NIGHT REGARDING THE MEDICATION AND THIS IS WHAT PROMPTED THE PATIENT TO CALL.     PLEASE ADVISE

## 2025-01-29 NOTE — TELEPHONE ENCOUNTER
Spoke with patients wife Elba. He only has symptoms here and there. They are seeing Dr. Muhammad today (cardio) and will discuss this further. Let them know if they need anything else from us to please let us know.

## 2025-02-04 ENCOUNTER — PREP FOR SURGERY (OUTPATIENT)
Dept: OTHER | Facility: HOSPITAL | Age: 85
End: 2025-02-04
Payer: MEDICARE

## 2025-02-04 ENCOUNTER — OFFICE VISIT (OUTPATIENT)
Dept: CARDIOLOGY | Facility: CLINIC | Age: 85
End: 2025-02-04
Payer: MEDICARE

## 2025-02-04 VITALS
SYSTOLIC BLOOD PRESSURE: 170 MMHG | HEART RATE: 59 BPM | BODY MASS INDEX: 28.56 KG/M2 | HEIGHT: 71 IN | OXYGEN SATURATION: 98 % | WEIGHT: 204 LBS | DIASTOLIC BLOOD PRESSURE: 77 MMHG

## 2025-02-04 DIAGNOSIS — I49.5 SICK SINUS SYNDROME: Primary | ICD-10-CM

## 2025-02-04 DIAGNOSIS — I10 ESSENTIAL HYPERTENSION: Chronic | ICD-10-CM

## 2025-02-04 DIAGNOSIS — I44.1 AV BLOCK, MOBITZ 1: ICD-10-CM

## 2025-02-04 DIAGNOSIS — Z95.2 S/P TAVR (TRANSCATHETER AORTIC VALVE REPLACEMENT): ICD-10-CM

## 2025-02-04 NOTE — PROGRESS NOTES
Progress note      Name: Alen Ratliff ADMIT: (Not on file)   : 1940  PCP: Serenity Wren MD    MRN: [id number] LOS: 0 days   AGE/SEX: 84 y.o. male  ROOM: Room/bed info not found     Chief Complaint   Patient presents with    Consult     NP ref Dr Muhammad       Subjective       History of present illness  Alen Ratliff is an 84-year-old male patient who has hypertension, dyslipidemia, CAD s/p PCI to left circumflex in 2023 aortic stenosis status post TAVR in 2023.  Patient is here today to discuss about possible pacemaker implantation due to worsening bradycardia.  Patient has been experiencing worsening fatigue and lack of energy for sometime now, his wife says that he goes to sleep every time he sits on the couch and does not have the energy to do much.    Past Medical History:   Diagnosis Date    Allergic rhinitis     Aortic stenosis     Atrial fibrillation     Coronary artery disease     Diabetes     GERD (gastroesophageal reflux disease)     Heart murmur     Hyperlipidemia     Hypertension     Hypothyroidism     Prostate cancer     S/P TAVR (transcatheter aortic valve replacement) 2023    Stroke      Past Surgical History:   Procedure Laterality Date    ANKLE OPEN REDUCTION INTERNAL FIXATION Left 2020    Procedure: ANKLE OPEN REDUCTION INTERNAL FIXATION;  Surgeon: CAROLE Vasquez DPM;  Location: [facility] MAIN OR;  Service: Podiatry;  Laterality: Left;    AORTIC VALVE REPAIR/REPLACEMENT N/A 2023    Procedure: Transfemoral Transcatheter Aortic Valve Replacement;  Surgeon: Naveed Leonardo MD;  Location: [facility] HYBRID OR;  Service: Cardiovascular;  Laterality: N/A;    AORTIC VALVE REPAIR/REPLACEMENT N/A 2023    Procedure: TRANSCATHETER AORTIC VALVE REPLACEMENT;  Surgeon: Fabio Villafana MD;  Location: [facility] HYBRID OR;  Service: Cardiothoracic;  Laterality: N/A;    BACK SURGERY      CARDIAC CATHETERIZATION Right 2022    Procedure: Left Heart Cath;   Surgeon: Steve Muhammad MD;  Location: [facility] CATH INVASIVE LOCATION;  Service: Cardiovascular;  Laterality: Right;    CARDIAC CATHETERIZATION N/A 01/12/2023    Procedure: Stent JENN coronary;  Surgeon: Steve Muhammad MD;  Location: [facility] CATH INVASIVE LOCATION;  Service: Cardiovascular;  Laterality: N/A;    CAROTID ENDARTERECTOMY Right 12/14/2020    Procedure: CAROTID ENDARTERECTOMY;  Surgeon: Toby Fung MD;  Location: [facility] MAIN OR;  Service: Vascular;  Laterality: Right;    COLONOSCOPY  08/25/2015    CORONARY STENT PLACEMENT      PROSTATECTOMY  2001    due to cancer     Family History   Problem Relation Age of Onset    Hypertension Other     Heart attack Mother     Heart disease Mother     Heart attack Father     Heart disease Father      Social History     Tobacco Use    Smoking status: Never     Passive exposure: Never    Smokeless tobacco: Never   Vaping Use    Vaping status: Never Used   Substance Use Topics    Alcohol use: Never    Drug use: Never       Current Outpatient Medications:     amLODIPine (NORVASC) 5 MG tablet, Take 1 tablet by mouth Daily., Disp: 90 tablet, Rfl: 1    aspirin 81 MG chewable tablet, Chew 1 tablet Daily., Disp: 30 tablet, Rfl: 1    atorvastatin (LIPITOR) 80 MG tablet, TAKE ONE TABLET BY MOUTH EVERY DAY, Disp: 90 tablet, Rfl: 1    empagliflozin (Jardiance) 10 MG tablet tablet, Take 1 tablet by mouth Daily., Disp: 90 tablet, Rfl: 0    fluticasone (FLONASE) 50 MCG/ACT nasal spray, Administer 2 sprays into the nostril(s) as directed by provider Daily As Needed., Disp: , Rfl:     Januvia 50 MG tablet, TAKE ONE TABLET BY MOUTH EVERY DAY, Disp: 90 tablet, Rfl: 0    levothyroxine (Synthroid) 112 MCG tablet, Take 1 tablet by mouth Daily., Disp: 90 tablet, Rfl: 1    losartan (COZAAR) 100 MG tablet, Take 1 tablet by mouth Daily., Disp: 90 tablet, Rfl: 1    OneTouch Ultra test strip, USE AND DISCARD 1 TEST     STRIP DAILY., Disp: 100 each, Rfl: 1    pantoprazole (PROTONIX) 40 MG EC  tablet, Take 1 tablet by mouth Daily., Disp: 90 tablet, Rfl: 1    Xarelto 15 MG tablet, TAKE ONE TABLET BY MOUTH EVERY DAY WITH DINNER, Disp: 90 tablet, Rfl: 0    Xdemvy 0.25 % solution, Administer 1 drop to both eyes 2 (Two) Times a Day., Disp: , Rfl:   Allergies:  Azithromycin and Tramadol      Physical Exam  VITALS REVIEWED    General:      well developed, in no acute distress.    Head:      normocephalic and atraumatic.    Eyes:      PERRL/EOM intact, conjunctiva and sclera clear with out nystagmus.    Neck:      no masses, thyromegaly,  trachea central with normal respiratory effort and PMI displaced laterally  Lungs:      Clear to auscultation bilaterally  Heart:       Regular rate and rhythm  Msk:      no deformity or scoliosis noted of thoracic or lumbar spine.    Pulses:      pulses normal in all 4 extremities.    Extremities:       No lower extremity edema  Neurologic:      no focal deficits.   alert oriented x3  Skin:      intact without lesions or rashes.    Psych:      alert and cooperative; normal mood and affect; normal attention span and concentration.      Result Review :               Pertinent cardiac workup    EKG 1/29/2025, sinus rhythm with second-degree type I AV block, ventricular rate 52 bpm  EKG post TAVR 2/28/2023 sinus rhythm with first-degree AV block and left bundle branch block.      Procedures        Assessment and Plan      Alen Ratliff is an 84-year-old male patient who has history of CAD status post PCI, aortic stenosis status post TAVR, is here today to discuss about possible pacemaker due to bradycardia.  After his TAVR, patient had first-degree AV block with left bundle branch block, however left bundle branch block seem to have recovered.  Patient now has prolonged AK interval at baseline with second-degree type I AV block.  He is not on any rate slowing agents but he is experiencing bradycardic episodes which seem to be symptomatic.  I think that the patient will benefit  from pacemaker implantation for symptomatic bradycardia from sick sinus syndrome but also for AV block.  I also believe that the patient is at high risk for complete heart block given his history of left bundle branch block and also recurrent very long MI interval and second-degree type I AV block.  Overall I think that the patient has more than 1 reason to qualify for a pacemaker, in fact in his case I would favor a biventricular pacemaker since he will be ventricular paced most of the time and therefore it is best to have a CRT-P device.  Risks indications and benefits for pacemaker implantation were discussed at length with him and he is agreeable.    Diagnoses and all orders for this visit:    1. Sick sinus syndrome (Primary)    2. AV block, Mobitz 1    3. S/P TAVR (transcatheter aortic valve replacement)    4. Essential hypertension           No follow-ups on file.  Patient was given instructions and counseling regarding his condition or for health maintenance advice. Please see specific information pulled into the AVS if appropriate.         Electronically signed by Radha Danielle MD, 02/04/25, 10:11 AM EST.

## 2025-02-05 ENCOUNTER — PATIENT ROUNDING (BHMG ONLY) (OUTPATIENT)
Dept: CARDIOLOGY | Facility: CLINIC | Age: 85
End: 2025-02-05
Payer: MEDICARE

## 2025-02-05 ENCOUNTER — OFFICE VISIT (OUTPATIENT)
Dept: FAMILY MEDICINE CLINIC | Facility: CLINIC | Age: 85
End: 2025-02-05
Payer: MEDICARE

## 2025-02-05 VITALS
OXYGEN SATURATION: 96 % | DIASTOLIC BLOOD PRESSURE: 81 MMHG | SYSTOLIC BLOOD PRESSURE: 171 MMHG | BODY MASS INDEX: 28.6 KG/M2 | TEMPERATURE: 98.6 F | WEIGHT: 204.3 LBS | HEART RATE: 61 BPM | RESPIRATION RATE: 15 BRPM | HEIGHT: 71 IN

## 2025-02-05 DIAGNOSIS — Z00.00 MEDICARE ANNUAL WELLNESS VISIT, SUBSEQUENT: Primary | ICD-10-CM

## 2025-02-05 PROCEDURE — 3077F SYST BP >= 140 MM HG: CPT | Performed by: FAMILY MEDICINE

## 2025-02-05 PROCEDURE — 1126F AMNT PAIN NOTED NONE PRSNT: CPT | Performed by: FAMILY MEDICINE

## 2025-02-05 PROCEDURE — 1170F FXNL STATUS ASSESSED: CPT | Performed by: FAMILY MEDICINE

## 2025-02-05 PROCEDURE — 1160F RVW MEDS BY RX/DR IN RCRD: CPT | Performed by: FAMILY MEDICINE

## 2025-02-05 PROCEDURE — 3079F DIAST BP 80-89 MM HG: CPT | Performed by: FAMILY MEDICINE

## 2025-02-05 PROCEDURE — 1159F MED LIST DOCD IN RCRD: CPT | Performed by: FAMILY MEDICINE

## 2025-02-05 PROCEDURE — G0439 PPPS, SUBSEQ VISIT: HCPCS | Performed by: FAMILY MEDICINE

## 2025-02-05 NOTE — PROGRESS NOTES
Subjective   The ABCs of the Annual Wellness Visit  Medicare Wellness Visit    Chief Complaint   Patient presents with    Medication Problem     Jardiance side effects. Fatigue, weakness, decreased appetite, dry mouth, dry eyes, pain in groin, confusion, some dizziness. Who originally prescribed this?    Medicare Wellness-subsequent       Alen Ratliff is a 84 y.o. patient who presents for a Medicare Wellness Visit.  He lives with his wife, he is fully independent with his activities of daily living.  Patient is here today with his wife.  He is followed by cardiology and he reports that he is scheduled for the pacemaker placement later this month.  He is being treated for multiple medical issues including diabetes, hypertension, and hypothyroidism.  He has been on Jardiance for about 5 years or maybe even longer.  He recently read up about possible side effects and he is concerned that some of his symptoms might be due to Jardiance and he really would like to come off the medication and try to go without it. Patient does not report any chest pain, shortness of breath, dizziness, nausea, vomiting, or diarrhea, visual issues, headaches, numbness or tingling. No urinary issues reported like urgency, frequency, or discomfort upon urination.  No significant weight changes reported.  No swelling reported.  No rashes or any other skin issues reported. No emotional issues or insomnia.    The following portions of the patient's history were reviewed and   updated as appropriate: allergies, current medications, past family history, past medical history, past social history, past surgical history, and problem list.    Compared to one year ago, the patient's physical   health is the same.  Compared to one year ago, the patient's mental   health is the same.    Recent Hospitalizations:  He was not admitted to the hospital during the last year.     Current Medical Providers:  Patient Care Team:  Serenity Wren MD as PCP  - General (Family Medicine)  Steve Muhammad MD as Consulting Physician (Cardiology)  CAROLE Vasquez DPM as Consulting Physician (Podiatry)  David Urbina MD as Consulting Physician (Otolaryngology)  Gregory Valle MD as Surgeon (Vascular Surgery)  Gómez Arvizu MD as Consulting Physician (Ophthalmology)  Radha Danielle MD as Consulting Physician (Cardiology)    Outpatient Medications Prior to Visit   Medication Sig Dispense Refill    amLODIPine (NORVASC) 5 MG tablet Take 1 tablet by mouth Daily. 90 tablet 1    Artificial Tear Ointment (artificial tears) ophthalmic ointment Administer  to both eyes Every 1 (One) Hour As Needed.      aspirin 81 MG chewable tablet Chew 1 tablet Daily. 30 tablet 1    atorvastatin (LIPITOR) 80 MG tablet TAKE ONE TABLET BY MOUTH EVERY DAY 90 tablet 1    empagliflozin (Jardiance) 10 MG tablet tablet Take 1 tablet by mouth Daily. 90 tablet 0    fluticasone (FLONASE) 50 MCG/ACT nasal spray Administer 2 sprays into the nostril(s) as directed by provider Daily As Needed.      Januvia 50 MG tablet TAKE ONE TABLET BY MOUTH EVERY DAY 90 tablet 0    levothyroxine (Synthroid) 112 MCG tablet Take 1 tablet by mouth Daily. 90 tablet 1    losartan (COZAAR) 100 MG tablet Take 1 tablet by mouth Daily. 90 tablet 1    OneTouch Ultra test strip USE AND DISCARD 1 TEST     STRIP DAILY. 100 each 1    pantoprazole (PROTONIX) 40 MG EC tablet Take 1 tablet by mouth Daily. 90 tablet 1    Xarelto 15 MG tablet TAKE ONE TABLET BY MOUTH EVERY DAY WITH DINNER 90 tablet 0    Xdemvy 0.25 % solution Administer 1 drop to both eyes 2 (Two) Times a Day. (Patient not taking: Reported on 2/5/2025)       No facility-administered medications prior to visit.     No opioid medication identified on active medication list. I have reviewed chart for other potential  high risk medication/s and harmful drug interactions in the elderly.      Aspirin is on active medication list. Aspirin use is indicated based on review  of current medical condition/s. Pros and cons of this therapy have been discussed today. Benefits of this medication outweigh potential harm.  Patient has been encouraged to continue taking this medication.  .      Patient Active Problem List   Diagnosis    Heart murmur    Essential hypertension    Mixed hyperlipidemia    Acquired hypothyroidism    GERD (gastroesophageal reflux disease)    Seasonal allergic rhinitis due to pollen    CKD (chronic kidney disease) stage 3, GFR 30-59 ml/min    History of prostate cancer    History of stroke    Symptomatic carotid artery stenosis, bilateral    Closed displaced fracture of lateral malleolus of left fibula    Medicare annual wellness visit, subsequent    Trigger index finger of left hand    Colon cancer screening    DM type 2 with diabetic peripheral neuropathy    Aortic stenosis, severe    Coronary artery disease involving native coronary artery of native heart    Needs flu shot    PAF (paroxysmal atrial fibrillation)    Coronary artery disease of native artery of native heart with stable angina pectoris    S/P PTCA (percutaneous transluminal coronary angioplasty)    Chronic diastolic CHF (congestive heart failure), NYHA class 2    S/P TAVR (transcatheter aortic valve replacement)    Dizziness    Bradycardia    TIA (transient ischemic attack)    Onychodystrophy    Chronic pain of right knee    Sick sinus syndrome    AV block, Mobitz 1     Advance Care Planning Advance Directive is on file.  ACP discussion was held with the patient during this visit. Patient has an advance directive in EMR which is still valid.       Review of Systems   Constitutional:  Negative for activity change, fatigue and fever.   Respiratory:  Negative for cough, shortness of breath and wheezing.    Cardiovascular:  Negative for chest pain, palpitations and leg swelling.   Gastrointestinal:  Negative for constipation, diarrhea and indigestion.   Skin:  Negative for color change, dry skin and rash.  "  Neurological:  Negative for tremors and headache.         Objective   Vitals:    02/05/25 1024   BP: 171/81   BP Location: Left arm   Patient Position: Sitting   Cuff Size: Adult   Pulse: 61   Resp: 15   Temp: 98.6 °F (37 °C)   TempSrc: Infrared   SpO2: 96%   Weight: 92.7 kg (204 lb 4.8 oz)   Height: 179.8 cm (70.79\")   PainSc: 0-No pain       Estimated body mass index is 28.67 kg/m² as calculated from the following:    Height as of this encounter: 179.8 cm (70.79\").    Weight as of this encounter: 92.7 kg (204 lb 4.8 oz).       Physical Exam  Vitals and nursing note reviewed.   Constitutional:       General: He is not in acute distress.     Appearance: Normal appearance. He is well-developed. He is not ill-appearing.   HENT:      Head: Normocephalic and atraumatic.   Cardiovascular:      Rate and Rhythm: Normal rate and regular rhythm.      Heart sounds: Normal heart sounds. No murmur heard.     No gallop.   Pulmonary:      Effort: Pulmonary effort is normal. No respiratory distress.      Breath sounds: Normal breath sounds. No wheezing, rhonchi or rales.   Chest:      Chest wall: No tenderness.   Musculoskeletal:      Cervical back: Normal range of motion and neck supple.   Neurological:      General: No focal deficit present.      Mental Status: He is alert and oriented to person, place, and time. Mental status is at baseline.   Psychiatric:         Mood and Affect: Mood normal.            Does the patient have evidence of cognitive impairment? Yes                                                                                               Health  Risk Assessment    Smoking Status:  Social History     Tobacco Use   Smoking Status Never    Passive exposure: Never   Smokeless Tobacco Never     Alcohol Consumption:  Social History     Substance and Sexual Activity   Alcohol Use Never       Fall Risk Screen  STEADI Fall Risk Assessment was completed, and patient is at LOW risk for falls.Assessment completed " on:2025    Depression Screening   Little interest or pleasure in doing things? Not at all   Feeling down, depressed, or hopeless? Not at all   PHQ-2 Total Score 0      Health Habits and Functional and Cognitive Screenin/5/2025    10:00 AM   Functional & Cognitive Status   Do you have difficulty preparing food and eating? No   Do you have difficulty bathing yourself, getting dressed or grooming yourself? No   Do you have difficulty using the toilet? No   Do you have difficulty moving around from place to place? No   Do you have trouble with steps or getting out of a bed or a chair? No   Current Diet Diabetic Diet   Dental Exam Up to date   Eye Exam Up to date   Exercise (times per week) 0 times per week   Current Exercises Include No Regular Exercise   Do you need help using the phone?  No   Are you deaf or do you have serious difficulty hearing?  No   Do you need help to go to places out of walking distance? No   Do you need help shopping? No   Do you need help preparing meals?  No   Do you need help with housework?  No   Do you need help with laundry? No   Do you need help taking your medications? No   Do you need help managing money? No   Do you ever drive or ride in a car without wearing a seat belt? No   Have you felt unusual stress, anger or loneliness in the last month? No   Who do you live with? Spouse   If you need help, do you have trouble finding someone available to you? No   Do you have difficulty concentrating, remembering or making decisions? No           Age-appropriate Screening Schedule:  Refer to the list below for future screening recommendations based on patient's age, sex and/or medical conditions. Orders for these recommended tests are listed in the plan section. The patient has been provided with a written plan.    Health Maintenance List  Health Maintenance   Topic Date Due    COVID-19 Vaccine ( season) 2024    HEMOGLOBIN A1C  04/10/2025    ANNUAL WELLNESS VISIT   04/18/2025    RSV Vaccine - Adults (1 - 1-dose 75+ series) 10/18/2025 (Originally 8/5/2014)    LIPID PANEL  10/10/2025    BMI FOLLOWUP  10/18/2025    DIABETIC EYE EXAM  12/20/2025    TDAP/TD VACCINES (2 - Td or Tdap) 10/29/2029    COLORECTAL CANCER SCREENING  08/14/2030    INFLUENZA VACCINE  Completed    Pneumococcal Vaccine 65+  Completed    ZOSTER VACCINE  Completed    URINE MICROALBUMIN  Discontinued                                                                                                                                                CMS Preventative Services Quick Reference  Risk Factors Identified During Encounter  Fall Risk-High or Moderate: Discussed Fall Prevention in the home  Immunizations Discussed/Encouraged: COVID19 and RSV (Respiratory Syncytial Virus)  Inactivity/Sedentary: Patient was advised to exercise at least 150 minutes a week per CDC recommendations.  Polypharmacy: Medication List reviewed  Dental Screening Recommended  Vision Screening Recommended    The above risks/problems have been discussed with the patient.  Pertinent information has been shared with the patient in the After Visit Summary.  An After Visit Summary and PPPS were made available to the patient.    Follow Up:     Next Medicare Wellness visit to be scheduled in 1 year.     Assessment & Plan  Medicare annual wellness visit, subsequent       Medicare wellness exam was done today.  I reviewed his previous labs, I also reviewed his medications.  Patient reports having his colonoscopy done couple of years ago and I will be getting a copy of that report.  Immunization was also reviewed and he is up to speed with his vaccination.  I also addressed his concerns about possible side effects of Jardiance.  Upon chart review it looks like he has been on Jardiance for at least 6 years or possibly even longer.  Medication was initiated by his previous medical team [place].  He really would like to try to  come off the medication for the symptoms monitoring.  Patient reports some dizziness and fatigue which she contributes to Jardiance.  He is scheduled to follow-up with me in couple of months and this will be reevaluated at that time.      Follow Up:     Return in about 3 months (around 5/5/2025) for Next scheduled follow up.    Requested Prescriptions      No prescriptions requested or ordered in this encounter     Serenity Wren MD  02/05/2025  10:53 EST

## 2025-02-05 NOTE — PROGRESS NOTES
A My-Chart message has been sent to the patient for PATIENT ROUNDING with [facility]

## 2025-02-10 ENCOUNTER — HOSPITAL ENCOUNTER (OUTPATIENT)
Dept: CARDIOLOGY | Facility: HOSPITAL | Age: 85
Discharge: HOME | End: 2025-02-10
Admitting: SURGERY
Payer: MEDICARE

## 2025-02-10 DIAGNOSIS — I65.23 BILATERAL CAROTID ARTERY OCCLUSION: ICD-10-CM

## 2025-02-10 DIAGNOSIS — E11.42 DM TYPE 2 WITH DIABETIC PERIPHERAL NEUROPATHY: ICD-10-CM

## 2025-02-10 LAB
BH CV XLRA MEAS LEFT CAROTID BULB PSV: -65 CM/SEC
BH CV XLRA MEAS LEFT DIST CCA EDV: -11.9 CM/SEC
BH CV XLRA MEAS LEFT DIST CCA PSV: -65.9 CM/SEC
BH CV XLRA MEAS LEFT DIST ICA EDV: -17.7 CM/SEC
BH CV XLRA MEAS LEFT DIST ICA PSV: -66.3 CM/SEC
BH CV XLRA MEAS LEFT ICA/CCA RATIO: 1.01
BH CV XLRA MEAS LEFT PROX CCA EDV: 9.5 CM/SEC
BH CV XLRA MEAS LEFT PROX CCA PSV: 86.6 CM/SEC
BH CV XLRA MEAS LEFT PROX ECA PSV: -158 CM/SEC
BH CV XLRA MEAS LEFT PROX ICA EDV: -26 CM/SEC
BH CV XLRA MEAS LEFT PROX ICA PSV: -87.5 CM/SEC
BH CV XLRA MEAS LEFT PROX SCLA PSV: 159 CM/SEC
BH CV XLRA MEAS LEFT VERTEBRAL A PSV: 73.7 CM/SEC
BH CV XLRA MEAS RIGHT CAROTID BULB PSV: -40.4 CM/SEC
BH CV XLRA MEAS RIGHT DIST CCA EDV: 10.6 CM/SEC
BH CV XLRA MEAS RIGHT DIST CCA PSV: 57.2 CM/SEC
BH CV XLRA MEAS RIGHT DIST ICA EDV: -18.6 CM/SEC
BH CV XLRA MEAS RIGHT DIST ICA PSV: -69 CM/SEC
BH CV XLRA MEAS RIGHT ICA/CCA RATIO: 0.74
BH CV XLRA MEAS RIGHT PROX CCA EDV: -7.5 CM/SEC
BH CV XLRA MEAS RIGHT PROX CCA PSV: -93.8 CM/SEC
BH CV XLRA MEAS RIGHT PROX ECA PSV: -81.4 CM/SEC
BH CV XLRA MEAS RIGHT PROX ICA EDV: -11.8 CM/SEC
BH CV XLRA MEAS RIGHT PROX ICA PSV: -41.2 CM/SEC
BH CV XLRA MEAS RIGHT PROX SCLA PSV: 110 CM/SEC
BH CV XLRA MEAS RIGHT VERTEBRAL A PSV: 32.2 CM/SEC
LEFT ARM BP: NORMAL MMHG
RIGHT ARM BP: NORMAL MMHG

## 2025-02-10 PROCEDURE — 93880 EXTRACRANIAL BILAT STUDY: CPT

## 2025-02-10 PROCEDURE — 93880 EXTRACRANIAL BILAT STUDY: CPT | Performed by: SURGERY

## 2025-02-10 RX ORDER — LOSARTAN POTASSIUM 100 MG/1
100 TABLET ORAL DAILY
Qty: 90 TABLET | Refills: 1 | Status: SHIPPED | OUTPATIENT
Start: 2025-02-10

## 2025-02-10 NOTE — TELEPHONE ENCOUNTER
Caller: Elba Ratliff    Relationship: Emergency Contact    Best call back number: [phone]     Requested Prescriptions:   Requested Prescriptions     Pending Prescriptions Disp Refills    empagliflozin (Jardiance) 10 MG tablet tablet 90 tablet 0     Sig: Take 1 tablet by mouth Daily.    losartan (COZAAR) 100 MG tablet 90 tablet 1     Sig: Take 1 tablet by mouth Daily.        Pharmacy where request should be sent: [facility], [address] [phone] [place] [phone]      Last office visit with prescribing clinician: 2/5/2025   Last telemedicine visit with prescribing clinician: Visit date not found   Next office visit with prescribing clinician: 4/22/2025     Additional details provided by patient: PATIENTS WIFE STATES HE IS OUT OF REFILLS    Does the patient have less than a 3 day supply:  [] Yes  [x] No    Cruz Lund Rep   02/10/25 13:29 EST

## 2025-02-14 PROBLEM — I65.23: Status: RESOLVED | Noted: 2020-12-12 | Resolved: 2025-02-14

## 2025-02-14 NOTE — PROGRESS NOTES
[facility] VASCULAR SURGERY    Chief Complaint  Carotid Artery Disease    Subjective          History of Present Illness  Alen Ratliff is a 84 y.o. male with carotid stenosis. He presents to the office today for follow up. He is followed by Dr. Valle. He has had the following vascular surgery interventions:    Right carotid endarterectomy for symptomatic disease and severe stenosis by Dr. Fung on 12/14/2020    He denies any hospitalizations or new diagnosis since we last saw him. He denies any symptoms of TIA/CVA, including amaurosis fugax, slurred speech, or unilateral paresthesias or paralysis.  He reports ever since his endarterectomy he is having random spasms in the right side of his neck.  The spasms do not last for long but they are bothersome when they occur.  They are not associated with any specific activity or neck movement.  He is wondering if this is normal.  He is maintained on Xarelto for his atrial fibrillation.  He is also on aspirin and a statin. He reports compliance with his medications. He is not a smoker.  His blood pressure is quite elevated in the office today, he denies any symptoms of headache or blurred vision.  He feels like he is at his baseline.    Review of Systems   Neurological:  Negative for facial asymmetry, speech difficulty and weakness.        Allergies: Azithromycin and Tramadol    Prior to Admission medications    Medication Sig Start Date End Date Taking? Authorizing Provider   amLODIPine (NORVASC) 5 MG tablet Take 1 tablet by mouth Daily. 10/18/24   Serenity Wren MD   Artificial Tear Ointment (artificial tears) ophthalmic ointment Administer  to both eyes Every 1 (One) Hour As Needed.    Provider, MD Renee   aspirin 81 MG chewable tablet Chew 1 tablet Daily. 12/18/20   Kendall Arellano MD   atorvastatin (LIPITOR) 80 MG tablet TAKE ONE TABLET BY MOUTH EVERY DAY 10/20/24   Serenity Wren MD   empagliflozin (Jardiance) 10 MG  "tablet tablet Take 1 tablet by mouth Daily. 10/18/24   Serenity Wren MD   fluticasone (FLONASE) 50 MCG/ACT nasal spray Administer 2 sprays into the nostril(s) as directed by provider Daily As Needed.    Renee Fabian MD   Januvia 50 MG tablet TAKE ONE TABLET BY MOUTH EVERY DAY 1/9/25   Serenity Wren MD   levothyroxine (Synthroid) 112 MCG tablet Take 1 tablet by mouth Daily. 1/20/25   Serenity Wren MD   losartan (COZAAR) 100 MG tablet Take 1 tablet by mouth Daily. 2/10/25   Serenity Wren MD   OneTouch Ultra test strip USE AND DISCARD 1 TEST     STRIP DAILY. 12/25/23   Serenity Wren MD   pantoprazole (PROTONIX) 40 MG EC tablet Take 1 tablet by mouth Daily. 10/18/24   Serenity Wren MD   Xarelto 15 MG tablet TAKE ONE TABLET BY MOUTH EVERY DAY WITH DINNER 10/30/24   Serenity Wren MD   Xdemvy 0.25 % solution Administer 1 drop to both eyes 2 (Two) Times a Day.  Patient not taking: Reported on 2/5/2025 6/24/24   ProviderRenee MD         Objective   Vital Signs:  BP (!) 199/83 (BP Location: Right arm)   Ht 179.8 cm (70.79\")   Wt 90.7 kg (200 lb)   BMI 28.06 kg/m²   Estimated body mass index is 28.06 kg/m² as calculated from the following:    Height as of this encounter: 179.8 cm (70.79\").    Weight as of this encounter: 90.7 kg (200 lb).       Physical Exam  Vitals reviewed.   Constitutional:       General: He is not in acute distress.     Appearance: He is not ill-appearing.   Cardiovascular:      Rate and Rhythm: Normal rate.      Pulses:           Radial pulses are 2+ on the right side and 2+ on the left side.   Pulmonary:      Effort: Pulmonary effort is normal. No respiratory distress.   Skin:     General: Skin is warm and dry.   Neurological:      General: No focal deficit present.      Mental Status: He is alert and oriented to person, place, and time.      GCS: GCS eye subscore is 4. GCS verbal subscore is 5. GCS motor subscore is 6.        Result Review :    The " following data was reviewed by: LARRY Kohler on 02/17/2025:  Duplex Carotid Ultrasound CAR (02/10/2025 09:50)     Right internal carotid artery demonstrates a less than 50% stenosis.    Antegrade right vertebral flow.    Left internal carotid artery demonstrates a less than 50% stenosis.    Antegrade left vertebral flow.    Progress Notes by Serenity Wren MD (02/05/2025 10:30)   Progress Notes by Radha Danielle MD (02/04/2025 09:00)        Assessment and Plan     Diagnoses and all orders for this visit:    1. Bilateral carotid artery stenosis (Primary)  -     Duplex Carotid Ultrasound CAR; Future    2. Mixed hyperlipidemia    3. Essential hypertension    4. Overweight (BMI 25.0-29.9)    BMI is >= 25 and <30. (Overweight) The following options were offered after discussion;: Information on healthy weight added to patient's after visit summary.         Alen Ratliff is a 84 y.o. male with carotid stenosis status post right CEA in 2020. He denies symptoms of TIA/CVA. His most recent carotid duplex shows 50% stenosis in the bilateral internal carotid arteries, antegrade flow in the bilateral vertebral arteries.  Surgery is not warranted unless stenosis reaches or exceeds 70% or patient becomes symptomatic.  We reviewed the signs and symptoms of stroke, he was instructed to seek emergency medical care if he experiences any of these. He verbalizes understanding. He is maintained on appropriate antiplatelet, statin, and antihypertensive medications which we recommend he continue. I will plan to see him in the office in one year with a carotid duplex.  In regards to the spasms in his right neck, I discussed this with Dr. Valle and he recommended that the patient do more stretching so the muscle does not get his tight cramp up.  I did offer the patient referral to physical therapy, he declined.  In regards to his high blood pressure, I recommended that he recheck his blood pressure at home tonight  and tomorrow and if it remains elevated he should call his cardiologist for possible medication change.  He verbalized understanding of this.  He was instructed to call our office if he had any questions or concerns.     Follow Up     Return in about 1 year (around 2/17/2026) for Carotid duplex.    Patient was given instructions and counseling regarding his condition or for health maintenance advice. Please see specific information pulled into the AVS if appropriate.     Catherine Mclaughlin, APRN

## 2025-02-17 ENCOUNTER — TELEPHONE (OUTPATIENT)
Dept: FAMILY MEDICINE CLINIC | Facility: CLINIC | Age: 85
End: 2025-02-17

## 2025-02-17 ENCOUNTER — OFFICE VISIT (OUTPATIENT)
Age: 85
End: 2025-02-17
Payer: MEDICARE

## 2025-02-17 VITALS
DIASTOLIC BLOOD PRESSURE: 83 MMHG | WEIGHT: 200 LBS | BODY MASS INDEX: 28 KG/M2 | SYSTOLIC BLOOD PRESSURE: 199 MMHG | HEIGHT: 71 IN

## 2025-02-17 DIAGNOSIS — E78.2 MIXED HYPERLIPIDEMIA: Chronic | ICD-10-CM

## 2025-02-17 DIAGNOSIS — I65.23 BILATERAL CAROTID ARTERY STENOSIS: Primary | ICD-10-CM

## 2025-02-17 DIAGNOSIS — I10 ESSENTIAL HYPERTENSION: Chronic | ICD-10-CM

## 2025-02-17 DIAGNOSIS — E66.3 OVERWEIGHT (BMI 25.0-29.9): ICD-10-CM

## 2025-02-17 PROCEDURE — G2211 COMPLEX E/M VISIT ADD ON: HCPCS | Performed by: NURSE PRACTITIONER

## 2025-02-17 PROCEDURE — 1159F MED LIST DOCD IN RCRD: CPT | Performed by: NURSE PRACTITIONER

## 2025-02-17 PROCEDURE — 1160F RVW MEDS BY RX/DR IN RCRD: CPT | Performed by: NURSE PRACTITIONER

## 2025-02-17 PROCEDURE — 3078F DIAST BP <80 MM HG: CPT | Performed by: NURSE PRACTITIONER

## 2025-02-17 PROCEDURE — 99214 OFFICE O/P EST MOD 30 MIN: CPT | Performed by: NURSE PRACTITIONER

## 2025-02-17 PROCEDURE — 3077F SYST BP >= 140 MM HG: CPT | Performed by: NURSE PRACTITIONER

## 2025-02-17 NOTE — TELEPHONE ENCOUNTER
Patient has been on Januvia for several years, this is not the new medication and it was just a regular refill I guess.  If it comes to Jardiance, patient was seen in our office few weeks ago with concerns about having some side effects to Jardiance and he wanted to discontinue the medication.  Please clarify with the patient.  Thank you.

## 2025-02-17 NOTE — TELEPHONE ENCOUNTER
Caller: Dewey Ratliff    Relationship: Emergency Contact    Best call back number: [phone]*    What is the best time to reach you: ANYTIME    Who are you requesting to speak with (clinical staff, provider,  specific staff member): CLINICAL    What was the call regarding: DEWEY, PATIENT'S SPOUSE CALLING STATING THAT THE PATIENT RECEIVED JUNIVIA INSTEAD OF JARDIANCE, FROM [facility], DEWEY REQUEST A CALL BACK TO ADVISE WHY.

## 2025-02-19 NOTE — TELEPHONE ENCOUNTER
Spoke with Elba, she was very confused at first as to what Alen was taking, then she remembered talking to Dr. Wren about him dx the Jardiance. She has enough to last him till his surgery next week. Then they will stop taking it.

## 2025-02-20 ENCOUNTER — HOSPITAL ENCOUNTER (INPATIENT)
Facility: HOSPITAL | Age: 85
LOS: 3 days | Discharge: HOME | End: 2025-02-26
Attending: EMERGENCY MEDICINE
Payer: MEDICARE

## 2025-02-20 ENCOUNTER — TELEPHONE (OUTPATIENT)
Dept: CARDIOLOGY | Facility: CLINIC | Age: 85
End: 2025-02-20

## 2025-02-20 ENCOUNTER — APPOINTMENT (OUTPATIENT)
Dept: GENERAL RADIOLOGY | Facility: HOSPITAL | Age: 85
End: 2025-02-20
Payer: MEDICARE

## 2025-02-20 DIAGNOSIS — I10 HYPERTENSION, UNSPECIFIED TYPE: ICD-10-CM

## 2025-02-20 DIAGNOSIS — I49.5 SICK SINUS SYNDROME: ICD-10-CM

## 2025-02-20 DIAGNOSIS — N17.9 AKI (ACUTE KIDNEY INJURY): ICD-10-CM

## 2025-02-20 DIAGNOSIS — G45.9 TIA (TRANSIENT ISCHEMIC ATTACK): ICD-10-CM

## 2025-02-20 DIAGNOSIS — I44.2 COMPLETE HEART BLOCK: ICD-10-CM

## 2025-02-20 DIAGNOSIS — R42 DIZZY SPELLS: Primary | ICD-10-CM

## 2025-02-20 LAB
ALBUMIN SERPL-MCNC: 3.9 G/DL (ref 3.5–5.2)
ALBUMIN/GLOB SERPL: 1.3 G/DL
ALP SERPL-CCNC: 84 U/L (ref 39–117)
ALT SERPL W P-5'-P-CCNC: 26 U/L (ref 1–41)
ANION GAP SERPL CALCULATED.3IONS-SCNC: 12.4 MMOL/L (ref 5–15)
AST SERPL-CCNC: 35 U/L (ref 1–40)
BASOPHILS # BLD AUTO: 0.05 10*3/MM3 (ref 0–0.2)
BASOPHILS NFR BLD AUTO: 0.6 % (ref 0–1.5)
BILIRUB SERPL-MCNC: 0.7 MG/DL (ref 0–1.2)
BUN SERPL-MCNC: 24 MG/DL (ref 8–23)
BUN/CREAT SERPL: 20.3 (ref 7–25)
CALCIUM SPEC-SCNC: 9.3 MG/DL (ref 8.6–10.5)
CHLORIDE SERPL-SCNC: 108 MMOL/L (ref 98–107)
CO2 SERPL-SCNC: 19.6 MMOL/L (ref 22–29)
CREAT SERPL-MCNC: 1.18 MG/DL (ref 0.76–1.27)
DEPRECATED RDW RBC AUTO: 45.1 FL (ref 37–54)
EGFRCR SERPLBLD CKD-EPI 2021: 60.5 ML/MIN/1.73
EOSINOPHIL # BLD AUTO: 0.06 10*3/MM3 (ref 0–0.4)
EOSINOPHIL NFR BLD AUTO: 0.8 % (ref 0.3–6.2)
ERYTHROCYTE [DISTWIDTH] IN BLOOD BY AUTOMATED COUNT: 13.3 % (ref 12.3–15.4)
GEN 5 1HR TROPONIN T REFLEX: 34 NG/L
GLOBULIN UR ELPH-MCNC: 2.9 GM/DL
GLUCOSE BLDC GLUCOMTR-MCNC: 117 MG/DL (ref 70–105)
GLUCOSE BLDC GLUCOMTR-MCNC: 128 MG/DL (ref 70–105)
GLUCOSE SERPL-MCNC: 156 MG/DL (ref 65–99)
HCT VFR BLD AUTO: 45 % (ref 37.5–51)
HGB BLD-MCNC: 14.1 G/DL (ref 13–17.7)
IMM GRANULOCYTES # BLD AUTO: 0.03 10*3/MM3 (ref 0–0.05)
IMM GRANULOCYTES NFR BLD AUTO: 0.4 % (ref 0–0.5)
LYMPHOCYTES # BLD AUTO: 1.24 10*3/MM3 (ref 0.7–3.1)
LYMPHOCYTES NFR BLD AUTO: 15.9 % (ref 19.6–45.3)
MAGNESIUM SERPL-MCNC: 1.3 MG/DL (ref 1.6–2.4)
MCH RBC QN AUTO: 29 PG (ref 26.6–33)
MCHC RBC AUTO-ENTMCNC: 31.3 G/DL (ref 31.5–35.7)
MCV RBC AUTO: 92.4 FL (ref 79–97)
MONOCYTES # BLD AUTO: 0.62 10*3/MM3 (ref 0.1–0.9)
MONOCYTES NFR BLD AUTO: 7.9 % (ref 5–12)
NEUTROPHILS NFR BLD AUTO: 5.81 10*3/MM3 (ref 1.7–7)
NEUTROPHILS NFR BLD AUTO: 74.4 % (ref 42.7–76)
NRBC BLD AUTO-RTO: 0 /100 WBC (ref 0–0.2)
PLATELET # BLD AUTO: 191 10*3/MM3 (ref 140–450)
PMV BLD AUTO: 10.7 FL (ref 6–12)
POTASSIUM SERPL-SCNC: 3.7 MMOL/L (ref 3.5–5.2)
PROT SERPL-MCNC: 6.8 G/DL (ref 6–8.5)
RBC # BLD AUTO: 4.87 10*6/MM3 (ref 4.14–5.8)
SODIUM SERPL-SCNC: 140 MMOL/L (ref 136–145)
TROPONIN T % DELTA: 6
TROPONIN T NUMERIC DELTA: 2 NG/L
TROPONIN T SERPL HS-MCNC: 32 NG/L
TSH SERPL DL<=0.05 MIU/L-ACNC: 2.71 UIU/ML (ref 0.27–4.2)
WBC NRBC COR # BLD AUTO: 7.81 10*3/MM3 (ref 3.4–10.8)

## 2025-02-20 PROCEDURE — 71045 X-RAY EXAM CHEST 1 VIEW: CPT

## 2025-02-20 PROCEDURE — 80053 COMPREHEN METABOLIC PANEL: CPT | Performed by: EMERGENCY MEDICINE

## 2025-02-20 PROCEDURE — 93005 ELECTROCARDIOGRAM TRACING: CPT | Performed by: EMERGENCY MEDICINE

## 2025-02-20 PROCEDURE — 36415 COLL VENOUS BLD VENIPUNCTURE: CPT

## 2025-02-20 PROCEDURE — 82948 REAGENT STRIP/BLOOD GLUCOSE: CPT

## 2025-02-20 PROCEDURE — 84443 ASSAY THYROID STIM HORMONE: CPT | Performed by: EMERGENCY MEDICINE

## 2025-02-20 PROCEDURE — 25010000002 MAGNESIUM SULFATE 2 GM/50ML SOLUTION: Performed by: EMERGENCY MEDICINE

## 2025-02-20 PROCEDURE — G0378 HOSPITAL OBSERVATION PER HR: HCPCS

## 2025-02-20 PROCEDURE — 99285 EMERGENCY DEPT VISIT HI MDM: CPT

## 2025-02-20 PROCEDURE — 93005 ELECTROCARDIOGRAM TRACING: CPT

## 2025-02-20 PROCEDURE — 83735 ASSAY OF MAGNESIUM: CPT | Performed by: EMERGENCY MEDICINE

## 2025-02-20 PROCEDURE — 84484 ASSAY OF TROPONIN QUANT: CPT | Performed by: EMERGENCY MEDICINE

## 2025-02-20 PROCEDURE — 85025 COMPLETE CBC W/AUTO DIFF WBC: CPT | Performed by: EMERGENCY MEDICINE

## 2025-02-20 RX ORDER — LEVOTHYROXINE SODIUM 112 UG/1
112 TABLET ORAL DAILY
Status: DISCONTINUED | OUTPATIENT
Start: 2025-02-20 | End: 2025-02-26 | Stop reason: HOSPADM

## 2025-02-20 RX ORDER — SODIUM CHLORIDE 0.9 % (FLUSH) 0.9 %
10 SYRINGE (ML) INJECTION EVERY 12 HOURS SCHEDULED
Status: DISCONTINUED | OUTPATIENT
Start: 2025-02-20 | End: 2025-02-26 | Stop reason: HOSPADM

## 2025-02-20 RX ORDER — SODIUM CHLORIDE 0.9 % (FLUSH) 0.9 %
10 SYRINGE (ML) INJECTION AS NEEDED
Status: DISCONTINUED | OUTPATIENT
Start: 2025-02-20 | End: 2025-02-26 | Stop reason: HOSPADM

## 2025-02-20 RX ORDER — PANTOPRAZOLE SODIUM 40 MG/1
40 TABLET, DELAYED RELEASE ORAL DAILY
Status: DISCONTINUED | OUTPATIENT
Start: 2025-02-20 | End: 2025-02-26 | Stop reason: HOSPADM

## 2025-02-20 RX ORDER — IBUPROFEN 600 MG/1
1 TABLET ORAL
Status: DISCONTINUED | OUTPATIENT
Start: 2025-02-20 | End: 2025-02-26 | Stop reason: HOSPADM

## 2025-02-20 RX ORDER — ONDANSETRON 4 MG/1
4 TABLET, ORALLY DISINTEGRATING ORAL EVERY 6 HOURS PRN
Status: DISCONTINUED | OUTPATIENT
Start: 2025-02-20 | End: 2025-02-26 | Stop reason: HOSPADM

## 2025-02-20 RX ORDER — BISACODYL 5 MG/1
5 TABLET, DELAYED RELEASE ORAL DAILY PRN
Status: DISCONTINUED | OUTPATIENT
Start: 2025-02-20 | End: 2025-02-26 | Stop reason: HOSPADM

## 2025-02-20 RX ORDER — ASPIRIN 81 MG/1
81 TABLET, CHEWABLE ORAL DAILY
Status: DISCONTINUED | OUTPATIENT
Start: 2025-02-20 | End: 2025-02-24

## 2025-02-20 RX ORDER — INSULIN LISPRO 100 [IU]/ML
2-9 INJECTION, SOLUTION INTRAVENOUS; SUBCUTANEOUS EVERY 6 HOURS SCHEDULED
Status: DISCONTINUED | OUTPATIENT
Start: 2025-02-20 | End: 2025-02-22

## 2025-02-20 RX ORDER — LEVOTHYROXINE SODIUM 112 UG/1
112 TABLET ORAL
COMMUNITY

## 2025-02-20 RX ORDER — AMOXICILLIN 250 MG
2 CAPSULE ORAL 2 TIMES DAILY PRN
Status: DISCONTINUED | OUTPATIENT
Start: 2025-02-20 | End: 2025-02-26 | Stop reason: HOSPADM

## 2025-02-20 RX ORDER — MAGNESIUM SULFATE HEPTAHYDRATE 40 MG/ML
2 INJECTION, SOLUTION INTRAVENOUS ONCE
Status: COMPLETED | OUTPATIENT
Start: 2025-02-20 | End: 2025-02-20

## 2025-02-20 RX ORDER — ONDANSETRON 2 MG/ML
4 INJECTION INTRAMUSCULAR; INTRAVENOUS EVERY 6 HOURS PRN
Status: DISCONTINUED | OUTPATIENT
Start: 2025-02-20 | End: 2025-02-26 | Stop reason: HOSPADM

## 2025-02-20 RX ORDER — ATORVASTATIN CALCIUM 40 MG/1
80 TABLET, FILM COATED ORAL DAILY
Status: DISCONTINUED | OUTPATIENT
Start: 2025-02-20 | End: 2025-02-22

## 2025-02-20 RX ORDER — ALUMINA, MAGNESIA, AND SIMETHICONE 2400; 2400; 240 MG/30ML; MG/30ML; MG/30ML
15 SUSPENSION ORAL EVERY 6 HOURS PRN
Status: DISCONTINUED | OUTPATIENT
Start: 2025-02-20 | End: 2025-02-26 | Stop reason: HOSPADM

## 2025-02-20 RX ORDER — DEXTROSE MONOHYDRATE 25 G/50ML
25 INJECTION, SOLUTION INTRAVENOUS
Status: DISCONTINUED | OUTPATIENT
Start: 2025-02-20 | End: 2025-02-26 | Stop reason: HOSPADM

## 2025-02-20 RX ORDER — SODIUM CHLORIDE 9 MG/ML
40 INJECTION, SOLUTION INTRAVENOUS AS NEEDED
Status: DISCONTINUED | OUTPATIENT
Start: 2025-02-20 | End: 2025-02-26 | Stop reason: HOSPADM

## 2025-02-20 RX ORDER — AMLODIPINE BESYLATE 5 MG/1
5 TABLET ORAL DAILY
Status: DISCONTINUED | OUTPATIENT
Start: 2025-02-20 | End: 2025-02-21

## 2025-02-20 RX ORDER — NICOTINE POLACRILEX 4 MG
15 LOZENGE BUCCAL
Status: DISCONTINUED | OUTPATIENT
Start: 2025-02-20 | End: 2025-02-26 | Stop reason: HOSPADM

## 2025-02-20 RX ORDER — LOSARTAN POTASSIUM 50 MG/1
100 TABLET ORAL DAILY
Status: DISCONTINUED | OUTPATIENT
Start: 2025-02-20 | End: 2025-02-21

## 2025-02-20 RX ORDER — POLYETHYLENE GLYCOL 3350 17 G/17G
17 POWDER, FOR SOLUTION ORAL DAILY PRN
Status: DISCONTINUED | OUTPATIENT
Start: 2025-02-20 | End: 2025-02-26 | Stop reason: HOSPADM

## 2025-02-20 RX ORDER — BISACODYL 10 MG
10 SUPPOSITORY, RECTAL RECTAL DAILY PRN
Status: DISCONTINUED | OUTPATIENT
Start: 2025-02-20 | End: 2025-02-26 | Stop reason: HOSPADM

## 2025-02-20 RX ADMIN — LEVOTHYROXINE SODIUM 112 MCG: 112 TABLET ORAL at 17:38

## 2025-02-20 RX ADMIN — PANTOPRAZOLE SODIUM 40 MG: 40 TABLET, DELAYED RELEASE ORAL at 17:38

## 2025-02-20 RX ADMIN — ATORVASTATIN CALCIUM 80 MG: 40 TABLET, FILM COATED ORAL at 17:38

## 2025-02-20 RX ADMIN — RIVAROXABAN 15 MG: 15 TABLET, FILM COATED ORAL at 17:38

## 2025-02-20 RX ADMIN — LINAGLIPTIN 5 MG: 5 TABLET, FILM COATED ORAL at 17:38

## 2025-02-20 RX ADMIN — LOSARTAN POTASSIUM 100 MG: 50 TABLET, FILM COATED ORAL at 17:38

## 2025-02-20 RX ADMIN — AMLODIPINE BESYLATE 5 MG: 5 TABLET ORAL at 17:38

## 2025-02-20 RX ADMIN — Medication 10 ML: at 20:05

## 2025-02-20 RX ADMIN — MAGNESIUM SULFATE IN WATER FOR 2 G: 40 INJECTION INTRAVENOUS at 15:17

## 2025-02-20 RX ADMIN — ASPIRIN 81 MG CHEWABLE TABLET 81 MG: 81 TABLET CHEWABLE at 17:38

## 2025-02-20 NOTE — ED PROVIDER NOTES
Subjective   History of Present Illness  Chief complaint dizzy spells weakness hypertension    History of present is a 84-year-old male has been having some dizzy spells and lightheadedness today patient's blood pressure has been elevated up over 200.  Patient called Dr. Muhammad's office and they told him to go to the hospital.  He has no headache or vision change speech difficulty or paralysis to one-sided the other no chest pain neck arm jaw pain or shortness of breath some nausea but no vomiting.  He has been sweating he woke up at 3:00 in the morning with a sweat and a blood pressure 190.  No recent injury illness fevers chills cough congestion flus viruses vaccinations foreign travels leg pain or swelling long car ride plane ride immobilization no abdominal pain no urinary complaints.  Nothing otherwise seem to trigger this or make it better or worse.  No syncope      Review of Systems   Constitutional:  Negative for chills and fever.   Respiratory:  Negative for chest tightness and shortness of breath.    Cardiovascular:  Negative for chest pain.   Gastrointestinal:  Negative for abdominal pain, blood in stool and vomiting.   Genitourinary:  Negative for difficulty urinating and dysuria.   Musculoskeletal:  Negative for back pain and neck pain.   Skin:  Negative for rash.   Neurological:  Positive for dizziness and light-headedness. Negative for facial asymmetry, speech difficulty and headaches.   Psychiatric/Behavioral:  Negative for confusion.        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    Stroke        Allergies   Allergen Reactions    Azithromycin Unknown - High Severity    Tramadol Unknown - High Severity        Past Surgical History:   Procedure Laterality Date    ANKLE OPEN REDUCTION INTERNAL FIXATION Left 12/16/2020    Procedure: ANKLE OPEN REDUCTION INTERNAL FIXATION;  Surgeon: CAROLE Vasquez DPM;  Location: [facility] MAIN OR;  Service: Podiatry;  Laterality: Left;    AORTIC VALVE REPAIR/REPLACEMENT N/A 02/20/2023    Procedure: Transfemoral Transcatheter Aortic Valve Replacement;  Surgeon: Naveed Leonardo MD;  Location: [facility] HYBRID OR;  Service: Cardiovascular;  Laterality: N/A;    AORTIC VALVE REPAIR/REPLACEMENT N/A 02/20/2023    Procedure: TRANSCATHETER AORTIC VALVE REPLACEMENT;  Surgeon: Fabio Villafana MD;  Location: [facility] HYBRID OR;  Service: Cardiothoracic;  Laterality: N/A;    BACK SURGERY  1994    CARDIAC CATHETERIZATION Right 09/27/2022    Procedure: Left Heart Cath;  Surgeon: Steve Muhammad MD;  Location: [facility] CATH INVASIVE LOCATION;  Service: Cardiovascular;  Laterality: Right;    CARDIAC CATHETERIZATION N/A 01/12/2023    Procedure: Stent JENN coronary;  Surgeon: Steve Muhammad MD;  Location: [facility] CATH INVASIVE LOCATION;  Service: Cardiovascular;  Laterality: N/A;    CAROTID ENDARTERECTOMY Right 12/14/2020    Procedure: CAROTID ENDARTERECTOMY;  Surgeon: Toby Fung MD;  Location: [facility] MAIN OR;  Service: Vascular;  Laterality: Right;    COLONOSCOPY  08/25/2015    CORONARY STENT PLACEMENT      PROSTATECTOMY  2001    due to cancer       Family History   Problem Relation Age of Onset    Hypertension Other     Heart attack Mother     Heart disease Mother     Heart attack Father     Heart disease Father        Social History     Socioeconomic History    Marital status:    Tobacco Use    Smoking status: Never     Passive exposure: Never    Smokeless tobacco: Never   Vaping Use    Vaping status: Never Used   Substance and Sexual Activity    Alcohol use: Never    Drug use: Never    Sexual activity: Defer     Prior to Admission medications    Medication Sig Start Date End Date Taking?  Authorizing Provider   amLODIPine (NORVASC) 5 MG tablet Take 1 tablet by mouth Daily. 10/18/24   Serenity Wren MD   Artificial Tear Ointment (artificial tears) ophthalmic ointment Administer  to both eyes Every 1 (One) Hour As Needed.    Provider, MD Renee   aspirin 81 MG chewable tablet Chew 1 tablet Daily. 12/18/20   Kendall Arellano MD   atorvastatin (LIPITOR) 80 MG tablet TAKE ONE TABLET BY MOUTH EVERY DAY 10/20/24   Serenity Wren MD   empagliflozin (Jardiance) 10 MG tablet tablet Take 1 tablet by mouth Daily. 10/18/24   Serenity Wren MD   fluticasone (FLONASE) 50 MCG/ACT nasal spray Administer 2 sprays into the nostril(s) as directed by provider Daily As Needed.    Provider, MD Renee   Januvia 50 MG tablet TAKE ONE TABLET BY MOUTH EVERY DAY 1/9/25   Serenity Wren MD   levothyroxine (Synthroid) 112 MCG tablet Take 1 tablet by mouth Daily. 1/20/25   Serenity Wren MD   losartan (COZAAR) 100 MG tablet Take 1 tablet by mouth Daily. 2/10/25   Serenity Wren MD   OneTouch Ultra test strip USE AND DISCARD 1 TEST     STRIP DAILY. 12/25/23   Serenity Wren MD   pantoprazole (PROTONIX) 40 MG EC tablet Take 1 tablet by mouth Daily. 10/18/24   Serenity Wren MD   Xarelto 15 MG tablet TAKE ONE TABLET BY MOUTH EVERY DAY WITH DINNER 10/30/24   Serenity Wren MD          Objective   Physical Exam  Constitutional this is an 84-year-old gentleman awake alert no acute distress triage vital signs reviewed.  HEENT extraocular muscles are intact pupils equal and reactive there is no photophobia or papilledema mouth is clear neck supple no adenopathy no JV no bruits no meningeal signs lungs were clear no retraction heart was regular without murmur rub abdomen was soft nontender good bowel sounds no peritoneal findings or pulsatile masses extremities pulses equal upper and lower extremities no edema no cords no Homans' sign no evidence of DVT skin is warm and dry without rashes or  cellulitic changes neurologic awake alert and orientated x 4 no face asymmetry speech normal no drift the arms or legs normal finger-to-nose no truncal ataxia there is no nystagmus.  Procedures           ED Course      Results for orders placed or performed during the hospital encounter of 02/20/25   ECG 12 Lead Chest Pain    Collection Time: 02/20/25  1:32 PM   Result Value Ref Range    QT Interval 457 ms    QTC Interval 450 ms   Comprehensive Metabolic Panel    Collection Time: 02/20/25  2:16 PM    Specimen: Blood   Result Value Ref Range    Glucose 156 (H) 65 - 99 mg/dL    BUN 24 (H) 8 - 23 mg/dL    Creatinine 1.18 0.76 - 1.27 mg/dL    Sodium 140 136 - 145 mmol/L    Potassium 3.7 3.5 - 5.2 mmol/L    Chloride 108 (H) 98 - 107 mmol/L    CO2 19.6 (L) 22.0 - 29.0 mmol/L    Calcium 9.3 8.6 - 10.5 mg/dL    Total Protein 6.8 6.0 - 8.5 g/dL    Albumin 3.9 3.5 - 5.2 g/dL    ALT (SGPT) 26 1 - 41 U/L    AST (SGOT) 35 1 - 40 U/L    Alkaline Phosphatase 84 39 - 117 U/L    Total Bilirubin 0.7 0.0 - 1.2 mg/dL    Globulin 2.9 gm/dL    A/G Ratio 1.3 g/dL    BUN/Creatinine Ratio 20.3 7.0 - 25.0    Anion Gap 12.4 5.0 - 15.0 mmol/L    eGFR 60.5 >60.0 mL/min/1.73   High Sensitivity Troponin T    Collection Time: 02/20/25  2:16 PM    Specimen: Blood   Result Value Ref Range    HS Troponin T 32 (H) <22 ng/L   TSH Rfx On Abnormal To Free T4    Collection Time: 02/20/25  2:16 PM    Specimen: Blood   Result Value Ref Range    TSH 2.710 0.270 - 4.200 uIU/mL   Magnesium    Collection Time: 02/20/25  2:16 PM    Specimen: Blood   Result Value Ref Range    Magnesium 1.3 (L) 1.6 - 2.4 mg/dL   CBC Auto Differential    Collection Time: 02/20/25  2:16 PM    Specimen: Blood   Result Value Ref Range    WBC 7.81 3.40 - 10.80 10*3/mm3    RBC 4.87 4.14 - 5.80 10*6/mm3    Hemoglobin 14.1 13.0 - 17.7 g/dL    Hematocrit 45.0 37.5 - 51.0 %    MCV 92.4 79.0 - 97.0 fL    MCH 29.0 26.6 - 33.0 pg    MCHC 31.3 (L) 31.5 - 35.7 g/dL    RDW 13.3 12.3 - 15.4 %     RDW-SD 45.1 37.0 - 54.0 fl    MPV 10.7 6.0 - 12.0 fL    Platelets 191 140 - 450 10*3/mm3    Neutrophil % 74.4 42.7 - 76.0 %    Lymphocyte % 15.9 (L) 19.6 - 45.3 %    Monocyte % 7.9 5.0 - 12.0 %    Eosinophil % 0.8 0.3 - 6.2 %    Basophil % 0.6 0.0 - 1.5 %    Immature Grans % 0.4 0.0 - 0.5 %    Neutrophils, Absolute 5.81 1.70 - 7.00 10*3/mm3    Lymphocytes, Absolute 1.24 0.70 - 3.10 10*3/mm3    Monocytes, Absolute 0.62 0.10 - 0.90 10*3/mm3    Eosinophils, Absolute 0.06 0.00 - 0.40 10*3/mm3    Basophils, Absolute 0.05 0.00 - 0.20 10*3/mm3    Immature Grans, Absolute 0.03 0.00 - 0.05 10*3/mm3    nRBC 0.0 0.0 - 0.2 /100 WBC   High Sensitivity Troponin T 1Hr    Collection Time: 02/20/25  3:16 PM    Specimen: Blood   Result Value Ref Range    HS Troponin T 34 (H) <22 ng/L    Troponin T Numeric Delta 2 ng/L    Troponin T % Delta 6 Abnormal if >/= 20%     XR Chest 1 View    Result Date: 2/20/2025  Impression: No acute chest finding. Electronically Signed: Lisa Barnard MD  2/20/2025 3:04 PM EST  Workstation ID: [id number]   Medications   sodium chloride 0.9 % flush 10 mL (has no administration in time range)   magnesium sulfate 2g/50 mL (PREMIX) infusion (2 g Intravenous New Bag 2/20/25 2571)                                         EKG my interpretation normal sinus rhythm rate of 55 PVC is noted QTc of 450 nonspecific interventricular conduction delay there is no change from 7/31/2023 abnormal EKG              Medical Decision Making  Medical Decision Making.  IV established monitor placed my review of sinus rhythm.  EKG obtained my interpretation normal sinus rhythm rate of 55 PVC noted QTc of 450 nonspecific interventricular conduction delay no change from 7/31/2023 abnormal.  Chest x-ray my independent view no pneumonia pneumothorax failure acute findings.  Labs obtained by independent review comprehensive metabolic profile is unremarkable and a BUN of 24 TSH normal magnesium 1.3 CBC unremarkable.  Troponin was noted to  be 32 with a repeat analysis 34.  The patient repeat exam is resting company blood pressure 174/77.  I do not see any evidence of an acute stroke or acute ST was myocardial infarction DVT pulmonary embolism aortic dissection pericarditis myocarditis pericardial effusion sepsis or bacteremia or ruptured AAA or intra-abdominal process based on my history and physical clinical findings although not a complete list of all possibilities.  Patient has been talking to his cardiologist about potential pacemaker.  Heart rates been in the 50s and in sinus rhythm.  He was sent here by the cardiologist office because of elevated blood pressure needs to remain stable it is high.  Can be placed observation provider notified cardiac consultation be placed stable otherwise unremarkable ER course    Problems Addressed:  Dizzy spells: complicated acute illness or injury  Hypertension, unspecified type: complicated acute illness or injury    Amount and/or Complexity of Data Reviewed  Labs: ordered. Decision-making details documented in ED Course.  Radiology: ordered and independent interpretation performed. Decision-making details documented in ED Course.  ECG/medicine tests: ordered and independent interpretation performed. Decision-making details documented in ED Course.    Risk  Decision regarding hospitalization.        Final diagnoses:   Dizzy spells   Hypertension, unspecified type       ED Disposition  ED Disposition       ED Disposition   Decision to Admit    Condition   --    Comment   --               No follow-up provider specified.       Medication List      No changes were made to your prescriptions during this visit.            Sg Blank MD  02/20/25 9758

## 2025-02-20 NOTE — PLAN OF CARE
Goal Outcome Evaluation:      83 y/o male patient presents with dizziness starting last night. Diaphoresis  last night per pt's with. Pt has hx of CAD, DM2. Pt of dr Muhammad. Cardiology consulted. Will continue to monitor.

## 2025-02-20 NOTE — TELEPHONE ENCOUNTER
Caller: Elba Ratliff    Relationship: Emergency Contact    Best call back number:[phone]      PATIENT BP HAS BEEN ELEVATED THE LAST WEEK  LAST NIGHT 177/84  THIS MORNING IT /92 197/93 200/99

## 2025-02-21 ENCOUNTER — TELEPHONE (OUTPATIENT)
Dept: CARDIOLOGY | Facility: CLINIC | Age: 85
End: 2025-02-21
Payer: MEDICARE

## 2025-02-21 DIAGNOSIS — Z01.810 PREOP CARDIOVASCULAR EXAM: ICD-10-CM

## 2025-02-21 DIAGNOSIS — I49.5 SICK SINUS SYNDROME: Primary | ICD-10-CM

## 2025-02-21 LAB
ANION GAP SERPL CALCULATED.3IONS-SCNC: 10.3 MMOL/L (ref 5–15)
BASOPHILS # BLD AUTO: 0.06 10*3/MM3 (ref 0–0.2)
BASOPHILS NFR BLD AUTO: 0.8 % (ref 0–1.5)
BUN SERPL-MCNC: 24 MG/DL (ref 8–23)
BUN/CREAT SERPL: 18.5 (ref 7–25)
CALCIUM SPEC-SCNC: 8.8 MG/DL (ref 8.6–10.5)
CHLORIDE SERPL-SCNC: 109 MMOL/L (ref 98–107)
CO2 SERPL-SCNC: 21.7 MMOL/L (ref 22–29)
CREAT SERPL-MCNC: 1.3 MG/DL (ref 0.76–1.27)
DEPRECATED RDW RBC AUTO: 45.4 FL (ref 37–54)
EGFRCR SERPLBLD CKD-EPI 2021: 53.8 ML/MIN/1.73
EOSINOPHIL # BLD AUTO: 0.15 10*3/MM3 (ref 0–0.4)
EOSINOPHIL NFR BLD AUTO: 2 % (ref 0.3–6.2)
ERYTHROCYTE [DISTWIDTH] IN BLOOD BY AUTOMATED COUNT: 13.3 % (ref 12.3–15.4)
GLUCOSE BLDC GLUCOMTR-MCNC: 117 MG/DL (ref 70–105)
GLUCOSE BLDC GLUCOMTR-MCNC: 119 MG/DL (ref 70–105)
GLUCOSE BLDC GLUCOMTR-MCNC: 168 MG/DL (ref 70–105)
GLUCOSE SERPL-MCNC: 103 MG/DL (ref 65–99)
HCT VFR BLD AUTO: 37.8 % (ref 37.5–51)
HGB BLD-MCNC: 12.1 G/DL (ref 13–17.7)
IMM GRANULOCYTES # BLD AUTO: 0.03 10*3/MM3 (ref 0–0.05)
IMM GRANULOCYTES NFR BLD AUTO: 0.4 % (ref 0–0.5)
LYMPHOCYTES # BLD AUTO: 2.15 10*3/MM3 (ref 0.7–3.1)
LYMPHOCYTES NFR BLD AUTO: 29.2 % (ref 19.6–45.3)
MCH RBC QN AUTO: 29.5 PG (ref 26.6–33)
MCHC RBC AUTO-ENTMCNC: 32 G/DL (ref 31.5–35.7)
MCV RBC AUTO: 92.2 FL (ref 79–97)
MONOCYTES # BLD AUTO: 0.86 10*3/MM3 (ref 0.1–0.9)
MONOCYTES NFR BLD AUTO: 11.7 % (ref 5–12)
NEUTROPHILS NFR BLD AUTO: 4.12 10*3/MM3 (ref 1.7–7)
NEUTROPHILS NFR BLD AUTO: 55.9 % (ref 42.7–76)
NRBC BLD AUTO-RTO: 0 /100 WBC (ref 0–0.2)
PLATELET # BLD AUTO: 195 10*3/MM3 (ref 140–450)
PMV BLD AUTO: 11 FL (ref 6–12)
POTASSIUM SERPL-SCNC: 3.6 MMOL/L (ref 3.5–5.2)
QT INTERVAL: 457 MS
QTC INTERVAL: 450 MS
RBC # BLD AUTO: 4.1 10*6/MM3 (ref 4.14–5.8)
SODIUM SERPL-SCNC: 141 MMOL/L (ref 136–145)
WBC NRBC COR # BLD AUTO: 7.37 10*3/MM3 (ref 3.4–10.8)

## 2025-02-21 PROCEDURE — 82948 REAGENT STRIP/BLOOD GLUCOSE: CPT

## 2025-02-21 PROCEDURE — 82948 REAGENT STRIP/BLOOD GLUCOSE: CPT | Performed by: PHYSICIAN ASSISTANT

## 2025-02-21 PROCEDURE — 85025 COMPLETE CBC W/AUTO DIFF WBC: CPT | Performed by: PHYSICIAN ASSISTANT

## 2025-02-21 PROCEDURE — 80048 BASIC METABOLIC PNL TOTAL CA: CPT | Performed by: PHYSICIAN ASSISTANT

## 2025-02-21 PROCEDURE — 99214 OFFICE O/P EST MOD 30 MIN: CPT | Performed by: INTERNAL MEDICINE

## 2025-02-21 PROCEDURE — G0378 HOSPITAL OBSERVATION PER HR: HCPCS

## 2025-02-21 RX ORDER — NIFEDIPINE 60 MG/1
60 TABLET, EXTENDED RELEASE ORAL
Status: DISCONTINUED | OUTPATIENT
Start: 2025-02-21 | End: 2025-02-26 | Stop reason: HOSPADM

## 2025-02-21 RX ORDER — NITROGLYCERIN 0.4 MG/1
0.4 TABLET SUBLINGUAL
Status: DISCONTINUED | OUTPATIENT
Start: 2025-02-21 | End: 2025-02-26 | Stop reason: HOSPADM

## 2025-02-21 RX ORDER — VALSARTAN 80 MG/1
160 TABLET ORAL
Status: DISCONTINUED | OUTPATIENT
Start: 2025-02-21 | End: 2025-02-23

## 2025-02-21 RX ADMIN — LEVOTHYROXINE SODIUM 112 MCG: 112 TABLET ORAL at 09:30

## 2025-02-21 RX ADMIN — Medication 10 ML: at 09:31

## 2025-02-21 RX ADMIN — RIVAROXABAN 15 MG: 15 TABLET, FILM COATED ORAL at 18:05

## 2025-02-21 RX ADMIN — LINAGLIPTIN 5 MG: 5 TABLET, FILM COATED ORAL at 09:30

## 2025-02-21 RX ADMIN — Medication 10 ML: at 20:19

## 2025-02-21 RX ADMIN — ASPIRIN 81 MG CHEWABLE TABLET 81 MG: 81 TABLET CHEWABLE at 09:30

## 2025-02-21 RX ADMIN — ATORVASTATIN CALCIUM 80 MG: 40 TABLET, FILM COATED ORAL at 09:30

## 2025-02-21 RX ADMIN — VALSARTAN 160 MG: 80 TABLET ORAL at 09:30

## 2025-02-21 RX ADMIN — PANTOPRAZOLE SODIUM 40 MG: 40 TABLET, DELAYED RELEASE ORAL at 09:31

## 2025-02-21 RX ADMIN — NIFEDIPINE 60 MG: 60 TABLET, EXTENDED RELEASE ORAL at 09:30

## 2025-02-21 NOTE — H&P
[facility]    Patient Name: Alen Ratliff  : 1940  MRN: [id number]  Primary Care Physician: Serenity Wren MD  Date of admission: 2025     Patient Care Team:  Serenity Wren MD as PCP - General (Family Medicine)  Steve Muhammad MD as Consulting Physician (Cardiology)  CAROLE Vasquez DPM as Consulting Physician (Podiatry)  David Urbina MD as Consulting Physician (Otolaryngology)  Gregory Valle MD as Surgeon (Vascular Surgery)  Gómez Arvizu MD as Consulting Physician (Ophthalmology)  Radha Danielle MD as Consulting Physician (Cardiology)          Subjective   History Present Illness     Chief Complaint:   Chief Complaint   Patient presents with    Chest Pain     High blood pressure. Pt states he feels weak and exhausted. Has some dizziness and nausea, pt vomiting at triage.       Chest pain, dizziness    Chest Pain   Associated symptoms include dizziness. Pertinent negatives include no fever or shortness of breath.       ED  84-year-old male has been having some dizzy spells and lightheadedness today patient's blood pressure has been elevated up over 200. Patient called Dr. Muhammad's office and they told him to go to the hospital. He has no headache or vision change speech difficulty or paralysis to one-sided the other no chest pain neck arm jaw pain or shortness of breath some nausea but no vomiting. He has been sweating he woke up at 3:00 in the morning with a sweat and a blood pressure 190. No recent injury illness fevers chills cough congestion flus viruses vaccinations foreign travels leg pain or swelling long car ride plane ride immobilization no abdominal pain no urinary complaints. Nothing otherwise seem to trigger this or make it better or worse. No syncope     Observation 25  Pt concurs with er hpi. Cardiology consulted.      Review of Systems   Constitutional: Negative for fever.   Cardiovascular:  Negative for chest pain.   Respiratory:  Negative  for shortness of breath.    Neurological:  Positive for dizziness and light-headedness.             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    Stroke        Surgical History:      Past Surgical History:   Procedure Laterality Date    ANKLE OPEN REDUCTION INTERNAL FIXATION Left 12/16/2020    Procedure: ANKLE OPEN REDUCTION INTERNAL FIXATION;  Surgeon: CAROLE Vasquez DPM;  Location: [facility] MAIN OR;  Service: Podiatry;  Laterality: Left;    AORTIC VALVE REPAIR/REPLACEMENT N/A 02/20/2023    Procedure: Transfemoral Transcatheter Aortic Valve Replacement;  Surgeon: Naveed Leonardo MD;  Location: [facility] HYBRID OR;  Service: Cardiovascular;  Laterality: N/A;    AORTIC VALVE REPAIR/REPLACEMENT N/A 02/20/2023    Procedure: TRANSCATHETER AORTIC VALVE REPLACEMENT;  Surgeon: Fabio Villafana MD;  Location: [facility] HYBRID OR;  Service: Cardiothoracic;  Laterality: N/A;    BACK SURGERY  1994    CARDIAC CATHETERIZATION Right 09/27/2022    Procedure: Left Heart Cath;  Surgeon: Steve Muhammad MD;  Location: [facility] CATH INVASIVE LOCATION;  Service: Cardiovascular;  Laterality: Right;    CARDIAC CATHETERIZATION N/A 01/12/2023    Procedure: Stent JENN coronary;  Surgeon: Steve Muhammad MD;  Location: [facility] CATH INVASIVE LOCATION;  Service: Cardiovascular;  Laterality: N/A;    CAROTID ENDARTERECTOMY Right 12/14/2020    Procedure: CAROTID ENDARTERECTOMY;  Surgeon: Toby Fung MD;  Location: [facility] MAIN OR;  Service: Vascular;  Laterality: Right;    COLONOSCOPY  08/25/2015    CORONARY STENT PLACEMENT      PROSTATECTOMY  2001    due to cancer           Family History: family history  includes Heart attack in his father and mother; Heart disease in his father and mother; Hypertension in an other family member. Otherwise pertinent FHx was reviewed and unremarkable.     Social History:  reports that he has never smoked. He has never been exposed to tobacco smoke. He has never used smokeless tobacco. He reports that he does not drink alcohol and does not use drugs.      Medications:  Prior to Admission medications    Medication Sig Start Date End Date Taking? Authorizing Provider   amLODIPine (NORVASC) 5 MG tablet Take 1 tablet by mouth Daily. 10/18/24  Yes Serenity Wren MD   Artificial Tear Ointment (artificial tears) ophthalmic ointment Administer  to both eyes Every 1 (One) Hour As Needed.   Yes Renee Fabian MD   aspirin 81 MG chewable tablet Chew 1 tablet Daily. 12/18/20  Yes Kendall Arellano MD   atorvastatin (LIPITOR) 80 MG tablet TAKE ONE TABLET BY MOUTH EVERY DAY 10/20/24  Yes Serenity Wren MD   empagliflozin (Jardiance) 10 MG tablet tablet Take 1 tablet by mouth Daily. 10/18/24  Yes Serenity Wren MD   fluticasone (FLONASE) 50 MCG/ACT nasal spray Administer 2 sprays into the nostril(s) as directed by provider Daily As Needed.   Yes Renee Fabian MD   Januvia 50 MG tablet TAKE ONE TABLET BY MOUTH EVERY DAY 1/9/25  Yes Serenity Wren MD   levothyroxine (Synthroid) 112 MCG tablet Take 1 tablet by mouth Daily. 1/20/25  Yes Serenity Wren MD   losartan (COZAAR) 100 MG tablet Take 1 tablet by mouth Daily. 2/10/25  Yes Serenity Wren MD   pantoprazole (PROTONIX) 40 MG EC tablet Take 1 tablet by mouth Daily. 10/18/24  Yes Serenity Wren MD   Xarelto 15 MG tablet TAKE ONE TABLET BY MOUTH EVERY DAY WITH DINNER 10/30/24  Yes Serenity Wren MD   levothyroxine (SYNTHROID, LEVOTHROID) 112 MCG tablet Take 1 tablet by mouth Every Morning.    Renee Fabian MD   OneTouch Ultra test strip USE AND DISCARD 1 TEST     STRIP DAILY. 12/25/23   [place]  MD Serenity       Allergies:    Allergies   Allergen Reactions    Azithromycin Unknown - High Severity    Tramadol Unknown - High Severity       Objective   Objective     Vital Signs  Temp:  [97.3 °F (36.3 °C)-98.5 °F (36.9 °C)] 98.5 °F (36.9 °C)  Heart Rate:  [50-98] 50  Resp:  [15-18] 15  BP: (151-193)/(57-92) 170/71  SpO2:  [91 %-99 %] 99 %  on   ;   Device (Oxygen Therapy): room air  Body mass index is 27.67 kg/m².    Physical Exam  Constitutional:       Appearance: Normal appearance.   Cardiovascular:      Rate and Rhythm: Regular rhythm. Bradycardia present.   Pulmonary:      Effort: Pulmonary effort is normal.      Breath sounds: Normal breath sounds.   Abdominal:      Palpations: Abdomen is soft.   Skin:     General: Skin is warm and dry.   Neurological:      General: No focal deficit present.      Mental Status: He is alert and oriented to person, place, and time. Mental status is at baseline.   Psychiatric:         Mood and Affect: Mood normal.         Behavior: Behavior normal.       Results Review:  I have personally reviewed most recent cardiac tracings, lab results, and radiology images and interpretations and agree with findings, most notably: cbc, cmp, troponin, EKG , chest xray.    Results from last 7 days   Lab Units 02/21/25  0338   WBC 10*3/mm3 7.37   HEMOGLOBIN g/dL 12.1*   HEMATOCRIT % 37.8   PLATELETS 10*3/mm3 195     Results from last 7 days   Lab Units 02/21/25  0338 02/20/25  1516 02/20/25  1416   SODIUM mmol/L 141  --  140   POTASSIUM mmol/L 3.6  --  3.7   CHLORIDE mmol/L 109*  --  108*   CO2 mmol/L 21.7*  --  19.6*   BUN mg/dL 24*  --  24*   CREATININE mg/dL 1.30*  --  1.18   GLUCOSE mg/dL 103*  --  156*   CALCIUM mg/dL 8.8  --  9.3   ALK PHOS U/L  --   --  84   ALT (SGPT) U/L  --   --  26   AST (SGOT) U/L  --   --  35   HSTROP T ng/L  --  34* 32*     Estimated Creatinine Clearance: 52.9 mL/min (A) (by C-G formula based on SCr of 1.3 mg/dL (H)).  Brief Urine Lab Results  (Last result in  the past 365 days)        Color   Clarity   Blood   Leuk Est   Nitrite   Protein   CREAT   Urine HCG        03/26/24 0833             81.1                 Microbiology Results (last 10 days)       ** No results found for the last 240 hours. **            ECG/EMG Results (most recent)       Procedure Component Value Units Date/Time    ECG 12 Lead Chest Pain [[id number]] Collected: 02/20/25 1332     Updated: 02/21/25 0901     QT Interval 457 ms      QTC Interval 450 ms     Narrative:      HEART RATE=55  bpm  RR Fevgvghu=2001  ms  NJ Ufzlscge=297  ms  P Horizontal Axis=  deg  P Front Axis=Invalid  deg  QRSD Mbccleuj=830  ms  QT Yymlmtqc=313  ms  AGkB=883  ms  QRS Axis=-38  deg  T Wave Axis=78  deg  - ABNORMAL ECG -  Sinus bradycardia  Ventricular premature complex  Sinus pause  Prolonged NJ interval  Probable  left atrial enlargement  Left axis deviation  Incomplete left bundle branch block  LVH with secondary repolarization abnormality  When compared with ECG of 31-Jul-2023 12:36:54,  Significant change in rhythm  Electronically Signed By: Sg Blank (LORI) 2025-02-21 09:01:09  Date and Time of Study:2025-02-20 13:32:49    Telemetry Scan [[id number]] Resulted: 02/20/25     Updated: 02/21/25 0943            Results for orders placed during the hospital encounter of 02/10/25    Duplex Carotid Ultrasound CAR    Interpretation Summary    Right internal carotid artery demonstrates a less than 50% stenosis.    Antegrade right vertebral flow.    Left internal carotid artery demonstrates a less than 50% stenosis.    Antegrade left vertebral flow.      Results for orders placed during the hospital encounter of 02/14/24    Adult Transthoracic Echo Complete W/ Color, Spectral and Contrast if Necessary Per Protocol    Interpretation Summary    Left ventricular systolic function is normal. Calculated left ventricular EF = 64% Left ventricular ejection fraction appears to be 61 - 65%.    Left ventricular diastolic function is  consistent with (grade I) impaired relaxation.  GLS -17%.    Left atrial volume is mildly increased.    29 mm [employer]onic TAVR valve is well-seated.  There is no aortic valve insufficiency.  Mean gradient is 7-10 mmHg.    Estimated right ventricular systolic pressure from tricuspid regurgitation is normal (<35 mmHg).      XR Chest 1 View    Result Date: 2/20/2025  Impression: No acute chest finding. Electronically Signed: Lisa Barnard MD  2/20/2025 3:04 PM EST  Workstation ID: [id number]       Estimated Creatinine Clearance: 52.9 mL/min (A) (by C-G formula based on SCr of 1.3 mg/dL (H)).    Assessment & Plan   Assessment/Plan       Active Hospital Problems    Diagnosis  POA    **Dizzy spells [R42]  Yes      Resolved Hospital Problems   No resolved problems to display.       Dizziness  - pt scheduled for pacemaker 2/27  - cbc, tsh unremarkable  - chest xray reviewed and showing no acute process   -EKG rate 55 sinus curt PVCs, inc LBBB  - cardiology consulted  - start valsartan, procardia   - renal us pending      Hypertension  -poorly Controlled   BP Readings from Last 1 Encounters:   02/21/25 170/71     - Continue valsartan, procardia  - Monitor while admitted       VTE Prophylaxis - Active VTE Prophylaxis:  Pharmacologic:        Start     Dose Route Frequency Stop    02/20/25 1800  rivaroxaban (XARELTO) tablet 15 mg         15 mg PO Daily With Dinner --                  Select Mechanical VTE Prophylaxis if Desired & Appropriate      CODE STATUS:    Code Status and Medical Interventions: CPR (Attempt to Resuscitate); Full Support   Ordered at: 02/20/25 0861     Code Status (Patient has no pulse and is not breathing):    CPR (Attempt to Resuscitate)     Medical Interventions (Patient has pulse or is breathing):    Full Support       This patient has been examined wearing personal protective equipment.     I discussed the patient's findings and my recommendations with patient and nursing  staff.      Signature:Electronically signed by Virgie Luis PA-C, 02/21/25, 12:39 PM EST.

## 2025-02-21 NOTE — CONSULTS
Patient life reviewed about current living arrangements and when living in the state of [place]. Patient is Mos[employer] and supported by local [place]. Patient reports not current emergent needs and can see Father Tyler when rounding. Prayed with patient and spouse.    Informed Father Tyler about spiritual request.    ally Tenorio

## 2025-02-21 NOTE — PLAN OF CARE
Goal Outcome Evaluation:    Plan for Duplex Renal Artery - Bilateral 2/22

## 2025-02-21 NOTE — CASE MANAGEMENT/SOCIAL WORK
Discharge Planning Assessment   David     Patient Name: Alen Ratliff  MRN: [id number]  Today's Date: 2/21/2025    Admit Date: 2/20/2025    Plan: From home with spouse   Discharge Needs Assessment       Row Name 02/21/25 1055       Living Environment    People in Home spouse    Name(s) of People in Home Apoorva Arreola    Current Living Arrangements apartment    Potentially Unsafe Housing Conditions none    In the past 12 months has the electric, gas, oil, or water company threatened to shut off services in your home? No    Primary Care Provided by self    Provides Primary Care For no one    Family Caregiver if Needed spouse    Family Caregiver Names Apoorva Arreola    Quality of Family Relationships helpful;involved;supportive    Able to Return to Prior Arrangements yes       Resource/Environmental Concerns    Resource/Environmental Concerns none    Transportation Concerns none       Transportation Needs    In the past 12 months, has lack of transportation kept you from medical appointments or from getting medications? no    In the past 12 months, has lack of transportation kept you from meetings, work, or from getting things needed for daily living? No       Food Insecurity    Within the past 12 months, you worried that your food would run out before you got the money to buy more. Never true    Within the past 12 months, the food you bought just didn't last and you didn't have money to get more. Never true       Transition Planning    Patient/Family Anticipates Transition to home with family    Patient/Family Anticipated Services at Transition none    Transportation Anticipated car, drives self;family or friend will provide       Discharge Needs Assessment    Readmission Within the Last 30 Days no previous admission in last 30 days    Equipment Currently Used at Home bp cuff    Concerns to be Addressed denies needs/concerns at this time;no discharge needs identified    Do you want help finding or keeping work or a  job? I do not need or want help    Do you want help with school or training? For example, starting or completing job training or getting a high school diploma, GED or equivalent No    Anticipated Changes Related to Illness none    Equipment Needed After Discharge none    Provided Post Acute Provider List? N/A    Provided Post Acute Provider Quality & Resource List? N/A    Offered/Gave Vendor List no                   Discharge Plan       Row Name 02/21/25 1051       Plan    Plan From home with spouse    Patient/Family in Agreement with Plan yes    Plan Comments CM met with patient and spouse Elba at the bedside to review discharge planning. Patient lives at home with spouse Elba, is IADLs and drives. Spouse Elba to transport patient at d/c. Confirmed PCP, insurance, and pharmacy. Patient accepts [facility]. Patient denies any difficulty affording food, utilities, or medications. Patient is not current with any HHC/OPPT/OT services. DC Barriers: Cards following, renal artery duplex, elevated BUN/Cr                  Continued Care and Services - Admitted Since 2/20/2025    No active coordination exists for this encounter.       Expected Discharge Date and Time       Expected Discharge Date Expected Discharge Time    Feb 22, 2025            Demographic Summary       Row Name 02/21/25 1054       General Information    Admission Type observation    Arrived From emergency department    Required Notices Provided Observation Status Notice    Referral Source admission list    Reason for Consult discharge planning    Preferred Language English       Contact Information    Permission Granted to Share Info With     Contact Information Obtained for                    Functional Status       Row Name 02/21/25 1059       Functional Status    Usual Activity Tolerance good    Current Activity Tolerance good       Functional Status, IADL    Medications independent    Meal Preparation independent    Housekeeping  independent    Laundry independent    Shopping independent    If for any reason you need help with day-to-day activities such as bathing, preparing meals, shopping, managing finances, etc., do you get the help you need? I don't need any help       Mental Status    General Appearance WDL WDL       Mental Status Summary    Recent Changes in Mental Status/Cognitive Functioning no changes                Patient Forms       Row Name 02/21/25 0840       Patient Forms    Important Message from Medicare (IMM) Delivered  MOON per Reg 2/20               Rubi Rodriguez RN     [phone]  [email or web address]

## 2025-02-21 NOTE — TELEPHONE ENCOUNTER
Spoke to Pricilla THIBODEAUX, will put in lab orders for patient for pre-op of PM.    Spoke to Allison at [facility] and notified her of lab orders.    Understood.

## 2025-02-21 NOTE — PLAN OF CARE
Problem: Hypertension Acute  Goal: Blood Pressure Within Desired Range  Outcome: Progressing  Intervention: Normalize Blood Pressure  Recent Flowsheet Documentation  Taken 2/21/2025 0410 by Victorina Shannon, RN  Medication Review/Management: medications reviewed  Taken 2/20/2025 2215 by Victorina Shannon, RN  Medication Review/Management: medications reviewed  Taken 2/20/2025 2015 by Victorina Shannon, RN  Medication Review/Management: medications reviewed   Goal Outcome Evaluation:

## 2025-02-21 NOTE — TELEPHONE ENCOUNTER
Dr Muhammad had Allison from [facility] call and wants Pt's Device Labs drawn while he is in the hospital today.

## 2025-02-21 NOTE — CONSULTS
[facility] [place]        Subjective:           Patient ID: Alen Ratliff is a 84 y.o. male.    Chief Complaint: diaphoresis, hypertension    Referring Physician: Virgie GREEN    HPI:  Alen Ratliff is a 84 y.o. male who presents with elevated blood pressure and diaphoresis. Mr. Ratliff is a patient of Dr. Muhammad. Pmh includes hypertension, hyperlipidemia, diabetes mellitus, AS s/p TAVR, Carotid artery disease s/p carotid endarterectomy, TIA, pAFib on Xarelto.     Per prior records:  In August 2020, patient was seen in office for symptom of shortness of breath and relative bradycardia.  Patient also had symptoms of occasional chest pain.  Patient underwent stress test which showed moderate inferior/septal fixed defect with small amount of joseph-infarct ischemia.  Echocardiogram showed normal left ventricular size and function and LVEF was 55 to 60%.  Mild LVH was noted.  Calcified aortic valve was noted with mild to moderate aortic valve stenosis with aortic valve area of 1.3 cm².  Holter monitor showed sinus bradycardia with episode of relative bradycardia.  No significant pause or heart rate less than 40 was noted.     In December 2020, patient was admitted at [facility] with slurring of speech and possible TIA.  Neurological work-up showed high-grade stenosis of right carotid artery.  Patient underwent endarterectomy.  Postoperatively patient had atrial fibrillation with controlled heart rate.  Patient spontaneously cardioverted.  Patient was started on aspirin and Plavix.  Patient was discharged on event monitor.  Event monitor showed predominantly sinus rhythm.  Patient had average heart rate of 60 bpm.  No significant atrial fibrillation burden noted.     In July 2022, patient underwent echocardiogram which showed EF of 55 to 60%.  Patient was noted to have moderate to severe aortic regurgitation.  Peak aortic velocity was 3.8 m/s with peak gradient of 56 mmHg with mean gradient  of 30 mmHg.     In August 2022, patient underwent CHRISTIANO and it showed EF of 60 to 65%.  Severe aortic stenosis with valve area of 0.59 cm was noted. Peak velocity was 4.3 m/s with peak gradient of 74 mmHg and mean gradient of 31 mmHg.     In September 2022, patient underwent cardiac catheterization which showed LAD was free of significant disease.  LCx has high-grade stenosis and RCA had 100% occlusion with collaterals.     In August 2022, I referred the patient to Dr. Villafana for possible TAVR/SAVR with possible CABG..  Initially he was thought to be a candidate for SAVR but subsequently Dr. Villafana recommended TAVR procedure.  In January 2023, patient underwent PCI and stenting of the LCx.  Patient was subsequently recommended to proceed with TAVR.  In February 2023, patient underwent TAVR valve replacement and postoperatively did well.     In March 2024, patient underwent MCOT and patient was noted to have frequent episode of relative bradycardia.  Average heart rate was 56 but no significant pause was noted.  No atrial fibrillation was present.     Patient had been referred to Dr. Danielle for SSS and is scheduled for a PPM 2/27.    Patient presented to ER this admission with elevated blood pressures. He woke up out of sleep diaphoretic and wife states talking out of his head. She took his blood pressure and it was elevated 170/90, later it was 190/90 followed by SBP > 200.   Work up in the ER revealed HS troponin 34. Na 141, K 3.6, BUN 24, Crt 1.3  No chest pain or shortness of breath.       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    Stroke        Past Surgical History:   Procedure Laterality Date    ANKLE OPEN  REDUCTION INTERNAL FIXATION Left 12/16/2020    Procedure: ANKLE OPEN REDUCTION INTERNAL FIXATION;  Surgeon: CAROLE Vasquez DPM;  Location: [facility] MAIN OR;  Service: Podiatry;  Laterality: Left;    AORTIC VALVE REPAIR/REPLACEMENT N/A 02/20/2023    Procedure: Transfemoral Transcatheter Aortic Valve Replacement;  Surgeon: Naveed Leonardo MD;  Location: [facility] HYBRID OR;  Service: Cardiovascular;  Laterality: N/A;    AORTIC VALVE REPAIR/REPLACEMENT N/A 02/20/2023    Procedure: TRANSCATHETER AORTIC VALVE REPLACEMENT;  Surgeon: Fabio Villafana MD;  Location: [facility] HYBRID OR;  Service: Cardiothoracic;  Laterality: N/A;    BACK SURGERY  1994    CARDIAC CATHETERIZATION Right 09/27/2022    Procedure: Left Heart Cath;  Surgeon: Steve Muhammad MD;  Location: [facility] CATH INVASIVE LOCATION;  Service: Cardiovascular;  Laterality: Right;    CARDIAC CATHETERIZATION N/A 01/12/2023    Procedure: Stent JENN coronary;  Surgeon: Steve Muhammad MD;  Location: [facility] CATH INVASIVE LOCATION;  Service: Cardiovascular;  Laterality: N/A;    CAROTID ENDARTERECTOMY Right 12/14/2020    Procedure: CAROTID ENDARTERECTOMY;  Surgeon: Toby Fung MD;  Location: [facility] MAIN OR;  Service: Vascular;  Laterality: Right;    COLONOSCOPY  08/25/2015    CORONARY STENT PLACEMENT      PROSTATECTOMY  2001    due to cancer       Family History   Problem Relation Age of Onset    Hypertension Other     Heart attack Mother     Heart disease Mother     Heart attack Father     Heart disease Father        Social History     Socioeconomic History    Marital status:    Tobacco Use    Smoking status: Never     Passive exposure: Never    Smokeless tobacco: Never   Vaping Use    Vaping status: Never Used   Substance and Sexual Activity    Alcohol use: Never    Drug use: Never    Sexual activity: Defer         Allergies   Allergen Reactions    Azithromycin Unknown - High Severity    Tramadol Unknown - High Severity       Current Medications:   Scheduled  "Meds:aspirin, 81 mg, Oral, Daily  atorvastatin, 80 mg, Oral, Daily  [Held by provider] empagliflozin, 10 mg, Oral, Daily  insulin lispro, 2-9 Units, Subcutaneous, Q6H  levothyroxine, 112 mcg, Oral, Daily  linagliptin, 5 mg, Oral, Daily  NIFEdipine XL, 60 mg, Oral, Q24H  pantoprazole, 40 mg, Oral, Daily  rivaroxaban, 15 mg, Oral, Daily With Dinner  sodium chloride, 10 mL, Intravenous, Q12H  valsartan, 160 mg, Oral, Q24H      Continuous Infusions:     Review of Systems   Constitutional: Negative for chills, diaphoresis and malaise/fatigue.   Cardiovascular:  Negative for chest pain, dyspnea on exertion, irregular heartbeat, leg swelling, near-syncope, orthopnea, palpitations, paroxysmal nocturnal dyspnea and syncope.   Respiratory:  Negative for cough, shortness of breath, sleep disturbances due to breathing and sputum production.    Gastrointestinal:  Negative for change in bowel habit.   Genitourinary:  Negative for urgency.   Neurological:  Negative for dizziness and headaches.   Psychiatric/Behavioral:  Negative for altered mental status.           Objective:         /69 (BP Location: Left arm, Patient Position: Lying)   Pulse 51   Temp 98.2 °F (36.8 °C) (Oral)   Resp 15   Ht 180.3 cm (71\")   Wt 90 kg (198 lb 6.6 oz)   SpO2 93%   BMI 27.67 kg/m²     Physical Exam:  General Appearance:    Alert, cooperative, in no acute distress                                Head: Atraumatic, normocephalic, PERRLA               Neck:   supple, trachea midline, no thyromegaly, no carotid bruit, no JVD   Lungs:     Clear to auscultation,respirations regular, even and               unlabored    Heart:    Regular rhythm and normal rate, normal S1 and S2   Abdomen:     Normal bowel sounds, no masses, no organomegaly, soft  nontender, nondistended, no guarding, no rebound  tenderness   Extremities:   Moves all extremities well, no edema, no cyanosis, no  redness   Pulses:   Pulses palpable and equal bilaterally   Skin:   No " "bleeding, bruising or rash   Neurologic:   Awake, alert, oriented x3                 ASCVD Risk Score::  The ASCVD Risk score (Blake COBURN, et al., 2019) failed to calculate for the following reasons:    The 2019 ASCVD risk score is only valid for ages 40 to 79    Risk score cannot be calculated because patient has a medical history suggesting prior/existing ASCVD      Lab Review:     Results from last 7 days   Lab Units 02/21/25  0338 02/20/25  1416   SODIUM mmol/L 141 140   POTASSIUM mmol/L 3.6 3.7   CHLORIDE mmol/L 109* 108*   CO2 mmol/L 21.7* 19.6*   BUN mg/dL 24* 24*   CREATININE mg/dL 1.30* 1.18   GLUCOSE mg/dL 103* 156*   CALCIUM mg/dL 8.8 9.3   AST (SGOT) U/L  --  35   ALT (SGPT) U/L  --  26     Results from last 7 days   Lab Units 02/20/25  1516 02/20/25  1416   HSTROP T ng/L 34* 32*     Results from last 7 days   Lab Units 02/21/25  0338 02/20/25  1416   WBC 10*3/mm3 7.37 7.81   HEMOGLOBIN g/dL 12.1* 14.1   HEMATOCRIT % 37.8 45.0   PLATELETS 10*3/mm3 195 191         Results from last 7 days   Lab Units 02/20/25  1416   MAGNESIUM mg/dL 1.3*           Invalid input(s): \"LDLCALC\"      Results from last 7 days   Lab Units 02/20/25  1416   TSH uIU/mL 2.710       Recent Radiology:  Imaging Results (Most Recent)       Procedure Component Value Units Date/Time    XR Chest 1 View [[id number]] Collected: 02/20/25 1504     Updated: 02/20/25 1506    Narrative:      XR CHEST 1 VW    Date of Exam: 2/20/2025 2:31 PM EST    Indication: Weakness    Comparison: AP chest 2/21/2023    Findings:  Heart size is borderline enlarged but stable with signs of TAVR. No acute airspace disease or overt features of pulmonary edema. No pleural effusion or pneumothorax or acute osseous abnormality      Impression:      Impression:  No acute chest finding.      Electronically Signed: Lisa Barnard MD    2/20/2025 3:04 PM EST    Workstation ID: [id number]              ECHOCARDIOGRAM:    Results for orders placed during the hospital encounter " of 02/14/24    Adult Transthoracic Echo Complete W/ Color, Spectral and Contrast if Necessary Per Protocol    Interpretation Summary    Left ventricular systolic function is normal. Calculated left ventricular EF = 64% Left ventricular ejection fraction appears to be 61 - 65%.    Left ventricular diastolic function is consistent with (grade I) impaired relaxation.  GLS -17%.    Left atrial volume is mildly increased.    29 mm Medtronic TAVR valve is well-seated.  There is no aortic valve insufficiency.  Mean gradient is 7-10 mmHg.    Estimated right ventricular systolic pressure from tricuspid regurgitation is normal (<35 mmHg).            Assessment:         Active Hospital Problems    Diagnosis  POA    **Dizzy spells [R42]  Yes     HTN urgency    2. VHD with aortic stenosis s/p TAVR    3. AUDREY     4. Paroxysmal atrial fibrillation on Xarelto    5. SSS scheduled for PPM 2/27    6. H/o carotid artery disease s/p carotid endarterectomy    7. Hyperlipidemia    8. diabetes mellitus    9. H/o TIA /CVA     Plan:   Patient presented with HTN urgency  Will start valsartan 160mg daily  Stop amlodipine  Start procardial XL 60mg daily  Check renal artery duplex  On Xarelto for a/c  Scheduled for PPM for SSS 2/27 will notify Dr. Danielle he is here but do not feel PPM needs to be moved up      Patient is seen and examined and findings are verified.  All data is reviewed by me personally.  Assessment and plan formulated by APC was done after discussion with attending.  I spent more than 50% of time in taking care of the patient.    Patient denies any symptom of chest pain.  He was admitted because of elevated blood pressure.    Normal S1 and S2.  No pericardial rub or murmur abdominal exam is benign    At this stage I would recommend to discontinue amlodipine and start nifedipine 60 mg daily.  I would also recommend to start valsartan.  I would also recommend renal duplex scan.    I anticipate discharge  tomorrow    Electronically signed by Steve Muhammad MD, 02/21/25, 5:20 PM EST.                 Tere Archuleta, LARRY  02/21/25  10:38 EST

## 2025-02-22 ENCOUNTER — APPOINTMENT (OUTPATIENT)
Dept: CARDIOLOGY | Facility: HOSPITAL | Age: 85
End: 2025-02-22
Payer: MEDICARE

## 2025-02-22 LAB
ANION GAP SERPL CALCULATED.3IONS-SCNC: 10.6 MMOL/L (ref 5–15)
BASOPHILS # BLD AUTO: 0.05 10*3/MM3 (ref 0–0.2)
BASOPHILS NFR BLD AUTO: 0.6 % (ref 0–1.5)
BH CV ECHO MEAS - DIST REN A EDV LEFT: 9.9 CM/S
BH CV ECHO MEAS - DIST REN A PSV LEFT: 42.8 CM/S
BH CV ECHO MEAS - MID REN A EDV LEFT: 14.6 CM/S
BH CV ECHO MEAS - MID REN A PSV LEFT: 85.5 CM/S
BH CV ECHO MEAS - PROX REN A EDV LEFT: 11.8 CM/S
BH CV ECHO MEAS - PROX REN A PSV LEFT: 65.3 CM/S
BH CV VAS RENAL AORTIC MID PSV: 168 CM/S
BH CV VAS RENAL HILUM LEFT EDV: 19.3 CM/S
BH CV VAS RENAL HILUM LEFT PSV: 90.2 CM/S
BH CV VAS RENAL HILUM RIGHT EDV: 18.9 CM/S
BH CV VAS RENAL HILUM RIGHT PSV: 90.3 CM/S
BH CV XLRA MEAS - KID L LEFT: 11.5 CM
BH CV XLRA MEAS DIST REN A EDV RIGHT: 13.9 CM/S
BH CV XLRA MEAS DIST REN A PSV RIGHT: 70.1 CM/S
BH CV XLRA MEAS KID L RIGHT: 10.8 CM
BH CV XLRA MEAS KID W RIGHT: 4.5 CM
BH CV XLRA MEAS MID REN A EDV RIGHT: 10.5 CM/S
BH CV XLRA MEAS MID REN A PSV RIGHT: 64.1 CM/S
BH CV XLRA MEAS PROX REN A EDV RIGHT: 16.7 CM/S
BH CV XLRA MEAS PROX REN A PSV RIGHT: 78.6 CM/S
BH CV XLRA MEAS RAR LEFT: 0.51
BH CV XLRA MEAS RAR RIGHT: 0.47
BUN SERPL-MCNC: 30 MG/DL (ref 8–23)
BUN/CREAT SERPL: 20.5 (ref 7–25)
CALCIUM SPEC-SCNC: 9.1 MG/DL (ref 8.6–10.5)
CHLORIDE SERPL-SCNC: 105 MMOL/L (ref 98–107)
CO2 SERPL-SCNC: 23.4 MMOL/L (ref 22–29)
CREAT SERPL-MCNC: 1.46 MG/DL (ref 0.76–1.27)
DEPRECATED RDW RBC AUTO: 46.3 FL (ref 37–54)
EGFRCR SERPLBLD CKD-EPI 2021: 46.8 ML/MIN/1.73
EOSINOPHIL # BLD AUTO: 0.11 10*3/MM3 (ref 0–0.4)
EOSINOPHIL NFR BLD AUTO: 1.4 % (ref 0.3–6.2)
ERYTHROCYTE [DISTWIDTH] IN BLOOD BY AUTOMATED COUNT: 13.7 % (ref 12.3–15.4)
GLUCOSE BLDC GLUCOMTR-MCNC: 112 MG/DL (ref 70–105)
GLUCOSE BLDC GLUCOMTR-MCNC: 136 MG/DL (ref 70–105)
GLUCOSE BLDC GLUCOMTR-MCNC: 156 MG/DL (ref 70–105)
GLUCOSE BLDC GLUCOMTR-MCNC: 163 MG/DL (ref 70–105)
GLUCOSE BLDC GLUCOMTR-MCNC: 207 MG/DL (ref 70–105)
GLUCOSE SERPL-MCNC: 160 MG/DL (ref 65–99)
HCT VFR BLD AUTO: 41.4 % (ref 37.5–51)
HGB BLD-MCNC: 12.9 G/DL (ref 13–17.7)
IMM GRANULOCYTES # BLD AUTO: 0.01 10*3/MM3 (ref 0–0.05)
IMM GRANULOCYTES NFR BLD AUTO: 0.1 % (ref 0–0.5)
LEFT KIDNEY WIDTH: 5.4 CM
LEFT RENAL UPPER PARENCHYMA MAX: 21.3 CM/S
LEFT RENAL UPPER PARENCHYMA MIN: 6.2 CM/S
LEFT RENAL UPPER PARENCHYMA RI: 0.71
LYMPHOCYTES # BLD AUTO: 1.88 10*3/MM3 (ref 0.7–3.1)
LYMPHOCYTES NFR BLD AUTO: 23.6 % (ref 19.6–45.3)
MCH RBC QN AUTO: 29.1 PG (ref 26.6–33)
MCHC RBC AUTO-ENTMCNC: 31.2 G/DL (ref 31.5–35.7)
MCV RBC AUTO: 93.2 FL (ref 79–97)
MONOCYTES # BLD AUTO: 0.95 10*3/MM3 (ref 0.1–0.9)
MONOCYTES NFR BLD AUTO: 11.9 % (ref 5–12)
NEUTROPHILS NFR BLD AUTO: 4.98 10*3/MM3 (ref 1.7–7)
NEUTROPHILS NFR BLD AUTO: 62.4 % (ref 42.7–76)
NRBC BLD AUTO-RTO: 0 /100 WBC (ref 0–0.2)
PLATELET # BLD AUTO: 217 10*3/MM3 (ref 140–450)
PMV BLD AUTO: 11.1 FL (ref 6–12)
POTASSIUM SERPL-SCNC: 4.1 MMOL/L (ref 3.5–5.2)
RBC # BLD AUTO: 4.44 10*6/MM3 (ref 4.14–5.8)
RIGHT RENAL UPPER PARENCHYMA MAX: 20.6 CM/S
RIGHT RENAL UPPER PARENCHYMA MIN: 6 CM/S
RIGHT RENAL UPPER PARENCHYMA RI: 0.71
SODIUM SERPL-SCNC: 139 MMOL/L (ref 136–145)
WBC NRBC COR # BLD AUTO: 7.98 10*3/MM3 (ref 3.4–10.8)

## 2025-02-22 PROCEDURE — 93005 ELECTROCARDIOGRAM TRACING: CPT | Performed by: INTERNAL MEDICINE

## 2025-02-22 PROCEDURE — 82948 REAGENT STRIP/BLOOD GLUCOSE: CPT

## 2025-02-22 PROCEDURE — 63710000001 INSULIN LISPRO (HUMAN) PER 5 UNITS: Performed by: PHYSICIAN ASSISTANT

## 2025-02-22 PROCEDURE — 99214 OFFICE O/P EST MOD 30 MIN: CPT | Performed by: INTERNAL MEDICINE

## 2025-02-22 PROCEDURE — 80048 BASIC METABOLIC PNL TOTAL CA: CPT | Performed by: PHYSICIAN ASSISTANT

## 2025-02-22 PROCEDURE — 93975 VASCULAR STUDY: CPT

## 2025-02-22 PROCEDURE — G0378 HOSPITAL OBSERVATION PER HR: HCPCS

## 2025-02-22 PROCEDURE — 93976 VASCULAR STUDY: CPT | Performed by: STUDENT IN AN ORGANIZED HEALTH CARE EDUCATION/TRAINING PROGRAM

## 2025-02-22 PROCEDURE — 82948 REAGENT STRIP/BLOOD GLUCOSE: CPT | Performed by: PHYSICIAN ASSISTANT

## 2025-02-22 PROCEDURE — 85025 COMPLETE CBC W/AUTO DIFF WBC: CPT | Performed by: PHYSICIAN ASSISTANT

## 2025-02-22 PROCEDURE — 93010 ELECTROCARDIOGRAM REPORT: CPT | Performed by: INTERNAL MEDICINE

## 2025-02-22 RX ORDER — INSULIN LISPRO 100 [IU]/ML
2-9 INJECTION, SOLUTION INTRAVENOUS; SUBCUTANEOUS
Status: DISCONTINUED | OUTPATIENT
Start: 2025-02-22 | End: 2025-02-24

## 2025-02-22 RX ORDER — ATORVASTATIN CALCIUM 40 MG/1
80 TABLET, FILM COATED ORAL NIGHTLY
Status: DISCONTINUED | OUTPATIENT
Start: 2025-02-22 | End: 2025-02-26 | Stop reason: HOSPADM

## 2025-02-22 RX ADMIN — Medication 10 ML: at 08:00

## 2025-02-22 RX ADMIN — ATORVASTATIN CALCIUM 80 MG: 40 TABLET, FILM COATED ORAL at 20:48

## 2025-02-22 RX ADMIN — INSULIN LISPRO 4 UNITS: 100 INJECTION, SOLUTION INTRAVENOUS; SUBCUTANEOUS at 21:33

## 2025-02-22 RX ADMIN — ASPIRIN 81 MG CHEWABLE TABLET 81 MG: 81 TABLET CHEWABLE at 12:50

## 2025-02-22 RX ADMIN — Medication 10 ML: at 20:49

## 2025-02-22 RX ADMIN — LINAGLIPTIN 5 MG: 5 TABLET, FILM COATED ORAL at 12:50

## 2025-02-22 RX ADMIN — PANTOPRAZOLE SODIUM 40 MG: 40 TABLET, DELAYED RELEASE ORAL at 12:50

## 2025-02-22 NOTE — PLAN OF CARE
Problem: Adult Inpatient Plan of Care  Goal: Plan of Care Review  Outcome: Progressing  Goal: Patient-Specific Goal (Individualized)  Outcome: Progressing  Goal: Absence of Hospital-Acquired Illness or Injury  Outcome: Progressing  Intervention: Identify and Manage Fall Risk  Description: Perform standard risk assessment on admission using a validated tool or comprehensive approach appropriate to the patient; reassess fall risk frequently, with change in status or transfer to another level of care.  Communicate risk to interprofessional healthcare team; ensure fall risk visible cue.  Determine need for increased observation, equipment and environmental modification, as well as use of supportive, nonskid footwear.  Adjust safety measures to individual needs and identified risk factors.  Reinforce the importance of active participation with fall risk prevention, safety, and physical activity with the patient and family.  Perform regular intentional rounding to assess need for position change, pain assessment and personal needs, including assistance with toileting.  Recent Flowsheet Documentation  Taken 2/22/2025 0233 by Edelmira Saldaña RN  Safety Promotion/Fall Prevention:   safety round/check completed   activity supervised  Taken 2/22/2025 0030 by Edelmira Saldaña RN  Safety Promotion/Fall Prevention:   safety round/check completed   activity supervised  Taken 2/21/2025 2210 by Edelmira Saldaña RN  Safety Promotion/Fall Prevention:   safety round/check completed   activity supervised  Taken 2/21/2025 2030 by Edelmira Saldaña, RN  Safety Promotion/Fall Prevention:   safety round/check completed   activity supervised  Intervention: Prevent Skin Injury  Description: Perform a screening for skin injury risk, such as pressure or moisture-associated skin damage on admission and at regular intervals throughout hospital stay.  Keep all areas of skin (especially folds) clean and dry.  Maintain adequate skin  hydration.  Relieve and redistribute pressure and protect bony prominences and skin at risk for injury; implement measures based on patient-specific risk factors.  Match turning and repositioning schedule to clinical condition.  Encourage weight shift frequently; assist with reposition if unable to complete independently.  Float heels off bed; avoid pressure on the Achilles tendon.  Keep skin free from extended contact with medical devices.  Optimize nutrition and hydration.  Encourage functional activity and mobility, as early as tolerated.  Use aids (e.g., slide boards, mechanical lift) during transfer.  Recent Flowsheet Documentation  Taken 2/21/2025 2030 by Edelmira Saldaña, RN  Body Position: position changed independently  Goal: Optimal Comfort and Wellbeing  Outcome: Progressing  Intervention: Provide Person-Centered Care  Description: Use a family-focused approach to care; encourage support system presence and participation.  Develop trust and rapport by proactively providing information, encouraging questions, addressing concerns and offering reassurance.  Acknowledge emotional response to hospitalization.  Recognize and utilize personal coping strategies and strengths; develop goals via shared decision-making.  Honor spiritual and cultural preferences.  Recent Flowsheet Documentation  Taken 2/21/2025 2030 by Edelmira Saldaña, RN  Trust Relationship/Rapport:   care explained   choices provided  Goal: Readiness for Transition of Care  Outcome: Progressing   Goal Outcome Evaluation:

## 2025-02-22 NOTE — PROGRESS NOTES
[facility] Progress note    Patient Name: Alen Ratliff  : 1940  MRN: [id number]  Primary Care Physician: Serenity Wren MD  Date of admission: 2025     Patient Care Team:  Serenity Wren MD as PCP - General (Family Medicine)  Steve Muhammad MD as Consulting Physician (Cardiology)  CAROLE Vasquez DPM as Consulting Physician (Podiatry)  David Urbina MD as Consulting Physician (Otolaryngology)  Gregory Valle MD as Surgeon (Vascular Surgery)  Gómez Arvizu MD as Consulting Physician (Ophthalmology)  Radha Danielle MD as Consulting Physician (Cardiology)          Subjective   History Present Illness     Chief Complaint:   Chief Complaint   Patient presents with    Chest Pain     High blood pressure. Pt states he feels weak and exhausted. Has some dizziness and nausea, pt vomiting at triage.       Chest pain    History of Present Illness    ED  84-year-old male has been having some dizzy spells and lightheadedness today patient's blood pressure has been elevated up over 200. Patient called Dr. Muhammad's office and they told him to go to the hospital. He has no headache or vision change speech difficulty or paralysis to one-sided the other no chest pain neck arm jaw pain or shortness of breath some nausea but no vomiting. He has been sweating he woke up at 3:00 in the morning with a sweat and a blood pressure 190. No recent injury illness fevers chills cough congestion flus viruses vaccinations foreign travels leg pain or swelling long car ride plane ride immobilization no abdominal pain no urinary complaints. Nothing otherwise seem to trigger this or make it better or worse. No syncope      Observation 25  Pt concurs with er hpi. Cardiology consulted.      ROS    Constitutional: Negative for fever.   Cardiovascular:  Negative for chest pain.   Respiratory:  Negative for shortness of breath.    Neurological:  Positive for dizziness and light-headedness.        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    Stroke        Surgical History:      Past Surgical History:   Procedure Laterality Date    ANKLE OPEN REDUCTION INTERNAL FIXATION Left 12/16/2020    Procedure: ANKLE OPEN REDUCTION INTERNAL FIXATION;  Surgeon: CAROLE Vasquez DPM;  Location: [facility] MAIN OR;  Service: Podiatry;  Laterality: Left;    AORTIC VALVE REPAIR/REPLACEMENT N/A 02/20/2023    Procedure: Transfemoral Transcatheter Aortic Valve Replacement;  Surgeon: Naveed Leonardo MD;  Location: [facility] HYBRID OR;  Service: Cardiovascular;  Laterality: N/A;    AORTIC VALVE REPAIR/REPLACEMENT N/A 02/20/2023    Procedure: TRANSCATHETER AORTIC VALVE REPLACEMENT;  Surgeon: Fabio Villafana MD;  Location: [facility] HYBRID OR;  Service: Cardiothoracic;  Laterality: N/A;    BACK SURGERY  1994    CARDIAC CATHETERIZATION Right 09/27/2022    Procedure: Left Heart Cath;  Surgeon: Steve Muhammad MD;  Location: [facility] CATH INVASIVE LOCATION;  Service: Cardiovascular;  Laterality: Right;    CARDIAC CATHETERIZATION N/A 01/12/2023    Procedure: Stent JENN coronary;  Surgeon: Steve Muhammad MD;  Location: [facility] CATH INVASIVE LOCATION;  Service: Cardiovascular;  Laterality: N/A;    CAROTID ENDARTERECTOMY Right 12/14/2020    Procedure: CAROTID ENDARTERECTOMY;  Surgeon: Toby Fung MD;  Location: [facility] MAIN OR;  Service: Vascular;  Laterality: Right;    COLONOSCOPY  08/25/2015    CORONARY STENT PLACEMENT      PROSTATECTOMY  2001    due to cancer           Family History: family history includes Heart attack in his father and mother; Heart disease in his father and mother; Hypertension  in an other family member. Otherwise pertinent FHx was reviewed and unremarkable.     Social History:  reports that he has never smoked. He has never been exposed to tobacco smoke. He has never used smokeless tobacco. He reports that he does not drink alcohol and does not use drugs.      Medications:  Prior to Admission medications    Medication Sig Start Date End Date Taking? Authorizing Provider   amLODIPine (NORVASC) 5 MG tablet Take 1 tablet by mouth Daily. 10/18/24  Yes Serenity Wren MD   Artificial Tear Ointment (artificial tears) ophthalmic ointment Administer  to both eyes Every 1 (One) Hour As Needed.   Yes Renee Fabian MD   aspirin 81 MG chewable tablet Chew 1 tablet Daily. 12/18/20  Yes Kendall Arellano MD   atorvastatin (LIPITOR) 80 MG tablet TAKE ONE TABLET BY MOUTH EVERY DAY 10/20/24  Yes Serenity Wren MD   empagliflozin (Jardiance) 10 MG tablet tablet Take 1 tablet by mouth Daily. 10/18/24  Yes Serenity Wren MD   fluticasone (FLONASE) 50 MCG/ACT nasal spray Administer 2 sprays into the nostril(s) as directed by provider Daily As Needed.   Yes Renee Fabian MD   Januvia 50 MG tablet TAKE ONE TABLET BY MOUTH EVERY DAY 1/9/25  Yes Serenity Wren MD   levothyroxine (Synthroid) 112 MCG tablet Take 1 tablet by mouth Daily. 1/20/25  Yes Serenity Wren MD   losartan (COZAAR) 100 MG tablet Take 1 tablet by mouth Daily. 2/10/25  Yes Serenity Wren MD   pantoprazole (PROTONIX) 40 MG EC tablet Take 1 tablet by mouth Daily. 10/18/24  Yes Serenity Wren MD   Xarelto 15 MG tablet TAKE ONE TABLET BY MOUTH EVERY DAY WITH DINNER 10/30/24  Yes Serenity Wren MD   levothyroxine (SYNTHROID, LEVOTHROID) 112 MCG tablet Take 1 tablet by mouth Every Morning.    Renee Fabian MD   OneTouch Ultra test strip USE AND DISCARD 1 TEST     STRIP DAILY. 12/25/23   Serenity Wren MD       Allergies:    Allergies   Allergen Reactions    Azithromycin Unknown - High Severity     Tramadol Unknown - High Severity       Objective   Objective     Vital Signs  Temp:  [97.5 °F (36.4 °C)-97.8 °F (36.6 °C)] 97.8 °F (36.6 °C)  Heart Rate:  [42-65] 59  Resp:  [15-19] 18  BP: (110-155)/(49-69) 145/49  SpO2:  [90 %-97 %] 96 %  on   ;   Device (Oxygen Therapy): room air  Body mass index is 27.67 kg/m².    Physical Exam    Constitutional:       Appearance: Normal appearance.   Cardiovascular:      Rate and Rhythm: Regular rhythm. Bradycardia present.   Pulmonary:      Effort: Pulmonary effort is normal.      Breath sounds: Normal breath sounds.   Abdominal:      Palpations: Abdomen is soft.   Skin:     General: Skin is warm and dry.   Neurological:      General: No focal deficit present.      Mental Status: He is alert and oriented to person, place, and time. Mental status is at baseline.   Psychiatric:         Mood and Affect: Mood normal.         Behavior: Behavior normal.     Results Review:  I have personally reviewed most recent cardiac tracings, lab results, and radiology images and interpretations and agree with findings, most notably: cbc, cmp, troponin, EKG , chest xray .    Results from last 7 days   Lab Units 02/22/25  0142   WBC 10*3/mm3 7.98   HEMOGLOBIN g/dL 12.9*   HEMATOCRIT % 41.4   PLATELETS 10*3/mm3 217     Results from last 7 days   Lab Units 02/22/25  0142 02/21/25  0338 02/20/25  1516 02/20/25  1416   SODIUM mmol/L 139   < >  --  140   POTASSIUM mmol/L 4.1   < >  --  3.7   CHLORIDE mmol/L 105   < >  --  108*   CO2 mmol/L 23.4   < >  --  19.6*   BUN mg/dL 30*   < >  --  24*   CREATININE mg/dL 1.46*   < >  --  1.18   GLUCOSE mg/dL 160*   < >  --  156*   CALCIUM mg/dL 9.1   < >  --  9.3   ALK PHOS U/L  --   --   --  84   ALT (SGPT) U/L  --   --   --  26   AST (SGOT) U/L  --   --   --  35   HSTROP T ng/L  --   --  34* 32*    < > = values in this interval not displayed.     Estimated Creatinine Clearance: 47.1 mL/min (A) (by C-G formula based on SCr of 1.46 mg/dL (H)).  Brief Urine Lab  Results  (Last result in the past 365 days)        Color   Clarity   Blood   Leuk Est   Nitrite   Protein   CREAT   Urine HCG        03/26/24 0833             81.1                 Microbiology Results (last 10 days)       ** No results found for the last 240 hours. **            ECG/EMG Results (most recent)       Procedure Component Value Units Date/Time    ECG 12 Lead Chest Pain [[id number]] Collected: 02/20/25 1332     Updated: 02/21/25 0901     QT Interval 457 ms      QTC Interval 450 ms     Narrative:      HEART RATE=55  bpm  RR Ybfsmwcx=5774  ms  ME Gqptekie=743  ms  P Horizontal Axis=  deg  P Front Axis=Invalid  deg  QRSD Uqjvahkk=008  ms  QT Gfpziyec=696  ms  EGvD=264  ms  QRS Axis=-38  deg  T Wave Axis=78  deg  - ABNORMAL ECG -  Sinus bradycardia  Ventricular premature complex  Sinus pause  Prolonged ME interval  Probable  left atrial enlargement  Left axis deviation  Incomplete left bundle branch block  LVH with secondary repolarization abnormality  When compared with ECG of 31-Jul-2023 12:36:54,  Significant change in rhythm  Electronically Signed By: Sg Blank (LORI) 2025-02-21 09:01:09  Date and Time of Study:2025-02-20 13:32:49    Telemetry Scan [[id number]] Resulted: 02/20/25     Updated: 02/21/25 0943            Results for orders placed during the hospital encounter of 02/10/25    Duplex Carotid Ultrasound CAR    Interpretation Summary    Right internal carotid artery demonstrates a less than 50% stenosis.    Antegrade right vertebral flow.    Left internal carotid artery demonstrates a less than 50% stenosis.    Antegrade left vertebral flow.      Results for orders placed during the hospital encounter of 02/14/24    Adult Transthoracic Echo Complete W/ Color, Spectral and Contrast if Necessary Per Protocol    Interpretation Summary    Left ventricular systolic function is normal. Calculated left ventricular EF = 64% Left ventricular ejection fraction appears to be 61 - 65%.    Left ventricular  diastolic function is consistent with (grade I) impaired relaxation.  GLS -17%.    Left atrial volume is mildly increased.    29 mm Medtronic TAVR valve is well-seated.  There is no aortic valve insufficiency.  Mean gradient is 7-10 mmHg.    Estimated right ventricular systolic pressure from tricuspid regurgitation is normal (<35 mmHg).      XR Chest 1 View    Result Date: 2/20/2025  Impression: No acute chest finding. Electronically Signed: Lisa Barnard MD  2/20/2025 3:04 PM EST  Workstation ID: [id number]       Estimated Creatinine Clearance: 47.1 mL/min (A) (by C-G formula based on SCr of 1.46 mg/dL (H)).    Assessment & Plan   Assessment/Plan       Active Hospital Problems    Diagnosis  POA    **Dizzy spells [R42]  Yes      Resolved Hospital Problems   No resolved problems to display.     Dizziness  - pt scheduled for pacemaker 2/27  - cbc, tsh unremarkable  - chest xray reviewed and showing no acute process   -EKG rate 55 sinus curt PVCs, inc LBBB  - cardiology consulted  - start valsartan, procardia   - renal us pending  - pt to have pacemaker possible monday     Hypertension  -poorly Controlled       BP Readings from Last 1 Encounters:   02/21/25 170/71      - Continue valsartan, procardia  - Monitor while admitted       VTE Prophylaxis - Active VTE Prophylaxis:  Pharmacologic:        Start     Dose Route Frequency Stop    02/20/25 1800  rivaroxaban (XARELTO) tablet 15 mg         15 mg PO Daily With Dinner --                  Select Mechanical VTE Prophylaxis if Desired & Appropriate      CODE STATUS:    Code Status and Medical Interventions: CPR (Attempt to Resuscitate); Full Support   Ordered at: 02/20/25 9975     Code Status (Patient has no pulse and is not breathing):    CPR (Attempt to Resuscitate)     Medical Interventions (Patient has pulse or is breathing):    Full Support       This patient has been examined wearing personal protective equipment.     I discussed the patient's findings and my  recommendations with patient and nursing staff.      Signature:Electronically signed by Virgie Luis PA-C, 02/22/25, 12:46 PM EST.

## 2025-02-23 PROBLEM — I44.2 COMPLETE HEART BLOCK: Status: ACTIVE | Noted: 2025-02-20

## 2025-02-23 LAB
ANION GAP SERPL CALCULATED.3IONS-SCNC: 11.1 MMOL/L (ref 5–15)
BASOPHILS # BLD AUTO: 0.05 10*3/MM3 (ref 0–0.2)
BASOPHILS NFR BLD AUTO: 0.6 % (ref 0–1.5)
BUN SERPL-MCNC: 32 MG/DL (ref 8–23)
BUN/CREAT SERPL: 24.4 (ref 7–25)
CALCIUM SPEC-SCNC: 9.1 MG/DL (ref 8.6–10.5)
CHLORIDE SERPL-SCNC: 109 MMOL/L (ref 98–107)
CO2 SERPL-SCNC: 21.9 MMOL/L (ref 22–29)
CREAT SERPL-MCNC: 1.31 MG/DL (ref 0.76–1.27)
DEPRECATED RDW RBC AUTO: 47.7 FL (ref 37–54)
EGFRCR SERPLBLD CKD-EPI 2021: 53.3 ML/MIN/1.73
EOSINOPHIL # BLD AUTO: 0.09 10*3/MM3 (ref 0–0.4)
EOSINOPHIL NFR BLD AUTO: 1.1 % (ref 0.3–6.2)
ERYTHROCYTE [DISTWIDTH] IN BLOOD BY AUTOMATED COUNT: 13.5 % (ref 12.3–15.4)
GLUCOSE BLDC GLUCOMTR-MCNC: 108 MG/DL (ref 70–105)
GLUCOSE BLDC GLUCOMTR-MCNC: 142 MG/DL (ref 70–105)
GLUCOSE BLDC GLUCOMTR-MCNC: 146 MG/DL (ref 70–105)
GLUCOSE BLDC GLUCOMTR-MCNC: 195 MG/DL (ref 70–105)
GLUCOSE SERPL-MCNC: 173 MG/DL (ref 65–99)
HCT VFR BLD AUTO: 41.7 % (ref 37.5–51)
HGB BLD-MCNC: 12.8 G/DL (ref 13–17.7)
IMM GRANULOCYTES # BLD AUTO: 0.03 10*3/MM3 (ref 0–0.05)
IMM GRANULOCYTES NFR BLD AUTO: 0.4 % (ref 0–0.5)
LYMPHOCYTES # BLD AUTO: 1.6 10*3/MM3 (ref 0.7–3.1)
LYMPHOCYTES NFR BLD AUTO: 19.8 % (ref 19.6–45.3)
MCH RBC QN AUTO: 29.4 PG (ref 26.6–33)
MCHC RBC AUTO-ENTMCNC: 30.7 G/DL (ref 31.5–35.7)
MCV RBC AUTO: 95.6 FL (ref 79–97)
MONOCYTES # BLD AUTO: 1.07 10*3/MM3 (ref 0.1–0.9)
MONOCYTES NFR BLD AUTO: 13.3 % (ref 5–12)
NEUTROPHILS NFR BLD AUTO: 5.23 10*3/MM3 (ref 1.7–7)
NEUTROPHILS NFR BLD AUTO: 64.8 % (ref 42.7–76)
NRBC BLD AUTO-RTO: 0 /100 WBC (ref 0–0.2)
PLATELET # BLD AUTO: 214 10*3/MM3 (ref 140–450)
PMV BLD AUTO: 10.7 FL (ref 6–12)
POTASSIUM SERPL-SCNC: 4 MMOL/L (ref 3.5–5.2)
QT INTERVAL: 413 MS
QTC INTERVAL: 433 MS
RBC # BLD AUTO: 4.36 10*6/MM3 (ref 4.14–5.8)
SODIUM SERPL-SCNC: 142 MMOL/L (ref 136–145)
WBC NRBC COR # BLD AUTO: 8.07 10*3/MM3 (ref 3.4–10.8)

## 2025-02-23 PROCEDURE — 82948 REAGENT STRIP/BLOOD GLUCOSE: CPT

## 2025-02-23 PROCEDURE — 85025 COMPLETE CBC W/AUTO DIFF WBC: CPT | Performed by: PHYSICIAN ASSISTANT

## 2025-02-23 PROCEDURE — 82948 REAGENT STRIP/BLOOD GLUCOSE: CPT | Performed by: PHYSICIAN ASSISTANT

## 2025-02-23 PROCEDURE — 99232 SBSQ HOSP IP/OBS MODERATE 35: CPT | Performed by: INTERNAL MEDICINE

## 2025-02-23 PROCEDURE — 80048 BASIC METABOLIC PNL TOTAL CA: CPT | Performed by: PHYSICIAN ASSISTANT

## 2025-02-23 RX ORDER — VALSARTAN 80 MG/1
240 TABLET ORAL
Status: DISCONTINUED | OUTPATIENT
Start: 2025-02-24 | End: 2025-02-26

## 2025-02-23 RX ADMIN — Medication 10 ML: at 08:40

## 2025-02-23 RX ADMIN — LINAGLIPTIN 5 MG: 5 TABLET, FILM COATED ORAL at 08:40

## 2025-02-23 RX ADMIN — ASPIRIN 81 MG CHEWABLE TABLET 81 MG: 81 TABLET CHEWABLE at 08:40

## 2025-02-23 RX ADMIN — PANTOPRAZOLE SODIUM 40 MG: 40 TABLET, DELAYED RELEASE ORAL at 08:40

## 2025-02-23 RX ADMIN — VALSARTAN 160 MG: 80 TABLET ORAL at 08:40

## 2025-02-23 RX ADMIN — ATORVASTATIN CALCIUM 80 MG: 40 TABLET, FILM COATED ORAL at 21:01

## 2025-02-23 RX ADMIN — Medication 10 ML: at 21:01

## 2025-02-23 RX ADMIN — LEVOTHYROXINE SODIUM 112 MCG: 112 TABLET ORAL at 08:40

## 2025-02-23 NOTE — PLAN OF CARE
Goal Outcome Evaluation:    Scheduled for pacemaker placement tomorrow

## 2025-02-23 NOTE — PROGRESS NOTES
[facility] MEDICINE SERVICE  DAILY PROGRESS NOTE    NAME: Alen Ratliff  : 1940  MRN: [id number]      LOS: 0 days     PROVIDER OF SERVICE: Cricket Renner MD    Chief Complaint: Dizzy spells    Subjective:     Interval History:  History taken from: patient    Patient seen and examined at bedside this morning.  Denies having any dizziness today.  Scheduled for pacemaker placement tomorrow        Review of Systems: Negative except described above  Review of Systems    Objective:     Vital Signs  Temp:  [97.8 °F (36.6 °C)-98.4 °F (36.9 °C)] 98.2 °F (36.8 °C)  Heart Rate:  [55-71] 55  Resp:  [15-22] 15  BP: (142-176)/(45-77) 153/71   Body mass index is 27.67 kg/m².    Physical Exam  Physical Exam  Constitutional:       Comments: NAD    Cardiovascular:      Comments:  RRR, S1 & S2   Pulmonary:      Comments:  Lungs CTA   Abdominal:      Comments:  ABD soft, NT            Diagnostic Data    Results from last 7 days   Lab Units 25  0125 25  0338 25  1416   WBC 10*3/mm3 8.07   < > 7.81   HEMOGLOBIN g/dL 12.8*   < > 14.1   HEMATOCRIT % 41.7   < > 45.0   PLATELETS 10*3/mm3 214   < > 191   GLUCOSE mg/dL 173*   < > 156*   CREATININE mg/dL 1.31*   < > 1.18   BUN mg/dL 32*   < > 24*   SODIUM mmol/L 142   < > 140   POTASSIUM mmol/L 4.0   < > 3.7   AST (SGOT) U/L  --   --  35   ALT (SGPT) U/L  --   --  26   ALK PHOS U/L  --   --  84   BILIRUBIN mg/dL  --   --  0.7   ANION GAP mmol/L 11.1   < > 12.4    < > = values in this interval not displayed.       No radiology results for the last day      I reviewed the patient's new clinical results.    Assessment/Plan:     Active and Resolved Problems  Active Hospital Problems    Diagnosis  POA    **Dizzy spells [R42]  Yes    Complete heart block [I44.2]  Unknown    Sick sinus syndrome [I49.5]  Unknown    Dizziness [R42]  Yes      Resolved Hospital Problems   No resolved problems to display.       Dizziness secondary to sick sinus  syndrome.  -Echo 2/14/2024: LVSF 64%.  LV DF consistent with grade 1 impaired relaxation.  29 mm Medtronic TAVR valve well-seated, no aortic valve insufficiency  -EKG on 2/22/2025: Sinus rhythm, prolonged [place] interval, multiple old infarcts, nonspecific T abnormalities in the lateral leads  -Continue valsartan, Procardia  -Patient to have PPM on 2/24/2025, Xarelto held   -Cardiology following  -Continue telemetry monitoring     Hypertension  -Poorly controlled  -Blood pressure at time of transfer 172/77    VTE Prophylaxis:  Pharmacologic VTE prophylaxis orders are present.             Disposition Planning:      Anticipated Date of Discharge: 2/25/2025         Time: 35 minutes     Code Status and Medical Interventions: CPR (Attempt to Resuscitate); Full Support   Ordered at: 02/20/25 1656     Code Status (Patient has no pulse and is not breathing):    CPR (Attempt to Resuscitate)     Medical Interventions (Patient has pulse or is breathing):    Full Support       Signature: Electronically signed by Cricket Renner MD, 02/23/25, 11:34 EST.  [facility] Hospitalist Team

## 2025-02-23 NOTE — CONSULTS
[facility] [place]    Cardiology progress note    Subjective:           Patient ID: Alen Ratliff is a 84 y.o. male.    Chief Complaint: diaphoresis, hypertension    Referring Physician: Virgie GREEN    HPI:  Alen Ratliff is a 84 y.o. male who presents with elevated blood pressure and diaphoresis. Mr. Ratliff is a patient of Dr. Muhammad. Pmh includes hypertension, hyperlipidemia, diabetes mellitus, AS s/p TAVR, Carotid artery disease s/p carotid endarterectomy, TIA, pAFib on Xarelto.     Per prior records:  In August 2020, patient was seen in office for symptom of shortness of breath and relative bradycardia.  Patient also had symptoms of occasional chest pain.  Patient underwent stress test which showed moderate inferior/septal fixed defect with small amount of joseph-infarct ischemia.  Echocardiogram showed normal left ventricular size and function and LVEF was 55 to 60%.  Mild LVH was noted.  Calcified aortic valve was noted with mild to moderate aortic valve stenosis with aortic valve area of 1.3 cm².  Holter monitor showed sinus bradycardia with episode of relative bradycardia.  No significant pause or heart rate less than 40 was noted.     In December 2020, patient was admitted at [facility] with slurring of speech and possible TIA.  Neurological work-up showed high-grade stenosis of right carotid artery.  Patient underwent endarterectomy.  Postoperatively patient had atrial fibrillation with controlled heart rate.  Patient spontaneously cardioverted.  Patient was started on aspirin and Plavix.  Patient was discharged on event monitor.  Event monitor showed predominantly sinus rhythm.  Patient had average heart rate of 60 bpm.  No significant atrial fibrillation burden noted.     In July 2022, patient underwent echocardiogram which showed EF of 55 to 60%.  Patient was noted to have moderate to severe aortic regurgitation.  Peak aortic velocity was 3.8 m/s with peak gradient of 56  mmHg with mean gradient of 30 mmHg.     In August 2022, patient underwent CHRISTIANO and it showed EF of 60 to 65%.  Severe aortic stenosis with valve area of 0.59 cm was noted. Peak velocity was 4.3 m/s with peak gradient of 74 mmHg and mean gradient of 31 mmHg.     In September 2022, patient underwent cardiac catheterization which showed LAD was free of significant disease.  LCx has high-grade stenosis and RCA had 100% occlusion with collaterals.     In August 2022, I referred the patient to Dr. Villafana for possible TAVR/SAVR with possible CABG..  Initially he was thought to be a candidate for SAVR but subsequently Dr. Villafana recommended TAVR procedure.  In January 2023, patient underwent PCI and stenting of the LCx.  Patient was subsequently recommended to proceed with TAVR.  In February 2023, patient underwent TAVR valve replacement and postoperatively did well.     In March 2024, patient underwent MCOT and patient was noted to have frequent episode of relative bradycardia.  Average heart rate was 56 but no significant pause was noted.  No atrial fibrillation was present.     Patient had been referred to Dr. Danielle for SSS and is scheduled for a PPM 2/27.    Patient presented to ER this admission with elevated blood pressures. He woke up out of sleep diaphoretic and wife states talking out of his head. She took his blood pressure and it was elevated 170/90, later it was 190/90 followed by SBP > 200.   Work up in the ER revealed HS troponin 34. Na 141, K 3.6, BUN 24, Crt 1.3  No chest pain or shortness of breath.   ==================================================    Seen and examined, remains on telemetry, severe first-degree AV block  Chest pain-free  N.p.o. midnight for pacemaker tomorrow  Off anticoagulation      Volume appears stable    Telemetry tracings reviewed and interpreted me demonstrates sinus rhythm with severe first-degree AV block    Past Medical History:   Diagnosis Date    Allergic rhinitis      Aortic stenosis     Atrial fibrillation     Coronary artery disease     Diabetes     GERD (gastroesophageal reflux disease)     Heart murmur     Hyperlipidemia     Hypertension     Hypothyroidism     Prostate cancer     S/P TAVR (transcatheter aortic valve replacement) 02/20/2023    Stroke        Past Surgical History:   Procedure Laterality Date    ANKLE OPEN REDUCTION INTERNAL FIXATION Left 12/16/2020    Procedure: ANKLE OPEN REDUCTION INTERNAL FIXATION;  Surgeon: CAROLE Vasquez DPM;  Location: [facility] MAIN OR;  Service: Podiatry;  Laterality: Left;    AORTIC VALVE REPAIR/REPLACEMENT N/A 02/20/2023    Procedure: Transfemoral Transcatheter Aortic Valve Replacement;  Surgeon: Naveed Leonardo MD;  Location: [facility] HYBRID OR;  Service: Cardiovascular;  Laterality: N/A;    AORTIC VALVE REPAIR/REPLACEMENT N/A 02/20/2023    Procedure: TRANSCATHETER AORTIC VALVE REPLACEMENT;  Surgeon: Fabio Villafana MD;  Location: [facility] HYBRID OR;  Service: Cardiothoracic;  Laterality: N/A;    BACK SURGERY  1994    CARDIAC CATHETERIZATION Right 09/27/2022    Procedure: Left Heart Cath;  Surgeon: Steve Muhammad MD;  Location: [facility] CATH INVASIVE LOCATION;  Service: Cardiovascular;  Laterality: Right;    CARDIAC CATHETERIZATION N/A 01/12/2023    Procedure: Stent JENN coronary;  Surgeon: Steve Muhammad MD;  Location: [facility] CATH INVASIVE LOCATION;  Service: Cardiovascular;  Laterality: N/A;    CAROTID ENDARTERECTOMY Right 12/14/2020    Procedure: CAROTID ENDARTERECTOMY;  Surgeon: Toby Fung MD;  Location: [facility] MAIN OR;  Service: Vascular;  Laterality: Right;    COLONOSCOPY  08/25/2015    CORONARY STENT PLACEMENT      PROSTATECTOMY  2001    due to cancer       Family History   Problem Relation Age of Onset    Hypertension Other     Heart attack Mother     Heart disease Mother     Heart attack Father     Heart disease Father        Social History     Socioeconomic History    Marital status:    Tobacco Use    Smoking  "status: Never     Passive exposure: Never    Smokeless tobacco: Never   Vaping Use    Vaping status: Never Used   Substance and Sexual Activity    Alcohol use: Never    Drug use: Never    Sexual activity: Defer         Allergies   Allergen Reactions    Azithromycin Unknown - High Severity    Tramadol Unknown - High Severity       Current Medications:   Scheduled Meds:aspirin, 81 mg, Oral, Daily  atorvastatin, 80 mg, Oral, Nightly  [Held by provider] empagliflozin, 10 mg, Oral, Daily  insulin lispro, 2-9 Units, Subcutaneous, 4x Daily With Meals & Nightly  levothyroxine, 112 mcg, Oral, Daily  linagliptin, 5 mg, Oral, Daily  NIFEdipine XL, 60 mg, Oral, Q24H  pantoprazole, 40 mg, Oral, Daily  [Held by provider] rivaroxaban, 15 mg, Oral, Daily With Dinner  sodium chloride, 10 mL, Intravenous, Q12H  valsartan, 160 mg, Oral, Q24H      Continuous Infusions:     Review of Systems   Constitutional: Negative for chills, diaphoresis and malaise/fatigue.   Cardiovascular:  Negative for chest pain, dyspnea on exertion, irregular heartbeat, leg swelling, near-syncope, orthopnea, palpitations, paroxysmal nocturnal dyspnea and syncope.   Respiratory:  Negative for cough, shortness of breath, sleep disturbances due to breathing and sputum production.    Gastrointestinal:  Negative for change in bowel habit.   Genitourinary:  Negative for urgency.   Neurological:  Negative for dizziness and headaches.   Psychiatric/Behavioral:  Negative for altered mental status.           Objective:         /71 (BP Location: Left arm, Patient Position: Sitting)   Pulse 55   Temp 98.2 °F (36.8 °C) (Oral)   Resp 15   Ht 180.3 cm (71\")   Wt 90 kg (198 lb 6.6 oz)   SpO2 96%   BMI 27.67 kg/m²     Physical Exam:  General Appearance:    Alert, cooperative, in no acute distress                                Head: Atraumatic, normocephalic, PERRLA               Neck:   supple, trachea midline, no thyromegaly, no carotid bruit, no JVD   Lungs:    " " Clear to auscultation,respirations regular, even and               unlabored    Heart:    Regular rhythm and normal rate 58-65, normal S1 and S2, no new rubs murmurs or gallops   Abdomen:     Normal bowel sounds, no masses, no organomegaly, soft  nontender, nondistended, no guarding, no rebound  tenderness   Extremities:   Moves all extremities well, no edema, no cyanosis, no  redness   Pulses:   Pulses palpable and equal bilaterally   Skin:   No bleeding, bruising or rash   Neurologic:   Awake, alert, oriented x3       Unchanged from prior          ASCVD Risk Score::  The ASCVD Risk score (Blake COBURN, et al., 2019) failed to calculate for the following reasons:    The 2019 ASCVD risk score is only valid for ages 40 to 79    Risk score cannot be calculated because patient has a medical history suggesting prior/existing ASCVD      Lab Review:     Results from last 7 days   Lab Units 02/23/25  0125 02/22/25  0142 02/21/25  0338 02/20/25  1416   SODIUM mmol/L 142 139   < > 140   POTASSIUM mmol/L 4.0 4.1   < > 3.7   CHLORIDE mmol/L 109* 105   < > 108*   CO2 mmol/L 21.9* 23.4   < > 19.6*   BUN mg/dL 32* 30*   < > 24*   CREATININE mg/dL 1.31* 1.46*   < > 1.18   GLUCOSE mg/dL 173* 160*   < > 156*   CALCIUM mg/dL 9.1 9.1   < > 9.3   AST (SGOT) U/L  --   --   --  35   ALT (SGPT) U/L  --   --   --  26    < > = values in this interval not displayed.     Results from last 7 days   Lab Units 02/20/25  1516 02/20/25  1416   HSTROP T ng/L 34* 32*     Results from last 7 days   Lab Units 02/23/25  0125 02/22/25  0142   WBC 10*3/mm3 8.07 7.98   HEMOGLOBIN g/dL 12.8* 12.9*   HEMATOCRIT % 41.7 41.4   PLATELETS 10*3/mm3 214 217         Results from last 7 days   Lab Units 02/20/25  1416   MAGNESIUM mg/dL 1.3*           Invalid input(s): \"LDLCALC\"      Results from last 7 days   Lab Units 02/20/25  1416   TSH uIU/mL 2.710       Recent Radiology:  Imaging Results (Most Recent)       Procedure Component Value Units Date/Time    XR Chest 1 " View [[id number]] Collected: 02/20/25 1504     Updated: 02/20/25 1506    Narrative:      XR CHEST 1 VW    Date of Exam: 2/20/2025 2:31 PM EST    Indication: Weakness    Comparison: AP chest 2/21/2023    Findings:  Heart size is borderline enlarged but stable with signs of TAVR. No acute airspace disease or overt features of pulmonary edema. No pleural effusion or pneumothorax or acute osseous abnormality      Impression:      Impression:  No acute chest finding.      Electronically Signed: Lisa Barnard MD    2/20/2025 3:04 PM EST    Workstation ID: [id number]              ECHOCARDIOGRAM:    Results for orders placed during the hospital encounter of 02/14/24    Adult Transthoracic Echo Complete W/ Color, Spectral and Contrast if Necessary Per Protocol    Interpretation Summary    Left ventricular systolic function is normal. Calculated left ventricular EF = 64% Left ventricular ejection fraction appears to be 61 - 65%.    Left ventricular diastolic function is consistent with (grade I) impaired relaxation.  GLS -17%.    Left atrial volume is mildly increased.    29 mm [employer] TAVR valve is well-seated.  There is no aortic valve insufficiency.  Mean gradient is 7-10 mmHg.    Estimated right ventricular systolic pressure from tricuspid regurgitation is normal (<35 mmHg).            Assessment:         Active Hospital Problems    Diagnosis  POA    **Dizzy spells [R42]  Yes    Dizziness [R42]  Yes     HTN urgency    2. VHD with aortic stenosis s/p TAVR    3. AUDREY     4. Paroxysmal atrial fibrillation on Xarelto    5. SSS scheduled for PPM 2/27    6. H/o carotid artery disease s/p carotid endarterectomy    7. Hyperlipidemia    8. diabetes mellitus    9. H/o TIA /CVA     Plan:   Patient presented with HTN urgency  Will start valsartan 160mg daily  Stop amlodipine  Start procardial XL 60mg daily  Check renal artery duplex  On Xarelto for a/c    Off all AV dmitry blockers,  Continue to feta pain  Xarelto on hold  Aspirin  statin remain  Continue valsartan    Blood pressure slightly up today 153/71, yesterday had lows of 135 systolic  General Advancing of antihypertensives, increasing valsartan to 240 daily, creatinine stable actually downtrending, continue current dose of nifedipine    Discussed with EP via phone yesterday  Patient will be moved up for Monday for possible biventricular pacemaker with now evolution to complete heart block /high degree heart block which is transient in combination with symptomatic bradycardia previously noted and sick sinus syndrome on top of likely significant AV dmitry dysfunction, at baseline on my encounter today severe first-degree block greater than 300 ms      Antihypertensives titrated for systolic of 1 20-1 40      Pacemaker Monday  N.p.o. midnight    Dylon Anaya MD, PhD           Dylon Anaya MD  02/21/25  08:46 EST

## 2025-02-23 NOTE — CONSULTS
[facility] [place]        Subjective:           Patient ID: Alen Ratliff is a 84 y.o. male.    Chief Complaint: diaphoresis, hypertension    Referring Physician: Virgie GREEN    HPI:  Alen Ratliff is a 84 y.o. male who presents with elevated blood pressure and diaphoresis. Mr. Ratliff is a patient of Dr. Muhammad. Pmh includes hypertension, hyperlipidemia, diabetes mellitus, AS s/p TAVR, Carotid artery disease s/p carotid endarterectomy, TIA, pAFib on Xarelto.     Per prior records:  In August 2020, patient was seen in office for symptom of shortness of breath and relative bradycardia.  Patient also had symptoms of occasional chest pain.  Patient underwent stress test which showed moderate inferior/septal fixed defect with small amount of joseph-infarct ischemia.  Echocardiogram showed normal left ventricular size and function and LVEF was 55 to 60%.  Mild LVH was noted.  Calcified aortic valve was noted with mild to moderate aortic valve stenosis with aortic valve area of 1.3 cm².  Holter monitor showed sinus bradycardia with episode of relative bradycardia.  No significant pause or heart rate less than 40 was noted.     In December 2020, patient was admitted at [facility] with slurring of speech and possible TIA.  Neurological work-up showed high-grade stenosis of right carotid artery.  Patient underwent endarterectomy.  Postoperatively patient had atrial fibrillation with controlled heart rate.  Patient spontaneously cardioverted.  Patient was started on aspirin and Plavix.  Patient was discharged on event monitor.  Event monitor showed predominantly sinus rhythm.  Patient had average heart rate of 60 bpm.  No significant atrial fibrillation burden noted.     In July 2022, patient underwent echocardiogram which showed EF of 55 to 60%.  Patient was noted to have moderate to severe aortic regurgitation.  Peak aortic velocity was 3.8 m/s with peak gradient of 56 mmHg with mean gradient  of 30 mmHg.     In August 2022, patient underwent CHRISTIANO and it showed EF of 60 to 65%.  Severe aortic stenosis with valve area of 0.59 cm was noted. Peak velocity was 4.3 m/s with peak gradient of 74 mmHg and mean gradient of 31 mmHg.     In September 2022, patient underwent cardiac catheterization which showed LAD was free of significant disease.  LCx has high-grade stenosis and RCA had 100% occlusion with collaterals.     In August 2022, I referred the patient to Dr. Villafana for possible TAVR/SAVR with possible CABG..  Initially he was thought to be a candidate for SAVR but subsequently Dr. Villafana recommended TAVR procedure.  In January 2023, patient underwent PCI and stenting of the LCx.  Patient was subsequently recommended to proceed with TAVR.  In February 2023, patient underwent TAVR valve replacement and postoperatively did well.     In March 2024, patient underwent MCOT and patient was noted to have frequent episode of relative bradycardia.  Average heart rate was 56 but no significant pause was noted.  No atrial fibrillation was present.     Patient had been referred to Dr. Danielle for SSS and is scheduled for a PPM 2/27.    Patient presented to ER this admission with elevated blood pressures. He woke up out of sleep diaphoretic and wife states talking out of his head. She took his blood pressure and it was elevated 170/90, later it was 190/90 followed by SBP > 200.   Work up in the ER revealed HS troponin 34. Na 141, K 3.6, BUN 24, Crt 1.3  No chest pain or shortness of breath.   ==================================================    Seen and examined, heart rates are in the upper 50s to low 60s  I reviewed tracings from monitor which revealed complete heart block with A-V dissociation  Patient otherwise feels okay on room air, wife at bedside    Discussed electrophysiology who will perform pacemaker on Monday  Patient will remain in house until this time on telemetry    Tracings demonstrated to the patient  voiced understanding  Chest pain-free  Volume appears stable    Telemetry tracings reviewed and interpreted me demonstrates sinus rhythm with severe first-degree AV block  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    Stroke        Past Surgical History:   Procedure Laterality Date    ANKLE OPEN REDUCTION INTERNAL FIXATION Left 12/16/2020    Procedure: ANKLE OPEN REDUCTION INTERNAL FIXATION;  Surgeon: CAROLE Vasquez DPM;  Location: [facility] MAIN OR;  Service: Podiatry;  Laterality: Left;    AORTIC VALVE REPAIR/REPLACEMENT N/A 02/20/2023    Procedure: Transfemoral Transcatheter Aortic Valve Replacement;  Surgeon: Naveed Leonardo MD;  Location: [facility] HYBRID OR;  Service: Cardiovascular;  Laterality: N/A;    AORTIC VALVE REPAIR/REPLACEMENT N/A 02/20/2023    Procedure: TRANSCATHETER AORTIC VALVE REPLACEMENT;  Surgeon: Fabio Villafana MD;  Location: [facility] HYBRID OR;  Service: Cardiothoracic;  Laterality: N/A;    BACK SURGERY  1994    CARDIAC CATHETERIZATION Right 09/27/2022    Procedure: Left Heart Cath;  Surgeon: Steve Muhammad MD;  Location: [facility] CATH INVASIVE LOCATION;  Service: Cardiovascular;  Laterality: Right;    CARDIAC CATHETERIZATION N/A 01/12/2023    Procedure: Stent JENN coronary;  Surgeon: Steev Muhammad MD;  Location: [facility] CATH INVASIVE LOCATION;  Service: Cardiovascular;  Laterality: N/A;    CAROTID ENDARTERECTOMY Right 12/14/2020    Procedure: CAROTID ENDARTERECTOMY;  Surgeon: Toby Fung MD;  Location: [facility] MAIN OR;  Service: Vascular;  Laterality: Right;    COLONOSCOPY  08/25/2015    CORONARY STENT PLACEMENT      PROSTATECTOMY  2001    due to cancer       Family History   Problem Relation Age  "of Onset    Hypertension Other     Heart attack Mother     Heart disease Mother     Heart attack Father     Heart disease Father        Social History     Socioeconomic History    Marital status:    Tobacco Use    Smoking status: Never     Passive exposure: Never    Smokeless tobacco: Never   Vaping Use    Vaping status: Never Used   Substance and Sexual Activity    Alcohol use: Never    Drug use: Never    Sexual activity: Defer         Allergies   Allergen Reactions    Azithromycin Unknown - High Severity    Tramadol Unknown - High Severity       Current Medications:   Scheduled Meds:aspirin, 81 mg, Oral, Daily  atorvastatin, 80 mg, Oral, Nightly  [Held by provider] empagliflozin, 10 mg, Oral, Daily  insulin lispro, 2-9 Units, Subcutaneous, 4x Daily With Meals & Nightly  levothyroxine, 112 mcg, Oral, Daily  linagliptin, 5 mg, Oral, Daily  NIFEdipine XL, 60 mg, Oral, Q24H  pantoprazole, 40 mg, Oral, Daily  [Held by provider] rivaroxaban, 15 mg, Oral, Daily With Dinner  sodium chloride, 10 mL, Intravenous, Q12H  valsartan, 160 mg, Oral, Q24H      Continuous Infusions:     Review of Systems   Constitutional: Negative for chills, diaphoresis and malaise/fatigue.   Cardiovascular:  Negative for chest pain, dyspnea on exertion, irregular heartbeat, leg swelling, near-syncope, orthopnea, palpitations, paroxysmal nocturnal dyspnea and syncope.   Respiratory:  Negative for cough, shortness of breath, sleep disturbances due to breathing and sputum production.    Gastrointestinal:  Negative for change in bowel habit.   Genitourinary:  Negative for urgency.   Neurological:  Negative for dizziness and headaches.   Psychiatric/Behavioral:  Negative for altered mental status.           Objective:         /71 (BP Location: Left arm, Patient Position: Sitting)   Pulse 55   Temp 98.2 °F (36.8 °C) (Oral)   Resp 15   Ht 180.3 cm (71\")   Wt 90 kg (198 lb 6.6 oz)   SpO2 96%   BMI 27.67 kg/m²     Physical " Exam:  General Appearance:    Alert, cooperative, in no acute distress                                Head: Atraumatic, normocephalic, PERRLA               Neck:   supple, trachea midline, no thyromegaly, no carotid bruit, no JVD   Lungs:     Clear to auscultation,respirations regular, even and               unlabored    Heart:    Regular rhythm and normal rate 58-65, normal S1 and S2, no new rubs murmurs or gallops   Abdomen:     Normal bowel sounds, no masses, no organomegaly, soft  nontender, nondistended, no guarding, no rebound  tenderness   Extremities:   Moves all extremities well, no edema, no cyanosis, no  redness   Pulses:   Pulses palpable and equal bilaterally   Skin:   No bleeding, bruising or rash   Neurologic:   Awake, alert, oriented x3       Unchanged from prior          ASCVD Risk Score::  The ASCVD Risk score (Blake COBURN, et al., 2019) failed to calculate for the following reasons:    The 2019 ASCVD risk score is only valid for ages 40 to 79    Risk score cannot be calculated because patient has a medical history suggesting prior/existing ASCVD      Lab Review:     Results from last 7 days   Lab Units 02/23/25  0125 02/22/25  0142 02/21/25  0338 02/20/25  1416   SODIUM mmol/L 142 139   < > 140   POTASSIUM mmol/L 4.0 4.1   < > 3.7   CHLORIDE mmol/L 109* 105   < > 108*   CO2 mmol/L 21.9* 23.4   < > 19.6*   BUN mg/dL 32* 30*   < > 24*   CREATININE mg/dL 1.31* 1.46*   < > 1.18   GLUCOSE mg/dL 173* 160*   < > 156*   CALCIUM mg/dL 9.1 9.1   < > 9.3   AST (SGOT) U/L  --   --   --  35   ALT (SGPT) U/L  --   --   --  26    < > = values in this interval not displayed.     Results from last 7 days   Lab Units 02/20/25  1516 02/20/25  1416   HSTROP T ng/L 34* 32*     Results from last 7 days   Lab Units 02/23/25  0125 02/22/25  0142   WBC 10*3/mm3 8.07 7.98   HEMOGLOBIN g/dL 12.8* 12.9*   HEMATOCRIT % 41.7 41.4   PLATELETS 10*3/mm3 214 217         Results from last 7 days   Lab Units 02/20/25  1416   MAGNESIUM  "mg/dL 1.3*           Invalid input(s): \"LDLCALC\"      Results from last 7 days   Lab Units 02/20/25  1416   TSH uIU/mL 2.710       Recent Radiology:  Imaging Results (Most Recent)       Procedure Component Value Units Date/Time    XR Chest 1 View [[id number]] Collected: 02/20/25 1504     Updated: 02/20/25 1506    Narrative:      XR CHEST 1 VW    Date of Exam: 2/20/2025 2:31 PM EST    Indication: Weakness    Comparison: AP chest 2/21/2023    Findings:  Heart size is borderline enlarged but stable with signs of TAVR. No acute airspace disease or overt features of pulmonary edema. No pleural effusion or pneumothorax or acute osseous abnormality      Impression:      Impression:  No acute chest finding.      Electronically Signed: Lisa Barnard MD    2/20/2025 3:04 PM EST    Workstation ID: [id number]              ECHOCARDIOGRAM:    Results for orders placed during the hospital encounter of 02/14/24    Adult Transthoracic Echo Complete W/ Color, Spectral and Contrast if Necessary Per Protocol    Interpretation Summary    Left ventricular systolic function is normal. Calculated left ventricular EF = 64% Left ventricular ejection fraction appears to be 61 - 65%.    Left ventricular diastolic function is consistent with (grade I) impaired relaxation.  GLS -17%.    Left atrial volume is mildly increased.    29 mm [employer]onic TAVR valve is well-seated.  There is no aortic valve insufficiency.  Mean gradient is 7-10 mmHg.    Estimated right ventricular systolic pressure from tricuspid regurgitation is normal (<35 mmHg).            Assessment:         Active Hospital Problems    Diagnosis  POA    **Dizzy spells [R42]  Yes    Dizziness [R42]  Yes     HTN urgency    2. VHD with aortic stenosis s/p TAVR    3. AUDREY     4. Paroxysmal atrial fibrillation on Xarelto    5. SSS scheduled for PPM 2/27    6. H/o carotid artery disease s/p carotid endarterectomy    7. Hyperlipidemia    8. diabetes mellitus    9. H/o TIA /CVA     Plan: "   Patient presented with HTN urgency  Will start valsartan 160mg daily  Stop amlodipine  Start procardial XL 60mg daily  Check renal artery duplex  On Xarelto for a/c    Discussed with EP via phone today  Patient will be moved up for Monday for possible biventricular pacemaker with now evolution to complete heart block or high degree heart block which is transient in combination with symptomatic bradycardia previously noted and sick sinus syndrome on top of likely significant AV dmitry dysfunction    Blood pressures are better since admission  Continue current antihypertensives, titrated for systolic of 1 20-1 40      Pacemaker Monday    Dylon Anaya MD, PhD           Dylon Anaya MD  02/21/25  08:42 EST

## 2025-02-23 NOTE — PLAN OF CARE
Problem: Adult Inpatient Plan of Care  Goal: Plan of Care Review  Outcome: Progressing  Flowsheets (Taken 2/23/2025 0229)  Progress: no change  Plan of Care Reviewed With: patient  Goal: Patient-Specific Goal (Individualized)  Outcome: Progressing  Goal: Absence of Hospital-Acquired Illness or Injury  Outcome: Progressing  Intervention: Identify and Manage Fall Risk  Recent Flowsheet Documentation  Taken 2/23/2025 0200 by Flores Jordan RN  Safety Promotion/Fall Prevention: safety round/check completed  Taken 2/23/2025 0000 by Flores Jordan RN  Safety Promotion/Fall Prevention: safety round/check completed  Taken 2/22/2025 2200 by Flores Jordan RN  Safety Promotion/Fall Prevention: safety round/check completed  Taken 2/22/2025 2000 by Flores Jordan RN  Safety Promotion/Fall Prevention: safety round/check completed  Intervention: Prevent Skin Injury  Recent Flowsheet Documentation  Taken 2/22/2025 2000 by Flores Jordan RN  Body Position: position changed independently  Skin Protection: transparent dressing maintained  Intervention: Prevent and Manage VTE (Venous Thromboembolism) Risk  Recent Flowsheet Documentation  Taken 2/22/2025 2000 by Flores Jordan RN  VTE Prevention/Management: SCDs (sequential compression devices) off  Intervention: Prevent Infection  Recent Flowsheet Documentation  Taken 2/23/2025 0200 by Flores Jordan RN  Infection Prevention: rest/sleep promoted  Taken 2/23/2025 0000 by Flores Jordan RN  Infection Prevention: rest/sleep promoted  Taken 2/22/2025 2200 by Flores Jordan RN  Infection Prevention: rest/sleep promoted  Taken 2/22/2025 2000 by Flores Jordan RN  Infection Prevention: rest/sleep promoted  Goal: Optimal Comfort and Wellbeing  Outcome: Progressing  Intervention: Provide Person-Centered Care  Recent Flowsheet Documentation  Taken 2/22/2025 2000 by Flores Jordan RN  Trust Relationship/Rapport: care explained  Goal: Readiness for Transition of Care  Outcome:  Progressing   Goal Outcome Evaluation:  Plan of Care Reviewed With: patient        Progress: no change

## 2025-02-23 NOTE — CASE MANAGEMENT/SOCIAL WORK
Continued Stay Note  THAD Hernandez     Patient Name: Alen Ratliff  MRN: [id number]  Today's Date: 2/22/2025    Admit Date: 2/20/2025    Plan: From home with spouse   Discharge Plan       Row Name 02/22/25 1949       Plan    Plan Comments DC Barriers: HTN, potential pacemaker placement on Monday, cardiac monitoring.                  Jacki Locke RN     Office: [phone]  Fax: [phone]

## 2025-02-23 NOTE — H&P
[facility] Medicine Services  History & Physical    Patient Name: Alen Ratliff  : 1940  MRN: [id number]  Primary Care Physician:  Serenity Wren MD  Date of admission: 2025  Date and Time of Service: 2025 at 4:04 PM EST      Subjective      Chief Complaint: Dizziness    History of Present Illness: Alen Ratliff is a 84 y.o. male with a CMH of atrial fibrillation, coronary artery disease S/P TAVR, diabetes, GERD, hyperlipidemia, hypertension, hypothyroidism, who presented to [facility] on 2025 with dizziness.  Patient had been having some dizzy spells, as well as lightheadedness on day of admission.  SBP greater than 200.  Patient contacted Dr. Muhammad's office who instructed patient to go to the emergency department for further evaluation. No recent injury illness fevers chills cough congestion flus viruses vaccinations foreign travels leg pain or swelling long car ride plane ride immobilization no abdominal pain no urinary complaints. Nothing otherwise seem to trigger this or make it better or worse. No syncope, no headache, no vomiting.    While admitted to the observation unit it was noted patient to have multiple episodes of bradycardia, noted to be in complete heart block.  Cardiology following and plan is to have pacemaker placed on 2025.  Patient transferred to inpatient, hospitalist consulted for medical management.      Review of Systems  Constitutional: Negative for fever.   Cardiovascular:  Negative for chest pain.   Respiratory:  Negative for shortness of breath.    Neurological:  Positive for dizziness and light-headedness.      Personal History     Past Medical History:   Diagnosis Date    Allergic rhinitis     Aortic stenosis     Atrial fibrillation     Coronary artery disease     Diabetes     GERD (gastroesophageal reflux disease)     Heart murmur     Hyperlipidemia     Hypertension     Hypothyroidism     Prostate cancer     S/P TAVR  (transcatheter aortic valve replacement) 02/20/2023    Stroke        Past Surgical History:   Procedure Laterality Date    ANKLE OPEN REDUCTION INTERNAL FIXATION Left 12/16/2020    Procedure: ANKLE OPEN REDUCTION INTERNAL FIXATION;  Surgeon: CAROLE Vasquez DPM;  Location: [facility] MAIN OR;  Service: Podiatry;  Laterality: Left;    AORTIC VALVE REPAIR/REPLACEMENT N/A 02/20/2023    Procedure: Transfemoral Transcatheter Aortic Valve Replacement;  Surgeon: Naveed Leonardo MD;  Location: [facility] HYBRID OR;  Service: Cardiovascular;  Laterality: N/A;    AORTIC VALVE REPAIR/REPLACEMENT N/A 02/20/2023    Procedure: TRANSCATHETER AORTIC VALVE REPLACEMENT;  Surgeon: Fabio Villafana MD;  Location: [facility] HYBRID OR;  Service: Cardiothoracic;  Laterality: N/A;    BACK SURGERY  1994    CARDIAC CATHETERIZATION Right 09/27/2022    Procedure: Left Heart Cath;  Surgeon: Steve Muhammad MD;  Location: [facility] CATH INVASIVE LOCATION;  Service: Cardiovascular;  Laterality: Right;    CARDIAC CATHETERIZATION N/A 01/12/2023    Procedure: Stent JENN coronary;  Surgeon: Steve Muhammad MD;  Location: [facility] CATH INVASIVE LOCATION;  Service: Cardiovascular;  Laterality: N/A;    CAROTID ENDARTERECTOMY Right 12/14/2020    Procedure: CAROTID ENDARTERECTOMY;  Surgeon: Toby Fung MD;  Location: [facility] MAIN OR;  Service: Vascular;  Laterality: Right;    COLONOSCOPY  08/25/2015    CORONARY STENT PLACEMENT      PROSTATECTOMY  2001    due to cancer       Family History: family history includes Heart attack in his father and mother; Heart disease in his father and mother; Hypertension in an other family member. Otherwise pertinent FHx was reviewed and not pertinent to current issue.    Social History:  reports that he has never smoked. He has never been exposed to tobacco smoke. He has never used smokeless tobacco. He reports that he does not drink alcohol and does not use drugs.    Home Medications:  Prior to Admission Medications        Prescriptions Last Dose Informant Patient Reported? Taking?    amLODIPine (NORVASC) 5 MG tablet 2/19/2025  No Yes    Take 1 tablet by mouth Daily.    Artificial Tear Ointment (artificial tears) ophthalmic ointment 2/19/2025  Yes Yes    Administer  to both eyes Every 1 (One) Hour As Needed.    aspirin 81 MG chewable tablet 2/19/2025  No Yes    Chew 1 tablet Daily.    atorvastatin (LIPITOR) 80 MG tablet 2/19/2025  No Yes    TAKE ONE TABLET BY MOUTH EVERY DAY    empagliflozin (Jardiance) 10 MG tablet tablet 2/19/2025  No Yes    Take 1 tablet by mouth Daily.    fluticasone (FLONASE) 50 MCG/ACT nasal spray 2/19/2025  Yes Yes    Administer 2 sprays into the nostril(s) as directed by provider Daily As Needed.    Januvia 50 MG tablet 2/19/2025  No Yes    TAKE ONE TABLET BY MOUTH EVERY DAY    levothyroxine (Synthroid) 112 MCG tablet 2/19/2025  No Yes    Take 1 tablet by mouth Daily.    losartan (COZAAR) 100 MG tablet 2/19/2025  No Yes    Take 1 tablet by mouth Daily.    pantoprazole (PROTONIX) 40 MG EC tablet 2/19/2025  No Yes    Take 1 tablet by mouth Daily.    Xarelto 15 MG tablet 2/19/2025  No Yes    TAKE ONE TABLET BY MOUTH EVERY DAY WITH DINNER    levothyroxine (SYNTHROID, LEVOTHROID) 112 MCG tablet   Yes No    Take 1 tablet by mouth Every Morning.    OneTouch Ultra test strip   No No    USE AND DISCARD 1 TEST     STRIP DAILY.              Allergies:  Allergies   Allergen Reactions    Azithromycin Unknown - High Severity    Tramadol Unknown - High Severity       Objective      Vitals:   Temp:  [97.5 °F (36.4 °C)-98.2 °F (36.8 °C)] 98.2 °F (36.8 °C)  Heart Rate:  [42-63] 62  Resp:  [15-19] 18  BP: (128-176)/(45-77) 172/77  Body mass index is 27.67 kg/m².  Physical Exam  PHYSICAL EXAM  Constitutional:  Well-developed, well-nourished, no acute distress, nontoxic appearance   Eyes:  PERRL, conjunctivae normal, EOMI   HENT:  Atraumatic, external ears normal, nose normal, oropharynx moist, no pharyngeal exudates. Neck-normal  range of motion, no tenderness, supple, trachea midline  Respiratory:  ctab, nonlabored respirations without accessory muscle use  Cardiovascular:  Normal rate, normal rhythm, no murmurs, no gallops, no rubs   GI:  Soft, nondistended, normal bowel sounds, nontender, no organomegaly, no mass, no rebound, no guarding   Musculoskeletal:  No edema, no tenderness, no deformities  Integument:  Well hydrated, no rash   Lymphatic:  No lymphadenopathy noted   Neurologic:  Alert & oriented x 3, CN 2-12 normal, normal motor function, normal sensory function, no focal deficits noted   Psychiatric:  Speech and behavior appropriate    Diagnostic Data:  Lab Results (last 24 hours)       Procedure Component Value Units Date/Time    POC Glucose Once [[id number]]  (Abnormal) Collected: 02/22/25 1658    Specimen: Blood Updated: 02/22/25 1659     Glucose 112 mg/dL      Comment: Serial Number: [id number]:  [id number]       POC Glucose 4x Daily Before Meals & at Bedtime [[id number]]  (Abnormal) Collected: 02/22/25 1136    Specimen: Blood Updated: 02/22/25 1138     Glucose 163 mg/dL      Comment: Serial Number: [id number]:  [id number]       POC Glucose Once [[id number]]  (Abnormal) Collected: 02/22/25 0938    Specimen: Blood Updated: 02/22/25 1036     Glucose 136 mg/dL      Comment: Serial Number: [id number]:  [id number]       POC Glucose Once [[id number]]  (Abnormal) Collected: 02/22/25 0725    Specimen: Blood Updated: 02/22/25 1010     Glucose 156 mg/dL      Comment: Serial Number: [id number]:  [id number]       Basic Metabolic Panel [[id number]]  (Abnormal) Collected: 02/22/25 0142    Specimen: Blood from Arm, Right Updated: 02/22/25 0345     Glucose 160 mg/dL      BUN 30 mg/dL      Creatinine 1.46 mg/dL      Sodium 139 mmol/L      Potassium 4.1 mmol/L      Chloride 105 mmol/L      CO2 23.4 mmol/L      Calcium 9.1 mg/dL      BUN/Creatinine Ratio 20.5     Anion Gap 10.6 mmol/L      eGFR 46.8 mL/min/1.73     Narrative:       GFR Categories in Chronic Kidney Disease (CKD)      GFR Category          GFR (mL/min/1.73)    Interpretation  G1                     90 or greater         Normal or high (1)  G2                      60-89                Mild decrease (1)  G3a                   45-59                Mild to moderate decrease  G3b                   30-44                Moderate to severe decrease  G4                    15-29                Severe decrease  G5                    14 or less           Kidney failure          (1)In the absence of evidence of kidney disease, neither GFR category G1 or G2 fulfill the criteria for CKD.    eGFR calculation 2021 CKD-EPI creatinine equation, which does not include race as a factor    CBC & Differential [[id number]]  (Abnormal) Collected: 02/22/25 0142    Specimen: Blood from Arm, Right Updated: 02/22/25 0242    Narrative:      The following orders were created for panel order CBC & Differential.  Procedure                               Abnormality         Status                     ---------                               -----------         ------                     CBC Auto Differential[[id number]]        Abnormal            Final result                 Please view results for these tests on the individual orders.    CBC Auto Differential [[id number]]  (Abnormal) Collected: 02/22/25 0142    Specimen: Blood from Arm, Right Updated: 02/22/25 0242     WBC 7.98 10*3/mm3      RBC 4.44 10*6/mm3      Hemoglobin 12.9 g/dL      Hematocrit 41.4 %      MCV 93.2 fL      MCH 29.1 pg      MCHC 31.2 g/dL      RDW 13.7 %      RDW-SD 46.3 fl      MPV 11.1 fL      Platelets 217 10*3/mm3      Neutrophil % 62.4 %      Lymphocyte % 23.6 %      Monocyte % 11.9 %      Eosinophil % 1.4 %      Basophil % 0.6 %      Immature Grans % 0.1 %      Neutrophils, Absolute 4.98 10*3/mm3      Lymphocytes, Absolute 1.88 10*3/mm3      Monocytes, Absolute 0.95 10*3/mm3      Eosinophils, Absolute 0.11 10*3/mm3      Basophils,  Absolute 0.05 10*3/mm3      Immature Grans, Absolute 0.01 10*3/mm3      nRBC 0.0 /100 WBC              Imaging Results (Last 24 Hours)       ** No results found for the last 24 hours. **              Assessment & Plan        This is a 84 y.o. male with:    Active and Resolved Problems  Active Hospital Problems    Diagnosis  POA    **Dizzy spells [R42]  Yes      Resolved Hospital Problems   No resolved problems to display.     Dizziness.  -Echo 2/14/2024: LVSF 64%.  LV DF consistent with grade 1 impaired relaxation.  29 mm Medtronic TAVR valve well-seated, no aortic valve insufficiency  -EKG on 2/22/2025: Sinus rhythm, prolonged CT interval, multiple old infarcts, nonspecific T abnormalities in the lateral leads  -Continue valsartan, Procardia  -Patient to have PPM on 2/24/2025  -Cardiology following  -Continue telemetry monitoring  -Xarelto    Hypertension  -Poorly controlled  -Blood pressure at time of transfer 172/77      VTE Prophylaxis:  Pharmacologic VTE prophylaxis orders are present.        The patient desires to be as follows:    CODE STATUS:    Code Status (Patient has no pulse and is not breathing): CPR (Attempt to Resuscitate)  Medical Interventions (Patient has pulse or is breathing): Full Support        Elba Ratliff, who can be contacted at [phone], is the designated person to make medical decisions on the patient's behalf if He is incapable of doing so. This was clarified with patient and/or next of kin on 2/20/2025 during the course of this H&P.    Admission Status:  I believe this patient meets inpatient status.    Expected Length of Stay: >2 MN    PDMP and Medication Dispenses via Sidebar reviewed and consistent with patient reported medications.    I discussed the patient's findings and my recommendations with patient and family.      Signature:     This document has been electronically signed by LARRY Chávez on February 22, 2025 21:04 [facility] Hospitalist Team

## 2025-02-24 ENCOUNTER — ANESTHESIA EVENT (OUTPATIENT)
Dept: CARDIOLOGY | Facility: HOSPITAL | Age: 85
End: 2025-02-24
Payer: MEDICARE

## 2025-02-24 ENCOUNTER — ANESTHESIA (OUTPATIENT)
Dept: CARDIOLOGY | Facility: HOSPITAL | Age: 85
End: 2025-02-24
Payer: MEDICARE

## 2025-02-24 ENCOUNTER — APPOINTMENT (OUTPATIENT)
Dept: GENERAL RADIOLOGY | Facility: HOSPITAL | Age: 85
End: 2025-02-24
Payer: MEDICARE

## 2025-02-24 LAB
GLUCOSE BLDC GLUCOMTR-MCNC: 114 MG/DL (ref 70–105)
GLUCOSE BLDC GLUCOMTR-MCNC: 119 MG/DL (ref 70–105)
GLUCOSE BLDC GLUCOMTR-MCNC: 126 MG/DL (ref 70–105)
GLUCOSE BLDC GLUCOMTR-MCNC: 140 MG/DL (ref 70–105)

## 2025-02-24 PROCEDURE — 0JH606Z INSERTION OF PACEMAKER, DUAL CHAMBER INTO CHEST SUBCUTANEOUS TISSUE AND FASCIA, OPEN APPROACH: ICD-10-PCS | Performed by: INTERNAL MEDICINE

## 2025-02-24 PROCEDURE — 25010000002 PROPOFOL 1000 MG/100ML EMULSION: Performed by: NURSE ANESTHETIST, CERTIFIED REGISTERED

## 2025-02-24 PROCEDURE — 25010000002 CEFAZOLIN PER 500 MG: Performed by: INTERNAL MEDICINE

## 2025-02-24 PROCEDURE — C1898 LEAD, PMKR, OTHER THAN TRANS: HCPCS | Performed by: INTERNAL MEDICINE

## 2025-02-24 PROCEDURE — 25010000002 GLYCOPYRROLATE 1 MG/5ML SOLUTION: Performed by: NURSE ANESTHETIST, CERTIFIED REGISTERED

## 2025-02-24 PROCEDURE — 99222 1ST HOSP IP/OBS MODERATE 55: CPT | Performed by: INTERNAL MEDICINE

## 2025-02-24 PROCEDURE — C1894 INTRO/SHEATH, NON-LASER: HCPCS | Performed by: INTERNAL MEDICINE

## 2025-02-24 PROCEDURE — 25010000002 FENTANYL CITRATE (PF) 100 MCG/2ML SOLUTION: Performed by: NURSE ANESTHETIST, CERTIFIED REGISTERED

## 2025-02-24 PROCEDURE — 25010000002 LIDOCAINE-EPINEPHRINE 2 %-1:100000 SOLUTION: Performed by: INTERNAL MEDICINE

## 2025-02-24 PROCEDURE — 71045 X-RAY EXAM CHEST 1 VIEW: CPT

## 2025-02-24 PROCEDURE — 02H63JZ INSERTION OF PACEMAKER LEAD INTO RIGHT ATRIUM, PERCUTANEOUS APPROACH: ICD-10-PCS | Performed by: INTERNAL MEDICINE

## 2025-02-24 PROCEDURE — 25810000003 SODIUM CHLORIDE 0.9 % SOLUTION: Performed by: NURSE ANESTHETIST, CERTIFIED REGISTERED

## 2025-02-24 PROCEDURE — C1887 CATHETER, GUIDING: HCPCS | Performed by: INTERNAL MEDICINE

## 2025-02-24 PROCEDURE — 25010000002 HYDRALAZINE PER 20 MG: Performed by: NURSE ANESTHETIST, CERTIFIED REGISTERED

## 2025-02-24 PROCEDURE — C1769 GUIDE WIRE: HCPCS | Performed by: INTERNAL MEDICINE

## 2025-02-24 PROCEDURE — 33208 INSRT HEART PM ATRIAL & VENT: CPT | Performed by: INTERNAL MEDICINE

## 2025-02-24 PROCEDURE — 02HK3JZ INSERTION OF PACEMAKER LEAD INTO RIGHT VENTRICLE, PERCUTANEOUS APPROACH: ICD-10-PCS | Performed by: INTERNAL MEDICINE

## 2025-02-24 PROCEDURE — 82948 REAGENT STRIP/BLOOD GLUCOSE: CPT

## 2025-02-24 PROCEDURE — 25010000002 CEFAZOLIN PER 500 MG: Performed by: NURSE ANESTHETIST, CERTIFIED REGISTERED

## 2025-02-24 PROCEDURE — C1785 PMKR, DUAL, RATE-RESP: HCPCS | Performed by: INTERNAL MEDICINE

## 2025-02-24 PROCEDURE — 25510000001 IOPAMIDOL PER 1 ML: Performed by: INTERNAL MEDICINE

## 2025-02-24 PROCEDURE — 99232 SBSQ HOSP IP/OBS MODERATE 35: CPT

## 2025-02-24 DEVICE — IMPLANTABLE DEVICE: Type: IMPLANTABLE DEVICE | Site: HEART | Status: FUNCTIONAL

## 2025-02-24 DEVICE — LD PM CAPSUREFIX NOVUS IS1 MEDTR 407652: Type: IMPLANTABLE DEVICE | Site: HEART | Status: FUNCTIONAL

## 2025-02-24 DEVICE — GEN PM AZURE XT SURESCAN DR MRI: Type: IMPLANTABLE DEVICE | Site: CHEST | Status: FUNCTIONAL

## 2025-02-24 RX ORDER — PROPOFOL 10 MG/ML
INJECTION, EMULSION INTRAVENOUS CONTINUOUS PRN
Status: DISCONTINUED | OUTPATIENT
Start: 2025-02-24 | End: 2025-02-24 | Stop reason: SURG

## 2025-02-24 RX ORDER — FENTANYL CITRATE 50 UG/ML
INJECTION, SOLUTION INTRAMUSCULAR; INTRAVENOUS AS NEEDED
Status: DISCONTINUED | OUTPATIENT
Start: 2025-02-24 | End: 2025-02-24 | Stop reason: SURG

## 2025-02-24 RX ORDER — PHENYLEPHRINE HCL IN 0.9% NACL 1 MG/10 ML
SYRINGE (ML) INTRAVENOUS AS NEEDED
Status: DISCONTINUED | OUTPATIENT
Start: 2025-02-24 | End: 2025-02-24 | Stop reason: SURG

## 2025-02-24 RX ORDER — ACETAMINOPHEN 325 MG/1
650 TABLET ORAL EVERY 4 HOURS PRN
Status: DISCONTINUED | OUTPATIENT
Start: 2025-02-24 | End: 2025-02-26 | Stop reason: HOSPADM

## 2025-02-24 RX ORDER — SODIUM CHLORIDE 9 MG/ML
INJECTION, SOLUTION INTRAVENOUS CONTINUOUS PRN
Status: DISCONTINUED | OUTPATIENT
Start: 2025-02-24 | End: 2025-02-24 | Stop reason: SURG

## 2025-02-24 RX ORDER — OXYCODONE HYDROCHLORIDE 5 MG/1
5 TABLET ORAL ONCE AS NEEDED
Status: DISCONTINUED | OUTPATIENT
Start: 2025-02-24 | End: 2025-02-26 | Stop reason: HOSPADM

## 2025-02-24 RX ORDER — OXYCODONE HYDROCHLORIDE 5 MG/1
10 TABLET ORAL EVERY 4 HOURS PRN
Status: DISCONTINUED | OUTPATIENT
Start: 2025-02-24 | End: 2025-02-26 | Stop reason: HOSPADM

## 2025-02-24 RX ORDER — SODIUM CHLORIDE 0.9 % (FLUSH) 0.9 %
10 SYRINGE (ML) INJECTION EVERY 12 HOURS SCHEDULED
Status: DISCONTINUED | OUTPATIENT
Start: 2025-02-24 | End: 2025-02-24 | Stop reason: HOSPADM

## 2025-02-24 RX ORDER — IPRATROPIUM BROMIDE AND ALBUTEROL SULFATE 2.5; .5 MG/3ML; MG/3ML
3 SOLUTION RESPIRATORY (INHALATION) ONCE AS NEEDED
Status: DISCONTINUED | OUTPATIENT
Start: 2025-02-24 | End: 2025-02-26 | Stop reason: HOSPADM

## 2025-02-24 RX ORDER — ACETAMINOPHEN 650 MG/1
650 SUPPOSITORY RECTAL EVERY 4 HOURS PRN
Status: DISCONTINUED | OUTPATIENT
Start: 2025-02-24 | End: 2025-02-26 | Stop reason: HOSPADM

## 2025-02-24 RX ORDER — LABETALOL HYDROCHLORIDE 5 MG/ML
5 INJECTION, SOLUTION INTRAVENOUS
Status: DISCONTINUED | OUTPATIENT
Start: 2025-02-24 | End: 2025-02-26 | Stop reason: HOSPADM

## 2025-02-24 RX ORDER — NITROGLYCERIN 0.4 MG/1
0.4 TABLET SUBLINGUAL
Status: DISCONTINUED | OUTPATIENT
Start: 2025-02-24 | End: 2025-02-26 | Stop reason: HOSPADM

## 2025-02-24 RX ORDER — EPHEDRINE SULFATE 5 MG/ML
5 INJECTION INTRAVENOUS ONCE AS NEEDED
Status: DISCONTINUED | OUTPATIENT
Start: 2025-02-24 | End: 2025-02-26 | Stop reason: HOSPADM

## 2025-02-24 RX ORDER — INSULIN LISPRO 100 [IU]/ML
2-9 INJECTION, SOLUTION INTRAVENOUS; SUBCUTANEOUS EVERY 6 HOURS SCHEDULED
Status: DISCONTINUED | OUTPATIENT
Start: 2025-02-24 | End: 2025-02-25

## 2025-02-24 RX ORDER — SODIUM CHLORIDE 9 MG/ML
9 INJECTION, SOLUTION INTRAVENOUS CONTINUOUS PRN
Status: DISPENSED | OUTPATIENT
Start: 2025-02-24 | End: 2025-02-25

## 2025-02-24 RX ORDER — FLUMAZENIL 0.1 MG/ML
0.2 INJECTION INTRAVENOUS AS NEEDED
Status: ACTIVE | OUTPATIENT
Start: 2025-02-24 | End: 2025-02-25

## 2025-02-24 RX ORDER — FENTANYL CITRATE 50 UG/ML
50 INJECTION, SOLUTION INTRAMUSCULAR; INTRAVENOUS
Status: DISCONTINUED | OUTPATIENT
Start: 2025-02-24 | End: 2025-02-26 | Stop reason: HOSPADM

## 2025-02-24 RX ORDER — DIPHENHYDRAMINE HYDROCHLORIDE 50 MG/ML
12.5 INJECTION INTRAMUSCULAR; INTRAVENOUS ONCE AS NEEDED
Status: DISCONTINUED | OUTPATIENT
Start: 2025-02-24 | End: 2025-02-26 | Stop reason: HOSPADM

## 2025-02-24 RX ORDER — SODIUM CHLORIDE 0.9 % (FLUSH) 0.9 %
10 SYRINGE (ML) INJECTION AS NEEDED
Status: DISCONTINUED | OUTPATIENT
Start: 2025-02-24 | End: 2025-02-24 | Stop reason: HOSPADM

## 2025-02-24 RX ORDER — LIDOCAINE HYDROCHLORIDE AND EPINEPHRINE BITARTRATE 20; .01 MG/ML; MG/ML
INJECTION, SOLUTION SUBCUTANEOUS
Status: DISCONTINUED | OUTPATIENT
Start: 2025-02-24 | End: 2025-02-24 | Stop reason: HOSPADM

## 2025-02-24 RX ORDER — HYDRALAZINE HYDROCHLORIDE 20 MG/ML
5 INJECTION INTRAMUSCULAR; INTRAVENOUS
Status: DISCONTINUED | OUTPATIENT
Start: 2025-02-24 | End: 2025-02-26 | Stop reason: HOSPADM

## 2025-02-24 RX ORDER — IOPAMIDOL 755 MG/ML
INJECTION, SOLUTION INTRAVASCULAR
Status: DISCONTINUED | OUTPATIENT
Start: 2025-02-24 | End: 2025-02-24 | Stop reason: HOSPADM

## 2025-02-24 RX ORDER — ACETAMINOPHEN 160 MG/5ML
650 SOLUTION ORAL EVERY 4 HOURS PRN
Status: DISCONTINUED | OUTPATIENT
Start: 2025-02-24 | End: 2025-02-26 | Stop reason: HOSPADM

## 2025-02-24 RX ORDER — ONDANSETRON 2 MG/ML
4 INJECTION INTRAMUSCULAR; INTRAVENOUS ONCE AS NEEDED
Status: DISCONTINUED | OUTPATIENT
Start: 2025-02-24 | End: 2025-02-26 | Stop reason: HOSPADM

## 2025-02-24 RX ORDER — NALOXONE HCL 0.4 MG/ML
0.4 VIAL (ML) INJECTION AS NEEDED
Status: ACTIVE | OUTPATIENT
Start: 2025-02-24 | End: 2025-02-25

## 2025-02-24 RX ORDER — DIPHENHYDRAMINE HYDROCHLORIDE 50 MG/ML
12.5 INJECTION INTRAMUSCULAR; INTRAVENOUS
Status: DISCONTINUED | OUTPATIENT
Start: 2025-02-24 | End: 2025-02-26 | Stop reason: HOSPADM

## 2025-02-24 RX ORDER — GLYCOPYRROLATE 0.2 MG/ML
INJECTION INTRAMUSCULAR; INTRAVENOUS AS NEEDED
Status: DISCONTINUED | OUTPATIENT
Start: 2025-02-24 | End: 2025-02-24 | Stop reason: SURG

## 2025-02-24 RX ORDER — CEFAZOLIN SODIUM 1 G/3ML
INJECTION, POWDER, FOR SOLUTION INTRAMUSCULAR; INTRAVENOUS AS NEEDED
Status: DISCONTINUED | OUTPATIENT
Start: 2025-02-24 | End: 2025-02-24 | Stop reason: SURG

## 2025-02-24 RX ADMIN — FENTANYL CITRATE 25 MCG: 50 INJECTION, SOLUTION INTRAMUSCULAR; INTRAVENOUS at 15:21

## 2025-02-24 RX ADMIN — FENTANYL CITRATE 25 MCG: 50 INJECTION, SOLUTION INTRAMUSCULAR; INTRAVENOUS at 16:12

## 2025-02-24 RX ADMIN — SODIUM CHLORIDE: 9 INJECTION, SOLUTION INTRAVENOUS at 14:48

## 2025-02-24 RX ADMIN — VALSARTAN 240 MG: 80 TABLET ORAL at 18:25

## 2025-02-24 RX ADMIN — GLYCOPYRROLATE 0.2 MCG: 0.2 INJECTION INTRAMUSCULAR; INTRAVENOUS at 15:33

## 2025-02-24 RX ADMIN — NIFEDIPINE 60 MG: 60 TABLET, EXTENDED RELEASE ORAL at 18:25

## 2025-02-24 RX ADMIN — CEFAZOLIN 2 G: 1 INJECTION, POWDER, FOR SOLUTION INTRAMUSCULAR; INTRAVENOUS at 15:18

## 2025-02-24 RX ADMIN — CEFAZOLIN 2000 MG: 2 INJECTION, POWDER, FOR SOLUTION INTRAMUSCULAR; INTRAVENOUS at 22:46

## 2025-02-24 RX ADMIN — PROPOFOL INJECTABLE EMULSION 70 MCG/KG/MIN: 10 INJECTION, EMULSION INTRAVENOUS at 15:00

## 2025-02-24 RX ADMIN — HYDRALAZINE HYDROCHLORIDE 5 MG: 20 INJECTION INTRAMUSCULAR; INTRAVENOUS at 22:46

## 2025-02-24 RX ADMIN — ATORVASTATIN CALCIUM 80 MG: 40 TABLET, FILM COATED ORAL at 20:15

## 2025-02-24 RX ADMIN — Medication 100 MCG: at 15:40

## 2025-02-24 RX ADMIN — Medication 100 MCG: at 15:57

## 2025-02-24 RX ADMIN — PROPOFOL INJECTABLE EMULSION 40 MG: 10 INJECTION, EMULSION INTRAVENOUS at 15:06

## 2025-02-24 RX ADMIN — Medication 10 ML: at 22:27

## 2025-02-24 NOTE — ANESTHESIA PREPROCEDURE EVALUATION
Anesthesia Evaluation     Patient summary reviewed and Nursing notes reviewed   no history of anesthetic complications:   NPO Solid Status: > 8 hours  NPO Liquid Status: > 2 hours           Airway   Dental      Pulmonary      ROS comment: Patient presented with HTN urgency  Will start valsartan 160mg daily  Stop amlodipine  Start procardial XL 60mg daily  Check renal artery duplex  On Xarelto for a/c     Off all AV dmitry blockers,  Continue to feta pain  Xarelto on hold  Aspirin statin remain  Continue valsartan     Blood pressure slightly up today 153/71, yesterday had lows of 135 systolic  General Advancing of antihypertensives, increasing valsartan to 240 daily, creatinine stable actually downtrending, continue current dose of nifedipine     Discussed with EP via phone yesterday  Patient will be moved up for Monday for possible biventricular pacemaker with now evolution to complete heart block /high degree heart block which is transient in combination with symptomatic bradycardia previously noted and sick sinus syndrome on top of likely significant AV mditry dysfunction, at baseline on my encounter today severe first-degree block greater than 300 ms     DR GEORGES  Cardiovascular     ECG reviewed  PT is on anticoagulation therapy    (+) hypertension, valvular problems/murmurs AS, MR, TI and murmur, CAD, cardiac stents , dysrhythmias Atrial Fib, Bradycardia, angina with exertion, CHF Diastolic >=55%, hyperlipidemia,  carotid artery disease      Neuro/Psych  (+) TIA, CVA, dizziness/light headedness, numbness  GI/Hepatic/Renal/Endo    (+) GERD, renal disease- CRI, diabetes mellitus, thyroid problem hypothyroidism    Musculoskeletal     (+) chronic pain  Abdominal    Substance History      OB/GYN          Other      history of cancer    ROS/Med Hx Other: Additional History:  Hyperglycemia, sick sinus syndrome, complete heart block, carotid stenosis, allergies, prostate cancer, neuropathy    Echo:    Echocardiogram  Findings    Left Ventricle Left ventricular systolic function is normal. Calculated left ventricular EF = 64% Left ventricular ejection fraction appears to be 61 - 65%.   Global longitudinal LV strain (GLS) = -17.0%. Normal left ventricular cavity size and wall thickness noted. All left ventricular wall segments contract normally. Left ventricular diastolic function is consistent with (grade I) impaired relaxation.  Right Ventricle Normal right ventricular cavity size, wall thickness, systolic function and septal motion noted.  Left Atrium Left atrial volume is mildly increased.  Right Atrium Normal right atrial cavity size noted.  Aortic Valve No aortic valve regurgitation is present. No hemodynamically significant aortic valve stenosis is present. There is a 29 mm TAVR valve present. 29 mm Medtronic valve is well-seated.  There is no aortic valve insufficiency.  Mean gradient is 7-10 mmHg.  Mitral Valve The mitral valve is grossly normal in structure. Mild mitral valve regurgitation is present. No significant mitral valve stenosis is present.  Tricuspid Valve The tricuspid valve is grossly normal in structure. Mild tricuspid valve regurgitation is present. Estimated right ventricular systolic pressure from tricuspid regurgitation is normal (<35 mmHg). No evidence of pulmonary hypertension is present. No tricuspid valve stenosis is present.  Pulmonic Valve The pulmonic valve is not well visualized. The pulmonic valve is grossly normal in structure. There is trace pulmonic valve regurgitation present. There is no pulmonic valve stenosis present.  Greater Vessels No dilation of the aortic root is present. No dilation of the sinuses of Valsalva is present.  Pericardium There is no evidence of pericardial effusion. .        Stress Test:  · Findings consistent with a normal ECG stress test.  · Left ventricular ejection fraction is normal. (Calculated EF = 64%).  · Myocardial perfusion imaging indicates a medium-sized  infarct located in the inferior wall and septal wall with mild joseph-infarct ischemia.  · Impressions are consistent with a high risk study.  · This is abnormal Cardiolite imaging stress test with moderate sized inferior/septal myocardial infarction with small amount of joseph-infarct ischemia. Left ventricular size and function was normal. Clinical correlation recommended.      Cath:  FINDINGS:     1. HEMODYNAMICS:       Aortic pressure: 174/72 mmHg     LVEDP: 10 to 15 mmHg     Gradient across aortic valve on pullback: Mean gradient of 19 mmHg        2. LEFT VENTRICULOGRAPHY: 55 to 60%        3. CORONARY ANGIOGRAPHY:            A: Left main coronary artery: Short and calcified.  No high-grade stenosis            B: Left anterior descending artery: 60 to 70% mid LAD stenosis followed by 60% stenosis in distal segment.            C: Left circumflex coronary artery: 99% stenosis of proximal LCx            D: Right coronary artery: 100% occlusion of RCA at the ostium with left-to-right collaterals     SUMMARY:      Three-vessel coronary artery disease  Severe aortic stenosis     RECOMMENDATIONS:      Recommend CABG with AVR versus TAVR with LCx stenting        SUMMARY:      1.  Severe aortic stenosis  2.  High-grade stenosis of LCx with successful stenting prior to TAVR  3.  Nonobstructive disease of LAD.  4.  100% occlusion of RCA which is well collateralized from the left system     RECOMMENDATIONS:      Continue DAPT  Proceed with TAVR      PSH:  PROSTATECTOMY BACK SURGERY  COLONOSCOPY CAROTID ENDARTERECTOMY  ANKLE OPEN REDUCTION INTERNAL FIXATION CARDIAC CATHETERIZATION  CARDIAC CATHETERIZATION AORTIC VALVE REPAIR/REPLACEMENT  AORTIC VALVE REPAIR/REPLACEMENT CORONARY STENT PLACEMENT                Anesthesia Plan    ASA 4     general   total IV anesthesia  (Patient identified; pre-operative vital signs, all relevant labs/studies, complete medical/surgical/anesthetic history, full medication list, full allergy list,  and NPO status obtained/reviewed; physical assessment performed; anesthetic options, side effects, potential complications, risks, and benefits discussed; questions answered; written anesthesia consent obtained; patient cleared for procedure; anesthesia machine and equipment checked and functioning)  intravenous induction     Anesthetic plan, risks, benefits, and alternatives have been provided, discussed and informed consent has been obtained with: patient.    Plan discussed with CRNA and CAA.    CODE STATUS:    Code Status (Patient has no pulse and is not breathing): CPR (Attempt to Resuscitate)  Medical Interventions (Patient has pulse or is breathing): Full Support

## 2025-02-24 NOTE — ANESTHESIA POSTPROCEDURE EVALUATION
Patient: Alen Ratliff    Procedure Summary       Date: 02/24/25 Room / Location: [facility] CATH LAB 3 / [facility] CATH INVASIVE LOCATION    Anesthesia Start: 1448 Anesthesia Stop: 1636    Procedure: Pacemaker DC new, BiV PPM for complete heart block Diagnosis:       Sick sinus syndrome      Complete heart block    Providers: Radha Danielle MD Provider: Kenny Nair MD    Anesthesia Type: general ASA Status: 4            Anesthesia Type: general    Vitals  Vitals Value Taken Time   /64 02/24/25 1735   Temp     Pulse 60 02/24/25 1817   Resp     SpO2 93 % 02/24/25 1817   Vitals shown include unfiled device data.        Post Anesthesia Care and Evaluation    Patient location during evaluation: PACU  Patient participation: complete - patient participated  Level of consciousness: awake  Pain score: 0  Pain management: adequate  Anesthetic complications: No anesthetic complications  PONV Status: none  Cardiovascular status: acceptable  Respiratory status: acceptable  Hydration status: acceptable

## 2025-02-24 NOTE — PLAN OF CARE
Goal Outcome Evaluation:    Patient resting in bed. Denies pain/discomfort

## 2025-02-24 NOTE — PLAN OF CARE
Problem: Adult Inpatient Plan of Care  Goal: Plan of Care Review  Outcome: Progressing  Flowsheets (Taken 2/24/2025 0341)  Progress: no change  Plan of Care Reviewed With: patient  Goal: Patient-Specific Goal (Individualized)  Outcome: Progressing  Goal: Absence of Hospital-Acquired Illness or Injury  Outcome: Progressing  Intervention: Identify and Manage Fall Risk  Recent Flowsheet Documentation  Taken 2/24/2025 0200 by Flores Jordan RN  Safety Promotion/Fall Prevention: safety round/check completed  Taken 2/24/2025 0000 by Flores Jordan RN  Safety Promotion/Fall Prevention: safety round/check completed  Taken 2/23/2025 2200 by Flores Jordan RN  Safety Promotion/Fall Prevention: safety round/check completed  Taken 2/23/2025 2055 by Flores Jordan RN  Safety Promotion/Fall Prevention: safety round/check completed  Intervention: Prevent Skin Injury  Recent Flowsheet Documentation  Taken 2/23/2025 2055 by Flores Jordan RN  Body Position: position changed independently  Skin Protection: transparent dressing maintained  Intervention: Prevent and Manage VTE (Venous Thromboembolism) Risk  Recent Flowsheet Documentation  Taken 2/23/2025 2055 by Flores Jordan RN  VTE Prevention/Management: SCDs (sequential compression devices) off  Intervention: Prevent Infection  Recent Flowsheet Documentation  Taken 2/24/2025 0200 by Flores Jordan RN  Infection Prevention: rest/sleep promoted  Taken 2/24/2025 0000 by Flores Jordan RN  Infection Prevention: rest/sleep promoted  Taken 2/23/2025 2200 by Flores Jordan RN  Infection Prevention: rest/sleep promoted  Taken 2/23/2025 2055 by Flores Jordan RN  Infection Prevention: rest/sleep promoted  Goal: Optimal Comfort and Wellbeing  Outcome: Progressing  Intervention: Provide Person-Centered Care  Recent Flowsheet Documentation  Taken 2/23/2025 2055 by Flores Jordan RN  Trust Relationship/Rapport: care explained  Goal: Readiness for Transition of Care  Outcome:  Progressing   Goal Outcome Evaluation:  Plan of Care Reviewed With: patient        Progress: no change

## 2025-02-24 NOTE — PROGRESS NOTES
[facility] MEDICINE SERVICE  DAILY PROGRESS NOTE    NAME: Alen Ratliff  : 1940  MRN: [id number]      LOS: 1 day     PROVIDER OF SERVICE: Cricket Renner MD    Chief Complaint: Dizzy spells    Subjective:     Interval History:  History taken from: patient    Patient seen and examined at bedside this morning.  No acute complaints overnight.  Scheduled for pacemaker placement at 2 PM today        Review of Systems: Negative except described above  Review of Systems    Objective:     Vital Signs  Temp:  [97.3 °F (36.3 °C)-98.2 °F (36.8 °C)] 97.5 °F (36.4 °C)  Heart Rate:  [51-83] 53  Resp:  [13-20] 17  BP: (132-176)/(46-70) 157/59   Body mass index is 27.67 kg/m².    Physical Exam  Physical Exam  Constitutional:       Comments: NAD    Cardiovascular:      Comments:  RRR, S1 & S2   Pulmonary:      Comments:  Lungs CTA   Abdominal:      Comments:  ABD soft, NT            Diagnostic Data    Results from last 7 days   Lab Units 25  0125 25  0338 25  1416   WBC 10*3/mm3 8.07   < > 7.81   HEMOGLOBIN g/dL 12.8*   < > 14.1   HEMATOCRIT % 41.7   < > 45.0   PLATELETS 10*3/mm3 214   < > 191   GLUCOSE mg/dL 173*   < > 156*   CREATININE mg/dL 1.31*   < > 1.18   BUN mg/dL 32*   < > 24*   SODIUM mmol/L 142   < > 140   POTASSIUM mmol/L 4.0   < > 3.7   AST (SGOT) U/L  --   --  35   ALT (SGPT) U/L  --   --  26   ALK PHOS U/L  --   --  84   BILIRUBIN mg/dL  --   --  0.7   ANION GAP mmol/L 11.1   < > 12.4    < > = values in this interval not displayed.       No radiology results for the last day      I reviewed the patient's new clinical results.    Assessment/Plan:     Active and Resolved Problems  Active Hospital Problems    Diagnosis  POA    **Dizzy spells [R42]  Yes    Complete heart block [I44.2]  Unknown    Sick sinus syndrome [I49.5]  Unknown    Dizziness [R42]  Yes      Resolved Hospital Problems   No resolved problems to display.       Dizziness secondary to sick sinus  syndrome.  -Echo 2/14/2024: LVSF 64%.  LV DF consistent with grade 1 impaired relaxation.  29 mm Medtronic TAVR valve well-seated, no aortic valve insufficiency  -EKG on 2/22/2025: Sinus rhythm, prolonged SC interval, multiple old infarcts, nonspecific T abnormalities in the lateral leads  -Patient to have PPM on 2/24/2025, Xarelto held   -Cardiology following  -Continue telemetry monitoring     Hypertension  -Poorly controlled  -Continue valsartan, Procardia       VTE Prophylaxis:  Pharmacologic VTE prophylaxis orders are present.             Disposition Planning:      Anticipated Date of Discharge: 2/25/2025       3  Time: 35 minutes     Code Status and Medical Interventions: CPR (Attempt to Resuscitate); Full Support   Ordered at: 02/20/25 0306     Code Status (Patient has no pulse and is not breathing):    CPR (Attempt to Resuscitate)     Medical Interventions (Patient has pulse or is breathing):    Full Support       Signature: Electronically signed by Cricket Renner MD, 02/24/25, 10:45 EST.  [facility] Hospitalist Team

## 2025-02-24 NOTE — CONSULTS
HP      Name: Alen Ratliff ADMIT: 2025   : 1940  PCP: Serenity Wren MD    MRN: [id number] LOS: 1 days   AGE/SEX: 84 y.o. male  ROOM: 234/1     Chief Complaint   Patient presents with    Chest Pain     High blood pressure. Pt states he feels weak and exhausted. Has some dizziness and nausea, pt vomiting at triage.         Subjective        History of present illness  Alen Ratliff is an 84-year-old male patient who has hypertension, dyslipidemia, CAD s/p PCI to left circumflex in 2023 aortic stenosis status post TAVR in 2023.    I had seen him in the office on  and we had decided to proceed with pacemaker implantation due to symptomatic bradycardia but also AV dmitry disease.  Patient is admitted to the hospital due to hypertensive urgency and overnight on telemetry he was noted to develop transient third-degree AV block.  EP was consulted to expedite pacemaker implantation.    Past Medical History:   Diagnosis Date    Allergic rhinitis     Aortic stenosis     Atrial fibrillation     Coronary artery disease     Diabetes     GERD (gastroesophageal reflux disease)     Heart murmur     Hyperlipidemia     Hypertension     Hypothyroidism     Prostate cancer     S/P TAVR (transcatheter aortic valve replacement) 2023    Stroke      Past Surgical History:   Procedure Laterality Date    ANKLE OPEN REDUCTION INTERNAL FIXATION Left 2020    Procedure: ANKLE OPEN REDUCTION INTERNAL FIXATION;  Surgeon: CAROLE Vasquez DPM;  Location: [facility] MAIN OR;  Service: Podiatry;  Laterality: Left;    AORTIC VALVE REPAIR/REPLACEMENT N/A 2023    Procedure: Transfemoral Transcatheter Aortic Valve Replacement;  Surgeon: Naveed Leonardo MD;  Location: [facility] HYBRID OR;  Service: Cardiovascular;  Laterality: N/A;    AORTIC VALVE REPAIR/REPLACEMENT N/A 2023    Procedure: TRANSCATHETER AORTIC VALVE REPLACEMENT;  Surgeon: Fabio Villafana MD;  Location: [facility] OR;   Service: Cardiothoracic;  Laterality: N/A;    BACK SURGERY  1994    CARDIAC CATHETERIZATION Right 09/27/2022    Procedure: Left Heart Cath;  Surgeon: Steve Muhammad MD;  Location: [facility] CATH INVASIVE LOCATION;  Service: Cardiovascular;  Laterality: Right;    CARDIAC CATHETERIZATION N/A 01/12/2023    Procedure: Stent JENN coronary;  Surgeon: Steve Muhammad MD;  Location: [facility] CATH INVASIVE LOCATION;  Service: Cardiovascular;  Laterality: N/A;    CAROTID ENDARTERECTOMY Right 12/14/2020    Procedure: CAROTID ENDARTERECTOMY;  Surgeon: Toby Fung MD;  Location: [facility] MAIN OR;  Service: Vascular;  Laterality: Right;    COLONOSCOPY  08/25/2015    CORONARY STENT PLACEMENT      PROSTATECTOMY  2001    due to cancer     Family History   Problem Relation Age of Onset    Hypertension Other     Heart attack Mother     Heart disease Mother     Heart attack Father     Heart disease Father      Social History     Tobacco Use    Smoking status: Never     Passive exposure: Never    Smokeless tobacco: Never   Vaping Use    Vaping status: Never Used   Substance Use Topics    Alcohol use: Never    Drug use: Never     Medications Prior to Admission   Medication Sig Dispense Refill Last Dose/Taking    amLODIPine (NORVASC) 5 MG tablet Take 1 tablet by mouth Daily. 90 tablet 1 2/19/2025    Artificial Tear Ointment (artificial tears) ophthalmic ointment Administer  to both eyes Every 1 (One) Hour As Needed.   2/19/2025    aspirin 81 MG chewable tablet Chew 1 tablet Daily. 30 tablet 1 2/19/2025    atorvastatin (LIPITOR) 80 MG tablet TAKE ONE TABLET BY MOUTH EVERY DAY 90 tablet 1 2/19/2025    empagliflozin (Jardiance) 10 MG tablet tablet Take 1 tablet by mouth Daily. 90 tablet 0 2/19/2025    fluticasone (FLONASE) 50 MCG/ACT nasal spray Administer 2 sprays into the nostril(s) as directed by provider Daily As Needed.   2/19/2025    Januvia 50 MG tablet TAKE ONE TABLET BY MOUTH EVERY DAY 90 tablet 0 2/19/2025    levothyroxine  (Synthroid) 112 MCG tablet Take 1 tablet by mouth Daily. 90 tablet 1 2/19/2025    losartan (COZAAR) 100 MG tablet Take 1 tablet by mouth Daily. 90 tablet 1 2/19/2025    pantoprazole (PROTONIX) 40 MG EC tablet Take 1 tablet by mouth Daily. 90 tablet 1 2/19/2025    Xarelto 15 MG tablet TAKE ONE TABLET BY MOUTH EVERY DAY WITH DINNER 90 tablet 0 2/19/2025    levothyroxine (SYNTHROID, LEVOTHROID) 112 MCG tablet Take 1 tablet by mouth Every Morning.       OneTouch Ultra test strip USE AND DISCARD 1 TEST     STRIP DAILY. 100 each 1      Allergies:  Azithromycin and Tramadol    Review of systems    Constitutional: Negative.    Respiratory and cardiovascular: As detailed in HPI section.  Gastrointestinal: Negative for constipation, nausea and vomiting negative for abdominal distention, abdominal pain and diarrhea.   Genitourinary: Negative for difficulty urinating and flank pain.   Musculoskeletal: Negative for arthralgias, joint swelling and myalgias.   Skin: Negative for color change, rash and wound.   Neurological: Negative for dizziness, syncope, weakness and headaches.   Hematological: Negative for adenopathy.   Psychiatric/Behavioral: Negative for confusion.   All other systems reviewed and are negative.       Physical Exam  VITALS REVIEWED    General:      well developed, in no acute distress.    Head:      normocephalic and atraumatic.    Eyes:      PERRL/EOM intact, conjunctiva and sclera clear with out nystagmus.    Neck:      no masses, thyromegaly,  trachea central with normal respiratory effort and PMI displaced laterally  Lungs:      Clear to auscultation bilaterally  Heart:       Regular rate and rhythm  Msk:      no deformity or scoliosis noted of thoracic or lumbar spine.    Pulses:      pulses normal in all 4 extremities.    Extremities:       No lower extremity edema  Neurologic:      no focal deficits.   alert oriented x3  Skin:      intact without lesions or rashes.    Psych:      alert and cooperative;  normal mood and affect; normal attention span and concentration.      Result Review :               Pertinent cardiac workup    EKG 2/22/2025 sinus rhythm with left bundle branch block, very long  ms.      Assessment and Plan         Dizzy spells    Dizziness    Sick sinus syndrome    Complete heart block      Alen Ratliff is an 84-year-old male patient was history of CAD, status post PCI, severe aortic stenosis status post TAVR.  Patient had first-degree AV block with left bundle branch block after TAVR.  For a while left bundle branch block seem to have recovered, however he has been developing progressively worsening AV block with very long AR and resurgence of left bundle branch block.  Patient was admitted to the hospital due to hypertensive urgency which was causing him to be diaphoretic.  Overnight however he was noted to develop transient third-degree AV block and EP was consulted to expedite pacemaker implantation.  I think is reasonable to go ahead and proceed with pacemaker implantation as he is clearly demonstrating rapid progression of his AV block.  Recent occasional benefits were discussed at length with the patient and he is agreeable.        No follow-ups on file.  Patient was given instructions and counseling regarding his condition or for health maintenance advice. Please see specific information pulled into the AVS if appropriate.        Electronically signed by Radha Danielle MD, 02/24/25, 6:39 PM EST.

## 2025-02-25 ENCOUNTER — APPOINTMENT (OUTPATIENT)
Dept: ULTRASOUND IMAGING | Facility: HOSPITAL | Age: 85
End: 2025-02-25
Payer: MEDICARE

## 2025-02-25 LAB
ANION GAP SERPL CALCULATED.3IONS-SCNC: 16.4 MMOL/L (ref 5–15)
BACTERIA UR QL AUTO: NORMAL /HPF
BILIRUB UR QL STRIP: NEGATIVE
BUN SERPL-MCNC: 36 MG/DL (ref 8–23)
BUN/CREAT SERPL: 21.8 (ref 7–25)
CALCIUM SPEC-SCNC: 9 MG/DL (ref 8.6–10.5)
CHLORIDE SERPL-SCNC: 104 MMOL/L (ref 98–107)
CLARITY UR: CLEAR
CO2 SERPL-SCNC: 17.6 MMOL/L (ref 22–29)
COLOR UR: YELLOW
CREAT SERPL-MCNC: 1.65 MG/DL (ref 0.76–1.27)
DEPRECATED RDW RBC AUTO: 46.5 FL (ref 37–54)
EGFRCR SERPLBLD CKD-EPI 2021: 40.4 ML/MIN/1.73
ERYTHROCYTE [DISTWIDTH] IN BLOOD BY AUTOMATED COUNT: 13.8 % (ref 12.3–15.4)
GLUCOSE BLDC GLUCOMTR-MCNC: 142 MG/DL (ref 70–105)
GLUCOSE BLDC GLUCOMTR-MCNC: 168 MG/DL (ref 70–105)
GLUCOSE BLDC GLUCOMTR-MCNC: 185 MG/DL (ref 70–105)
GLUCOSE BLDC GLUCOMTR-MCNC: 203 MG/DL (ref 70–105)
GLUCOSE SERPL-MCNC: 221 MG/DL (ref 65–99)
GLUCOSE UR STRIP-MCNC: NEGATIVE MG/DL
HCT VFR BLD AUTO: 39.6 % (ref 37.5–51)
HGB BLD-MCNC: 12.6 G/DL (ref 13–17.7)
HGB UR QL STRIP.AUTO: NEGATIVE
HYALINE CASTS UR QL AUTO: NORMAL /LPF
KETONES UR QL STRIP: ABNORMAL
LEUKOCYTE ESTERASE UR QL STRIP.AUTO: NEGATIVE
MCH RBC QN AUTO: 29.4 PG (ref 26.6–33)
MCHC RBC AUTO-ENTMCNC: 31.8 G/DL (ref 31.5–35.7)
MCV RBC AUTO: 92.5 FL (ref 79–97)
NITRITE UR QL STRIP: NEGATIVE
PH UR STRIP.AUTO: <=5 [PH] (ref 5–8)
PLATELET # BLD AUTO: 179 10*3/MM3 (ref 140–450)
PMV BLD AUTO: 10.7 FL (ref 6–12)
POTASSIUM SERPL-SCNC: 4 MMOL/L (ref 3.5–5.2)
PROT UR QL STRIP: ABNORMAL
RBC # BLD AUTO: 4.28 10*6/MM3 (ref 4.14–5.8)
RBC # UR STRIP: NORMAL /HPF
REF LAB TEST METHOD: NORMAL
SODIUM SERPL-SCNC: 138 MMOL/L (ref 136–145)
SODIUM UR-SCNC: 31 MMOL/L
SP GR UR STRIP: 1.02 (ref 1–1.03)
SQUAMOUS #/AREA URNS HPF: NORMAL /HPF
UROBILINOGEN UR QL STRIP: ABNORMAL
WBC # UR STRIP: NORMAL /HPF
WBC NRBC COR # BLD AUTO: 16.02 10*3/MM3 (ref 3.4–10.8)

## 2025-02-25 PROCEDURE — 76775 US EXAM ABDO BACK WALL LIM: CPT

## 2025-02-25 PROCEDURE — 82948 REAGENT STRIP/BLOOD GLUCOSE: CPT

## 2025-02-25 PROCEDURE — 63710000001 INSULIN LISPRO (HUMAN) PER 5 UNITS: Performed by: INTERNAL MEDICINE

## 2025-02-25 PROCEDURE — 99232 SBSQ HOSP IP/OBS MODERATE 35: CPT | Performed by: INTERNAL MEDICINE

## 2025-02-25 PROCEDURE — 25010000002 CEFAZOLIN PER 500 MG: Performed by: INTERNAL MEDICINE

## 2025-02-25 PROCEDURE — 81001 URINALYSIS AUTO W/SCOPE: CPT | Performed by: INTERNAL MEDICINE

## 2025-02-25 PROCEDURE — 80048 BASIC METABOLIC PNL TOTAL CA: CPT

## 2025-02-25 PROCEDURE — 99024 POSTOP FOLLOW-UP VISIT: CPT | Performed by: NURSE PRACTITIONER

## 2025-02-25 PROCEDURE — 84300 ASSAY OF URINE SODIUM: CPT | Performed by: INTERNAL MEDICINE

## 2025-02-25 PROCEDURE — 25810000003 LACTATED RINGERS PER 1000 ML

## 2025-02-25 PROCEDURE — 85027 COMPLETE CBC AUTOMATED: CPT

## 2025-02-25 RX ORDER — VALSARTAN 80 MG/1
240 TABLET ORAL
Qty: 90 TABLET | Refills: 0 | Status: CANCELLED | OUTPATIENT
Start: 2025-02-26 | End: 2025-03-28

## 2025-02-25 RX ORDER — SODIUM CHLORIDE, SODIUM LACTATE, POTASSIUM CHLORIDE, CALCIUM CHLORIDE 600; 310; 30; 20 MG/100ML; MG/100ML; MG/100ML; MG/100ML
100 INJECTION, SOLUTION INTRAVENOUS CONTINUOUS
Status: DISCONTINUED | OUTPATIENT
Start: 2025-02-25 | End: 2025-02-26 | Stop reason: HOSPADM

## 2025-02-25 RX ORDER — INSULIN LISPRO 100 [IU]/ML
2-9 INJECTION, SOLUTION INTRAVENOUS; SUBCUTANEOUS
Status: DISCONTINUED | OUTPATIENT
Start: 2025-02-26 | End: 2025-02-26 | Stop reason: HOSPADM

## 2025-02-25 RX ORDER — CEPHALEXIN 500 MG/1
500 CAPSULE ORAL 2 TIMES DAILY
Qty: 10 CAPSULE | Refills: 0 | Status: SHIPPED | OUTPATIENT
Start: 2025-02-25 | End: 2025-03-03

## 2025-02-25 RX ADMIN — SODIUM CHLORIDE, POTASSIUM CHLORIDE, SODIUM LACTATE AND CALCIUM CHLORIDE 100 ML/HR: 600; 310; 30; 20 INJECTION, SOLUTION INTRAVENOUS at 21:44

## 2025-02-25 RX ADMIN — PANTOPRAZOLE SODIUM 40 MG: 40 TABLET, DELAYED RELEASE ORAL at 08:41

## 2025-02-25 RX ADMIN — SODIUM CHLORIDE, POTASSIUM CHLORIDE, SODIUM LACTATE AND CALCIUM CHLORIDE 100 ML/HR: 600; 310; 30; 20 INJECTION, SOLUTION INTRAVENOUS at 13:02

## 2025-02-25 RX ADMIN — INSULIN LISPRO 4 UNITS: 100 INJECTION, SOLUTION INTRAVENOUS; SUBCUTANEOUS at 13:04

## 2025-02-25 RX ADMIN — LEVOTHYROXINE SODIUM 112 MCG: 112 TABLET ORAL at 08:41

## 2025-02-25 RX ADMIN — Medication 10 ML: at 08:43

## 2025-02-25 RX ADMIN — INSULIN LISPRO 2 UNITS: 100 INJECTION, SOLUTION INTRAVENOUS; SUBCUTANEOUS at 08:41

## 2025-02-25 RX ADMIN — ATORVASTATIN CALCIUM 80 MG: 40 TABLET, FILM COATED ORAL at 21:44

## 2025-02-25 RX ADMIN — LINAGLIPTIN 5 MG: 5 TABLET, FILM COATED ORAL at 08:41

## 2025-02-25 RX ADMIN — CEFAZOLIN 2000 MG: 2 INJECTION, POWDER, FOR SOLUTION INTRAMUSCULAR; INTRAVENOUS at 08:50

## 2025-02-25 NOTE — DISCHARGE SUMMARY
"             [facility] Medicine Services  Discharge Summary    Date of Service: 2025  Patient Name: Alen Ratliff  : 1940  MRN: [id number]    Date of Admission: 2025  Discharge Diagnosis: Dizzy spells  Date of Discharge: 2025  Primary Care Physician: Serenity Wren MD      Presenting Problem:   Dizzy spells [R42]  Hypertension, unspecified type [I10]  Dizziness [R42]    Active and Resolved Hospital Problems:  Active Hospital Problems    Diagnosis POA    **Dizzy spells [R42] Yes    Complete heart block [I44.2] Unknown    Sick sinus syndrome [I49.5] Unknown    Dizziness [R42] Yes      Resolved Hospital Problems   No resolved problems to display.         Hospital Course     HPI:    \"Alen Ratliff is a 84 y.o. male with a CMH of atrial fibrillation, coronary artery disease S/P TAVR, diabetes, GERD, hyperlipidemia, hypertension, hypothyroidism, who presented to [facility] on [facility] with dizziness.  Patient had been having some dizzy spells, as well as lightheadedness on day of admission.  SBP greater than 200.  Patient contacted Dr. Muhammad's office who instructed patient to go to the emergency department for further evaluation. No recent injury illness fevers chills cough congestion flus viruses vaccinations foreign travels leg pain or swelling long car ride plane ride immobilization no abdominal pain no urinary complaints. Nothing otherwise seem to trigger this or make it better or worse. No syncope, no headache, no vomiting.     While admitted to the observation unit it was noted patient to have multiple episodes of bradycardia, noted to be in complete heart block.  Cardiology following and plan is to have pacemaker placed on 2025.  Patient transferred to inpatient, hospitalist consulted for medical management.\"    Hospital Course:  Dizziness secondary to sick sinus syndrome.  -Echo 2024: LVSF 64%.  LV DF consistent with grade 1 impaired relaxation.  29 mm " Medtronic TAVR valve well-seated, no aortic valve insufficiency  -EKG on 2/22/2025: Sinus rhythm, prolonged HI interval, multiple old infarcts, nonspecific T abnormalities in the lateral leads  -1 seen to go into transient third-degree AV block on telemetry so she was scheduled for pacemaker placement.  Patient underwent PPM on 2/24/2025  -Was seen by cardiology and EP during his hospital course, recommend discharging on 5 days of Keflex and restarting Xarelto on 2/26     AUDREY likely prerenal  Nephrology consulted, recommend holding off on Jardiance and ARB for now.  Will send in repeat BMP to be done in 5 days and outpatient follow-up with nephrology     Hypertension  -Poorly controlled  -Continue on nifedipine and Bystolic.  Will send in prescription on discharge           DISCHARGE Follow Up Recommendations for labs and diagnostics: Follow-up with PCP in 1 week        Day of Discharge     Vital Signs:  Temp:  [97.5 °F (36.4 °C)-98.4 °F (36.9 °C)] 98.4 °F (36.9 °C)  Heart Rate:  [51-84] 70  Resp:  [9-20] 18  BP: (121-196)/(52-81) 124/52    Physical Exam:  Physical Exam  Constitutional:       Comments: NAD    Cardiovascular:      Comments:  RRR, S1 & S2   Pulmonary:      Comments:  Lungs CTA   Abdominal:      Comments:  ABD soft, NT             Pertinent  and/or Most Recent Results     LAB RESULTS:      Lab 02/23/25 0125 02/22/25 0142 02/21/25  0338 02/20/25  1416   WBC 8.07 7.98 7.37 7.81   HEMOGLOBIN 12.8* 12.9* 12.1* 14.1   HEMATOCRIT 41.7 41.4 37.8 45.0   PLATELETS 214 217 195 191   NEUTROS ABS 5.23 4.98 4.12 5.81   IMMATURE GRANS (ABS) 0.03 0.01 0.03 0.03   LYMPHS ABS 1.60 1.88 2.15 1.24   MONOS ABS 1.07* 0.95* 0.86 0.62   EOS ABS 0.09 0.11 0.15 0.06   MCV 95.6 93.2 92.2 92.4         Lab 02/23/25  0125 02/22/25  0142 02/21/25  0338 02/20/25  1416   SODIUM 142 139 141 140   POTASSIUM 4.0 4.1 3.6 3.7   CHLORIDE 109* 105 109* 108*   CO2 21.9* 23.4 21.7* 19.6*   ANION GAP 11.1 10.6 10.3 12.4   BUN 32* 30* 24* 24*    CREATININE 1.31* 1.46* 1.30* 1.18   EGFR 53.3* 46.8* 53.8* 60.5   GLUCOSE 173* 160* 103* 156*   CALCIUM 9.1 9.1 8.8 9.3   MAGNESIUM  --   --   --  1.3*   TSH  --   --   --  2.710         Lab 02/20/25  1416   TOTAL PROTEIN 6.8   ALBUMIN 3.9   GLOBULIN 2.9   ALT (SGPT) 26   AST (SGOT) 35   BILIRUBIN 0.7   ALK PHOS 84         Lab 02/20/25  1516 02/20/25  1416   HSTROP T 34* 32*                 Brief Urine Lab Results  (Last result in the past 365 days)        Color   Clarity   Blood   Leuk Est   Nitrite   Protein   CREAT   Urine HCG        03/26/24 0833             81.1               Microbiology Results (last 10 days)       ** No results found for the last 240 hours. **            XR Chest 1 View    Result Date: 2/24/2025  Impression: Impression: Post left subclavian dual-lead pacemaker placement. No evidence of pneumothorax. Electronically Signed: Warren Galvez MD  2/24/2025 6:34 PM EST  Workstation ID: [id number]    XR Chest 1 View    Result Date: 2/20/2025  Impression: Impression: No acute chest finding. Electronically Signed: Lisa Barnard MD  2/20/2025 3:04 PM EST  Workstation ID: [id number]     Results for orders placed during the hospital encounter of 02/20/25    Duplex Renal Artery - Bilateral Complete CAR    Interpretation Summary    No ultrasound evidence of renal artery stenosis.  Technically difficult study and unable to visualize origins of renal arteries bilaterally.  If there is a high index of suspicion for renovascular hypertension then consider alternative imaging such as CTA or conventional angiography      Results for orders placed during the hospital encounter of 02/20/25    Duplex Renal Artery - Bilateral Complete CAR    Interpretation Summary    No ultrasound evidence of renal artery stenosis.  Technically difficult study and unable to visualize origins of renal arteries bilaterally.  If there is a high index of suspicion for renovascular hypertension then consider alternative imaging such as  CTA or conventional angiography      Results for orders placed during the hospital encounter of 02/14/24    Adult Transthoracic Echo Complete W/ Color, Spectral and Contrast if Necessary Per Protocol    Interpretation Summary    Left ventricular systolic function is normal. Calculated left ventricular EF = 64% Left ventricular ejection fraction appears to be 61 - 65%.    Left ventricular diastolic function is consistent with (grade I) impaired relaxation.  GLS -17%.    Left atrial volume is mildly increased.    29 mm Medtronic TAVR valve is well-seated.  There is no aortic valve insufficiency.  Mean gradient is 7-10 mmHg.    Estimated right ventricular systolic pressure from tricuspid regurgitation is normal (<35 mmHg).      Labs Pending at Discharge:  Pending Results       Procedure [Order ID] Specimen - Date/Time    Basic Metabolic Panel [[id number]]     Specimen: Blood     CBC (No Diff) [[id number]]     Specimen: Blood             Procedures Performed  Procedure(s):  Pacemaker DC new, BiV PPM for complete heart block         Consults:   Consults       Date and Time Order Name Status Description    2/22/2025 12:50 PM Inpatient Hospitalist Consult      2/20/2025  4:56 PM Inpatient Cardiology Consult Completed               Discharge Details        Discharge Medications        ASK your doctor about these medications        Instructions Start Date   amLODIPine 5 MG tablet  Commonly known as: NORVASC   5 mg, Oral, Daily      artificial tears ophthalmic ointment   Every 1 Hour PRN      aspirin 81 MG chewable tablet   81 mg, Oral, Daily      atorvastatin 80 MG tablet  Commonly known as: LIPITOR   80 mg, Oral, Daily      empagliflozin 10 MG tablet tablet  Commonly known as: Jardiance   10 mg, Oral, Daily      fluticasone 50 MCG/ACT nasal spray  Commonly known as: FLONASE   2 sprays, Daily PRN      Januvia 50 MG tablet  Generic drug: SITagliptin   50 mg, Oral, Daily      levothyroxine 112 MCG tablet  Commonly known as:  Synthroid   112 mcg, Oral, Daily      levothyroxine 112 MCG tablet  Commonly known as: SYNTHROID, LEVOTHROID   112 mcg, Oral, Every Early Morning      losartan 100 MG tablet  Commonly known as: COZAAR   100 mg, Oral, Daily      OneTouch Ultra test strip  Generic drug: glucose blood   USE AND DISCARD 1 TEST     STRIP DAILY.      pantoprazole 40 MG EC tablet  Commonly known as: PROTONIX   40 mg, Oral, Daily      Xarelto 15 MG tablet  Generic drug: rivaroxaban   TAKE ONE TABLET BY MOUTH EVERY DAY WITH DINNER               Allergies   Allergen Reactions    Azithromycin Unknown - High Severity    Tramadol Unknown - High Severity         Discharge Disposition: home      Diet:  Hospital:  Diet Order   Procedures    Diet: Cardiac, Diabetic; Healthy Heart (2-3 Na+); Consistent Carbohydrate; Fluid Consistency: Thin (IDDSI 0)         Discharge Activity:         CODE STATUS:  Code Status and Medical Interventions: CPR (Attempt to Resuscitate); Full Support   Ordered at: 02/20/25 1656     Code Status (Patient has no pulse and is not breathing):    CPR (Attempt to Resuscitate)     Medical Interventions (Patient has pulse or is breathing):    Full Support         Future Appointments   Date Time Provider Department Center   3/12/2025 11:30 AM [facility] DEVICE CHECK [facility] NA   4/1/2025 11:15 AM Sabrina Tavera APRN [facility] POD NA [facility]   4/15/2025  8:10 AM LAB [facility] [place] [facility]   4/22/2025 11:00 AM Serenity Wren MD [facility]   8/6/2025  3:30 PM Steve Muhammad MD [facility] CAR NA P [facility] NA   2/18/2026  1:45 PM [facility] VASC MACHINE 4 [facility]I [facility]   2/18/2026  2:45 PM Catherine Mclaughlin APRN [facility] VS [facility]           Time spent on Discharge including face to face service:  35 minutes    Signature: Electronically signed by Cricket Renner MD, 02/25/25, 08:47 EST.  [facility] Hospitalist Team

## 2025-02-25 NOTE — PROGRESS NOTES
[facility] [place] [facility]   PROGRESS NOTE      Referring Provider: Cricket Renner,*    Reason for follow-up: symptomatic bradycardia     Patient Care Team:  Serenity Wren MD as PCP - General (Family Medicine)  Steve Muhammad MD as Consulting Physician (Cardiology)  CAROLE Vasquez DPM as Consulting Physician (Podiatry)  David Urbina MD as Consulting Physician (Otolaryngology)  Gregory Valle MD as Surgeon (Vascular Surgery)  Gómez Arvizu MD as Consulting Physician (Ophthalmology)  Radha Danielle MD as Consulting Physician (Cardiology)      SUBJECTIVE    Patient doing well today. Pacemaker implanted yesterday. Site examined and post operative instructions given to patient and wife at bedside.     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    Stroke        Past Surgical History:   Procedure Laterality Date    ANKLE OPEN REDUCTION INTERNAL FIXATION Left 12/16/2020    Procedure: ANKLE OPEN REDUCTION INTERNAL FIXATION;  Surgeon: CAROLE Vasquez DPM;  Location: [facility] MAIN OR;  Service: Podiatry;  Laterality: Left;    AORTIC VALVE REPAIR/REPLACEMENT N/A 02/20/2023    Procedure: Transfemoral Transcatheter Aortic Valve Replacement;  Surgeon: Naveed Leonardo MD;  Location: [facility] HYBRID OR;  Service: Cardiovascular;  Laterality: N/A;    AORTIC VALVE REPAIR/REPLACEMENT N/A 02/20/2023    Procedure: TRANSCATHETER AORTIC VALVE REPLACEMENT;  Surgeon: Fabio Villafana MD;  Location: [facility] HYBRID OR;  Service: Cardiothoracic;  Laterality: N/A;    BACK SURGERY  1994    CARDIAC CATHETERIZATION Right 09/27/2022     Procedure: Left Heart Cath;  Surgeon: Steve Muhammad MD;  Location: [facility] CATH INVASIVE LOCATION;  Service: Cardiovascular;  Laterality: Right;    CARDIAC CATHETERIZATION N/A 01/12/2023    Procedure: Stent JENN coronary;  Surgeon: Steve Muhammad MD;  Location: [facility] CATH INVASIVE LOCATION;  Service: Cardiovascular;  Laterality: N/A;    CAROTID ENDARTERECTOMY Right 12/14/2020    Procedure: CAROTID ENDARTERECTOMY;  Surgeon: Toby Fung MD;  Location: [facility] MAIN OR;  Service: Vascular;  Laterality: Right;    COLONOSCOPY  08/25/2015    CORONARY STENT PLACEMENT      PROSTATECTOMY  2001    due to cancer       Family History   Problem Relation Age of Onset    Hypertension Other     Heart attack Mother     Heart disease Mother     Heart attack Father     Heart disease Father        Social History     Tobacco Use    Smoking status: Never     Passive exposure: Never    Smokeless tobacco: Never   Vaping Use    Vaping status: Never Used   Substance Use Topics    Alcohol use: Never    Drug use: Never        Medications Prior to Admission   Medication Sig Dispense Refill Last Dose/Taking    amLODIPine (NORVASC) 5 MG tablet Take 1 tablet by mouth Daily. 90 tablet 1 2/19/2025    Artificial Tear Ointment (artificial tears) ophthalmic ointment Administer  to both eyes Every 1 (One) Hour As Needed.   2/19/2025    aspirin 81 MG chewable tablet Chew 1 tablet Daily. 30 tablet 1 2/19/2025    atorvastatin (LIPITOR) 80 MG tablet TAKE ONE TABLET BY MOUTH EVERY DAY 90 tablet 1 2/19/2025    empagliflozin (Jardiance) 10 MG tablet tablet Take 1 tablet by mouth Daily. 90 tablet 0 2/19/2025    fluticasone (FLONASE) 50 MCG/ACT nasal spray Administer 2 sprays into the nostril(s) as directed by provider Daily As Needed.   2/19/2025    Januvia 50 MG tablet TAKE ONE TABLET BY MOUTH EVERY DAY 90 tablet 0 2/19/2025    levothyroxine (Synthroid) 112 MCG tablet Take 1 tablet by mouth Daily. 90 tablet 1 2/19/2025    losartan (COZAAR) 100 MG tablet  "Take 1 tablet by mouth Daily. 90 tablet 1 2/19/2025    pantoprazole (PROTONIX) 40 MG EC tablet Take 1 tablet by mouth Daily. 90 tablet 1 2/19/2025    Xarelto 15 MG tablet TAKE ONE TABLET BY MOUTH EVERY DAY WITH DINNER 90 tablet 0 2/19/2025    levothyroxine (SYNTHROID, LEVOTHROID) 112 MCG tablet Take 1 tablet by mouth Every Morning.       OneTouch Ultra test strip USE AND DISCARD 1 TEST     STRIP DAILY. 100 each 1          OBJECTIVE    VITAL SIGNS  Vitals:    02/25/25 0419 02/25/25 0808 02/25/25 0841 02/25/25 1139   BP: 121/55 124/52 124/52 138/46   BP Location: Right arm Right arm  Right arm   Patient Position: Lying Lying  Lying   Pulse: 76 70 70 69   Resp: 20 18  18   Temp: 98.4 °F (36.9 °C) 98.4 °F (36.9 °C)  98.2 °F (36.8 °C)   TempSrc: Oral Oral  Oral   SpO2: 91% 92%  98%   Weight:       Height:         Flowsheet Rows      Flowsheet Row First Filed Value   Admission Height 180.3 cm (71\") Documented at 02/20/2025 1337   Admission Weight 90 kg (198 lb 6.6 oz) Documented at 02/20/2025 1337             TELEMETRY: paced rhythm    Physical Exam  VITALS REVIEWED    General:      well developed, in no acute distress.    Head:      normocephalic and atraumatic.    Eyes:      PERRL/EOM intact, conjunctiva and sclera clear with out nystagmus.    Neck:      no masses, thyromegaly,  trachea central with normal respiratory effort and PMI displaced laterally  Lungs:      Clear to auscultation bilaterally  Heart:       Regular rhythm and rate. Left chest incision site clean and dry with steri-strips intact. No bleeding or hematoma noted at site.    Msk:      no deformity or scoliosis noted of thoracic or lumbar spine.    Pulses:      pulses normal in all 4 extremities.    Extremities:       No lower extremity edema    Neurologic:      no focal deficits.   alert oriented x3  Skin:      intact without lesions or rashes.    Psych:      alert and cooperative; normal mood and affect; normal attention span and concentration.  "     LAB RESULTS (LAST 7 DAYS)  I have reviewed new clinical results.    CMP   Results from last 7 days   Lab Units 02/25/25  0948 02/23/25  0125 02/22/25  0142 02/21/25  0338 02/20/25  1416   SODIUM mmol/L 138 142 139 141 140   POTASSIUM mmol/L 4.0 4.0 4.1 3.6 3.7   CHLORIDE mmol/L 104 109* 105 109* 108*   CO2 mmol/L 17.6* 21.9* 23.4 21.7* 19.6*   BUN mg/dL 36* 32* 30* 24* 24*   CREATININE mg/dL 1.65* 1.31* 1.46* 1.30* 1.18   GLUCOSE mg/dL 221* 173* 160* 103* 156*   ALBUMIN g/dL  --   --   --   --  3.9   BILIRUBIN mg/dL  --   --   --   --  0.7   ALK PHOS U/L  --   --   --   --  84   AST (SGOT) U/L  --   --   --   --  35   ALT (SGPT) U/L  --   --   --   --  26     CBC  Results from last 7 days   Lab Units 02/23/25  0125 02/22/25  0142 02/21/25  0338 02/20/25  1416   WBC 10*3/mm3 8.07 7.98 7.37 7.81   RBC 10*6/mm3 4.36 4.44 4.10* 4.87   HEMOGLOBIN g/dL 12.8* 12.9* 12.1* 14.1   HEMATOCRIT % 41.7 41.4 37.8 45.0   MCV fL 95.6 93.2 92.2 92.4   PLATELETS 10*3/mm3 214 217 195 191     ProBNP      TROPONIN  Results from last 7 days   Lab Units 02/20/25  1516   HSTROP T ng/L 34*     CoAg      Creatinine Clearance  Estimated Creatinine Clearance: 41.7 mL/min (A) (by C-G formula based on SCr of 1.65 mg/dL (H)).    Radiology  XR Chest 1 View    Result Date: 2/24/2025  Impression: Post left subclavian dual-lead pacemaker placement. No evidence of pneumothorax. Electronically Signed: Warren Galvez MD  2/24/2025 6:34 PM EST  Workstation ID: [id number]     EKG    I personally viewed and interpreted the patient's EKG/Telemetry data:  ECG 12 Lead Rhythm Change   Final Result   HEART RATE=66  bpm   RR Flwrbdly=389  ms   OR Ylvtpkkl=429  ms   P Horizontal Axis=-62  deg   P Front Axis=54  deg   QRSD Psqevtht=413  ms   QT Nmxbmdte=156  ms   CSnG=069  ms   QRS Axis=-40  deg   T Wave Axis=90  deg   - ABNORMAL ECG -   Sinus rhythm   Prolonged OR interval   Incomplete left bundle branch block   Inferior  infarct, old   Anterior  infarct, old    Nonspecific  T abnormalities, lateral leads   When compared with ECG of 20-Feb-2025 13:32:49,   non Significant repolarization change   Electronically Signed By: Parminder Bagley ([facility]) 2025-02-23 13:26:17   Date and Time of Study:2025-02-22 18:03:01      ECG 12 Lead Chest Pain   Final Result   HEART RATE=55  bpm   RR Rcjtglqu=4211  ms   WV Qvvyztfu=989  ms   P Horizontal Axis=  deg   P Front Axis=Invalid  deg   QRSD Ptbvzflr=987  ms   QT Eanhqxdr=586  ms   DOdT=458  ms   QRS Axis=-38  deg   T Wave Axis=78  deg   - ABNORMAL ECG -   Sinus bradycardia   Ventricular premature complex   Sinus pause   Prolonged WV interval   Probable  left atrial enlargement   Left axis deviation   Incomplete left bundle branch block   LVH with secondary repolarization abnormality   When compared with ECG of 31-Jul-2023 12:36:54,   Significant change in rhythm   Electronically Signed By: Sg Blank ([facility]) 2025-02-21 09:01:09   Date and Time of Study:2025-02-20 13:32:49      Telemetry Scan   Final Result      Telemetry Scan   Final Result      Telemetry Scan   Final Result      Telemetry Scan   Final Result      Telemetry Scan   Final Result      Telemetry Scan   Final Result      Telemetry Scan   Final Result      Telemetry Scan   Final Result      Telemetry Scan   Final Result      Telemetry Scan   Final Result      Telemetry Scan   Final Result      Telemetry Scan   Final Result      Telemetry Scan   Final Result      Telemetry Scan   Final Result      Telemetry Scan   Final Result      Telemetry Scan   Final Result      Telemetry Scan   Final Result      Telemetry Scan   Final Result      Telemetry Scan   Final Result      Telemetry Scan   Final Result      Telemetry Scan   Final Result      Telemetry Scan   Final Result      Telemetry Scan   Final Result      Telemetry Scan   Final Result      Telemetry Scan   Final Result          ECHOCARDIOGRAM:  Results for orders placed during the hospital encounter of 02/14/24    Adult  Transthoracic Echo Complete W/ Color, Spectral and Contrast if Necessary Per Protocol    Interpretation Summary    Left ventricular systolic function is normal. Calculated left ventricular EF = 64% Left ventricular ejection fraction appears to be 61 - 65%.    Left ventricular diastolic function is consistent with (grade I) impaired relaxation.  GLS -17%.    Left atrial volume is mildly increased.    29 mm Medtronic TAVR valve is well-seated.  There is no aortic valve insufficiency.  Mean gradient is 7-10 mmHg.    Estimated right ventricular systolic pressure from tricuspid regurgitation is normal (<35 mmHg).      STRESS MYOVIEW:  Results for orders placed during the hospital encounter of 09/24/20    Stress Test With Myocardial Perfusion One Day    Interpretation Summary  · Findings consistent with a normal ECG stress test.  · Left ventricular ejection fraction is normal. (Calculated EF = 64%).  · Myocardial perfusion imaging indicates a medium-sized infarct located in the inferior wall and septal wall with mild joseph-infarct ischemia.  · Impressions are consistent with a high risk study.  · This is abnormal Cardiolite imaging stress test with moderate sized inferior/septal myocardial infarction with small amount of joseph-infarct ischemia. Left ventricular size and function was normal. Clinical correlation recommended.      CARDIAC CATHETERIZATION:  Results for orders placed during the hospital encounter of 02/20/23    Cardiac Catheterization/Vascular Study    Conclusion  Indication:  Severe symptomatic nonrheumatic aortic stenosis  Congestive heart failure, NYHA class III    Procedures performed  Ultrasound-guided vascular access  Common femoral angiography  Balloon aortic valvuloplasty with 20 mm true balloon  Supravalvular aortogram  Temporary pacemaker placement  Transcatheter aortic valve replacement with 29 mm Evolut pro plus FX via right femoral arterial access  Aorto abdominal aortogram with bilateral  iliofemoral runoff  Perclose and angioseal deployment    Procedural Details  The procedure was performed under conscious sedation in the hybrid OR.    Under ultrasound guidance, a 6 F introducer was placed in the left femoral vein usinh modified Seldinger technique. Under ultrasound guidance, a 7 F introducer was placed in the left femoral artery using modified Seldinger technique. Under ultrasound guidance, a 7 F introducer was placed in the right femoral artery using modified Seldinger technique.    Two Perclose ProGlide devices were used to 'pre-close' the RFA access site. A 5F bipolar electrode temporary pacemaking wire was inserted via the left femoral venous sheath and positioned using fluoroscopy. Pacing threshold was tested. The temporary pacemaker wire was secured in place.    A 6F pigtail catheter was placed in the aortic root via the LFA sheath. The aortic valve was crossed retrograde with a 0.035 inch straight wire through a 6F AL1 catheter via the RFA 14Fr sheath. The AL1 catheter was advanced into the LV and a exchange length 0.035 inch lunderquist wire in the LV.  Under rapid pacing of 180 bpm we then performed a balloon aortic valvuloplasty with 20 x 45 true balloon.    The 14F sheath was removed and the Medtronic delivery sheath was inserted over the lunderquist wire. The TAVR procedure was then performed using a 29mm Evolut pro + FX device. It was advanced over the lunderquist wire to the aortic valve position. After confirming correct position of the valve by fluoroscopy, the valve was implanted with ventricular pacing and dynamic aortography. After valve deployment the valve was well-seated and there was no significant paravalvular leak noted.    At the end of the procedure, the Medtronic delivery system was removed over a guidewire. The Perclose devices were deployed to secure the arterial access on the TAVR side.    Distal aortic, iliac and femoral angiography was performed with no evidence of  dissection, aneurysm or vascular injury.    The 7F LFA sheath was removed and the arteriotomy was angiosealed. The 6F LFV sheath along with the temporary pacemaker wire were removed and manual pressure applied.  The patient was transferred to [facility]  in stable condition.    Rebeca Leonardo and Ledy performed the TAVR procedure. Dr. Dykes assisted.    Estimated blood loss  20ml    Complications  None    Recommendations  Dual antiplatelet therapy  Cardiac rehab  Repeat echocardiogram in morning      Pertinent cardiac workup     EKG 2/22/2025 sinus rhythm with left bundle branch block, very long  ms      ASSESSMENT/PLAN      Dizzy spells    Dizziness    Sick sinus syndrome    Complete heart block      PLAN  Alen Ratliff is an 84-year-old male patient was history of CAD, status post PCI, severe aortic stenosis status post TAVR.  Patient had first-degree AV block with left bundle branch block after TAVR.  For a while left bundle branch block seem to have recovered, however he has been developing progressively worsening AV block with very long MD and resurgence of left bundle branch block.  Patient was admitted to the hospital due to hypertensive urgency which was causing him to be diaphoretic.  Overnight however he was noted to develop transient third-degree AV block and EP was consulted to expedite pacemaker implantation.  I think is reasonable to go ahead and proceed with pacemaker implantation as he is clearly demonstrating rapid progression of his AV block.  Recent occasional benefits were discussed at length with the patient and he is agreeable.    2/25/2025    Patient doing well after biventricular pacemaker implantation on 2/24/2025. Site examined and post operative instructions given. Patient to discharge with Keflex for 5 days. May restart Xarelto tomorrow. Will follow up with device/wound check in 2 weeks. All questions answered.    I discussed the patients findings and my recommendations with patient  and nurse.      LARRY Campa  02/25/25  12:26 EST  Electronically signed by LARRY Campa, 02/25/25, 12:31 PM EST.

## 2025-02-25 NOTE — PLAN OF CARE
Goal Outcome Evaluation:              Outcome Evaluation: 84M made inpatient today. A&Ox4, VSS on RA with paced rhythm. No complaints of pain. Nephrology consulted today. Will continue to monitor.

## 2025-02-25 NOTE — PROGRESS NOTES
[facility] MEDICINE SERVICE  DAILY PROGRESS NOTE    NAME: Alen Ratliff  : 1940  MRN: [id number]      LOS: 2 days     PROVIDER OF SERVICE: Cricket Renner MD    Chief Complaint: Dizzy spells    Subjective:     Interval History:  History taken from: patient    Patient seen and examined at bedside this morning        Review of Systems: Negative except described above  Review of Systems    Objective:     Vital Signs  Temp:  [97.5 °F (36.4 °C)-98.4 °F (36.9 °C)] 98.2 °F (36.8 °C)  Heart Rate:  [60-84] 69  Resp:  [16-20] 18  BP: (121-196)/(46-81) 138/46   Body mass index is 27.67 kg/m².    Physical Exam  Physical Exam  Constitutional:       Comments: NAD    Cardiovascular:      Comments:  RRR, S1 & S2   Pulmonary:      Comments:  Lungs CTA   Abdominal:      Comments:  ABD soft, NT            Diagnostic Data    Results from last 7 days   Lab Units 25  0948 25  0125 25  0338 25  1416   WBC 10*3/mm3  --  8.07   < > 7.81   HEMOGLOBIN g/dL  --  12.8*   < > 14.1   HEMATOCRIT %  --  41.7   < > 45.0   PLATELETS 10*3/mm3  --  214   < > 191   GLUCOSE mg/dL 221* 173*   < > 156*   CREATININE mg/dL 1.65* 1.31*   < > 1.18   BUN mg/dL 36* 32*   < > 24*   SODIUM mmol/L 138 142   < > 140   POTASSIUM mmol/L 4.0 4.0   < > 3.7   AST (SGOT) U/L  --   --   --  35   ALT (SGPT) U/L  --   --   --  26   ALK PHOS U/L  --   --   --  84   BILIRUBIN mg/dL  --   --   --  0.7   ANION GAP mmol/L 16.4* 11.1   < > 12.4    < > = values in this interval not displayed.       XR Chest 1 View    Result Date: 2025  Impression: Post left subclavian dual-lead pacemaker placement. No evidence of pneumothorax. Electronically Signed: Warren Galvez MD  2025 6:34 PM EST  Workstation ID: [id number]       I reviewed the patient's new clinical results.    Assessment/Plan:     Active and Resolved Problems  Active Hospital Problems    Diagnosis  POA    **Dizzy spells [R42]  Yes    Complete heart block [I44.2]   Unknown    Sick sinus syndrome [I49.5]  Unknown    Dizziness [R42]  Yes      Resolved Hospital Problems   No resolved problems to display.       Dizziness secondary to sick sinus syndrome.  -Echo 2/14/2024: LVSF 64%.  LV DF consistent with grade 1 impaired relaxation.  29 mm Medtronic TAVR valve well-seated, no aortic valve insufficiency  -EKG on 2/22/2025: Sinus rhythm, prolonged PA interval, multiple old infarcts, nonspecific T abnormalities in the lateral leads  -1 seen to go into transient third-degree AV block on telemetry so she was scheduled for pacemaker placement.  Patient underwent PPM on 2/24/2025  -Was seen by cardiology and EP during his hospital course, recommend discharging on 5 days of Keflex and restarting Xarelto on 2/26    AUDREY  Hold ARB  Will start patient on IV fluids  Nephrology consulted     Hypertension  -Poorly controlled  -Hold valsartan for now, continue Procardia      VTE Prophylaxis:  Pharmacologic VTE prophylaxis orders are present.             Disposition Planning:        Anticipated Date of Discharge: 2/27/25         Time: 35 minutes     Code Status and Medical Interventions: CPR (Attempt to Resuscitate); Full Support   Ordered at: 02/20/25 4297     Code Status (Patient has no pulse and is not breathing):    CPR (Attempt to Resuscitate)     Medical Interventions (Patient has pulse or is breathing):    Full Support       Signature: Electronically signed by Cricket Renner MD, 02/25/25, 12:28 EST.  [facility] Hospitalist Team

## 2025-02-25 NOTE — CONSULTS
[facility] CONSULT NOTE    Referring Provider: Cricket Renner MD   Reason for Consultation: Acute kidney injury    Chief complaint.  Dizziness    History of present illness: Patient is 84 years old very pleasant gentleman with history of hypertension, status post TAVR, GERD, diabetes, coronary disease, was admitted to the hospital initially with hypertensive urgency and bradycardia.  Patient initially presented with dizziness.  He did not have any chest pain, shortness of breath, nausea, vomiting, fever, cough, expectoration, hematuria or dysuria.  Patient was started on ARB.  He underwent pacemaker placement yesterday.  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    Stroke      Past Surgical History:   Procedure Laterality Date    ANKLE OPEN REDUCTION INTERNAL FIXATION Left 12/16/2020    Procedure: ANKLE OPEN REDUCTION INTERNAL FIXATION;  Surgeon: CAROLE Vasquez DPM;  Location: [facility] MAIN OR;  Service: Podiatry;  Laterality: Left;    AORTIC VALVE REPAIR/REPLACEMENT N/A 02/20/2023    Procedure: Transfemoral Transcatheter Aortic Valve Replacement;  Surgeon: Naveed Leonardo MD;  Location: [facility] HYBRID OR;  Service: Cardiovascular;  Laterality: N/A;    AORTIC VALVE REPAIR/REPLACEMENT N/A 02/20/2023    Procedure: TRANSCATHETER AORTIC VALVE REPLACEMENT;  Surgeon: Fabio Villafana MD;  Location: [facility] HYBRID OR;  Service: Cardiothoracic;  Laterality: N/A;    BACK SURGERY  1994    CARDIAC CATHETERIZATION Right 09/27/2022    Procedure: Left Heart Cath;  Surgeon: Steve Muhammad MD;  Location: [facility] CATH INVASIVE LOCATION;  Service:  Cardiovascular;  Laterality: Right;    CARDIAC CATHETERIZATION N/A 01/12/2023    Procedure: Stent JENN coronary;  Surgeon: Steve Muhammad MD;  Location: [facility] CATH INVASIVE LOCATION;  Service: Cardiovascular;  Laterality: N/A;    CAROTID ENDARTERECTOMY Right 12/14/2020    Procedure: CAROTID ENDARTERECTOMY;  Surgeon: Toby Fung MD;  Location: [facility] MAIN OR;  Service: Vascular;  Laterality: Right;    COLONOSCOPY  08/25/2015    CORONARY STENT PLACEMENT      PROSTATECTOMY  2001    due to cancer     Social History     Tobacco Use    Smoking status: Never     Passive exposure: Never    Smokeless tobacco: Never   Vaping Use    Vaping status: Never Used   Substance Use Topics    Alcohol use: Never    Drug use: Never     Family History   Problem Relation Age of Onset    Hypertension Other     Heart attack Mother     Heart disease Mother     Heart attack Father     Heart disease Father        Review of Systems  ROS  As per HPI  Objective     Vital Signs  Temp:  [97.5 °F (36.4 °C)-98.4 °F (36.9 °C)] 98.2 °F (36.8 °C)  Heart Rate:  [60-84] 69  Resp:  [16-20] 18  BP: (121-196)/(46-81) 138/46    No intake/output data recorded.  I/O last 3 completed shifts:  In: 650 [P.O.:300; I.V.:350]  Out: 200 [Urine:200]    Physical Exam:  Physical Exam    General Appearance: alert, oriented x 3, no acute distress   Skin: warm and dry  HEENT: oral mucosa normal, nonicteric sclera  Neck: supple, no JVD  Lungs: CTA  Heart: RRR, normal S1 and S2  Abdomen: soft, nontender, nondistended  : no palpable bladder  Extremities: no edema, cyanosis or clubbing  Neuro: normal speech and mental status     Results Review:   I reviewed the patient's new clinical results.    Lab Results   Component Value Date    CALCIUM 9.0 02/25/2025    PHOS 4.0 12/15/2020     Results from last 7 days   Lab Units 02/25/25  1226 02/25/25  0948 02/23/25  0125 02/22/25  0142 02/21/25  0338 02/20/25  1416   MAGNESIUM mg/dL  --   --   --   --   --  1.3*   SODIUM mmol/L  " --  138 142 139   < > 140   POTASSIUM mmol/L  --  4.0 4.0 4.1   < > 3.7   CHLORIDE mmol/L  --  104 109* 105   < > 108*   CO2 mmol/L  --  17.6* 21.9* 23.4   < > 19.6*   BUN mg/dL  --  36* 32* 30*   < > 24*   CREATININE mg/dL  --  1.65* 1.31* 1.46*   < > 1.18   GLUCOSE mg/dL  --  221* 173* 160*   < > 156*   CALCIUM mg/dL  --  9.0 9.1 9.1   < > 9.3   WBC 10*3/mm3 16.02*  --  8.07 7.98   < > 7.81   HEMOGLOBIN g/dL 12.6*  --  12.8* 12.9*   < > 14.1   PLATELETS 10*3/mm3 179  --  214 217   < > 191   ALT (SGPT) U/L  --   --   --   --   --  26   AST (SGOT) U/L  --   --   --   --   --  35    < > = values in this interval not displayed.     Lab Results   Component Value Date    TROPONINT 34 (H) 02/20/2025     Estimated Creatinine Clearance: 41.7 mL/min (A) (by C-G formula based on SCr of 1.65 mg/dL (H)).No results found for: \"URICACID\"    Brief Urine Lab Results  (Last result in the past 365 days)        Color   Clarity   Blood   Leuk Est   Nitrite   Protein   CREAT   Urine HCG        03/26/24 0833             81.1                 Prior to Admission medications    Medication Sig Start Date End Date Taking? Authorizing Provider   amLODIPine (NORVASC) 5 MG tablet Take 1 tablet by mouth Daily. 10/18/24  Yes Serenity Wren MD   Artificial Tear Ointment (artificial tears) ophthalmic ointment Administer  to both eyes Every 1 (One) Hour As Needed.   Yes ProviderRenee MD   aspirin 81 MG chewable tablet Chew 1 tablet Daily. 12/18/20  Yes Kendall Arellano MD   atorvastatin (LIPITOR) 80 MG tablet TAKE ONE TABLET BY MOUTH EVERY DAY 10/20/24  Yes Serenity Wren MD   empagliflozin (Jardiance) 10 MG tablet tablet Take 1 tablet by mouth Daily. 10/18/24  Yes Serenity Wren MD   fluticasone (FLONASE) 50 MCG/ACT nasal spray Administer 2 sprays into the nostril(s) as directed by provider Daily As Needed.   Yes Provider, MD Renee   Januvia 50 MG tablet TAKE ONE TABLET BY MOUTH EVERY DAY 1/9/25  Yes Serenity Wren MD "   levothyroxine (Synthroid) 112 MCG tablet Take 1 tablet by mouth Daily. 1/20/25  Yes Serenity Wren MD   losartan (COZAAR) 100 MG tablet Take 1 tablet by mouth Daily. 2/10/25  Yes Serenity Wren MD   pantoprazole (PROTONIX) 40 MG EC tablet Take 1 tablet by mouth Daily. 10/18/24  Yes Serenity Wren MD   Xarelto 15 MG tablet TAKE ONE TABLET BY MOUTH EVERY DAY WITH DINNER 10/30/24  Yes Serenity Wren MD   cephalexin (KEFLEX) 500 MG capsule Take 1 capsule by mouth 2 (Two) Times a Day for 5 days. 2/25/25 3/2/25  Lila Ferrer APRN   levothyroxine (SYNTHROID, LEVOTHROID) 112 MCG tablet Take 1 tablet by mouth Every Morning.    Provider, MD Renee   OneTouch Ultra test strip USE AND DISCARD 1 TEST     STRIP DAILY. 12/25/23   Serenity Wren MD       atorvastatin, 80 mg, Oral, Nightly  [Held by provider] empagliflozin, 10 mg, Oral, Daily  insulin lispro, 2-9 Units, Subcutaneous, Q6H  levothyroxine, 112 mcg, Oral, Daily  linagliptin, 5 mg, Oral, Daily  NIFEdipine XL, 60 mg, Oral, Q24H  pantoprazole, 40 mg, Oral, Daily  [Held by provider] rivaroxaban, 15 mg, Oral, Daily With Dinner  sodium chloride, 10 mL, Intravenous, Q12H  [Held by provider] valsartan, 240 mg, Oral, Q24H      lactated ringers, 100 mL/hr  sodium chloride, 9 mL/hr        Assessment & Plan       Acute kidney injury patient's creatinine progressively increasing since admission, 1.6 Mg/DL this morning.  Probably due to hemodynamic fluctuation with ARB.  Electrolytes, volume status okay  Hypertensive urgency.  Blood pressure was very high on presentation but has improved significantly.  Valsartan held due to worsening renal function  Sick sinus syndrome.  Status post pacemaker placement    Plan:  I agree holding ARB  Ordering urine sodium, urinalysis and renal ultrasound  Can start beta-blockers if needed for blood pressure control  Surveillance labs    I discussed the patients findings and my recommendations with patient, family, and  consulting provider    Modesto Mccall MD  02/25/25  12:41 EST

## 2025-02-25 NOTE — PROGRESS NOTES
LOS: 2 days   Admitting Physician- Cricket Renner,*    Reason For Followup:    Bradycardia  Hypertension  Aortic stenosis  Status post TAVR  Paroxysmal atrial fibrillation      Subjective     Sitting up in the bed.  No complaints hemodynamics are stable blood pressure is controlled    Objective     Hemodynamics are stable    Review of Systems:   Review of Systems   Constitutional: Negative for chills and fever.   HENT:  Negative for ear discharge and nosebleeds.    Eyes:  Negative for discharge and redness.   Cardiovascular:  Negative for chest pain, orthopnea, palpitations, paroxysmal nocturnal dyspnea and syncope.   Respiratory:  Negative for cough, shortness of breath and wheezing.    Endocrine: Negative for heat intolerance.   Skin:  Negative for rash.   Musculoskeletal:  Negative for arthritis and myalgias.   Gastrointestinal:  Negative for abdominal pain, melena, nausea and vomiting.   Genitourinary:  Negative for dysuria and hematuria.   Neurological:  Negative for dizziness, light-headedness, numbness and tremors.   Psychiatric/Behavioral:  Negative for depression. The patient is not nervous/anxious.          Vital Signs  Vitals:    02/25/25 0419 02/25/25 0808 02/25/25 0841 02/25/25 1139   BP: 121/55 124/52 124/52 138/46   BP Location: Right arm Right arm  Right arm   Patient Position: Lying Lying  Lying   Pulse: 76 70 70 69   Resp: 20 18  18   Temp: 98.4 °F (36.9 °C) 98.4 °F (36.9 °C)  98.2 °F (36.8 °C)   TempSrc: Oral Oral  Oral   SpO2: 91% 92%  98%   Weight:       Height:         Wt Readings from Last 1 Encounters:   02/20/25 90 kg (198 lb 6.6 oz)       Intake/Output Summary (Last 24 hours) at 2/25/2025 1306  Last data filed at 2/24/2025 2300  Gross per 24 hour   Intake 650 ml   Output 200 ml   Net 450 ml     Physical Exam:  Constitutional:       Appearance: Well-developed.   Eyes:      General: No scleral icterus.        Right eye: No discharge.   HENT:      Head: Normocephalic and  atraumatic.   Neck:      Thyroid: No thyromegaly.      Lymphadenopathy: No cervical adenopathy.   Pulmonary:      Effort: Pulmonary effort is normal. No respiratory distress.      Breath sounds: Normal breath sounds. No wheezing. No rales.   Cardiovascular:      Normal rate. Regular rhythm.      No gallop.    Edema:     Peripheral edema absent.   Abdominal:      Tenderness: There is no abdominal tenderness.   Skin:     Findings: No erythema or rash.   Neurological:      Mental Status: Alert and oriented to person, place, and time.         Results Review:   Lab Results (last 24 hours)       Procedure Component Value Units Date/Time    CBC (No Diff) [[id number]]  (Abnormal) Collected: 02/25/25 1226    Specimen: Blood from Hand, Left Updated: 02/25/25 1234     WBC 16.02 10*3/mm3      RBC 4.28 10*6/mm3      Hemoglobin 12.6 g/dL      Hematocrit 39.6 %      MCV 92.5 fL      MCH 29.4 pg      MCHC 31.8 g/dL      RDW 13.8 %      RDW-SD 46.5 fl      MPV 10.7 fL      Platelets 179 10*3/mm3     POC Glucose Once [[id number]]  (Abnormal) Collected: 02/25/25 1141    Specimen: Blood Updated: 02/25/25 1152     Glucose 203 mg/dL      Comment: Serial Number: [id number]:  [id number]       Basic Metabolic Panel [[id number]]  (Abnormal) Collected: 02/25/25 0948    Specimen: Blood from Hand, Left Updated: 02/25/25 1054     Glucose 221 mg/dL      BUN 36 mg/dL      Creatinine 1.65 mg/dL      Sodium 138 mmol/L      Potassium 4.0 mmol/L      Chloride 104 mmol/L      CO2 17.6 mmol/L      Calcium 9.0 mg/dL      BUN/Creatinine Ratio 21.8     Anion Gap 16.4 mmol/L      eGFR 40.4 mL/min/1.73     Narrative:      GFR Categories in Chronic Kidney Disease (CKD)      GFR Category          GFR (mL/min/1.73)    Interpretation  G1                     90 or greater         Normal or high (1)  G2                      60-89                Mild decrease (1)  G3a                   45-59                Mild to moderate decrease  G3b                   30-44                 Moderate to severe decrease  G4                    15-29                Severe decrease  G5                    14 or less           Kidney failure          (1)In the absence of evidence of kidney disease, neither GFR category G1 or G2 fulfill the criteria for CKD.    eGFR calculation 2021 CKD-EPI creatinine equation, which does not include race as a factor    POC Glucose 4x Daily Before Meals & at Bedtime [[id number]]  (Abnormal) Collected: 02/25/25 0810    Specimen: Blood Updated: 02/25/25 0811     Glucose 168 mg/dL      Comment: Serial Number: [id number]:  [id number]       POC Glucose Once [[id number]]  (Abnormal) Collected: 02/24/25 2143    Specimen: Blood Updated: 02/24/25 2145     Glucose 114 mg/dL      Comment: Serial Number: [id number]:  [id number]       POC Glucose Once [[id number]]  (Abnormal) Collected: 02/24/25 1809    Specimen: Blood Updated: 02/24/25 1810     Glucose 119 mg/dL      Comment: Serial Number: [id number]:  [id number]             Imaging Results (Last 72 Hours)       Procedure Component Value Units Date/Time    XR Chest 1 View [[id number]] Collected: 02/24/25 1833     Updated: 02/24/25 1837    Narrative:      XR CHEST 1 VW    Date of Exam: 2/24/2025 6:02 PM EST    Indication: Post ICD / Pacer Implant    Comparison: Chest radiograph performed on February 20, 2025    Findings:  Patient is post left subclavian dual-lead pacemaker placement. There is no evidence of pneumothorax. No focal consolidation or effusion. Cardiac silhouette is mildly enlarged. Patient is post TAVR. No acute osseous abnormality identified.      Impression:      Impression:  Post left subclavian dual-lead pacemaker placement. No evidence of pneumothorax.      Electronically Signed: Warren Galvez MD    2/24/2025 6:34 PM EST    Workstation ID: [id number]          ECG/EMG Results (most recent)       Procedure Component Value Units Date/Time    ECG 12 Lead Chest Pain [[id number]] Collected: 02/20/25  1332     Updated: 02/21/25 0901     QT Interval 457 ms      QTC Interval 450 ms     Narrative:      HEART RATE=55  bpm  RR Xaxezbth=3929  ms  NE Yzcftyjs=158  ms  P Horizontal Axis=  deg  P Front Axis=Invalid  deg  QRSD Bvbpqvxr=257  ms  QT Clgipdac=120  ms  JKcY=030  ms  QRS Axis=-38  deg  T Wave Axis=78  deg  - ABNORMAL ECG -  Sinus bradycardia  Ventricular premature complex  Sinus pause  Prolonged NE interval  Probable  left atrial enlargement  Left axis deviation  Incomplete left bundle branch block  LVH with secondary repolarization abnormality  When compared with ECG of 31-Jul-2023 12:36:54,  Significant change in rhythm  Electronically Signed By: Sg Blank ([facility]) 2025-02-21 09:01:09  Date and Time of Study:2025-02-20 13:32:49    Telemetry Scan [[id number]] Resulted: 02/20/25     Updated: 02/21/25 0943    ECG 12 Lead Rhythm Change [[id number]] Collected: 02/22/25 1803     Updated: 02/23/25 1326     QT Interval 413 ms      QTC Interval 433 ms     Narrative:      HEART RATE=66  bpm  RR Jgnoptli=753  ms  NE Cemezobz=998  ms  P Horizontal Axis=-62  deg  P Front Axis=54  deg  QRSD Urvfzwgx=469  ms  QT Nczsokvq=349  ms  TUwS=219  ms  QRS Axis=-40  deg  T Wave Axis=90  deg  - ABNORMAL ECG -  Sinus rhythm  Prolonged NE interval  Incomplete left bundle branch block  Inferior  infarct, old  Anterior  infarct, old  Nonspecific  T abnormalities, lateral leads  When compared with ECG of 20-Feb-2025 13:32:49,  non Significant repolarization change  Electronically Signed By: Parminder Bagley ([facility]) 2025-02-23 13:26:17  Date and Time of Study:2025-02-22 18:03:01    Telemetry Scan [[id number]] Resulted: 02/20/25     Updated: 02/24/25 0722    Telemetry Scan [[id number]] Resulted: 02/20/25     Updated: 02/24/25 0855    Telemetry Scan [[id number]] Resulted: 02/20/25     Updated: 02/24/25 0858    Telemetry Scan [[id number]] Resulted: 02/20/25     Updated: 02/24/25 0923    Telemetry Scan [[id number]] Resulted: 02/20/25     Updated:  02/24/25 0949    Telemetry Scan [[id number]] Resulted: 02/20/25     Updated: 02/24/25 0949    Telemetry Scan [[id number]] Resulted: 02/20/25     Updated: 02/24/25 1015    Telemetry Scan [[id number]] Resulted: 02/20/25     Updated: 02/24/25 1104    Telemetry Scan [[id number]] Resulted: 02/20/25     Updated: 02/24/25 1126    Telemetry Scan [[id number]] Resulted: 02/20/25     Updated: 02/24/25 1133    Telemetry Scan [[id number]] Resulted: 02/20/25     Updated: 02/24/25 1239    Telemetry Scan [[id number]] Resulted: 02/20/25     Updated: 02/24/25 1249    Telemetry Scan [[id number]] Resulted: 02/20/25     Updated: 02/24/25 1317    Telemetry Scan [[id number]] Resulted: 02/20/25     Updated: 02/24/25 1319    Telemetry Scan [[id number]] Resulted: 02/20/25     Updated: 02/24/25 1346    Telemetry Scan [[id number]] Resulted: 02/20/25     Updated: 02/24/25 1358    Telemetry Scan [[id number]] Resulted: 02/20/25     Updated: 02/24/25 1429    Telemetry Scan [[id number]] Resulted: 02/20/25     Updated: 02/24/25 1509    EP/CRM Study [[id number]] Resulted: 02/24/25 1647     Updated: 02/24/25 1647    Narrative:      Patient Care Team:  Seernity Wren MD as PCP - General (Family Medicine)  Steve Muhammad MD as Consulting Physician (Cardiology)  CAROLE Vasquez DPM as Consulting Physician (Podiatry)  David Urbina MD as Consulting Physician (Otolaryngology)  Gregory Valle MD as Surgeon (Vascular Surgery)  Gómez Arvizu MD as Consulting Physician (Ophthalmology)  Radha Danielle MD as Consulting Physician (Cardiology)    : Radha Danielle MD    Date of procedure: 2/24/2025    Procedure indications: Sick sinus syndrome and transient third-degree AV   block.  Symptomatic bradycardia    Procedure performed: Implantation of dual-chamber/direct conduction system   pacemaker with left bundle area pacing lead and right atrial lead.    Procedure details    The patient was taken to the EP lab in a fasting state, the left shoulder  "  area was prepped and draped in the usual sterile fashion.  Sedation was   performed by the anesthesia team.  Incision was made in the left subclavian area and carried down with the   help of blunt and sharp dissection.  Axillary vein was accessed with a   micropuncture needle under venographic guidance, and 2 wires were placed   in the SVC.  A preformed HIS sheath (Medtronic, C315) was advanced into the RV over a   Glidewire and positioned approximately mid septum.  Contrast was injected   to ensure appropriate positioning of the sheath against the septum.  The   Medtronic 3830 lead was then advanced through the sheath and manually   rotated and anchored to the RV septum. Pacing was performed which showed a   characteristic \"W\" sign on V1 precordial leads. The lead was then further   advanced deep into the septum.  Contrast was injected through the sheath   to assess the depth of the lead tip into the septum.  Pacing through the   lead showed evidence of left bundle capture with a terminal R wave in V1   and V2 and a stim to QRS of 80 ms, and a paced QRS duration of 120 ms. The   sheath was then split using the appropriate splitter.  The right atrial lead was advanced under fluoroscopic guidance and was   actively fixated to the right atrial appendage, it was tested with   satisfactory performance.  Both leads were then sewn down to the   pectoralis fascia using Ethibond sutures.  A pocket was then made large   enough to accommodate the pacemaker generator, and it was irrigated with   antibiotic solution.  Both leads were then connected to the pulse   generator and the assembly was introduced in the pocket.  The incision was   then closed in layers of absorbable sutures and Steri-Strips and   appropriate bandaging was applied. The patient was transferred to the   recovery area in a stable condition.    Device information  Medtronic [id number], serial number [id number]    Lead information  RV (left bundle) lead: " Medtronic 3830/69, serial number [id number]  RA lead: Medtronic 4076/52, serial number [id number]    Parameters  RV (left bundle): Threshold 0.75 V@0.4 ms    Sensin.0 mV       Impedance: 901 ohms  RA:                     Threshold 0.75 V@0.4 ms    Sensing: 3.2 mV       Impedance: 551 ohms    Device settings: DDDR 60/130    MRI: Conditional    Complications: None    Blood loss: 20 cc    Conclusion: Successful implantation of a direct conduction system,   dual-chamber pacemaker.       Electronically signed by Radha Danielle MD, 25, 4:47 PM EST.    Telemetry Scan [[id number]] Resulted: 25     Updated: 25    Telemetry Scan [[id number]] Resulted: 25     Updated: 25 0839    Telemetry Scan [[id number]] Resulted: 25     Updated: 25 0857    Telemetry Scan [[id number]] Resulted: 25     Updated: 25 0932    Telemetry Scan [[id number]] Resulted: 25     Updated: 25 0947    Telemetry Scan [[id number]] Resulted: 25     Updated: 25 1218          CBC    Results from last 7 days   Lab Units 25  1226 25  0125 25  0142 25  0338 25  1416   WBC 10*3/mm3 16.02* 8.07 7.98 7.37 7.81   HEMOGLOBIN g/dL 12.6* 12.8* 12.9* 12.1* 14.1   PLATELETS 10*3/mm3 179 214 217 195 191     BMP   Results from last 7 days   Lab Units 25  0948 25  0125 25  0142 25  0338 25  1416   SODIUM mmol/L 138 142 139 141 140   POTASSIUM mmol/L 4.0 4.0 4.1 3.6 3.7   CHLORIDE mmol/L 104 109* 105 109* 108*   CO2 mmol/L 17.6* 21.9* 23.4 21.7* 19.6*   BUN mg/dL 36* 32* 30* 24* 24*   CREATININE mg/dL 1.65* 1.31* 1.46* 1.30* 1.18   GLUCOSE mg/dL 221* 173* 160* 103* 156*   MAGNESIUM mg/dL  --   --   --   --  1.3*     CMP   Results from last 7 days   Lab Units 25  0948 25  0125 25  0142 25  0338 25  1416   SODIUM mmol/L 138 142 139 141 140   POTASSIUM mmol/L 4.0 4.0 4.1 3.6 3.7   CHLORIDE mmol/L 104 109* 105  109* 108*   CO2 mmol/L 17.6* 21.9* 23.4 21.7* 19.6*   BUN mg/dL 36* 32* 30* 24* 24*   CREATININE mg/dL 1.65* 1.31* 1.46* 1.30* 1.18   GLUCOSE mg/dL 221* 173* 160* 103* 156*   ALBUMIN g/dL  --   --   --   --  3.9   BILIRUBIN mg/dL  --   --   --   --  0.7   ALK PHOS U/L  --   --   --   --  84   AST (SGOT) U/L  --   --   --   --  35   ALT (SGPT) U/L  --   --   --   --  26     Cardiac Studies:  Echo- Results for orders placed during the hospital encounter of 02/14/24    Adult Transthoracic Echo Complete W/ Color, Spectral and Contrast if Necessary Per Protocol    Interpretation Summary    Left ventricular systolic function is normal. Calculated left ventricular EF = 64% Left ventricular ejection fraction appears to be 61 - 65%.    Left ventricular diastolic function is consistent with (grade I) impaired relaxation.  GLS -17%.    Left atrial volume is mildly increased.    29 mm Medtronic TAVR valve is well-seated.  There is no aortic valve insufficiency.  Mean gradient is 7-10 mmHg.    Estimated right ventricular systolic pressure from tricuspid regurgitation is normal (<35 mmHg).    Stress Myoview-  Cath-      Medication Review:   Scheduled Meds:atorvastatin, 80 mg, Oral, Nightly  [Held by provider] empagliflozin, 10 mg, Oral, Daily  insulin lispro, 2-9 Units, Subcutaneous, Q6H  levothyroxine, 112 mcg, Oral, Daily  linagliptin, 5 mg, Oral, Daily  NIFEdipine XL, 60 mg, Oral, Q24H  pantoprazole, 40 mg, Oral, Daily  [Held by provider] rivaroxaban, 15 mg, Oral, Daily With Dinner  sodium chloride, 10 mL, Intravenous, Q12H  [Held by provider] valsartan, 240 mg, Oral, Q24H      Continuous Infusions:lactated ringers, 100 mL/hr, Last Rate: 100 mL/hr (02/25/25 1302)  sodium chloride, 9 mL/hr      PRN Meds:.  acetaminophen **OR** acetaminophen **OR** acetaminophen    aluminum-magnesium hydroxide-simethicone    atropine    senna-docusate sodium **AND** polyethylene glycol **AND** bisacodyl **AND** bisacodyl    dextrose     dextrose    diphenhydrAMINE    diphenhydrAMINE    ePHEDrine Sulfate (Pressors)    fentanyl    flumazenil    glucagon (human recombinant)    hydrALAZINE    HYDROmorphone    ipratropium-albuterol    labetalol    lidocaine (cardiac)    naloxone    nitroglycerin    nitroglycerin    ondansetron ODT **OR** ondansetron    ondansetron    oxyCODONE    oxyCODONE    [COMPLETED] Insert Peripheral IV **AND** sodium chloride    sodium chloride    sodium chloride    sodium chloride      Assessment & Plan     Dizzy spells    Dizziness    Sick sinus syndrome    Complete heart block    MDM:    1.  Hypertension:    Blood pressure is better controlled with Procardia.  Continue valsartan and Procardia     2.  Sinus bradycardia/third-degree block:    Patient underwent pacemaker at present it is functioning appropriately #2 is being paced    3.  Paroxysmal atrial fibrillation/tachybradycardia syndrome:    Patient is maintaining sinus rhythm    4.  Aortic stenosis s/p TAVR    Patient is doing well.    5.  Disposition:    Patient can be discharged and follow-up with me    Steve Muhammad MD  02/25/25  13:06 EST

## 2025-02-26 ENCOUNTER — READMISSION MANAGEMENT (OUTPATIENT)
Dept: CALL CENTER | Facility: HOSPITAL | Age: 85
End: 2025-02-26
Payer: MEDICARE

## 2025-02-26 VITALS
SYSTOLIC BLOOD PRESSURE: 156 MMHG | RESPIRATION RATE: 15 BRPM | WEIGHT: 198.41 LBS | OXYGEN SATURATION: 97 % | TEMPERATURE: 98.2 F | HEIGHT: 71 IN | HEART RATE: 62 BPM | BODY MASS INDEX: 27.78 KG/M2 | DIASTOLIC BLOOD PRESSURE: 66 MMHG

## 2025-02-26 LAB
ALBUMIN SERPL-MCNC: 3.6 G/DL (ref 3.5–5.2)
ANION GAP SERPL CALCULATED.3IONS-SCNC: 11.8 MMOL/L (ref 5–15)
BASOPHILS # BLD AUTO: 0.04 10*3/MM3 (ref 0–0.2)
BASOPHILS NFR BLD AUTO: 0.4 % (ref 0–1.5)
BUN SERPL-MCNC: 28 MG/DL (ref 8–23)
BUN/CREAT SERPL: 18.4 (ref 7–25)
CALCIUM SPEC-SCNC: 9.1 MG/DL (ref 8.6–10.5)
CHLORIDE SERPL-SCNC: 110 MMOL/L (ref 98–107)
CO2 SERPL-SCNC: 21.2 MMOL/L (ref 22–29)
CREAT SERPL-MCNC: 1.52 MG/DL (ref 0.76–1.27)
DEPRECATED RDW RBC AUTO: 49.1 FL (ref 37–54)
EGFRCR SERPLBLD CKD-EPI 2021: 44.6 ML/MIN/1.73
EOSINOPHIL # BLD AUTO: 0.14 10*3/MM3 (ref 0–0.4)
EOSINOPHIL NFR BLD AUTO: 1.3 % (ref 0.3–6.2)
ERYTHROCYTE [DISTWIDTH] IN BLOOD BY AUTOMATED COUNT: 13.9 % (ref 12.3–15.4)
GLUCOSE BLDC GLUCOMTR-MCNC: 120 MG/DL (ref 70–105)
GLUCOSE BLDC GLUCOMTR-MCNC: 219 MG/DL (ref 70–105)
GLUCOSE SERPL-MCNC: 139 MG/DL (ref 65–99)
HCT VFR BLD AUTO: 41.2 % (ref 37.5–51)
HGB BLD-MCNC: 12.5 G/DL (ref 13–17.7)
IMM GRANULOCYTES # BLD AUTO: 0.03 10*3/MM3 (ref 0–0.05)
IMM GRANULOCYTES NFR BLD AUTO: 0.3 % (ref 0–0.5)
LYMPHOCYTES # BLD AUTO: 1.9 10*3/MM3 (ref 0.7–3.1)
LYMPHOCYTES NFR BLD AUTO: 17.9 % (ref 19.6–45.3)
MCH RBC QN AUTO: 29.1 PG (ref 26.6–33)
MCHC RBC AUTO-ENTMCNC: 30.3 G/DL (ref 31.5–35.7)
MCV RBC AUTO: 95.8 FL (ref 79–97)
MONOCYTES # BLD AUTO: 1.1 10*3/MM3 (ref 0.1–0.9)
MONOCYTES NFR BLD AUTO: 10.3 % (ref 5–12)
NEUTROPHILS NFR BLD AUTO: 69.8 % (ref 42.7–76)
NEUTROPHILS NFR BLD AUTO: 7.43 10*3/MM3 (ref 1.7–7)
NRBC BLD AUTO-RTO: 0 /100 WBC (ref 0–0.2)
PHOSPHATE SERPL-MCNC: 3 MG/DL (ref 2.5–4.5)
PLATELET # BLD AUTO: 180 10*3/MM3 (ref 140–450)
PMV BLD AUTO: 11 FL (ref 6–12)
POTASSIUM SERPL-SCNC: 4.2 MMOL/L (ref 3.5–5.2)
RBC # BLD AUTO: 4.3 10*6/MM3 (ref 4.14–5.8)
SODIUM SERPL-SCNC: 143 MMOL/L (ref 136–145)
WBC NRBC COR # BLD AUTO: 10.64 10*3/MM3 (ref 3.4–10.8)

## 2025-02-26 PROCEDURE — 82948 REAGENT STRIP/BLOOD GLUCOSE: CPT

## 2025-02-26 PROCEDURE — 99232 SBSQ HOSP IP/OBS MODERATE 35: CPT

## 2025-02-26 PROCEDURE — 85025 COMPLETE CBC W/AUTO DIFF WBC: CPT | Performed by: INTERNAL MEDICINE

## 2025-02-26 PROCEDURE — 80069 RENAL FUNCTION PANEL: CPT | Performed by: INTERNAL MEDICINE

## 2025-02-26 PROCEDURE — 63710000001 INSULIN LISPRO (HUMAN) PER 5 UNITS

## 2025-02-26 RX ORDER — NEBIVOLOL 5 MG/1
2.5 TABLET ORAL
Status: DISCONTINUED | OUTPATIENT
Start: 2025-02-26 | End: 2025-02-26 | Stop reason: HOSPADM

## 2025-02-26 RX ORDER — NEBIVOLOL 2.5 MG/1
2.5 TABLET ORAL
Qty: 30 TABLET | Refills: 0 | Status: SHIPPED | OUTPATIENT
Start: 2025-02-26 | End: 2025-03-28

## 2025-02-26 RX ADMIN — LEVOTHYROXINE SODIUM 112 MCG: 112 TABLET ORAL at 08:23

## 2025-02-26 RX ADMIN — LINAGLIPTIN 5 MG: 5 TABLET, FILM COATED ORAL at 08:23

## 2025-02-26 RX ADMIN — INSULIN LISPRO 4 UNITS: 100 INJECTION, SOLUTION INTRAVENOUS; SUBCUTANEOUS at 12:32

## 2025-02-26 RX ADMIN — Medication 10 ML: at 08:23

## 2025-02-26 RX ADMIN — NIFEDIPINE 60 MG: 60 TABLET, EXTENDED RELEASE ORAL at 08:23

## 2025-02-26 RX ADMIN — PANTOPRAZOLE SODIUM 40 MG: 40 TABLET, DELAYED RELEASE ORAL at 08:23

## 2025-02-26 RX ADMIN — NEBIVOLOL 2.5 MG: 5 TABLET ORAL at 12:32

## 2025-02-26 NOTE — PLAN OF CARE
Goal Outcome Evaluation:              Outcome Evaluation: planning to discharge today

## 2025-02-26 NOTE — CASE MANAGEMENT/SOCIAL WORK
Case Management Discharge Note      Final Note: Routine home    Provided Post Acute Provider List?: N/A  Provided Post Acute Provider Quality & Resource List?: N/A                 Transportation Services  Private: Car    Final Discharge Disposition Code: 01 - home or self-care

## 2025-02-26 NOTE — PLAN OF CARE
Problem: Adult Inpatient Plan of Care  Goal: Plan of Care Review  Outcome: Progressing  Goal: Patient-Specific Goal (Individualized)  Outcome: Progressing  Goal: Absence of Hospital-Acquired Illness or Injury  Outcome: Progressing  Intervention: Identify and Manage Fall Risk  Recent Flowsheet Documentation  Taken 2/26/2025 0000 by Antonina Eng RN  Safety Promotion/Fall Prevention: safety round/check completed  Taken 2/25/2025 2200 by Antonina Eng RN  Safety Promotion/Fall Prevention: safety round/check completed  Taken 2/25/2025 2030 by Antonina Eng RN  Safety Promotion/Fall Prevention: safety round/check completed  Intervention: Prevent Skin Injury  Recent Flowsheet Documentation  Taken 2/25/2025 2030 by Antonina Eng RN  Body Position: position changed independently  Goal: Optimal Comfort and Wellbeing  Outcome: Progressing  Intervention: Provide Person-Centered Care  Recent Flowsheet Documentation  Taken 2/25/2025 2030 by Antonina Eng RN  Trust Relationship/Rapport:   care explained   choices provided   questions answered   questions encouraged   thoughts/feelings acknowledged  Goal: Readiness for Transition of Care  Outcome: Progressing   Goal Outcome Evaluation:

## 2025-02-26 NOTE — OUTREACH NOTE
Prep Survey      Flowsheet Row Responses   [facility] patient discharged from? [facility]   Is LACE score < 7 ? No   Eligibility [facility]   Date of Admission 02/20/25   Date of Discharge 02/26/25   Discharge Disposition Home or Self Care   Discharge diagnosis Dizzy spells   Does the patient have one of the following disease processes/diagnoses(primary or secondary)? Other   Does the patient have Home health ordered? No   Is there a DME ordered? No   Prep survey completed? Yes            Danni LIN - Registered Nurse

## 2025-02-26 NOTE — PROGRESS NOTES
LOS: 3 days   Admitting Physician- Cricket Renner,*    Reason For Followup:    Bradycardia  Hypertension  Aortic stenosis  Status post TAVR  Paroxysmal atrial fibrillation      Subjective     Sitting up in the recliner.  No complaints hemodynamics are stable blood pressure is controlled.  Discharge was held yesterday for creatinine 1.6 up from 1.1 on admission.  Dr. Mccall consulted.  He held valsartan, continuing Procardia and added nebivolol.  He also advised to hold Jardiance.  Will repeat BMP in 5 days and follow-up with renal as outpatient    Objective     Hemodynamics are stable    Review of Systems:   Review of Systems   Constitutional: Negative for chills and fever.   HENT:  Negative for ear discharge and nosebleeds.    Eyes:  Negative for discharge and redness.   Cardiovascular:  Negative for chest pain, orthopnea, palpitations, paroxysmal nocturnal dyspnea and syncope.   Respiratory:  Negative for cough, shortness of breath and wheezing.    Endocrine: Negative for heat intolerance.   Skin:  Negative for rash.   Musculoskeletal:  Negative for arthritis and myalgias.   Gastrointestinal:  Negative for abdominal pain, melena, nausea and vomiting.   Genitourinary:  Negative for dysuria and hematuria.   Neurological:  Negative for dizziness, light-headedness, numbness and tremors.   Psychiatric/Behavioral:  Negative for depression. The patient is not nervous/anxious.          Vital Signs  Vitals:    02/25/25 2001 02/25/25 2341 02/26/25 0341 02/26/25 0803   BP: 135/57 159/69 147/60 156/66   BP Location: Right arm Right arm Right arm Right arm   Patient Position: Sitting Lying Lying Sitting   Pulse: 62 60 65 66   Resp: 19 18 18 16   Temp: 97.8 °F (36.6 °C) 98 °F (36.7 °C) 97.8 °F (36.6 °C)    TempSrc: Oral Oral Oral    SpO2: 95% 93% 99% 95%   Weight:   90 kg (198 lb 6.6 oz)    Height:         Wt Readings from Last 1 Encounters:   02/26/25 90 kg (198 lb 6.6 oz)       Intake/Output Summary (Last 24 hours)  at 2/26/2025 1154  Last data filed at 2/26/2025 0445  Gross per 24 hour   Intake 1090 ml   Output 400 ml   Net 690 ml     Physical Exam:  Constitutional:       Appearance: Well-developed.   Eyes:      General: No scleral icterus.        Right eye: No discharge.   HENT:      Head: Normocephalic and atraumatic.   Neck:      Thyroid: No thyromegaly.      Lymphadenopathy: No cervical adenopathy.   Pulmonary:      Effort: Pulmonary effort is normal. No respiratory distress.      Breath sounds: Normal breath sounds. No wheezing. No rales.   Cardiovascular:      Normal rate. Regular rhythm.      No gallop.    Edema:     Peripheral edema absent.   Abdominal:      Tenderness: There is no abdominal tenderness.   Skin:     Findings: No erythema or rash.   Neurological:      Mental Status: Alert and oriented to person, place, and time.         Results Review:   Lab Results (last 24 hours)       Procedure Component Value Units Date/Time    POC Glucose Once [[id number]]  (Abnormal) Collected: 02/26/25 0802    Specimen: Blood Updated: 02/26/25 0807     Glucose 120 mg/dL      Comment: Serial Number: [id number]:  [id number]       Renal Function Panel [[id number]]  (Abnormal) Collected: 02/26/25 0539    Specimen: Blood Updated: 02/26/25 0639     Glucose 139 mg/dL      BUN 28 mg/dL      Creatinine 1.52 mg/dL      Sodium 143 mmol/L      Potassium 4.2 mmol/L      Chloride 110 mmol/L      CO2 21.2 mmol/L      Calcium 9.1 mg/dL      Albumin 3.6 g/dL      Phosphorus 3.0 mg/dL      Anion Gap 11.8 mmol/L      BUN/Creatinine Ratio 18.4     eGFR 44.6 mL/min/1.73     Narrative:      GFR Categories in Chronic Kidney Disease (CKD)      GFR Category          GFR (mL/min/1.73)    Interpretation  G1                     90 or greater         Normal or high (1)  G2                      60-89                Mild decrease (1)  G3a                   45-59                Mild to moderate decrease  G3b                   30-44                Moderate to  severe decrease  G4                    15-29                Severe decrease  G5                    14 or less           Kidney failure          (1)In the absence of evidence of kidney disease, neither GFR category G1 or G2 fulfill the criteria for CKD.    eGFR calculation 2021 CKD-EPI creatinine equation, which does not include race as a factor    CBC & Differential [[id number]]  (Abnormal) Collected: 02/26/25 0539    Specimen: Blood Updated: 02/26/25 0613    Narrative:      The following orders were created for panel order CBC & Differential.  Procedure                               Abnormality         Status                     ---------                               -----------         ------                     CBC Auto Differential[[id number]]        Abnormal            Final result                 Please view results for these tests on the individual orders.    CBC Auto Differential [[id number]]  (Abnormal) Collected: 02/26/25 0539    Specimen: Blood Updated: 02/26/25 0613     WBC 10.64 10*3/mm3      RBC 4.30 10*6/mm3      Hemoglobin 12.5 g/dL      Hematocrit 41.2 %      MCV 95.8 fL      MCH 29.1 pg      MCHC 30.3 g/dL      RDW 13.9 %      RDW-SD 49.1 fl      MPV 11.0 fL      Platelets 180 10*3/mm3      Neutrophil % 69.8 %      Lymphocyte % 17.9 %      Monocyte % 10.3 %      Eosinophil % 1.3 %      Basophil % 0.4 %      Immature Grans % 0.3 %      Neutrophils, Absolute 7.43 10*3/mm3      Lymphocytes, Absolute 1.90 10*3/mm3      Monocytes, Absolute 1.10 10*3/mm3      Eosinophils, Absolute 0.14 10*3/mm3      Basophils, Absolute 0.04 10*3/mm3      Immature Grans, Absolute 0.03 10*3/mm3      nRBC 0.0 /100 WBC     POC Glucose Once [[id number]]  (Abnormal) Collected: 02/25/25 2106    Specimen: Blood Updated: 02/25/25 2107     Glucose 185 mg/dL      Comment: Serial Number: [id number]:  [id number]       POC Glucose 4x Daily Before Meals & at Bedtime [[id number]]  (Abnormal) Collected: 02/25/25 1814    Specimen: Blood  Updated: 02/25/25 1815     Glucose 142 mg/dL      Comment: Serial Number: [id number]:  [id number]       Sodium, Urine, Random - Urine, Clean Catch [[id number]] Collected: 02/25/25 1510    Specimen: Urine, Clean Catch Updated: 02/25/25 1541     Sodium, Urine 31 mmol/L     Narrative:      Reference intervals for random urine have not been established.  Clinical usage is dependent upon physician's interpretation in combination with other laboratory tests.       Urinalysis With Microscopic If Indicated (No Culture) - Urine, Clean Catch [[id number]]  (Abnormal) Collected: 02/25/25 1512    Specimen: Urine, Clean Catch Updated: 02/25/25 1535     Color, UA Yellow     Appearance, UA Clear     pH, UA <=5.0     Specific Gravity, UA 1.019     Glucose, UA Negative     Ketones, UA Trace     Bilirubin, UA Negative     Blood, UA Negative     Protein, UA 30 mg/dL (1+)     Leuk Esterase, UA Negative     Nitrite, UA Negative     Urobilinogen, UA 0.2 E.U./dL    Urinalysis, Microscopic Only - Urine, Clean Catch [[id number]] Collected: 02/25/25 1512    Specimen: Urine, Clean Catch Updated: 02/25/25 1535     RBC, UA 0-2 /HPF      WBC, UA 0-2 /HPF      Bacteria, UA None Seen /HPF      Squamous Epithelial Cells, UA 0-2 /HPF      Hyaline Casts, UA 0-2 /LPF      Methodology Automated Microscopy    CBC (No Diff) [[id number]]  (Abnormal) Collected: 02/25/25 1226    Specimen: Blood from Hand, Left Updated: 02/25/25 1234     WBC 16.02 10*3/mm3      RBC 4.28 10*6/mm3      Hemoglobin 12.6 g/dL      Hematocrit 39.6 %      MCV 92.5 fL      MCH 29.4 pg      MCHC 31.8 g/dL      RDW 13.8 %      RDW-SD 46.5 fl      MPV 10.7 fL      Platelets 179 10*3/mm3           Imaging Results (Last 72 Hours)       Procedure Component Value Units Date/Time    US Renal Bilateral [[id number]] Collected: 02/25/25 1423     Updated: 02/25/25 1429    Narrative:      US RENAL BILATERAL    Date of Exam: 2/25/2025 1:17 PM EST    Indication: AUDREY.    Comparison: No comparisons  available.    Technique: Grayscale and color Doppler ultrasound evaluation of the kidneys and urinary bladder was performed.      Findings:  The right kidney measures 11.8 x 6.2 x 5.1 cm. The left kidney measures 11.6 x 4.6 x 4.5 cm. No hydronephrosis. Corticomedullary differentiation maintained. Bladder is without internal debris or acute abnormality.      Impression:      Impression:  No hydronephrosis or acute sonographic abnormality.            Electronically Signed: Darci Lee MD    2/25/2025 2:26 PM EST    Workstation ID: [id number]    XR Chest 1 View [[id number]] Collected: 02/24/25 1833     Updated: 02/24/25 1837    Narrative:      XR CHEST 1 VW    Date of Exam: 2/24/2025 6:02 PM EST    Indication: Post ICD / Pacer Implant    Comparison: Chest radiograph performed on February 20, 2025    Findings:  Patient is post left subclavian dual-lead pacemaker placement. There is no evidence of pneumothorax. No focal consolidation or effusion. Cardiac silhouette is mildly enlarged. Patient is post TAVR. No acute osseous abnormality identified.      Impression:      Impression:  Post left subclavian dual-lead pacemaker placement. No evidence of pneumothorax.      Electronically Signed: Warren Galvez MD    2/24/2025 6:34 PM EST    Workstation ID: [id number]          ECG/EMG Results (most recent)       Procedure Component Value Units Date/Time    ECG 12 Lead Chest Pain [[id number]] Collected: 02/20/25 1332     Updated: 02/21/25 0901     QT Interval 457 ms      QTC Interval 450 ms     Narrative:      HEART RATE=55  bpm  RR Obkxdaqc=2299  ms  HI Lpxmjikx=526  ms  P Horizontal Axis=  deg  P Front Axis=Invalid  deg  QRSD Uplnjlxc=481  ms  QT Sdvondlr=402  ms  FYpU=030  ms  QRS Axis=-38  deg  T Wave Axis=78  deg  - ABNORMAL ECG -  Sinus bradycardia  Ventricular premature complex  Sinus pause  Prolonged HI interval  Probable  left atrial enlargement  Left axis deviation  Incomplete left bundle branch block  LVH with secondary  repolarization abnormality  When compared with ECG of 31-Jul-2023 12:36:54,  Significant change in rhythm  Electronically Signed By: Sg Blank ([facility]) 2025-02-21 09:01:09  Date and Time of Study:2025-02-20 13:32:49    Telemetry Scan [[id number]] Resulted: 02/20/25     Updated: 02/21/25 0943    ECG 12 Lead Rhythm Change [[id number]] Collected: 02/22/25 1803     Updated: 02/23/25 1326     QT Interval 413 ms      QTC Interval 433 ms     Narrative:      HEART RATE=66  bpm  RR Irpkogqm=339  ms  WI Veusnfyo=178  ms  P Horizontal Axis=-62  deg  P Front Axis=54  deg  QRSD Kcnpruqi=039  ms  QT Exomgssa=296  ms  ACuP=325  ms  QRS Axis=-40  deg  T Wave Axis=90  deg  - ABNORMAL ECG -  Sinus rhythm  Prolonged WI interval  Incomplete left bundle branch block  Inferior  infarct, old  Anterior  infarct, old  Nonspecific  T abnormalities, lateral leads  When compared with ECG of 20-Feb-2025 13:32:49,  non Significant repolarization change  Electronically Signed By: Parminder Bagley ([facility]) 2025-02-23 13:26:17  Date and Time of Study:2025-02-22 18:03:01    Telemetry Scan [[id number]] Resulted: 02/20/25     Updated: 02/24/25 0722    Telemetry Scan [[id number]] Resulted: 02/20/25     Updated: 02/24/25 0855    Telemetry Scan [[id number]] Resulted: 02/20/25     Updated: 02/24/25 0858    Telemetry Scan [[id number]] Resulted: 02/20/25     Updated: 02/24/25 0923    Telemetry Scan [[id number]] Resulted: 02/20/25     Updated: 02/24/25 0949    Telemetry Scan [[id number]] Resulted: 02/20/25     Updated: 02/24/25 0949    Telemetry Scan [[id number]] Resulted: 02/20/25     Updated: 02/24/25 1015    Telemetry Scan [[id number]] Resulted: 02/20/25     Updated: 02/24/25 1104    Telemetry Scan [[id number]] Resulted: 02/20/25     Updated: 02/24/25 1126    Telemetry Scan [[id number]] Resulted: 02/20/25     Updated: 02/24/25 1133    Telemetry Scan [[id number]] Resulted: 02/20/25     Updated: 02/24/25 1239    Telemetry Scan [[id number]] Resulted: 02/20/25     Updated:  02/24/25 1249    Telemetry Scan [[id number]] Resulted: 02/20/25     Updated: 02/24/25 1317    Telemetry Scan [[id number]] Resulted: 02/20/25     Updated: 02/24/25 1319    Telemetry Scan [[id number]] Resulted: 02/20/25     Updated: 02/24/25 1346    Telemetry Scan [[id number]] Resulted: 02/20/25     Updated: 02/24/25 1358    Telemetry Scan [[id number]] Resulted: 02/20/25     Updated: 02/24/25 1429    Telemetry Scan [[id number]] Resulted: 02/20/25     Updated: 02/24/25 1509    Telemetry Scan [[id number]] Resulted: 02/20/25     Updated: 02/24/25 2005    Telemetry Scan [[id number]] Resulted: 02/20/25     Updated: 02/25/25 0839    Telemetry Scan [[id number]] Resulted: 02/20/25     Updated: 02/25/25 0857    Telemetry Scan [[id number]] Resulted: 02/20/25     Updated: 02/25/25 0932    Telemetry Scan [[id number]] Resulted: 02/20/25     Updated: 02/25/25 0947    Telemetry Scan [[id number]] Resulted: 02/20/25     Updated: 02/25/25 1218    EP/CRM Study [[id number]] Resulted: 02/24/25 1647     Updated: 02/25/25 1707    Narrative:      Patient Care Team:  Serenity Wren MD as PCP - General (Family Medicine)  Steve Muhammad MD as Consulting Physician (Cardiology)  CAROLE Vasquez DPM as Consulting Physician (Podiatry)  David Urbina MD as Consulting Physician (Otolaryngology)  Gregory Valle MD as Surgeon (Vascular Surgery)  Gómez Arvizu MD as Consulting Physician (Ophthalmology)  Radha Danielle MD as Consulting Physician (Cardiology)    : Radha Danielle MD    Date of procedure: 2/24/2025    Procedure indications: Sick sinus syndrome and transient third-degree AV   block.  Symptomatic bradycardia    Procedure performed: Implantation of dual-chamber/direct conduction system   pacemaker with left bundle area pacing lead and right atrial lead.    Procedure details    The patient was taken to the EP lab in a fasting state, the left shoulder   area was prepped and draped in the usual sterile fashion.  Sedation was  "  performed by the anesthesia team.  Incision was made in the left subclavian area and carried down with the   help of blunt and sharp dissection.  Axillary vein was accessed with a   micropuncture needle under venographic guidance, and 2 wires were placed   in the SVC.  A preformed HIS sheath (Medtronic, C315) was advanced into the RV over a   Glidewire and positioned approximately mid septum.  Contrast was injected   to ensure appropriate positioning of the sheath against the septum.  The   Medtronic 3830 lead was then advanced through the sheath and manually   rotated and anchored to the RV septum. Pacing was performed which showed a   characteristic \"W\" sign on V1 precordial leads. The lead was then further   advanced deep into the septum.  Contrast was injected through the sheath   to assess the depth of the lead tip into the septum.  Pacing through the   lead showed evidence of left bundle capture with a terminal R wave in V1   and V2 and a stim to QRS of 80 ms, and a paced QRS duration of 120 ms. The   sheath was then split using the appropriate splitter.  The right atrial lead was advanced under fluoroscopic guidance and was   actively fixated to the right atrial appendage, it was tested with   satisfactory performance.  Both leads were then sewn down to the   pectoralis fascia using Ethibond sutures.  A pocket was then made large   enough to accommodate the pacemaker generator, and it was irrigated with   antibiotic solution.  Both leads were then connected to the pulse   generator and the assembly was introduced in the pocket.  The incision was   then closed in layers of absorbable sutures and Steri-Strips and   appropriate bandaging was applied. The patient was transferred to the   recovery area in a stable condition.    Device information  [employer] [id number], serial number [id number]    Lead information  RV (left bundle) lead: Medtronic 3830/69, serial number [id number]  RA lead: Medtronic 4076/52, serial " number [id number]    Parameters  RV (left bundle): Threshold 0.75 V@0.4 ms    Sensin.0 mV       Impedance: 901 ohms  RA:                     Threshold 0.75 V@0.4 ms    Sensing: 3.2 mV       Impedance: 551 ohms    Device settings: DDDR 60/130    MRI: Conditional    Complications: None    Blood loss: 20 cc    Conclusion: Successful implantation of a direct conduction system,   dual-chamber pacemaker.       Electronically signed by Radha Danielle MD, 25, 4:47 PM EST.    Telemetry Scan [[id number]] Resulted: 25     Updated: 25    Telemetry Scan [[id number]] Resulted: 25     Updated: 25 0906    Telemetry Scan [[id number]] Resulted: 25     Updated: 25 0931    Telemetry Scan [[id number]] Resulted: 25     Updated: 25 1040          CBC    Results from last 7 days   Lab Units 25  0539 25  1226 25  0125 25  0142 25  0338 25  1416   WBC 10*3/mm3 10.64 16.02* 8.07 7.98 7.37 7.81   HEMOGLOBIN g/dL 12.5* 12.6* 12.8* 12.9* 12.1* 14.1   PLATELETS 10*3/mm3 180 179 214 217 195 191     BMP   Results from last 7 days   Lab Units 25  0539 25  0948 25  0125 25  0142 25  0338 25  1416   SODIUM mmol/L 143 138 142 139 141 140   POTASSIUM mmol/L 4.2 4.0 4.0 4.1 3.6 3.7   CHLORIDE mmol/L 110* 104 109* 105 109* 108*   CO2 mmol/L 21.2* 17.6* 21.9* 23.4 21.7* 19.6*   BUN mg/dL 28* 36* 32* 30* 24* 24*   CREATININE mg/dL 1.52* 1.65* 1.31* 1.46* 1.30* 1.18   GLUCOSE mg/dL 139* 221* 173* 160* 103* 156*   MAGNESIUM mg/dL  --   --   --   --   --  1.3*   PHOSPHORUS mg/dL 3.0  --   --   --   --   --      CMP   Results from last 7 days   Lab Units 25  0539 25  0948 25  0125 25  0142 25  0338 25  1416   SODIUM mmol/L 143 138 142 139 141 140   POTASSIUM mmol/L 4.2 4.0 4.0 4.1 3.6 3.7   CHLORIDE mmol/L 110* 104 109* 105 109* 108*   CO2 mmol/L 21.2* 17.6* 21.9* 23.4 21.7* 19.6*   BUN mg/dL  28* 36* 32* 30* 24* 24*   CREATININE mg/dL 1.52* 1.65* 1.31* 1.46* 1.30* 1.18   GLUCOSE mg/dL 139* 221* 173* 160* 103* 156*   ALBUMIN g/dL 3.6  --   --   --   --  3.9   BILIRUBIN mg/dL  --   --   --   --   --  0.7   ALK PHOS U/L  --   --   --   --   --  84   AST (SGOT) U/L  --   --   --   --   --  35   ALT (SGPT) U/L  --   --   --   --   --  26     Cardiac Studies:  Echo- Results for orders placed during the hospital encounter of 02/14/24    Adult Transthoracic Echo Complete W/ Color, Spectral and Contrast if Necessary Per Protocol    Interpretation Summary    Left ventricular systolic function is normal. Calculated left ventricular EF = 64% Left ventricular ejection fraction appears to be 61 - 65%.    Left ventricular diastolic function is consistent with (grade I) impaired relaxation.  GLS -17%.    Left atrial volume is mildly increased.    29 mm Medtronic TAVR valve is well-seated.  There is no aortic valve insufficiency.  Mean gradient is 7-10 mmHg.    Estimated right ventricular systolic pressure from tricuspid regurgitation is normal (<35 mmHg).    Stress Myoview-  Cath-      Medication Review:   Scheduled Meds:atorvastatin, 80 mg, Oral, Nightly  [Held by provider] empagliflozin, 10 mg, Oral, Daily  insulin lispro, 2-9 Units, Subcutaneous, 4x Daily With Meals & Nightly  levothyroxine, 112 mcg, Oral, Daily  linagliptin, 5 mg, Oral, Daily  nebivolol, 2.5 mg, Oral, Q24H  NIFEdipine XL, 60 mg, Oral, Q24H  pantoprazole, 40 mg, Oral, Daily  rivaroxaban, 15 mg, Oral, Daily With Dinner  sodium chloride, 10 mL, Intravenous, Q12H      Continuous Infusions:lactated ringers, 100 mL/hr, Last Rate: 100 mL/hr (02/26/25 0600)      PRN Meds:.  acetaminophen **OR** acetaminophen **OR** acetaminophen    aluminum-magnesium hydroxide-simethicone    atropine    senna-docusate sodium **AND** polyethylene glycol **AND** bisacodyl **AND** bisacodyl    dextrose    dextrose    diphenhydrAMINE    diphenhydrAMINE    ePHEDrine Sulfate  (Pressors)    fentanyl    glucagon (human recombinant)    hydrALAZINE    HYDROmorphone    ipratropium-albuterol    labetalol    lidocaine (cardiac)    nitroglycerin    nitroglycerin    ondansetron ODT **OR** ondansetron    ondansetron    oxyCODONE    oxyCODONE    [COMPLETED] Insert Peripheral IV **AND** sodium chloride    sodium chloride    sodium chloride      Assessment & Plan     Dizzy spells    Dizziness    Sick sinus syndrome    Complete heart block    MDM:    1.  Hypertension:    Blood pressure is better controlled with Procardia.  Continue Procardia, valsartan held by nephrology, nebivolol started.  Systolic blood pressure averaging 140s Heart rate 60-70     2.  Sinus bradycardia/third-degree block:    Patient underwent pacemaker,. Site clean and dry,  already has follow-up appointment with Dr. Barragan 3/12    3.  Paroxysmal atrial fibrillation/tachybradycardia syndrome:    Patient is maintaining sinus rhythm, okayed by EP to resume Xarelto    4.  Aortic stenosis s/p TAVR    Patient is doing well.    Disposition:    Patient can be discharged and follow-up with me in 4 wks    LARRY Klein  02/26/25  11:54 EST

## 2025-02-27 ENCOUNTER — TRANSITIONAL CARE MANAGEMENT TELEPHONE ENCOUNTER (OUTPATIENT)
Dept: CALL CENTER | Facility: HOSPITAL | Age: 85
End: 2025-02-27
Payer: MEDICARE

## 2025-02-27 NOTE — OUTREACH NOTE
Call Center TCM Note      Flowsheet Row Responses   [facility] facility patient discharged from? [facility]   Does the patient have one of the following disease processes/diagnoses(primary or secondary)? Other   TCM attempt successful? No   Unsuccessful attempts Attempt 2            Nikki Lopez RN    2/27/2025, 12:42 EST

## 2025-02-28 ENCOUNTER — TELEPHONE (OUTPATIENT)
Dept: CARDIOLOGY | Facility: CLINIC | Age: 85
End: 2025-02-28
Payer: MEDICARE

## 2025-02-28 ENCOUNTER — TRANSITIONAL CARE MANAGEMENT TELEPHONE ENCOUNTER (OUTPATIENT)
Dept: CALL CENTER | Facility: HOSPITAL | Age: 85
End: 2025-02-28
Payer: MEDICARE

## 2025-02-28 NOTE — OUTREACH NOTE
Call Center TCM Note      Flowsheet Row Responses   [facility] [place] facility patient discharged from? [facility]   Does the patient have one of the following disease processes/diagnoses(primary or secondary)? Other   TCM attempt successful? No   Unsuccessful attempts Attempt 3  [VR-Spouse]            Shavon Luis RN    2/28/2025, 11:59 EST

## 2025-02-28 NOTE — TELEPHONE ENCOUNTER
Caller: Alen Ratliff    Relationship: Self    Best call back number: [phone]      PATIENTS WIFE CALLED IN STATING SHE RECEIVED A DENIAL LETTER FOR THE PATIENTS PACEMAKER THAT WAS PLACED MONDAY.    SHE DID SPEAK WITH [employer] AND THAT THEY DID DENY IT AND IT CAN BE APPEALED

## 2025-03-03 ENCOUNTER — LAB (OUTPATIENT)
Dept: LAB | Facility: HOSPITAL | Age: 85
End: 2025-03-03
Payer: MEDICARE

## 2025-03-03 ENCOUNTER — TRANSCRIBE ORDERS (OUTPATIENT)
Dept: LAB | Facility: HOSPITAL | Age: 85
End: 2025-03-03
Payer: MEDICARE

## 2025-03-03 DIAGNOSIS — N17.9 ACUTE RENAL FAILURE, UNSPECIFIED ACUTE RENAL FAILURE TYPE: Primary | ICD-10-CM

## 2025-03-03 DIAGNOSIS — N17.9 ACUTE RENAL FAILURE, UNSPECIFIED ACUTE RENAL FAILURE TYPE: ICD-10-CM

## 2025-03-03 DIAGNOSIS — E11.69 DIABETES MELLITUS ASSOCIATED WITH HORMONAL ETIOLOGY: ICD-10-CM

## 2025-03-03 LAB
ALBUMIN SERPL-MCNC: 3.8 G/DL (ref 3.5–5.2)
ALBUMIN/GLOB SERPL: 1.2 G/DL
ALP SERPL-CCNC: 78 U/L (ref 39–117)
ALT SERPL W P-5'-P-CCNC: 20 U/L (ref 1–41)
ANION GAP SERPL CALCULATED.3IONS-SCNC: 13 MMOL/L (ref 5–15)
AST SERPL-CCNC: 22 U/L (ref 1–40)
BILIRUB SERPL-MCNC: 0.4 MG/DL (ref 0–1.2)
BUN SERPL-MCNC: 26 MG/DL (ref 8–23)
BUN/CREAT SERPL: 21.3 (ref 7–25)
CALCIUM SPEC-SCNC: 9.1 MG/DL (ref 8.6–10.5)
CHLORIDE SERPL-SCNC: 107 MMOL/L (ref 98–107)
CO2 SERPL-SCNC: 24 MMOL/L (ref 22–29)
CREAT SERPL-MCNC: 1.22 MG/DL (ref 0.76–1.27)
EGFRCR SERPLBLD CKD-EPI 2021: 58.1 ML/MIN/1.73
GLOBULIN UR ELPH-MCNC: 3.1 GM/DL
GLUCOSE SERPL-MCNC: 122 MG/DL (ref 65–99)
POTASSIUM SERPL-SCNC: 4.2 MMOL/L (ref 3.5–5.2)
PROT SERPL-MCNC: 6.9 G/DL (ref 6–8.5)
SODIUM SERPL-SCNC: 144 MMOL/L (ref 136–145)

## 2025-03-03 PROCEDURE — 80053 COMPREHEN METABOLIC PANEL: CPT

## 2025-03-03 PROCEDURE — 36415 COLL VENOUS BLD VENIPUNCTURE: CPT

## 2025-03-03 NOTE — TELEPHONE ENCOUNTER
Makes no sense, he was already on the schedule for pacemaker, he got hospitalized and that is why we did not while he was in the hospital because he was having complete heart block.

## 2025-03-04 NOTE — TELEPHONE ENCOUNTER
Pt wife called to get information about PM and how the machine works at home. Explain everything pt and wife V/U       Pt called about OM denial. Routed to Gayle RAZO

## 2025-03-05 NOTE — TELEPHONE ENCOUNTER
Spoke to patients wife regarding his denial letter. She needs to contact the insurance company to make sure they know that he was admitted to hospital prior to having the ablation. It was inpatient surgery. She verbalize understanding .

## 2025-03-10 ENCOUNTER — OFFICE VISIT (OUTPATIENT)
Dept: FAMILY MEDICINE CLINIC | Facility: CLINIC | Age: 85
End: 2025-03-10
Payer: MEDICARE

## 2025-03-10 VITALS
BODY MASS INDEX: 28.87 KG/M2 | HEIGHT: 71 IN | TEMPERATURE: 97.6 F | HEART RATE: 60 BPM | RESPIRATION RATE: 16 BRPM | SYSTOLIC BLOOD PRESSURE: 130 MMHG | WEIGHT: 206.2 LBS | OXYGEN SATURATION: 97 % | DIASTOLIC BLOOD PRESSURE: 71 MMHG

## 2025-03-10 DIAGNOSIS — I10 ESSENTIAL HYPERTENSION: Primary | Chronic | ICD-10-CM

## 2025-03-10 DIAGNOSIS — I48.0 PAF (PAROXYSMAL ATRIAL FIBRILLATION): ICD-10-CM

## 2025-03-10 DIAGNOSIS — I44.2 THIRD DEGREE AV BLOCK: ICD-10-CM

## 2025-03-10 DIAGNOSIS — E11.42 DM TYPE 2 WITH DIABETIC PERIPHERAL NEUROPATHY: ICD-10-CM

## 2025-03-10 PROBLEM — R42 DIZZY SPELLS: Status: RESOLVED | Noted: 2025-02-20 | Resolved: 2025-03-10

## 2025-03-10 PROBLEM — R42 DIZZINESS: Status: RESOLVED | Noted: 2023-05-03 | Resolved: 2025-03-10

## 2025-03-10 PROCEDURE — 1159F MED LIST DOCD IN RCRD: CPT | Performed by: FAMILY MEDICINE

## 2025-03-10 PROCEDURE — G2211 COMPLEX E/M VISIT ADD ON: HCPCS | Performed by: FAMILY MEDICINE

## 2025-03-10 PROCEDURE — 99214 OFFICE O/P EST MOD 30 MIN: CPT | Performed by: FAMILY MEDICINE

## 2025-03-10 PROCEDURE — 3078F DIAST BP <80 MM HG: CPT | Performed by: FAMILY MEDICINE

## 2025-03-10 PROCEDURE — 1126F AMNT PAIN NOTED NONE PRSNT: CPT | Performed by: FAMILY MEDICINE

## 2025-03-10 PROCEDURE — 1160F RVW MEDS BY RX/DR IN RCRD: CPT | Performed by: FAMILY MEDICINE

## 2025-03-10 PROCEDURE — 3075F SYST BP GE 130 - 139MM HG: CPT | Performed by: FAMILY MEDICINE

## 2025-03-10 RX ORDER — LOSARTAN POTASSIUM 100 MG/1
50 TABLET ORAL DAILY
COMMUNITY

## 2025-03-10 RX ORDER — NEBIVOLOL 2.5 MG/1
2.5 TABLET ORAL
Qty: 30 TABLET | Refills: 0 | Status: SHIPPED | OUTPATIENT
Start: 2025-03-10 | End: 2025-04-09

## 2025-03-10 NOTE — PROGRESS NOTES
Subjective   Chief Complaint   Patient presents with    Hospital Follow Up Visit    Coronary Artery Disease    Hypertension    Med Refill     Alen Ratliff is a 84 y.o. male.     Patient Care Team:  Serenity Wren MD as PCP - General (Family Medicine)  Steve Muhammad MD as Consulting Physician (Cardiology)  CAROLE Vasquez DPM as Consulting Physician (Podiatry)  David Urbina MD as Consulting Physician (Otolaryngology)  Gregory Valle MD as Surgeon (Vascular Surgery)  Gómez Arvizu MD as Consulting Physician (Ophthalmology)  Radha Danielle MD as Consulting Physician (Cardiology)  Modesto Mccall MD as Consulting Physician (Nephrology)    History of Present Illness  He is coming in today with his wife to follow-up on his recent hospital admission.  Patient went to the ER due to not feeling well and having quite high blood pressure.  He is already established with a cardiology due to CAD, during his hospital stay pacemaker was placed.  His medications were also changed.  Patient was noted to be in acute kidney failure and his amlodipine, losartan 1 her milligrams, and Jardiance were discontinued.  He was started on nebivolol and nifedipine and he tells me that later on he was started back on losartan at 50 mg strength.  He overall reports feeling pretty good.  He is scheduled to follow-up with the cardiology office next week.       The following portions of the patient's history were reviewed and updated as appropriate: allergies, current medications, past family history, past medical history, past social history, past surgical history, and problem list.  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     Stroke      Past Surgical History:   Procedure Laterality Date    ANKLE OPEN REDUCTION INTERNAL FIXATION Left 12/16/2020    Procedure: ANKLE OPEN REDUCTION INTERNAL FIXATION;  Surgeon: CAROLE Vasquez DPM;  Location: [facility] MAIN OR;  Service: Podiatry;  Laterality: Left;    AORTIC VALVE REPAIR/REPLACEMENT N/A 02/20/2023    Procedure: Transfemoral Transcatheter Aortic Valve Replacement;  Surgeon: Naveed Leonardo MD;  Location: [facility] HYBRID OR;  Service: Cardiovascular;  Laterality: N/A;    AORTIC VALVE REPAIR/REPLACEMENT N/A 02/20/2023    Procedure: TRANSCATHETER AORTIC VALVE REPLACEMENT;  Surgeon: Fabio Villafana MD;  Location: [facility] HYBRID OR;  Service: Cardiothoracic;  Laterality: N/A;    BACK SURGERY  1994    CARDIAC CATHETERIZATION Right 09/27/2022    Procedure: Left Heart Cath;  Surgeon: Steve Muhammad MD;  Location: [facility] CATH INVASIVE LOCATION;  Service: Cardiovascular;  Laterality: Right;    CARDIAC CATHETERIZATION N/A 01/12/2023    Procedure: Stent JENN coronary;  Surgeon: Steve Muhammad MD;  Location: [facility] CATH INVASIVE LOCATION;  Service: Cardiovascular;  Laterality: N/A;    CARDIAC ELECTROPHYSIOLOGY PROCEDURE N/A 2/24/2025    Procedure: Pacemaker DC new, BiV PPM for complete heart block;  Surgeon: Rahda Danielle MD;  Location: [facility] CATH INVASIVE LOCATION;  Service: Cardiovascular;  Laterality: N/A;    CAROTID ENDARTERECTOMY Right 12/14/2020    Procedure: CAROTID ENDARTERECTOMY;  Surgeon: Toby Fung MD;  Location: [facility] MAIN OR;  Service: Vascular;  Laterality: Right;    COLONOSCOPY  08/25/2015    CORONARY STENT PLACEMENT      PROSTATECTOMY  2001    due to cancer     The patient has a family history of  Family History   Problem Relation Age of Onset    Hypertension Other     Heart attack Mother     Heart disease Mother     Heart attack Father     Heart disease Father      Social History     Socioeconomic History    Marital status:    Tobacco Use    Smoking status: Never      "Passive exposure: Never    Smokeless tobacco: Never   Vaping Use    Vaping status: Never Used   Substance and Sexual Activity    Alcohol use: Never    Drug use: Never    Sexual activity: Defer       Review of Systems  Visit Vitals  /71 (BP Location: Left arm, Patient Position: Sitting, Cuff Size: Adult)   Pulse 60   Temp 97.6 °F (36.4 °C) (Temporal)   Resp 16   Ht 180.3 cm (71\")   Wt 93.5 kg (206 lb 3.2 oz)   SpO2 97%   BMI 28.76 kg/m²              Current Outpatient Medications:     Artificial Tear Ointment (artificial tears) ophthalmic ointment, Administer  to both eyes Every 1 (One) Hour As Needed., Disp: , Rfl:     aspirin 81 MG chewable tablet, Chew 1 tablet Daily., Disp: 30 tablet, Rfl: 1    atorvastatin (LIPITOR) 80 MG tablet, TAKE ONE TABLET BY MOUTH EVERY DAY, Disp: 90 tablet, Rfl: 1    fluticasone (FLONASE) 50 MCG/ACT nasal spray, Administer 2 sprays into the nostril(s) as directed by provider Daily As Needed., Disp: , Rfl:     levothyroxine (Synthroid) 112 MCG tablet, Take 1 tablet by mouth Daily., Disp: 90 tablet, Rfl: 1    levothyroxine (SYNTHROID, LEVOTHROID) 112 MCG tablet, Take 1 tablet by mouth Every Morning., Disp: , Rfl:     losartan (COZAAR) 100 MG tablet, Take 0.5 tablets by mouth Daily., Disp: , Rfl:     nebivolol (BYSTOLIC) 2.5 MG tablet, Take 1 tablet by mouth Daily for 30 days., Disp: 30 tablet, Rfl: 0    NIFEdipine CC (ADALAT CC) 60 MG 24 hr tablet, Take 1 tablet by mouth Daily for 30 days., Disp: 30 tablet, Rfl: 0    OneTouch Ultra test strip, USE AND DISCARD 1 TEST     STRIP DAILY., Disp: 100 each, Rfl: 1    pantoprazole (PROTONIX) 40 MG EC tablet, Take 1 tablet by mouth Daily., Disp: 90 tablet, Rfl: 1    SITagliptin (Januvia) 50 MG tablet, Take 1 tablet by mouth Daily., Disp: 90 tablet, Rfl: 0    Xarelto 15 MG tablet, TAKE ONE TABLET BY MOUTH EVERY DAY WITH DINNER, Disp: 90 tablet, Rfl: 0    Objective   Physical Exam    US Renal Bilateral  Result Date: 2/25/2025  Impression: " Impression: No hydronephrosis or acute sonographic abnormality. Electronically Signed: Darci Lee MD  2/25/2025 2:26 PM EST  Workstation ID: [id number]    XR Chest 1 View  Result Date: 2/24/2025  Impression: Impression: Post left subclavian dual-lead pacemaker placement. No evidence of pneumothorax. Electronically Signed: Warren Galvez MD  2/24/2025 6:34 PM EST  Workstation ID: [id number]    XR Chest 1 View  Result Date: 2/20/2025  Impression: Impression: No acute chest finding. Electronically Signed: Lisa Barnard MD  2/20/2025 3:04 PM EST  Workstation ID: [id number]      FOLLOWING LABS WERE REVIEWED TODAY:  CMP          2/25/2025    09:48 2/26/2025    05:39 3/3/2025    11:19   CMP   Glucose 221  139  122    BUN 36  28  26    Creatinine 1.65  1.52  1.22    EGFR 40.4  44.6  58.1    Sodium 138  143  144    Potassium 4.0  4.2  4.2    Chloride 104  110  107    Calcium 9.0  9.1  9.1    Total Protein   6.9    Albumin  3.6  3.8    Globulin   3.1    Total Bilirubin   0.4    Alkaline Phosphatase   78    AST (SGOT)   22    ALT (SGPT)   20    Albumin/Globulin Ratio   1.2    BUN/Creatinine Ratio 21.8  18.4  21.3    Anion Gap 16.4  11.8  13.0      CBC          2/23/2025    01:25 2/25/2025    12:26 2/26/2025    05:39   CBC   WBC 8.07  16.02  10.64    RBC 4.36  4.28  4.30    Hemoglobin 12.8  12.6  12.5    Hematocrit 41.7  39.6  41.2    MCV 95.6  92.5  95.8    MCH 29.4  29.4  29.1    MCHC 30.7  31.8  30.3    RDW 13.5  13.8  13.9    Platelets 214  179  180      Lipid Panel          3/26/2024    08:33 10/10/2024    09:05   Lipid Panel   Total Cholesterol 124  110    Triglycerides 104  62    HDL Cholesterol 39  37    VLDL Cholesterol 19  14    LDL Cholesterol  66  59    LDL/HDL Ratio 1.65  1.64      TSH          10/10/2024    09:05 12/18/2024    10:14 2/20/2025    14:16   TSH   TSH 5.420  3.070  2.710      Most Recent A1C          10/10/2024    09:05   HGBA1C Most Recent   Hemoglobin A1C 6.60            Assessment & Plan   Diagnoses  and all orders for this visit:    1. Essential hypertension (Primary)  -     NIFEdipine CC (ADALAT CC) 60 MG 24 hr tablet; Take 1 tablet by mouth Daily for 30 days.  Dispense: 30 tablet; Refill: 0  -     nebivolol (BYSTOLIC) 2.5 MG tablet; Take 1 tablet by mouth Daily for 30 days.  Dispense: 30 tablet; Refill: 0    2. Third degree AV block    3. PAF (paroxysmal atrial fibrillation)    4. DM type 2 with diabetic peripheral neuropathy  -     SITagliptin (Januvia) 50 MG tablet; Take 1 tablet by mouth Daily.  Dispense: 90 tablet; Refill: 0        I reviewed his available hospital records including multiple labs.  He is a status post pacemaker placement and he has been recovering well.  His blood pressure today looks very good.  He will continue nifedipine and nebivolol.  Patient is currently off Jardiance and hopefully he will be able to be restarted back on it in near future.    Return in about 3 months (around 6/10/2025) for Next scheduled follow up.    Requested Prescriptions     Signed Prescriptions Disp Refills    NIFEdipine CC (ADALAT CC) 60 MG 24 hr tablet 30 tablet 0     Sig: Take 1 tablet by mouth Daily for 30 days.    nebivolol (BYSTOLIC) 2.5 MG tablet 30 tablet 0     Sig: Take 1 tablet by mouth Daily for 30 days.    SITagliptin (Januvia) 50 MG tablet 90 tablet 0     Sig: Take 1 tablet by mouth Daily.       Serenity Wren MD  03/10/2025  11:06 EDT

## 2025-03-12 ENCOUNTER — CLINICAL SUPPORT NO REQUIREMENTS (OUTPATIENT)
Dept: CARDIOLOGY | Facility: CLINIC | Age: 85
End: 2025-03-12
Payer: MEDICARE

## 2025-03-12 DIAGNOSIS — R00.1 BRADYCARDIA: ICD-10-CM

## 2025-03-12 DIAGNOSIS — Z95.0 PACEMAKER: ICD-10-CM

## 2025-03-12 DIAGNOSIS — I44.2 THIRD DEGREE AV BLOCK: ICD-10-CM

## 2025-03-12 DIAGNOSIS — I49.5 SICK SINUS SYNDROME: ICD-10-CM

## 2025-03-12 DIAGNOSIS — I44.1 AV BLOCK, MOBITZ 1: Primary | ICD-10-CM

## 2025-03-12 NOTE — PROGRESS NOTES
"Patient is here today s/p pacemaker placement. Interrogation of device is normal, leads stable. Removed steri strips with no issues, incision is healed. Incision is well approximated with no redness, swelling or drainage noted. Patient teaching for left arm precautions, incision care, device function and follow up. Patient to see NP at Dr Muhammad\"s office.  "

## 2025-03-17 ENCOUNTER — TELEPHONE (OUTPATIENT)
Dept: CARDIOLOGY | Facility: CLINIC | Age: 85
End: 2025-03-17

## 2025-03-17 DIAGNOSIS — G45.9 TIA (TRANSIENT ISCHEMIC ATTACK): ICD-10-CM

## 2025-03-17 NOTE — TELEPHONE ENCOUNTER
Rx Refill Note  Requested Prescriptions     Pending Prescriptions Disp Refills    rivaroxaban (Xarelto) 15 MG tablet 90 tablet 0      Last office visit with prescribing clinician: 3/10/2025   Last telemedicine visit with prescribing clinician: Visit date not found   Next office visit with prescribing clinician: 4/22/2025                         Would you like a call back once the refill request has been completed: [] Yes [] No    If the office needs to give you a call back, can they leave a voicemail: [] Yes [] No    Nasra Cruz MA  03/17/25, 12:54 EDT

## 2025-03-17 NOTE — TELEPHONE ENCOUNTER
PATIENT'S SPOUSE CALLED FOR MEDICATION FOR  Xarelto 15 MG tablet   HE HAS 2 WEEKS LEFT. NO REFILLS LEFT    [facility], [facility]. - [place], [place] - [address] [place] [address] [phone] [facility] [phone]  [phone]     CALL BACK NUMBER [phone]

## 2025-03-17 NOTE — TELEPHONE ENCOUNTER
Caller: DEWEY    Relationship: SPOUSE    Best call back number: [phone]       What was the call regarding: NEXT OV 4/1/25    CURRENT BP MEDS AFTER GETTING OUT OF THE HOSPITAL- : NEVILOL, NIFEDIPINE  (IN CHART) NEPHROLOGY ADVISED THEM TO TAKE LOSARTAN AT 50MG AS WELL        3/15/25 STOPPED BP MEDS- PT WASNT ACTING CORRECTLY- SHE STATED HE IS FEELING/ACTING LIKE THE SAME WAY BEFORE HE GOT HIS PACEMAKER      3/16/25-VERY SLEEPY, DISORIENTED     MORNING  142/63   136/61    AFTERNOON 147/81  154/76    EVENING  128/64  158/79      3/17 9:00 AM    140/62  128/56        HR RANGES 60-65

## 2025-03-20 ENCOUNTER — LAB (OUTPATIENT)
Dept: LAB | Facility: HOSPITAL | Age: 85
End: 2025-03-20
Payer: MEDICARE

## 2025-03-20 ENCOUNTER — TRANSCRIBE ORDERS (OUTPATIENT)
Dept: ADMINISTRATIVE | Facility: HOSPITAL | Age: 85
End: 2025-03-20
Payer: MEDICARE

## 2025-03-20 ENCOUNTER — TELEPHONE (OUTPATIENT)
Dept: FAMILY MEDICINE CLINIC | Facility: CLINIC | Age: 85
End: 2025-03-20
Payer: MEDICARE

## 2025-03-20 DIAGNOSIS — I49.5 CORONARY SINUS RHYTHM DISORDER: ICD-10-CM

## 2025-03-20 DIAGNOSIS — E11.69 DIABETES MELLITUS ASSOCIATED WITH HORMONAL ETIOLOGY: ICD-10-CM

## 2025-03-20 DIAGNOSIS — N17.9 ACUTE RENAL FAILURE, UNSPECIFIED ACUTE RENAL FAILURE TYPE: ICD-10-CM

## 2025-03-20 DIAGNOSIS — I10 ESSENTIAL HYPERTENSION, MALIGNANT: ICD-10-CM

## 2025-03-20 DIAGNOSIS — N17.9 ACUTE RENAL FAILURE, UNSPECIFIED ACUTE RENAL FAILURE TYPE: Primary | ICD-10-CM

## 2025-03-20 LAB
ALBUMIN SERPL-MCNC: 3.7 G/DL (ref 3.5–5.2)
ALBUMIN/GLOB SERPL: 1.3 G/DL
ALP SERPL-CCNC: 75 U/L (ref 39–117)
ALT SERPL W P-5'-P-CCNC: 53 U/L (ref 1–41)
ANION GAP SERPL CALCULATED.3IONS-SCNC: 14.9 MMOL/L (ref 5–15)
AST SERPL-CCNC: 54 U/L (ref 1–40)
BILIRUB SERPL-MCNC: 0.7 MG/DL (ref 0–1.2)
BUN SERPL-MCNC: 33 MG/DL (ref 8–23)
BUN/CREAT SERPL: 19.2 (ref 7–25)
CALCIUM SPEC-SCNC: 8.5 MG/DL (ref 8.6–10.5)
CHLORIDE SERPL-SCNC: 103 MMOL/L (ref 98–107)
CO2 SERPL-SCNC: 20.1 MMOL/L (ref 22–29)
CREAT SERPL-MCNC: 1.72 MG/DL (ref 0.76–1.27)
EGFRCR SERPLBLD CKD-EPI 2021: 38.5 ML/MIN/1.73
GLOBULIN UR ELPH-MCNC: 2.9 GM/DL
GLUCOSE SERPL-MCNC: 167 MG/DL (ref 65–99)
POTASSIUM SERPL-SCNC: 3.7 MMOL/L (ref 3.5–5.2)
PROT SERPL-MCNC: 6.6 G/DL (ref 6–8.5)
SODIUM SERPL-SCNC: 138 MMOL/L (ref 136–145)

## 2025-03-20 PROCEDURE — 80053 COMPREHEN METABOLIC PANEL: CPT

## 2025-03-20 PROCEDURE — 36415 COLL VENOUS BLD VENIPUNCTURE: CPT

## 2025-03-20 NOTE — TELEPHONE ENCOUNTER
Patient's wife, Elba, called and lvm. She stated the patient is needing to schedule an appointment. Please call Alen, or Elba, to schedule an appointment with Dr. Wren.

## 2025-03-20 NOTE — TELEPHONE ENCOUNTER
CALLED AND SPOKE TO DEWEY AND SCHEDULED FLOWER AN APPT FOR 03/26/2025. SHE WAS VERY UPSET THAT I COULDN'T GET HIM IN SOONER, SO I TOLD HER SHE CAN CALL IN EACH DAY TO SEE IF WE'VE HAD ANY CANCELLATIONS.

## 2025-03-21 ENCOUNTER — HOSPITAL ENCOUNTER (INPATIENT)
Facility: HOSPITAL | Age: 85
LOS: 4 days | Discharge: NURSING, REHAB OR LONG-TERM CARE | DRG: 372 | End: 2025-03-29
Attending: INTERNAL MEDICINE | Admitting: STUDENT IN AN ORGANIZED HEALTH CARE EDUCATION/TRAINING PROGRAM
Payer: MEDICARE

## 2025-03-21 DIAGNOSIS — D64.9 ANEMIA, UNSPECIFIED TYPE: ICD-10-CM

## 2025-03-21 DIAGNOSIS — M25.561 CHRONIC PAIN OF RIGHT KNEE: ICD-10-CM

## 2025-03-21 DIAGNOSIS — E87.8 ELECTROLYTE ABNORMALITY: ICD-10-CM

## 2025-03-21 DIAGNOSIS — R26.9 GAIT DISTURBANCE: Primary | ICD-10-CM

## 2025-03-21 DIAGNOSIS — G89.29 CHRONIC PAIN OF RIGHT KNEE: ICD-10-CM

## 2025-03-21 PROBLEM — R29.898 WEAKNESS OF BOTH LOWER EXTREMITIES: Status: ACTIVE | Noted: 2025-03-21

## 2025-03-21 LAB
ANION GAP SERPL CALCULATED.3IONS-SCNC: 12.7 MMOL/L (ref 5–15)
APTT PPP: 32.5 SECONDS (ref 22.7–35.4)
BACTERIA UR QL AUTO: ABNORMAL /HPF
BASOPHILS # BLD AUTO: 0.04 10*3/MM3 (ref 0–0.2)
BASOPHILS NFR BLD AUTO: 0.4 % (ref 0–1.5)
BILIRUB UR QL STRIP: NEGATIVE
BUN SERPL-MCNC: 29 MG/DL (ref 8–23)
BUN/CREAT SERPL: 18.2 (ref 7–25)
CALCIUM SPEC-SCNC: 8.4 MG/DL (ref 8.6–10.5)
CHLORIDE SERPL-SCNC: 105 MMOL/L (ref 98–107)
CLARITY UR: ABNORMAL
CO2 SERPL-SCNC: 20.3 MMOL/L (ref 22–29)
COLOR UR: YELLOW
CREAT SERPL-MCNC: 1.59 MG/DL (ref 0.76–1.27)
DEPRECATED RDW RBC AUTO: 45.4 FL (ref 37–54)
EGFRCR SERPLBLD CKD-EPI 2021: 42.3 ML/MIN/1.73
EOSINOPHIL # BLD AUTO: 0.07 10*3/MM3 (ref 0–0.4)
EOSINOPHIL NFR BLD AUTO: 0.8 % (ref 0.3–6.2)
ERYTHROCYTE [DISTWIDTH] IN BLOOD BY AUTOMATED COUNT: 13.6 % (ref 12.3–15.4)
GLUCOSE SERPL-MCNC: 127 MG/DL (ref 65–99)
GLUCOSE UR STRIP-MCNC: NEGATIVE MG/DL
HCT VFR BLD AUTO: 37.6 % (ref 37.5–51)
HGB BLD-MCNC: 11.7 G/DL (ref 13–17.7)
HGB UR QL STRIP.AUTO: NEGATIVE
HOLD SPECIMEN: NORMAL
HYALINE CASTS UR QL AUTO: ABNORMAL /LPF
IMM GRANULOCYTES # BLD AUTO: 0.05 10*3/MM3 (ref 0–0.05)
IMM GRANULOCYTES NFR BLD AUTO: 0.5 % (ref 0–0.5)
INR PPP: 1.31 (ref 0.9–1.1)
KETONES UR QL STRIP: NEGATIVE
LEUKOCYTE ESTERASE UR QL STRIP.AUTO: NEGATIVE
LYMPHOCYTES # BLD AUTO: 0.85 10*3/MM3 (ref 0.7–3.1)
LYMPHOCYTES NFR BLD AUTO: 9.3 % (ref 19.6–45.3)
MAGNESIUM SERPL-MCNC: 1.2 MG/DL (ref 1.6–2.4)
MCH RBC QN AUTO: 28.5 PG (ref 26.6–33)
MCHC RBC AUTO-ENTMCNC: 31.1 G/DL (ref 31.5–35.7)
MCV RBC AUTO: 91.5 FL (ref 79–97)
MONOCYTES # BLD AUTO: 1.32 10*3/MM3 (ref 0.1–0.9)
MONOCYTES NFR BLD AUTO: 14.4 % (ref 5–12)
NEUTROPHILS NFR BLD AUTO: 6.82 10*3/MM3 (ref 1.7–7)
NEUTROPHILS NFR BLD AUTO: 74.6 % (ref 42.7–76)
NITRITE UR QL STRIP: NEGATIVE
NRBC BLD AUTO-RTO: 0 /100 WBC (ref 0–0.2)
NT-PROBNP SERPL-MCNC: 772 PG/ML (ref 0–1800)
PH UR STRIP.AUTO: 5.5 [PH] (ref 5–8)
PHOSPHATE SERPL-MCNC: 2.8 MG/DL (ref 2.5–4.5)
PLATELET # BLD AUTO: 209 10*3/MM3 (ref 140–450)
PMV BLD AUTO: 10.3 FL (ref 6–12)
POTASSIUM SERPL-SCNC: 4.2 MMOL/L (ref 3.5–5.2)
PROT UR QL STRIP: ABNORMAL
PROTHROMBIN TIME: 16.3 SECONDS (ref 11.7–14.2)
RBC # BLD AUTO: 4.11 10*6/MM3 (ref 4.14–5.8)
RBC # UR STRIP: ABNORMAL /HPF
REF LAB TEST METHOD: ABNORMAL
SODIUM SERPL-SCNC: 138 MMOL/L (ref 136–145)
SP GR UR STRIP: 1.02 (ref 1–1.03)
SQUAMOUS #/AREA URNS HPF: ABNORMAL /HPF
TSH SERPL DL<=0.05 MIU/L-ACNC: 1.68 UIU/ML (ref 0.27–4.2)
UROBILINOGEN UR QL STRIP: ABNORMAL
WBC # UR STRIP: ABNORMAL /HPF
WBC NRBC COR # BLD AUTO: 9.15 10*3/MM3 (ref 3.4–10.8)

## 2025-03-21 PROCEDURE — 83036 HEMOGLOBIN GLYCOSYLATED A1C: CPT

## 2025-03-21 PROCEDURE — 25010000002 HEPARIN (PORCINE) PER 1000 UNITS

## 2025-03-21 PROCEDURE — 81001 URINALYSIS AUTO W/SCOPE: CPT | Performed by: OCCUPATIONAL THERAPIST

## 2025-03-21 PROCEDURE — G0378 HOSPITAL OBSERVATION PER HR: HCPCS

## 2025-03-21 PROCEDURE — 85730 THROMBOPLASTIN TIME PARTIAL: CPT | Performed by: OCCUPATIONAL THERAPIST

## 2025-03-21 PROCEDURE — 83880 ASSAY OF NATRIURETIC PEPTIDE: CPT | Performed by: OCCUPATIONAL THERAPIST

## 2025-03-21 PROCEDURE — 80048 BASIC METABOLIC PNL TOTAL CA: CPT | Performed by: OCCUPATIONAL THERAPIST

## 2025-03-21 PROCEDURE — 84443 ASSAY THYROID STIM HORMONE: CPT | Performed by: OCCUPATIONAL THERAPIST

## 2025-03-21 PROCEDURE — 25810000003 SODIUM CHLORIDE 0.9 % SOLUTION

## 2025-03-21 PROCEDURE — 99285 EMERGENCY DEPT VISIT HI MDM: CPT

## 2025-03-21 PROCEDURE — 85025 COMPLETE CBC W/AUTO DIFF WBC: CPT | Performed by: OCCUPATIONAL THERAPIST

## 2025-03-21 PROCEDURE — 85610 PROTHROMBIN TIME: CPT | Performed by: OCCUPATIONAL THERAPIST

## 2025-03-21 PROCEDURE — 84100 ASSAY OF PHOSPHORUS: CPT | Performed by: OCCUPATIONAL THERAPIST

## 2025-03-21 PROCEDURE — 83735 ASSAY OF MAGNESIUM: CPT | Performed by: OCCUPATIONAL THERAPIST

## 2025-03-21 RX ORDER — IBUPROFEN 600 MG/1
1 TABLET ORAL
Status: DISCONTINUED | OUTPATIENT
Start: 2025-03-21 | End: 2025-03-29 | Stop reason: HOSPADM

## 2025-03-21 RX ORDER — NIFEDIPINE 30 MG
30 TABLET, EXTENDED RELEASE ORAL DAILY
COMMUNITY

## 2025-03-21 RX ORDER — HYDROCODONE BITARTRATE AND ACETAMINOPHEN 5; 325 MG/1; MG/1
1 TABLET ORAL EVERY 6 HOURS PRN
Refills: 0 | Status: ACTIVE | OUTPATIENT
Start: 2025-03-21 | End: 2025-03-26

## 2025-03-21 RX ORDER — SODIUM CHLORIDE 0.9 % (FLUSH) 0.9 %
10 SYRINGE (ML) INJECTION AS NEEDED
Status: DISCONTINUED | OUTPATIENT
Start: 2025-03-21 | End: 2025-03-29 | Stop reason: HOSPADM

## 2025-03-21 RX ORDER — BISACODYL 10 MG
10 SUPPOSITORY, RECTAL RECTAL DAILY PRN
Status: DISCONTINUED | OUTPATIENT
Start: 2025-03-21 | End: 2025-03-29 | Stop reason: HOSPADM

## 2025-03-21 RX ORDER — DEXTROSE MONOHYDRATE 25 G/50ML
25 INJECTION, SOLUTION INTRAVENOUS
Status: DISCONTINUED | OUTPATIENT
Start: 2025-03-21 | End: 2025-03-29 | Stop reason: HOSPADM

## 2025-03-21 RX ORDER — SODIUM CHLORIDE 0.9 % (FLUSH) 0.9 %
10 SYRINGE (ML) INJECTION EVERY 12 HOURS SCHEDULED
Status: DISCONTINUED | OUTPATIENT
Start: 2025-03-21 | End: 2025-03-29 | Stop reason: HOSPADM

## 2025-03-21 RX ORDER — POLYETHYLENE GLYCOL 3350 17 G/17G
17 POWDER, FOR SOLUTION ORAL DAILY PRN
Status: DISCONTINUED | OUTPATIENT
Start: 2025-03-21 | End: 2025-03-29 | Stop reason: HOSPADM

## 2025-03-21 RX ORDER — SODIUM CHLORIDE 9 MG/ML
40 INJECTION, SOLUTION INTRAVENOUS AS NEEDED
Status: DISCONTINUED | OUTPATIENT
Start: 2025-03-21 | End: 2025-03-29 | Stop reason: HOSPADM

## 2025-03-21 RX ORDER — INSULIN LISPRO 100 [IU]/ML
2-7 INJECTION, SOLUTION INTRAVENOUS; SUBCUTANEOUS
Status: DISCONTINUED | OUTPATIENT
Start: 2025-03-22 | End: 2025-03-29 | Stop reason: HOSPADM

## 2025-03-21 RX ORDER — MAGNESIUM SULFATE HEPTAHYDRATE 40 MG/ML
2 INJECTION, SOLUTION INTRAVENOUS
Status: COMPLETED | OUTPATIENT
Start: 2025-03-22 | End: 2025-03-22

## 2025-03-21 RX ORDER — NITROGLYCERIN 0.4 MG/1
0.4 TABLET SUBLINGUAL
Status: DISCONTINUED | OUTPATIENT
Start: 2025-03-21 | End: 2025-03-29 | Stop reason: HOSPADM

## 2025-03-21 RX ORDER — AMOXICILLIN 250 MG
2 CAPSULE ORAL 2 TIMES DAILY PRN
Status: DISCONTINUED | OUTPATIENT
Start: 2025-03-21 | End: 2025-03-29 | Stop reason: HOSPADM

## 2025-03-21 RX ORDER — NICOTINE POLACRILEX 4 MG
15 LOZENGE BUCCAL
Status: DISCONTINUED | OUTPATIENT
Start: 2025-03-21 | End: 2025-03-29 | Stop reason: HOSPADM

## 2025-03-21 RX ORDER — OXYCODONE HYDROCHLORIDE 5 MG/1
10 TABLET ORAL EVERY 4 HOURS PRN
Refills: 0 | Status: DISPENSED | OUTPATIENT
Start: 2025-03-21 | End: 2025-03-26

## 2025-03-21 RX ORDER — HEPARIN SODIUM 5000 [USP'U]/ML
5000 INJECTION, SOLUTION INTRAVENOUS; SUBCUTANEOUS EVERY 8 HOURS SCHEDULED
Status: DISCONTINUED | OUTPATIENT
Start: 2025-03-21 | End: 2025-03-22

## 2025-03-21 RX ORDER — BISACODYL 5 MG/1
5 TABLET, DELAYED RELEASE ORAL DAILY PRN
Status: DISCONTINUED | OUTPATIENT
Start: 2025-03-21 | End: 2025-03-29 | Stop reason: HOSPADM

## 2025-03-21 RX ORDER — SODIUM CHLORIDE 9 MG/ML
100 INJECTION, SOLUTION INTRAVENOUS CONTINUOUS
Status: DISPENSED | OUTPATIENT
Start: 2025-03-21 | End: 2025-03-22

## 2025-03-21 RX ADMIN — Medication 10 ML: at 22:43

## 2025-03-21 RX ADMIN — HEPARIN SODIUM 5000 UNITS: 5000 INJECTION INTRAVENOUS; SUBCUTANEOUS at 22:43

## 2025-03-21 RX ADMIN — SODIUM CHLORIDE 100 ML/HR: 9 INJECTION, SOLUTION INTRAVENOUS at 23:43

## 2025-03-21 NOTE — CASE MANAGEMENT/SOCIAL WORK
Discharge Planning Assessment   David     Patient Name: Alen Ratliff  MRN: [id number]  Today's Date: 3/21/2025    Admit Date: 3/21/2025    Plan: From Home, PT pending   Discharge Needs Assessment       Row Name 03/21/25 1931       Transition Planning    Patient/Family Anticipates Transition to home with family    Patient/Family Anticipated Services at Transition none    Transportation Anticipated family or friend will provide  Spouse Elba can transport at d/c      Row Name 03/21/25 1929       Living Environment    People in Home spouse    Name(s) of People in Home spouse Elba  spouse Elba    Current Living Arrangements home    Potentially Unsafe Housing Conditions none    In the past 12 months has the electric, gas, oil, or water company threatened to shut off services in your home? No    Primary Care Provided by self    Provides Primary Care For no one    Family Caregiver if Needed spouse    Family Caregiver Names spouse Elba    Quality of Family Relationships helpful;involved;supportive    Able to Return to Prior Arrangements yes       Resource/Environmental Concerns    Resource/Environmental Concerns none    Transportation Concerns none       Transportation Needs    In the past 12 months, has lack of transportation kept you from medical appointments or from getting medications? no    In the past 12 months, has lack of transportation kept you from meetings, work, or from getting things needed for daily living? No       Food Insecurity    Within the past 12 months, you worried that your food would run out before you got the money to buy more. Never true    Within the past 12 months, the food you bought just didn't last and you didn't have money to get more. Never true       Transition Planning    Patient/Family Anticipates Transition to home with family    Patient/Family Anticipated Services at Transition none    Transportation Anticipated family or friend will provide       Discharge Needs Assessment     Readmission Within the Last 30 Days no previous admission in last 30 days    Equipment Currently Used at Home cane, straight;grab bar;glucometer    Concerns to be Addressed no discharge needs identified    Do you want help finding or keeping work or a job? I do not need or want help    Do you want help with school or training? For example, starting or completing job training or getting a high school diploma, GED or equivalent No                Discharge Plan       Row Name 03/21/25 1932       Plan    Plan From Home, PT pending    Patient/Family in Agreement with Plan yes    Plan Comments CM spoke to pt. Alen and spouse Elba at bedside. PCP and pharmacy confirmed. Pt denies medication, transportation, or financial issues. Pt. agrees to M2B. DC barriers: PT Pending                Demographic Summary       Row Name 03/21/25 1928       General Information    Arrived From home    Referral Source admission list    Reason for Consult discharge planning    Preferred Language English       Contact Information    Permission Granted to Share Info With     Contact Information Obtained for                    Functional Status       Row Name 03/21/25 1928       Functional Status    Usual Activity Tolerance good    Current Activity Tolerance good       Physical Activity    On average, how many days per week do you engage in moderate to strenuous exercise (like a brisk walk)? 7 days    On average, how many minutes do you engage in exercise at this level? 40 min    Number of minutes of exercise per week 280       Functional Status, IADL    Medications independent    Meal Preparation independent    Housekeeping independent    Laundry assistive person  Spouse Elba    Shopping independent    If for any reason you need help with day-to-day activities such as bathing, preparing meals, shopping, managing finances, etc., do you get the help you need? I don't need any help               Cee Olmos RN  Case  Manager  Office: [phone]  Fax: [phone]  [email or web address]

## 2025-03-21 NOTE — ED PROVIDER NOTES
Subjective   History of Present Illness  Patient is an 84-year-old male with past medical history significant for hypothyroidism, GERD, stroke, hypertension, coronary artery disease, and diabetes presenting to the ED for changes in his gait x 5 days.  Patient had a pacemaker placed on 2/24/2025 during a 7-day admission.  He had several changes in his medications during that time.  Patient denies any changes in bowel or bladder function, saddle anesthesia, headache, facial drooping, dysphagia, dysarthria, changes in appetite, back pain, and upper extremity weakness or lack of coordination.  Patient reports that his symptoms feel heavy, and he says that he is taking more effort to move his legs.  He has not had any falls since this started.        Review of Systems   Constitutional:  Negative for fever.   Respiratory:  Negative for cough, chest tightness and shortness of breath.    Cardiovascular:  Negative for chest pain, palpitations and leg swelling.   Gastrointestinal:  Negative for abdominal pain, constipation, diarrhea and nausea.   Genitourinary:  Negative for difficulty urinating.   Musculoskeletal:  Positive for gait problem. Negative for back pain and joint swelling.       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    Stroke        Allergies   Allergen Reactions    Azithromycin Unknown - High Severity    Tramadol Unknown - High Severity       Past Surgical History:   Procedure Laterality Date    ANKLE OPEN REDUCTION INTERNAL FIXATION Left 12/16/2020    Procedure: ANKLE OPEN REDUCTION INTERNAL FIXATION;  Surgeon: CAROLE Vasquez DPM;  [facility];  Service: Podiatry;  Laterality: Left;    AORTIC  VALVE REPAIR/REPLACEMENT N/A 02/20/2023    Procedure: Transfemoral Transcatheter Aortic Valve Replacement;  Surgeon: Naveed Leonardo MD;  Location: [facility] HYBRID OR;  Service: Cardiovascular;  Laterality: N/A;    AORTIC VALVE REPAIR/REPLACEMENT N/A 02/20/2023    Procedure: TRANSCATHETER AORTIC VALVE REPLACEMENT;  Surgeon: Fabio Villafana MD;  Location: [facility] HYBRID OR;  Service: Cardiothoracic;  Laterality: N/A;    BACK SURGERY  1994    CARDIAC CATHETERIZATION Right 09/27/2022    Procedure: Left Heart Cath;  Surgeon: Steve Muhammad MD;  Location: [facility] CATH INVASIVE LOCATION;  Service: Cardiovascular;  Laterality: Right;    CARDIAC CATHETERIZATION N/A 01/12/2023    Procedure: Stent JENN coronary;  Surgeon: Steve Muhammad MD;  Location: [facility] CATH INVASIVE LOCATION;  Service: Cardiovascular;  Laterality: N/A;    CARDIAC ELECTROPHYSIOLOGY PROCEDURE N/A 02/24/2025    Procedure: Pacemaker DC new, BiV PPM for complete heart block;  Surgeon: Radha Danielle MD;  Location: [facility] CATH INVASIVE LOCATION;  Service: Cardiovascular;  Laterality: N/A;    CAROTID ENDARTERECTOMY Right 12/14/2020    Procedure: CAROTID ENDARTERECTOMY;  Surgeon: Toby Fung MD;  Location: [facility] MAIN OR;  Service: Vascular;  Laterality: Right;    COLONOSCOPY  08/25/2015    CORONARY STENT PLACEMENT      PACEMAKER IMPLANTATION      2/24/25    PROSTATECTOMY  2001    due to cancer       Family History   Problem Relation Age of Onset    Hypertension Other     Heart attack Mother     Heart disease Mother     Heart attack Father     Heart disease Father        Social History     Socioeconomic History    Marital status:    Tobacco Use    Smoking status: Never     Passive exposure: Never    Smokeless tobacco: Never   Vaping Use    Vaping status: Never Used   Substance and Sexual Activity    Alcohol use: Never    Drug use: Never    Sexual activity: Defer           Objective   Physical Exam  Vitals and nursing note reviewed.    Constitutional:       General: He is not in acute distress.     Appearance: Normal appearance. He is normal weight. He is not ill-appearing, toxic-appearing or diaphoretic.   HENT:      Head: Normocephalic and atraumatic.      Right Ear: External ear normal.      Left Ear: External ear normal.      Nose: Nose normal. No congestion or rhinorrhea.      Mouth/Throat:      Mouth: Mucous membranes are moist.   Eyes:      General: No scleral icterus.        Right eye: No discharge.         Left eye: No discharge.      Extraocular Movements: Extraocular movements intact.      Conjunctiva/sclera: Conjunctivae normal.      Pupils: Pupils are equal, round, and reactive to light.   Cardiovascular:      Rate and Rhythm: Normal rate and regular rhythm.      Pulses: Normal pulses.      Heart sounds: Murmur heard.      No friction rub. No gallop.   Pulmonary:      Effort: Pulmonary effort is normal. No respiratory distress.      Breath sounds: No stridor. No wheezing, rhonchi or rales.   Chest:      Chest wall: No tenderness.   Abdominal:      General: Abdomen is flat. Bowel sounds are normal. There is no distension.      Palpations: Abdomen is soft.      Tenderness: There is no guarding.   Musculoskeletal:         General: No tenderness.      Cervical back: Normal range of motion and neck supple. No rigidity or tenderness.      Right hip: Normal.      Left hip: Normal.      Right knee: Normal.      Left knee: Normal.      Right lower leg: No tenderness. No edema.      Left lower leg: No tenderness. No edema.      Right ankle: Normal.      Left ankle: Normal.      Right foot: Normal.      Left foot: Normal.      Comments: Patient does have some decrease sensation in the bilateral feet from pre-existing neuropathy.  He is able to feel light and firm touch in all dermatomal distributions.       Lymphadenopathy:      Cervical: No cervical adenopathy.   Skin:     General: Skin is warm and dry.      Capillary Refill: Capillary refill  takes 2 to 3 seconds.      Coloration: Skin is not jaundiced.      Findings: No erythema or rash.   Neurological:      Mental Status: He is alert and oriented to person, place, and time.      Motor: No weakness.      Gait: Gait abnormal.      Deep Tendon Reflexes: Reflexes normal.   Psychiatric:         Mood and Affect: Mood normal.         Behavior: Behavior normal.         Procedures           ED Course  ED Course as of 03/21/25 2136   Fri Mar 21, 2025   1832 Waiting on labs [ME]      ED Course User Index  [ME] Elba Sampson PA-C      Labs Reviewed   BASIC METABOLIC PANEL - Abnormal; Notable for the following components:       Result Value    Glucose 127 (*)     BUN 29 (*)     Creatinine 1.59 (*)     CO2 20.3 (*)     Calcium 8.4 (*)     eGFR 42.3 (*)     All other components within normal limits    Narrative:     GFR Categories in Chronic Kidney Disease (CKD)      GFR Category          GFR (mL/min/1.73)    Interpretation  G1                     90 or greater         Normal or high (1)  G2                      60-89                Mild decrease (1)  G3a                   45-59                Mild to moderate decrease  G3b                   30-44                Moderate to severe decrease  G4                    15-29                Severe decrease  G5                    14 or less           Kidney failure          (1)In the absence of evidence of kidney disease, neither GFR category G1 or G2 fulfill the criteria for CKD.    eGFR calculation 2021 CKD-EPI creatinine equation, which does not include race as a factor   PROTIME-INR - Abnormal; Notable for the following components:    Protime 16.3 (*)     INR 1.31 (*)     All other components within normal limits   URINALYSIS W/ CULTURE IF INDICATED - Abnormal; Notable for the following components:    Appearance, UA Cloudy (*)     Protein, UA 30 mg/dL (1+) (*)     All other components within normal limits    Narrative:     In absence of clinical symptoms, the  presence of pyuria, bacteria, and/or nitrites on the urinalysis result does not correlate with infection.   MAGNESIUM - Abnormal; Notable for the following components:    Magnesium 1.2 (*)     All other components within normal limits   CBC WITH AUTO DIFFERENTIAL - Abnormal; Notable for the following components:    RBC 4.11 (*)     Hemoglobin 11.7 (*)     MCHC 31.1 (*)     Lymphocyte % 9.3 (*)     Monocyte % 14.4 (*)     Monocytes, Absolute 1.32 (*)     All other components within normal limits   URINALYSIS, MICROSCOPIC ONLY - Abnormal; Notable for the following components:    Squamous Epithelial Cells, UA 7-12 (*)     All other components within normal limits   APTT - Normal   BNP (IN-HOUSE) - Normal    Narrative:     This assay is used as an aid in the diagnosis of individuals suspected of having heart failure. It can be used as an aid in the diagnosis of acute decompensated heart failure (ADHF) in patients presenting with signs and symptoms of ADHF to the emergency department (ED). In addition, NT-proBNP of <300 pg/mL indicates ADHF is not likely.    Age Range Result Interpretation  NT-proBNP Concentration (pg/mL:      <50             Positive            >450                   Gray                 300-450                    Negative             <300    50-75           Positive            >900                  Gray                300-900                  Negative            <300      >75             Positive            >1800                  Gray                300-1800                  Negative            <300   PHOSPHORUS - Normal   TSH RFX ON ABNORMAL TO FREE T4 - Normal   CBC AND DIFFERENTIAL    Narrative:     The following orders were created for panel order CBC & Differential.  Procedure                               Abnormality         Status                     ---------                               -----------         ------                     CBC Auto Differential[[id number]]        Abnormal             Final result                 Please view results for these tests on the individual orders.   EXTRA TUBES    Narrative:     The following orders were created for panel order Extra Tubes.  Procedure                               Abnormality         Status                     ---------                               -----------         ------                     [facility] - [facility][[id number]]                                   Final result                 Please view results for these tests on the individual orders.   [facility]     No radiology results for the last day  Medications - No data to display                                                   Medical Decision Making  Patient is an 84-year-old male who presented with the above complaint.  He had the above evaluation and exam.    Imaging considered but not deemed emergently warranted at this time.    Labs grossly unremarkable, including urinalysis, TSH, BNP, phosphorus, and PTT.  CBC revealed hemoglobin of 11.7.  BMP significant for 1.59 creatinine.  Magnesium 1.2.    At reassessment, patient is resting comfortably no acute distress.  He has good distal pulses and cap refill.  He does not have any lower extremity weakness on exam, but his gait changes warrant additional evaluation secondary to the abrupt onset and his complicated medical history.  Patient was not experiencing any chest pain, shortness of breath, saddle anesthesia, or other red flag symptoms while in the ED.  Discussed patient with Tammie  hospitalist team, who agrees to admit for further evaluation and management.    Problems Addressed:  Gait disturbance: complicated acute illness or injury    Amount and/or Complexity of Data Reviewed  Labs: ordered.    Risk  Decision regarding hospitalization.        Final diagnoses:   Gait disturbance   Anemia, unspecified type   Electrolyte abnormality       ED Disposition  ED Disposition       ED Disposition   Decision to Admit    Condition   --    Comment    Level of Care: Telemetry [5]   Diagnosis: Gait disturbance [531389]   Admitting Physician: AYAAN NIÑO [[id number]]                 No follow-up provider specified.       Medication List        ASK your doctor about these medications      levothyroxine 112 MCG tablet  Commonly known as: Synthroid  Take 1 tablet by mouth Daily.  Ask about: Which instructions should I use?     NIFEdipine CC 30 MG 24 hr tablet  Commonly known as: ADALAT CC  Ask about: Which instructions should I use?                 Elba Sampson PA-C  03/21/25 1666

## 2025-03-21 NOTE — Clinical Note
Level of Care: Telemetry [5]   Admitting Physician: KEN JENKINS [[id number]]   Attending Physician: KEN JENKINS [[id number]]

## 2025-03-21 NOTE — ED NOTES
Nursing report ED to floor  Alen Ratliff  84 y.o.  male    HPI:   Chief Complaint   Patient presents with    Gait Problem    Weakness - Generalized       Admitting doctor:   Yandel Gray MD    Admitting diagnosis:   The primary encounter diagnosis was Gait disturbance. Diagnoses of Anemia, unspecified type and Electrolyte abnormality were also pertinent to this visit.    Code status:   Current Code Status       Date Active Code Status Order ID Comments User Context       Prior            Allergies:   Azithromycin and Tramadol    Isolation:  No active isolations     Fall Risk:  Fall Risk Assessment was completed, and patient is at moderate risk for falls.   Predictive Model Details         26 (Low) Factor Value    Calculated 3/21/2025 19:38 Age 84    Risk of Fall Model Active Peripheral IV Present     Respiratory Rate 18     Magnesium 1.2 mg/dL     Calcium 8.4 mg/dL     Marlo Scale not on file     Diastolic BP 73     Creatinine 1.59 mg/dL     Albumin 3.7 g/dL     Clinically Relevant Sex Not Female     Total Bilirubin 0.7 mg/dL     ALT 53 U/L     Duration of Current Encounter 0.108 days     Potassium 4.2 mmol/L     Chloride 105 mmol/L         Weight:       03/21/25  1657   Weight: 89.5 kg (197 lb 5 oz)       Intake and Output  No intake or output data in the 24 hours ending 03/21/25 1949    Diet:   Dietary Orders (From admission, onward)       Start     Ordered    03/21/25 1709  NPO Diet NPO Type: Strict NPO  Diet Effective Now        Question:  NPO Type  Answer:  Strict NPO    03/21/25 1708                     Most recent vitals:   Vitals:    03/21/25 1900 03/21/25 1915 03/21/25 1930 03/21/25 1945   BP: 142/74 148/73 147/69 142/69   BP Location:       Patient Position:       Pulse: 64 66 67 65   Resp: 18  15    Temp:       TempSrc:       SpO2: 94% 97% 96% 94%   Weight:       Height:           Active LDAs/IV Access:   Lines, Drains & Airways       Active LDAs       Name Placement date Placement time Site Days     Peripheral IV 03/21/25 1805 Right Antecubital 03/21/25  1805  Antecubital  less than 1                    Skin Condition:   Skin Assessments (last day)       None             Labs (abnormal labs have a star):   Labs Reviewed   BASIC METABOLIC PANEL - Abnormal; Notable for the following components:       Result Value    Glucose 127 (*)     BUN 29 (*)     Creatinine 1.59 (*)     CO2 20.3 (*)     Calcium 8.4 (*)     eGFR 42.3 (*)     All other components within normal limits    Narrative:     GFR Categories in Chronic Kidney Disease (CKD)      GFR Category          GFR (mL/min/1.73)    Interpretation  G1                     90 or greater         Normal or high (1)  G2                      60-89                Mild decrease (1)  G3a                   45-59                Mild to moderate decrease  G3b                   30-44                Moderate to severe decrease  G4                    15-29                Severe decrease  G5                    14 or less           Kidney failure          (1)In the absence of evidence of kidney disease, neither GFR category G1 or G2 fulfill the criteria for CKD.    eGFR calculation 2021 CKD-EPI creatinine equation, which does not include race as a factor   PROTIME-INR - Abnormal; Notable for the following components:    Protime 16.3 (*)     INR 1.31 (*)     All other components within normal limits   URINALYSIS W/ CULTURE IF INDICATED - Abnormal; Notable for the following components:    Appearance, UA Cloudy (*)     Protein, UA 30 mg/dL (1+) (*)     All other components within normal limits    Narrative:     In absence of clinical symptoms, the presence of pyuria, bacteria, and/or nitrites on the urinalysis result does not correlate with infection.   MAGNESIUM - Abnormal; Notable for the following components:    Magnesium 1.2 (*)     All other components within normal limits   CBC WITH AUTO DIFFERENTIAL - Abnormal; Notable for the following components:    RBC 4.11 (*)     Hemoglobin  11.7 (*)     MCHC 31.1 (*)     Lymphocyte % 9.3 (*)     Monocyte % 14.4 (*)     Monocytes, Absolute 1.32 (*)     All other components within normal limits   URINALYSIS, MICROSCOPIC ONLY - Abnormal; Notable for the following components:    Squamous Epithelial Cells, UA 7-12 (*)     All other components within normal limits   APTT - Normal   BNP (IN-HOUSE) - Normal    Narrative:     This assay is used as an aid in the diagnosis of individuals suspected of having heart failure. It can be used as an aid in the diagnosis of acute decompensated heart failure (ADHF) in patients presenting with signs and symptoms of ADHF to the emergency department (ED). In addition, NT-proBNP of <300 pg/mL indicates ADHF is not likely.    Age Range Result Interpretation  NT-proBNP Concentration (pg/mL:      <50             Positive            >450                   Gray                 300-450                    Negative             <300    50-75           Positive            >900                  Gray                300-900                  Negative            <300      >75             Positive            >1800                  Gray                300-1800                  Negative            <300   PHOSPHORUS - Normal   TSH RFX ON ABNORMAL TO FREE T4 - Normal   CBC AND DIFFERENTIAL    Narrative:     The following orders were created for panel order CBC & Differential.  Procedure                               Abnormality         Status                     ---------                               -----------         ------                     CBC Auto Differential[[id number]]        Abnormal            Final result                 Please view results for these tests on the individual orders.   EXTRA TUBES    Narrative:     The following orders were created for panel order Extra Tubes.  Procedure                               Abnormality         Status                     ---------                               -----------         ------                      Gold Top - [facility][[id number]]                                   Final result                 Please view results for these tests on the individual orders.   GOLD [facility] - [facility]       LOC: Person, Place, Time, and Situation    Telemetry:  Telemetry    Cardiac Monitoring Ordered: yes    EKG:   No orders to display       Medications Given in the ED:   Medications - No data to display    Imaging results:  No radiology results for the last day    Social issues:   Social History     Socioeconomic History    Marital status:    Tobacco Use    Smoking status: Never     Passive exposure: Never    Smokeless tobacco: Never   Vaping Use    Vaping status: Never Used   Substance and Sexual Activity    Alcohol use: Never    Drug use: Never    Sexual activity: Defer       NIH Stroke Scale:  Interval: (not recorded)  1a. Level of Consciousness: (not recorded)  1b. LOC Questions: (not recorded)  1c. LOC Commands: (not recorded)  2. Best Gaze: (not recorded)  3. Visual: (not recorded)  4. Facial Palsy: (not recorded)  5a. Motor Arm, Left: (not recorded)  5b. Motor Arm, Right: (not recorded)  6a. Motor Leg, Left: (not recorded)  6b. Motor Leg, Right: (not recorded)  7. Limb Ataxia: (not recorded)  8. Sensory: (not recorded)  9. Best Language: (not recorded)  10. Dysarthria: (not recorded)  11. Extinction and Inattention (formerly Neglect): (not recorded)    Total (NIH Stroke Scale): (not recorded)     Additional notable assessment information:.     Nursing report ED to floor:  Jenny Charlton RN   03/21/25 19:49 EDT

## 2025-03-22 ENCOUNTER — APPOINTMENT (OUTPATIENT)
Dept: MRI IMAGING | Facility: HOSPITAL | Age: 85
DRG: 372 | End: 2025-03-22
Payer: MEDICARE

## 2025-03-22 DIAGNOSIS — K21.9 GASTROESOPHAGEAL REFLUX DISEASE, UNSPECIFIED WHETHER ESOPHAGITIS PRESENT: ICD-10-CM

## 2025-03-22 LAB
GLUCOSE BLDC GLUCOMTR-MCNC: 139 MG/DL (ref 70–105)
GLUCOSE BLDC GLUCOMTR-MCNC: 169 MG/DL (ref 70–105)
GLUCOSE BLDC GLUCOMTR-MCNC: 177 MG/DL (ref 70–105)
GLUCOSE BLDC GLUCOMTR-MCNC: 181 MG/DL (ref 70–105)
HBA1C MFR BLD: 6.97 % (ref 4.8–5.6)

## 2025-03-22 PROCEDURE — G0378 HOSPITAL OBSERVATION PER HR: HCPCS

## 2025-03-22 PROCEDURE — 25010000002 HEPARIN (PORCINE) PER 1000 UNITS

## 2025-03-22 PROCEDURE — 25010000002 MAGNESIUM SULFATE 2 GM/50ML SOLUTION: Performed by: INTERNAL MEDICINE

## 2025-03-22 PROCEDURE — 82948 REAGENT STRIP/BLOOD GLUCOSE: CPT

## 2025-03-22 PROCEDURE — 97162 PT EVAL MOD COMPLEX 30 MIN: CPT

## 2025-03-22 PROCEDURE — 63710000001 INSULIN LISPRO (HUMAN) PER 5 UNITS

## 2025-03-22 RX ORDER — PANTOPRAZOLE SODIUM 40 MG/1
40 TABLET, DELAYED RELEASE ORAL DAILY
Qty: 90 TABLET | Refills: 0 | Status: SHIPPED | OUTPATIENT
Start: 2025-03-22

## 2025-03-22 RX ORDER — ACETAMINOPHEN 325 MG/1
650 TABLET ORAL EVERY 6 HOURS PRN
Status: DISCONTINUED | OUTPATIENT
Start: 2025-03-22 | End: 2025-03-29 | Stop reason: HOSPADM

## 2025-03-22 RX ORDER — LEVOTHYROXINE SODIUM 112 UG/1
112 TABLET ORAL DAILY
Status: DISCONTINUED | OUTPATIENT
Start: 2025-03-22 | End: 2025-03-29 | Stop reason: HOSPADM

## 2025-03-22 RX ORDER — PANTOPRAZOLE SODIUM 40 MG/1
40 TABLET, DELAYED RELEASE ORAL DAILY
Status: DISCONTINUED | OUTPATIENT
Start: 2025-03-22 | End: 2025-03-29 | Stop reason: HOSPADM

## 2025-03-22 RX ORDER — LOSARTAN POTASSIUM 25 MG/1
25 TABLET ORAL DAILY
Status: DISCONTINUED | OUTPATIENT
Start: 2025-03-22 | End: 2025-03-25

## 2025-03-22 RX ORDER — ATORVASTATIN CALCIUM 40 MG/1
80 TABLET, FILM COATED ORAL DAILY
Status: DISCONTINUED | OUTPATIENT
Start: 2025-03-22 | End: 2025-03-29 | Stop reason: HOSPADM

## 2025-03-22 RX ORDER — ASPIRIN 81 MG/1
81 TABLET, CHEWABLE ORAL DAILY
Status: DISCONTINUED | OUTPATIENT
Start: 2025-03-22 | End: 2025-03-29 | Stop reason: HOSPADM

## 2025-03-22 RX ADMIN — ATORVASTATIN CALCIUM 80 MG: 40 TABLET, FILM COATED ORAL at 18:25

## 2025-03-22 RX ADMIN — MAGNESIUM SULFATE HEPTAHYDRATE 2 G: 40 INJECTION, SOLUTION INTRAVENOUS at 01:27

## 2025-03-22 RX ADMIN — PANTOPRAZOLE SODIUM 40 MG: 40 TABLET, DELAYED RELEASE ORAL at 12:08

## 2025-03-22 RX ADMIN — ACETAMINOPHEN 650 MG: 325 TABLET, FILM COATED ORAL at 01:26

## 2025-03-22 RX ADMIN — RIVAROXABAN 15 MG: 15 TABLET, FILM COATED ORAL at 18:25

## 2025-03-22 RX ADMIN — HEPARIN SODIUM 5000 UNITS: 5000 INJECTION INTRAVENOUS; SUBCUTANEOUS at 05:52

## 2025-03-22 RX ADMIN — MAGNESIUM SULFATE HEPTAHYDRATE 2 G: 40 INJECTION, SOLUTION INTRAVENOUS at 03:46

## 2025-03-22 RX ADMIN — LEVOTHYROXINE SODIUM 112 MCG: 112 TABLET ORAL at 12:09

## 2025-03-22 RX ADMIN — Medication 10 ML: at 09:48

## 2025-03-22 RX ADMIN — MAGNESIUM SULFATE HEPTAHYDRATE 2 G: 40 INJECTION, SOLUTION INTRAVENOUS at 05:52

## 2025-03-22 RX ADMIN — INSULIN LISPRO 2 UNITS: 100 INJECTION, SOLUTION INTRAVENOUS; SUBCUTANEOUS at 22:10

## 2025-03-22 RX ADMIN — INSULIN LISPRO 2 UNITS: 100 INJECTION, SOLUTION INTRAVENOUS; SUBCUTANEOUS at 18:25

## 2025-03-22 RX ADMIN — Medication 10 ML: at 22:10

## 2025-03-22 RX ADMIN — LOSARTAN POTASSIUM 25 MG: 25 TABLET, FILM COATED ORAL at 12:07

## 2025-03-22 RX ADMIN — INSULIN LISPRO 2 UNITS: 100 INJECTION, SOLUTION INTRAVENOUS; SUBCUTANEOUS at 12:06

## 2025-03-22 RX ADMIN — ASPIRIN 81 MG CHEWABLE TABLET 81 MG: 81 TABLET CHEWABLE at 12:07

## 2025-03-22 RX ADMIN — ACETAMINOPHEN 650 MG: 325 TABLET, FILM COATED ORAL at 17:13

## 2025-03-22 NOTE — THERAPY EVALUATION
Patient Name: Alen Ratliff  : 1940    MRN: [id number]                              Today's Date: 3/22/2025       Admit Date: 3/21/2025    Visit Dx:     ICD-10-CM ICD-9-CM   1. Gait disturbance  R26.9 781.2   2. Anemia, unspecified type  D64.9 285.9   3. Electrolyte abnormality  E87.8 276.9     Patient Active Problem List   Diagnosis    Heart murmur    Essential hypertension    Mixed hyperlipidemia    Acquired hypothyroidism    GERD (gastroesophageal reflux disease)    Seasonal allergic rhinitis due to pollen    CKD (chronic kidney disease) stage 3, GFR 30-59 ml/min    History of prostate cancer    History of stroke    Bilateral carotid artery stenosis    Closed displaced fracture of lateral malleolus of left fibula    Medicare annual wellness visit, subsequent    Trigger index finger of left hand    Colon cancer screening    DM type 2 with diabetic peripheral neuropathy    Aortic stenosis, severe    Coronary artery disease involving native coronary artery of native heart    Needs flu shot    PAF (paroxysmal atrial fibrillation)    Coronary artery disease of native artery of native heart with stable angina pectoris    S/P PTCA (percutaneous transluminal coronary angioplasty)    Chronic diastolic CHF (congestive heart failure), NYHA class 2    S/P TAVR (transcatheter aortic valve replacement)    Bradycardia    TIA (transient ischemic attack)    Onychodystrophy    Chronic pain of right knee    Sick sinus syndrome    AV block, Mobitz 1    Overweight (BMI 25.0-29.9)    Third degree AV block    Pacemaker    Gait disturbance    Weakness of both lower extremities     Past Medical History:   Diagnosis Date    Allergic rhinitis     Aortic stenosis     Atrial fibrillation     Coronary artery disease     Diabetes     GERD (gastroesophageal reflux disease)     Heart murmur     Hyperlipidemia     Hypertension     Hypothyroidism     Prostate cancer     S/P TAVR (transcatheter aortic valve replacement) 2023     Stroke      Past Surgical History:   Procedure Laterality Date    ANKLE OPEN REDUCTION INTERNAL FIXATION Left 12/16/2020    Procedure: ANKLE OPEN REDUCTION INTERNAL FIXATION;  Surgeon: CAROLE Vasquez DPM;  Location: [facility] MAIN OR;  Service: Podiatry;  Laterality: Left;    AORTIC VALVE REPAIR/REPLACEMENT N/A 02/20/2023    Procedure: Transfemoral Transcatheter Aortic Valve Replacement;  Surgeon: Naveed Leonardo MD;  Location: [facility] HYBRID OR;  Service: Cardiovascular;  Laterality: N/A;    AORTIC VALVE REPAIR/REPLACEMENT N/A 02/20/2023    Procedure: TRANSCATHETER AORTIC VALVE REPLACEMENT;  Surgeon: Fabio Villafana MD;  Location: [facility] HYBRID OR;  Service: Cardiothoracic;  Laterality: N/A;    BACK SURGERY  1994    CARDIAC CATHETERIZATION Right 09/27/2022    Procedure: Left Heart Cath;  Surgeon: Steve Muhammad MD;  Location: [facility] CATH INVASIVE LOCATION;  Service: Cardiovascular;  Laterality: Right;    CARDIAC CATHETERIZATION N/A 01/12/2023    Procedure: Stent JENN coronary;  Surgeon: Steve Muhammad MD;  Location: [facility] CATH INVASIVE LOCATION;  Service: Cardiovascular;  Laterality: N/A;    CARDIAC ELECTROPHYSIOLOGY PROCEDURE N/A 02/24/2025    Procedure: Pacemaker DC new, BiV PPM for complete heart block;  Surgeon: Radha Danielle MD;  Location: [facility] CATH INVASIVE LOCATION;  Service: Cardiovascular;  Laterality: N/A;    CAROTID ENDARTERECTOMY Right 12/14/2020    Procedure: CAROTID ENDARTERECTOMY;  Surgeon: Toby Fung MD;  Location: [facility] MAIN OR;  Service: Vascular;  Laterality: Right;    COLONOSCOPY  08/25/2015    CORONARY STENT PLACEMENT      PACEMAKER IMPLANTATION      2/24/25    PROSTATECTOMY  2001    due to cancer      General Information       Row Name 03/22/25 1914          Physical Therapy Time and Intention    Document Type evaluation  -EL     Mode of Treatment individual therapy;physical therapy  -EL       Row Name 03/22/25 1914          General Information    Prior Level of Function  independent:;all household mobility;ADL's  Prior to decline, has required more assistance recently  -EL       Row Name 03/22/25 1914          Living Environment    Current Living Arrangements home  -EL     People in Home spouse  -EL       Row Name 03/22/25 1914          Cognition    Orientation Status (Cognition) oriented x 4  -EL       Row Name 03/22/25 1914          Safety Issues/Impairments Affecting Functional Mobility    Impairments Affecting Function (Mobility) balance;endurance/activity tolerance;strength  -EL               User Key  (r) = Recorded By, (t) = Taken By, (c) = Cosigned By      Initials Name Provider Type    Riki Anaya PT Physical Therapist                   Mobility       Row Name 03/22/25 1914          Bed Mobility    Bed Mobility bed mobility (all) activities  -EL     All Activities, [place] (Bed Mobility) standby assist  -EL     Assistive Device (Bed Mobility) bed rails  -EL       Row Name 03/22/25 1914          Sit-Stand Transfer    Sit-Stand [place] (Transfers) minimum assist (75% patient effort)  -EL     Assistive Device (Sit-Stand Transfers) walker, front-wheeled  -EL       Row Name 03/22/25 1914          Gait/Stairs (Locomotion)    [place] Level (Gait) contact guard;minimum assist (75% patient effort)  -EL     Assistive Device (Gait) walker, front-wheeled  -EL     Patient was able to Ambulate yes  -EL     Distance in Feet (Gait) 40  -EL     Deviations/Abnormal Patterns (Gait) gait speed decreased  -EL     Bilateral Gait Deviations forward flexed posture  -EL     Comment, (Gait/Stairs) Mild balance deficits, reliant on RWx  -EL               User Key  (r) = Recorded By, (t) = Taken By, (c) = Cosigned By      Initials Name Provider Type    Riki Anaya PT Physical Therapist                   Obj/Interventions       Row Name 03/22/25 1915          Range of Motion Comprehensive    General Range of Motion bilateral lower extremity ROM WFL  -EL       Row Name 03/22/25 1915           Strength Comprehensive (MMT)    General Manual Muscle Testing (MMT) Assessment lower extremity strength deficits identified  -       Row Name 03/22/25 1915          Balance    Balance Assessment sitting static balance;standing static balance;standing dynamic balance  -EL     Static Sitting Balance independent  -EL     Static Standing Balance contact guard  -EL     Dynamic Standing Balance minimal assist  -EL     Position/Device Used, Standing Balance walker, front-wheeled  -       Row Name 03/22/25 1915          Sensory Assessment (Somatosensory)    Sensory Assessment (Somatosensory) sensation intact  -EL               User Key  (r) = Recorded By, (t) = Taken By, (c) = Cosigned By      Initials Name Provider Type    Riki Anaya, PT Physical Therapist                   Goals/Plan       Row Name 03/22/25 1919          Bed Mobility Goal 1 (PT)    Activity/Assistive Device (Bed Mobility Goal 1, PT) bed mobility activities, all  -EL     [place] Level/Cues Needed (Bed Mobility Goal 1, PT) modified independence  -EL     Time Frame (Bed Mobility Goal 1, PT) long term goal (LTG);2 weeks  -[facility] Name 03/22/25 1919          Transfer Goal 1 (PT)    Activity/Assistive Device (Transfer Goal 1, PT) transfers, all;walker, rolling  -EL     [place] Level/Cues Needed (Transfer Goal 1, PT) modified independence  -EL     Time Frame (Transfer Goal 1, PT) long term goal (LTG);2 weeks  -[facility] Name 03/22/25 1919          Gait Training Goal 1 (PT)    Activity/Assistive Device (Gait Training Goal 1, PT) gait (walking locomotion);walker, rolling  -EL     [place] Level (Gait Training Goal 1, PT) modified independence  -EL     Distance (Gait Training Goal 1, PT) 250  -EL     Time Frame (Gait Training Goal 1, PT) long term goal (LTG);2 weeks  -       Row Name 03/22/25 1919          Therapy Assessment/Plan (PT)    Planned Therapy Interventions (PT) neuromuscular re-education;balance training;bed  mobility training;transfer training;gait training;patient/family education;strengthening  -EL               User Key  (r) = Recorded By, (t) = Taken By, (c) = Cosigned By      Initials Name Provider Type    Riki Anaya, PT Physical Therapist                   Clinical Impression       Row Name 03/22/25 1916          Pain    Pretreatment Pain Rating 0/10 - no pain  -EL     Posttreatment Pain Rating 0/10 - no pain  -EL       Row Name 03/22/25 1916          Plan of Care Review    Plan of Care Reviewed With patient  -EL     Outcome Evaluation Pt is an 85 YO M admitted wiht gait disturbance, generalized weakness. Pt reports living at home with spouse where he is independent with ADLs, ambulation without AD and no recent falls. Pt reports having cane and RWx if needed. This date pt requiring no physical assistance for bed mobility, transfers and ambulation with MIN A. Pt with impaired balance and decreased foot clearance with ambulation, as well and notably decreased endurance. Pt reports having pacemaker placed ~1 month ago and endurance has deminished since. Appears to likely be deconditioned with decreased activity at baseline. Recommendation is short IP rehab stay to improved mobitliy and functionality. PT to follow while admitted.  -EL       Row Name 03/22/25 1916          Therapy Assessment/Plan (PT)    Rehab Potential (PT) good  -EL     Criteria for Skilled Interventions Met (PT) yes  -EL     Therapy Frequency (PT) 5 times/wk  -EL     Predicted Duration of Therapy Intervention (PT) until d/c  -EL       Row Name 03/22/25 1916          Vital Signs    O2 Delivery Pre Treatment room air  -EL     O2 Delivery Intra Treatment room air  -EL     O2 Delivery Post Treatment room air  -EL     Pre Patient Position Supine  -EL     Intra Patient Position Standing  -EL     Post Patient Position Sitting  -EL       Row Name 03/22/25 1916          Positioning and Restraints    Pre-Treatment Position in bed  -EL     Post Treatment  Position bed  -EL     In Bed sitting EOB;call light within reach;encouraged to call for assist;notified nsg;with family/caregiver  -EL               User Key  (r) = Recorded By, (t) = Taken By, (c) = Cosigned By      Initials Name Provider Type    Riki Anaya, PT Physical Therapist                   Outcome Measures       Row Name 03/22/25 1920 03/22/25 1205       How much help from another person do you currently need...    Turning from your back to your side while in flat bed without using bedrails? 3  -EL 3  -TJ    Moving from lying on back to sitting on the side of a flat bed without bedrails? 3  -EL 3  -TJ    Moving to and from a bed to a chair (including a wheelchair)? 3  -EL 3  -TJ    Standing up from a chair using your arms (e.g., wheelchair, bedside chair)? 3  -EL 3  -TJ    Climbing 3-5 steps with a railing? 2  -EL 2  -TJ    To walk in hospital room? 3  -EL 3  -TJ    AM-PAC 6 Clicks Score (PT) 17  -EL 17  -TJ    Highest Level of Mobility Goal 5 --> Static standing  -EL 5 --> Static standing  -TJ      Row Name 03/22/25 1920          Functional Assessment    Outcome Measure Options AM-PAC 6 Clicks Basic Mobility (PT)  -EL               User Key  (r) = Recorded By, (t) = Taken By, (c) = Cosigned By      Initials Name Provider Type    Riki Anaya, TO[place]IE Physical Therapist    Mikayla Lewis RN Registered Nurse                                 Physical Therapy Education       Title: PT OT SLP Therapies (Done)       Topic: Physical Therapy (Done)       Point: Mobility training (Done)       Learning Progress Summary            Patient Acceptance, E,TB, VU by  at 3/22/2025 1920                      Point: Precautions (Done)       Learning Progress Summary            Patient Acceptance, E,TB, VU by  at 3/22/2025 1920                                      User Key       Initials Effective Dates Name Provider Type [facility] 06/23/20 -  Riki Yeager, PT Physical Therapist PT                  PT Recommendation  and Plan  Planned Therapy Interventions (PT): neuromuscular re-education, balance training, bed mobility training, transfer training, gait training, patient/family education, strengthening  Outcome Evaluation: Pt is an 83 YO M admitted wiht gait disturbance, generalized weakness. Pt reports living at home with spouse where he is independent with ADLs, ambulation without AD and no recent falls. Pt reports having cane and RWx if needed. This date pt requiring no physical assistance for bed mobility, transfers and ambulation with MIN A. Pt with impaired balance and decreased foot clearance with ambulation, as well and notably decreased endurance. Pt reports having pacemaker placed ~1 month ago and endurance has deminished since. Appears to likely be deconditioned with decreased activity at baseline. Recommendation is short IP rehab stay to improved mobitliy and functionality. PT to follow while admitted.     Time Calculation:   PT Evaluation Complexity  History, PT Evaluation Complexity: 1-2 personal factors and/or comorbidities  Examination of Body Systems (PT Eval Complexity): total of 3 or more elements  Clinical Presentation (PT Evaluation Complexity): evolving  Clinical Decision Making (PT Evaluation Complexity): moderate complexity  Overall Complexity (PT Evaluation Complexity): moderate complexity     PT Charges       Row Name 03/22/25 1921             Time Calculation    Start Time 1441  -EL      Stop Time 1504  -EL      Time Calculation (min) 23 min  -EL      PT Received On 03/22/25  -EL      PT - Next Appointment 03/24/25  -EL      PT Goal Re-Cert Due Date 04/05/25  -EL                User Key  (r) = Recorded By, (t) = Taken By, (c) = Cosigned By      Initials Name Provider Type    EL Riki Yeager, PT Physical Therapist                  Therapy Charges for Today       Code Description Service Date Service Provider Modifiers Qty    [id number]  PT EVAL MOD COMPLEXITY 4 3/22/2025 Riki Yeager, PT GP 1             PT G-Codes  Outcome Measure Options: AM-PAC 6 Clicks Basic Mobility (PT)  AM-PAC 6 Clicks Score (PT): 17  PT Discharge Summary  Anticipated Discharge Disposition (PT): inpatient rehabilitation facility    Riki Yeager PT  3/22/2025

## 2025-03-22 NOTE — PLAN OF CARE
Problem: Adult Inpatient Plan of Care  Goal: Plan of Care Review  Outcome: Not Progressing  Flowsheets (Taken 3/21/2025 2746)  Progress: no change  Plan of Care Reviewed With:   patient   spouse  Goal: Patient-Specific Goal (Individualized)  Outcome: Not Progressing  Goal: Absence of Hospital-Acquired Illness or Injury  Outcome: Not Progressing  Intervention: Identify and Manage Fall Risk  Recent Flowsheet Documentation  Taken 3/21/2025 2212 by eJnny Lopez, RN  Safety Promotion/Fall Prevention:   assistive device/personal items within reach   clutter free environment maintained   nonskid shoes/slippers when out of bed   room organization consistent   safety round/check completed  Intervention: Prevent Infection  Recent Flowsheet Documentation  Taken 3/21/2025 2212 by Jenny Lopez, RN  Infection Prevention:   environmental surveillance performed   hand hygiene promoted   rest/sleep promoted   visitors restricted/screened   single patient room provided  Goal: Optimal Comfort and Wellbeing  Outcome: Not Progressing  Goal: Readiness for Transition of Care  Outcome: Not Progressing   Goal Outcome Evaluation:  Plan of Care Reviewed With: patient, spouse        Progress: no change     Alert and oriented x 4. Hard of hearing. Assist x 1 for transfers. Spouse at bedside. No c/o pain/discomfort/SOB/dizziness noted. Does not require O2 therapy at this time. No s/s of hyper/hypoglycemia noted. NaCl infusing at 100 ml/hr and tolerating well. Continent of bowel and bladder. Magnesium replacement protocol in place. Currently in bed, eyes closed. Rise and fall of chest observed. Call bell in reach. From home.

## 2025-03-22 NOTE — PROGRESS NOTES
[facility] MEDICINE SERVICE  DAILY PROGRESS NOTE    NAME: Alen Ratliff  : 1940  MRN: [id number]      LOS: 0 days     PROVIDER OF SERVICE: Tian Roca MD    Chief Complaint: Weakness of both lower extremities    Subjective:     Interval History:  History taken from: patient    Awake alert oriented and with his wife at bedside.  He complains mostly about generalized weakness over the last several weeks or so.        Review of Systems:   Review of Systems    Objective:     Vital Signs  Temp:  [97.7 °F (36.5 °C)-100.7 °F (38.2 °C)] 97.8 °F (36.6 °C)  Heart Rate:  [60-75] 65  Resp:  [15-18] 18  BP: (129-166)/(51-76) 166/66   Body mass index is 27.52 kg/m².    Physical Exam  Physical Exam  Neurological:      General: No focal deficit present.      Mental Status: He is oriented to person, place, and time. Mental status is at baseline.      Cranial Nerves: No cranial nerve deficit.      Sensory: No sensory deficit.      Coordination: Coordination normal.      Comments: 4/5 b/u upper and lower strength   Psychiatric:         Mood and Affect: Mood normal.         Behavior: Behavior normal.            Diagnostic Data    Results from last 7 days   Lab Units 25  1805 25  1446   WBC 10*3/mm3 9.15  --    HEMOGLOBIN g/dL 11.7*  --    HEMATOCRIT % 37.6  --    PLATELETS 10*3/mm3 209  --    GLUCOSE mg/dL 127* 167*   CREATININE mg/dL 1.59* 1.72*   BUN mg/dL 29* 33*   SODIUM mmol/L 138 138   POTASSIUM mmol/L 4.2 3.7   AST (SGOT) U/L  --  54*   ALT (SGPT) U/L  --  53*   ALK PHOS U/L  --  75   BILIRUBIN mg/dL  --  0.7   ANION GAP mmol/L 12.7 14.9       No radiology results for the last day      I reviewed the patient's new clinical results.    Assessment/Plan:     Active and Resolved Problems  Active Hospital Problems    Diagnosis  POA    **Weakness of both lower extremities [R29.898]  Yes    Gait disturbance [R26.9]  Yes      Resolved Hospital Problems   No resolved problems to display.        Generalized weakness of upper and lower extremities, possibly uppers somewhat worse than lowers.  -There are no lateralizing deficits on this patient.  No focal neurological deficits consistent with stroke.  He is experiencing generalized weakness that started shortly after he was hospitalized for complete heart block.  -He is not eligible for MRI given the very recent pacemaker insertion per the radiology department.  -There are no signs and symptoms of infection in this patient.  He does not have any respiratory symptoms or cough.  His urinalysis is clean.  His abdominal exam is unrevealing.  He has no wounds.  His white blood cell count is low.  He did have a low-grade temp overnight of 100.7 which will be monitored going forward.  -I feel that PT and OT evaluation would be the best first step for this patient, could potentially pursue MR down the road but given his discussion of his symptoms new stroke appears very unlikely.     AUDREY  -Creatinine 1.72 on presentation, not far off from his baseline renal function.  Best recent creatinine was 1.22 several weeks ago.  -NS at 100 cc/h     Type 2 diabetes  -Hold home diabetic regimen  -Hemoglobin A1c in the a.m.  -Low-dose insulin sliding scale     Complete heart block status post pacemaker February 24, 2024  Hypertension  Hyperlipidemia  Hypothyroidism  GERD  CAD  CVA x 2  Aortic stenosis status post TAVR        VTE Prophylaxis:  Pharmacologic VTE prophylaxis orders are present.      Disposition Planning:     Barriers to Discharge:therapy eval  Anticipated Date of Discharge: 3/24/2025  Place of Discharge: unclear      Time: 35 minutes     Code Status and Medical Interventions: CPR (Attempt to Resuscitate); Full Support   Ordered at: 03/21/25 9397     Code Status (Patient has no pulse and is not breathing):    CPR (Attempt to Resuscitate)     Medical Interventions (Patient has pulse or is breathing):    Full Support       Signature: Electronically signed by  Tian Roca MD, 03/22/25, 11:24 EDT.  [facility] Hospitalist Team

## 2025-03-22 NOTE — PLAN OF CARE
Goal Outcome Evaluation:              Outcome Evaluation: Patient rested well through the night without c/o pain. replacing mag. Will recheck when appropriate. MRI ordered for this morning

## 2025-03-22 NOTE — PLAN OF CARE
Problem: Adult Inpatient Plan of Care  Goal: Plan of Care Review  Outcome: Progressing  Flowsheets (Taken 3/22/2025 1795)  Progress: no change  Plan of Care Reviewed With:   patient   spouse   Goal Outcome Evaluation:  Plan of Care Reviewed With: patient, spouse        Progress: no change

## 2025-03-22 NOTE — PLAN OF CARE
Goal Outcome Evaluation:  Plan of Care Reviewed With: patient           Outcome Evaluation: Pt is an 83 YO M admitted wiht gait disturbance, generalized weakness. Pt reports living at home with spouse where he is independent with ADLs, ambulation without AD and no recent falls. Pt reports having cane and RWx if needed. This date pt requiring no physical assistance for bed mobility, transfers and ambulation with MIN A. Pt with impaired balance and decreased foot clearance with ambulation, as well and notably decreased endurance. Pt reports having pacemaker placed ~1 month ago and endurance has deminished since. Appears to likely be deconditioned with decreased activity at baseline. Recommendation is short IP rehab stay to improved mobitliy and functionality. PT to follow while admitted.    Anticipated Discharge Disposition (PT): inpatient rehabilitation facility

## 2025-03-22 NOTE — H&P
"[facility] Medicine Services  History & Physical    Patient Name: Alen Ratliff  : 1940  MRN: [id number]  Primary Care Physician:  Serenity Wren MD  Date of admission: 3/21/2025  Date and Time of Service: 3/21/2025 at 2100    Subjective      Chief Complaint:     History of Present Illness: Alen Ratliff is a 84 y.o. male with a CMH of atrial fibrillation on xarelto , complete heart block s/p pacemaker (2025), CVA X2, coronary artery disease S/P TAVR, type 2 diabetes, GERD, hyperlipidemia, hypertension, hypothyroidism   who presented to [facility] on 3/21/2025 with  lower extremity weakness in the past 5 days.  Described as requiring more effort to move and heaviness in his legs.  He attributes this to changes in his medications since he was discharged.  Per wife at bedside.  Patient reports blood pressure meds where \"too strong\" and was asked to decrease dose several times before presentation.  She denies any focal neurological deficits, falls, hypotensive episodes, denies recent sick contacts, joint pain, fever or chills.  In the ED, vitally stable, labs notable for creatinine 1.72.     Review of Systems   Constitutional:  Positive for activity change (weakness) and fatigue. Negative for chills, diaphoresis and fever.   HENT:  Negative for congestion, drooling and ear pain.    Eyes:  Negative for pain and itching.   Respiratory:  Negative for cough, choking, chest tightness and shortness of breath.    Cardiovascular:  Negative for chest pain and leg swelling.   Gastrointestinal:  Negative for abdominal distention, abdominal pain, diarrhea, nausea and vomiting.   Musculoskeletal:  Negative for arthralgias and back pain.   Skin:  Negative for color change and pallor.   Neurological:  Positive for weakness. Negative for tremors, seizures, syncope, light-headedness and headaches.   Psychiatric/Behavioral:  Negative for agitation and confusion.        Personal History "     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    Stroke        Past Surgical History:   Procedure Laterality Date    ANKLE OPEN REDUCTION INTERNAL FIXATION Left 12/16/2020    Procedure: ANKLE OPEN REDUCTION INTERNAL FIXATION;  Surgeon: CAROLE Vasquez DPM;  Location: [facility] MAIN OR;  Service: Podiatry;  Laterality: Left;    AORTIC VALVE REPAIR/REPLACEMENT N/A 02/20/2023    Procedure: Transfemoral Transcatheter Aortic Valve Replacement;  Surgeon: Naveed Leonardo MD;  Location: [facility] HYBRID OR;  Service: Cardiovascular;  Laterality: N/A;    AORTIC VALVE REPAIR/REPLACEMENT N/A 02/20/2023    Procedure: TRANSCATHETER AORTIC VALVE REPLACEMENT;  Surgeon: Fabio Villafana MD;  Location: [facility] HYBRID OR;  Service: Cardiothoracic;  Laterality: N/A;    BACK SURGERY  1994    CARDIAC CATHETERIZATION Right 09/27/2022    Procedure: Left Heart Cath;  Surgeon: Steve Muhammad MD;  Location: [facility] CATH INVASIVE LOCATION;  Service: Cardiovascular;  Laterality: Right;    CARDIAC CATHETERIZATION N/A 01/12/2023    Procedure: Stent JENN coronary;  Surgeon: Steve Muhammad MD;  Location: [facility] CATH INVASIVE LOCATION;  Service: Cardiovascular;  Laterality: N/A;    CARDIAC ELECTROPHYSIOLOGY PROCEDURE N/A 02/24/2025    Procedure: Pacemaker DC new, BiV PPM for complete heart block;  Surgeon: Radha Danielle MD;  Location: [facility] CATH INVASIVE LOCATION;  Service: Cardiovascular;  Laterality: N/A;    CAROTID ENDARTERECTOMY Right 12/14/2020    Procedure: CAROTID ENDARTERECTOMY;  Surgeon: Toby Fung MD;  Location: [facility] MAIN OR;  Service: Vascular;  Laterality: Right;    COLONOSCOPY  08/25/2015    CORONARY STENT PLACEMENT      PACEMAKER  IMPLANTATION      2/24/25    PROSTATECTOMY  2001    due to cancer       Family History: family history includes Heart attack in his father and mother; Heart disease in his father and mother; Hypertension in an other family member. Otherwise pertinent FHx was reviewed and not pertinent to current issue.    Social History:  reports that he has never smoked. He has never been exposed to tobacco smoke. He has never used smokeless tobacco. He reports that he does not drink alcohol and does not use drugs.    Home Medications:  Prior to Admission Medications       Prescriptions Last Dose Informant Patient Reported? Taking?    aspirin 81 MG chewable tablet 3/21/2025  No Yes    Chew 1 tablet Daily.    atorvastatin (LIPITOR) 80 MG tablet 3/20/2025  No Yes    TAKE ONE TABLET BY MOUTH EVERY DAY    levothyroxine (Synthroid) 112 MCG tablet 3/21/2025  No Yes    Take 1 tablet by mouth Daily.    losartan (COZAAR) 100 MG tablet 3/21/2025  Yes Yes    Take 25 mg by mouth Daily.    nebivolol (BYSTOLIC) 2.5 MG tablet 3/21/2025  No Yes    Take 1 tablet by mouth Daily for 30 days.    NIFEdipine CC (ADALAT CC) 30 MG 24 hr tablet 3/21/2025  Yes Yes    Take 1 tablet by mouth Daily.    pantoprazole (PROTONIX) 40 MG EC tablet 3/21/2025  No Yes    Take 1 tablet by mouth Daily.    rivaroxaban (Xarelto) 15 MG tablet 3/20/2025  No Yes    Take 1 tablet by mouth Daily With Dinner.    SITagliptin (Januvia) 50 MG tablet 3/21/2025  No Yes    Take 1 tablet by mouth Daily.    Artificial Tear Ointment (artificial tears) ophthalmic ointment   Yes No    Administer  to both eyes Every 1 (One) Hour As Needed.    fluticasone (FLONASE) 50 MCG/ACT nasal spray   Yes No    Administer 2 sprays into the nostril(s) as directed by provider Daily As Needed.              Allergies:  Allergies   Allergen Reactions    Azithromycin Unknown - High Severity    Tramadol Unknown - High Severity       Objective      Vitals:   Temp:  [98.1 °F (36.7 °C)] 98.1 °F (36.7 °C)  Heart  Rate:  [60-75] 75  Resp:  [15-18] 15  BP: (129-153)/(51-76) 153/75  Body mass index is 27.52 kg/m².  Physical Exam  Constitutional:       Appearance: Normal appearance.   HENT:      Head: Normocephalic.      Right Ear: Tympanic membrane normal.      Left Ear: Tympanic membrane normal.      Mouth/Throat:      Mouth: Mucous membranes are moist.   Eyes:      Pupils: Pupils are equal, round, and reactive to light.   Cardiovascular:      Rate and Rhythm: Normal rate and regular rhythm.      Heart sounds: No murmur heard.  Pulmonary:      Effort: No respiratory distress.      Breath sounds: No wheezing.   Abdominal:      General: There is no distension.      Palpations: There is no mass.   Musculoskeletal:         General: Normal range of motion.   Skin:     General: Skin is warm and dry.      Capillary Refill: Capillary refill takes less than 2 seconds.   Neurological:      General: No focal deficit present.      Mental Status: He is alert and oriented to person, place, and time.         Diagnostic Data:  Lab Results (last 24 hours)       Procedure Component Value Units Date/Time    Urinalysis With Culture If Indicated - Urine, Clean Catch [[id number]]  (Abnormal) Collected: 03/21/25 1832    Specimen: Urine, Clean Catch Updated: 03/21/25 1840     Color, UA Yellow     Appearance, UA Cloudy     pH, UA 5.5     Specific Gravity, UA 1.016     Glucose, UA Negative     Ketones, UA Negative     Bilirubin, UA Negative     Blood, UA Negative     Protein, UA 30 mg/dL (1+)     Leuk Esterase, UA Negative     Nitrite, UA Negative     Urobilinogen, UA 1.0 E.U./dL    Narrative:      In absence of clinical symptoms, the presence of pyuria, bacteria, and/or nitrites on the urinalysis result does not correlate with infection.    Urinalysis, Microscopic Only - Urine, Clean Catch [[id number]]  (Abnormal) Collected: 03/21/25 1832    Specimen: Urine, Clean Catch Updated: 03/21/25 1840     RBC, UA 0-2 /HPF      WBC, UA 0-2 /HPF      Comment:  Urine culture not indicated.        Bacteria, UA None Seen /HPF      Squamous Epithelial Cells, UA 7-12 /HPF      Hyaline Casts, UA 0-2 /LPF      Methodology Automated Microscopy    BNP [[id number]]  (Normal) Collected: 03/21/25 1805    Specimen: Blood Updated: 03/21/25 1839     proBNP 772.0 pg/mL     Narrative:      This assay is used as an aid in the diagnosis of individuals suspected of having heart failure. It can be used as an aid in the diagnosis of acute decompensated heart failure (ADHF) in patients presenting with signs and symptoms of ADHF to the emergency department (ED). In addition, NT-proBNP of <300 pg/mL indicates ADHF is not likely.    Age Range Result Interpretation  NT-proBNP Concentration (pg/mL:      <50             Positive            >450                   Gray                 300-450                    Negative             <300    50-75           Positive            >900                  Gray                300-900                  Negative            <300      >75             Positive            >1800                  Gray                300-1800                  Negative            <300    TSH Rfx On Abnormal To Free T4 [[id number]]  (Normal) Collected: 03/21/25 1805    Specimen: Blood Updated: 03/21/25 1839     TSH 1.680 uIU/mL     Basic Metabolic Panel [[id number]]  (Abnormal) Collected: 03/21/25 1805    Specimen: Blood Updated: 03/21/25 1839     Glucose 127 mg/dL      BUN 29 mg/dL      Creatinine 1.59 mg/dL      Sodium 138 mmol/L      Potassium 4.2 mmol/L      Chloride 105 mmol/L      CO2 20.3 mmol/L      Calcium 8.4 mg/dL      BUN/Creatinine Ratio 18.2     Anion Gap 12.7 mmol/L      eGFR 42.3 mL/min/1.73     Narrative:      GFR Categories in Chronic Kidney Disease (CKD)      GFR Category          GFR (mL/min/1.73)    Interpretation  G1                     90 or greater         Normal or high (1)  G2                      60-89                Mild decrease (1)  G3a                   45-59                 Mild to moderate decrease  G3b                   30-44                Moderate to severe decrease  G4                    15-29                Severe decrease  G5                    14 or less           Kidney failure          (1)In the absence of evidence of kidney disease, neither GFR category G1 or G2 fulfill the criteria for CKD.    eGFR calculation 2021 CKD-EPI creatinine equation, which does not include race as a factor    Magnesium [[id number]]  (Abnormal) Collected: 03/21/25 1805    Specimen: Blood Updated: 03/21/25 1839     Magnesium 1.2 mg/dL     Phosphorus [[id number]]  (Normal) Collected: 03/21/25 1805    Specimen: Blood Updated: 03/21/25 1839     Phosphorus 2.8 mg/dL     Protime-INR [[id number]]  (Abnormal) Collected: 03/21/25 1805    Specimen: Blood Updated: 03/21/25 1822     Protime 16.3 Seconds      INR 1.31    aPTT [[id number]]  (Normal) Collected: 03/21/25 1805    Specimen: Blood Updated: 03/21/25 1822     PTT 32.5 seconds     Extra Tubes [[id number]] Collected: 03/21/25 1805    Specimen: Blood, Venous Line Updated: 03/21/25 1815    Narrative:      The following orders were created for panel order Extra Tubes.  Procedure                               Abnormality         Status                     ---------                               -----------         ------                     Gold Top - SST[[id number]]                                   Final result                 Please view results for these tests on the individual orders.    Gold Top - SST [[id number]] Collected: 03/21/25 1805    Specimen: Blood Updated: 03/21/25 1815     Extra Tube Hold for add-ons.     Comment: Auto resulted.       CBC & Differential [[id number]]  (Abnormal) Collected: 03/21/25 1805    Specimen: Blood Updated: 03/21/25 1814    Narrative:      The following orders were created for panel order CBC & Differential.  Procedure                               Abnormality         Status                     ---------                                -----------         ------                     CBC Auto Differential[[id number]]        Abnormal            Final result                 Please view results for these tests on the individual orders.    CBC Auto Differential [[id number]]  (Abnormal) Collected: 03/21/25 1805    Specimen: Blood Updated: 03/21/25 1814     WBC 9.15 10*3/mm3      RBC 4.11 10*6/mm3      Hemoglobin 11.7 g/dL      Hematocrit 37.6 %      MCV 91.5 fL      MCH 28.5 pg      MCHC 31.1 g/dL      RDW 13.6 %      RDW-SD 45.4 fl      MPV 10.3 fL      Platelets 209 10*3/mm3      Neutrophil % 74.6 %      Lymphocyte % 9.3 %      Monocyte % 14.4 %      Eosinophil % 0.8 %      Basophil % 0.4 %      Immature Grans % 0.5 %      Neutrophils, Absolute 6.82 10*3/mm3      Lymphocytes, Absolute 0.85 10*3/mm3      Monocytes, Absolute 1.32 10*3/mm3      Eosinophils, Absolute 0.07 10*3/mm3      Basophils, Absolute 0.04 10*3/mm3      Immature Grans, Absolute 0.05 10*3/mm3      nRBC 0.0 /100 WBC              Imaging Results (Last 24 Hours)       ** No results found for the last 24 hours. **              Assessment & Plan        This is a 84 y.o. male with:    Active and Resolved Problems  Active Hospital Problems    Diagnosis  POA    **Gait disturbance [R26.9]  Yes      Resolved Hospital Problems   No resolved problems to display.       Bilateral lower extremity weakness  -Given history of strokes, will obtain brain MRI  -PT OT eval  -Falls precaution  -Orthostatic vitals x 1  -Monitor observation times in for 24 hours and discharge planning based on physical therapy recommendations    AUDREY  -Creatinine 1.72 on presentation  -NS at 100 cc/h    Type 2 diabetes  -Hold home diabetic regimen  -Hemoglobin A1c in the a.m.  -Low-dose insulin sliding scale    Complete heart block status post pacemaker February 24, 2024  Hypertension  Hyperlipidemia  Hypothyroidism  GERD  CAD  CVA x 2  Aortic stenosis status post TAVR  -Resume home meds when verified by  pharmacy and clinically appropriate    VTE Prophylaxis:  Pharmacologic VTE prophylaxis orders are present.        The patient desires to be as follows:    CODE STATUS:    Code Status (Patient has no pulse and is not breathing): CPR (Attempt to Resuscitate)  Medical Interventions (Patient has pulse or is breathing): Full Support        Elba Ratliff, who can be contacted at [phone], is the designated person to make medical decisions on the patient's behalf if He is incapable of doing so. This was clarified with patient and/or next of kin on 3/21/2025 during the course of this H&P.    Admission Status:  I believe this patient meets inpatient status.    Expected Length of Stay: less then 2 nights    PDMP and Medication Dispenses via Sidebar reviewed and consistent with patient reported medications.    I discussed the patient's findings and my recommendations with patient and family.      Signature:     This document has been electronically signed by Juan Alberto Mann MD on March 21, 2025 22:07 EDT   [facility] Hospitalist Team

## 2025-03-23 LAB
ANION GAP SERPL CALCULATED.3IONS-SCNC: 10.4 MMOL/L (ref 5–15)
BUN SERPL-MCNC: 21 MG/DL (ref 8–23)
BUN/CREAT SERPL: 15.2 (ref 7–25)
CALCIUM SPEC-SCNC: 8 MG/DL (ref 8.6–10.5)
CHLORIDE SERPL-SCNC: 108 MMOL/L (ref 98–107)
CO2 SERPL-SCNC: 18.6 MMOL/L (ref 22–29)
CREAT SERPL-MCNC: 1.38 MG/DL (ref 0.76–1.27)
DEPRECATED RDW RBC AUTO: 46.4 FL (ref 37–54)
EGFRCR SERPLBLD CKD-EPI 2021: 50.1 ML/MIN/1.73
ERYTHROCYTE [DISTWIDTH] IN BLOOD BY AUTOMATED COUNT: 13.6 % (ref 12.3–15.4)
GLUCOSE BLDC GLUCOMTR-MCNC: 131 MG/DL (ref 70–105)
GLUCOSE BLDC GLUCOMTR-MCNC: 161 MG/DL (ref 70–105)
GLUCOSE BLDC GLUCOMTR-MCNC: 175 MG/DL (ref 70–105)
GLUCOSE BLDC GLUCOMTR-MCNC: 220 MG/DL (ref 70–105)
GLUCOSE SERPL-MCNC: 203 MG/DL (ref 65–99)
HCT VFR BLD AUTO: 33.6 % (ref 37.5–51)
HGB BLD-MCNC: 10.6 G/DL (ref 13–17.7)
MAGNESIUM SERPL-MCNC: 2.2 MG/DL (ref 1.6–2.4)
MCH RBC QN AUTO: 29 PG (ref 26.6–33)
MCHC RBC AUTO-ENTMCNC: 31.5 G/DL (ref 31.5–35.7)
MCV RBC AUTO: 91.8 FL (ref 79–97)
PLATELET # BLD AUTO: 201 10*3/MM3 (ref 140–450)
PMV BLD AUTO: 10.9 FL (ref 6–12)
POTASSIUM SERPL-SCNC: 4 MMOL/L (ref 3.5–5.2)
RBC # BLD AUTO: 3.66 10*6/MM3 (ref 4.14–5.8)
SODIUM SERPL-SCNC: 137 MMOL/L (ref 136–145)
WBC NRBC COR # BLD AUTO: 8.73 10*3/MM3 (ref 3.4–10.8)

## 2025-03-23 PROCEDURE — 80048 BASIC METABOLIC PNL TOTAL CA: CPT | Performed by: STUDENT IN AN ORGANIZED HEALTH CARE EDUCATION/TRAINING PROGRAM

## 2025-03-23 PROCEDURE — 82948 REAGENT STRIP/BLOOD GLUCOSE: CPT

## 2025-03-23 PROCEDURE — 63710000001 INSULIN LISPRO (HUMAN) PER 5 UNITS

## 2025-03-23 PROCEDURE — 85027 COMPLETE CBC AUTOMATED: CPT | Performed by: STUDENT IN AN ORGANIZED HEALTH CARE EDUCATION/TRAINING PROGRAM

## 2025-03-23 PROCEDURE — G0378 HOSPITAL OBSERVATION PER HR: HCPCS

## 2025-03-23 PROCEDURE — 83735 ASSAY OF MAGNESIUM: CPT | Performed by: INTERNAL MEDICINE

## 2025-03-23 RX ORDER — GUAIFENESIN 200 MG/10ML
200 LIQUID ORAL EVERY 4 HOURS PRN
Status: DISCONTINUED | OUTPATIENT
Start: 2025-03-23 | End: 2025-03-29 | Stop reason: HOSPADM

## 2025-03-23 RX ADMIN — LEVOTHYROXINE SODIUM 112 MCG: 112 TABLET ORAL at 08:34

## 2025-03-23 RX ADMIN — LOSARTAN POTASSIUM 25 MG: 25 TABLET, FILM COATED ORAL at 08:34

## 2025-03-23 RX ADMIN — PANTOPRAZOLE SODIUM 40 MG: 40 TABLET, DELAYED RELEASE ORAL at 08:34

## 2025-03-23 RX ADMIN — INSULIN LISPRO 3 UNITS: 100 INJECTION, SOLUTION INTRAVENOUS; SUBCUTANEOUS at 13:38

## 2025-03-23 RX ADMIN — ATORVASTATIN CALCIUM 80 MG: 40 TABLET, FILM COATED ORAL at 17:31

## 2025-03-23 RX ADMIN — GUAIFENESIN 200 MG: 200 SOLUTION ORAL at 01:23

## 2025-03-23 RX ADMIN — ASPIRIN 81 MG CHEWABLE TABLET 81 MG: 81 TABLET CHEWABLE at 08:34

## 2025-03-23 RX ADMIN — Medication 10 ML: at 21:16

## 2025-03-23 RX ADMIN — INSULIN LISPRO 2 UNITS: 100 INJECTION, SOLUTION INTRAVENOUS; SUBCUTANEOUS at 21:15

## 2025-03-23 RX ADMIN — INSULIN LISPRO 2 UNITS: 100 INJECTION, SOLUTION INTRAVENOUS; SUBCUTANEOUS at 17:31

## 2025-03-23 RX ADMIN — RIVAROXABAN 15 MG: 15 TABLET, FILM COATED ORAL at 17:31

## 2025-03-23 NOTE — PROGRESS NOTES
[facility] MEDICINE SERVICE  DAILY PROGRESS NOTE    NAME: Alen Ratliff  : 1940  MRN: [id number]      LOS: 0 days     PROVIDER OF SERVICE: Tian Roca MD    Chief Complaint: Weakness of both lower extremities    Subjective:     Interval History:  History taken from: patient    Awake alert oriented and with his wife at bedside.  Discussed PT evaluation.        Review of Systems:   Review of Systems    Objective:     Vital Signs  Temp:  [98.1 °F (36.7 °C)-98.6 °F (37 °C)] 98.2 °F (36.8 °C)  Heart Rate:  [60-71] 60  Resp:  [12-18] 18  BP: (135-164)/(61-90) 148/74   Body mass index is 27.52 kg/m².    Physical Exam  Physical Exam  Neurological:      General: No focal deficit present.      Mental Status: He is oriented to person, place, and time. Mental status is at baseline.      Cranial Nerves: No cranial nerve deficit.      Sensory: No sensory deficit.      Coordination: Coordination normal.      Comments: 4/5 b/u upper and lower strength   Psychiatric:         Mood and Affect: Mood normal.         Behavior: Behavior normal.            Diagnostic Data    Results from last 7 days   Lab Units 25  1027 25  1805 25  1446   WBC 10*3/mm3 8.73   < >  --    HEMOGLOBIN g/dL 10.6*   < >  --    HEMATOCRIT % 33.6*   < >  --    PLATELETS 10*3/mm3 201   < >  --    GLUCOSE mg/dL 203*   < > 167*   CREATININE mg/dL 1.38*   < > 1.72*   BUN mg/dL 21   < > 33*   SODIUM mmol/L 137   < > 138   POTASSIUM mmol/L 4.0   < > 3.7   AST (SGOT) U/L  --   --  54*   ALT (SGPT) U/L  --   --  53*   ALK PHOS U/L  --   --  75   BILIRUBIN mg/dL  --   --  0.7   ANION GAP mmol/L 10.4   < > 14.9    < > = values in this interval not displayed.       No radiology results for the last day      I reviewed the patient's new clinical results.    Assessment/Plan:     Active and Resolved Problems  Active Hospital Problems    Diagnosis  POA    **Weakness of both lower extremities [R29.898]  Yes    Gait disturbance [R26.9]   Yes      Resolved Hospital Problems   No resolved problems to display.       Generalized weakness of upper and lower extremities, possibly uppers somewhat worse than lowers.  -There are no lateralizing deficits on this patient.  No focal neurological deficits consistent with stroke.  He is experiencing generalized weakness that started shortly after he was hospitalized for complete heart block.  -He is not eligible for MRI given the very recent pacemaker insertion per the radiology department.  -There are no signs and symptoms of infection in this patient.  He does not have any respiratory symptoms or cough.  His urinalysis is clean.  His abdominal exam is unrevealing.  He has no wounds.  His white blood cell count is low.  CTM  - Agree with therapy recommendations for IPR.     AUDREY - improving  -Creatinine 1.72 on presentation, not far off from his baseline renal function.  Best recent creatinine was 1.22 several weeks ago.  -NS at 100 cc/h     Type 2 diabetes  -Hold home diabetic regimen  -Hemoglobin A1c in the a.m.  -Low-dose insulin sliding scale     Complete heart block status post pacemaker February 24, 2024  Hypertension  Hyperlipidemia  Hypothyroidism  GERD  CAD  CVA x 2  Aortic stenosis status post TAVR        VTE Prophylaxis:  Pharmacologic VTE prophylaxis orders are present.      Disposition Planning:     Barriers to Discharge:IPR  Anticipated Date of Discharge: 3/24/2025  Place of Discharge: unclear      Time: 35 minutes     Code Status and Medical Interventions: CPR (Attempt to Resuscitate); Full Support   Ordered at: 03/21/25 2207     Code Status (Patient has no pulse and is not breathing):    CPR (Attempt to Resuscitate)     Medical Interventions (Patient has pulse or is breathing):    Full Support       Signature: Electronically signed by Tian Roca MD, 03/23/25, 13:28 EDT.  [facility] Hospitalist Team

## 2025-03-23 NOTE — PLAN OF CARE
Problem: Adult Inpatient Plan of Care  Goal: Plan of Care Review  Outcome: Not Progressing  Flowsheets (Taken 3/23/2025 0432)  Progress: no change  Plan of Care Reviewed With:   patient   spouse  Goal: Patient-Specific Goal (Individualized)  Outcome: Not Progressing  Goal: Absence of Hospital-Acquired Illness or Injury  Outcome: Not Progressing  Intervention: Identify and Manage Fall Risk  Recent Flowsheet Documentation  Taken 3/23/2025 0229 by Jenny Lopez, RN  Safety Promotion/Fall Prevention:   assistive device/personal items within reach   clutter free environment maintained   fall prevention program maintained   nonskid shoes/slippers when out of bed   room organization consistent   safety round/check completed  Taken 3/23/2025 0023 by Jenny Lopez, RN  Safety Promotion/Fall Prevention:   assistive device/personal items within reach   clutter free environment maintained   fall prevention program maintained   nonskid shoes/slippers when out of bed   room organization consistent   safety round/check completed  Taken 3/22/2025 2207 by Jenny Lopez, RN  Safety Promotion/Fall Prevention:   assistive device/personal items within reach   clutter free environment maintained   fall prevention program maintained   nonskid shoes/slippers when out of bed   room organization consistent   safety round/check completed  Taken 3/22/2025 2021 by Jenny Lopez, RN  Safety Promotion/Fall Prevention:   assistive device/personal items within reach   clutter free environment maintained   fall prevention program maintained   nonskid shoes/slippers when out of bed   room organization consistent   safety round/check completed  Intervention: Prevent Skin Injury  Recent Flowsheet Documentation  Taken 3/22/2025 2021 by Jenny Lopez, RN  Body Position:   position changed independently   sitting up in bed  Intervention: Prevent and Manage VTE (Venous Thromboembolism) Risk  Recent Flowsheet Documentation  Taken 3/22/2025 2021 by  Hindle, Jenny, RN  VTE Prevention/Management:   SCDs (sequential compression devices) off   patient refused intervention  Intervention: Prevent Infection  Recent Flowsheet Documentation  Taken 3/23/2025 0229 by Jenny Lopez RN  Infection Prevention:   environmental surveillance performed   hand hygiene promoted   rest/sleep promoted   single patient room provided   visitors restricted/screened  Taken 3/23/2025 0023 by Jenny Lopez RN  Infection Prevention:   environmental surveillance performed   hand hygiene promoted   rest/sleep promoted   single patient room provided   visitors restricted/screened  Taken 3/22/2025 2207 by Jenny Lopez RN  Infection Prevention:   environmental surveillance performed   hand hygiene promoted   single patient room provided   visitors restricted/screened  Taken 3/22/2025 2021 by Jenny Lopez RN  Infection Prevention:   environmental surveillance performed   hand hygiene promoted   single patient room provided   visitors restricted/screened  Goal: Optimal Comfort and Wellbeing  Outcome: Not Progressing  Intervention: Provide Person-Centered Care  Recent Flowsheet Documentation  Taken 3/22/2025 2021 by Jenny Lopez RN  Trust Relationship/Rapport:   care explained   choices provided  Goal: Readiness for Transition of Care  Outcome: Not Progressing   Goal Outcome Evaluation:  Plan of Care Reviewed With: patient, spouse        Progress: no change     Alert and oriented x 4. Hard of hearing. Assist x 1 for transfers. Spouse at bedside. No c/o pain/discomfort/SOB/dizziness noted. Does not require O2 therapy at this time. No s/s of hyper/hypoglycemia noted. NaCl infusion discontinued. Continent of bowel and bladder. Magnesium replacement protocol completed with positive effect noted. Currently in bed, eyes closed. Rise and fall of chest observed. Call bell in reach. From home.

## 2025-03-24 ENCOUNTER — APPOINTMENT (OUTPATIENT)
Dept: GENERAL RADIOLOGY | Facility: HOSPITAL | Age: 85
DRG: 372 | End: 2025-03-24
Payer: MEDICARE

## 2025-03-24 ENCOUNTER — APPOINTMENT (OUTPATIENT)
Dept: CT IMAGING | Facility: HOSPITAL | Age: 85
DRG: 372 | End: 2025-03-24
Payer: MEDICARE

## 2025-03-24 LAB
ANION GAP SERPL CALCULATED.3IONS-SCNC: 13 MMOL/L (ref 5–15)
B PARAPERT DNA SPEC QL NAA+PROBE: NOT DETECTED
B PERT DNA SPEC QL NAA+PROBE: NOT DETECTED
BASOPHILS # BLD AUTO: 0.05 10*3/MM3 (ref 0–0.2)
BASOPHILS NFR BLD AUTO: 0.6 % (ref 0–1.5)
BUN SERPL-MCNC: 23 MG/DL (ref 8–23)
BUN/CREAT SERPL: 16.1 (ref 7–25)
C PNEUM DNA NPH QL NAA+NON-PROBE: NOT DETECTED
CALCIUM SPEC-SCNC: 8.3 MG/DL (ref 8.6–10.5)
CHLORIDE SERPL-SCNC: 106 MMOL/L (ref 98–107)
CO2 SERPL-SCNC: 19 MMOL/L (ref 22–29)
CREAT SERPL-MCNC: 1.43 MG/DL (ref 0.76–1.27)
D-LACTATE SERPL-SCNC: 2.1 MMOL/L (ref 0.5–2)
DEPRECATED RDW RBC AUTO: 46.7 FL (ref 37–54)
DEPRECATED RDW RBC AUTO: 47.1 FL (ref 37–54)
EGFRCR SERPLBLD CKD-EPI 2021: 48 ML/MIN/1.73
EOSINOPHIL # BLD AUTO: 0.1 10*3/MM3 (ref 0–0.4)
EOSINOPHIL NFR BLD AUTO: 1.1 % (ref 0.3–6.2)
ERYTHROCYTE [DISTWIDTH] IN BLOOD BY AUTOMATED COUNT: 13.8 % (ref 12.3–15.4)
ERYTHROCYTE [DISTWIDTH] IN BLOOD BY AUTOMATED COUNT: 13.8 % (ref 12.3–15.4)
FLUAV SUBTYP SPEC NAA+PROBE: NOT DETECTED
FLUBV RNA ISLT QL NAA+PROBE: NOT DETECTED
GLUCOSE BLDC GLUCOMTR-MCNC: 149 MG/DL (ref 70–105)
GLUCOSE BLDC GLUCOMTR-MCNC: 172 MG/DL (ref 70–105)
GLUCOSE BLDC GLUCOMTR-MCNC: 175 MG/DL (ref 70–105)
GLUCOSE BLDC GLUCOMTR-MCNC: 183 MG/DL (ref 70–105)
GLUCOSE SERPL-MCNC: 180 MG/DL (ref 65–99)
HADV DNA SPEC NAA+PROBE: NOT DETECTED
HCOV 229E RNA SPEC QL NAA+PROBE: NOT DETECTED
HCOV HKU1 RNA SPEC QL NAA+PROBE: NOT DETECTED
HCOV NL63 RNA SPEC QL NAA+PROBE: NOT DETECTED
HCOV OC43 RNA SPEC QL NAA+PROBE: NOT DETECTED
HCT VFR BLD AUTO: 36.6 % (ref 37.5–51)
HCT VFR BLD AUTO: 37.7 % (ref 37.5–51)
HGB BLD-MCNC: 11.4 G/DL (ref 13–17.7)
HGB BLD-MCNC: 11.5 G/DL (ref 13–17.7)
HMPV RNA NPH QL NAA+NON-PROBE: NOT DETECTED
HPIV1 RNA ISLT QL NAA+PROBE: NOT DETECTED
HPIV2 RNA SPEC QL NAA+PROBE: NOT DETECTED
HPIV3 RNA NPH QL NAA+PROBE: NOT DETECTED
HPIV4 P GENE NPH QL NAA+PROBE: NOT DETECTED
IMM GRANULOCYTES # BLD AUTO: 0.05 10*3/MM3 (ref 0–0.05)
IMM GRANULOCYTES NFR BLD AUTO: 0.6 % (ref 0–0.5)
LYMPHOCYTES # BLD AUTO: 0.95 10*3/MM3 (ref 0.7–3.1)
LYMPHOCYTES NFR BLD AUTO: 10.6 % (ref 19.6–45.3)
M PNEUMO IGG SER IA-ACNC: NOT DETECTED
MCH RBC QN AUTO: 28.2 PG (ref 26.6–33)
MCH RBC QN AUTO: 28.7 PG (ref 26.6–33)
MCHC RBC AUTO-ENTMCNC: 30.5 G/DL (ref 31.5–35.7)
MCHC RBC AUTO-ENTMCNC: 31.1 G/DL (ref 31.5–35.7)
MCV RBC AUTO: 92.2 FL (ref 79–97)
MCV RBC AUTO: 92.4 FL (ref 79–97)
MONOCYTES # BLD AUTO: 1.04 10*3/MM3 (ref 0.1–0.9)
MONOCYTES NFR BLD AUTO: 11.6 % (ref 5–12)
NEUTROPHILS NFR BLD AUTO: 6.74 10*3/MM3 (ref 1.7–7)
NEUTROPHILS NFR BLD AUTO: 75.5 % (ref 42.7–76)
NRBC BLD AUTO-RTO: 0 /100 WBC (ref 0–0.2)
PLATELET # BLD AUTO: 251 10*3/MM3 (ref 140–450)
PLATELET # BLD AUTO: 253 10*3/MM3 (ref 140–450)
PMV BLD AUTO: 10.3 FL (ref 6–12)
PMV BLD AUTO: 10.8 FL (ref 6–12)
POTASSIUM SERPL-SCNC: 3.9 MMOL/L (ref 3.5–5.2)
RBC # BLD AUTO: 3.97 10*6/MM3 (ref 4.14–5.8)
RBC # BLD AUTO: 4.08 10*6/MM3 (ref 4.14–5.8)
RHINOVIRUS RNA SPEC NAA+PROBE: NOT DETECTED
RSV RNA NPH QL NAA+NON-PROBE: NOT DETECTED
SARS-COV-2 RNA RESP QL NAA+PROBE: NOT DETECTED
SODIUM SERPL-SCNC: 138 MMOL/L (ref 136–145)
WBC NRBC COR # BLD AUTO: 8.93 10*3/MM3 (ref 3.4–10.8)
WBC NRBC COR # BLD AUTO: 9.48 10*3/MM3 (ref 3.4–10.8)

## 2025-03-24 PROCEDURE — 0202U NFCT DS 22 TRGT SARS-COV-2: CPT | Performed by: STUDENT IN AN ORGANIZED HEALTH CARE EDUCATION/TRAINING PROGRAM

## 2025-03-24 PROCEDURE — 97166 OT EVAL MOD COMPLEX 45 MIN: CPT

## 2025-03-24 PROCEDURE — 87040 BLOOD CULTURE FOR BACTERIA: CPT | Performed by: STUDENT IN AN ORGANIZED HEALTH CARE EDUCATION/TRAINING PROGRAM

## 2025-03-24 PROCEDURE — 83605 ASSAY OF LACTIC ACID: CPT | Performed by: STUDENT IN AN ORGANIZED HEALTH CARE EDUCATION/TRAINING PROGRAM

## 2025-03-24 PROCEDURE — 25810000003 SODIUM CHLORIDE 0.9 % SOLUTION: Performed by: STUDENT IN AN ORGANIZED HEALTH CARE EDUCATION/TRAINING PROGRAM

## 2025-03-24 PROCEDURE — 85025 COMPLETE CBC W/AUTO DIFF WBC: CPT | Performed by: STUDENT IN AN ORGANIZED HEALTH CARE EDUCATION/TRAINING PROGRAM

## 2025-03-24 PROCEDURE — G0378 HOSPITAL OBSERVATION PER HR: HCPCS

## 2025-03-24 PROCEDURE — 71045 X-RAY EXAM CHEST 1 VIEW: CPT

## 2025-03-24 PROCEDURE — 87077 CULTURE AEROBIC IDENTIFY: CPT | Performed by: STUDENT IN AN ORGANIZED HEALTH CARE EDUCATION/TRAINING PROGRAM

## 2025-03-24 PROCEDURE — 63710000001 INSULIN LISPRO (HUMAN) PER 5 UNITS

## 2025-03-24 PROCEDURE — 82948 REAGENT STRIP/BLOOD GLUCOSE: CPT

## 2025-03-24 PROCEDURE — 70450 CT HEAD/BRAIN W/O DYE: CPT

## 2025-03-24 PROCEDURE — 87154 CUL TYP ID BLD PTHGN 6+ TRGT: CPT | Performed by: STUDENT IN AN ORGANIZED HEALTH CARE EDUCATION/TRAINING PROGRAM

## 2025-03-24 PROCEDURE — 97116 GAIT TRAINING THERAPY: CPT

## 2025-03-24 PROCEDURE — 87186 SC STD MICRODIL/AGAR DIL: CPT | Performed by: STUDENT IN AN ORGANIZED HEALTH CARE EDUCATION/TRAINING PROGRAM

## 2025-03-24 PROCEDURE — 97530 THERAPEUTIC ACTIVITIES: CPT

## 2025-03-24 PROCEDURE — 97110 THERAPEUTIC EXERCISES: CPT

## 2025-03-24 RX ORDER — SODIUM CHLORIDE 9 MG/ML
100 INJECTION, SOLUTION INTRAVENOUS ONCE
Status: COMPLETED | OUTPATIENT
Start: 2025-03-24 | End: 2025-03-24

## 2025-03-24 RX ADMIN — Medication 10 ML: at 09:54

## 2025-03-24 RX ADMIN — SODIUM CHLORIDE 100 ML/HR: 9 INJECTION, SOLUTION INTRAVENOUS at 22:32

## 2025-03-24 RX ADMIN — INSULIN LISPRO 2 UNITS: 100 INJECTION, SOLUTION INTRAVENOUS; SUBCUTANEOUS at 17:16

## 2025-03-24 RX ADMIN — ACETAMINOPHEN 650 MG: 325 TABLET, FILM COATED ORAL at 20:28

## 2025-03-24 RX ADMIN — INSULIN LISPRO 2 UNITS: 100 INJECTION, SOLUTION INTRAVENOUS; SUBCUTANEOUS at 12:03

## 2025-03-24 RX ADMIN — Medication 10 ML: at 21:44

## 2025-03-24 RX ADMIN — PANTOPRAZOLE SODIUM 40 MG: 40 TABLET, DELAYED RELEASE ORAL at 09:53

## 2025-03-24 RX ADMIN — LEVOTHYROXINE SODIUM 112 MCG: 112 TABLET ORAL at 09:53

## 2025-03-24 RX ADMIN — ASPIRIN 81 MG CHEWABLE TABLET 81 MG: 81 TABLET CHEWABLE at 09:53

## 2025-03-24 RX ADMIN — ATORVASTATIN CALCIUM 80 MG: 40 TABLET, FILM COATED ORAL at 17:16

## 2025-03-24 RX ADMIN — RIVAROXABAN 15 MG: 15 TABLET, FILM COATED ORAL at 17:16

## 2025-03-24 RX ADMIN — LOSARTAN POTASSIUM 25 MG: 25 TABLET, FILM COATED ORAL at 09:53

## 2025-03-24 RX ADMIN — INSULIN LISPRO 2 UNITS: 100 INJECTION, SOLUTION INTRAVENOUS; SUBCUTANEOUS at 21:41

## 2025-03-24 NOTE — PLAN OF CARE
Goal Outcome Evaluation:  Plan of Care Reviewed With: spouse, patient        Progress: no change  Outcome Evaluation: Pt is an 83 y/o M who presented to [facility] 3/21/25 with BLE weakness. Of note, pt recently admitted 2/20/25 with HTN, was noted to be in complete heart block, pacemaker was placed 2/24/25 by Dr. Danielle, dc home with spouse 2/26/25. Unable to undergo MRI d/t recent pacemaker placed. At baseline, pt resides with spouse in ground level apartment with 0 MAXIMILIANO. At pt typical baseline, (I) with ADLs, (I) with mobility without AD and is an active . Pt cleared for OT by nursing, oriented x4 on RA sitting up chair upon arrival, family at bedside. Pt c/o no pain. Pt comes to standing with min A requiring verbal cues for hand placement for safety. Pt requiring min A with RW to ambulate within room, appears unsteady on feet and with limited activity tolerance. Pt don/doff shoes with set up in supported sitting position. Pt with recent functional decline, would benefit from acute IP rehab when medically appropriate for dc. OT will follow within acute setting, will follow and progress as appropriate.    Anticipated Discharge Disposition (OT): inpatient rehabilitation facility                         Received a request for a refill on    lisinopril-hydroCHLOROthiazide (ZESTORETIC) 20-25 MG per tablet (Discontinued) 90 tablet 0 1/6/2021 1/11/2021    Sig - Route: Take 0.5 tablets by mouth daily. - Oral    Class: Script Not Printed    Reason for Discontinue: Alternate Therapy       not on medication list as a combined  Medicine     [id number] - [place], [place] - [address]  [phone]

## 2025-03-24 NOTE — THERAPY EVALUATION
Patient Name: Alen Ratliff  : 1940    MRN: [id number]                              Today's Date: 3/24/2025       Admit Date: 3/21/2025    Visit Dx:     ICD-10-CM ICD-9-CM   1. Gait disturbance  R26.9 781.2   2. Anemia, unspecified type  D64.9 285.9   3. Electrolyte abnormality  E87.8 276.9     Patient Active Problem List   Diagnosis    Heart murmur    Essential hypertension    Mixed hyperlipidemia    Acquired hypothyroidism    GERD (gastroesophageal reflux disease)    Seasonal allergic rhinitis due to pollen    CKD (chronic kidney disease) stage 3, GFR 30-59 ml/min    History of prostate cancer    History of stroke    Bilateral carotid artery stenosis    Closed displaced fracture of lateral malleolus of left fibula    Medicare annual wellness visit, subsequent    Trigger index finger of left hand    Colon cancer screening    DM type 2 with diabetic peripheral neuropathy    Aortic stenosis, severe    Coronary artery disease involving native coronary artery of native heart    Needs flu shot    PAF (paroxysmal atrial fibrillation)    Coronary artery disease of native artery of native heart with stable angina pectoris    S/P PTCA (percutaneous transluminal coronary angioplasty)    Chronic diastolic CHF (congestive heart failure), NYHA class 2    S/P TAVR (transcatheter aortic valve replacement)    Bradycardia    TIA (transient ischemic attack)    Onychodystrophy    Chronic pain of right knee    Sick sinus syndrome    AV block, Mobitz 1    Overweight (BMI 25.0-29.9)    Third degree AV block    Pacemaker    Gait disturbance    Weakness of both lower extremities     Past Medical History:   Diagnosis Date    Allergic rhinitis     Aortic stenosis     Atrial fibrillation     Coronary artery disease     Diabetes     GERD (gastroesophageal reflux disease)     Heart murmur     Hyperlipidemia     Hypertension     Hypothyroidism     Prostate cancer     S/P TAVR (transcatheter aortic valve replacement) 2023     Stroke      Past Surgical History:   Procedure Laterality Date    ANKLE OPEN REDUCTION INTERNAL FIXATION Left 12/16/2020    Procedure: ANKLE OPEN REDUCTION INTERNAL FIXATION;  Surgeon: CAROLE Vasquez DPM;  Location: [facility] MAIN OR;  Service: Podiatry;  Laterality: Left;    AORTIC VALVE REPAIR/REPLACEMENT N/A 02/20/2023    Procedure: Transfemoral Transcatheter Aortic Valve Replacement;  Surgeon: Naveed Leonardo MD;  Location: [facility] HYBRID OR;  Service: Cardiovascular;  Laterality: N/A;    AORTIC VALVE REPAIR/REPLACEMENT N/A 02/20/2023    Procedure: TRANSCATHETER AORTIC VALVE REPLACEMENT;  Surgeon: Fabio Villafana MD;  Location: [facility] HYBRID OR;  Service: Cardiothoracic;  Laterality: N/A;    BACK SURGERY  1994    CARDIAC CATHETERIZATION Right 09/27/2022    Procedure: Left Heart Cath;  Surgeon: Steve Muhammad MD;  Location: [facility] CATH INVASIVE LOCATION;  Service: Cardiovascular;  Laterality: Right;    CARDIAC CATHETERIZATION N/A 01/12/2023    Procedure: Stent JENN coronary;  Surgeon: Steve Muhammad MD;  Location: [facility] CATH INVASIVE LOCATION;  Service: Cardiovascular;  Laterality: N/A;    CARDIAC ELECTROPHYSIOLOGY PROCEDURE N/A 02/24/2025    Procedure: Pacemaker DC new, BiV PPM for complete heart block;  Surgeon: Radha Danielle MD;  Location: [facility] CATH INVASIVE LOCATION;  Service: Cardiovascular;  Laterality: N/A;    CAROTID ENDARTERECTOMY Right 12/14/2020    Procedure: CAROTID ENDARTERECTOMY;  Surgeon: Toby Fung MD;  Location: [facility] MAIN OR;  Service: Vascular;  Laterality: Right;    COLONOSCOPY  08/25/2015    CORONARY STENT PLACEMENT      PACEMAKER IMPLANTATION      2/24/25    PROSTATECTOMY  2001    due to cancer      General Information       Row Name 03/24/25 1507          OT Time and Intention    Subjective Information complains of;weakness  -MS     Document Type evaluation  -MS     Mode of Treatment occupational therapy  -MS     Patient Effort excellent  -MS       Row Name 03/24/25 1501           General Information    Patient Profile Reviewed yes  -MS     Prior Level of Function independent:;ADL's;all household mobility;community mobility;driving  -MS     Existing Precautions/Restrictions fall  -MS       Row Name 03/24/25 1504          Occupational Profile    Reason for Services/Referral (Occupational Profile) Pt is an 83 y/o M who presented to [facility] 3/21/25 with BLE weakness. Of note, pt recently admitted 2/20/25 with HTN, was noted to be in complete heart block, pacemaker was placed 2/24/25 by Dr. Danielle, [place] home with spouse 2/26/25. Unable to undergo MRI d.t recent pacemaker placed. PMHx significant for HTN, CKD, hx CVA, trigger finger of L hand, A.fib, and pacemaker. At baseline, pt resides with spouse in ground level apartment with 0 [address]. At pt typical baseline, (I) with ADLs, (I) with mobility without AD and is an active .  -MS     Environmental Supports and Barriers (Occupational Profile) supportive family  -MS       Row Name 03/24/25 1504          Living Environment    Current Living Arrangements apartment  -MS     People in Home spouse  -MS       Row Name 03/24/25 1504          Home Main Entrance    Number of Stairs, Main Entrance none  -MS       Row Name 03/24/25 1504          Stairs Within Home, Primary    Number of Stairs, Within Home, Primary none  -MS       Row Name 03/24/25 1504          Cognition    Orientation Status (Cognition) oriented x 4  -MS       Row Name 03/24/25 1504          Safety Issues/Impairments Affecting Functional Mobility    Impairments Affecting Function (Mobility) balance;endurance/activity tolerance;range of motion (ROM);strength;postural/trunk control  -MS               User Key  (r) = Recorded By, (t) = Taken By, (c) = Cosigned By      Initials Name Provider Type    Savana Jj OT Occupational Therapist                     Mobility/ADL's       Row Name 03/24/25 1505          Bed Mobility    Bed Mobility bed mobility (all) activities  -MS     All  Activities, [place] (Bed Mobility) unable to assess  -MS     Comment, (Bed Mobility) sitting up in chair upon arrival  -MS       Row Name 03/24/25 1505          Transfers    Transfers sit-stand transfer  -MS       Row Name 03/24/25 1505          Sit-Stand Transfer    Sit-Stand [place] (Transfers) minimum assist (75% patient effort)  -MS     Comment, (Sit-Stand Transfer) verbal cues for hand placement  -MS       Row Name 03/24/25 1505          Functional Mobility    Patient was able to Ambulate yes  -MS       Row Name 03/24/25 1505          Activities of Daily Living    BADL Assessment/Intervention lower body dressing  -MS       Row Name 03/24/25 1505          Lower Body Dressing Assessment/Training    [place] Level (Lower Body Dressing) don;doff;shoes/slippers;set up  -MS     Position (Lower Body Dressing) supported sitting  -MS               User Key  (r) = Recorded By, (t) = Taken By, (c) = Cosigned By      Initials Name Provider Type    Savana Jj OT Occupational Therapist                   Obj/Interventions       Row Name 03/24/25 1505          Sensory Assessment (Somatosensory)    Sensory Assessment (Somatosensory) UE sensation intact  -MS       Row Name 03/24/25 1505          Vision Assessment/Intervention    Visual Impairment/Limitations WFL  -MS       Row Name 03/24/25 1505          Range of Motion Comprehensive    General Range of Motion bilateral upper extremity ROM WFL  -MS       Row Name 03/24/25 1505          Strength Comprehensive (MMT)    Comment, General Manual Muscle Testing (MMT) Assessment BUE grossly 3+/5  -MS       Row Name 03/24/25 1505          Balance    Balance Assessment sitting static balance;sitting dynamic balance;standing static balance;standing dynamic balance  -MS     Static Sitting Balance modified independence  -MS     Dynamic Sitting Balance modified independence  -MS     Position, Sitting Balance supported;sitting in chair  -MS     Static Standing Balance  contact guard  -MS     Dynamic Standing Balance contact guard;minimal assist  -MS     Position/Device Used, Standing Balance supported;walker, front-wheeled  -MS               User Key  (r) = Recorded By, (t) = Taken By, (c) = Cosigned By      Initials Name Provider Type    Savana Jj, OT Occupational Therapist                   Goals/Plan       Row Name 03/24/25 1502          Bed Mobility Goal 1 (OT)    Activity/Assistive Device (Bed Mobility Goal 1, OT) bed mobility activities, all  -MS     [place] Level/Cues Needed (Bed Mobility Goal 1, OT) modified independence  -MS     Time Frame (Bed Mobility Goal 1, OT) long term goal (LTG);2 weeks  -MS     Progress/Outcomes (Bed Mobility Goal 1, OT) new goal  -MS       Row Name 03/24/25 1501          Transfer Goal 1 (OT)    Activity/Assistive Device (Transfer Goal 1, OT) transfers, all  -MS     [place] Level/Cues Needed (Transfer Goal 1, OT) modified independence  -MS     Time Frame (Transfer Goal 1, OT) long term goal (LTG);2 weeks  -MS     Progress/Outcome (Transfer Goal 1, OT) new goal  -MS       Row Name 03/24/25 1501          Dressing Goal 1 (OT)    Activity/Device (Dressing Goal 1, OT) dressing skills, all  -MS     [place]/Cues Needed (Dressing Goal 1, OT) modified independence  -MS     Time Frame (Dressing Goal 1, OT) long term goal (LTG);2 weeks  -MS     Progress/Outcome (Dressing Goal 1, OT) new goal  -MS       Row Name 03/24/25 1504          Toileting Goal 1 (OT)    Activity/Device (Toileting Goal 1, OT) toileting skills, all  -MS     [place] Level/Cues Needed (Toileting Goal 1, OT) modified independence  -MS     Time Frame (Toileting Goal 1, OT) long term goal (LTG);2 weeks  -MS     Progress/Outcome (Toileting Goal 1, OT) new goal  -MS       Row Name 03/24/25 1501          Therapy Assessment/Plan (OT)    Planned Therapy Interventions (OT) activity tolerance training;adaptive equipment training;BADL retraining;functional balance  retraining;IADL retraining;neuromuscular control/coordination retraining;occupation/activity based interventions;passive ROM/stretching;patient/caregiver education/training;transfer/mobility retraining;strengthening exercise;ROM/therapeutic exercise  -MS               User Key  (r) = Recorded By, (t) = Taken By, (c) = Cosigned By      Initials Name Provider Type    MS Savana Triplett, OT Occupational Therapist                   Clinical Impression       Row Name 03/24/25 1506          Pain Assessment    Pretreatment Pain Rating 0/10 - no pain  -MS     Posttreatment Pain Rating 0/10 - no pain  -MS       Row Name 03/24/25 1506          Plan of Care Review    Plan of Care Reviewed With spouse;patient  -MS     Progress no change  -MS     Outcome Evaluation Pt is an 83 y/o M who presented to [facility] 3/21/25 with BLE weakness. Of note, pt recently admitted 2/20/25 with HTN, was noted to be in complete heart block, pacemaker was placed 2/24/25 by Dr. Danielle, dc home with spouse 2/26/25. Unable to undergo MRI d/t recent pacemaker placed. At baseline, pt resides with spouse in ground level apartment with 0 [address]IMIL[address]O. At pt typical baseline, (I) with ADLs, (I) with mobility without AD and is an active . Pt cleared for OT by nursing, oriented x4 on RA sitting up chair upon arrival, family at bedside. Pt c/o no pain. Pt comes to standing with min A requiring verbal cues for hand placement for safety. Pt requiring min A with RW to ambulate within room, appears unsteady on feet and with limited activity tolerance. Pt don/doff shoes with set up in supported sitting position. Pt with recent functional decline, would benefit from acute IP rehab when medically appropriate for dc. OT will follow within acute setting, will follow and progress as appropriate.  -MS       Row Name 03/24/25 1506          Therapy Assessment/Plan (OT)    Rehab Potential (OT) good  -MS     Criteria for Skilled Therapeutic Interventions Met (OT) yes;meets  criteria;skilled treatment is necessary  -MS     Therapy Frequency (OT) 3 times/wk  -MS     Predicted Duration of Therapy Intervention (OT) until d/c  -MS       Row Name 03/24/25 1506          Therapy Plan Review/Discharge Plan (OT)    Anticipated Discharge Disposition (OT) inpatient rehabilitation facility  -MS       Row Name 03/24/25 1506          Vital Signs    Pre Systolic BP Rehab 171  -MS     Pre Treatment Diastolic BP 71  -MS     Intra Systolic BP Rehab 145  -MS     Intra Treatment Diastolic BP 75  -MS     Post Systolic BP Rehab 155  -MS     Post Treatment Diastolic BP 66  -MS     Pretreatment Heart Rate (beats/min) 64  -MS     Intratreatment Heart Rate (beats/min) 73  -MS     Posttreatment Heart Rate (beats/min) 66  -MS     Pretreatment Resp Rate (breaths/min) 12  -MS     Intratreatment Resp Rate (breaths/min) 13  -MS     Posttreatment Resp Rate (breaths/min) 18  -MS     Pre SpO2 (%) 98  -MS     O2 Delivery Pre Treatment room air  -MS     O2 Delivery Intra Treatment room air  -MS     Post SpO2 (%) 100  -MS     O2 Delivery Post Treatment room air  -MS     Pre Patient Position Sitting  -MS     Intra Patient Position Standing  -MS     Post Patient Position Sitting  -MS       Row Name 03/24/25 1506          Positioning and Restraints    Pre-Treatment Position sitting in chair/recliner  -MS     Post Treatment Position chair  -MS     In Chair notified nsg;reclined;call light within reach;encouraged to call for assist;exit alarm on;with family/caregiver;legs elevated  -MS               User Key  (r) = Recorded By, (t) = Taken By, (c) = Cosigned By      Initials Name Provider Type    Savana Jj, OT Occupational Therapist                   Outcome Measures       Row Name 03/24/25 1509          How much help from another is currently needed...    Putting on and taking off regular lower body clothing? 3  -MS     Bathing (including washing, rinsing, and drying) 3  -MS     Toileting (which includes using toilet  bed pan or urinal) 3  -MS     Putting on and taking off regular upper body clothing 4  -MS     Taking care of personal grooming (such as brushing teeth) 4  -MS     Eating meals 4  -MS     AM-PAC 6 Clicks Score (OT) 21  -MS       Row Name 03/24/25 0954          How much help from another person do you currently need...    Turning from your back to your side while in flat bed without using bedrails? 3  -MH     Moving from lying on back to sitting on the side of a flat bed without bedrails? 3  -MH     Moving to and from a bed to a chair (including a wheelchair)? 3  -MH     Standing up from a chair using your arms (e.g., wheelchair, bedside chair)? 3  -MH     Climbing 3-5 steps with a railing? 2  -MH     To walk in hospital room? 3  -MH     AM-PAC 6 Clicks Score (PT) 17  -MH     Highest Level of Mobility Goal 5 --> Static standing  -MH       Row Name 03/24/25 1509          Functional Assessment    Outcome Measure Options AM-PAC 6 Clicks Daily Activity (OT)  -MS               User Key  (r) = Recorded By, (t) = Taken By, (c) = Cosigned By      Initials Name Provider Type     Grayson Rob, RN Registered Nurse    Savana Jj OT Occupational Therapist                    Occupational Therapy Education       Title: PT OT SLP Therapies (Done)       Topic: Occupational Therapy (Done)       Point: ADL training (Done)       Learning Progress Summary            Patient Acceptance, E,TB, VU by MS at 3/24/2025 1510                      Point: Precautions (Done)       Learning Progress Summary            Patient Acceptance, E,TB, VU by MS at 3/24/2025 1510                      Point: Body mechanics (Done)       Learning Progress Summary            Patient Acceptance, E,TB, VU by MS at 3/24/2025 1510                                      User Key       Initials Effective Dates Name Provider Type Discipline    MS 07/13/22 -  Savana Triplett OT Occupational Therapist OT                  OT Recommendation and Plan  Planned Therapy  Interventions (OT): activity tolerance training, adaptive equipment training, BADL retraining, functional balance retraining, IADL retraining, neuromuscular control/coordination retraining, occupation/activity based interventions, passive ROM/stretching, patient/caregiver education/training, transfer/mobility retraining, strengthening exercise, ROM/therapeutic exercise  Therapy Frequency (OT): 3 times/wk  Plan of Care Review  Plan of Care Reviewed With: spouse, patient  Progress: no change  Outcome Evaluation: Pt is an 85 y/o M who presented to [facility] 3/21/25 with BLE weakness. Of note, pt recently admitted 2/20/25 with HTN, was noted to be in complete heart block, pacemaker was placed 2/24/25 by Dr. Danielle, dc home with spouse 2/26/25. Unable to undergo MRI d/t recent pacemaker placed. At baseline, pt resides with spouse in ground level apartment with 0 MAXIMILIANO. At pt typical baseline, (I) with ADLs, (I) with mobility without AD and is an active . Pt cleared for OT by nursing, oriented x4 on RA sitting up chair upon arrival, family at bedside. Pt c/o no pain. Pt comes to standing with min A requiring verbal cues for hand placement for safety. Pt requiring min A with RW to ambulate within room, appears unsteady on feet and with limited activity tolerance. Pt don/doff shoes with set up in supported sitting position. Pt with recent functional decline, would benefit from acute IP rehab when medically appropriate for dc. OT will follow within acute setting, will follow and progress as appropriate.     Time Calculation:         Time Calculation- OT       Row Name 03/24/25 1510             Time Calculation- OT    OT Start Time 1122  -MS      OT Stop Time 1131  -MS      OT Time Calculation (min) 9 min  -MS      Total Timed Code Minutes- OT 0 minute(s)  -MS      OT Received On 03/24/25  -MS      OT - Next Appointment 03/26/25  -MS      OT Goal Re-Cert Due Date 04/07/25  -MS                User Key  (r) = Recorded By,  (t) = Taken By, (c) = Cosigned By      Initials Name Provider Type    MS Savana Triplett OT Occupational Therapist                  Therapy Charges for Today       Code Description Service Date Service Provider Modifiers Qty    35571207568 HC OT EVAL MOD COMPLEXITY 4 3/24/2025 Savana Triplett OT GO 1                 Savana Triplett OT  3/24/2025

## 2025-03-24 NOTE — THERAPY TREATMENT NOTE
Subjective: Pt agreeable to therapeutic plan of care.    Objective:     Bed mobility - N/A or Not attempted.  Pt already up in the chair  Transfers - Min-A and with rolling walker  sit to stand from chair 2 times  Ambulation - 22 + 25 feet CGA and with rolling walker  cues for posture, shuffle steps,  cues to correct step length,  pt is able to correct but does not maintain    Therapeutic Exercise - 15 Reps B LE AROM supported sitting / chair and standing with roll walker    Vitals: WNL    Pain: 0    Education: Provided education on the importance of mobility in the acute care setting, Verbal/Tactile Cues, Transfer Training, and Gait Training    Assessment: Alen Ratliff presents with functional mobility impairments which indicate the need for skilled intervention. Tolerating session today without incident.  Pt is highly motivated to recover his independent mobility.  Pt is very appropriate for Acute Inpatient rehab at d/c.  Will continue to follow and progress as tolerated.     Plan/Recommendations:   If medically appropriate, High Intensity Therapy recommended post-acute care. This is recommended as therapy feels the patient would require 5-6 days per week, 2-3 hours per day. At this time, inpatient rehabilitation (acute rehab) would be recommended.  Pt requires no DME at discharge.     Pt desires Inpatient Rehabilitation placement at discharge. Pt cooperative; agreeable to therapeutic recommendations and plan of care.     Basic Mobility 6-click:  Rollin = Total, A lot = 2, A little = 3; 4 = None  Supine>Sit:   1 = Total, A lot = 2, A little = 3; 4 = None   Sit>Stand with arms:  1 = Total, A lot = 2, A little = 3; 4 = None  Bed>Chair:   1 = Total, A lot = 2, A little = 3; 4 = None  Ambulate in room:  1 = Total, A lot = 2, A little = 3; 4 = None  3-5 Steps with railin = Total, A lot = 2, A little = 3; 4 = None  Score: 17    Modified [place]: N/A = No pre-op stroke/TIA    Post-Tx Position: Up in  Chair and Call light and personal items within reach  pt's wife present  PPE: gloves     Therapy Charges for Today       Code Description Service Date Service Provider Modifiers Qty    92947030425 HC GAIT TRAINING EA 15 MIN 3/24/2025 Lila Drew, DELIA GP 1    11235324400 HC PT THERAPEUTIC ACT EA 15 MIN 3/24/2025 Lila Drew, DELIA GP 1    67410540932 HC PT THER PROC EA 15 MIN 3/24/2025 Lila Drew, DELIA GP 1           PT Charges       Row Name 03/24/25 1032             Time Calculation    Start Time 0911  -SC      Stop Time 0936  -SC      Time Calculation (min) 25 min  -SC      PT Received On 03/24/25  -SC      PT - Next Appointment 03/25/25  -SC         Time Calculation- PT    Total Timed Code Minutes- PT 25 minute(s)  -SC                User Key  (r) = Recorded By, (t) = Taken By, (c) = Cosigned By      Initials Name Provider Type    SC Lila Drew PTA Physical Therapist Assistant

## 2025-03-24 NOTE — PROGRESS NOTES
[facility] MEDICINE SERVICE  DAILY PROGRESS NOTE    NAME: Alen Ratliff  : 1940  MRN: [id number]      LOS: 0 days     PROVIDER OF SERVICE: Tian Roca MD    Chief Complaint: Weakness of both lower extremities    Subjective:     Interval History:  History taken from: patient    Waiting IPR placement.      Review of Systems:   Review of Systems    Objective:     Vital Signs  Temp:  [97.6 °F (36.4 °C)-99.4 °F (37.4 °C)] 98.5 °F (36.9 °C)  Heart Rate:  [60-67] 60  Resp:  [14-26] 14  BP: (126-170)/(59-71) 143/60   Body mass index is 27.52 kg/m².    Physical Exam  Physical Exam  Neurological:      General: No focal deficit present.      Mental Status: He is oriented to person, place, and time. Mental status is at baseline.      Cranial Nerves: No cranial nerve deficit.      Sensory: No sensory deficit.      Coordination: Coordination normal.      Comments: 4/5 b/u upper and lower strength   Psychiatric:         Mood and Affect: Mood normal.         Behavior: Behavior normal.            Diagnostic Data    Results from last 7 days   Lab Units 25  2354 25  1805 25  1446   WBC 10*3/mm3 9.48   < >  --    HEMOGLOBIN g/dL 11.4*   < >  --    HEMATOCRIT % 36.6*   < >  --    PLATELETS 10*3/mm3 251   < >  --    GLUCOSE mg/dL 180*   < > 167*   CREATININE mg/dL 1.43*   < > 1.72*   BUN mg/dL 23   < > 33*   SODIUM mmol/L 138   < > 138   POTASSIUM mmol/L 3.9   < > 3.7   AST (SGOT) U/L  --   --  54*   ALT (SGPT) U/L  --   --  53*   ALK PHOS U/L  --   --  75   BILIRUBIN mg/dL  --   --  0.7   ANION GAP mmol/L 13.0   < > 14.9    < > = values in this interval not displayed.       No radiology results for the last day      I reviewed the patient's new clinical results.    Assessment/Plan:     Active and Resolved Problems  Active Hospital Problems    Diagnosis  POA    **Weakness of both lower extremities [R29.898]  Yes    Gait disturbance [R26.9]  Yes      Resolved Hospital Problems   No resolved  problems to display.       Generalized weakness of upper and lower extremities  -There are no lateralizing deficits on this patient.  No focal neurological deficits consistent with stroke.  He is experiencing generalized weakness that started shortly after he was hospitalized for complete heart block.  -He is not eligible for MRI given the very recent pacemaker insertion per the radiology department.  -There are no signs and symptoms of infection in this patient.  He does not have any respiratory symptoms or cough.  His urinalysis is clean.  His abdominal exam is unrevealing.  He has no wounds.  His white blood cell count is low.  CTM  - Agree with therapy recommendations for IPR.     AUDREY -now is at baseline  -Creatinine 1.72 on presentation, not far off from his baseline renal function.  Best recent creatinine was 1.22 several weeks ago.  -Status post fluid resuscitation.     Type 2 diabetes  -Hold home diabetic regimen  -Hemoglobin A1c in the a.m.  -Low-dose insulin sliding scale     Complete heart block status post pacemaker February 24, 2024  Hypertension  Hyperlipidemia  Hypothyroidism  GERD  CAD  CVA x 2  Aortic stenosis status post TAVR        VTE Prophylaxis:  Pharmacologic VTE prophylaxis orders are present.      Disposition Planning:     Barriers to Discharge:IPR, patient is medically cleared for discharge.  Anticipated Date of Discharge: 3/24/2025  Place of Discharge: unclear      Time: 35 minutes     Code Status and Medical Interventions: CPR (Attempt to Resuscitate); Full Support   Ordered at: 03/21/25 2207     Code Status (Patient has no pulse and is not breathing):    CPR (Attempt to Resuscitate)     Medical Interventions (Patient has pulse or is breathing):    Full Support       Signature: Electronically signed by Tian Roca MD, 03/24/25, 10:18 EDT.  [facility] Hospitalist Team

## 2025-03-24 NOTE — CASE MANAGEMENT/SOCIAL WORK
Continued Stay Note  THAD Hernandez     Patient Name: Alen Ratliff  MRN: [id number]  Today's Date: 3/24/2025    Admit Date: 3/21/2025    Plan: [facility] (pending). No PASRR required, will need precert.   Discharge Plan       Row Name 03/24/25 1555       Plan    Plan [facility] (pending). No PASRR required, will need precert.    Plan Comments CM made aware [facility] has been able to accept patient pending precert. Patient to have CT of the head, PT/OT evals today recommend IRF. CM updated ELIZABETH Galicia and mami Boone/Juanita.           Aron Guerrero RN     Cell number [phone]  Office number [phone]

## 2025-03-24 NOTE — PLAN OF CARE
Alen Ratliff presents with functional mobility impairments which indicate the need for skilled intervention. Tolerating session today without incident.  Pt is highly motivated to recover his independent mobility.  Pt is very appropriate for Acute Inpatient rehab at d/c.  Will continue to follow and progress as tolerated.     Plan/Recommendations:   If medically appropriate, High Intensity Therapy recommended post-acute care. This is recommended as therapy feels the patient would require 5-6 days per week, 2-3 hours per day. At this time, inpatient rehabilitation (acute rehab) would be recommended.  Pt requires no DME at discharge.     Pt desires Inpatient Rehabilitation placement at discharge. Pt cooperative; agreeable to therapeutic recommendations and plan of care.

## 2025-03-24 NOTE — PLAN OF CARE
Problem: Adult Inpatient Plan of Care  Goal: Plan of Care Review  Outcome: Not Progressing  Flowsheets (Taken 3/24/2025 0139)  Progress: no change  Plan of Care Reviewed With:   patient   spouse  Goal: Patient-Specific Goal (Individualized)  Outcome: Not Progressing  Goal: Absence of Hospital-Acquired Illness or Injury  Outcome: Not Progressing  Intervention: Identify and Manage Fall Risk  Recent Flowsheet Documentation  Taken 3/24/2025 0011 by Jenny Lopez RN  Safety Promotion/Fall Prevention:   assistive device/personal items within reach   clutter free environment maintained   nonskid shoes/slippers when out of bed   room organization consistent   safety round/check completed   fall prevention program maintained  Taken 3/23/2025 2218 by Jenny Lopez RN  Safety Promotion/Fall Prevention:   assistive device/personal items within reach   clutter free environment maintained   nonskid shoes/slippers when out of bed   room organization consistent   safety round/check completed  Taken 3/23/2025 2012 by Jenny Lopez RN  Safety Promotion/Fall Prevention:   assistive device/personal items within reach   clutter free environment maintained   fall prevention program maintained   nonskid shoes/slippers when out of bed   room organization consistent   safety round/check completed  Intervention: Prevent Skin Injury  Recent Flowsheet Documentation  Taken 3/23/2025 2012 by Jenny Lopez RN  Body Position:   position changed independently   supine  Intervention: Prevent and Manage VTE (Venous Thromboembolism) Risk  Recent Flowsheet Documentation  Taken 3/23/2025 2012 by Jenny Lopez RN  VTE Prevention/Management:   SCDs (sequential compression devices) off   patient refused intervention  Intervention: Prevent Infection  Recent Flowsheet Documentation  Taken 3/24/2025 0011 by Jenny Lopez, RN  Infection Prevention:   environmental surveillance performed   hand hygiene promoted   rest/sleep promoted   single  patient room provided   visitors restricted/screened  Taken 3/23/2025 2218 by Jenny Lopez, RN  Infection Prevention:   environmental surveillance performed   hand hygiene promoted   rest/sleep promoted   single patient room provided   visitors restricted/screened  Taken 3/23/2025 2012 by Jenny Lopez, RN  Infection Prevention:   environmental surveillance performed   hand hygiene promoted   rest/sleep promoted   single patient room provided   visitors restricted/screened  Goal: Optimal Comfort and Wellbeing  Outcome: Not Progressing  Intervention: Provide Person-Centered Care  Recent Flowsheet Documentation  Taken 3/23/2025 2012 by Jenny Lopez RN  Trust Relationship/Rapport:   care explained   choices provided  Goal: Readiness for Transition of Care  Outcome: Not Progressing   Goal Outcome Evaluation:  Plan of Care Reviewed With: patient, spouse        Progress: no change     Alert and oriented x 4. Hard of hearing. Assist x 1 for transfers. Spouse at bedside. No c/o pain/discomfort/SOB/dizziness noted. Does not require O2 therapy at this time. No s/s of hyper/hypoglycemia noted. Continent of bowel and bladder. Currently in bed, eyes closed. Rise and fall of chest observed. Call bell in reach. From home. No new case management notes to assess discharge plans.

## 2025-03-24 NOTE — DISCHARGE PLACEMENT REQUEST
"Alen Viera (84 y.o. Male)       Date of Birth   1940    Social Security Number       [place]   [facility] [place] [facility]    Home Phone   [phone]    MRN   [id number]       Scientology   Sikhism    Marital Status                               Admission Date   3/21/2025    Admission Type   Emergency    Admitting Provider   Juan Alberto Mann MD    Attending Provider   Tian Roca MD    Department, Room/Bed   [facility] INPATIENT, 380/1       Discharge Date       Discharge Disposition       Discharge Destination                                 Attending Provider: Tian Roca MD    Allergies: Azithromycin, Tramadol    Isolation: None   Infection: None   Code Status: CPR    Ht: 180.3 cm (71\")   Wt: 89.5 kg (197 lb 5 oz)    Admission Cmt: None   Principal Problem: Weakness of both lower extremities [R29.898]                   Active Insurance as of 3/21/2025       Primary Coverage       Payor Plan Insurance Group Employer/Plan Group    ANTHEM MEDICARE REPLACEMENT ANTHEM MEDICARE ADVANTAGE PPO [id number]       Payor Plan Address Payor Plan Phone Number Payor Plan Fax Number Effective Dates    [address] [phone]  1/1/2024 - None Entered    [place] [address]         Subscriber Name Subscriber Birth Date Member ID       ALEN VIERA 1940 [id number]                     Emergency Contacts        (Rel.) Home Phone Work Phone Mobile Phone    Elba Viera (Spouse) [phone] -- [phone]    SANTOS FORBES (Daughter) -- -- [phone]                "

## 2025-03-24 NOTE — PLAN OF CARE
Goal Outcome Evaluation:              Outcome Evaluation: Pt. is A&Ox4, makes needs known. Pt. has been resting throughout the shift with no complaints. Per MD note pt is medically ready to DC, placement to \[facility]\""  rehab pending.

## 2025-03-25 ENCOUNTER — APPOINTMENT (OUTPATIENT)
Dept: CT IMAGING | Facility: HOSPITAL | Age: 85
DRG: 372 | End: 2025-03-25
Payer: MEDICARE

## 2025-03-25 PROBLEM — R78.81 BACTEREMIA: Status: ACTIVE | Noted: 2025-03-25

## 2025-03-25 LAB
ANION GAP SERPL CALCULATED.3IONS-SCNC: 10.2 MMOL/L (ref 5–15)
BACTERIA BLD CULT: ABNORMAL
BOTTLE TYPE: ABNORMAL
BUN SERPL-MCNC: 23 MG/DL (ref 8–23)
BUN/CREAT SERPL: 16.7 (ref 7–25)
CALCIUM SPEC-SCNC: 8.3 MG/DL (ref 8.6–10.5)
CHLORIDE SERPL-SCNC: 108 MMOL/L (ref 98–107)
CO2 SERPL-SCNC: 20.8 MMOL/L (ref 22–29)
CREAT SERPL-MCNC: 1.38 MG/DL (ref 0.76–1.27)
D-LACTATE SERPL-SCNC: 1.2 MMOL/L (ref 0.5–2)
DEPRECATED RDW RBC AUTO: 48.2 FL (ref 37–54)
EGFRCR SERPLBLD CKD-EPI 2021: 50.1 ML/MIN/1.73
ERYTHROCYTE [DISTWIDTH] IN BLOOD BY AUTOMATED COUNT: 13.8 % (ref 12.3–15.4)
GLUCOSE BLDC GLUCOMTR-MCNC: 129 MG/DL (ref 70–105)
GLUCOSE BLDC GLUCOMTR-MCNC: 149 MG/DL (ref 70–105)
GLUCOSE BLDC GLUCOMTR-MCNC: 180 MG/DL (ref 70–105)
GLUCOSE BLDC GLUCOMTR-MCNC: 215 MG/DL (ref 70–105)
GLUCOSE SERPL-MCNC: 154 MG/DL (ref 65–99)
HCT VFR BLD AUTO: 36.1 % (ref 37.5–51)
HGB BLD-MCNC: 10.8 G/DL (ref 13–17.7)
MCH RBC QN AUTO: 28.5 PG (ref 26.6–33)
MCHC RBC AUTO-ENTMCNC: 29.9 G/DL (ref 31.5–35.7)
MCV RBC AUTO: 95.3 FL (ref 79–97)
PLATELET # BLD AUTO: 227 10*3/MM3 (ref 140–450)
PMV BLD AUTO: 10.5 FL (ref 6–12)
POTASSIUM SERPL-SCNC: 3.9 MMOL/L (ref 3.5–5.2)
RBC # BLD AUTO: 3.79 10*6/MM3 (ref 4.14–5.8)
SODIUM SERPL-SCNC: 139 MMOL/L (ref 136–145)
WBC NRBC COR # BLD AUTO: 10 10*3/MM3 (ref 3.4–10.8)

## 2025-03-25 PROCEDURE — 87040 BLOOD CULTURE FOR BACTERIA: CPT | Performed by: NURSE PRACTITIONER

## 2025-03-25 PROCEDURE — 97116 GAIT TRAINING THERAPY: CPT

## 2025-03-25 PROCEDURE — 63710000001 INSULIN LISPRO (HUMAN) PER 5 UNITS

## 2025-03-25 PROCEDURE — 74176 CT ABD & PELVIS W/O CONTRAST: CPT

## 2025-03-25 PROCEDURE — 87040 BLOOD CULTURE FOR BACTERIA: CPT | Performed by: STUDENT IN AN ORGANIZED HEALTH CARE EDUCATION/TRAINING PROGRAM

## 2025-03-25 PROCEDURE — 85027 COMPLETE CBC AUTOMATED: CPT | Performed by: STUDENT IN AN ORGANIZED HEALTH CARE EDUCATION/TRAINING PROGRAM

## 2025-03-25 PROCEDURE — 97530 THERAPEUTIC ACTIVITIES: CPT

## 2025-03-25 PROCEDURE — 83605 ASSAY OF LACTIC ACID: CPT | Performed by: STUDENT IN AN ORGANIZED HEALTH CARE EDUCATION/TRAINING PROGRAM

## 2025-03-25 PROCEDURE — 25010000002 VANCOMYCIN 2-0.9 GM/500ML-% SOLUTION: Performed by: STUDENT IN AN ORGANIZED HEALTH CARE EDUCATION/TRAINING PROGRAM

## 2025-03-25 PROCEDURE — 97110 THERAPEUTIC EXERCISES: CPT

## 2025-03-25 PROCEDURE — 82948 REAGENT STRIP/BLOOD GLUCOSE: CPT

## 2025-03-25 PROCEDURE — 80048 BASIC METABOLIC PNL TOTAL CA: CPT | Performed by: STUDENT IN AN ORGANIZED HEALTH CARE EDUCATION/TRAINING PROGRAM

## 2025-03-25 RX ORDER — LOSARTAN POTASSIUM 25 MG/1
25 TABLET ORAL ONCE
Status: COMPLETED | OUTPATIENT
Start: 2025-03-25 | End: 2025-03-25

## 2025-03-25 RX ORDER — LOSARTAN POTASSIUM 50 MG/1
50 TABLET ORAL DAILY
Status: DISCONTINUED | OUTPATIENT
Start: 2025-03-26 | End: 2025-03-29 | Stop reason: HOSPADM

## 2025-03-25 RX ORDER — NEBIVOLOL 5 MG/1
2.5 TABLET ORAL
Status: DISCONTINUED | OUTPATIENT
Start: 2025-03-25 | End: 2025-03-27

## 2025-03-25 RX ORDER — VANCOMYCIN 2 GRAM/500 ML IN 0.9 % SODIUM CHLORIDE INTRAVENOUS
2000 ONCE
Status: COMPLETED | OUTPATIENT
Start: 2025-03-25 | End: 2025-03-25

## 2025-03-25 RX ORDER — NIFEDIPINE 30 MG/1
30 TABLET, EXTENDED RELEASE ORAL DAILY
Status: DISCONTINUED | OUTPATIENT
Start: 2025-03-25 | End: 2025-03-29 | Stop reason: HOSPADM

## 2025-03-25 RX ADMIN — Medication 2000 MG: at 16:38

## 2025-03-25 RX ADMIN — NEBIVOLOL 2.5 MG: 5 TABLET ORAL at 18:21

## 2025-03-25 RX ADMIN — PANTOPRAZOLE SODIUM 40 MG: 40 TABLET, DELAYED RELEASE ORAL at 08:10

## 2025-03-25 RX ADMIN — ATORVASTATIN CALCIUM 80 MG: 40 TABLET, FILM COATED ORAL at 17:30

## 2025-03-25 RX ADMIN — ACETAMINOPHEN 650 MG: 325 TABLET, FILM COATED ORAL at 18:21

## 2025-03-25 RX ADMIN — RIVAROXABAN 15 MG: 15 TABLET, FILM COATED ORAL at 17:30

## 2025-03-25 RX ADMIN — NIFEDIPINE 30 MG: 30 TABLET, EXTENDED RELEASE ORAL at 18:21

## 2025-03-25 RX ADMIN — Medication 10 ML: at 08:10

## 2025-03-25 RX ADMIN — Medication 10 ML: at 21:10

## 2025-03-25 RX ADMIN — INSULIN LISPRO 2 UNITS: 100 INJECTION, SOLUTION INTRAVENOUS; SUBCUTANEOUS at 11:42

## 2025-03-25 RX ADMIN — OXYCODONE HYDROCHLORIDE 10 MG: 5 TABLET ORAL at 08:10

## 2025-03-25 RX ADMIN — INSULIN LISPRO 3 UNITS: 100 INJECTION, SOLUTION INTRAVENOUS; SUBCUTANEOUS at 21:10

## 2025-03-25 RX ADMIN — ASPIRIN 81 MG CHEWABLE TABLET 81 MG: 81 TABLET CHEWABLE at 08:10

## 2025-03-25 RX ADMIN — LOSARTAN POTASSIUM 25 MG: 25 TABLET, FILM COATED ORAL at 08:10

## 2025-03-25 RX ADMIN — LOSARTAN POTASSIUM 25 MG: 25 TABLET, FILM COATED ORAL at 18:21

## 2025-03-25 RX ADMIN — LEVOTHYROXINE SODIUM 112 MCG: 112 TABLET ORAL at 08:10

## 2025-03-25 NOTE — PROGRESS NOTES
[facility] MEDICINE SERVICE  DAILY PROGRESS NOTE    NAME: Alen Ratliff  : 1940  MRN: [id number]      LOS: 0 days     PROVIDER OF SERVICE: Tian Roca MD    Chief Complaint: Weakness of both lower extremities    Subjective:     Interval History:  History taken from: patient    Positive blood cultures this AM      Review of Systems:   Review of Systems    Objective:     Vital Signs  Temp:  [98.1 °F (36.7 °C)-100.6 °F (38.1 °C)] 98.7 °F (37.1 °C)  Heart Rate:  [60-72] 66  Resp:  [13-27] 17  BP: (147-180)/(59-74) 176/72   Body mass index is 27.52 kg/m².    Physical Exam  Physical Exam  Neurological:      General: No focal deficit present.      Mental Status: He is oriented to person, place, and time. Mental status is at baseline.      Cranial Nerves: No cranial nerve deficit.      Sensory: No sensory deficit.      Coordination: Coordination normal.      Comments: 4/5 b/u upper and lower strength   Psychiatric:         Mood and Affect: Mood normal.         Behavior: Behavior normal.            Diagnostic Data    Results from last 7 days   Lab Units 25  0519 25  0005 25  1805 25  1446   WBC 10*3/mm3 10.00  --    < >  --    HEMOGLOBIN g/dL 10.8*  --    < >  --    HEMATOCRIT % 36.1*  --    < >  --    PLATELETS 10*3/mm3 227  --    < >  --    GLUCOSE mg/dL  --  154*   < > 167*   CREATININE mg/dL  --  1.38*   < > 1.72*   BUN mg/dL  --  23   < > 33*   SODIUM mmol/L  --  139   < > 138   POTASSIUM mmol/L  --  3.9   < > 3.7   AST (SGOT) U/L  --   --   --  54*   ALT (SGPT) U/L  --   --   --  53*   ALK PHOS U/L  --   --   --  75   BILIRUBIN mg/dL  --   --   --  0.7   ANION GAP mmol/L  --  10.2   < > 14.9    < > = values in this interval not displayed.       XR Chest 1 View  Result Date: 3/24/2025  Impression: No acute cardiopulmonary disease. Electronically Signed: Wiliam Jasso MD  3/24/2025 8:49 PM EDT  Workstation ID: [id number]    CT Head Without Contrast  Result Date:  3/24/2025  Impression: 1.No acute intracranial abnormality identified. 2.Chronic small vessel ischemia and chronic right basal ganglia lacunar infarct. Electronically Signed: Kingsley Lynch MD  3/24/2025 4:38 PM EDT  Workstation ID: [id number]        I reviewed the patient's new clinical results.    Assessment/Plan:     Active and Resolved Problems  Active Hospital Problems    Diagnosis  POA    **Weakness of both lower extremities [R29.898]  Yes    Bacteremia [R78.81]  Yes    Gait disturbance [R26.9]  Yes      Resolved Hospital Problems   No resolved problems to display.     E. Faecalis Bactermia  Generalized weakness of upper and lower extremities  -Symptoms now believed likely 2/2 E. Faecalis bactermia.  - Recent cardiac hardware, agree with CHRISTIANO. Load with Vanc with pharmacy assistance.     AUDREY -now is at baseline  -Creatinine 1.72 on presentation, not far off from his baseline renal function.  Best recent creatinine was 1.22 several weeks ago.  -Status post fluid resuscitation.     Type 2 diabetes  -Hold home diabetic regimen  -Hemoglobin A1c in the a.m.  -Low-dose insulin sliding scale     Complete heart block status post pacemaker February 24, 2024  Hypertension  Hyperlipidemia  Hypothyroidism  GERD  CAD  CVA x 2  Aortic stenosis status post TAVR        VTE Prophylaxis:  Pharmacologic VTE prophylaxis orders are present.      Disposition Planning:     Barriers to Discharge:Pending bactermia investigation  Anticipated Date of Discharge: 3/29/2025  Place of Discharge: unclear      Time: 35 minutes     Code Status and Medical Interventions: CPR (Attempt to Resuscitate); Full Support   Ordered at: 03/21/25 2207     Code Status (Patient has no pulse and is not breathing):    CPR (Attempt to Resuscitate)     Medical Interventions (Patient has pulse or is breathing):    Full Support       Signature: Electronically signed by Tian Roca MD, 03/25/25, 16:05 EDT.  [facility] Hospitalist Team

## 2025-03-25 NOTE — THERAPY TREATMENT NOTE
"Subjective: Pt agreeable to therapeutic plan of care.    Objective:     Bed mobility - N/A or Not attempted.  Transfers - Min-A and with rolling walker  Ambulation - 20 feet x 4CGA, Min-A, and with rolling walker    Therapeutic Exercise - 10 Reps Bilaterally AROM supported sitting / chair and standing    Vitals: WNL    Pain: 3 Caldera-Baker Location: gen  Intervention for pain: Repositioned and Increased Activity    Education: Provided education on the importance of mobility in the acute care setting, Verbal/Tactile Cues, Transfer Training, and Gait Training    Assessment: Alen Ratliff presents with functional mobility impairments which indicate the need for skilled intervention. Patient with decreased activity tolerance however ambulated 20 ft x 4 with rw, min/CGA. Gait is slow with flexed trunk posture but no buckling of knees noted. RPE after gait 10/10 and patient reports of fatigue. Patient tolerate standing and seated UE/LE exercises after gait activities.  Tolerating session today without incident. Will continue to follow and progress as tolerated. Patient continues to be below his baseline and needs rehab at discharge.    Plan/Recommendations:   If medically appropriate, Moderate Intensity Therapy recommended post-acute care. This is recommended as therapy feels the patient would require 3-4 days per week and wouldn't tolerate \"3 hour daily\" rehab intensity. SNF would be the preferred choice. If the patient does not agree to SNF, arrange HH or OP depending on home bound status. If patient is medically complex, consider LTACH. Pt requires no DME at discharge.     Pt desires Inpatient Rehabilitation placement at discharge. Pt cooperative; agreeable to therapeutic recommendations and plan of care.         Basic Mobility 6-click:  Rollin = Total, A lot = 2, A little = 3; 4 = None  Supine>Sit:   1 = Total, A lot = 2, A little = 3; 4 = None   Sit>Stand with arms:  1 = Total, A lot = 2, A little = 3; " 4 = None  Bed>Chair:   1 = Total, A lot = 2, A little = 3; 4 = None  Ambulate in room:  1 = Total, A lot = 2, A little = 3; 4 = None  3-5 Steps with railin = Total, A lot = 2, A little = 3; 4 = None  Score: 17    Post-Tx Position: Up in Chair and Call light and personal items within reach  PPE: gloves and surgical mask    Therapy Charges for Today       Code Description Service Date Service Provider Modifiers Qty    00743206003 HC GAIT TRAINING EA 15 MIN 3/25/2025 Reyes, Carmela, PT GP 1    [id number]  PT THER PROC EA 15 MIN 3/25/2025 Reyes, Carmela, PT GP 1    [id number]  PT THERAPEUTIC ACT EA 15 MIN 3/25/2025 Reyes, Carmela, PT GP 1           PT Charges       Row Name 25 1503             Time Calculation    Start Time 1156  -CR      Stop Time 1220  -CR      Time Calculation (min) 24 min  -CR      PT Received On 25  -CR      PT - Next Appointment 25  -CR         Time Calculation- PT    Total Timed Code Minutes- PT 24 minute(s)  -CR                User Key  (r) = Recorded By, (t) = Taken By, (c) = Cosigned By      Initials Name Provider Type    CR Reyes, Carmela, PT Physical Therapist

## 2025-03-25 NOTE — PLAN OF CARE
Problem: Adult Inpatient Plan of Care  Goal: Plan of Care Review  Outcome: Progressing  Flowsheets (Taken 3/25/2025 1208)  Progress: improving  Plan of Care Reviewed With: patient  Goal: Patient-Specific Goal (Individualized)  Outcome: Progressing  Goal: Absence of Hospital-Acquired Illness or Injury  Outcome: Progressing  Intervention: Identify and Manage Fall Risk  Recent Flowsheet Documentation  Taken 3/25/2025 1200 by Adri Barragan, RN  Safety Promotion/Fall Prevention:   activity supervised   clutter free environment maintained   fall prevention program maintained   lighting adjusted   nonskid shoes/slippers when out of bed   room organization consistent   safety round/check completed  Taken 3/25/2025 1000 by dAri Barragan, RN  Safety Promotion/Fall Prevention:   activity supervised   clutter free environment maintained   fall prevention program maintained   lighting adjusted   nonskid shoes/slippers when out of bed   room organization consistent   safety round/check completed  Taken 3/25/2025 0755 by Adri Barragan, RN  Safety Promotion/Fall Prevention:   activity supervised   clutter free environment maintained   fall prevention program maintained   lighting adjusted   nonskid shoes/slippers when out of bed   room organization consistent   safety round/check completed  Intervention: Prevent Skin Injury  Recent Flowsheet Documentation  Taken 3/25/2025 0755 by Adri Barragan, RN  Body Position: position changed independently  Skin Protection: incontinence pads utilized  Intervention: Prevent and Manage VTE (Venous Thromboembolism) Risk  Recent Flowsheet Documentation  Taken 3/25/2025 0755 by Adri Barragan, RN  VTE Prevention/Management:   bilateral   SCDs (sequential compression devices) off   patient refused intervention  Intervention: Prevent Infection  Recent Flowsheet Documentation  Taken 3/25/2025 1200 by Adri Barragan, RN  Infection Prevention:   equipment surfaces  disinfected   hand hygiene promoted   personal protective equipment utilized   rest/sleep promoted   single patient room provided  Taken 3/25/2025 1000 by Adri Barragan, RN  Infection Prevention:   equipment surfaces disinfected   hand hygiene promoted   personal protective equipment utilized   rest/sleep promoted   single patient room provided  Taken 3/25/2025 0755 by Adri Barragan, RN  Infection Prevention:   equipment surfaces disinfected   hand hygiene promoted   personal protective equipment utilized   rest/sleep promoted   single patient room provided  Goal: Optimal Comfort and Wellbeing  Outcome: Progressing  Intervention: Monitor Pain and Promote Comfort  Recent Flowsheet Documentation  Taken 3/25/2025 0755 by Adri Barragan, RN  Pain Management Interventions:   relaxation techniques promoted   quiet environment facilitated   pain medication given  Intervention: Provide Person-Centered Care  Recent Flowsheet Documentation  Taken 3/25/2025 0755 by Adir Barragan, RN  Trust Relationship/Rapport:   care explained   choices provided   emotional support provided   empathic listening provided   questions answered   reassurance provided   questions encouraged   thoughts/feelings acknowledged  Goal: Readiness for Transition of Care  Outcome: Progressing     Problem: Fatigue  Goal: Improved Activity Tolerance  Outcome: Progressing  Intervention: Promote Improved Energy  Recent Flowsheet Documentation  Taken 3/25/2025 0755 by Adri Barragan, RN  Activity Management: ambulated in room  Environmental Support:   calm environment promoted   rest periods encouraged   personal routine supported  Sleep/Rest Enhancement:   consistent schedule promoted   awakenings minimized   noise level reduced   regular sleep/rest pattern promoted   relaxation techniques promoted     Problem: Comorbidity Management  Goal: Blood Glucose Level Within Target Range  Outcome: Progressing  Intervention: Monitor and Manage  Glycemia  Recent Flowsheet Documentation  Taken 3/25/2025 1200 by Adri Barragan, RN  Medication Review/Management: medications reviewed  Taken 3/25/2025 1000 by Adri Barragan, RN  Medication Review/Management: medications reviewed  Taken 3/25/2025 0755 by Adri Barragan, RN  Medication Review/Management: medications reviewed  Goal: Blood Pressure in Desired Range  Outcome: Progressing  Intervention: Maintain Blood Pressure Management  Recent Flowsheet Documentation  Taken 3/25/2025 1200 by Adri Barragan, RN  Medication Review/Management: medications reviewed  Taken 3/25/2025 1000 by Adri Barragan, RN  Medication Review/Management: medications reviewed  Taken 3/25/2025 0755 by Adri Barragan, RN  Medication Review/Management: medications reviewed     Problem: Fall Injury Risk  Goal: Absence of Fall and Fall-Related Injury  Outcome: Progressing  Intervention: Identify and Manage Contributors  Recent Flowsheet Documentation  Taken 3/25/2025 1200 by Adri Barragan, RN  Medication Review/Management: medications reviewed  Taken 3/25/2025 1000 by Adri Barragan, RN  Medication Review/Management: medications reviewed  Taken 3/25/2025 0755 by Adri Barragan, RN  Medication Review/Management: medications reviewed  Self-Care Promotion: independence encouraged  Intervention: Promote Injury-Free Environment  Recent Flowsheet Documentation  Taken 3/25/2025 1200 by Adri Barragan, RN  Safety Promotion/Fall Prevention:   activity supervised   clutter free environment maintained   fall prevention program maintained   lighting adjusted   nonskid shoes/slippers when out of bed   room organization consistent   safety round/check completed  Taken 3/25/2025 1000 by Adri Barragan, RN  Safety Promotion/Fall Prevention:   activity supervised   clutter free environment maintained   fall prevention program maintained   lighting adjusted   nonskid shoes/slippers when out of bed   room  organization consistent   safety round/check completed  Taken 3/25/2025 0755 by Adri Barragan, RN  Safety Promotion/Fall Prevention:   activity supervised   clutter free environment maintained   fall prevention program maintained   lighting adjusted   nonskid shoes/slippers when out of bed   room organization consistent   safety round/check completed   Goal Outcome Evaluation:Pt. Progressing towards goal, no acute events noted this shift.   Plan of Care Reviewed With: patient        Progress: improving

## 2025-03-25 NOTE — PLAN OF CARE
Assessment: Alen Ratliff presents with functional mobility impairments which indicate the need for skilled intervention. Patient with decreased activity tolerance however ambulated 20 ft x 4 with rw, min/CGA. Gait is slow with flexed trunk posture but no buckling of knees noted. RPE after gait 10/10 and patient reports of fatigue. Patient tolerate standing and seated UE/LE exercises after gait activities.  Tolerating session today without incident. Will continue to follow and progress as tolerated. Patient continues to be below his baseline and needs rehab at discharge.

## 2025-03-25 NOTE — CASE MANAGEMENT/SOCIAL WORK
Continued Stay Note  THAD Hernandez     Patient Name: Alen Ratliff  MRN: [id number]  Today's Date: 3/25/2025    Admit Date: 3/21/2025    Plan: [facility] (accepted). No PASRR required, precert pending (started 3/25)   Discharge Plan       Row Name 03/25/25 1603       Plan    Plan [facility] (accepted). No PASRR required, precert pending (started 3/25)    Plan Comments precert started today per ELIZABETH Galicia, currently still pending. Careteam updated during MDR's.           Aron Guerrero RN     Cell number [phone]  Office number [phone]

## 2025-03-25 NOTE — PROGRESS NOTES
"Pharmacy Antimicrobial Dosing Service  Subjective:  Alen Ratliff is a 84 y.o.male admitted with weakness BLE. Pharmacy has been consulted to dose Vancomycin for possible e. Faecium bacteremia.  ID consulted    PMH: AFib on xarelto , complete heart block s/p pacemaker (2/24/2025), CVA X2, CAD S/P TAVR, T2DM, GERD, HLD, HTN, hypothyroidism         Assessment/Plan  1. Day #1 Vancomycin: Goal -600 mcg*h/mL.  Will give a loading dose of 2,000mg Vancomycin IV x 1 (~22.3mg/kg ABW).  Will follow with 1,250mg (~14mg/kg ABW) IV q24h for an expected AUC of 532mg*h/mL & expected trough ~17mg/L.  Will obtain levels when closer to steady-state and adjust dosing accordingly.    Will continue to monitor drug levels, renal function, culture and sensitivities, and patient clinical status.       Objective:  Relevant clinical data and objective history reviewed:  180.3 cm (71\")   89.5 kg (197 lb 5 oz)   Ideal body weight: 75.3 kg (166 lb 0.1 oz)  Adjusted ideal body weight: 81 kg (178 lb 8.5 oz)  Body mass index is 27.52 kg/m².        Results from last 7 days   Lab Units 03/25/25  0005 03/23/25 2354 03/23/25  1027   CREATININE mg/dL 1.38* 1.43* 1.38*     Estimated Creatinine Clearance: 49.5 mL/min (A) (by C-G formula based on SCr of 1.38 mg/dL (H)).  I/O last 3 completed shifts:  In: 1200 [P.O.:1200]  Out: 600 [Urine:600]    Results from last 7 days   Lab Units 03/25/25  0519 03/24/25 2028 03/23/25  2354   WBC 10*3/mm3 10.00 8.93 9.48     Temperature    03/24/25 2144 03/25/25 0020 03/25/25 0459   Temp: 100 °F (37.8 °C) 98.1 °F (36.7 °C) 98.7 °F (37.1 °C)     Baseline culture/source/susceptibility:  Microbiology Results (last 10 days)       Procedure Component Value - Date/Time    Respiratory Panel PCR w/COVID-19(SARS-CoV-2) SUPA/GURU/LORI/PAD/COR/DEANGELO In-House, NP Swab in UTM/VTM, 2 HR TAT - Swab, Nasopharynx [[id number]]  (Normal) Collected: 03/24/25 2156    Lab Status: Final result Specimen: Swab from Nasopharynx Updated: " 03/24/25 2310     ADENOVIRUS, PCR Not Detected     Coronavirus 229E Not Detected     Coronavirus HKU1 Not Detected     Coronavirus NL63 Not Detected     Coronavirus OC43 Not Detected     COVID19 Not Detected     Human Metapneumovirus Not Detected     Human Rhinovirus/Enterovirus Not Detected     Influenza A PCR Not Detected     Influenza B PCR Not Detected     Parainfluenza Virus 1 Not Detected     Parainfluenza Virus 2 Not Detected     Parainfluenza Virus 3 Not Detected     Parainfluenza Virus 4 Not Detected     RSV, PCR Not Detected     Bordetella pertussis pcr Not Detected     Bordetella parapertussis PCR Not Detected     Chlamydophila pneumoniae PCR Not Detected     Mycoplasma pneumo by PCR Not Detected    Narrative:      In the setting of a positive respiratory panel with a viral infection PLUS a negative procalcitonin without other underlying concern for bacterial infection, consider observing off antibiotics or discontinuation of antibiotics and continue supportive care. If the respiratory panel is positive for atypical bacterial infection (Bordetella pertussis, Chlamydophila pneumoniae, or Mycoplasma pneumoniae), consider antibiotic de-escalation to target atypical bacterial infection.    Blood Culture - Blood, Hand, Right [[id number]]  (Abnormal) Collected: 03/24/25 2028    Lab Status: Preliminary result Specimen: Blood from Hand, Right Updated: 03/25/25 1247     Blood Culture Abnormal Stain     Gram Stain Anaerobic Bottle Gram positive cocci in chains      Aerobic Bottle Gram positive cocci in chains    Blood Culture ID, PCR - Blood, Hand, Right [[id number]]  (Abnormal) Collected: 03/24/25 2028    Lab Status: Final result Specimen: Blood from Hand, Right Updated: 03/25/25 1242     BCID, PCR Enterococcus faecium. Saúl/B (vancomycin resistance gene) not detected. Identification by BCID2 PCR.     BOTTLE TYPE Anaerobic Bottle    Narrative:      Infectious disease consultation is highly recommended to rule out  distant foci of infection.          Sheng Jenkins [facility]  03/25/25 13:26 EDT

## 2025-03-25 NOTE — DISCHARGE PLACEMENT REQUEST
"Alen Viera (84 y.o. Male)       Date of Birth   1940    Social Security Number       Address   [facility] [place][note 2] [address] [place] [facility]    Home Phone   [phone]    MRN   [id number]       Yarsani   Jehovah's witness    Marital Status                               Admission Date   3/21/2025    Admission Type   Emergency    Admitting Provider   Juan Alberto Mann MD    Attending Provider   Tian Roca MD    Department, Room/Bed   [facility] MEDICAL INPATIENT, 380/1       Discharge Date       Discharge Disposition       Discharge Destination                                 Attending Provider: Tian Roca MD    Allergies: Azithromycin, Tramadol    Isolation: None   Infection: None   Code Status: CPR    Ht: 180.3 cm (71\")   Wt: 89.5 kg (197 lb 5 oz)    Admission Cmt: None   Principal Problem: Weakness of both lower extremities [R29.898]                   Active Insurance as of 3/21/2025       Primary Coverage       Payor Plan Insurance Group Employer/Plan Group    ANTHEM MEDICARE REPLACEMENT ANTHEM MEDICARE ADVANTAGE PPO [id number]       Payor Plan Address Payor Plan Phone Number Payor Plan Fax Number Effective Dates    [address] [phone]  2024 - None Entered    [facility] [id number]         Subscriber Name Subscriber Birth Date Member ID       ALEN VIERA 1940 [id number]                     Emergency Contacts        (Rel.) Home Phone Work Phone Mobile Phone    Elba Viera (Spouse) [phone] -- [phone]    SANTOS FORBES (Daughter) -- -- [phone]                 History & Physical        Juan Alberto Mann MD at [address] [place] [address] Medicine Services  History & Physical    Patient Name: Alen Viera  : 1940  MRN: [id number]  Primary Care Physician:  Serenity Wren MD  Date of admission: 3/21/2025  Date and Time of Service: 3/21/2025 at 2100    Subjective      Chief Complaint:     History " "of Present Illness: Alen Ratliff is a 84 y.o. male with a CMH of atrial fibrillation on xarelto , complete heart block s/p pacemaker (2/24/2025), CVA X2, coronary artery disease S/P TAVR, type 2 diabetes, GERD, hyperlipidemia, hypertension, hypothyroidism   who presented to [facility] on 3/21/2025 with  lower extremity weakness in the past 5 days.  Described as requiring more effort to move and heaviness in his legs.  He attributes this to changes in his medications since he was discharged.  Per wife at bedside.  Patient reports blood pressure meds where \"too strong\" and was asked to decrease dose several times before presentation.  She denies any focal neurological deficits, falls, hypotensive episodes, denies recent sick contacts, joint pain, fever or chills.  In the ED, vitally stable, labs notable for creatinine 1.72.     Review of Systems   Constitutional:  Positive for activity change (weakness) and fatigue. Negative for chills, diaphoresis and fever.   HENT:  Negative for congestion, drooling and ear pain.    Eyes:  Negative for pain and itching.   Respiratory:  Negative for cough, choking, chest tightness and shortness of breath.    Cardiovascular:  Negative for chest pain and leg swelling.   Gastrointestinal:  Negative for abdominal distention, abdominal pain, diarrhea, nausea and vomiting.   Musculoskeletal:  Negative for arthralgias and back pain.   Skin:  Negative for color change and pallor.   Neurological:  Positive for weakness. Negative for tremors, seizures, syncope, light-headedness and headaches.   Psychiatric/Behavioral:  Negative for agitation and confusion.        Personal History     Past Medical History:   Diagnosis Date    Allergic rhinitis     Aortic stenosis     Atrial fibrillation     Coronary artery disease     Diabetes     GERD (gastroesophageal reflux disease)     Heart murmur     Hyperlipidemia     Hypertension     Hypothyroidism     Prostate cancer     S/P TAVR " (transcatheter aortic valve replacement) 02/20/2023    Stroke        Past Surgical History:   Procedure Laterality Date    ANKLE OPEN REDUCTION INTERNAL FIXATION Left 12/16/2020    Procedure: ANKLE OPEN REDUCTION INTERNAL FIXATION;  Surgeon: CAROLE Vasquez DPM;  Location: [facility] MAIN OR;  Service: Podiatry;  Laterality: Left;    AORTIC VALVE REPAIR/REPLACEMENT N/A 02/20/2023    Procedure: Transfemoral Transcatheter Aortic Valve Replacement;  Surgeon: Naveed Leonardo MD;  Location: [facility] HYBRID OR;  Service: Cardiovascular;  Laterality: N/A;    AORTIC VALVE REPAIR/REPLACEMENT N/A 02/20/2023    Procedure: TRANSCATHETER AORTIC VALVE REPLACEMENT;  Surgeon: Fabio Villafana MD;  Location: [facility] HYBRID OR;  Service: Cardiothoracic;  Laterality: N/A;    BACK SURGERY  1994    CARDIAC CATHETERIZATION Right 09/27/2022    Procedure: Left Heart Cath;  Surgeon: Steve Muhammad MD;  Location: [facility] CATH INVASIVE LOCATION;  Service: Cardiovascular;  Laterality: Right;    CARDIAC CATHETERIZATION N/A 01/12/2023    Procedure: Stent JENN coronary;  Surgeon: Steve Muhammad MD;  Location: [facility] CATH INVASIVE LOCATION;  Service: Cardiovascular;  Laterality: N/A;    CARDIAC ELECTROPHYSIOLOGY PROCEDURE N/A 02/24/2025    Procedure: Pacemaker DC new, BiV PPM for complete heart block;  Surgeon: Radha Danielle MD;  Location: [facility] CATH INVASIVE LOCATION;  Service: Cardiovascular;  Laterality: N/A;    CAROTID ENDARTERECTOMY Right 12/14/2020    Procedure: CAROTID ENDARTERECTOMY;  Surgeon: Toby Fung MD;  Location: [facility] MAIN OR;  Service: Vascular;  Laterality: Right;    COLONOSCOPY  08/25/2015    CORONARY STENT PLACEMENT      PACEMAKER IMPLANTATION      2/24/25    PROSTATECTOMY  2001    due to cancer       Family History: family history includes Heart attack in his father and mother; Heart disease in his father and mother; Hypertension in an other family member. Otherwise pertinent FHx was reviewed and not pertinent to current  issue.    Social History:  reports that he has never smoked. He has never been exposed to tobacco smoke. He has never used smokeless tobacco. He reports that he does not drink alcohol and does not use drugs.    Home Medications:  Prior to Admission Medications       Prescriptions Last Dose Informant Patient Reported? Taking?    aspirin 81 MG chewable tablet 3/21/2025  No Yes    Chew 1 tablet Daily.    atorvastatin (LIPITOR) 80 MG tablet 3/20/2025  No Yes    TAKE ONE TABLET BY MOUTH EVERY DAY    levothyroxine (Synthroid) 112 MCG tablet 3/21/2025  No Yes    Take 1 tablet by mouth Daily.    losartan (COZAAR) 100 MG tablet 3/21/2025  Yes Yes    Take 25 mg by mouth Daily.    nebivolol (BYSTOLIC) 2.5 MG tablet 3/21/2025  No Yes    Take 1 tablet by mouth Daily for 30 days.    NIFEdipine CC (ADALAT CC) 30 MG 24 hr tablet 3/21/2025  Yes Yes    Take 1 tablet by mouth Daily.    pantoprazole (PROTONIX) 40 MG EC tablet 3/21/2025  No Yes    Take 1 tablet by mouth Daily.    rivaroxaban (Xarelto) 15 MG tablet 3/20/2025  No Yes    Take 1 tablet by mouth Daily With Dinner.    SITagliptin (Januvia) 50 MG tablet 3/21/2025  No Yes    Take 1 tablet by mouth Daily.    Artificial Tear Ointment (artificial tears) ophthalmic ointment   Yes No    Administer  to both eyes Every 1 (One) Hour As Needed.    fluticasone (FLONASE) 50 MCG/ACT nasal spray   Yes No    Administer 2 sprays into the nostril(s) as directed by provider Daily As Needed.              Allergies:  Allergies   Allergen Reactions    Azithromycin Unknown - High Severity    Tramadol Unknown - High Severity       Objective      Vitals:   Temp:  [98.1 °F (36.7 °C)] 98.1 °F (36.7 °C)  Heart Rate:  [60-75] 75  Resp:  [15-18] 15  BP: (129-153)/(51-76) 153/75  Body mass index is 27.52 kg/m².  Physical Exam  Constitutional:       Appearance: Normal appearance.   HENT:      Head: Normocephalic.      Right Ear: Tympanic membrane normal.      Left Ear: Tympanic membrane normal.       Mouth/Throat:      Mouth: Mucous membranes are moist.   Eyes:      Pupils: Pupils are equal, round, and reactive to light.   Cardiovascular:      Rate and Rhythm: Normal rate and regular rhythm.      Heart sounds: No murmur heard.  Pulmonary:      Effort: No respiratory distress.      Breath sounds: No wheezing.   Abdominal:      General: There is no distension.      Palpations: There is no mass.   Musculoskeletal:         General: Normal range of motion.   Skin:     General: Skin is warm and dry.      Capillary Refill: Capillary refill takes less than 2 seconds.   Neurological:      General: No focal deficit present.      Mental Status: He is alert and oriented to person, place, and time.         Diagnostic Data:  Lab Results (last 24 hours)       Procedure Component Value Units Date/Time    Urinalysis With Culture If Indicated - Urine, Clean Catch [[id number]]  (Abnormal) Collected: 03/21/25 1832    Specimen: Urine, Clean Catch Updated: 03/21/25 1840     Color, UA Yellow     Appearance, UA Cloudy     pH, UA 5.5     Specific Gravity, UA 1.016     Glucose, UA Negative     Ketones, UA Negative     Bilirubin, UA Negative     Blood, UA Negative     Protein, UA 30 mg/dL (1+)     Leuk Esterase, UA Negative     Nitrite, UA Negative     Urobilinogen, UA 1.0 E.U./dL    Narrative:      In absence of clinical symptoms, the presence of pyuria, bacteria, and/or nitrites on the urinalysis result does not correlate with infection.    Urinalysis, Microscopic Only - Urine, Clean Catch [[id number]]  (Abnormal) Collected: 03/21/25 1832    Specimen: Urine, Clean Catch Updated: 03/21/25 1840     RBC, UA 0-2 /HPF      WBC, UA 0-2 /HPF      Comment: Urine culture not indicated.        Bacteria, UA None Seen /HPF      Squamous Epithelial Cells, UA 7-12 /HPF      Hyaline Casts, UA 0-2 /LPF      Methodology Automated Microscopy    BNP [[id number]]  (Normal) Collected: 03/21/25 1805    Specimen: Blood Updated: 03/21/25 1839     proBNP 772.0  pg/mL     Narrative:      This assay is used as an aid in the diagnosis of individuals suspected of having heart failure. It can be used as an aid in the diagnosis of acute decompensated heart failure (ADHF) in patients presenting with signs and symptoms of ADHF to the emergency department (ED). In addition, NT-proBNP of <300 pg/mL indicates ADHF is not likely.    Age Range Result Interpretation  NT-proBNP Concentration (pg/mL:      <50             Positive            >450                   Gray                 300-450                    Negative             <300    50-75           Positive            >900                  Gray                300-900                  Negative            <300      >75             Positive            >1800                  Gray                300-1800                  Negative            <300    TSH Rfx On Abnormal To Free T4 [[id number]]  (Normal) Collected: 03/21/25 1805    Specimen: Blood Updated: 03/21/25 1839     TSH 1.680 uIU/mL     Basic Metabolic Panel [[id number]]  (Abnormal) Collected: 03/21/25 1805    Specimen: Blood Updated: 03/21/25 1839     Glucose 127 mg/dL      BUN 29 mg/dL      Creatinine 1.59 mg/dL      Sodium 138 mmol/L      Potassium 4.2 mmol/L      Chloride 105 mmol/L      CO2 20.3 mmol/L      Calcium 8.4 mg/dL      BUN/Creatinine Ratio 18.2     Anion Gap 12.7 mmol/L      eGFR 42.3 mL/min/1.73     Narrative:      GFR Categories in Chronic Kidney Disease (CKD)      GFR Category          GFR (mL/min/1.73)    Interpretation  G1                     90 or greater         Normal or high (1)  G2                      60-89                Mild decrease (1)  G3a                   45-59                Mild to moderate decrease  G3b                   30-44                Moderate to severe decrease  G4                    15-29                Severe decrease  G5                    14 or less           Kidney failure          (1)In the absence of evidence of kidney disease,  neither GFR category G1 or G2 fulfill the criteria for CKD.    eGFR calculation 2021 CKD-EPI creatinine equation, which does not include race as a factor    Magnesium [[id number]]  (Abnormal) Collected: 03/21/25 1805    Specimen: Blood Updated: 03/21/25 1839     Magnesium 1.2 mg/dL     Phosphorus [[id number]]  (Normal) Collected: 03/21/25 1805    Specimen: Blood Updated: 03/21/25 1839     Phosphorus 2.8 mg/dL     Protime-INR [[id number]]  (Abnormal) Collected: 03/21/25 1805    Specimen: Blood Updated: 03/21/25 1822     Protime 16.3 Seconds      INR 1.31    aPTT [[id number]]  (Normal) Collected: 03/21/25 1805    Specimen: Blood Updated: 03/21/25 1822     PTT 32.5 seconds     Extra Tubes [[id number]] Collected: 03/21/25 1805    Specimen: Blood, Venous Line Updated: 03/21/25 1815    Narrative:      The following orders were created for panel order Extra Tubes.  Procedure                               Abnormality         Status                     ---------                               -----------         ------                     Gold Top - SST[[id number]]                                   Final result                 Please view results for these tests on the individual orders.    Gold Top - SST [[id number]] Collected: 03/21/25 1805    Specimen: Blood Updated: 03/21/25 1815     Extra Tube Hold for add-ons.     Comment: Auto resulted.       CBC & Differential [[id number]]  (Abnormal) Collected: 03/21/25 1805    Specimen: Blood Updated: 03/21/25 1814    Narrative:      The following orders were created for panel order CBC & Differential.  Procedure                               Abnormality         Status                     ---------                               -----------         ------                     CBC Auto Differential[[id number]]        Abnormal            Final result                 Please view results for these tests on the individual orders.    CBC Auto Differential [[id number]]  (Abnormal) Collected: 03/21/25  1805    Specimen: Blood Updated: 03/21/25 1814     WBC 9.15 10*3/mm3      RBC 4.11 10*6/mm3      Hemoglobin 11.7 g/dL      Hematocrit 37.6 %      MCV 91.5 fL      MCH 28.5 pg      MCHC 31.1 g/dL      RDW 13.6 %      RDW-SD 45.4 fl      MPV 10.3 fL      Platelets 209 10*3/mm3      Neutrophil % 74.6 %      Lymphocyte % 9.3 %      Monocyte % 14.4 %      Eosinophil % 0.8 %      Basophil % 0.4 %      Immature Grans % 0.5 %      Neutrophils, Absolute 6.82 10*3/mm3      Lymphocytes, Absolute 0.85 10*3/mm3      Monocytes, Absolute 1.32 10*3/mm3      Eosinophils, Absolute 0.07 10*3/mm3      Basophils, Absolute 0.04 10*3/mm3      Immature Grans, Absolute 0.05 10*3/mm3      nRBC 0.0 /100 WBC              Imaging Results (Last 24 Hours)       ** No results found for the last 24 hours. **              Assessment & Plan        This is a 84 y.o. male with:    Active and Resolved Problems  Active Hospital Problems    Diagnosis  POA    **Gait disturbance [R26.9]  Yes      Resolved Hospital Problems   No resolved problems to display.       Bilateral lower extremity weakness  -Given history of strokes, will obtain brain MRI  -PT OT eval  -Falls precaution  -Orthostatic vitals x 1  -Monitor observation times in for 24 hours and discharge planning based on physical therapy recommendations    AUDREY  -Creatinine 1.72 on presentation  -NS at 100 cc/h    Type 2 diabetes  -Hold home diabetic regimen  -Hemoglobin A1c in the a.m.  -Low-dose insulin sliding scale    Complete heart block status post pacemaker February 24, 2024  Hypertension  Hyperlipidemia  Hypothyroidism  GERD  CAD  CVA x 2  Aortic stenosis status post TAVR  -Resume home meds when verified by pharmacy and clinically appropriate    VTE Prophylaxis:  Pharmacologic VTE prophylaxis orders are present.        The patient desires to be as follows:    CODE STATUS:    Code Status (Patient has no pulse and is not breathing): CPR (Attempt to Resuscitate)  Medical Interventions (Patient has  pulse or is breathing): Full Support        Elba Ratliff, who can be contacted at [phone], is the designated person to make medical decisions on the patient's behalf if He is incapable of doing so. This was clarified with patient and/or next of kin on 3/21/2025 during the course of this H&P.    Admission Status:  I believe this patient meets inpatient status.    Expected Length of Stay: less then 2 nights    PDMP and Medication Dispenses via Sidebar reviewed and consistent with patient reported medications.    I discussed the patient's findings and my recommendations with patient and family.      Signature:     This document has been electronically signed by Juan Alberto Mann MD on 2025 22:07 EDT   [facility]    Electronically signed by Juan Alberto Mann MD at 25 0921       Operative/Procedure Notes (all)    No notes of this type exist for this encounter.          Physician Progress Notes (last 48 hours)        Tian Roca MD at [address] [facility]              [facility] MEDICINE SERVICE  DAILY PROGRESS NOTE    NAME: Alen Ratliff  : 1940  MRN: [id number]      LOS: 0 days     PROVIDER OF SERVICE: Tian Roca MD    Chief Complaint: Weakness of both lower extremities    Subjective:     Interval History:  History taken from: patient    Waiting IPR placement.      Review of Systems:   Review of Systems    Objective:     Vital Signs  Temp:  [97.6 °F (36.4 °C)-99.4 °F (37.4 °C)] 98.5 °F (36.9 °C)  Heart Rate:  [60-67] 60  Resp:  [14-26] 14  BP: (126-170)/(59-71) 143/60   Body mass index is 27.52 kg/m².    Physical Exam  Physical Exam  Neurological:      General: No focal deficit present.      Mental Status: He is oriented to person, place, and time. Mental status is at baseline.      Cranial Nerves: No cranial nerve deficit.      Sensory: No sensory deficit.      Coordination: Coordination normal.      Comments: 4/5 b/u upper and lower strength   Psychiatric:          Mood and Affect: Mood normal.         Behavior: Behavior normal.            Diagnostic Data    Results from last 7 days   Lab Units 03/23/25  2354 03/21/25  1805 03/20/25  1446   WBC 10*3/mm3 9.48   < >  --    HEMOGLOBIN g/dL 11.4*   < >  --    HEMATOCRIT % 36.6*   < >  --    PLATELETS 10*3/mm3 251   < >  --    GLUCOSE mg/dL 180*   < > 167*   CREATININE mg/dL 1.43*   < > 1.72*   BUN mg/dL 23   < > 33*   SODIUM mmol/L 138   < > 138   POTASSIUM mmol/L 3.9   < > 3.7   AST (SGOT) U/L  --   --  54*   ALT (SGPT) U/L  --   --  53*   ALK PHOS U/L  --   --  75   BILIRUBIN mg/dL  --   --  0.7   ANION GAP mmol/L 13.0   < > 14.9    < > = values in this interval not displayed.       No radiology results for the last day      I reviewed the patient's new clinical results.    Assessment/Plan:     Active and Resolved Problems  Active Hospital Problems    Diagnosis  POA    **Weakness of both lower extremities [R29.898]  Yes    Gait disturbance [R26.9]  Yes      Resolved Hospital Problems   No resolved problems to display.       Generalized weakness of upper and lower extremities  -There are no lateralizing deficits on this patient.  No focal neurological deficits consistent with stroke.  He is experiencing generalized weakness that started shortly after he was hospitalized for complete heart block.  -He is not eligible for MRI given the very recent pacemaker insertion per the radiology department.  -There are no signs and symptoms of infection in this patient.  He does not have any respiratory symptoms or cough.  His urinalysis is clean.  His abdominal exam is unrevealing.  He has no wounds.  His white blood cell count is low.  CTM  - Agree with therapy recommendations for IPR.     AUDREY -now is at baseline  -Creatinine 1.72 on presentation, not far off from his baseline renal function.  Best recent creatinine was 1.22 several weeks ago.  -Status post fluid resuscitation.     Type 2 diabetes  -Hold home diabetic  regimen  -Hemoglobin A1c in the a.m.  -Low-dose insulin sliding scale     Complete heart block status post pacemaker 2024  Hypertension  Hyperlipidemia  Hypothyroidism  GERD  CAD  CVA x 2  Aortic stenosis status post TAVR        VTE Prophylaxis:  Pharmacologic VTE prophylaxis orders are present.      Disposition Planning:     Barriers to Discharge:IPR, patient is medically cleared for discharge.  Anticipated Date of Discharge: 3/24/2025  Place of Discharge: unclear      Time: 35 minutes     Code Status and Medical Interventions: CPR (Attempt to Resuscitate); Full Support   Ordered at: 257     Code Status (Patient has no pulse and is not breathing):    CPR (Attempt to Resuscitate)     Medical Interventions (Patient has pulse or is breathing):    Full Support       Signature: Electronically signed by Tian Roca MD, 25, 10:18 EDT.  [facility] [place] Hospitalist Team      Electronically signed by Tian Roca MD at 25 1523       Tian Roca MD at 25 1328              [facility] MEDICINE SERVICE  DAILY PROGRESS NOTE    NAME: Alen Ratliff  : 1940  MRN: [id number]      LOS: 0 days     PROVIDER OF SERVICE: Tian Roca MD    Chief Complaint: Weakness of both lower extremities    Subjective:     Interval History:  History taken from: patient    Awake alert oriented and with his wife at bedside.  Discussed PT evaluation.        Review of Systems:   Review of Systems    Objective:     Vital Signs  Temp:  [98.1 °F (36.7 °C)-98.6 °F (37 °C)] 98.2 °F (36.8 °C)  Heart Rate:  [60-71] 60  Resp:  [12-18] 18  BP: (135-164)/(61-90) 148/74   Body mass index is 27.52 kg/m².    Physical Exam  Physical Exam  Neurological:      General: No focal deficit present.      Mental Status: He is oriented to person, place, and time. Mental status is at baseline.      Cranial Nerves: No cranial nerve deficit.      Sensory: No sensory deficit.      Coordination: Coordination normal.       Comments: 4/5 b/u upper and lower strength   Psychiatric:         Mood and Affect: Mood normal.         Behavior: Behavior normal.            Diagnostic Data    Results from last 7 days   Lab Units 03/23/25  1027 03/21/25  1805 03/20/25  1446   WBC 10*3/mm3 8.73   < >  --    HEMOGLOBIN g/dL 10.6*   < >  --    HEMATOCRIT % 33.6*   < >  --    PLATELETS 10*3/mm3 201   < >  --    GLUCOSE mg/dL 203*   < > 167*   CREATININE mg/dL 1.38*   < > 1.72*   BUN mg/dL 21   < > 33*   SODIUM mmol/L 137   < > 138   POTASSIUM mmol/L 4.0   < > 3.7   AST (SGOT) U/L  --   --  54*   ALT (SGPT) U/L  --   --  53*   ALK PHOS U/L  --   --  75   BILIRUBIN mg/dL  --   --  0.7   ANION GAP mmol/L 10.4   < > 14.9    < > = values in this interval not displayed.       No radiology results for the last day      I reviewed the patient's new clinical results.    Assessment/Plan:     Active and Resolved Problems  Active Hospital Problems    Diagnosis  POA    **Weakness of both lower extremities [R29.898]  Yes    Gait disturbance [R26.9]  Yes      Resolved Hospital Problems   No resolved problems to display.       Generalized weakness of upper and lower extremities, possibly uppers somewhat worse than lowers.  -There are no lateralizing deficits on this patient.  No focal neurological deficits consistent with stroke.  He is experiencing generalized weakness that started shortly after he was hospitalized for complete heart block.  -He is not eligible for MRI given the very recent pacemaker insertion per the radiology department.  -There are no signs and symptoms of infection in this patient.  He does not have any respiratory symptoms or cough.  His urinalysis is clean.  His abdominal exam is unrevealing.  He has no wounds.  His white blood cell count is low.  CTM  - Agree with therapy recommendations for IPR.     AUDREY - improving  -Creatinine 1.72 on presentation, not far off from his baseline renal function.  Best recent creatinine was 1.22 several  weeks ago.  -NS at 100 cc/h     Type 2 diabetes  -Hold home diabetic regimen  -Hemoglobin A1c in the a.m.  -Low-dose insulin sliding scale     Complete heart block status post pacemaker 2024  Hypertension  Hyperlipidemia  Hypothyroidism  GERD  CAD  CVA x 2  Aortic stenosis status post TAVR        VTE Prophylaxis:  Pharmacologic VTE prophylaxis orders are present.      Disposition Planning:     Barriers to Discharge:IPR  Anticipated Date of Discharge: 3/24/2025  Place of Discharge: unclear      Time: 35 minutes     Code Status and Medical Interventions: CPR (Attempt to Resuscitate); Full Support   Ordered at: 257     Code Status (Patient has no pulse and is not breathing):    CPR (Attempt to Resuscitate)     Medical Interventions (Patient has pulse or is breathing):    Full Support       Signature: Electronically signed by Tian Roca MD, 25, 13:28 EDT.  [facility] Hospitalist Team      Electronically signed by Tian Roca MD at 25 1330          Consult Notes (last 48 hours)        Popeye Phelps at 25 1342        Consult Orders    1. Inpatient Spiritual Care Consult [[id number]] ordered by Yandel Gray MD at 25              Summary:Pt & family support                 Administered the Gnosticist Sacraments of Anointing of the Sick & Eucharist.    Electronically signed by Popeye Phelps at 25 1343       Nutrition Notes (most recent note)    No notes exist for this encounter.       Speech Language Pathology Notes (most recent note)    No notes exist for this encounter.       Riki Yeager, PT   Physical Therapist  Specialty:  Physical Therapy  Therapy Evaluation     Signed  Date of Service:  25  Creation Time:  25     Signed        Expand All Collapse All  Patient Name: Alen Ratliff                       : 1940                          MRN: [id number]                              Today's Date: 3/22/2025                                    Admit Date: 3/21/2025                        Visit Dx:   Visit Diagnosis       ICD-10-CM ICD-9-CM   1. Gait disturbance  R26.9 781.2   2. Anemia, unspecified type  D64.9 285.9   3. Electrolyte abnormality  E87.8 276.9         Problem List       Patient Active Problem List   Diagnosis    Heart murmur    Essential hypertension    Mixed hyperlipidemia    Acquired hypothyroidism    GERD (gastroesophageal reflux disease)    Seasonal allergic rhinitis due to pollen    CKD (chronic kidney disease) stage 3, GFR 30-59 ml/min    History of prostate cancer    History of stroke    Bilateral carotid artery stenosis    Closed displaced fracture of lateral malleolus of left fibula    Medicare annual wellness visit, subsequent    Trigger index finger of left hand    Colon cancer screening    DM type 2 with diabetic peripheral neuropathy    Aortic stenosis, severe    Coronary artery disease involving native coronary artery of native heart    Needs flu shot    PAF (paroxysmal atrial fibrillation)    Coronary artery disease of native artery of native heart with stable angina pectoris    S/P PTCA (percutaneous transluminal coronary angioplasty)    Chronic diastolic CHF (congestive heart failure), NYHA class 2    S/P TAVR (transcatheter aortic valve replacement)    Bradycardia    TIA (transient ischemic attack)    Onychodystrophy    Chronic pain of right knee    Sick sinus syndrome    AV block, Mobitz 1    Overweight (BMI 25.0-29.9)    Third degree AV block    Pacemaker    Gait disturbance    Weakness of both lower extremities         Medical History        Past Medical History:   Diagnosis Date    Allergic rhinitis      Aortic stenosis      Atrial fibrillation      Coronary artery disease      Diabetes      GERD (gastroesophageal reflux disease)      Heart murmur      Hyperlipidemia      Hypertension      Hypothyroidism      Prostate cancer      S/P TAVR (transcatheter aortic valve replacement) 02/20/2023    Stroke            Surgical History         Past Surgical History:   Procedure Laterality Date    ANKLE OPEN REDUCTION INTERNAL FIXATION Left 12/16/2020     Procedure: ANKLE OPEN REDUCTION INTERNAL FIXATION;  Surgeon: CAROLE Vasquez DPM;  Location: [facility] MAIN OR;  Service: Podiatry;  Laterality: Left;    AORTIC VALVE REPAIR/REPLACEMENT N/A 02/20/2023     Procedure: Transfemoral Transcatheter Aortic Valve Replacement;  Surgeon: Naveed Leonardo MD;  Location: [facility] HYBRID OR;  Service: Cardiovascular;  Laterality: N/A;    AORTIC VALVE REPAIR/REPLACEMENT N/A 02/20/2023     Procedure: TRANSCATHETER AORTIC VALVE REPLACEMENT;  Surgeon: Fabio Villafana MD;  Location: [facility] HYBRID OR;  Service: Cardiothoracic;  Laterality: N/A;    BACK SURGERY   1994    CARDIAC CATHETERIZATION Right 09/27/2022     Procedure: Left Heart Cath;  Surgeon: Steve Muhammad MD;  Location: [facility] CATH INVASIVE LOCATION;  Service: Cardiovascular;  Laterality: Right;    CARDIAC CATHETERIZATION N/A 01/12/2023     Procedure: Stent JENN coronary;  Surgeon: Steve Muhammad MD;  Location: [facility] CATH INVASIVE LOCATION;  Service: Cardiovascular;  Laterality: N/A;    CARDIAC ELECTROPHYSIOLOGY PROCEDURE N/A 02/24/2025     Procedure: Pacemaker DC new, BiV PPM for complete heart block;  Surgeon: Radha Danielle MD;  Location: [facility] CATH INVASIVE LOCATION;  Service: Cardiovascular;  Laterality: N/A;    CAROTID ENDARTERECTOMY Right 12/14/2020     Procedure: CAROTID ENDARTERECTOMY;  Surgeon: Toby Fung MD;  Location: [facility] MAIN OR;  Service: Vascular;  Laterality: Right;    COLONOSCOPY   08/25/2015    CORONARY STENT PLACEMENT        PACEMAKER IMPLANTATION         2/24/25    PROSTATECTOMY   2001     due to cancer           General Information         Row Name 03/22/25 1914                 Physical Therapy Time and Intention     Document Type evaluation  -EL       Mode of Treatment individual therapy;physical therapy  -EL          Row Name 03/22/25 1914                  General Information     Prior Level of Function independent:;all household mobility;ADL's  Prior to decline, has required more assistance recently  -          Row Name 03/22/25 1914                 Living Environment     Current Living Arrangements home  -EL       People in Home spouse  -          Row Name 03/22/25 1914                 Cognition     Orientation Status (Cognition) oriented x 4  -          Row Name 03/22/25 1914                 Safety Issues/Impairments Affecting Functional Mobility     Impairments Affecting Function (Mobility) balance;endurance/activity tolerance;strength  -EL                       User Key  (r) = Recorded By, (t) = Taken By, (c) = Cosigned By        Initials Name Provider Type     Riki Anaya, TOOTIE Physical Therapist                            Mobility         Row Name 03/22/25 1914                 Bed Mobility     Bed Mobility bed mobility (all) activities  -EL       All Activities, [place] (Bed Mobility) standby assist  -EL       Assistive Device (Bed Mobility) bed rails  -          Row Name 03/22/25 1914                 Sit-Stand Transfer     Sit-Stand [place] (Transfers) minimum assist (75% patient effort)  -EL       Assistive Device (Sit-Stand Transfers) walker, front-wheeled  -          Row Name 03/22/25 1914                 Gait/Stairs (Locomotion)     [place] Level (Gait) contact guard;minimum assist (75% patient effort)  -EL       Assistive Device (Gait) walker, front-wheeled  -EL       Patient was able to Ambulate yes  -EL       Distance in Feet (Gait) 40  -EL       Deviations/Abnormal Patterns (Gait) gait speed decreased  -EL       Bilateral Gait Deviations forward flexed posture  -EL       Comment, (Gait/Stairs) Mild balance deficits, reliant on RWx  -EL                       User Key  (r) = Recorded By, (t) = Taken By, (c) = Cosigned By        Initials Name Provider Type     Riki Anaya, PT Physical Therapist                             Obj/Interventions         [facility] Name 03/22/25 1915                 Range of Motion Comprehensive     General Range of Motion bilateral lower extremity ROM WFL  -[facility] Name 03/22/25 1915                 Strength Comprehensive (MMT)     General Manual Muscle Testing (MMT) Assessment lower extremity strength deficits identified  -EL          Row Name 03/22/25 1915                 Balance     Balance Assessment sitting static balance;standing static balance;standing dynamic balance  -EL       Static Sitting Balance independent  -EL       Static Standing Balance contact guard  -EL       Dynamic Standing Balance minimal assist  -EL       Position/Device Used, Standing Balance walker, front-wheeled  -[facility] Name 03/22/25 1915                 Sensory Assessment (Somatosensory)     Sensory Assessment (Somatosensory) sensation intact  -                       User Key  (r) = Recorded By, (t) = Taken By, (c) = Cosigned By        Initials Name Provider Type     Riki Anaya, PT Physical Therapist                            Goals/Plan         Row Name 03/22/25 1919                 Bed Mobility Goal 1 (PT)     Activity/Assistive Device (Bed Mobility Goal 1, PT) bed mobility activities, all  -EL       [place] Level/Cues Needed (Bed Mobility Goal 1, PT) modified independence  -EL       Time Frame (Bed Mobility Goal 1, PT) long term goal (LTG);2 weeks  -EL          Row Name 03/22/25 1919                 Transfer Goal 1 (PT)     Activity/Assistive Device (Transfer Goal 1, PT) transfers, all;walker, rolling  -EL       [place] Level/Cues Needed (Transfer Goal 1, PT) modified independence  -EL       Time Frame (Transfer Goal 1, PT) long term goal (LTG);2 weeks  -EL          Row Name 03/22/25 1919                 Gait Training Goal 1 (PT)     Activity/Assistive Device (Gait Training Goal 1, PT) gait (walking locomotion);walker, rolling  -EL       [place] Level (Gait Training Goal 1, PT) modified  independence  -EL       Distance (Gait Training Goal 1, PT) 250  -EL       Time Frame (Gait Training Goal 1, PT) long term goal (LTG);2 weeks  -EL          Row Name 03/22/25 1919                 Therapy Assessment/Plan (PT)     Planned Therapy Interventions (PT) neuromuscular re-education;balance training;bed mobility training;transfer training;gait training;patient/family education;strengthening  -EL                    User Key  (r) = Recorded By, (t) = Taken By, (c) = Cosigned By        Initials Name Provider Type     Riki Anaya, PT Physical Therapist                         Clinical Impression         Row Name 03/22/25 1916                 Pain     Pretreatment Pain Rating 0/10 - no pain  -EL       Posttreatment Pain Rating 0/10 - no pain  -EL          Row Name 03/22/25 1916                 Plan of Care Review     Plan of Care Reviewed With patient  -EL       Outcome Evaluation Pt is an 83 YO M admitted wiht gait disturbance, generalized weakness. Pt reports living at home with spouse where he is independent with ADLs, ambulation without AD and no recent falls. Pt reports having cane and RWx if needed. This date pt requiring no physical assistance for bed mobility, transfers and ambulation with MIN A. Pt with impaired balance and decreased foot clearance with ambulation, as well and notably decreased endurance. Pt reports having pacemaker placed ~1 month ago and endurance has deminished since. Appears to likely be deconditioned with decreased activity at baseline. Recommendation is short IP rehab stay to improved mobitliy and functionality. PT to follow while admitted.  -EL          Row Name 03/22/25 1916                 Therapy Assessment/Plan (PT)     Rehab Potential (PT) good  -EL       Criteria for Skilled Interventions Met (PT) yes  -EL       Therapy Frequency (PT) 5 times/wk  -EL       Predicted Duration of Therapy Intervention (PT) until d/c  -EL          Row Name 03/22/25 1916                 Vital  Signs     O2 Delivery Pre Treatment room air  -EL       O2 Delivery Intra Treatment room air  -EL       O2 Delivery Post Treatment room air  -EL       Pre Patient Position Supine  -EL       Intra Patient Position Standing  -EL       Post Patient Position Sitting  -EL          Row Name 03/22/25 1916                 Positioning and Restraints     Pre-Treatment Position in bed  -EL       Post Treatment Position bed  -EL       In Bed sitting EOB;call light within reach;encouraged to call for assist;notified nsg;with family/caregiver  -EL                       User Key  (r) = Recorded By, (t) = Taken By, (c) = Cosigned By        Initials Name Provider Type     Riki Anaya, PT Physical Therapist                            Outcome Measures         Row Name 03/22/25 1920 03/22/25 1205            How much help from another person do you currently need...     Turning from your back to your side while in flat bed without using bedrails? 3  -EL 3  -TJ     Moving from lying on back to sitting on the side of a flat bed without bedrails? 3  -EL 3  -TJ     Moving to and from a bed to a chair (including a wheelchair)? 3  -EL 3  -TJ     Standing up from a chair using your arms (e.g., wheelchair, bedside chair)? 3  -EL 3  -TJ     Climbing 3-5 steps with a railing? 2  -EL 2  -TJ     To walk in hospital room? 3  -EL 3  -TJ     AM-PAC 6 Clicks Score (PT) 17  -EL 17  -TJ     Highest Level of Mobility Goal 5 --> Static standing  -EL 5 --> Static standing  -TJ        Row Name 03/22/25 1920                 Functional Assessment     Outcome Measure Options AM-PAC 6 Clicks Basic Mobility (PT)  -EL                       User Key  (r) = Recorded By, (t) = Taken By, (c) = Cosigned By        Initials Name Provider Type     Riki Anaya, PT Physical Therapist     Mikayla Lewis, RN Registered Nurse                          Physical Therapy Education            Title: PT OT SLP Therapies (Done)         Topic: Physical Therapy (Done)          Point: Mobility training (Done)         Learning Progress Summary             Patient Acceptance, E,TB, VU by  at 3/22/2025 1920                            Point: Precautions (Done)         Learning Progress Summary             Patient Acceptance, E,TB, VU by  at 3/22/2025 1920                                                User Key         Initials Effective Dates Name Provider Type Discipline      06/23/20 -  Riki Yeager, PT Physical Therapist PT                          PT Recommendation and Plan  Planned Therapy Interventions (PT): neuromuscular re-education, balance training, bed mobility training, transfer training, gait training, patient/family education, strengthening  Outcome Evaluation: Pt is an 85 YO M admitted wiht gait disturbance, generalized weakness. Pt reports living at home with spouse where he is independent with ADLs, ambulation without AD and no recent falls. Pt reports having cane and RWx if needed. This date pt requiring no physical assistance for bed mobility, transfers and ambulation with MIN A. Pt with impaired balance and decreased foot clearance with ambulation, as well and notably decreased endurance. Pt reports having pacemaker placed ~1 month ago and endurance has deminished since. Appears to likely be deconditioned with decreased activity at baseline. Recommendation is short IP rehab stay to improved mobitliy and functionality. PT to follow while admitted.      Time Calculation:   PT Evaluation Complexity  History, PT Evaluation Complexity: 1-2 personal factors and/or comorbidities  Examination of Body Systems (PT Eval Complexity): total of 3 or more elements  Clinical Presentation (PT Evaluation Complexity): evolving  Clinical Decision Making (PT Evaluation Complexity): moderate complexity  Overall Complexity (PT Evaluation Complexity): moderate complexity       PT Charges         Row Name 03/22/25 1921                       Time Calculation     Start Time 1441  -EL         Stop  Time 1504  -EL         Time Calculation (min) 23 min  -EL         PT Received On 03/22/25  -EL         PT - Next Appointment 03/24/25  -EL         PT Goal Re-Cert Due Date 04/05/25  -EL                      User Key  (r) = Recorded By, (t) = Taken By, (c) = Cosigned By        Initials Name Provider Type     Riki Anaya, PT Physical Therapist                       Therapy Charges for Today         Code Description Service Date Service Provider Modifiers Qty     49079567074 HC PT EVAL MOD COMPLEXITY 4 3/22/2025 Riki Yeager, PT GP 1                PT G-Codes  Outcome Measure Options: AM-PAC 6 Clicks Basic Mobility (PT)  AM-PAC 6 Clicks Score (PT): 17  PT Discharge Summary  Anticipated Discharge Disposition (PT): inpatient rehabilitation facility     Riki Yeager PT             3/22/2025                                    Lila Drew PTA   Physical Therapist Assistant  Specialty:  Physical Therapy  Therapy Treatment Note     Signed  Date of Service:  03/24/25 0911  Creation Time:  03/24/25 1032     Signed        Subjective: Pt agreeable to therapeutic plan of care.     Objective:      Bed mobility - N/A or Not attempted.  Pt already up in the chair  Transfers - Min-A and with rolling walker  sit to stand from chair 2 times  Ambulation - 22 + 25 feet CGA and with rolling walker  cues for posture, shuffle steps,  cues to correct step length,  pt is able to correct but does not maintain     Therapeutic Exercise - 15 Reps B LE AROM supported sitting / chair and standing with roll walker     Vitals: WNL     Pain: 0    Education: Provided education on the importance of mobility in the acute care setting, Verbal/Tactile Cues, Transfer Training, and Gait Training     Assessment: Alen Ratliff presents with functional mobility impairments which indicate the need for skilled intervention. Tolerating session today without incident.  Pt is highly motivated to recover his independent mobility.  Pt is very appropriate for  Acute Inpatient rehab at d/c.  Will continue to follow and progress as tolerated.      Plan/Recommendations:   If medically appropriate, High Intensity Therapy recommended post-acute care. This is recommended as therapy feels the patient would require 5-6 days per week, 2-3 hours per day. At this time, inpatient rehabilitation (acute rehab) would be recommended.  Pt requires no DME at discharge.      Pt desires Inpatient Rehabilitation placement at discharge. Pt cooperative; agreeable to therapeutic recommendations and plan of care.      Basic Mobility 6-click:  Rollin = Total, A lot = 2, A little = 3; 4 = None  Supine>Sit:                      1 = Total, A lot = 2, A little = 3; 4 = None   Sit>Stand with arms:       1 = Total, A lot = 2, A little = 3; 4 = None  Bed>Chair:                      1 = Total, A lot = 2, A little = 3; 4 = None  Ambulate in room:           1 = Total, A lot = 2, A little = 3; 4 = None  3-5 Steps with railin = Total, A lot = 2, A little = 3; 4 = None  Score: 17     Modified [place]: N/A = No pre-op stroke/TIA     Post-Tx Position: Up in Chair and Call light and personal items within reach  pt's wife present  PPE: gloves      Therapy Charges for Today         Code Description Service Date Service Provider Modifiers Qty     88781469870 HC GAIT TRAINING EA 15 MIN 3/24/2025 Lila Drew, PTA GP 1     62194372942 HC PT THERAPEUTIC ACT EA 15 MIN 3/24/2025 Lila Drew, PTA GP 1     69079380888 HC PT THER PROC EA 15 MIN 3/24/2025 Lila Drew, PTA GP 1               PT Charges         Row Name 25 1032                       Time Calculation     Start Time 0911  -SC         Stop Time 09  -SC         Time Calculation (min) 25 min  -SC         PT Received On 25  -SC         PT - Next Appointment 25  -SC                 Time Calculation- PT     Total Timed Code Minutes- PT 25 minute(s)  -SC                      User Key  (r) =  Recorded By, (t) = Taken By, (c) = Cosigned By        Initials Name Provider Type     Lila Wood PTA Physical Therapist Assistant                                        Savana Triplett OT   Occupational Therapist  Specialty:  Occupational Therapy  Therapy Evaluation     Signed  Date of Service:  25  Creation Time:  25     Signed        Expand All Collapse All  Patient Name: Alen Ratliff                       : 1940                          MRN: [id number]                              Today's Date: 3/24/2025                                   Admit Date: 3/21/2025                        Visit Dx:   Visit Diagnosis       ICD-10-CM ICD-9-CM   1. Gait disturbance  R26.9 781.2   2. Anemia, unspecified type  D64.9 285.9   3. Electrolyte abnormality  E87.8 276.9         Problem List       Patient Active Problem List   Diagnosis    Heart murmur    Essential hypertension    Mixed hyperlipidemia    Acquired hypothyroidism    GERD (gastroesophageal reflux disease)    Seasonal allergic rhinitis due to pollen    CKD (chronic kidney disease) stage 3, GFR 30-59 ml/min    History of prostate cancer    History of stroke    Bilateral carotid artery stenosis    Closed displaced fracture of lateral malleolus of left fibula    Medicare annual wellness visit, subsequent    Trigger index finger of left hand    Colon cancer screening    DM type 2 with diabetic peripheral neuropathy    Aortic stenosis, severe    Coronary artery disease involving native coronary artery of native heart    Needs flu shot    PAF (paroxysmal atrial fibrillation)    Coronary artery disease of native artery of native heart with stable angina pectoris    S/P PTCA (percutaneous transluminal coronary angioplasty)    Chronic diastolic CHF (congestive heart failure), NYHA class 2    S/P TAVR (transcatheter aortic valve replacement)    Bradycardia    TIA (transient ischemic attack)    Onychodystrophy    Chronic pain of right  knee    Sick sinus syndrome    AV block, Mobitz 1    Overweight (BMI 25.0-29.9)    Third degree AV block    Pacemaker    Gait disturbance    Weakness of both lower extremities         Medical History        Past Medical History:   Diagnosis Date    Allergic rhinitis      Aortic stenosis      Atrial fibrillation      Coronary artery disease      Diabetes      GERD (gastroesophageal reflux disease)      Heart murmur      Hyperlipidemia      Hypertension      Hypothyroidism      Prostate cancer      S/P TAVR (transcatheter aortic valve replacement) 02/20/2023    Stroke           Surgical History         Past Surgical History:   Procedure Laterality Date    ANKLE OPEN REDUCTION INTERNAL FIXATION Left 12/16/2020     Procedure: ANKLE OPEN REDUCTION INTERNAL FIXATION;  Surgeon: CAROLE Vasquez DPM;  Location: [facility] MAIN OR;  Service: Podiatry;  Laterality: Left;    AORTIC VALVE REPAIR/REPLACEMENT N/A 02/20/2023     Procedure: Transfemoral Transcatheter Aortic Valve Replacement;  Surgeon: Naveed Leonardo MD;  Location: [facility] HYBRID OR;  Service: Cardiovascular;  Laterality: N/A;    AORTIC VALVE REPAIR/REPLACEMENT N/A 02/20/2023     Procedure: TRANSCATHETER AORTIC VALVE REPLACEMENT;  Surgeon: Fabio Villafana MD;  Location: [facility] HYBRID OR;  Service: Cardiothoracic;  Laterality: N/A;    BACK SURGERY   1994    CARDIAC CATHETERIZATION Right 09/27/2022     Procedure: Left Heart Cath;  Surgeon: Steve Muhammad MD;  Location: [facility] CATH INVASIVE LOCATION;  Service: Cardiovascular;  Laterality: Right;    CARDIAC CATHETERIZATION N/A 01/12/2023     Procedure: Stent JENN coronary;  Surgeon: Steve Muhammad MD;  Location: [facility] CATH INVASIVE LOCATION;  Service: Cardiovascular;  Laterality: N/A;    CARDIAC ELECTROPHYSIOLOGY PROCEDURE N/A 02/24/2025     Procedure: Pacemaker DC new, BiV PPM for complete heart block;  Surgeon: Radha Danielle MD;  Location: [facility] CATH INVASIVE LOCATION;  Service: Cardiovascular;  Laterality: N/A;     CAROTID ENDARTERECTOMY Right 12/14/2020     Procedure: CAROTID ENDARTERECTOMY;  Surgeon: Toby Fung MD;  Location: [facility] MAIN OR;  Service: Vascular;  Laterality: Right;    COLONOSCOPY   08/25/2015    CORONARY STENT PLACEMENT        PACEMAKER IMPLANTATION         2/24/25    PROSTATECTOMY   2001     due to cancer           General Information         Row Name 03/24/25 1504                 OT Time and Intention     Subjective Information complains of;weakness  -MS       Document Type evaluation  -MS       Mode of Treatment occupational therapy  -MS       Patient Effort excellent  -MS          Row Name 03/24/25 1504                 General Information     Patient Profile Reviewed yes  -MS       Prior Level of Function independent:;ADL's;all household mobility;community mobility;driving  -MS       Existing Precautions/Restrictions fall  -MS          Row Name 03/24/25 1504                 Occupational Profile     Reason for Services/Referral (Occupational Profile) Pt is an 83 y/o M who presented to [facility] 3/21/25 with BLE weakness. Of note, pt recently admitted 2/20/25 with HTN, was noted to be in complete heart block, pacemaker was placed 2/24/25 by Dr. Danielle, [place] home with spouse 2/26/25. Unable to undergo MRI d.t recent pacemaker placed. PMHx significant for HTN, CKD, hx CVA, trigger finger of L hand, A.fib, and pacemaker. At baseline, pt resides with spouse in ground level apartment with 0 [address]IM[address]. At pt typical baseline, (I) with ADLs, (I) with mobility without AD and is an active .  -MS       Environmental Supports and Barriers (Occupational Profile) supportive family  -MS          Row Name 03/24/25 1504                 Living Environment     Current Living Arrangements apartment  -MS       People in Home spouse  -MS          Row Name 03/24/25 1504                 Home Main Entrance     Number of Stairs, Main Entrance none  -MS          Row Name 03/24/25 1504                 Stairs Within Home,  Primary     Number of Stairs, Within Home, Primary none  -MS          Row Name 03/24/25 1504                 Cognition     Orientation Status (Cognition) oriented x 4  -MS          Row Name 03/24/25 1504                 Safety Issues/Impairments Affecting Functional Mobility     Impairments Affecting Function (Mobility) balance;endurance/activity tolerance;range of motion (ROM);strength;postural/trunk control  -MS                       User Key  (r) = Recorded By, (t) = Taken By, (c) = Cosigned By        Initials Name Provider Type     Savana Jj OT Occupational Therapist                                     Mobility/ADL's         Row Name 03/24/25 1505                 Bed Mobility     Bed Mobility bed mobility (all) activities  -MS       All Activities, [place] (Bed Mobility) unable to assess  -MS       Comment, (Bed Mobility) sitting up in chair upon arrival  -MS          Row Name 03/24/25 1505                 Transfers     Transfers sit-stand transfer  -MS          Row Name 03/24/25 1505                 Sit-Stand Transfer     Sit-Stand [place] (Transfers) minimum assist (75% patient effort)  -MS       Comment, (Sit-Stand Transfer) verbal cues for hand placement  -MS          Row Name 03/24/25 1505                 Functional Mobility     Patient was able to Ambulate yes  -MS          Row Name 03/24/25 1505                 Activities of Daily Living     BADL Assessment/Intervention lower body dressing  -MS          Row Name 03/24/25 1505                 Lower Body Dressing Assessment/Training     [place] Level (Lower Body Dressing) don;doff;shoes/slippers;set up  -MS       Position (Lower Body Dressing) supported sitting  -MS                       User Key  (r) = Recorded By, (t) = Taken By, (c) = Cosigned By        Initials Name Provider Type     Savana Jj OT Occupational Therapist                            Obj/Interventions         Row Name 03/24/25 1505                 Sensory  Assessment (Somatosensory)     Sensory Assessment (Somatosensory) UE sensation intact  -MS          Row Name 03/24/25 1505                 Vision Assessment/Intervention     Visual Impairment/Limitations WFL  -MS          Row Name 03/24/25 1505                 Range of Motion Comprehensive     General Range of Motion bilateral upper extremity ROM WFL  -MS          Row Name 03/24/25 1505                 Strength Comprehensive (MMT)     Comment, General Manual Muscle Testing (MMT) Assessment BUE grossly 3+/5  -MS          Row Name 03/24/25 1505                 Balance     Balance Assessment sitting static balance;sitting dynamic balance;standing static balance;standing dynamic balance  -MS       Static Sitting Balance modified independence  -MS       Dynamic Sitting Balance modified independence  -MS       Position, Sitting Balance supported;sitting in chair  -MS       Static Standing Balance contact guard  -MS       Dynamic Standing Balance contact guard;minimal assist  -MS       Position/Device Used, Standing Balance supported;walker, front-wheeled  -MS                       User Key  (r) = Recorded By, (t) = Taken By, (c) = Cosigned By        Initials Name Provider Type     Savana Jj, AFSANEH Occupational Therapist                            Goals/Plan         Row Name 03/24/25 1509                 Bed Mobility Goal 1 (OT)     Activity/Assistive Device (Bed Mobility Goal 1, OT) bed mobility activities, all  -MS       [place] Level/Cues Needed (Bed Mobility Goal 1, OT) modified independence  -MS       Time Frame (Bed Mobility Goal 1, OT) long term goal (LTG);2 weeks  -MS       Progress/Outcomes (Bed Mobility Goal 1, OT) new goal  -MS          Row Name 03/24/25 1509                 Transfer Goal 1 (OT)     Activity/Assistive Device (Transfer Goal 1, OT) transfers, all  -MS       [place] Level/Cues Needed (Transfer Goal 1, OT) modified independence  -MS       Time Frame (Transfer Goal 1, OT) long term  goal (LTG);2 weeks  -MS       Progress/Outcome (Transfer Goal 1, OT) new goal  -MS          Row Name 03/24/25 1509                 Dressing Goal 1 (OT)     Activity/Device (Dressing Goal 1, OT) dressing skills, all  -MS       [place]/Cues Needed (Dressing Goal 1, OT) modified independence  -MS       Time Frame (Dressing Goal 1, OT) long term goal (LTG);2 weeks  -MS       Progress/Outcome (Dressing Goal 1, OT) new goal  -MS          Row Name 03/24/25 1509                 Toileting Goal 1 (OT)     Activity/Device (Toileting Goal 1, OT) toileting skills, all  -MS       [place] Level/Cues Needed (Toileting Goal 1, OT) modified independence  -MS       Time Frame (Toileting Goal 1, OT) long term goal (LTG);2 weeks  -MS       Progress/Outcome (Toileting Goal 1, OT) new goal  -MS          Row Name 03/24/25 1509                 Therapy Assessment/Plan (OT)     Planned Therapy Interventions (OT) activity tolerance training;adaptive equipment training;BADL retraining;functional balance retraining;IADL retraining;neuromuscular control/coordination retraining;occupation/activity based interventions;passive ROM/stretching;patient/caregiver education/training;transfer/mobility retraining;strengthening exercise;ROM/therapeutic exercise  -MS                    User Key  (r) = Recorded By, (t) = Taken By, (c) = Cosigned By        Initials Name Provider Type     Savana Jj, OT Occupational Therapist                         Clinical Impression         Row Name 03/24/25 1506                 Pain Assessment     Pretreatment Pain Rating 0/10 - no pain  -MS       Posttreatment Pain Rating 0/10 - no pain  -MS          Row Name 03/24/25 1506                 Plan of Care Review     Plan of Care Reviewed With spouse;patient  -MS       Progress no change  -MS       Outcome Evaluation Pt is an 85 y/o M who presented to [facility] 3/21/25 with BLE weakness. Of note, pt recently admitted 2/20/25 with HTN, was noted to be in complete  heart block, pacemaker was placed 2/24/25 by Dr. Danielle, [place] home with spouse 2/26/25. Unable to undergo MRI d/t recent pacemaker placed. At baseline, pt resides with spouse in ground level apartment with 0 [address]. At pt typical baseline, (I) with ADLs, (I) with mobility without AD and is an active . Pt cleared for OT by nursing, oriented x4 on RA sitting up chair upon arrival, family at bedside. Pt c/o no pain. Pt comes to standing with min A requiring verbal cues for hand placement for safety. Pt requiring min A with RW to ambulate within room, appears unsteady on feet and with limited activity tolerance. Pt don/doff shoes with set up in supported sitting position. Pt with recent functional decline, would benefit from acute IP rehab when medically appropriate for dc. OT will follow within acute setting, will follow and progress as appropriate.  -MS          Row Name 03/24/25 1506                 Therapy Assessment/Plan (OT)     Rehab Potential (OT) good  -MS       Criteria for Skilled Therapeutic Interventions Met (OT) yes;meets criteria;skilled treatment is necessary  -MS       Therapy Frequency (OT) 3 times/wk  -MS       Predicted Duration of Therapy Intervention (OT) until d/c  -MS          Row Name 03/24/25 1506                 Therapy Plan Review/Discharge Plan (OT)     Anticipated Discharge Disposition (OT) inpatient rehabilitation facility  -MS          Row Name 03/24/25 1506                 Vital Signs     Pre Systolic BP Rehab 171  -MS       Pre Treatment Diastolic BP 71  -MS       Intra Systolic BP Rehab 145  -MS       Intra Treatment Diastolic BP 75  -MS       Post Systolic BP Rehab 155  -MS       Post Treatment Diastolic BP 66  -MS       Pretreatment Heart Rate (beats/min) 64  -MS       Intratreatment Heart Rate (beats/min) 73  -MS       Posttreatment Heart Rate (beats/min) 66  -MS       Pretreatment Resp Rate (breaths/min) 12  -MS       Intratreatment Resp Rate (breaths/min) 13  -MS        Posttreatment Resp Rate (breaths/min) 18  -MS       Pre SpO2 (%) 98  -MS       O2 Delivery Pre Treatment room air  -MS       O2 Delivery Intra Treatment room air  -MS       Post SpO2 (%) 100  -MS       O2 Delivery Post Treatment room air  -MS       Pre Patient Position Sitting  -MS       Intra Patient Position Standing  -MS       Post Patient Position Sitting  -MS          Row Name 03/24/25 1506                 Positioning and Restraints     Pre-Treatment Position sitting in chair/recliner  -MS       Post Treatment Position chair  -MS       In Chair notified nsg;reclined;call light within reach;encouraged to call for assist;exit alarm on;with family/caregiver;legs elevated  -MS                       User Key  (r) = Recorded By, (t) = Taken By, (c) = Cosigned By        Initials Name Provider Type     Savana Jj, OT Occupational Therapist                            Outcome Measures         Row Name 03/24/25 1509                 How much help from another is currently needed...     Putting on and taking off regular lower body clothing? 3  -MS       Bathing (including washing, rinsing, and drying) 3  -MS       Toileting (which includes using toilet bed pan or urinal) 3  -MS       Putting on and taking off regular upper body clothing 4  -MS       Taking care of personal grooming (such as brushing teeth) 4  -MS       Eating meals 4  -MS       AM-PAC 6 Clicks Score (OT) 21  -MS          Row Name 03/24/25 5258                 How much help from another person do you currently need...     Turning from your back to your side while in flat bed without using bedrails? 3  -MH       Moving from lying on back to sitting on the side of a flat bed without bedrails? 3  -MH       Moving to and from a bed to a chair (including a wheelchair)? 3  -MH       Standing up from a chair using your arms (e.g., wheelchair, bedside chair)? 3  -MH       Climbing 3-5 steps with a railing? 2  -MH       To walk in hospital room? 3  -MH        AM-PAC 6 Clicks Score (PT) 17  -       Highest Level of Mobility Goal 5 --> Static standing  -          Row Name 03/24/25 1509                 Functional Assessment     Outcome Measure Options AM-PAC 6 Clicks Daily Activity (OT)  -MS                       User Key  (r) = Recorded By, (t) = Taken By, (c) = Cosigned By        Initials Name Provider Type      Grayson Rob, RN Registered Nurse     Savana Jj, AFSANEH Occupational Therapist                             Occupational Therapy Education            Title: PT OT SLP Therapies (Done)         Topic: Occupational Therapy (Done)         Point: ADL training (Done)         Learning Progress Summary             Patient Acceptance, E,TB, VU by MS at 3/24/2025 1510                            Point: Precautions (Done)         Learning Progress Summary             Patient Acceptance, E,TB, VU by MS at 3/24/2025 1510                            Point: Body mechanics (Done)         Learning Progress Summary             Patient Acceptance, E,TB, VU by MS at 3/24/2025 1510                                                User Key         Initials Effective Dates Name Provider Type Discipline     MS 07/13/22 -  Savana Triplett, AFSANEH Occupational Therapist OT                          OT Recommendation and Plan  Planned Therapy Interventions (OT): activity tolerance training, adaptive equipment training, BADL retraining, functional balance retraining, IADL retraining, neuromuscular control/coordination retraining, occupation/activity based interventions, passive ROM/stretching, patient/caregiver education/training, transfer/mobility retraining, strengthening exercise, ROM/therapeutic exercise  Therapy Frequency (OT): 3 times/wk  Plan of Care Review  Plan of Care Reviewed With: spouse, patient  Progress: no change  Outcome Evaluation: Pt is an 85 y/o M who presented to [facility] 3/21/25 with BLE weakness. Of note, pt recently admitted 2/20/25 with HTN, was noted to be in complete  heart block, pacemaker was placed 2/24/25 by Dr. Danielle, [place] home with spouse 2/26/25. Unable to undergo MRI d/t recent pacemaker placed. At baseline, pt resides with spouse in ground level apartment with 0 [address]IMIL[address]. At pt typical baseline, (I) with ADLs, (I) with mobility without AD and is an active . Pt cleared for OT by nursing, oriented x4 on RA sitting up chair upon arrival, family at bedside. Pt c/o no pain. Pt comes to standing with min A requiring verbal cues for hand placement for safety. Pt requiring min A with RW to ambulate within room, appears unsteady on feet and with limited activity tolerance. Pt don/doff shoes with set up in supported sitting position. Pt with recent functional decline, would benefit from acute IP rehab when medically appropriate for dc. OT will follow within acute setting, will follow and progress as appropriate.      Time Calculation:        Time Calculation- OT         Row Name 03/24/25 1510                       Time Calculation- OT     OT Start Time 1122  -MS         OT Stop Time 1131  -MS         OT Time Calculation (min) 9 min  -MS         Total Timed Code Minutes- OT 0 minute(s)  -MS         OT Received On 03/24/25  -MS         OT - Next Appointment 03/26/25  -MS         OT Goal Re-Cert Due Date 04/07/25  -MS                      User Key  (r) = Recorded By, (t) = Taken By, (c) = Cosigned By        Initials Name Provider Type     Savana Jj OT Occupational Therapist                       Therapy Charges for Today         Code Description Service Date Service Provider Modifiers Qty     [id number] HC OT EVAL MOD COMPLEXITY 4 3/24/2025 Savana Triplett OT GO 1                   Savana Triplett OT                  3/24/2025

## 2025-03-25 NOTE — PLAN OF CARE
Problem: Adult Inpatient Plan of Care  Goal: Plan of Care Review  Outcome: Not Progressing  Flowsheets (Taken 3/25/2025 0049)  Progress: declining  Plan of Care Reviewed With:   patient   spouse  Goal: Patient-Specific Goal (Individualized)  Outcome: Not Progressing  Goal: Absence of Hospital-Acquired Illness or Injury  Outcome: Not Progressing  Intervention: Identify and Manage Fall Risk  Recent Flowsheet Documentation  Taken 3/25/2025 0027 by Jenny Lopez RN  Safety Promotion/Fall Prevention:   assistive device/personal items within reach   clutter free environment maintained   fall prevention program maintained   nonskid shoes/slippers when out of bed   room organization consistent   safety round/check completed  Taken 3/24/2025 2232 by Jenny Lopez RN  Safety Promotion/Fall Prevention:   assistive device/personal items within reach   clutter free environment maintained   nonskid shoes/slippers when out of bed   room organization consistent   safety round/check completed  Taken 3/24/2025 2000 by Jenny Lopez RN  Safety Promotion/Fall Prevention:   assistive device/personal items within reach   clutter free environment maintained   fall prevention program maintained   nonskid shoes/slippers when out of bed   room organization consistent   safety round/check completed  Intervention: Prevent Skin Injury  Recent Flowsheet Documentation  Taken 3/24/2025 2000 by Jenny Lopez RN  Body Position:   position changed independently   supine  Intervention: Prevent and Manage VTE (Venous Thromboembolism) Risk  Recent Flowsheet Documentation  Taken 3/24/2025 2000 by Jenny Lopez RN  VTE Prevention/Management:   SCDs (sequential compression devices) off   patient refused intervention  Intervention: Prevent Infection  Recent Flowsheet Documentation  Taken 3/25/2025 0027 by Jenny Lopez, RN  Infection Prevention:   environmental surveillance performed   hand hygiene promoted   single patient room provided    visitors restricted/screened  Taken 3/24/2025 2232 by Jenny Lopez, RN  Infection Prevention:   environmental surveillance performed   hand hygiene promoted   single patient room provided   visitors restricted/screened  Taken 3/24/2025 2000 by Jenny Lopez RN  Infection Prevention:   environmental surveillance performed   hand hygiene promoted   rest/sleep promoted   single patient room provided   visitors restricted/screened  Goal: Optimal Comfort and Wellbeing  Outcome: Not Progressing  Intervention: Provide Person-Centered Care  Recent Flowsheet Documentation  Taken 3/24/2025 2000 by Jenny Lopez RN  Trust Relationship/Rapport:   care explained   choices provided  Goal: Readiness for Transition of Care  Outcome: Not Progressing   Goal Outcome Evaluation:  Plan of Care Reviewed With: patient, spouse        Progress: declining     Alert and oriented x 4. Hard of hearing. Assist x 1 for transfers. Spouse at bedside. No c/o SOB/dizziness noted. C/O abdominal discomfort, hyperactive bowel sounds noted. Elevated temperature at beginning of shift noted, MD made aware. Labs, chest x-ray and respiratory panel ordered. CBC WNL, Lactic acid elevated, chest x-ray negative. Due to elevated latic acid, NaCl infusion ordered at 100 ml/hr for 1 liter. Does not require O2 therapy at this time. No s/s of hyper/hypoglycemia noted. Continent of bowel and bladder. Currently in bed, eyes closed. Rise and fall of chest observed. Call bell in reach. From home. Per case management, awaiting acceptance to [facility]. No PASSR required, Pre-cert required.

## 2025-03-26 ENCOUNTER — APPOINTMENT (OUTPATIENT)
Dept: CARDIOLOGY | Facility: HOSPITAL | Age: 85
DRG: 372 | End: 2025-03-26
Payer: MEDICARE

## 2025-03-26 LAB
ANION GAP SERPL CALCULATED.3IONS-SCNC: 11.3 MMOL/L (ref 5–15)
AORTIC DIMENSIONLESS INDEX: 0.57 (DI)
AV MEAN PRESS GRAD SYS DOP V1V2: 10 MMHG
AV VMAX SYS DOP: 220 CM/SEC
BH CV ECHO MEAS - AO MAX PG: 19.4 MMHG
BH CV ECHO MEAS - AO V2 VTI: 48.5 CM
BH CV ECHO MEAS - AVA(I,D): 1.79 CM2
BH CV ECHO MEAS - EDV(CUBED): 91.1 ML
BH CV ECHO MEAS - EDV(MOD-SP2): 88.7 ML
BH CV ECHO MEAS - EDV(MOD-SP4): 109 ML
BH CV ECHO MEAS - EF(MOD-SP2): 68.4 %
BH CV ECHO MEAS - EF(MOD-SP4): 70 %
BH CV ECHO MEAS - ESV(CUBED): 19.7 ML
BH CV ECHO MEAS - ESV(MOD-SP2): 28 ML
BH CV ECHO MEAS - ESV(MOD-SP4): 32.7 ML
BH CV ECHO MEAS - FS: 40 %
BH CV ECHO MEAS - IVS/LVPW: 0.85 CM
BH CV ECHO MEAS - IVSD: 1.1 CM
BH CV ECHO MEAS - LA DIMENSION: 4.8 CM
BH CV ECHO MEAS - LAT PEAK E' VEL: 7.5 CM/SEC
BH CV ECHO MEAS - LV DIASTOLIC VOL/BSA (35-75): 53.4 CM2
BH CV ECHO MEAS - LV MASS(C)D: 198.1 GRAMS
BH CV ECHO MEAS - LV MAX PG: 4.8 MMHG
BH CV ECHO MEAS - LV MEAN PG: 3 MMHG
BH CV ECHO MEAS - LV SYSTOLIC VOL/BSA (12-30): 16 CM2
BH CV ECHO MEAS - LV V1 MAX: 110 CM/SEC
BH CV ECHO MEAS - LV V1 VTI: 27.7 CM
BH CV ECHO MEAS - LVIDD: 4.5 CM
BH CV ECHO MEAS - LVIDS: 2.7 CM
BH CV ECHO MEAS - LVOT AREA: 3.1 CM2
BH CV ECHO MEAS - LVOT DIAM: 2 CM
BH CV ECHO MEAS - LVPWD: 1.3 CM
BH CV ECHO MEAS - MED PEAK E' VEL: 4.6 CM/SEC
BH CV ECHO MEAS - MR MAX PG: 92.9 MMHG
BH CV ECHO MEAS - MR MAX VEL: 482 CM/SEC
BH CV ECHO MEAS - MV A DUR: 0.14 SEC
BH CV ECHO MEAS - MV A MAX VEL: 93.8 CM/SEC
BH CV ECHO MEAS - MV DEC SLOPE: 274 CM/SEC2
BH CV ECHO MEAS - MV DEC TIME: 0.31 SEC
BH CV ECHO MEAS - MV E MAX VEL: 70.2 CM/SEC
BH CV ECHO MEAS - MV E/A: 0.75
BH CV ECHO MEAS - MV MAX PG: 4.8 MMHG
BH CV ECHO MEAS - MV MEAN PG: 2 MMHG
BH CV ECHO MEAS - MV P1/2T: 127.2 MSEC
BH CV ECHO MEAS - MV V2 VTI: 41.3 CM
BH CV ECHO MEAS - MVA(P1/2T): 1.73 CM2
BH CV ECHO MEAS - MVA(VTI): 2.11 CM2
BH CV ECHO MEAS - PA ACC TIME: 0.13 SEC
BH CV ECHO MEAS - PA V2 MAX: 125 CM/SEC
BH CV ECHO MEAS - PULM A REVS DUR: 0.15 SEC
BH CV ECHO MEAS - PULM A REVS VEL: 34.2 CM/SEC
BH CV ECHO MEAS - PULM DIAS VEL: 27.4 CM/SEC
BH CV ECHO MEAS - PULM S/D: 1.51
BH CV ECHO MEAS - PULM SYS VEL: 41.5 CM/SEC
BH CV ECHO MEAS - RAP SYSTOLE: 3 MMHG
BH CV ECHO MEAS - RV MAX PG: 4.1 MMHG
BH CV ECHO MEAS - RV V1 MAX: 101 CM/SEC
BH CV ECHO MEAS - RV V1 VTI: 22.7 CM
BH CV ECHO MEAS - RVSP: 31.3 MMHG
BH CV ECHO MEAS - SV(LVOT): 87 ML
BH CV ECHO MEAS - SV(MOD-SP2): 60.7 ML
BH CV ECHO MEAS - SV(MOD-SP4): 76.3 ML
BH CV ECHO MEAS - SVI(LVOT): 42.7 ML/M2
BH CV ECHO MEAS - SVI(MOD-SP2): 29.8 ML/M2
BH CV ECHO MEAS - SVI(MOD-SP4): 37.4 ML/M2
BH CV ECHO MEAS - TAPSE (>1.6): 2.43 CM
BH CV ECHO MEAS - TR MAX PG: 28.3 MMHG
BH CV ECHO MEAS - TR MAX VEL: 266 CM/SEC
BH CV ECHO MEASUREMENTS AVERAGE E/E' RATIO: 11.6
BH CV XLRA - TDI S': 15.1 CM/SEC
BUN SERPL-MCNC: 18 MG/DL (ref 8–23)
BUN/CREAT SERPL: 13 (ref 7–25)
CALCIUM SPEC-SCNC: 8.8 MG/DL (ref 8.6–10.5)
CHLORIDE SERPL-SCNC: 104 MMOL/L (ref 98–107)
CO2 SERPL-SCNC: 22.7 MMOL/L (ref 22–29)
CREAT SERPL-MCNC: 1.38 MG/DL (ref 0.76–1.27)
DEPRECATED RDW RBC AUTO: 46.6 FL (ref 37–54)
DEPRECATED RDW RBC AUTO: 47 FL (ref 37–54)
EGFRCR SERPLBLD CKD-EPI 2021: 50.1 ML/MIN/1.73
ERYTHROCYTE [DISTWIDTH] IN BLOOD BY AUTOMATED COUNT: 13.7 % (ref 12.3–15.4)
ERYTHROCYTE [DISTWIDTH] IN BLOOD BY AUTOMATED COUNT: 13.8 % (ref 12.3–15.4)
GLUCOSE BLDC GLUCOMTR-MCNC: 141 MG/DL (ref 70–105)
GLUCOSE BLDC GLUCOMTR-MCNC: 155 MG/DL (ref 70–105)
GLUCOSE BLDC GLUCOMTR-MCNC: 215 MG/DL (ref 70–105)
GLUCOSE BLDC GLUCOMTR-MCNC: 216 MG/DL (ref 70–105)
GLUCOSE SERPL-MCNC: 139 MG/DL (ref 65–99)
HCT VFR BLD AUTO: 38.5 % (ref 37.5–51)
HCT VFR BLD AUTO: 41 % (ref 37.5–51)
HGB BLD-MCNC: 11.9 G/DL (ref 13–17.7)
HGB BLD-MCNC: 12.4 G/DL (ref 13–17.7)
LEFT ATRIUM VOLUME INDEX: 45.6 ML/M2
LV EF BIPLANE MOD: 68.8 %
MCH RBC QN AUTO: 28.2 PG (ref 26.6–33)
MCH RBC QN AUTO: 28.5 PG (ref 26.6–33)
MCHC RBC AUTO-ENTMCNC: 30.2 G/DL (ref 31.5–35.7)
MCHC RBC AUTO-ENTMCNC: 30.9 G/DL (ref 31.5–35.7)
MCV RBC AUTO: 92.1 FL (ref 79–97)
MCV RBC AUTO: 93.2 FL (ref 79–97)
PLATELET # BLD AUTO: 282 10*3/MM3 (ref 140–450)
PLATELET # BLD AUTO: 293 10*3/MM3 (ref 140–450)
PMV BLD AUTO: 10.1 FL (ref 6–12)
PMV BLD AUTO: 10.4 FL (ref 6–12)
POTASSIUM SERPL-SCNC: 3.8 MMOL/L (ref 3.5–5.2)
RBC # BLD AUTO: 4.18 10*6/MM3 (ref 4.14–5.8)
RBC # BLD AUTO: 4.4 10*6/MM3 (ref 4.14–5.8)
SINUS: 3.4 CM
SODIUM SERPL-SCNC: 138 MMOL/L (ref 136–145)
STJ: 2.5 CM
WBC NRBC COR # BLD AUTO: 11.35 10*3/MM3 (ref 3.4–10.8)
WBC NRBC COR # BLD AUTO: 12.02 10*3/MM3 (ref 3.4–10.8)

## 2025-03-26 PROCEDURE — 80048 BASIC METABOLIC PNL TOTAL CA: CPT | Performed by: STUDENT IN AN ORGANIZED HEALTH CARE EDUCATION/TRAINING PROGRAM

## 2025-03-26 PROCEDURE — 93306 TTE W/DOPPLER COMPLETE: CPT

## 2025-03-26 PROCEDURE — 99222 1ST HOSP IP/OBS MODERATE 55: CPT | Performed by: INTERNAL MEDICINE

## 2025-03-26 PROCEDURE — 93306 TTE W/DOPPLER COMPLETE: CPT | Performed by: INTERNAL MEDICINE

## 2025-03-26 PROCEDURE — 80202 ASSAY OF VANCOMYCIN: CPT | Performed by: STUDENT IN AN ORGANIZED HEALTH CARE EDUCATION/TRAINING PROGRAM

## 2025-03-26 PROCEDURE — 85027 COMPLETE CBC AUTOMATED: CPT | Performed by: STUDENT IN AN ORGANIZED HEALTH CARE EDUCATION/TRAINING PROGRAM

## 2025-03-26 PROCEDURE — 25010000002 VANCOMYCIN HCL 1.25 G RECONSTITUTED SOLUTION 1 EACH VIAL: Performed by: STUDENT IN AN ORGANIZED HEALTH CARE EDUCATION/TRAINING PROGRAM

## 2025-03-26 PROCEDURE — 63710000001 INSULIN LISPRO (HUMAN) PER 5 UNITS

## 2025-03-26 PROCEDURE — 25810000003 SODIUM CHLORIDE 0.9 % SOLUTION 250 ML FLEX CONT: Performed by: STUDENT IN AN ORGANIZED HEALTH CARE EDUCATION/TRAINING PROGRAM

## 2025-03-26 PROCEDURE — 82948 REAGENT STRIP/BLOOD GLUCOSE: CPT

## 2025-03-26 RX ADMIN — VANCOMYCIN HYDROCHLORIDE 1250 MG: 1.25 INJECTION, POWDER, LYOPHILIZED, FOR SOLUTION INTRAVENOUS at 11:42

## 2025-03-26 RX ADMIN — ASPIRIN 81 MG CHEWABLE TABLET 81 MG: 81 TABLET CHEWABLE at 09:28

## 2025-03-26 RX ADMIN — OXYCODONE HYDROCHLORIDE 10 MG: 5 TABLET ORAL at 13:29

## 2025-03-26 RX ADMIN — ATORVASTATIN CALCIUM 80 MG: 40 TABLET, FILM COATED ORAL at 17:15

## 2025-03-26 RX ADMIN — Medication 10 ML: at 09:14

## 2025-03-26 RX ADMIN — PANTOPRAZOLE SODIUM 40 MG: 40 TABLET, DELAYED RELEASE ORAL at 09:28

## 2025-03-26 RX ADMIN — LOSARTAN POTASSIUM 50 MG: 50 TABLET, FILM COATED ORAL at 09:28

## 2025-03-26 RX ADMIN — INSULIN LISPRO 3 UNITS: 100 INJECTION, SOLUTION INTRAVENOUS; SUBCUTANEOUS at 20:55

## 2025-03-26 RX ADMIN — INSULIN LISPRO 3 UNITS: 100 INJECTION, SOLUTION INTRAVENOUS; SUBCUTANEOUS at 12:37

## 2025-03-26 RX ADMIN — INSULIN LISPRO 2 UNITS: 100 INJECTION, SOLUTION INTRAVENOUS; SUBCUTANEOUS at 09:37

## 2025-03-26 RX ADMIN — OXYCODONE HYDROCHLORIDE 10 MG: 5 TABLET ORAL at 20:55

## 2025-03-26 RX ADMIN — Medication 10 ML: at 20:56

## 2025-03-26 RX ADMIN — RIVAROXABAN 15 MG: 15 TABLET, FILM COATED ORAL at 17:15

## 2025-03-26 RX ADMIN — LEVOTHYROXINE SODIUM 112 MCG: 112 TABLET ORAL at 09:28

## 2025-03-26 NOTE — PROGRESS NOTES
[facility] MEDICINE SERVICE  DAILY PROGRESS NOTE    NAME: Alen Ratliff  : 1940  MRN: [id number]      LOS: 1 day     PROVIDER OF SERVICE: Tian Roca MD    Chief Complaint: Weakness of both lower extremities    Subjective:     Interval History:  History taken from: patient    Immediately back from TTE.      Review of Systems:   Review of Systems    Objective:     Vital Signs  Temp:  [97.2 °F (36.2 °C)-101.7 °F (38.7 °C)] 97.5 °F (36.4 °C)  Heart Rate:  [60-89] 64  Resp:  [] 13  BP: (116-200)/(52-88) 171/75   Body mass index is 27.52 kg/m².    Physical Exam  Physical Exam  Neurological:      General: No focal deficit present.      Mental Status: He is oriented to person, place, and time. Mental status is at baseline.      Cranial Nerves: No cranial nerve deficit.      Sensory: No sensory deficit.      Coordination: Coordination normal.      Comments: 4/5 b/u upper and lower strength   Psychiatric:         Mood and Affect: Mood normal.         Behavior: Behavior normal.            Diagnostic Data    Results from last 7 days   Lab Units 25  0047 25  1805 25  1446   WBC 10*3/mm3 12.02*   < >  --    HEMOGLOBIN g/dL 12.4*   < >  --    HEMATOCRIT % 41.0   < >  --    PLATELETS 10*3/mm3 282   < >  --    GLUCOSE mg/dL 139*   < > 167*   CREATININE mg/dL 1.38*   < > 1.72*   BUN mg/dL 18   < > 33*   SODIUM mmol/L 138   < > 138   POTASSIUM mmol/L 3.8   < > 3.7   AST (SGOT) U/L  --   --  54*   ALT (SGPT) U/L  --   --  53*   ALK PHOS U/L  --   --  75   BILIRUBIN mg/dL  --   --  0.7   ANION GAP mmol/L 11.3   < > 14.9    < > = values in this interval not displayed.       CT Abdomen Pelvis Without Contrast  Result Date: 3/25/2025  Impression: 1.No acute intra-abdominal or pelvic abnormality. 2.Colonic diverticulosis without diverticulitis. 3.Postsurgical changes of prior prostatectomy. Electronically Signed: Anshul Coelho  3/25/2025 10:39 PM EDT  Workstation ID: [id number]    XR Chest 1  View  Result Date: 3/24/2025  Impression: No acute cardiopulmonary disease. Electronically Signed: Wiliam Jasso MD  3/24/2025 8:49 PM EDT  Workstation ID: [id number]    CT Head Without Contrast  Result Date: 3/24/2025  Impression: 1.No acute intracranial abnormality identified. 2.Chronic small vessel ischemia and chronic right basal ganglia lacunar infarct. Electronically Signed: Kingsley Lynch MD  3/24/2025 4:38 PM EDT  Workstation ID: [id number]        I reviewed the patient's new clinical results.    Assessment/Plan:     Active and Resolved Problems  Active Hospital Problems    Diagnosis  POA    **Weakness of both lower extremities [R29.898]  Yes    Bacteremia [R78.81]  Yes    Gait disturbance [R26.9]  Yes      Resolved Hospital Problems   No resolved problems to display.     E. Faecalis Bactermia  Generalized weakness of upper and lower extremities  -Symptoms now believed likely 2/2 E. Faecalis bactermia.  - Recent cardiac hardware, agree with CHRISTIANO.  Continue on vancomycin.  ID is following.  Awaiting TTE results from this morning     AUDREY -now is at baseline  -Creatinine 1.72 on presentation, not far off from his baseline renal function.  Best recent creatinine was 1.22 several weeks ago.  -Status post fluid resuscitation.  He is eating and drinking very well.     Type 2 diabetes  -Hold home diabetic regimen  -Low-dose insulin sliding scale     Complete heart block status post pacemaker February 24, 2024  Hypertension  Hyperlipidemia  Hypothyroidism  GERD  CAD  CVA x 2  Aortic stenosis status post TAVR        VTE Prophylaxis:  Pharmacologic VTE prophylaxis orders are present.      Disposition Planning:     Barriers to Discharge:Pending bactermia investigation  Anticipated Date of Discharge: 3/29/2025  Place of Discharge: unclear      Time: 35 minutes     Code Status and Medical Interventions: CPR (Attempt to Resuscitate); Full Support   Ordered at: 03/21/25 7095     Code Status (Patient has no pulse and is not  breathing):    CPR (Attempt to Resuscitate)     Medical Interventions (Patient has pulse or is breathing):    Full Support       Signature: Electronically signed by Tian Roca MD, 03/26/25, 12:24 EDT.  [facility]

## 2025-03-26 NOTE — PLAN OF CARE
Problem: Adult Inpatient Plan of Care  Goal: Plan of Care Review  Outcome: Not Progressing  Flowsheets (Taken 3/26/2025 0448)  Progress: no change  Plan of Care Reviewed With:   patient   spouse  Goal: Patient-Specific Goal (Individualized)  Outcome: Not Progressing  Goal: Absence of Hospital-Acquired Illness or Injury  Outcome: Not Progressing  Intervention: Identify and Manage Fall Risk  Recent Flowsheet Documentation  Taken 3/26/2025 0419 by Jenny Lopez, RN  Safety Promotion/Fall Prevention:   assistive device/personal items within reach   clutter free environment maintained   fall prevention program maintained   nonskid shoes/slippers when out of bed   room organization consistent   safety round/check completed  Taken 3/26/2025 0208 by Jenny Lopez, RN  Safety Promotion/Fall Prevention:   assistive device/personal items within reach   clutter free environment maintained   fall prevention program maintained   nonskid shoes/slippers when out of bed   room organization consistent   safety round/check completed  Taken 3/26/2025 0017 by Jenny Lopez, RN  Safety Promotion/Fall Prevention:   assistive device/personal items within reach   clutter free environment maintained   fall prevention program maintained   nonskid shoes/slippers when out of bed   room organization consistent   safety round/check completed  Taken 3/25/2025 2223 by Jenny Lopez, RN  Safety Promotion/Fall Prevention:   assistive device/personal items within reach   clutter free environment maintained   fall prevention program maintained   nonskid shoes/slippers when out of bed   room organization consistent   safety round/check completed  Taken 3/25/2025 2005 by Jenny Lopez, RN  Safety Promotion/Fall Prevention:   assistive device/personal items within reach   clutter free environment maintained   fall prevention program maintained   room organization consistent   nonskid shoes/slippers when out of bed   safety round/check  completed  Intervention: Prevent Skin Injury  Recent Flowsheet Documentation  Taken 3/25/2025 2005 by Jenny Lopez RN  Body Position:   head facing, right   side-lying   position changed independently  Intervention: Prevent and Manage VTE (Venous Thromboembolism) Risk  Recent Flowsheet Documentation  Taken 3/25/2025 2005 by Jenny Lopez RN  VTE Prevention/Management:   SCDs (sequential compression devices) off   patient refused intervention  Intervention: Prevent Infection  Recent Flowsheet Documentation  Taken 3/26/2025 0419 by Jenny Lopez RN  Infection Prevention:   environmental surveillance performed   hand hygiene promoted   rest/sleep promoted   single patient room provided   visitors restricted/screened  Taken 3/26/2025 0208 by Jenny Lopez RN  Infection Prevention:   environmental surveillance performed   hand hygiene promoted   rest/sleep promoted   single patient room provided   visitors restricted/screened  Taken 3/26/2025 0017 by Jenny Lopez RN  Infection Prevention:   environmental surveillance performed   hand hygiene promoted   single patient room provided   visitors restricted/screened  Taken 3/25/2025 2223 by Jenny Lopez RN  Infection Prevention:   environmental surveillance performed   hand hygiene promoted   rest/sleep promoted   single patient room provided   visitors restricted/screened  Taken 3/25/2025 2005 by Jenny Lopez RN  Infection Prevention:   environmental surveillance performed   hand hygiene promoted   single patient room provided   visitors restricted/screened   rest/sleep promoted  Goal: Optimal Comfort and Wellbeing  Outcome: Not Progressing  Intervention: Provide Person-Centered Care  Recent Flowsheet Documentation  Taken 3/25/2025 2005 by Jenny Lopez RN  Trust Relationship/Rapport:   care explained   choices provided  Goal: Readiness for Transition of Care  Outcome: Not Progressing   Goal Outcome Evaluation:  Plan of Care Reviewed With: patient,  spouse        Progress: no change     Alert and oriented x 4. Hard of hearing. Assist x 1 for transfers. Spouse at bedside. No c/o SOB/dizziness/pain/discomfort noted. Elevated temperature at beginning of shift noted. Does not require O2 therapy at this time. No s/s of hyper/hypoglycemia noted. Continent of bowel and bladder. Currently in bed, eyes closed. Rise and fall of chest observed. Triggered simple sepsis during shift d/t elevated WBC and increased respiration rate. MD made aware, no new orders received. Continues to receive IV Vancomycin for bacteremia, no s/s of adverse reaction noted. Call bell in reach. From home. Per case management, awaiting acceptance to [facility]. No PASSR required, Pre-cert pending.

## 2025-03-26 NOTE — PROGRESS NOTES
Infectious Diseases Progress Note      LOS: 1 day   Patient Care Team:  Serenity Wren MD as PCP - General (Family Medicine)  Steve Muhammad MD as Consulting Physician (Cardiology)  CAROLE Vasquez DPM as Consulting Physician (Podiatry)  David Urbina MD as Consulting Physician (Otolaryngology)  Gregory Valle MD as Surgeon (Vascular Surgery)  Gómez Arvizu MD as Consulting Physician (Ophthalmology)  Radha Danielle MD as Consulting Physician (Cardiology)  Modesto Mccall MD as Consulting Physician (Nephrology)    Chief Complaint: Fatigue, lower extremity weakness    Subjective       The patient had a high temperature in the last 24 hours.  The patient is on room air, hemodynamically stable, and is tolerating antimicrobial therapy.  Into the chair with no new complaints      Review of Systems:   Review of Systems   Constitutional:  Positive for fatigue.   HENT: Negative.     Eyes: Negative.    Respiratory: Negative.     Cardiovascular: Negative.    Gastrointestinal: Negative.    Endocrine: Negative.    Genitourinary: Negative.    Musculoskeletal: Negative.    Skin: Negative.    Neurological:  Positive for weakness.   Psychiatric/Behavioral: Negative.     All other systems reviewed and are negative.       Objective     Vital Signs  Temp:  [97.2 °F (36.2 °C)-101.7 °F (38.7 °C)] 98.3 °F (36.8 °C)  Heart Rate:  [60-89] 60  Resp:  [12-24] 16  BP: (116-200)/(52-88) 139/64    Physical Exam:  Physical Exam  Vitals and nursing note reviewed.   Constitutional:       General: He is not in acute distress.     Appearance: He is well-developed and normal weight. He is ill-appearing. He is not diaphoretic.   HENT:      Head: Normocephalic and atraumatic.   Eyes:      Conjunctiva/sclera: Conjunctivae normal.      Pupils: Pupils are equal, round, and reactive to light.   Cardiovascular:      Rate and Rhythm: Normal rate and regular rhythm.      Heart sounds: Normal heart sounds, S1 normal and S2 normal.   Pulmonary:       Effort: Pulmonary effort is normal. No respiratory distress.      Breath sounds: Normal breath sounds. No stridor. No wheezing or rales.   Abdominal:      General: Bowel sounds are normal. There is no distension.      Palpations: Abdomen is soft. There is no mass.      Tenderness: There is no abdominal tenderness. There is no guarding.   Musculoskeletal:         General: No deformity.      Cervical back: Neck supple.   Skin:     General: Skin is warm and dry.      Coloration: Skin is not pale.      Findings: No erythema or rash.   Neurological:      Mental Status: He is alert and oriented to person, place, and time.      Motor: Weakness present.          Results Review:    I have reviewed all clinical data, test, lab, and imaging results.     Radiology  Adult Transthoracic Echo Complete W/ Cont if Necessary Per Protocol  Result Date: 3/26/2025    Left ventricular systolic function is normal. Left ventricular ejection fraction appears to be 61 - 65%.   Left ventricular wall thickness is consistent with mild to moderate concentric hypertrophy.   Left ventricular diastolic function is consistent with (grade I) impaired relaxation.   There is a TAVR valve present.   Estimated right ventricular systolic pressure from tricuspid regurgitation is normal (<35 mmHg).   No gross vegetation noted.  Recommend CHRISTIANO if clinically indicated for bacterial endocarditis.  Recommend modified Duke criteria for clinical diagnosis of bacterial endocarditis     CT Abdomen Pelvis Without Contrast  Result Date: 3/25/2025  CT ABDOMEN PELVIS WO CONTRAST Date of Exam: 3/25/2025 5:52 PM EDT Indication: Patient is bacteremic, rule out source. Comparison: Renal ultrasound dated 2/25/2025 Technique: Axial CT images were obtained of the abdomen and pelvis without the administration of contrast. Sagittal and coronal reconstructions were performed.  Automated exposure control and iterative reconstruction methods were used. Findings: The heart size is  normal. There is no pericardial effusion. There is a prosthetic aortic valve. There is coronary artery atherosclerotic calcification. The liver is normal in size and contour. There is no focal hepatic lesion by noncontrast technique. The gallbladder is present without wall thickening. There is no intrahepatic or extrahepatic biliary ductal dilatation. The spleen is normal in size. The adrenal glands and pancreas appear within normal limits. The kidneys are symmetric in size. There is no hydronephrosis. The urinary bladder is fluid-filled without wall thickening. The prostate is surgically absent. The stomach and duodenum are normal in caliber and configuration. There are no abnormally dilated loops of small bowel to suggest small bowel obstruction or small bowel inflammation. The appendix is visualized and normal within the right lower quadrant. There is colonic diverticulosis. There is no diverticulitis or colitis. The aorta is normal in caliber without aneurysm formation. There is diffuse aortoiliac atherosclerotic calcification. There is no mesenteric, retroperitoneal, or pelvic lymphadenopathy. There are degenerative changes of the thoracolumbar spine.     Impression: 1.No acute intra-abdominal or pelvic abnormality. 2.Colonic diverticulosis without diverticulitis. 3.Postsurgical changes of prior prostatectomy. Electronically Signed: Anshul Coelho  3/25/2025 10:39 PM EDT  Workstation ID: [id number]      Cardiology    Laboratory    Results from last 7 days   Lab Units 03/26/25  0047 03/25/25  0519 03/24/25 2028 03/23/25  2354 03/23/25  1027 03/21/25  1805   WBC 10*3/mm3 12.02* 10.00 8.93 9.48 8.73 9.15   HEMOGLOBIN g/dL 12.4* 10.8* 11.5* 11.4* 10.6* 11.7*   HEMATOCRIT % 41.0 36.1* 37.7 36.6* 33.6* 37.6   PLATELETS 10*3/mm3 282 227 253 251 201 209     Results from last 7 days   Lab Units 03/26/25  0047 03/25/25  0005 03/23/25  2354 03/23/25  1027 03/21/25  1805 03/20/25  1446   SODIUM mmol/L 138 139 138 137 138  138   POTASSIUM mmol/L 3.8 3.9 3.9 4.0 4.2 3.7   CHLORIDE mmol/L 104 108* 106 108* 105 103   CO2 mmol/L 22.7 20.8* 19.0* 18.6* 20.3* 20.1*   BUN mg/dL 18 23 23 21 29* 33*   CREATININE mg/dL 1.38* 1.38* 1.43* 1.38* 1.59* 1.72*   GLUCOSE mg/dL 139* 154* 180* 203* 127* 167*   ALBUMIN g/dL  --   --   --   --   --  3.7   BILIRUBIN mg/dL  --   --   --   --   --  0.7   ALK PHOS U/L  --   --   --   --   --  75   AST (SGOT) U/L  --   --   --   --   --  54*   ALT (SGPT) U/L  --   --   --   --   --  53*   CALCIUM mg/dL 8.8 8.3* 8.3* 8.0* 8.4* 8.5*                 Microbiology   Microbiology Results (last 10 days)       Procedure Component Value - Date/Time    Blood Culture - Blood, Hand, Left [[id number]]  (Abnormal) Collected: 03/25/25 0005    Lab Status: Preliminary result Specimen: Blood from Hand, Left Updated: 03/26/25 0654     Blood Culture Gram Positive Cocci     Isolated from --     Gram Stain Anaerobic Bottle Gram positive cocci in chains      Aerobic Bottle Gram positive cocci in chains    Respiratory Panel PCR w/COVID-19(SARS-CoV-2) SUPA/GURU/LORI/PAD/COR/DEANGELO In-House, NP Swab in UTM/VTM, 2 HR TAT - Swab, Nasopharynx [[id number]]  (Normal) Collected: 03/24/25 2156    Lab Status: Final result Specimen: Swab from Nasopharynx Updated: 03/24/25 2310     ADENOVIRUS, PCR Not Detected     Coronavirus 229E Not Detected     Coronavirus HKU1 Not Detected     Coronavirus NL63 Not Detected     Coronavirus OC43 Not Detected     COVID19 Not Detected     Human Metapneumovirus Not Detected     Human Rhinovirus/Enterovirus Not Detected     Influenza A PCR Not Detected     Influenza B PCR Not Detected     Parainfluenza Virus 1 Not Detected     Parainfluenza Virus 2 Not Detected     Parainfluenza Virus 3 Not Detected     Parainfluenza Virus 4 Not Detected     RSV, PCR Not Detected     Bordetella pertussis pcr Not Detected     Bordetella parapertussis PCR Not Detected     Chlamydophila pneumoniae PCR Not Detected     Mycoplasma pneumo by  I have reviewed and confirmed nurses' notes... PCR Not Detected    Narrative:      In the setting of a positive respiratory panel with a viral infection PLUS a negative procalcitonin without other underlying concern for bacterial infection, consider observing off antibiotics or discontinuation of antibiotics and continue supportive care. If the respiratory panel is positive for atypical bacterial infection (Bordetella pertussis, Chlamydophila pneumoniae, or Mycoplasma pneumoniae), consider antibiotic de-escalation to target atypical bacterial infection.    Blood Culture - Blood, Hand, Right [[id number]]  (Abnormal) Collected: 03/24/25 2028    Lab Status: Preliminary result Specimen: Blood from Hand, Right Updated: 03/26/25 0654     Blood Culture Gram Positive Cocci     Isolated from Aerobic and Anaerobic Bottles     Gram Stain Anaerobic Bottle Gram positive cocci in chains      Aerobic Bottle Gram positive cocci in chains    Blood Culture ID, PCR - Blood, Hand, Right [[id number]]  (Abnormal) Collected: 03/24/25 2028    Lab Status: Final result Specimen: Blood from Hand, Right Updated: 03/25/25 1242     BCID, PCR Enterococcus faecium. Saúl/B (vancomycin resistance gene) not detected. Identification by BCID2 PCR.     BOTTLE TYPE Anaerobic Bottle    Narrative:      Infectious disease consultation is highly recommended to rule out distant foci of infection.            Medication Review:       Schedule Meds  aspirin, 81 mg, Oral, Daily  atorvastatin, 80 mg, Oral, Daily  insulin lispro, 2-7 Units, Subcutaneous, 4x Daily AC & at Bedtime  levothyroxine, 112 mcg, Oral, Daily  losartan, 50 mg, Oral, Daily  nebivolol, 2.5 mg, Oral, Q24H  NIFEdipine XL, 30 mg, Oral, Daily  pantoprazole, 40 mg, Oral, Daily  rivaroxaban, 15 mg, Oral, Daily With Dinner  sodium chloride, 10 mL, Intravenous, Q12H  vancomycin, 1,250 mg, Intravenous, Q24H        Infusion Meds  Pharmacy to dose vancomycin,         PRN Meds    acetaminophen    senna-docusate sodium **AND** polyethylene glycol **AND**  bisacodyl **AND** bisacodyl    Calcium Replacement - Follow Nurse / BPA Driven Protocol    dextrose    dextrose    glucagon (human recombinant)    guaifenesin    HYDROcodone-acetaminophen    Magnesium Standard Dose Replacement - Follow Nurse / BPA Driven Protocol    nitroglycerin    oxyCODONE    Pharmacy to dose vancomycin    Phosphorus Replacement - Follow Nurse / BPA Driven Protocol    Potassium Replacement - Follow Nurse / BPA Driven Protocol    sodium chloride    sodium chloride        Assessment & Plan       Antimicrobial Therapy   1.  Vancomycin        2.        3.        4.        5.          Assessment     Enterococcus faecium bacteremia with no obvious source.  We need to rule out endocarditis.  CT scan of abdomen and pelvis was negative as well as urinalysis.  2D echo did not show any obvious vegetation.  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  Currently pharmacy following and dosing  Waiting on blood cultures to finalize  Repeat blood culture-pending  Cardiology service is following the patient and planning for CHRISTIANO in a.m.  Continue supportive care  A.m. labs  Case discussed with patient and family members at bedside         LARRY Patel  03/26/25  17:26 EDT    Note is dictated utilizing voice recognition software/Dragon

## 2025-03-26 NOTE — CASE MANAGEMENT/SOCIAL WORK
Continued Stay Note  [facility]     Patient Name: Alen Ratliff  MRN: [id number]  Today's Date: 3/26/2025    Admit Date: 3/21/2025    Plan: [facility] (accepted). No PASRR required, precert approved 3/26-4/8.   Discharge Plan       Row Name 03/26/25 1554       Plan    Plan [facility] (accepted). No PASRR required, precert approved 3/26-4/8.    Plan Comments CM made aware precert has been approved after PA completed P2P and got initial denial overturned. CM updated patient and family at the bedside and went over IMM. Careteam updated.           Aron Guerrero RN     Cell number [phone]  Office number [phone]

## 2025-03-26 NOTE — SIGNIFICANT NOTE
Case Management/Social Work    Patient Name:  Alen Ratliff  YOB: 1940  MRN: [id number]  Admit Date:  3/21/2025           03/26/25 1523   Post Acute Pre-Cert Documentation   Date Post Acute Pre-Cert Completed 03/26/25   Response Received from Insurance? Approval   Action Taken by Requesting Facility: P2P Completed   Post Acute Pre-Cert Initiated Comment ELIZABETH verified IRF precert approval via [employer] portal. Auth ID [id number]. Valid 3/26-4/8. Approval letter indexed to media. CM made aware.           Electronically signed by:  Grayson Julien CMA  03/26/25 15:25 EDT    Grayson Julien  Case Management Associate  [address] [address]  P: [phone]  F: [phone]

## 2025-03-26 NOTE — PLAN OF CARE
Goal Outcome Evaluation:         CHRISTIANO scheduled for tomorrow 0800. Concern for vegetation on valves related to bacteremia. NPO at midnight. Receiving IV ABX as scheduled. Wife at bedside and involved in care. ECHO completed today.

## 2025-03-26 NOTE — CONSULTS
Cardiology [place]    Subjective:     Encounter Date:03/21/2025      Patient ID: Alen Ratliff is a 84 y.o. male     Referring Physician: Yudi    Chief Complaint: Lower extremity weakness     Reason for Consult: CHRISTIANO to rule out endocarditis    HPI:  Alen Ratliff is a 84 y.o. male patient of Dr. Muhammad with a history of essential hypertension, mixed hyperlipidemia, CAD status post PCI, paroxysmal A-fib, complete heart block status post pacemaker approximately 1 month ago, diastolic heart failure aortic stenosis status post TAVR, CVA, bilateral carotid artery stenosis status post endarterectomy, type 2 diabetes hypothyroidism, GERD, CKD stage III, prostate cancer who presents with lower extremity weakness x 5 days.     Per previous records:  In August 2020, patient was seen in my office for symptom of shortness of breath and relative bradycardia.  Patient also had symptom of occasional chest pain.  Patient underwent stress test which showed moderate inferior/septal fixed defect with small amount of joseph-infarct ischemia.  Echocardiogram showed normal left ventricular size and function and LVEF was 55 to 60%.  Mild LVH was noted.  Calcified aortic valve was noted with mild to moderate aortic valve stenosis with aortic valve area of 1.3 cm².  Holter monitor showed sinus bradycardia with episode of relative bradycardia.  No significant pause or heart rate less than 40 was noted.     In December 2020, patient was admitted at [facility] with slurring of speech and possible TIA.  Neurological work-up showed high-grade stenosis of right carotid artery.  Patient underwent endarterectomy.  Postoperatively patient had atrial fibrillation with controlled heart rate.  Patient spontaneously cardioverted.  Patient was started on aspirin and Plavix.  Patient was discharged on event monitor.  Event monitor showed predominantly sinus rhythm.  Patient had average heart rate of 60 bpm.  No significant atrial  fibrillation burden noted.     In July 2022, patient underwent echocardiogram which showed EF of 55 to 60%.  Patient was noted to have moderate to severe aortic regurgitation.  Peak aortic velocity was 3.8 m/s with peak gradient of 56 mmHg with mean gradient of 30 mmHg.     In August 2022, patient underwent CHRISTIANO and it showed EF of 60 to 65%.  Severe aortic stenosis with valve area of 0.59 cm was noted. Peak velocity was 4.3 m/s with peak gradient of 74 mmHg and mean gradient of 31 mmHg.     In September 2022, patient underwent cardiac catheterization which showed LAD was free of significant disease.  LCx has high-grade stenosis and RCA had 100% occlusion with collaterals.     In August 2022, I referred the patient to Dr. Villafana for possible TAVR/SAVR with possible CABG..  Initially he was thought to be a candidate for SAVR but subsequently Dr. Villafana recommended TAVR procedure.  In January 2023, patient underwent PCI and stenting of the LCx.  Patient was subsequently recommended to proceed with TAVR.  In February 2023, patient underwent TAVR valve replacement and postoperatively did well.     In March 2024, patient underwent MCOT and patient was noted to have frequent episode of relative bradycardia.  Average heart rate was 56 but no significant pause was noted.  No atrial fibrillation was present.    Patient had recent hospitalization in February and was noted to have third-degree heart block.  He was scheduled already for pacemaker due to sick sinus syndrome, pacemaker implantation was expedited.  He had PPM on February 24, 2025.  Patient presented to ED 3/21/2025 with complaints of lower extremity weakness x 5 days.  Patient was found to have blood cultures positive for Enterococcus faceium.  Cardiology has been consulted for CHRISTIANO to rule out endocarditis.  Patient seen today as he returns from 2D echo.  He complains of dyspnea on exertion, fever and chills, myalgias and fatigue.  No complaints of chest pain,  palpitations, dizziness or near syncope, orthopnea or PND.    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    Stroke        Past Surgical History:   Procedure Laterality Date    ANKLE OPEN REDUCTION INTERNAL FIXATION Left 12/16/2020    Procedure: ANKLE OPEN REDUCTION INTERNAL FIXATION;  Surgeon: CAROLE Vasquez DPM;  Location: [facility] MAIN OR;  Service: Podiatry;  Laterality: Left;    AORTIC VALVE REPAIR/REPLACEMENT N/A 02/20/2023    Procedure: Transfemoral Transcatheter Aortic Valve Replacement;  Surgeon: Naveed Leonardo MD;  Location: [facility] HYBRID OR;  Service: Cardiovascular;  Laterality: N/A;    AORTIC VALVE REPAIR/REPLACEMENT N/A 02/20/2023    Procedure: TRANSCATHETER AORTIC VALVE REPLACEMENT;  Surgeon: Fabio Villafana MD;  Location: [facility] HYBRID OR;  Service: Cardiothoracic;  Laterality: N/A;    BACK SURGERY  1994    CARDIAC CATHETERIZATION Right 09/27/2022    Procedure: Left Heart Cath;  Surgeon: Steve Muhammad MD;  Location: [facility] CATH INVASIVE LOCATION;  Service: Cardiovascular;  Laterality: Right;    CARDIAC CATHETERIZATION N/A 01/12/2023    Procedure: Stent JENN coronary;  Surgeon: Steve Muhammad MD;  Location: [facility] CATH INVASIVE LOCATION;  Service: Cardiovascular;  Laterality: N/A;    CARDIAC ELECTROPHYSIOLOGY PROCEDURE N/A 02/24/2025    Procedure: Pacemaker DC new, BiV PPM for complete heart block;  Surgeon: Radha Danielle MD;  Location: [facility] CATH INVASIVE LOCATION;  Service: Cardiovascular;  Laterality: N/A;    CAROTID ENDARTERECTOMY Right 12/14/2020    Procedure: CAROTID ENDARTERECTOMY;  Surgeon: Toby Fung MD;  Location: [facility] MAIN OR;  Service: Vascular;  Laterality: Right;     "COLONOSCOPY  08/25/2015    CORONARY STENT PLACEMENT      PACEMAKER IMPLANTATION      2/24/25    PROSTATECTOMY  2001    due to cancer       Family History   Problem Relation Age of Onset    Hypertension Other     Heart attack Mother     Heart disease Mother     Heart attack Father     Heart disease Father        Social History     Socioeconomic History    Marital status:    Tobacco Use    Smoking status: Never     Passive exposure: Never    Smokeless tobacco: Never   Vaping Use    Vaping status: Never Used   Substance and Sexual Activity    Alcohol use: Never    Drug use: Never    Sexual activity: Defer         Review of Systems   Constitutional: Positive for chills and fever.   HENT:  Negative for ear discharge and nosebleeds.    Eyes:  Negative for discharge and redness.   Cardiovascular:  Negative for chest pain, orthopnea, palpitations, paroxysmal nocturnal dyspnea and syncope.   Respiratory:  Positive for shortness of breath. Negative for cough and wheezing.    Endocrine: Negative for heat intolerance.   Skin:  Negative for rash.   Musculoskeletal:  Negative for arthritis and myalgias.   Gastrointestinal:  Negative for abdominal pain, melena, nausea and vomiting.   Genitourinary:  Negative for dysuria and hematuria.   Neurological:  Negative for dizziness, light-headedness, numbness and tremors.   Psychiatric/Behavioral:  Negative for depression. The patient is not nervous/anxious.             Objective:         /60 (BP Location: Left arm, Patient Position: Sitting)   Pulse 64   Temp 97.3 °F (36.3 °C) (Oral)   Resp 15   Ht 180.3 cm (71\")   Wt 89.5 kg (197 lb 5 oz)   SpO2 97%   BMI 27.52 kg/m²     Physical Exam:  Physical Exam    General Appearance:    fatigued, in no acute distress   Head:    Normocephalic, without obvious abnormality, atraumatic   Eyes:            PERRLA, EOM intact, conjunctivae and sclerae normal, no  icterus       Throat:   Oral mucosa moist, No oral lesions, no thrush " "  Neck:   No carotid bruit, no JVD, supple, trachea midline, no thyromegaly    Lungs:     Clear to auscultation,respirations regular, even and   unlabored, RA    Heart:    Regular rhythm and normal rate, normal S1 and S2, no gallop, no rub, no click   Chest Wall:    No abnormalities observed   Abdomen:     Soft, non-tender, non-distended, no guarding, no rebound  tenderness   Extremities:   No edema, no cyanosis or redness   Pulses:   Pulses palpable and equal bilaterally in all extremities   Skin: Pacemaker site on the left chest healing and well-approximated, no edema no erythema no drainage or bleeding             ASCVD Risk Score::  The ASCVD Risk score (Blake DK, et al., 2019) failed to calculate for the following reasons:    The 2019 ASCVD risk score is only valid for ages 40 to 79    Risk score cannot be calculated because patient has a medical history suggesting prior/existing ASCVD      Lab Review:     Results from last 7 days   Lab Units 03/26/25  0047 03/25/25  0005 03/21/25  1805 03/20/25  1446   SODIUM mmol/L 138 139   < > 138   POTASSIUM mmol/L 3.8 3.9   < > 3.7   CHLORIDE mmol/L 104 108*   < > 103   CO2 mmol/L 22.7 20.8*   < > 20.1*   BUN mg/dL 18 23   < > 33*   CREATININE mg/dL 1.38* 1.38*   < > 1.72*   GLUCOSE mg/dL 139* 154*   < > 167*   CALCIUM mg/dL 8.8 8.3*   < > 8.5*   AST (SGOT) U/L  --   --   --  54*   ALT (SGPT) U/L  --   --   --  53*    < > = values in this interval not displayed.         Results from last 7 days   Lab Units 03/26/25  0047 03/25/25  0519   WBC 10*3/mm3 12.02* 10.00   HEMOGLOBIN g/dL 12.4* 10.8*   HEMATOCRIT % 41.0 36.1*   PLATELETS 10*3/mm3 282 227     Results from last 7 days   Lab Units 03/21/25  1805   INR  1.31*   APTT seconds 32.5     Results from last 7 days   Lab Units 03/23/25  0127 03/21/25  1805   MAGNESIUM mg/dL 2.2 1.2*           Invalid input(s): \"LDLCALC\"  Results from last 7 days   Lab Units 03/21/25  1805   PROBNP pg/mL 772.0     Results from last 7 days "   Lab Units 03/21/25  1805   TSH uIU/mL 1.680       Recent Radiology:  Imaging Results (Most Recent)       Procedure Component Value Units Date/Time    CT Abdomen Pelvis Without Contrast [[id number]] Collected: 03/25/25 2222     Updated: 03/25/25 2241    Narrative:      CT ABDOMEN PELVIS WO CONTRAST    Date of Exam: 3/25/2025 5:52 PM EDT    Indication: Patient is bacteremic, rule out source.    Comparison: Renal ultrasound dated 2/25/2025    Technique: Axial CT images were obtained of the abdomen and pelvis without the administration of contrast. Sagittal and coronal reconstructions were performed.  Automated exposure control and iterative reconstruction methods were used.      Findings:  The heart size is normal. There is no pericardial effusion. There is a prosthetic aortic valve. There is coronary artery atherosclerotic calcification.    The liver is normal in size and contour. There is no focal hepatic lesion by noncontrast technique. The gallbladder is present without wall thickening. There is no intrahepatic or extrahepatic biliary ductal dilatation. The spleen is normal in size. The   adrenal glands and pancreas appear within normal limits.    The kidneys are symmetric in size. There is no hydronephrosis. The urinary bladder is fluid-filled without wall thickening. The prostate is surgically absent.    The stomach and duodenum are normal in caliber and configuration. There are no abnormally dilated loops of small bowel to suggest small bowel obstruction or small bowel inflammation. The appendix is visualized and normal within the right lower quadrant.   There is colonic diverticulosis. There is no diverticulitis or colitis.    The aorta is normal in caliber without aneurysm formation. There is diffuse aortoiliac atherosclerotic calcification. There is no mesenteric, retroperitoneal, or pelvic lymphadenopathy. There are degenerative changes of the thoracolumbar spine.      Impression:      Impression:  1.No  acute intra-abdominal or pelvic abnormality.  2.Colonic diverticulosis without diverticulitis.  3.Postsurgical changes of prior prostatectomy.        Electronically Signed: Anshul Coelho    3/25/2025 10:39 PM EDT    Workstation ID: [id number]    XR Chest 1 View [[id number]] Collected: 03/24/25 2048     Updated: 03/24/25 2051    Narrative:      XR CHEST 1 VW    Date of Exam: 3/24/2025 8:00 PM EDT    Indication: elevated temperature    Comparison: Chest AP dated 2/24/2025    Findings:  The lungs are clear bilaterally. The cardiac and mediastinal silhouettes appear normal. No effusion is seen. A TAVR has been performed. A dual-lead transvenous pacemaker has been placed.      Impression:      Impression:  No acute cardiopulmonary disease.      Electronically Signed: Wiliam Jasso MD    3/24/2025 8:49 PM EDT    Workstation ID: [id number]    CT Head Without Contrast [[id number]] Collected: 03/24/25 1635     Updated: 03/24/25 1640    Narrative:      CT HEAD WO CONTRAST    Date of Exam: 3/24/2025 4:23 PM EDT    Indication: weakness, ambulation difficulties.    Comparison: None available.    Technique: Axial CT images were obtained of the head without contrast administration.  Coronal reconstructions were performed.  Automated exposure control and iterative reconstruction methods were used.      Findings:  There is no evidence of hemorrhage. There is no mass effect or midline shift. There is diffuse brain atrophy. Periventricular hypodensities compatible with chronic small vessel ischemia. Chronic right basal ganglia lacunar infarct    There is no extracerebral collection.    Ventricles are normal in size and configuration for patient's stated age.     Posterior fossa is within normal limits.    Calvarium and skull base appear intact.  Visualized sinuses show no air fluid levels. Visualized orbits are unremarkable.        Impression:      Impression:  1.No acute intracranial abnormality identified.  2.Chronic small vessel  ischemia and chronic right basal ganglia lacunar infarct.        Electronically Signed: Kingsley Lynch MD    3/24/2025 4:38 PM EDT    Workstation ID: [id number]              ECHOCARDIOGRAM:  Results for orders placed during the hospital encounter of 02/14/24    Adult Transthoracic Echo Complete W/ Color, Spectral and Contrast if Necessary Per Protocol    Interpretation Summary    Left ventricular systolic function is normal. Calculated left ventricular EF = 64% Left ventricular ejection fraction appears to be 61 - 65%.    Left ventricular diastolic function is consistent with (grade I) impaired relaxation.  GLS -17%.    Left atrial volume is mildly increased.    29 mm Medtronic TAVR valve is well-seated.  There is no aortic valve insufficiency.  Mean gradient is 7-10 mmHg.    Estimated right ventricular systolic pressure from tricuspid regurgitation is normal (<35 mmHg).      Stress Test:  Results for orders placed during the hospital encounter of 09/24/20    Stress Test With Myocardial Perfusion One Day    Interpretation Summary  · Findings consistent with a normal ECG stress test.  · Left ventricular ejection fraction is normal. (Calculated EF = 64%).  · Myocardial perfusion imaging indicates a medium-sized infarct located in the inferior wall and septal wall with mild joseph-infarct ischemia.  · Impressions are consistent with a high risk study.  · This is abnormal Cardiolite imaging stress test with moderate sized inferior/septal myocardial infarction with small amount of joseph-infarct ischemia. Left ventricular size and function was normal. Clinical correlation recommended.        Cardiac Catheterization:  Results for orders placed during the hospital encounter of 02/20/23    Cardiac Catheterization/Vascular Study    Conclusion  Indication:  Severe symptomatic nonrheumatic aortic stenosis  Congestive heart failure, NYHA class III    Procedures performed  Ultrasound-guided vascular access  Common femoral  angiography  Balloon aortic valvuloplasty with 20 mm true balloon  Supravalvular aortogram  Temporary pacemaker placement  Transcatheter aortic valve replacement with 29 mm Evolut pro plus FX via right femoral arterial access  Aorto abdominal aortogram with bilateral iliofemoral runoff  Perclose and angioseal deployment    Procedural Details  The procedure was performed under conscious sedation in the hybrid OR.    Under ultrasound guidance, a 6 F introducer was placed in the left femoral vein usinh modified Seldinger technique. Under ultrasound guidance, a 7 F introducer was placed in the left femoral artery using modified Seldinger technique. Under ultrasound guidance, a 7 F introducer was placed in the right femoral artery using modified Seldinger technique.    Two Perclose ProGlide devices were used to 'pre-close' the RFA access site. A 5F bipolar electrode temporary pacemaking wire was inserted via the left femoral venous sheath and positioned using fluoroscopy. Pacing threshold was tested. The temporary pacemaker wire was secured in place.    A 6F pigtail catheter was placed in the aortic root via the LFA sheath. The aortic valve was crossed retrograde with a 0.035 inch straight wire through a 6F AL1 catheter via the RFA 14Fr sheath. The AL1 catheter was advanced into the LV and a exchange length 0.035 inch lunderquist wire in the LV.  Under rapid pacing of 180 bpm we then performed a balloon aortic valvuloplasty with 20 x 45 true balloon.    The 14F sheath was removed and the Petflow delivery sheath was inserted over the lunderquist wire. The TAVR procedure was then performed using a 29mm Evolut pro + FX device. It was advanced over the lunderquist wire to the aortic valve position. After confirming correct position of the valve by fluoroscopy, the valve was implanted with ventricular pacing and dynamic aortography. After valve deployment the valve was well-seated and there was no significant paravalvular  leak noted.    At the end of the procedure, the Medtronic delivery system was removed over a guidewire. The Perclose devices were deployed to secure the arterial access on the TAVR side.    Distal aortic, iliac and femoral angiography was performed with no evidence of dissection, aneurysm or vascular injury.    The 7F LFA sheath was removed and the arteriotomy was angiosealed. The 6F LFV sheath along with the temporary pacemaker wire were removed and manual pressure applied.  The patient was transferred to [facility]  in stable condition.    Rebeca Leonardo and Ledy performed the TAVR procedure. Dr. Dykes assisted.    Estimated blood loss  20ml    Complications  None    Recommendations  Dual antiplatelet therapy  Cardiac rehab  Repeat echocardiogram in morning      Results Review:  I have personally reviewed the results from the time of this admission to 3/26/2025 12:58 EDT and agree with these findings:  []  Laboratory  []  Microbiology  []  Radiology  []  EKG/Telemetry   []  Cardiology/Vascular   []  Pathology  []  Old records  []  Other:    Most notable findings include:     Allergies   Allergen Reactions    Azithromycin Unknown - High Severity    Tramadol Unknown - High Severity       Current Medications:   Scheduled Meds:aspirin, 81 mg, Oral, Daily  atorvastatin, 80 mg, Oral, Daily  insulin lispro, 2-7 Units, Subcutaneous, 4x Daily AC & at Bedtime  levothyroxine, 112 mcg, Oral, Daily  losartan, 50 mg, Oral, Daily  nebivolol, 2.5 mg, Oral, Q24H  NIFEdipine XL, 30 mg, Oral, Daily  pantoprazole, 40 mg, Oral, Daily  rivaroxaban, 15 mg, Oral, Daily With Dinner  sodium chloride, 10 mL, Intravenous, Q12H  vancomycin, 1,250 mg, Intravenous, Q24H      Continuous Infusions:Pharmacy to dose vancomycin,           Assessment:         Active Hospital Problems    Diagnosis  POA    **Weakness of both lower extremities [R29.898]  Yes    Bacteremia [R78.81]  Yes    Gait disturbance [R26.9]  Yes   Hypertension  VHD with aortic stenosis  s/p TAVR  Paroxysmal atrial fibrillation on Xarelto  SSS/third-degree AVB status post PPM  H/o carotid artery disease s/p carotid endarterectomy  H/o TIA /CVA  CAD/HLD  Diabetes mellitus     Plan:   Weakness/gait disturbance   Presenting complaint lower extremity weakness x 5 days    Bacteremia  Blood cultures positive for Enterococcus faecium   Vancomycin  2D echo today  Cardiology consulted for CHRISTIANO to rule out endocarditis-planned for 8:00 in a.m.    SSS/third-degree heart block/paroxysmal A-fib  Status post pacemaker secondary to symptomatic bradycardia and AV dmitry disease, third-degree AVB February 24, 2025  Paced on monitor  On Xarelto    CAD/HLD  s/p PCI left circumflex January 2023  Aspirin and Lipitor    VHD/aortic stenosis  Status post TAVR February 2023    History TIA/CVA  Aspirin and Lipitor    Hypertension  Patient is on Procardia XL 30 mg daily, losartan 50 mg daily, Bystolic 2.5 mg daily  Patient was hypertensive yesterday, improved today, last blood pressure 136/60      Plan  2D echo was performed today-result pending   CHRISTIANO 8 AM tomorrow  BMP today unremarkable, creatinine 1.38-baseline  White count 12  Patient was hypertensive yesterday, improved today, monitor closely  Further orders and recommendations to follow clinical course      Patient is seen and examined and findings are verified.  All data is reviewed by me personally.  Assessment and plan formulated by APC was done after discussion with attending.  I spent more than 50% of time in taking care of the patient.    Patient is admitted with generalized weakness and not doing well.  Patient had fever and chills.    Hemodynamics are stable    Normal S1 and S2.  Ejection systolic murmur noted.  Chest is clear.  Leg edema absent    Patient is being paced by pacemaker on telemetry.  Patient is admitted with fever and chills and generalized weakness.  Patient is noted to have enterococcal bacteremia ID recommended CHRISTIANO.  I would proceed with CHRISTIANO  tomorrow.  Pacemaker site is healing well.  There is no redness or sign of infection.    Electronically signed by Steve Muhammad MD, 03/27/25, 8:29 AM EDT.         LARRY Klein  03/26/25  12:58 EDT

## 2025-03-27 ENCOUNTER — ANESTHESIA EVENT (OUTPATIENT)
Dept: CARDIOLOGY | Facility: HOSPITAL | Age: 85
End: 2025-03-27
Payer: MEDICARE

## 2025-03-27 ENCOUNTER — APPOINTMENT (OUTPATIENT)
Dept: CARDIOLOGY | Facility: HOSPITAL | Age: 85
DRG: 372 | End: 2025-03-27
Payer: MEDICARE

## 2025-03-27 ENCOUNTER — ANESTHESIA (OUTPATIENT)
Dept: CARDIOLOGY | Facility: HOSPITAL | Age: 85
End: 2025-03-27
Payer: MEDICARE

## 2025-03-27 VITALS — SYSTOLIC BLOOD PRESSURE: 103 MMHG | HEART RATE: 60 BPM | OXYGEN SATURATION: 98 % | DIASTOLIC BLOOD PRESSURE: 61 MMHG

## 2025-03-27 LAB
ANION GAP SERPL CALCULATED.3IONS-SCNC: 13.1 MMOL/L (ref 5–15)
BACTERIA SPEC AEROBE CULT: ABNORMAL
BACTERIA SPEC AEROBE CULT: ABNORMAL
BASOPHILS # BLD AUTO: 0.05 10*3/MM3 (ref 0–0.2)
BASOPHILS NFR BLD AUTO: 0.5 % (ref 0–1.5)
BH CV ECHO SHUNT ASSESSMENT PERFORMED (HIDDEN SCRIPTING): 1
BUN SERPL-MCNC: 19 MG/DL (ref 8–23)
BUN/CREAT SERPL: 14.8 (ref 7–25)
CALCIUM SPEC-SCNC: 8.8 MG/DL (ref 8.6–10.5)
CHLORIDE SERPL-SCNC: 102 MMOL/L (ref 98–107)
CO2 SERPL-SCNC: 21.9 MMOL/L (ref 22–29)
CREAT SERPL-MCNC: 1.28 MG/DL (ref 0.76–1.27)
DEPRECATED RDW RBC AUTO: 46.3 FL (ref 37–54)
EGFRCR SERPLBLD CKD-EPI 2021: 54.8 ML/MIN/1.73
EOSINOPHIL # BLD AUTO: 0.11 10*3/MM3 (ref 0–0.4)
EOSINOPHIL NFR BLD AUTO: 1 % (ref 0.3–6.2)
ERYTHROCYTE [DISTWIDTH] IN BLOOD BY AUTOMATED COUNT: 13.7 % (ref 12.3–15.4)
GLUCOSE BLDC GLUCOMTR-MCNC: 148 MG/DL (ref 70–105)
GLUCOSE BLDC GLUCOMTR-MCNC: 214 MG/DL (ref 70–105)
GLUCOSE BLDC GLUCOMTR-MCNC: 268 MG/DL (ref 70–105)
GLUCOSE SERPL-MCNC: 217 MG/DL (ref 65–99)
GRAM STN SPEC: ABNORMAL
HCT VFR BLD AUTO: 32.3 % (ref 37.5–51)
HGB BLD-MCNC: 10.1 G/DL (ref 13–17.7)
IMM GRANULOCYTES # BLD AUTO: 0.06 10*3/MM3 (ref 0–0.05)
IMM GRANULOCYTES NFR BLD AUTO: 0.5 % (ref 0–0.5)
ISOLATED FROM: ABNORMAL
ISOLATED FROM: ABNORMAL
LYMPHOCYTES # BLD AUTO: 1.3 10*3/MM3 (ref 0.7–3.1)
LYMPHOCYTES NFR BLD AUTO: 11.7 % (ref 19.6–45.3)
MCH RBC QN AUTO: 28.8 PG (ref 26.6–33)
MCHC RBC AUTO-ENTMCNC: 31.3 G/DL (ref 31.5–35.7)
MCV RBC AUTO: 92 FL (ref 79–97)
MONOCYTES # BLD AUTO: 1.22 10*3/MM3 (ref 0.1–0.9)
MONOCYTES NFR BLD AUTO: 11 % (ref 5–12)
NEUTROPHILS NFR BLD AUTO: 75.3 % (ref 42.7–76)
NEUTROPHILS NFR BLD AUTO: 8.33 10*3/MM3 (ref 1.7–7)
NRBC BLD AUTO-RTO: 0 /100 WBC (ref 0–0.2)
PLATELET # BLD AUTO: 297 10*3/MM3 (ref 140–450)
PMV BLD AUTO: 9.9 FL (ref 6–12)
POTASSIUM SERPL-SCNC: 3.9 MMOL/L (ref 3.5–5.2)
RBC # BLD AUTO: 3.51 10*6/MM3 (ref 4.14–5.8)
SODIUM SERPL-SCNC: 137 MMOL/L (ref 136–145)
VANCOMYCIN SERPL-MCNC: 14.5 MCG/ML (ref 5–40)
WBC NRBC COR # BLD AUTO: 11.07 10*3/MM3 (ref 3.4–10.8)

## 2025-03-27 PROCEDURE — 82948 REAGENT STRIP/BLOOD GLUCOSE: CPT

## 2025-03-27 PROCEDURE — 93320 DOPPLER ECHO COMPLETE: CPT

## 2025-03-27 PROCEDURE — 93325 DOPPLER ECHO COLOR FLOW MAPG: CPT

## 2025-03-27 PROCEDURE — 25010000002 VANCOMYCIN HCL 1.25 G RECONSTITUTED SOLUTION 1 EACH VIAL: Performed by: STUDENT IN AN ORGANIZED HEALTH CARE EDUCATION/TRAINING PROGRAM

## 2025-03-27 PROCEDURE — 80053 COMPREHEN METABOLIC PANEL: CPT | Performed by: INTERNAL MEDICINE

## 2025-03-27 PROCEDURE — 93321 DOPPLER ECHO F-UP/LMTD STD: CPT

## 2025-03-27 PROCEDURE — 99232 SBSQ HOSP IP/OBS MODERATE 35: CPT | Performed by: INTERNAL MEDICINE

## 2025-03-27 PROCEDURE — 87040 BLOOD CULTURE FOR BACTERIA: CPT | Performed by: INTERNAL MEDICINE

## 2025-03-27 PROCEDURE — 63710000001 INSULIN LISPRO (HUMAN) PER 5 UNITS

## 2025-03-27 PROCEDURE — 25010000002 PROPOFOL 10 MG/ML EMULSION

## 2025-03-27 PROCEDURE — 25010000002 LIDOCAINE PF 1% 1 % SOLUTION

## 2025-03-27 PROCEDURE — 85025 COMPLETE CBC W/AUTO DIFF WBC: CPT | Performed by: INTERNAL MEDICINE

## 2025-03-27 PROCEDURE — 25810000003 SODIUM CHLORIDE 0.9 % SOLUTION 250 ML FLEX CONT: Performed by: STUDENT IN AN ORGANIZED HEALTH CARE EDUCATION/TRAINING PROGRAM

## 2025-03-27 RX ORDER — OXYCODONE HYDROCHLORIDE 5 MG/1
10 TABLET ORAL EVERY 4 HOURS PRN
Refills: 0 | Status: DISPENSED | OUTPATIENT
Start: 2025-03-27 | End: 2025-03-28

## 2025-03-27 RX ORDER — NEBIVOLOL 10 MG/1
5 TABLET ORAL
Status: DISCONTINUED | OUTPATIENT
Start: 2025-03-28 | End: 2025-03-29 | Stop reason: HOSPADM

## 2025-03-27 RX ORDER — ONDANSETRON 2 MG/ML
4 INJECTION INTRAMUSCULAR; INTRAVENOUS ONCE AS NEEDED
Status: DISCONTINUED | OUTPATIENT
Start: 2025-03-27 | End: 2025-03-29 | Stop reason: HOSPADM

## 2025-03-27 RX ORDER — IPRATROPIUM BROMIDE AND ALBUTEROL SULFATE 2.5; .5 MG/3ML; MG/3ML
3 SOLUTION RESPIRATORY (INHALATION) ONCE AS NEEDED
Status: DISCONTINUED | OUTPATIENT
Start: 2025-03-27 | End: 2025-03-29 | Stop reason: HOSPADM

## 2025-03-27 RX ORDER — LABETALOL HYDROCHLORIDE 5 MG/ML
5 INJECTION, SOLUTION INTRAVENOUS
Status: DISCONTINUED | OUTPATIENT
Start: 2025-03-27 | End: 2025-03-29 | Stop reason: HOSPADM

## 2025-03-27 RX ORDER — DIPHENHYDRAMINE HYDROCHLORIDE 50 MG/ML
12.5 INJECTION, SOLUTION INTRAMUSCULAR; INTRAVENOUS
Status: DISCONTINUED | OUTPATIENT
Start: 2025-03-27 | End: 2025-03-29 | Stop reason: HOSPADM

## 2025-03-27 RX ORDER — PROPOFOL 10 MG/ML
VIAL (ML) INTRAVENOUS AS NEEDED
Status: DISCONTINUED | OUTPATIENT
Start: 2025-03-27 | End: 2025-03-27 | Stop reason: SURG

## 2025-03-27 RX ORDER — EPHEDRINE SULFATE 5 MG/ML
5 INJECTION INTRAVENOUS ONCE AS NEEDED
Status: DISCONTINUED | OUTPATIENT
Start: 2025-03-27 | End: 2025-03-29 | Stop reason: HOSPADM

## 2025-03-27 RX ORDER — SODIUM CHLORIDE 9 MG/ML
INJECTION, SOLUTION INTRAVENOUS CONTINUOUS PRN
Status: DISCONTINUED | OUTPATIENT
Start: 2025-03-27 | End: 2025-03-27 | Stop reason: SURG

## 2025-03-27 RX ORDER — LIDOCAINE HYDROCHLORIDE 10 MG/ML
INJECTION, SOLUTION EPIDURAL; INFILTRATION; INTRACAUDAL; PERINEURAL AS NEEDED
Status: DISCONTINUED | OUTPATIENT
Start: 2025-03-27 | End: 2025-03-27 | Stop reason: SURG

## 2025-03-27 RX ORDER — HYDRALAZINE HYDROCHLORIDE 20 MG/ML
5 INJECTION INTRAMUSCULAR; INTRAVENOUS
Status: DISCONTINUED | OUTPATIENT
Start: 2025-03-27 | End: 2025-03-29 | Stop reason: HOSPADM

## 2025-03-27 RX ADMIN — Medication 10 ML: at 20:07

## 2025-03-27 RX ADMIN — INSULIN LISPRO 3 UNITS: 100 INJECTION, SOLUTION INTRAVENOUS; SUBCUTANEOUS at 21:45

## 2025-03-27 RX ADMIN — ASPIRIN 81 MG CHEWABLE TABLET 81 MG: 81 TABLET CHEWABLE at 12:03

## 2025-03-27 RX ADMIN — ATORVASTATIN CALCIUM 80 MG: 40 TABLET, FILM COATED ORAL at 17:20

## 2025-03-27 RX ADMIN — PANTOPRAZOLE SODIUM 40 MG: 40 TABLET, DELAYED RELEASE ORAL at 12:03

## 2025-03-27 RX ADMIN — PROPOFOL 30 MG: 10 INJECTION, EMULSION INTRAVENOUS at 08:54

## 2025-03-27 RX ADMIN — INSULIN LISPRO 4 UNITS: 100 INJECTION, SOLUTION INTRAVENOUS; SUBCUTANEOUS at 17:20

## 2025-03-27 RX ADMIN — VANCOMYCIN HYDROCHLORIDE 1250 MG: 1.25 INJECTION, POWDER, LYOPHILIZED, FOR SOLUTION INTRAVENOUS at 12:03

## 2025-03-27 RX ADMIN — LEVOTHYROXINE SODIUM 112 MCG: 112 TABLET ORAL at 12:03

## 2025-03-27 RX ADMIN — PROPOFOL 120 MCG/KG/MIN: 10 INJECTION, EMULSION INTRAVENOUS at 08:45

## 2025-03-27 RX ADMIN — NIFEDIPINE 30 MG: 30 TABLET, EXTENDED RELEASE ORAL at 12:03

## 2025-03-27 RX ADMIN — LIDOCAINE HYDROCHLORIDE 100 MG: 10 INJECTION, SOLUTION EPIDURAL; INFILTRATION; INTRACAUDAL; PERINEURAL at 08:45

## 2025-03-27 RX ADMIN — SODIUM CHLORIDE: 9 INJECTION, SOLUTION INTRAVENOUS at 08:40

## 2025-03-27 RX ADMIN — Medication 10 ML: at 12:04

## 2025-03-27 RX ADMIN — OXYCODONE HYDROCHLORIDE 10 MG: 5 TABLET ORAL at 20:06

## 2025-03-27 RX ADMIN — NEBIVOLOL 2.5 MG: 5 TABLET ORAL at 12:03

## 2025-03-27 RX ADMIN — LOSARTAN POTASSIUM 50 MG: 50 TABLET, FILM COATED ORAL at 12:03

## 2025-03-27 RX ADMIN — PROPOFOL 50 MG: 10 INJECTION, EMULSION INTRAVENOUS at 08:45

## 2025-03-27 RX ADMIN — RIVAROXABAN 15 MG: 15 TABLET, FILM COATED ORAL at 17:20

## 2025-03-27 NOTE — CASE MANAGEMENT/SOCIAL WORK
Continued Stay Note  THAD Hernandez     Patient Name: Alen Ratliff  MRN: [id number]  Today's Date: 3/27/2025    Admit Date: 3/21/2025    Plan: [facility] (accepted). No PASRR required, precert approved 3/26-4/8.   Discharge Plan       Row Name 03/27/25 0932       Plan    Plan Comments DC barriers: Cardiology and ID following, continue IV vanc for now-awaiting repeat blood cultures to result, CHRISTIANO and echo pending.           Aron Guerrero RN      Cell number [phone]  Office number [phone]

## 2025-03-27 NOTE — PROGRESS NOTES
Infectious Diseases Progress Note      LOS: 2 days   Patient Care Team:  Serenity Wren MD as PCP - General (Family Medicine)  Steve Muhammad MD as Consulting Physician (Cardiology)  CAROLE Vasquez DPM as Consulting Physician (Podiatry)  David Urbina MD as Consulting Physician (Otolaryngology)  Gregory Valle MD as Surgeon (Vascular Surgery)  Gómez Arvizu MD as Consulting Physician (Ophthalmology)  Radha Danielle MD as Consulting Physician (Cardiology)  Modesto Mccall MD as Consulting Physician (Nephrology)    Chief Complaint: Fatigue, lower extremity weakness    Subjective       The patient had a high temperature in the last 24 hours.  The patient is on room air, hemodynamically stable, and is tolerating antimicrobial therapy.       Review of Systems:   Review of Systems   Constitutional:  Positive for fatigue.   HENT: Negative.     Eyes: Negative.    Respiratory: Negative.     Cardiovascular: Negative.    Gastrointestinal: Negative.    Endocrine: Negative.    Genitourinary: Negative.    Musculoskeletal: Negative.    Skin: Negative.    Neurological:  Positive for weakness.   Psychiatric/Behavioral: Negative.     All other systems reviewed and are negative.       Objective     Vital Signs  Temp:  [97.6 °F (36.4 °C)-99.1 °F (37.3 °C)] 97.6 °F (36.4 °C)  Heart Rate:  [60-70] 64  Resp:  [14-21] 18  BP: (100-178)/(58-73) 178/73    Physical Exam:  Physical Exam  Vitals and nursing note reviewed.   Constitutional:       General: He is not in acute distress.     Appearance: He is well-developed and normal weight. He is ill-appearing. He is not diaphoretic.   HENT:      Head: Normocephalic and atraumatic.   Eyes:      Conjunctiva/sclera: Conjunctivae normal.      Pupils: Pupils are equal, round, and reactive to light.   Cardiovascular:      Rate and Rhythm: Normal rate and regular rhythm.      Heart sounds: Normal heart sounds, S1 normal and S2 normal.   Pulmonary:      Effort: Pulmonary effort is normal.  No respiratory distress.      Breath sounds: Normal breath sounds. No stridor. No wheezing or rales.   Abdominal:      General: Bowel sounds are normal. There is no distension.      Palpations: Abdomen is soft. There is no mass.      Tenderness: There is no abdominal tenderness. There is no guarding.   Musculoskeletal:         General: No deformity.      Cervical back: Neck supple.   Skin:     General: Skin is warm and dry.      Coloration: Skin is not pale.      Findings: No erythema or rash.   Neurological:      Mental Status: He is alert and oriented to person, place, and time.      Motor: Weakness present.          Results Review:    I have reviewed all clinical data, test, lab, and imaging results.     Radiology  No Radiology Exams Resulted Within Past 24 Hours      Cardiology    Laboratory    Results from last 7 days   Lab Units 03/26/25 2327 03/26/25  0047 03/25/25  0519 03/24/25 2028 03/23/25  2354 03/23/25  1027 03/21/25  1805   WBC 10*3/mm3 11.35* 12.02* 10.00 8.93 9.48 8.73 9.15   HEMOGLOBIN g/dL 11.9* 12.4* 10.8* 11.5* 11.4* 10.6* 11.7*   HEMATOCRIT % 38.5 41.0 36.1* 37.7 36.6* 33.6* 37.6   PLATELETS 10*3/mm3 293 282 227 253 251 201 209     Results from last 7 days   Lab Units 03/26/25 2327 03/26/25  0047 03/25/25  0005 03/23/25  2354 03/23/25  1027 03/21/25  1805 03/20/25  1446   SODIUM mmol/L 137 138 139 138 137 138 138   POTASSIUM mmol/L 3.9 3.8 3.9 3.9 4.0 4.2 3.7   CHLORIDE mmol/L 102 104 108* 106 108* 105 103   CO2 mmol/L 21.9* 22.7 20.8* 19.0* 18.6* 20.3* 20.1*   BUN mg/dL 19 18 23 23 21 29* 33*   CREATININE mg/dL 1.28* 1.38* 1.38* 1.43* 1.38* 1.59* 1.72*   GLUCOSE mg/dL 217* 139* 154* 180* 203* 127* 167*   ALBUMIN g/dL  --   --   --   --   --   --  3.7   BILIRUBIN mg/dL  --   --   --   --   --   --  0.7   ALK PHOS U/L  --   --   --   --   --   --  75   AST (SGOT) U/L  --   --   --   --   --   --  54*   ALT (SGPT) U/L  --   --   --   --   --   --  53*   CALCIUM mg/dL 8.8 8.8 8.3* 8.3* 8.0* 8.4*  8.5*                 Microbiology   Microbiology Results (last 10 days)       Procedure Component Value - Date/Time    Blood Culture - Blood, Hand, Right [[id number]]  (Normal) Collected: 03/25/25 1724    Lab Status: Preliminary result Specimen: Blood from Hand, Right Updated: 03/26/25 1745     Blood Culture No growth at 24 hours    Narrative:      Less than seven (7) mL's of blood was collected.  Insufficient quantity may yield false negative results.    Blood Culture - Blood, Hand, Left [[id number]]  (Abnormal) Collected: 03/25/25 0005    Lab Status: Final result Specimen: Blood from Hand, Left Updated: 03/27/25 0623     Blood Culture Enterococcus faecium     Comment:   Infectious disease consultation is highly recommended.        Isolated from Aerobic and Anaerobic Bottles     Gram Stain Anaerobic Bottle Gram positive cocci in chains      Aerobic Bottle Gram positive cocci in chains    Narrative:      Refer to previous blood culture collected on 03/24/2025 2028 for Mics.     Respiratory Panel PCR w/COVID-19(SARS-CoV-2) SUPA/GURU/LORI/PAD/COR/[facility]ANG[facility]O In-House, NP Swab in UTM/VTM, 2 HR TAT - Swab, Nasopharynx [[id number]]  (Normal) Collected: 03/24/25 2156    Lab Status: Final result Specimen: Swab from Nasopharynx Updated: 03/24/25 2310     ADENOVIRUS, PCR Not Detected     Coronavirus 229E Not Detected     Coronavirus HKU1 Not Detected     Coronavirus NL63 Not Detected     Coronavirus OC43 Not Detected     COVID19 Not Detected     Human Metapneumovirus Not Detected     Human Rhinovirus/Enterovirus Not Detected     Influenza A PCR Not Detected     Influenza B PCR Not Detected     Parainfluenza Virus 1 Not Detected     Parainfluenza Virus 2 Not Detected     Parainfluenza Virus 3 Not Detected     Parainfluenza Virus 4 Not Detected     RSV, PCR Not Detected     Bordetella pertussis pcr Not Detected     Bordetella parapertussis PCR Not Detected     Chlamydophila pneumoniae PCR Not Detected     Mycoplasma pneumo by PCR Not  Detected    Narrative:      In the setting of a positive respiratory panel with a viral infection PLUS a negative procalcitonin without other underlying concern for bacterial infection, consider observing off antibiotics or discontinuation of antibiotics and continue supportive care. If the respiratory panel is positive for atypical bacterial infection (Bordetella pertussis, Chlamydophila pneumoniae, or Mycoplasma pneumoniae), consider antibiotic de-escalation to target atypical bacterial infection.    Blood Culture - Blood, Hand, Right [[id number]]  (Abnormal)  (Susceptibility) Collected: 03/24/25 2028    Lab Status: Final result Specimen: Blood from Hand, Right Updated: 03/27/25 0623     Blood Culture Enterococcus faecium     Comment:   Infectious disease consultation is highly recommended.        Isolated from Aerobic and Anaerobic Bottles     Gram Stain Anaerobic Bottle Gram positive cocci in chains      Aerobic Bottle Gram positive cocci in chains    Susceptibility        Enterococcus faecium      MARIO      Ampicillin Resistant      Gentamicin High Level Synergy Resistant      Streptomycin High Level Synergy Susceptible      Vancomycin Susceptible                           Blood Culture ID, PCR - Blood, Hand, Right [[id number]]  (Abnormal) Collected: 03/24/25 2028    Lab Status: Final result Specimen: Blood from Hand, Right Updated: 03/25/25 1242     BCID, PCR Enterococcus faecium. Saúl/B (vancomycin resistance gene) not detected. Identification by BCID2 PCR.     BOTTLE TYPE Anaerobic Bottle    Narrative:      Infectious disease consultation is highly recommended to rule out distant foci of infection.            Medication Review:       Schedule Meds  aspirin, 81 mg, Oral, Daily  atorvastatin, 80 mg, Oral, Daily  insulin lispro, 2-7 Units, Subcutaneous, 4x Daily AC & at Bedtime  levothyroxine, 112 mcg, Oral, Daily  losartan, 50 mg, Oral, Daily  nebivolol, 2.5 mg, Oral, Q24H  NIFEdipine XL, 30 mg, Oral,  Daily  pantoprazole, 40 mg, Oral, Daily  rivaroxaban, 15 mg, Oral, Daily With Dinner  sodium chloride, 10 mL, Intravenous, Q12H  vancomycin, 1,250 mg, Intravenous, Q24H        Infusion Meds  Pharmacy to dose vancomycin,         PRN Meds    acetaminophen    atropine    senna-docusate sodium **AND** polyethylene glycol **AND** bisacodyl **AND** bisacodyl    Calcium Replacement - Follow Nurse / BPA Driven Protocol    dextrose    dextrose    diphenhydrAMINE    ePHEDrine Sulfate (Pressors)    glucagon (human recombinant)    guaifenesin    hydrALAZINE    ipratropium-albuterol    labetalol    lidocaine (cardiac)    Magnesium Standard Dose Replacement - Follow Nurse / BPA Driven Protocol    nitroglycerin    ondansetron    Pharmacy to dose vancomycin    Phosphorus Replacement - Follow Nurse / BPA Driven Protocol    Potassium Replacement - Follow Nurse / BPA Driven Protocol    sodium chloride    sodium chloride        Assessment & Plan       Antimicrobial Therapy   1.  Vancomycin        2.        3.        4.        5.          Assessment     Enterococcus faecium bacteremia with no obvious source.  Probably transient bacteremia from GI source.  CT scan of abdomen and pelvis was negative as well as urinalysis.  2D echo did not show any obvious vegetation.  CT of the abdomen pelvis did not show any acute findings.  The patient is s/p transesophageal echo March 27, 2025 and showed no vegetation and there was no lesions seen on the visualized part of pacemaker leads.  Repeat blood culture is negative so far from March 25, 2025     History of pacemaker placement in July 2024 from complete heart block     History of TAVR procedure in 2023     Type 2 diabetes-most recent A1c is 6.79     History of prostate cancer     Plan     Continue IV vancomycin.  Currently pharmacy following and dosing.  Try to keep trough between 10 and 20  Recommend 2 weeks of IV vancomycin  Repeat blood cultures x 2 sets were ordered earlier today by primary  service  Case was discussed with cardiology service  Case was discussed with primary service  Miko labs  Case was discussed with family at bedside  The patient will need a midline before discharge to rehab facility for the duration of the antibiotics         Lisa Bagley MD  03/27/25  13:51 EDT    Note is dictated utilizing voice recognition software/Dragon

## 2025-03-27 NOTE — ANESTHESIA PREPROCEDURE EVALUATION
Anesthesia Evaluation     Patient summary reviewed and Nursing notes reviewed   no history of anesthetic complications:   NPO Solid Status: > 8 hours  NPO Liquid Status: > 2 hours           Airway   Dental      Pulmonary      ROS comment: Assessment  Active Hospital Problems    Diagnosis   POA  · **Weakness of both lower extremities [R29.898]   Yes  · Bacteremia [R78.81]   Yes  · Gait disturbance [R26.9]   Yes  Hypertension  VHD with aortic stenosis s/p TAVR  Paroxysmal atrial fibrillation on Xarelto  SSS/third-degree AVB status post PPM  H/o carotid artery disease s/p carotid endarterectomy  H/o TIA /CVA  CAD/HLD  Diabetes mellitus     Plan:  Weakness/gait disturbance   Presenting complaint lower extremity weakness x 5 days     Bacteremia  Blood cultures positive for Enterococcus faecium   Vancomycin  2D echo today  Cardiology consulted for CHRISTIANO to rule out endocarditis-planned for 8:00 in a.m.     SSS/third-degree heart block/paroxysmal A-fib  Status post pacemaker secondary to symptomatic bradycardia and AV dmitry disease, third-degree AVB February 24, 2025  Paced on monitor  On Xarelto     CAD/HLD  s/p PCI left circumflex January 2023  Aspirin and Lipitor     VHD/aortic stenosis  Status post TAVR February 2023     History TIA/CVA  Aspirin and Lipitor     Hypertension  Patient is on Procardia XL 30 mg daily, losartan 50 mg daily, Bystolic 2.5 mg daily  Patient was hypertensive yesterday, improved today, last blood pressure 136/60        Plan  2D echo was performed today-result pending   CHRISTIANO 8 AM tomorrow  BMP today unremarkable, creatinine 1.38-baseline  White count 12  Patient was hypertensive yesterday, improved today, monitor closely  Further orders and recommendations to follow clinical course       Cardiovascular     ECG reviewed  PT is on anticoagulation therapy    (+) hypertension, valvular problems/murmurs AS, MR, TI and murmur, CAD, cardiac stents , dysrhythmias Atrial Fib, Bradycardia, angina with  exertion, CHF Diastolic >=55%, hyperlipidemia,  carotid artery disease      Neuro/Psych  (+) TIA, CVA, dizziness/light headedness, numbness  GI/Hepatic/Renal/Endo    (+) GERD, renal disease- CRI, diabetes mellitus, thyroid problem hypothyroidism    Musculoskeletal     (+) chronic pain  Abdominal    Substance History      OB/GYN          Other      history of cancer    ROS/Med Hx Other: Additional History:  Hyperglycemia, sick sinus syndrome, complete heart block, carotid stenosis, allergies, prostate cancer, neuropathy    Echo:    Echocardiogram Findings    Left Ventricle Left ventricular systolic function is normal. Calculated left ventricular EF = 64% Left ventricular ejection fraction appears to be 61 - 65%.   Global longitudinal LV strain (GLS) = -17.0%. Normal left ventricular cavity size and wall thickness noted. All left ventricular wall segments contract normally. Left ventricular diastolic function is consistent with (grade I) impaired relaxation.  Right Ventricle Normal right ventricular cavity size, wall thickness, systolic function and septal motion noted.  Left Atrium Left atrial volume is mildly increased.  Right Atrium Normal right atrial cavity size noted.  Aortic Valve No aortic valve regurgitation is present. No hemodynamically significant aortic valve stenosis is present. There is a 29 mm TAVR valve present. 29 mm Medtronic valve is well-seated.  There is no aortic valve insufficiency.  Mean gradient is 7-10 mmHg.  Mitral Valve The mitral valve is grossly normal in structure. Mild mitral valve regurgitation is present. No significant mitral valve stenosis is present.  Tricuspid Valve The tricuspid valve is grossly normal in structure. Mild tricuspid valve regurgitation is present. Estimated right ventricular systolic pressure from tricuspid regurgitation is normal (<35 mmHg). No evidence of pulmonary hypertension is present. No tricuspid valve stenosis is present.  Pulmonic Valve The pulmonic  valve is not well visualized. The pulmonic valve is grossly normal in structure. There is trace pulmonic valve regurgitation present. There is no pulmonic valve stenosis present.  Greater Vessels No dilation of the aortic root is present. No dilation of the sinuses of Valsalva is present.  Pericardium There is no evidence of pericardial effusion. .        Stress Test:  · Findings consistent with a normal ECG stress test.  · Left ventricular ejection fraction is normal. (Calculated EF = 64%).  · Myocardial perfusion imaging indicates a medium-sized infarct located in the inferior wall and septal wall with mild joseph-infarct ischemia.  · Impressions are consistent with a high risk study.  · This is abnormal Cardiolite imaging stress test with moderate sized inferior/septal myocardial infarction with small amount of joseph-infarct ischemia. Left ventricular size and function was normal. Clinical correlation recommended.      Cath:  FINDINGS:     1. HEMODYNAMICS:       Aortic pressure: 174/72 mmHg     LVEDP: 10 to 15 mmHg     Gradient across aortic valve on pullback: Mean gradient of 19 mmHg        2. LEFT VENTRICULOGRAPHY: 55 to 60%        3. CORONARY ANGIOGRAPHY:            A: Left main coronary artery: Short and calcified.  No high-grade stenosis            B: Left anterior descending artery: 60 to 70% mid LAD stenosis followed by 60% stenosis in distal segment.            C: Left circumflex coronary artery: 99% stenosis of proximal LCx            D: Right coronary artery: 100% occlusion of RCA at the ostium with left-to-right collaterals     SUMMARY:      Three-vessel coronary artery disease  Severe aortic stenosis     RECOMMENDATIONS:      Recommend CABG with AVR versus TAVR with LCx stenting        SUMMARY:      1.  Severe aortic stenosis  2.  High-grade stenosis of LCx with successful stenting prior to TAVR  3.  Nonobstructive disease of LAD.  4.  100% occlusion of RCA which is well collateralized from the left  system     RECOMMENDATIONS:      Continue DAPT  Proceed with TAVR      PSH:  PROSTATECTOMY BACK SURGERY  COLONOSCOPY CAROTID ENDARTERECTOMY  ANKLE OPEN REDUCTION INTERNAL FIXATION CARDIAC CATHETERIZATION  CARDIAC CATHETERIZATION AORTIC VALVE REPAIR/REPLACEMENT  AORTIC VALVE REPAIR/REPLACEMENT CORONARY STENT PLACEMENT                Anesthesia Plan    ASA 4     general   total IV anesthesia  (Patient identified; pre-operative vital signs, all relevant labs/studies, complete medical/surgical/anesthetic history, full medication list, full allergy list, and NPO status obtained/reviewed; physical assessment performed; anesthetic options, side effects, potential complications, risks, and benefits discussed; questions answered; written anesthesia consent obtained; patient cleared for procedure; anesthesia machine and equipment checked and functioning)  intravenous induction     Anesthetic plan, risks, benefits, and alternatives have been provided, discussed and informed consent has been obtained with: patient.    Plan discussed with CRNA and CAA.    CODE STATUS:    Code Status (Patient has no pulse and is not breathing): CPR (Attempt to Resuscitate)  Medical Interventions (Patient has pulse or is breathing): Full Support

## 2025-03-27 NOTE — PROGRESS NOTES
"Pharmacy Antimicrobial Dosing Service  Subjective:  Alen Ratliff is a 84 y.o.male admitted with weakness BLE. Pharmacy has been consulted to dose Vancomycin for possible E. faecium bacteremia.  ID consulted    PMH: s/p pacemaker 2/24/2025, s/p TAVR 2023, T2DM    Assessment/Plan    1. Day #2 Vancomycin: Goal -600 mcg*h/mL.  Patient received a loading dose of vancomycin 2000 mg IV x 1 (~22mg/kg ABW) followed by 1,250mg (~14mg/kg ABW) IV q24h. Based on vancomycin random level collected this morning, predicted steady state AUC will be 487 mcg*h/mL and trough 16.1 mg/mL. Will continue current dose and schedule vancomycin trough on Saturday.    Will continue to monitor drug levels, renal function, culture and sensitivities, and patient clinical status.       Objective:  Relevant clinical data and objective history reviewed:  180.3 cm (71\")   89.5 kg (197 lb 5 oz)   Ideal body weight: 75.3 kg (166 lb 0.1 oz)  Adjusted ideal body weight: 81 kg (178 lb 8.5 oz)  Body mass index is 27.52 kg/m².    Results from last 7 days   Lab Units 03/26/25 2327   VANCOMYCIN RM mcg/mL 14.50     Results from last 7 days   Lab Units 03/26/25 2327 03/26/25  0047 03/25/25  0005   CREATININE mg/dL 1.28* 1.38* 1.38*     Estimated Creatinine Clearance: 53.4 mL/min (A) (by C-G formula based on SCr of 1.28 mg/dL (H)).  I/O last 3 completed shifts:  In: 1210 [P.O.:960; IV Piggyback:250]  Out: -     Results from last 7 days   Lab Units 03/26/25 2327 03/26/25  0047 03/25/25  0519   WBC 10*3/mm3 11.35* 12.02* 10.00     Temperature    03/27/25 0019 03/27/25 0423 03/27/25 0753   Temp: 97.8 °F (36.6 °C) 99.1 °F (37.3 °C) 98.6 °F (37 °C)     Baseline culture/source/susceptibility:  Microbiology Results (last 10 days)       Procedure Component Value - Date/Time    Blood Culture - Blood, Hand, Right [[id number]]  (Normal) Collected: 03/25/25 1724    Lab Status: Preliminary result Specimen: Blood from Hand, Right Updated: 03/26/25 1745     Blood " Culture No growth at 24 hours    Narrative:      Less than seven (7) mL's of blood was collected.  Insufficient quantity may yield false negative results.    Blood Culture - Blood, Hand, Left [[id number]]  (Abnormal) Collected: 03/25/25 0005    Lab Status: Final result Specimen: Blood from Hand, Left Updated: 03/27/25 0623     Blood Culture Enterococcus faecium     Comment:   Infectious disease consultation is highly recommended.        Isolated from Aerobic and Anaerobic Bottles     Gram Stain Anaerobic Bottle Gram positive cocci in chains      Aerobic Bottle Gram positive cocci in chains    Narrative:      Refer to previous blood culture collected on 03/24/2025 2028 for Mics.     Respiratory Panel PCR w/COVID-19(SARS-CoV-2) SUPA/GURU/LORI/PAD/COR/[facility]ANG[facility] In-House, NP Swab in UTM/VTM, 2 HR TAT - Swab, Nasopharynx [[id number]]  (Normal) Collected: 03/24/25 2156    Lab Status: Final result Specimen: Swab from Nasopharynx Updated: 03/24/25 2310     ADENOVIRUS, PCR Not Detected     Coronavirus 229E Not Detected     Coronavirus HKU1 Not Detected     Coronavirus NL63 Not Detected     Coronavirus OC43 Not Detected     COVID19 Not Detected     Human Metapneumovirus Not Detected     Human Rhinovirus/Enterovirus Not Detected     Influenza A PCR Not Detected     Influenza B PCR Not Detected     Parainfluenza Virus 1 Not Detected     Parainfluenza Virus 2 Not Detected     Parainfluenza Virus 3 Not Detected     Parainfluenza Virus 4 Not Detected     RSV, PCR Not Detected     Bordetella pertussis pcr Not Detected     Bordetella parapertussis PCR Not Detected     Chlamydophila pneumoniae PCR Not Detected     Mycoplasma pneumo by PCR Not Detected    Narrative:      In the setting of a positive respiratory panel with a viral infection PLUS a negative procalcitonin without other underlying concern for bacterial infection, consider observing off antibiotics or discontinuation of antibiotics and continue supportive care. If the respiratory  panel is positive for atypical bacterial infection (Bordetella pertussis, Chlamydophila pneumoniae, or Mycoplasma pneumoniae), consider antibiotic de-escalation to target atypical bacterial infection.    Blood Culture - Blood, Hand, Right [[id number]]  (Abnormal)  (Susceptibility) Collected: 03/24/25 2028    Lab Status: Final result Specimen: Blood from Hand, Right Updated: 03/27/25 0623     Blood Culture Enterococcus faecium     Comment:   Infectious disease consultation is highly recommended.        Isolated from Aerobic and Anaerobic Bottles     Gram Stain Anaerobic Bottle Gram positive cocci in chains      Aerobic Bottle Gram positive cocci in chains    Susceptibility        Enterococcus faecium      MARIO      Ampicillin >=32 ug/ml Resistant      Gentamicin High Level Synergy SYN-R ug/ml Resistant      Streptomycin High Level Synergy SYN-S ug/ml Susceptible      Vancomycin <=0.5 ug/ml Susceptible                           Blood Culture ID, PCR - Blood, Hand, Right [[id number]]  (Abnormal) Collected: 03/24/25 2028    Lab Status: Final result Specimen: Blood from Hand, Right Updated: 03/25/25 1242     BCID, PCR Enterococcus faecium. Saúl/B (vancomycin resistance gene) not detected. Identification by BCID2 PCR.     BOTTLE TYPE Anaerobic Bottle    Narrative:      Infectious disease consultation is highly recommended to rule out distant foci of infection.          Danni Ching [facility]  03/27/25 08:51 EDT

## 2025-03-27 NOTE — PROGRESS NOTES
[facility] MEDICINE SERVICE  DAILY PROGRESS NOTE    NAME: Alen Ratliff  : 1940  MRN: [id number]      LOS: 2 days     PROVIDER OF SERVICE: Herbert Choi MD    Chief Complaint: Weakness of both lower extremities    Subjective:     Interval History: Patient doing well this morning.  He was seen after CHRISTIANO and was still somewhat lethargic after sedation.  Family is at the bedside.      Review of Systems:   Review of Systems    Objective:     Vital Signs  Temp:  [97.8 °F (36.6 °C)-99.1 °F (37.3 °C)] 98.6 °F (37 °C)  Heart Rate:  [60-70] 60  Resp:  [14-21] 20  BP: (100-160)/(58-73) 152/73  Flow (L/min) (Oxygen Therapy):  [6-10] 6   Body mass index is 27.52 kg/m².    Physical Exam  Physical Exam  Neurological:      General: No focal deficit present.      Mental Status: He is oriented to person, place, and time. Mental status is at baseline.      Cranial Nerves: No cranial nerve deficit.      Sensory: No sensory deficit.      Coordination: Coordination normal.      Comments: 4/5 b/u upper and lower strength   Psychiatric:         Mood and Affect: Mood normal.         Behavior: Behavior normal.            Diagnostic Data    Results from last 7 days   Lab Units 25  2327 25  1805 25  1446   WBC 10*3/mm3 11.35*   < >  --    HEMOGLOBIN g/dL 11.9*   < >  --    HEMATOCRIT % 38.5   < >  --    PLATELETS 10*3/mm3 293   < >  --    GLUCOSE mg/dL 217*   < > 167*   CREATININE mg/dL 1.28*   < > 1.72*   BUN mg/dL 19   < > 33*   SODIUM mmol/L 137   < > 138   POTASSIUM mmol/L 3.9   < > 3.7   AST (SGOT) U/L  --   --  54*   ALT (SGPT) U/L  --   --  53*   ALK PHOS U/L  --   --  75   BILIRUBIN mg/dL  --   --  0.7   ANION GAP mmol/L 13.1   < > 14.9    < > = values in this interval not displayed.       CT Abdomen Pelvis Without Contrast  Result Date: 3/25/2025  Impression: 1.No acute intra-abdominal or pelvic abnormality. 2.Colonic diverticulosis without diverticulitis. 3.Postsurgical changes of prior  prostatectomy. Electronically Signed: Anshul Coelho  3/25/2025 10:39 PM EDT  Workstation ID: [id number]        I reviewed the patient's new clinical results.    Assessment/Plan:     Active and Resolved Problems  Active Hospital Problems    Diagnosis  POA    **Weakness of both lower extremities [R29.898]  Yes    Bacteremia [R78.81]  Yes    Gait disturbance [R26.9]  Yes      Resolved Hospital Problems   No resolved problems to display.     E. Faecium Bactermia  Generalized weakness of upper and lower extremities  -Symptoms now believed likely 2/2 E. Faecalis bactermia  -This is not a common cause of endocarditis, although patient did undergo CHRISTIANO on 3/27 which did not show any evidence of valvular vegetation or any vegetation on the patient's device leads  -Continue IV antibiotics with vancomycin for now; defer to ID for final antibiotic course  -Cultures from 3/25 still with evidence of bacteremia; will repeat cultures today 3/27  -Source was likely intestinal, possible translocation of bacteria given recent diarrheal infection per family and patient     AUDREY -now is at baseline  -Renal function appears to be at baseline currently  -Status post fluid resuscitation; tolerating p.o. intake     Type 2 diabetes  -Hold home diabetic regimen  -Low-dose insulin sliding scale     Complete heart block status post pacemaker February 24, 2024  Hypertension  Hyperlipidemia  Hypothyroidism  GERD  CAD  CVA x 2  Aortic stenosis status post TAVR        VTE Prophylaxis:  Pharmacologic VTE prophylaxis orders are present.      Disposition Planning:     Barriers to Discharge: Persistent bacteremia; awaiting clearance of bacteria from the blood and PICC line placement  Anticipated Date of Discharge: 3/29/2025  Place of Discharge: [facility]      Time: 35 minutes     Code Status and Medical Interventions: CPR (Attempt to Resuscitate); Full Support   Ordered at: 03/21/25 2201     Code Status (Patient has no pulse and is not breathing):    CPR  (Attempt to Resuscitate)     Medical Interventions (Patient has pulse or is breathing):    Full Support       Signature: Electronically signed by Herbert Choi MD, 03/27/25, 12:39 EDT.  [facility] Hospitalist Team

## 2025-03-27 NOTE — ANESTHESIA POSTPROCEDURE EVALUATION
Patient: Alen Ratliff    Procedure Summary       Date: 03/27/25 Room / Location: [facility]    Anesthesia Start: 0840 Anesthesia Stop: 0930    Procedure: ADULT TRANSESOPHAGEAL ECHO (CHRISTIANO) W/ CONT IF NECESSARY PER PROTOCOL Diagnosis: (Endocarditis)    Scheduled Providers: Steve Muhammad MD Provider: Carol Cordero MD    Anesthesia Type: general ASA Status: 4            Anesthesia Type: general    Vitals  Vitals Value Taken Time   /63 03/27/25 10:53   Temp     Pulse 60 03/27/25 11:15   Resp 20 03/27/25 10:00   SpO2 96 % 03/27/25 11:15   Vitals shown include unfiled device data.        Post Anesthesia Care and Evaluation    Patient location during evaluation: PACU  Patient participation: complete - patient participated  Level of consciousness: awake and alert  Pain management: satisfactory to patient    Airway patency: patent  Anesthetic complications: No anesthetic complications  PONV Status: none  Cardiovascular status: acceptable  Respiratory status: acceptable  Hydration status: acceptable

## 2025-03-27 NOTE — SIGNIFICANT NOTE
03/27/25 0714   Rehab Time/Intention   Session Not Performed patient unavailable for treatment  (CHRISTIANO 3/27)   Recommendation   PT - Next Appointment 03/28/25

## 2025-03-27 NOTE — PLAN OF CARE
Problem: Adult Inpatient Plan of Care  Goal: Plan of Care Review  Outcome: Not Progressing  Flowsheets (Taken 3/27/2025 0448)  Progress: no change  Plan of Care Reviewed With:   patient   spouse  Goal: Patient-Specific Goal (Individualized)  Outcome: Not Progressing  Goal: Absence of Hospital-Acquired Illness or Injury  Outcome: Not Progressing  Intervention: Identify and Manage Fall Risk  Recent Flowsheet Documentation  Taken 3/27/2025 0416 by Jenny Lopez, RN  Safety Promotion/Fall Prevention:   assistive device/personal items within reach   clutter free environment maintained   nonskid shoes/slippers when out of bed   safety round/check completed   room organization consistent   fall prevention program maintained  Taken 3/27/2025 0217 by Jenny Lopez, RN  Safety Promotion/Fall Prevention:   assistive device/personal items within reach   clutter free environment maintained   fall prevention program maintained   nonskid shoes/slippers when out of bed   room organization consistent   safety round/check completed  Taken 3/27/2025 0025 by Jenny Lopez, RN  Safety Promotion/Fall Prevention:   assistive device/personal items within reach   clutter free environment maintained   fall prevention program maintained   nonskid shoes/slippers when out of bed   room organization consistent   safety round/check completed  Taken 3/26/2025 2212 by Jenny Lopez, RN  Safety Promotion/Fall Prevention:   assistive device/personal items within reach   clutter free environment maintained   fall prevention program maintained   nonskid shoes/slippers when out of bed   room organization consistent   safety round/check completed  Taken 3/26/2025 2050 by Jenny Lopez, RN  Safety Promotion/Fall Prevention:   assistive device/personal items within reach   clutter free environment maintained   nonskid shoes/slippers when out of bed   room organization consistent   safety round/check completed  Intervention: Prevent Skin  Injury  Recent Flowsheet Documentation  Taken 3/26/2025 2050 by Jenny Lopez RN  Body Position:   position changed independently   head facing, right   side-lying  Intervention: Prevent and Manage VTE (Venous Thromboembolism) Risk  Recent Flowsheet Documentation  Taken 3/26/2025 2050 by Jenny Lopez RN  VTE Prevention/Management:   SCDs (sequential compression devices) off   patient refused intervention  Intervention: Prevent Infection  Recent Flowsheet Documentation  Taken 3/27/2025 0416 by Jenny Lopez RN  Infection Prevention:   environmental surveillance performed   hand hygiene promoted   single patient room provided   visitors restricted/screened   rest/sleep promoted  Taken 3/27/2025 0217 by Jenny Lopez RN  Infection Prevention:   environmental surveillance performed   hand hygiene promoted   rest/sleep promoted   single patient room provided   visitors restricted/screened  Taken 3/27/2025 0025 by Jenny Lopez RN  Infection Prevention:   environmental surveillance performed   hand hygiene promoted   rest/sleep promoted   single patient room provided   visitors restricted/screened  Taken 3/26/2025 2212 by Jenny Lopez RN  Infection Prevention:   environmental surveillance performed   hand hygiene promoted   rest/sleep promoted   single patient room provided   visitors restricted/screened  Taken 3/26/2025 2050 by Jenny Lopez RN  Infection Prevention:   environmental surveillance performed   hand hygiene promoted   single patient room provided   visitors restricted/screened  Goal: Optimal Comfort and Wellbeing  Outcome: Not Progressing  Intervention: Monitor Pain and Promote Comfort  Recent Flowsheet Documentation  Taken 3/26/2025 2050 by Jenny Lopez RN  Pain Management Interventions: pain medication given  Intervention: Provide Person-Centered Care  Recent Flowsheet Documentation  Taken 3/26/2025 2050 by Jenny Lopez RN  Trust Relationship/Rapport:   care explained   choices  provided  Goal: Readiness for Transition of Care  Outcome: Not Progressing   Goal Outcome Evaluation:  Plan of Care Reviewed With: patient, spouse        Progress: no change     Alert and oriented x 4. Hard of hearing. Assist x 1 for transfers. Spouse at bedside. C/O lower back pain noted, PRN Oxycodone administered with positive effect noted. Elevated temperature at beginning of shift noted. Does not require O2 therapy at this time. No s/s of hyper/hypoglycemia noted. Continent of bowel and bladder. Currently in bed, eyes closed. Rise and fall of chest observed. Continues to receive IV Vancomycin for bacteremia, no s/s of adverse reaction noted. Blood cultures from 3/24 positive for gram positive cocci. Repeat blood culture collected on 3/25, results pending. Call bell in reach. From home. Per case management, awaiting acceptance to [facility]. No PASSR required, Pre-cert approved 3/26-4/8).

## 2025-03-27 NOTE — PROGRESS NOTES
LOS: 2 days   Admitting Physician- Herbert Choi MD    Reason For Followup:    Bacteremia  Status post TAVR  Status post pacemaker  Hypertension      Subjective     Patient denies any chest pain or shortness of breath patient feels weak and tired    Objective     Hemodynamics are stable    Review of Systems:   Review of Systems   Constitutional: Negative for chills and fever.   HENT:  Negative for ear discharge and nosebleeds.    Eyes:  Negative for discharge and redness.   Cardiovascular:  Negative for chest pain, orthopnea, palpitations, paroxysmal nocturnal dyspnea and syncope.   Respiratory:  Negative for cough, shortness of breath and wheezing.    Endocrine: Negative for heat intolerance.   Skin:  Negative for rash.   Musculoskeletal:  Negative for arthritis and myalgias.   Gastrointestinal:  Negative for abdominal pain, melena, nausea and vomiting.   Genitourinary:  Negative for dysuria and hematuria.   Neurological:  Negative for dizziness, light-headedness, numbness and tremors.   Psychiatric/Behavioral:  Negative for depression. The patient is not nervous/anxious.          Vital Signs  Vitals:    03/27/25 0949 03/27/25 1000 03/27/25 1230 03/27/25 1550   BP: 125/69 152/73 178/73 146/70   BP Location:   Left arm Left arm   Patient Position:   Lying Lying   Pulse: 60 60 64 64   Resp: 21 20 18 15   Temp:   97.6 °F (36.4 °C) 98.9 °F (37.2 °C)   TempSrc:   Oral Oral   SpO2: 98% 93% 97% 96%   Weight:       Height:         Wt Readings from Last 1 Encounters:   03/21/25 89.5 kg (197 lb 5 oz)       Intake/Output Summary (Last 24 hours) at 3/27/2025 1834  Last data filed at 3/27/2025 0929  Gross per 24 hour   Intake 100 ml   Output --   Net 100 ml     Physical Exam:  Constitutional:       Appearance: Well-developed.   Eyes:      General: No scleral icterus.        Right eye: No discharge.   HENT:      Head: Normocephalic and atraumatic.   Neck:      Thyroid: No thyromegaly.      Lymphadenopathy: No cervical  adenopathy.   Pulmonary:      Effort: Pulmonary effort is normal. No respiratory distress.      Breath sounds: Normal breath sounds. No wheezing. No rales.   Cardiovascular:      Normal rate. Regular rhythm.      No gallop.    Edema:     Peripheral edema absent.   Abdominal:      Tenderness: There is no abdominal tenderness.   Skin:     Findings: No erythema or rash.   Neurological:      Mental Status: Alert and oriented to person, place, and time.         Results Review:   Lab Results (last 24 hours)       Procedure Component Value Units Date/Time    Blood Culture - Blood, Hand, Right [[id number]]  (Normal) Collected: 03/25/25 1724    Specimen: Blood from Hand, Right Updated: 03/27/25 1745     Blood Culture No growth at 2 days    Narrative:      Less than seven (7) mL's of blood was collected.  Insufficient quantity may yield false negative results.    POC Glucose Once [[id number]]  (Abnormal) Collected: 03/27/25 1659    Specimen: Blood Updated: 03/27/25 1702     Glucose 268 mg/dL      Comment: Serial Number: [id number]:  [id number]       Blood Culture - Blood, Arm, Right [[id number]] Collected: 03/27/25 1147    Specimen: Blood from Arm, Right Updated: 03/27/25 1230    Blood Culture - Blood, Arm, Left [[id number]] Collected: 03/27/25 1147    Specimen: Blood from Arm, Left Updated: 03/27/25 1230    POC Glucose Once [[id number]]  (Abnormal) Collected: 03/27/25 1221    Specimen: Blood Updated: 03/27/25 1228     Glucose 148 mg/dL      Comment: Serial Number: [id number]:  [id number]       Blood Culture - Blood, Hand, Right [[id number]]  (Abnormal)  (Susceptibility) Collected: 03/24/25 2028    Specimen: Blood from Hand, Right Updated: 03/27/25 0623     Blood Culture Enterococcus faecium     Comment:   Infectious disease consultation is highly recommended.        Isolated from Aerobic and Anaerobic Bottles     Gram Stain Anaerobic Bottle Gram positive cocci in chains      Aerobic Bottle Gram positive cocci in chains     Susceptibility        Enterococcus faecium      MARIO      Ampicillin Resistant      Gentamicin High Level Synergy Resistant      Streptomycin High Level Synergy Susceptible      Vancomycin Susceptible                           Blood Culture - Blood, Hand, Left [[id number]]  (Abnormal) Collected: 03/25/25 0005    Specimen: Blood from Hand, Left Updated: 03/27/25 0623     Blood Culture Enterococcus faecium     Comment:   Infectious disease consultation is highly recommended.        Isolated from Aerobic and Anaerobic Bottles     Gram Stain Anaerobic Bottle Gram positive cocci in chains      Aerobic Bottle Gram positive cocci in chains    Narrative:      Refer to previous blood culture collected on 03/24/2025 2028 for Mics.     Vancomycin, Random [[id number]]  (Normal) Collected: 03/26/25 2327    Specimen: Blood from Hand, Left Updated: 03/27/25 0012     Vancomycin Random 14.50 mcg/mL     Narrative:      Therapeutic Ranges for Vancomycin    Vancomycin Random   5.0-40.0 mcg/mL  Vancomycin Trough   5.0-20.0 mcg/mL  Vancomycin Peak     20.0-40.0 mcg/mL    Basic Metabolic Panel [[id number]]  (Abnormal) Collected: 03/26/25 2327    Specimen: Blood from Hand, Left Updated: 03/27/25 0012     Glucose 217 mg/dL      BUN 19 mg/dL      Creatinine 1.28 mg/dL      Sodium 137 mmol/L      Potassium 3.9 mmol/L      Chloride 102 mmol/L      CO2 21.9 mmol/L      Calcium 8.8 mg/dL      BUN/Creatinine Ratio 14.8     Anion Gap 13.1 mmol/L      eGFR 54.8 mL/min/1.73     Narrative:      GFR Categories in Chronic Kidney Disease (CKD)      GFR Category          GFR (mL/min/1.73)    Interpretation  G1                     90 or greater         Normal or high (1)  G2                      60-89                Mild decrease (1)  G3a                   45-59                Mild to moderate decrease  G3b                   30-44                Moderate to severe decrease  G4                    15-29                Severe decrease  G5                     14 or less           Kidney failure          (1)In the absence of evidence of kidney disease, neither GFR category G1 or G2 fulfill the criteria for CKD.    eGFR calculation 2021 CKD-EPI creatinine equation, which does not include race as a factor    CBC (No Diff) [[id number]]  (Abnormal) Collected: 03/26/25 2327    Specimen: Blood from Hand, Left Updated: 03/26/25 2345     WBC 11.35 10*3/mm3      RBC 4.18 10*6/mm3      Hemoglobin 11.9 g/dL      Hematocrit 38.5 %      MCV 92.1 fL      MCH 28.5 pg      MCHC 30.9 g/dL      RDW 13.8 %      RDW-SD 47.0 fl      MPV 10.1 fL      Platelets 293 10*3/mm3     POC Glucose Once [[id number]]  (Abnormal) Collected: 03/26/25 2007    Specimen: Blood Updated: 03/26/25 2008     Glucose 216 mg/dL      Comment: Serial Number: [id number]:  [id number]             Imaging Results (Last 72 Hours)       Procedure Component Value Units Date/Time    CT Abdomen Pelvis Without Contrast [[id number]] Collected: 03/25/25 2222     Updated: 03/25/25 2241    Narrative:      CT ABDOMEN PELVIS WO CONTRAST    Date of Exam: 3/25/2025 5:52 PM EDT    Indication: Patient is bacteremic, rule out source.    Comparison: Renal ultrasound dated 2/25/2025    Technique: Axial CT images were obtained of the abdomen and pelvis without the administration of contrast. Sagittal and coronal reconstructions were performed.  Automated exposure control and iterative reconstruction methods were used.      Findings:  The heart size is normal. There is no pericardial effusion. There is a prosthetic aortic valve. There is coronary artery atherosclerotic calcification.    The liver is normal in size and contour. There is no focal hepatic lesion by noncontrast technique. The gallbladder is present without wall thickening. There is no intrahepatic or extrahepatic biliary ductal dilatation. The spleen is normal in size. The   adrenal glands and pancreas appear within normal limits.    The kidneys are symmetric in size. There is  no hydronephrosis. The urinary bladder is fluid-filled without wall thickening. The prostate is surgically absent.    The stomach and duodenum are normal in caliber and configuration. There are no abnormally dilated loops of small bowel to suggest small bowel obstruction or small bowel inflammation. The appendix is visualized and normal within the right lower quadrant.   There is colonic diverticulosis. There is no diverticulitis or colitis.    The aorta is normal in caliber without aneurysm formation. There is diffuse aortoiliac atherosclerotic calcification. There is no mesenteric, retroperitoneal, or pelvic lymphadenopathy. There are degenerative changes of the thoracolumbar spine.      Impression:      Impression:  1.No acute intra-abdominal or pelvic abnormality.  2.Colonic diverticulosis without diverticulitis.  3.Postsurgical changes of prior prostatectomy.        Electronically Signed: Anshul Coelho    3/25/2025 10:39 PM EDT    Workstation ID: [id number]    XR Chest 1 View [[id number]] Collected: 03/24/25 2048     Updated: 03/24/25 2051    Narrative:      XR CHEST 1 VW    Date of Exam: 3/24/2025 8:00 PM EDT    Indication: elevated temperature    Comparison: Chest AP dated 2/24/2025    Findings:  The lungs are clear bilaterally. The cardiac and mediastinal silhouettes appear normal. No effusion is seen. A TAVR has been performed. A dual-lead transvenous pacemaker has been placed.      Impression:      Impression:  No acute cardiopulmonary disease.      Electronically Signed: Wiliam Jasso MD    3/24/2025 8:49 PM EDT    Workstation ID: [id number]          ECG/EMG Results (most recent)       Procedure Component Value Units Date/Time    Telemetry Scan [[id number]] Resulted: 03/21/25     Updated: 03/22/25 0238    Telemetry Scan [[id number]] Resulted: 03/21/25     Updated: 03/24/25 0032    Telemetry Scan [[id number]] Resulted: 03/21/25     Updated: 03/24/25 0424    Telemetry Scan [[id number]] Resulted: 03/21/25      Updated: 03/24/25 0430    Telemetry Scan [[id number]] Resulted: 03/21/25     Updated: 03/24/25 0727    Telemetry Scan [[id number]] Resulted: 03/21/25     Updated: 03/24/25 0818    Telemetry Scan [[id number]] Resulted: 03/21/25     Updated: 03/24/25 1030    Telemetry Scan [[id number]] Resulted: 03/21/25     Updated: 03/24/25 1254    Telemetry Scan [[id number]] Resulted: 03/21/25     Updated: 03/24/25 2311    Telemetry Scan [[id number]] Resulted: 03/21/25     Updated: 03/25/25 0407    Telemetry Scan [[id number]] Resulted: 03/21/25     Updated: 03/25/25 1053    Telemetry Scan [[id number]] Resulted: 03/21/25     Updated: 03/25/25 2251    Telemetry Scan [[id number]] Resulted: 03/21/25     Updated: 03/26/25 0545    Telemetry Scan [[id number]] Resulted: 03/21/25     Updated: 03/26/25 0545    Telemetry Scan [[id number]] Resulted: 03/21/25     Updated: 03/26/25 1132    Adult Transthoracic Echo Complete W/ Cont if Necessary Per Protocol [[id number]] Resulted: 03/26/25 1711     Updated: 03/26/25 1711     EF(MOD-bp) 68.8 %      LVIDd 4.5 cm      LVIDs 2.7 cm      IVSd 1.10 cm      LVPWd 1.30 cm      FS 40.0 %      IVS/LVPW 0.85 cm      ESV(cubed) 19.7 ml      LV Sys Vol (BSA corrected) 16.0 cm2      EDV(cubed) 91.1 ml      LV Delgado Vol (BSA corrected) 53.4 cm2      LV mass(C)d 198.1 grams      LVOT area 3.1 cm2      LVOT diam 2.00 cm      EDV(MOD-sp2) 88.7 ml      EDV(MOD-sp4) 109.0 ml      ESV(MOD-sp2) 28.0 ml      ESV(MOD-sp4) 32.7 ml      SV(MOD-sp2) 60.7 ml      SV(MOD-sp4) 76.3 ml      SVi(MOD-SP2) 29.8 ml/m2      SVi(MOD-SP4) 37.4 ml/m2      SVi (LVOT) 42.7 ml/m2      EF(MOD-sp2) 68.4 %      EF(MOD-sp4) 70.0 %      MV E max hoang 70.2 cm/sec      MV A max hoang 93.8 cm/sec      MV dec time 0.31 sec      MV E/A 0.75     Pulm A Revs Dur 0.15 sec      MV A dur 0.14 sec      LA ESV Index (BP) 45.6 ml/m2      Med Peak E' Hoang 4.6 cm/sec      Lat Peak E' Hoang 7.5 cm/sec      TR max hoang 266.0 cm/sec      Avg E/e' ratio 11.60     SV(LVOT) 87.0 ml       TAPSE (>1.6) 2.43 cm      RV S' 15.1 cm/sec      LA dimension (2D)  4.8 cm      Pulm Sys Hoang 41.5 cm/sec      Pulm Delgado Hoang 27.4 cm/sec      Pulm S/D 1.51     Pulm A Revs Hoang 34.2 cm/sec      LV V1 max 110.0 cm/sec      LV V1 max PG 4.8 mmHg      LV V1 mean PG 3.0 mmHg      LV V1 VTI 27.7 cm      Ao pk hoang 220.0 cm/sec      Ao max PG 19.4 mmHg      Ao mean PG 10.0 mmHg      Ao V2 VTI 48.5 cm      SAL(I,D) 1.79 cm2      Dimensionless Index 0.57 (DI)      MV max PG 4.8 mmHg      MV mean PG 2.00 mmHg      MV V2 VTI 41.3 cm      MV P1/2t 127.2 msec      MVA(P1/2t) 1.73 cm2      MVA(VTI) 2.11 cm2      MV dec slope 274.0 cm/sec2      MR max hoang 482.0 cm/sec      MR max PG 92.9 mmHg      TR max PG 28.3 mmHg      RVSP(TR) 31.3 mmHg      RAP systole 3.0 mmHg      RV V1 max PG 4.1 mmHg      RV V1 max 101.0 cm/sec      RV V1 VTI 22.7 cm      PA V2 max 125.0 cm/sec      PA acc time 0.13 sec      Sinus 3.4 cm      STJ 2.5 cm     Narrative:        Left ventricular systolic function is normal. Left ventricular ejection   fraction appears to be 61 - 65%.    Left ventricular wall thickness is consistent with mild to moderate   concentric hypertrophy.    Left ventricular diastolic function is consistent with (grade I)   impaired relaxation.    There is a TAVR valve present.    Estimated right ventricular systolic pressure from tricuspid   regurgitation is normal (<35 mmHg).    No gross vegetation noted.  Recommend CHRISTIANO if clinically indicated for   bacterial endocarditis.  Recommend modified Duke criteria for clinical   diagnosis of bacterial endocarditis      Telemetry Scan [[id number]] Resulted: 03/21/25     Updated: 03/26/25 3016    Telemetry Scan [[id number]] Resulted: 03/21/25     Updated: 03/27/25 0444    Adult Transesophageal Echo (CHRISTIANO) W/ Cont if Necessary Per Protocol [[id number]] Resulted: 03/27/25 1019     Updated: 03/27/25 1019      CV ECHO SHUNT ASSESSMENT PERFORMED (HIDDEN SCRIPTING) 1    Telemetry Scan [[id number]]  Resulted: 03/21/25     Updated: 03/27/25 1523          CBC    Results from last 7 days   Lab Units 03/26/25 2327 03/26/25 0047 03/25/25  0519 03/24/25 2028 03/23/25 2354 03/23/25 1027 03/21/25  1805   WBC 10*3/mm3 11.35* 12.02* 10.00 8.93 9.48 8.73 9.15   HEMOGLOBIN g/dL 11.9* 12.4* 10.8* 11.5* 11.4* 10.6* 11.7*   PLATELETS 10*3/mm3 293 282 227 253 251 201 209     BMP   Results from last 7 days   Lab Units 03/26/25 2327 03/26/25 0047 03/25/25  0005 03/23/25 2354 03/23/25 1027 03/23/25 0127 03/21/25  1805   SODIUM mmol/L 137 138 139 138 137  --  138   POTASSIUM mmol/L 3.9 3.8 3.9 3.9 4.0  --  4.2   CHLORIDE mmol/L 102 104 108* 106 108*  --  105   CO2 mmol/L 21.9* 22.7 20.8* 19.0* 18.6*  --  20.3*   BUN mg/dL 19 18 23 23 21  --  29*   CREATININE mg/dL 1.28* 1.38* 1.38* 1.43* 1.38*  --  1.59*   GLUCOSE mg/dL 217* 139* 154* 180* 203*  --  127*   MAGNESIUM mg/dL  --   --   --   --   --  2.2 1.2*   PHOSPHORUS mg/dL  --   --   --   --   --   --  2.8     CMP   Results from last 7 days   Lab Units 03/26/25 2327 03/26/25 0047 03/25/25  0005 03/23/25 2354 03/23/25 1027 03/21/25  1805   SODIUM mmol/L 137 138 139 138 137 138   POTASSIUM mmol/L 3.9 3.8 3.9 3.9 4.0 4.2   CHLORIDE mmol/L 102 104 108* 106 108* 105   CO2 mmol/L 21.9* 22.7 20.8* 19.0* 18.6* 20.3*   BUN mg/dL 19 18 23 23 21 29*   CREATININE mg/dL 1.28* 1.38* 1.38* 1.43* 1.38* 1.59*   GLUCOSE mg/dL 217* 139* 154* 180* 203* 127*     Cardiac Studies:  Echo- Results for orders placed during the hospital encounter of 03/21/25    Adult Transthoracic Echo Complete W/ Cont if Necessary Per Protocol    Interpretation Summary    Left ventricular systolic function is normal. Left ventricular ejection fraction appears to be 61 - 65%.    Left ventricular wall thickness is consistent with mild to moderate concentric hypertrophy.    Left ventricular diastolic function is consistent with (grade I) impaired relaxation.    There is a TAVR valve present.    Estimated right  ventricular systolic pressure from tricuspid regurgitation is normal (<35 mmHg).    No gross vegetation noted.  Recommend CHRISTIANO if clinically indicated for bacterial endocarditis.  Recommend modified Duke criteria for clinical diagnosis of bacterial endocarditis    Stress Myoview-  Cath-      Medication Review:   Scheduled Meds:aspirin, 81 mg, Oral, Daily  atorvastatin, 80 mg, Oral, Daily  insulin lispro, 2-7 Units, Subcutaneous, 4x Daily AC & at Bedtime  levothyroxine, 112 mcg, Oral, Daily  losartan, 50 mg, Oral, Daily  nebivolol, 2.5 mg, Oral, Q24H  NIFEdipine XL, 30 mg, Oral, Daily  pantoprazole, 40 mg, Oral, Daily  rivaroxaban, 15 mg, Oral, Daily With Dinner  sodium chloride, 10 mL, Intravenous, Q12H  vancomycin, 1,250 mg, Intravenous, Q24H      Continuous Infusions:Pharmacy to dose vancomycin,       PRN Meds:.  acetaminophen    atropine    senna-docusate sodium **AND** polyethylene glycol **AND** bisacodyl **AND** bisacodyl    Calcium Replacement - Follow Nurse / BPA Driven Protocol    dextrose    dextrose    diphenhydrAMINE    ePHEDrine Sulfate (Pressors)    glucagon (human recombinant)    guaifenesin    hydrALAZINE    ipratropium-albuterol    labetalol    lidocaine (cardiac)    Magnesium Standard Dose Replacement - Follow Nurse / BPA Driven Protocol    nitroglycerin    ondansetron    Pharmacy to dose vancomycin    Phosphorus Replacement - Follow Nurse / BPA Driven Protocol    Potassium Replacement - Follow Nurse / BPA Driven Protocol    sodium chloride    sodium chloride      Assessment & Plan     Weakness of both lower extremities    Gait disturbance    Bacteremia    MDM:    1.  Bacteremia:    Patient underwent CHRISTIANO and it did not show vegetation.    2.  Hypertension:    I would increase Bystolic to 5 mg daily    3.  Status post TAVR:    It is functioning appropriately no regurgitation noted    4.  Paroxysmal atrial fibrillation:    Patient is on Xarelto.    Electronically signed by Steve Muhammad MD, 03/27/25,  6:40 PM EDT.      Steve Muhammad MD  03/27/25  18:34 EDT

## 2025-03-27 NOTE — CASE MANAGEMENT/SOCIAL WORK
Case Management Readmission Assessment Note    Case Management Readmission Assessment (all recorded)       Readmission Interview       Row Name 03/27/25 0836             Readmission Indications    Is the patient and/or family able to complete the readmission assessment questions? Yes      Is this hospitalization related to the prior hospital diagnosis? No        Row Name 03/27/25 0836             Recommendation for rehospitalization    Did you speak with your physician prior to coming to the hospital Yes      If yes, what physician did you speak with? Patient contacted Dr. Muhammad's office who instructed patient to go to the emergency department for further evaluation.        Row Name 03/27/25 0836             Follow-up Appointments    Do you have a PCP? Yes      Did you have an appointment with PCP after your hospitalization? Yes      When was your appointment scheduled? 03/10/25      Did you go to appointment? Yes      Did you have an appointment with a Specialist? Yes  nephrology      When was your appointment scheduled? 03/03/25      Did you go to appointment? Yes      Are you current with the Pulmonary Clinic? No      Are you current with the CHF Clinic? No        Row Name 03/27/25 0836             Medications    Did you have newly prescribed medications at discharge? Yes  keflex BIDx5 days per Cardiology. told to stop taking Norvasc, Jardiance, and losartan      Did you understand the reasons for your medications at discharge and how to take them? Yes      Did you understand the side effects of your medications? Yes      Are you taking all of you prescribed medications? Yes      What pharmacy was used to fill prescription(s)? [facility]      Were medications picked up? Yes        Row Name 03/27/25 0836             Discharge Instructions    Did you understand your discharge instructions? Yes      Did your family/caregiver hear your instructions? Yes      Were you told to eat a special diet? Yes  diabetic diet       Did you adhere to the diet? Yes      Were you given a number of someone to call if you had questions or concerns? Yes        Row Name 03/27/25 0836             Index discharge location/services    Where did you go upon discharge? Home      Do you have supportive family or friends in the home? Yes        Row Name 03/27/25 0836             Discharge Readiness    On a scale of 1-5 (5 being well prepared), how ready were you for discharge 4      Recommendation based on interview Other (comment)  patient and family doing everything correct and following instructions.        Row Name 03/27/25 0836             Palliative Care/Hospice    Are you current with Palliative Care? No      Are you current with Hospice Care? No        Row Name 03/21/25 2045             Advance Directives (For Healthcare)    Pre-existing AND/MOST/POLST Order Yes, notify physician for order      Advance Directive Status Patient has advance directive, copy in chart      Type of Advance Directive Health care directive/Living will      Have you reviewed your Advance Directive and is it valid for this stay? Yes      Literature Provided on Advance Directives No      Patient Requests Assistance on Advance Directives Patient Declined        Row Name 03/27/25 0836             Readmission Assessment Final Comments    Final Comments [address] - Michelle Wallace : Admitted in observation status 03/21/2025 with gait disturbance and weakness, was ready for discharge and was awaiting placement, however, he was changed to inpatient on 03/25/2025 for bacteremia. Temp up to 101.7. 2/2 Blood cultures positive for Enterococcus faecium. Lactate 2.1. On IV ABX. MCG supports the inpatient status. Submitted to Michelle Wallace, determination pending. Prior stay: Admitted in observation status on 02/20/2025, changed to inpatient on 02/23/2025, discharged to home on 02/26/2025 with dizziness and AUDREY. MCG supported the inpatient status. Michelle Wallace approved the inpatient  status.

## 2025-03-27 NOTE — PLAN OF CARE
Goal Outcome Evaluation:         CHRISTIANO today, negative, Blood cultures sent, IV abx, possible d/c to rehab over weekend with picc for abx treatment

## 2025-03-28 LAB
ALBUMIN SERPL-MCNC: 3 G/DL (ref 3.5–5.2)
ALBUMIN/GLOB SERPL: 0.9 G/DL
ALP SERPL-CCNC: 69 U/L (ref 39–117)
ALT SERPL W P-5'-P-CCNC: 73 U/L (ref 1–41)
ANION GAP SERPL CALCULATED.3IONS-SCNC: 11.6 MMOL/L (ref 5–15)
ANION GAP SERPL CALCULATED.3IONS-SCNC: 12.5 MMOL/L (ref 5–15)
AST SERPL-CCNC: 54 U/L (ref 1–40)
BILIRUB SERPL-MCNC: 0.3 MG/DL (ref 0–1.2)
BUN SERPL-MCNC: 20 MG/DL (ref 8–23)
BUN SERPL-MCNC: 23 MG/DL (ref 8–23)
BUN/CREAT SERPL: 15.4 (ref 7–25)
BUN/CREAT SERPL: 16.4 (ref 7–25)
CALCIUM SPEC-SCNC: 8.5 MG/DL (ref 8.6–10.5)
CALCIUM SPEC-SCNC: 8.7 MG/DL (ref 8.6–10.5)
CHLORIDE SERPL-SCNC: 103 MMOL/L (ref 98–107)
CHLORIDE SERPL-SCNC: 105 MMOL/L (ref 98–107)
CO2 SERPL-SCNC: 20.4 MMOL/L (ref 22–29)
CO2 SERPL-SCNC: 21.5 MMOL/L (ref 22–29)
CREAT SERPL-MCNC: 1.3 MG/DL (ref 0.76–1.27)
CREAT SERPL-MCNC: 1.4 MG/DL (ref 0.76–1.27)
DEPRECATED RDW RBC AUTO: 47 FL (ref 37–54)
EGFRCR SERPLBLD CKD-EPI 2021: 49.3 ML/MIN/1.73
EGFRCR SERPLBLD CKD-EPI 2021: 53.8 ML/MIN/1.73
ERYTHROCYTE [DISTWIDTH] IN BLOOD BY AUTOMATED COUNT: 13.8 % (ref 12.3–15.4)
GLOBULIN UR ELPH-MCNC: 3.2 GM/DL
GLUCOSE BLDC GLUCOMTR-MCNC: 149 MG/DL (ref 70–105)
GLUCOSE BLDC GLUCOMTR-MCNC: 167 MG/DL (ref 70–105)
GLUCOSE BLDC GLUCOMTR-MCNC: 187 MG/DL (ref 70–105)
GLUCOSE BLDC GLUCOMTR-MCNC: 195 MG/DL (ref 70–105)
GLUCOSE BLDC GLUCOMTR-MCNC: 206 MG/DL (ref 70–105)
GLUCOSE SERPL-MCNC: 172 MG/DL (ref 65–99)
GLUCOSE SERPL-MCNC: 183 MG/DL (ref 65–99)
HCT VFR BLD AUTO: 32.6 % (ref 37.5–51)
HGB BLD-MCNC: 10.2 G/DL (ref 13–17.7)
MCH RBC QN AUTO: 28.7 PG (ref 26.6–33)
MCHC RBC AUTO-ENTMCNC: 31.3 G/DL (ref 31.5–35.7)
MCV RBC AUTO: 91.6 FL (ref 79–97)
PLATELET # BLD AUTO: 321 10*3/MM3 (ref 140–450)
PMV BLD AUTO: 10 FL (ref 6–12)
POTASSIUM SERPL-SCNC: 4.1 MMOL/L (ref 3.5–5.2)
POTASSIUM SERPL-SCNC: 4.2 MMOL/L (ref 3.5–5.2)
PROT SERPL-MCNC: 6.2 G/DL (ref 6–8.5)
RBC # BLD AUTO: 3.56 10*6/MM3 (ref 4.14–5.8)
SODIUM SERPL-SCNC: 137 MMOL/L (ref 136–145)
SODIUM SERPL-SCNC: 137 MMOL/L (ref 136–145)
WBC NRBC COR # BLD AUTO: 11.78 10*3/MM3 (ref 3.4–10.8)

## 2025-03-28 PROCEDURE — 80048 BASIC METABOLIC PNL TOTAL CA: CPT | Performed by: STUDENT IN AN ORGANIZED HEALTH CARE EDUCATION/TRAINING PROGRAM

## 2025-03-28 PROCEDURE — 85027 COMPLETE CBC AUTOMATED: CPT | Performed by: STUDENT IN AN ORGANIZED HEALTH CARE EDUCATION/TRAINING PROGRAM

## 2025-03-28 PROCEDURE — 25010000002 VANCOMYCIN HCL 1.25 G RECONSTITUTED SOLUTION 1 EACH VIAL: Performed by: STUDENT IN AN ORGANIZED HEALTH CARE EDUCATION/TRAINING PROGRAM

## 2025-03-28 PROCEDURE — 99232 SBSQ HOSP IP/OBS MODERATE 35: CPT | Performed by: INTERNAL MEDICINE

## 2025-03-28 PROCEDURE — 25810000003 SODIUM CHLORIDE 0.9 % SOLUTION 250 ML FLEX CONT: Performed by: STUDENT IN AN ORGANIZED HEALTH CARE EDUCATION/TRAINING PROGRAM

## 2025-03-28 PROCEDURE — 82948 REAGENT STRIP/BLOOD GLUCOSE: CPT

## 2025-03-28 PROCEDURE — 63710000001 INSULIN LISPRO (HUMAN) PER 5 UNITS

## 2025-03-28 RX ORDER — OXYCODONE HYDROCHLORIDE 5 MG/1
10 TABLET ORAL EVERY 4 HOURS PRN
Refills: 0 | Status: DISCONTINUED | OUTPATIENT
Start: 2025-03-28 | End: 2025-03-29 | Stop reason: HOSPADM

## 2025-03-28 RX ADMIN — NEBIVOLOL 5 MG: 10 TABLET ORAL at 08:42

## 2025-03-28 RX ADMIN — OXYCODONE HYDROCHLORIDE 10 MG: 5 TABLET ORAL at 21:18

## 2025-03-28 RX ADMIN — NIFEDIPINE 30 MG: 30 TABLET, EXTENDED RELEASE ORAL at 08:42

## 2025-03-28 RX ADMIN — ASPIRIN 81 MG CHEWABLE TABLET 81 MG: 81 TABLET CHEWABLE at 08:41

## 2025-03-28 RX ADMIN — VANCOMYCIN HYDROCHLORIDE 1250 MG: 1.25 INJECTION, POWDER, LYOPHILIZED, FOR SOLUTION INTRAVENOUS at 11:41

## 2025-03-28 RX ADMIN — Medication 10 ML: at 08:41

## 2025-03-28 RX ADMIN — INSULIN LISPRO 3 UNITS: 100 INJECTION, SOLUTION INTRAVENOUS; SUBCUTANEOUS at 22:28

## 2025-03-28 RX ADMIN — LEVOTHYROXINE SODIUM 112 MCG: 112 TABLET ORAL at 08:41

## 2025-03-28 RX ADMIN — INSULIN LISPRO 2 UNITS: 100 INJECTION, SOLUTION INTRAVENOUS; SUBCUTANEOUS at 08:40

## 2025-03-28 RX ADMIN — LOSARTAN POTASSIUM 50 MG: 50 TABLET, FILM COATED ORAL at 08:41

## 2025-03-28 RX ADMIN — ATORVASTATIN CALCIUM 80 MG: 40 TABLET, FILM COATED ORAL at 18:10

## 2025-03-28 RX ADMIN — Medication 10 ML: at 21:19

## 2025-03-28 RX ADMIN — PANTOPRAZOLE SODIUM 40 MG: 40 TABLET, DELAYED RELEASE ORAL at 08:42

## 2025-03-28 RX ADMIN — RIVAROXABAN 15 MG: 15 TABLET, FILM COATED ORAL at 18:11

## 2025-03-28 RX ADMIN — INSULIN LISPRO 2 UNITS: 100 INJECTION, SOLUTION INTRAVENOUS; SUBCUTANEOUS at 11:41

## 2025-03-28 NOTE — PLAN OF CARE
Problem: Adult Inpatient Plan of Care  Goal: Plan of Care Review  Outcome: Progressing  Flowsheets (Taken 3/28/2025 1253)  Progress: improving  Plan of Care Reviewed With: patient  Goal: Patient-Specific Goal (Individualized)  Outcome: Progressing  Goal: Absence of Hospital-Acquired Illness or Injury  Outcome: Progressing  Intervention: Identify and Manage Fall Risk  Recent Flowsheet Documentation  Taken 3/28/2025 1200 by Adri Barragan, RN  Safety Promotion/Fall Prevention:   activity supervised   clutter free environment maintained   fall prevention program maintained   lighting adjusted   nonskid shoes/slippers when out of bed   room organization consistent   safety round/check completed  Taken 3/28/2025 1000 by Adri Barragan, RN  Safety Promotion/Fall Prevention:   activity supervised   clutter free environment maintained   fall prevention program maintained   lighting adjusted   nonskid shoes/slippers when out of bed   room organization consistent   safety round/check completed  Taken 3/28/2025 0855 by Adri Barragan, RN  Safety Promotion/Fall Prevention:   activity supervised   clutter free environment maintained   fall prevention program maintained   lighting adjusted   nonskid shoes/slippers when out of bed   room organization consistent   safety round/check completed  Intervention: Prevent Skin Injury  Recent Flowsheet Documentation  Taken 3/28/2025 0855 by Adri Barragan, RN  Body Position: position changed independently  Intervention: Prevent and Manage VTE (Venous Thromboembolism) Risk  Recent Flowsheet Documentation  Taken 3/28/2025 0855 by Adri Barragan, RN  VTE Prevention/Management:   bilateral   SCDs (sequential compression devices) off   patient refused intervention  Intervention: Prevent Infection  Recent Flowsheet Documentation  Taken 3/28/2025 1200 by Adri Barragan, RN  Infection Prevention:   equipment surfaces disinfected   hand hygiene promoted   personal  protective equipment utilized   single patient room provided   rest/sleep promoted  Taken 3/28/2025 1000 by Adri Barragan, RN  Infection Prevention:   equipment surfaces disinfected   hand hygiene promoted   personal protective equipment utilized   rest/sleep promoted   single patient room provided  Taken 3/28/2025 0855 by Adri Barragan, RN  Infection Prevention:   equipment surfaces disinfected   hand hygiene promoted   personal protective equipment utilized   rest/sleep promoted   single patient room provided  Goal: Optimal Comfort and Wellbeing  Outcome: Progressing  Intervention: Provide Person-Centered Care  Recent Flowsheet Documentation  Taken 3/28/2025 0855 by Adri Barragan, RN  Trust Relationship/Rapport:   care explained   choices provided   emotional support provided   empathic listening provided   questions answered   questions encouraged   reassurance provided   thoughts/feelings acknowledged  Goal: Readiness for Transition of Care  Outcome: Progressing     Problem: Fatigue  Goal: Improved Activity Tolerance  Outcome: Progressing  Intervention: Promote Improved Energy  Recent Flowsheet Documentation  Taken 3/28/2025 0855 by Adri Barragan, RN  Sleep/Rest Enhancement:   awakenings minimized   consistent schedule promoted   regular sleep/rest pattern promoted   relaxation techniques promoted     Problem: Comorbidity Management  Goal: Blood Glucose Level Within Target Range  Outcome: Progressing  Intervention: Monitor and Manage Glycemia  Recent Flowsheet Documentation  Taken 3/28/2025 1200 by Adri Barragan, RN  Medication Review/Management: medications reviewed  Taken 3/28/2025 1000 by Adri Barragan, RN  Medication Review/Management: medications reviewed  Taken 3/28/2025 0855 by Adri Barragan, RN  Medication Review/Management: medications reviewed  Goal: Blood Pressure in Desired Range  Outcome: Progressing  Intervention: Maintain Blood Pressure Management  Recent  Flowsheet Documentation  Taken 3/28/2025 1200 by Adri Barragan, RN  Medication Review/Management: medications reviewed  Taken 3/28/2025 1000 by Adri Barragan, RN  Medication Review/Management: medications reviewed  Taken 3/28/2025 0855 by Adri Barragan, RN  Medication Review/Management: medications reviewed     Problem: Fall Injury Risk  Goal: Absence of Fall and Fall-Related Injury  Outcome: Progressing  Intervention: Identify and Manage Contributors  Recent Flowsheet Documentation  Taken 3/28/2025 1200 by Adri Barragan, RN  Medication Review/Management: medications reviewed  Taken 3/28/2025 1000 by Adri Barragan, RN  Medication Review/Management: medications reviewed  Taken 3/28/2025 0855 by Adri Barragan, RN  Medication Review/Management: medications reviewed  Intervention: Promote Injury-Free Environment  Recent Flowsheet Documentation  Taken 3/28/2025 1200 by Adri Barragan, RN  Safety Promotion/Fall Prevention:   activity supervised   clutter free environment maintained   fall prevention program maintained   lighting adjusted   nonskid shoes/slippers when out of bed   room organization consistent   safety round/check completed  Taken 3/28/2025 1000 by Adri Barragan, RN  Safety Promotion/Fall Prevention:   activity supervised   clutter free environment maintained   fall prevention program maintained   lighting adjusted   nonskid shoes/slippers when out of bed   room organization consistent   safety round/check completed  Taken 3/28/2025 0855 by Adri Barragan, RN  Safety Promotion/Fall Prevention:   activity supervised   clutter free environment maintained   fall prevention program maintained   lighting adjusted   nonskid shoes/slippers when out of bed   room organization consistent   safety round/check completed     Problem: Skin Injury Risk Increased  Goal: Skin Health and Integrity  Outcome: Progressing  Intervention: Optimize Skin Protection  Recent Flowsheet  Documentation  Taken 3/28/2025 0855 by Adri Barragan, RN  Head of Bed (HOB) Positioning:   HOB elevated   HOB at 20-30 degrees     Problem: Sepsis/Septic Shock  Goal: Optimal Coping  Outcome: Progressing  Intervention: Support Patient and Family Response  Recent Flowsheet Documentation  Taken 3/28/2025 0855 by Adri Barragan, RN  Family/Support System Care:   support provided   self-care encouraged  Goal: Absence of Bleeding  Outcome: Progressing  Goal: Blood Glucose Level Within Target Range  Outcome: Progressing  Goal: Absence of Infection Signs and Symptoms  Outcome: Progressing  Intervention: Initiate Sepsis Management  Recent Flowsheet Documentation  Taken 3/28/2025 1200 by Adri Barragan, RN  Infection Prevention:   equipment surfaces disinfected   hand hygiene promoted   personal protective equipment utilized   single patient room provided   rest/sleep promoted  Taken 3/28/2025 1000 by Adri Barragan, RN  Infection Prevention:   equipment surfaces disinfected   hand hygiene promoted   personal protective equipment utilized   rest/sleep promoted   single patient room provided  Taken 3/28/2025 0855 by Adri Barragan, RN  Infection Prevention:   equipment surfaces disinfected   hand hygiene promoted   personal protective equipment utilized   rest/sleep promoted   single patient room provided  Intervention: Promote Recovery  Recent Flowsheet Documentation  Taken 3/28/2025 0855 by Adri Barragan, RN  Sleep/Rest Enhancement:   awakenings minimized   consistent schedule promoted   regular sleep/rest pattern promoted   relaxation techniques promoted  Goal: Optimal Nutrition Delivery  Outcome: Progressing   Goal Outcome Evaluation:Pt. With no acute events this shift, progressing towards goal.   Plan of Care Reviewed With: patient        Progress: improving

## 2025-03-28 NOTE — PROGRESS NOTES
Infectious Diseases Progress Note      LOS: 3 days   Patient Care Team:  Serenity Wren MD as PCP - General (Family Medicine)  Steve Muhammad MD as Consulting Physician (Cardiology)  CAROLE Vasquez DPM as Consulting Physician (Podiatry)  David Urbina MD as Consulting Physician (Otolaryngology)  Gregory Valle MD as Surgeon (Vascular Surgery)  Gómez Arvizu MD as Consulting Physician (Ophthalmology)  Radha Danielle MD as Consulting Physician (Cardiology)  Modesto Mccall MD as Consulting Physician (Nephrology)    Chief Complaint: Fatigue, lower extremity weakness    Subjective       The patient had a high temperature in the last 24 hours.  The patient is on room air, hemodynamically stable, and is tolerating antimicrobial therapy.  Currently in the chair with no new complaints      Review of Systems:   Review of Systems   Constitutional:  Positive for fatigue.   HENT: Negative.     Eyes: Negative.    Respiratory: Negative.     Cardiovascular: Negative.    Gastrointestinal: Negative.    Endocrine: Negative.    Genitourinary: Negative.    Musculoskeletal: Negative.    Skin: Negative.    Neurological:  Positive for weakness.   Psychiatric/Behavioral: Negative.     All other systems reviewed and are negative.       Objective     Vital Signs  Temp:  [97.1 °F (36.2 °C)-98.8 °F (37.1 °C)] 97.1 °F (36.2 °C)  Heart Rate:  [60-67] 60  Resp:  [17-19] 17  BP: (134-187)/(60-83) 139/60    Physical Exam:  Physical Exam  Vitals and nursing note reviewed.   Constitutional:       General: He is not in acute distress.     Appearance: He is well-developed and normal weight. He is ill-appearing. He is not diaphoretic.   HENT:      Head: Normocephalic and atraumatic.   Eyes:      Conjunctiva/sclera: Conjunctivae normal.      Pupils: Pupils are equal, round, and reactive to light.   Cardiovascular:      Rate and Rhythm: Normal rate and regular rhythm.      Heart sounds: Normal heart sounds, S1 normal and S2 normal.    Pulmonary:      Effort: Pulmonary effort is normal. No respiratory distress.      Breath sounds: Normal breath sounds. No stridor. No wheezing or rales.   Abdominal:      General: Bowel sounds are normal. There is no distension.      Palpations: Abdomen is soft. There is no mass.      Tenderness: There is no abdominal tenderness. There is no guarding.   Musculoskeletal:         General: No deformity.      Cervical back: Neck supple.   Skin:     General: Skin is warm and dry.      Coloration: Skin is not pale.      Findings: No erythema or rash.   Neurological:      Mental Status: He is alert and oriented to person, place, and time.      Motor: Weakness present.          Results Review:    I have reviewed all clinical data, test, lab, and imaging results.     Radiology  No Radiology Exams Resulted Within Past 24 Hours      Cardiology    Laboratory    Results from last 7 days   Lab Units 03/27/25 2332 03/26/25 2327 03/26/25  0047 03/25/25  0519 03/24/25 2028 03/23/25  2354 03/23/25  1027   WBC 10*3/mm3 11.07* 11.35* 12.02* 10.00 8.93 9.48 8.73   HEMOGLOBIN g/dL 10.1* 11.9* 12.4* 10.8* 11.5* 11.4* 10.6*   HEMATOCRIT % 32.3* 38.5 41.0 36.1* 37.7 36.6* 33.6*   PLATELETS 10*3/mm3 297 293 282 227 253 251 201     Results from last 7 days   Lab Units 03/27/25  2332 03/26/25 2327 03/26/25  0047 03/25/25  0005 03/23/25  2354 03/23/25  1027 03/21/25  1805   SODIUM mmol/L 137 137 138 139 138 137 138   POTASSIUM mmol/L 4.1 3.9 3.8 3.9 3.9 4.0 4.2   CHLORIDE mmol/L 103 102 104 108* 106 108* 105   CO2 mmol/L 21.5* 21.9* 22.7 20.8* 19.0* 18.6* 20.3*   BUN mg/dL 20 19 18 23 23 21 29*   CREATININE mg/dL 1.30* 1.28* 1.38* 1.38* 1.43* 1.38* 1.59*   GLUCOSE mg/dL 172* 217* 139* 154* 180* 203* 127*   ALBUMIN g/dL 3.0*  --   --   --   --   --   --    BILIRUBIN mg/dL 0.3  --   --   --   --   --   --    ALK PHOS U/L 69  --   --   --   --   --   --    AST (SGOT) U/L 54*  --   --   --   --   --   --    ALT (SGPT) U/L 73*  --   --   --    --   --   --    CALCIUM mg/dL 8.5* 8.8 8.8 8.3* 8.3* 8.0* 8.4*                 Microbiology   Microbiology Results (last 10 days)       Procedure Component Value - Date/Time    Blood Culture - Blood, Arm, Left [[id number]]  (Normal) Collected: 03/27/25 1147    Lab Status: Preliminary result Specimen: Blood from Arm, Left Updated: 03/28/25 1230     Blood Culture No growth at 24 hours    Narrative:      Less than seven (7) mL's of blood was collected.  Insufficient quantity may yield false negative results.    Blood Culture - Blood, Arm, Right [[id number]]  (Normal) Collected: 03/27/25 1147    Lab Status: Preliminary result Specimen: Blood from Arm, Right Updated: 03/28/25 1230     Blood Culture No growth at 24 hours    Narrative:      Less than seven (7) mL's of blood was collected.  Insufficient quantity may yield false negative results.    Blood Culture - Blood, Hand, Right [[id number]]  (Normal) Collected: 03/25/25 1724    Lab Status: Preliminary result Specimen: Blood from Hand, Right Updated: 03/27/25 1745     Blood Culture No growth at 2 days    Narrative:      Less than seven (7) mL's of blood was collected.  Insufficient quantity may yield false negative results.    Blood Culture - Blood, Hand, Left [[id number]]  (Abnormal) Collected: 03/25/25 0005    Lab Status: Final result Specimen: Blood from Hand, Left Updated: 03/27/25 0623     Blood Culture Enterococcus faecium     Comment:   Infectious disease consultation is highly recommended.        Isolated from Aerobic and Anaerobic Bottles     Gram Stain Anaerobic Bottle Gram positive cocci in chains      Aerobic Bottle Gram positive cocci in chains    Narrative:      Refer to previous blood culture collected on 03/24/2025 2028 for Mics.     Respiratory Panel PCR w/COVID-19(SARS-CoV-2) SUPA/GURU/LORI/PAD/COR/DEANGELO In-House, NP Swab in UTM/VTM, 2 HR TAT - Swab, Nasopharynx [[id number]]  (Normal) Collected: 03/24/25 2156    Lab Status: Final result Specimen: Swab from  Nasopharynx Updated: 03/24/25 2310     ADENOVIRUS, PCR Not Detected     Coronavirus 229E Not Detected     Coronavirus HKU1 Not Detected     Coronavirus NL63 Not Detected     Coronavirus OC43 Not Detected     COVID19 Not Detected     Human Metapneumovirus Not Detected     Human Rhinovirus/Enterovirus Not Detected     Influenza A PCR Not Detected     Influenza B PCR Not Detected     Parainfluenza Virus 1 Not Detected     Parainfluenza Virus 2 Not Detected     Parainfluenza Virus 3 Not Detected     Parainfluenza Virus 4 Not Detected     RSV, PCR Not Detected     Bordetella pertussis pcr Not Detected     Bordetella parapertussis PCR Not Detected     Chlamydophila pneumoniae PCR Not Detected     Mycoplasma pneumo by PCR Not Detected    Narrative:      In the setting of a positive respiratory panel with a viral infection PLUS a negative procalcitonin without other underlying concern for bacterial infection, consider observing off antibiotics or discontinuation of antibiotics and continue supportive care. If the respiratory panel is positive for atypical bacterial infection (Bordetella pertussis, Chlamydophila pneumoniae, or Mycoplasma pneumoniae), consider antibiotic de-escalation to target atypical bacterial infection.    Blood Culture - Blood, Hand, Right [[id number]]  (Abnormal)  (Susceptibility) Collected: 03/24/25 2028    Lab Status: Final result Specimen: Blood from Hand, Right Updated: 03/27/25 0623     Blood Culture Enterococcus faecium     Comment:   Infectious disease consultation is highly recommended.        Isolated from Aerobic and Anaerobic Bottles     Gram Stain Anaerobic Bottle Gram positive cocci in chains      Aerobic Bottle Gram positive cocci in chains    Susceptibility        Enterococcus faecium      MARIO      Ampicillin Resistant      Gentamicin High Level Synergy Resistant      Streptomycin High Level Synergy Susceptible      Vancomycin Susceptible                           Blood Culture ID, PCR -  Blood, Hand, Right [[id number]]  (Abnormal) Collected: 03/24/25 2028    Lab Status: Final result Specimen: Blood from Hand, Right Updated: 03/25/25 1242     BCID, PCR Enterococcus faecium. Saúl/B (vancomycin resistance gene) not detected. Identification by BCID2 PCR.     BOTTLE TYPE Anaerobic Bottle    Narrative:      Infectious disease consultation is highly recommended to rule out distant foci of infection.            Medication Review:       Schedule Meds  aspirin, 81 mg, Oral, Daily  atorvastatin, 80 mg, Oral, Daily  insulin lispro, 2-7 Units, Subcutaneous, 4x Daily AC & at Bedtime  levothyroxine, 112 mcg, Oral, Daily  losartan, 50 mg, Oral, Daily  nebivolol, 5 mg, Oral, Q24H  NIFEdipine XL, 30 mg, Oral, Daily  pantoprazole, 40 mg, Oral, Daily  rivaroxaban, 15 mg, Oral, Daily With Dinner  sodium chloride, 10 mL, Intravenous, Q12H  vancomycin, 1,250 mg, Intravenous, Q24H        Infusion Meds  Pharmacy to dose vancomycin,         PRN Meds    acetaminophen    atropine    senna-docusate sodium **AND** polyethylene glycol **AND** bisacodyl **AND** bisacodyl    Calcium Replacement - Follow Nurse / BPA Driven Protocol    dextrose    dextrose    diphenhydrAMINE    ePHEDrine Sulfate (Pressors)    glucagon (human recombinant)    guaifenesin    hydrALAZINE    ipratropium-albuterol    labetalol    lidocaine (cardiac)    Magnesium Standard Dose Replacement - Follow Nurse / BPA Driven Protocol    nitroglycerin    ondansetron    oxyCODONE    Pharmacy to dose vancomycin    Phosphorus Replacement - Follow Nurse / BPA Driven Protocol    Potassium Replacement - Follow Nurse / BPA Driven Protocol    sodium chloride    sodium chloride        Assessment & Plan       Antimicrobial Therapy   1.  Vancomycin        2.        3.        4.        5.          Assessment     Enterococcus faecium bacteremia with no obvious source.  Probably transient bacteremia from GI source.  CT scan of abdomen and pelvis was negative as well as urinalysis.   2D echo did not show any obvious vegetation.  CT of the abdomen pelvis did not show any acute findings.  The patient is s/p transesophageal echo March 27, 2025 and showed no vegetation and there was no lesions seen on the visualized part of pacemaker leads.  Repeat blood culture is negative so far from March 25, 2025.  Repeat blood cultures from 3/27/2025 are also negative so far     History of pacemaker placement in July 2024 from complete heart block     History of TAVR procedure in 2023     Type 2 diabetes-most recent A1c is 6.79     History of prostate cancer     Plan     Continue IV vancomycin.  Currently pharmacy following and dosing.  Try to keep trough between 10 and 20  Recommend 2 weeks of IV vancomycin from negative blood culture-last day on 4/7/2025  Patient will need a midline before discharge-please remove when IV antibiotics are completed  Weekly labs CBC and creatinine x 2 weeks  Vancomycin trough per pharmacy  Continue supportive care  Case was discussed with family at bedside  Okay to discharge to rehab anytime from ID standpoint  Not much more to add from infectious disease standpoint-we will sign off at this time-please call with any questions.    Please fax all post discharge lab results, imaging studies and correspondence to this fax number [phone]  For any question or concern please contact our service number [phone]         LARRY Patel  03/28/25  15:53 EDT    Note is dictated utilizing voice recognition software/Dragon

## 2025-03-28 NOTE — CASE MANAGEMENT/SOCIAL WORK
Continued Stay Note  THAD Hernandez     Patient Name: Alen Ratliff  MRN: [id number]  Today's Date: 3/28/2025    Admit Date: 3/21/2025    Plan: [facility] (accepted). No PASRR required, precert approved 3/26-4/8.   Discharge Plan       Row Name 03/28/25 0829       Plan    Plan Comments CHRISTIANO on 3/27 negative for evidence of valvular vegetation, cardiology increasing Bystolic dose to 5mg r/t hypertension, ID following- IV vanc, repeat blood culutres show no growth after 24 hours, will need midline prior to DC for 2 weeks of IV vanc. CM updated [facility] liaisons Juanita/Mely that patient will likely be a weekend discharge and confirmed bed will be ready.           Aron Guerrero RN      Cell number [phone]  Office number [phone]

## 2025-03-28 NOTE — PROGRESS NOTES
[facility] MEDICINE SERVICE  DAILY PROGRESS NOTE    NAME: Alen Ratliff  : 1940  MRN: [id number]      LOS: 3 days     PROVIDER OF SERVICE: Herbert Choi MD    Chief Complaint: Weakness of both lower extremities    Subjective:     Interval History: Patient doing well this morning.  Family was concerned that he was slightly lethargic this morning although he is more alert and oriented when speaking with me.      Review of Systems:   Review of Systems    Objective:     Vital Signs  Temp:  [97.1 °F (36.2 °C)-98.9 °F (37.2 °C)] 97.1 °F (36.2 °C)  Heart Rate:  [60-67] 60  Resp:  [15-19] 17  BP: (134-187)/(60-83) 139/60   Body mass index is 27.67 kg/m².    Physical Exam  Physical Exam  Neurological:      General: No focal deficit present.      Mental Status: He is oriented to person, place, and time. Mental status is at baseline.      Cranial Nerves: No cranial nerve deficit.      Sensory: No sensory deficit.      Coordination: Coordination normal.      Comments: 4/5 b/u upper and lower strength   Psychiatric:         Mood and Affect: Mood normal.         Behavior: Behavior normal.            Diagnostic Data    Results from last 7 days   Lab Units 25  2332   WBC 10*3/mm3 11.07*   HEMOGLOBIN g/dL 10.1*   HEMATOCRIT % 32.3*   PLATELETS 10*3/mm3 297   GLUCOSE mg/dL 172*   CREATININE mg/dL 1.30*   BUN mg/dL 20   SODIUM mmol/L 137   POTASSIUM mmol/L 4.1   AST (SGOT) U/L 54*   ALT (SGPT) U/L 73*   ALK PHOS U/L 69   BILIRUBIN mg/dL 0.3   ANION GAP mmol/L 12.5       No radiology results for the last day        I reviewed the patient's new clinical results.    Assessment/Plan:     Active and Resolved Problems  Active Hospital Problems    Diagnosis  POA    **Weakness of both lower extremities [R29.898]  Yes    Bacteremia [R78.81]  Yes    Gait disturbance [R26.9]  Yes      Resolved Hospital Problems   No resolved problems to display.     E. Faecium Bactermia  Generalized weakness of upper and lower  extremities  -Symptoms now believed likely 2/2 E. Faecalis bactermia  -This is not a common cause of endocarditis, although patient did undergo CHRISTIANO on 3/27 which did not show any evidence of valvular vegetation or any vegetation on the patient's device leads  -Continue IV antibiotics with vancomycin for now; defer to ID for final antibiotic course  -Cultures from 3/25 still with evidence of bacteremia; repeat cultures from 3/27 pending and if negative at 48 hours, can place midline for planned antibiotics after discharge  -Source was likely intestinal, possible translocation of bacteria given recent diarrheal infection per family and patient     AUDREY -now is at baseline  -Renal function appears to be at baseline currently  -Status post fluid resuscitation; tolerating p.o. intake     Type 2 diabetes  -Hold home diabetic regimen  -Low-dose insulin sliding scale     Complete heart block status post pacemaker February 24, 2024  Hypertension  Hyperlipidemia  Hypothyroidism  GERD  CAD  CVA x 2  Aortic stenosis status post TAVR        VTE Prophylaxis:  Pharmacologic VTE prophylaxis orders are present.      Disposition Planning:     Barriers to Discharge: Persistent bacteremia; awaiting clearance of bacteria from the blood and PICC line placement  Anticipated Date of Discharge: 3/29/2025  Place of Discharge: [facility]      Time: 35 minutes     Code Status and Medical Interventions: CPR (Attempt to Resuscitate); Full Support   Ordered at: 03/21/25 2207     Code Status (Patient has no pulse and is not breathing):    CPR (Attempt to Resuscitate)     Medical Interventions (Patient has pulse or is breathing):    Full Support       Signature: Electronically signed by Herbert Choi MD, 03/28/25, 12:35 EDT.  [facility] Hospitalist Team

## 2025-03-28 NOTE — PLAN OF CARE
Goal Outcome Evaluation:  Plan of Care Reviewed With: patient           Outcome Evaluation: pt requested pain medicine for generalized body aches; remains on IV abx; family at bedside; continue to monitor

## 2025-03-28 NOTE — PROGRESS NOTES
Cardiology [place]        LOS:  LOS: 3 days   Patient Name: Alen Ratliff  Age/Sex: 84 y.o. male  : 1940  MRN: [id number]    Day of Service: 25   Length of Stay: 3  Encounter Provider: LARRY Pakrer  Place of Service: [facility] CARDIOLOGY  Patient Care Team:  Serenity Wren MD as PCP - General (Family Medicine)  Steve Muhammad MD as Consulting Physician (Cardiology)  CAROLE Vasquez DPM as Consulting Physician (Podiatry)  David Urbina MD as Consulting Physician (Otolaryngology)  Gregory Valle MD as Surgeon (Vascular Surgery)  Gómez Arvizu MD as Consulting Physician (Ophthalmology)  Radha Danielle MD as Consulting Physician (Cardiology)  Modesto Mccall MD as Consulting Physician (Nephrology)    Subjective:     Chief Complaint: f/u. Bacteremia, s/p prior TAVR, h/o PPM    Subjective: CHRISTIANO yesterday negative for vegetation. No acute CV events    Current Medications:   Scheduled Meds:aspirin, 81 mg, Oral, Daily  atorvastatin, 80 mg, Oral, Daily  insulin lispro, 2-7 Units, Subcutaneous, 4x Daily AC & at Bedtime  levothyroxine, 112 mcg, Oral, Daily  losartan, 50 mg, Oral, Daily  nebivolol, 5 mg, Oral, Q24H  NIFEdipine XL, 30 mg, Oral, Daily  pantoprazole, 40 mg, Oral, Daily  rivaroxaban, 15 mg, Oral, Daily With Dinner  sodium chloride, 10 mL, Intravenous, Q12H  vancomycin, 1,250 mg, Intravenous, Q24H      Continuous Infusions:Pharmacy to dose vancomycin,         Allergies:  Allergies   Allergen Reactions    Azithromycin Unknown - High Severity    Tramadol Unknown - High Severity       Review of Systems   Constitutional: Negative for chills and fever.   HENT:  Negative for ear discharge and nosebleeds.    Eyes:  Negative for discharge and redness.   Cardiovascular:  Negative for chest pain, orthopnea, palpitations, paroxysmal nocturnal dyspnea and syncope.   Respiratory:  Negative for cough, shortness of breath and wheezing.    Endocrine: Negative for  heat intolerance.   Skin:  Negative for rash.   Musculoskeletal:  Negative for arthritis and myalgias.   Gastrointestinal:  Negative for abdominal pain, melena, nausea and vomiting.   Genitourinary:  Negative for dysuria and hematuria.   Neurological:  Negative for dizziness, light-headedness, numbness and tremors.   Psychiatric/Behavioral:  Negative for depression. The patient is not nervous/anxious.        Objective:     Temp:  [97.6 °F (36.4 °C)-98.9 °F (37.2 °C)] 98.4 °F (36.9 °C)  Heart Rate:  [61-67] 67  Resp:  [15-19] 18  BP: (134-187)/(64-83) 163/70     Intake/Output Summary (Last 24 hours) at 3/28/2025 1106  Last data filed at 3/27/2025 1930  Gross per 24 hour   Intake 240 ml   Output --   Net 240 ml     Body mass index is 27.67 kg/m².      03/21/25  1657 03/28/25  0338   Weight: 89.5 kg (197 lb 5 oz) 90 kg (198 lb 6.6 oz)         General Appearance:    Alert, cooperative, in no acute distress                                Head: Atraumatic, normocephalic, PERRLA               Neck:   supple, trachea midline, no thyromegaly, no carotid bruit, no JVD   Lungs:     Clear to auscultation, respirations regular, even and               unlabored    Heart:    Regular rhythm and normal rate, normal S1 and S2   Abdomen:     Normal bowel sounds, no masses, no organomegaly, soft  nontender, nondistended, no guarding, no rebound  tenderness   Extremities:   Moves all extremities well, no edema, no cyanosis, no  redness   Pulses:   Pulses palpable and equal bilaterally   Skin:   No bleeding, bruising or rash   Neurologic:   Awake, alert, oriented x3         Lab Review:   Results from last 7 days   Lab Units 03/27/25  2332 03/26/25  2327   SODIUM mmol/L 137 137   POTASSIUM mmol/L 4.1 3.9   CHLORIDE mmol/L 103 102   CO2 mmol/L 21.5* 21.9*   BUN mg/dL 20 19   CREATININE mg/dL 1.30* 1.28*   GLUCOSE mg/dL 172* 217*   CALCIUM mg/dL 8.5* 8.8   AST (SGOT) U/L 54*  --    ALT (SGPT) U/L 73*  --          Results from last 7 days  "  Lab Units 03/27/25  2332 03/26/25  2327   WBC 10*3/mm3 11.07* 11.35*   HEMOGLOBIN g/dL 10.1* 11.9*   HEMATOCRIT % 32.3* 38.5   PLATELETS 10*3/mm3 297 293     Results from last 7 days   Lab Units 03/21/25  1805   INR  1.31*   APTT seconds 32.5     Results from last 7 days   Lab Units 03/23/25  0127 03/21/25  1805   MAGNESIUM mg/dL 2.2 1.2*           Invalid input(s): \"LDLCALC\"  Results from last 7 days   Lab Units 03/21/25  1805   PROBNP pg/mL 772.0     Results from last 7 days   Lab Units 03/21/25  1805   TSH uIU/mL 1.680       Recent Radiology:  Imaging Results (Most Recent)       Procedure Component Value Units Date/Time    CT Abdomen Pelvis Without Contrast [[id number]] Collected: 03/25/25 2222     Updated: 03/25/25 2241    Narrative:      CT ABDOMEN PELVIS WO CONTRAST    Date of Exam: 3/25/2025 5:52 PM EDT    Indication: Patient is bacteremic, rule out source.    Comparison: Renal ultrasound dated 2/25/2025    Technique: Axial CT images were obtained of the abdomen and pelvis without the administration of contrast. Sagittal and coronal reconstructions were performed.  Automated exposure control and iterative reconstruction methods were used.      Findings:  The heart size is normal. There is no pericardial effusion. There is a prosthetic aortic valve. There is coronary artery atherosclerotic calcification.    The liver is normal in size and contour. There is no focal hepatic lesion by noncontrast technique. The gallbladder is present without wall thickening. There is no intrahepatic or extrahepatic biliary ductal dilatation. The spleen is normal in size. The   adrenal glands and pancreas appear within normal limits.    The kidneys are symmetric in size. There is no hydronephrosis. The urinary bladder is fluid-filled without wall thickening. The prostate is surgically absent.    The stomach and duodenum are normal in caliber and configuration. There are no abnormally dilated loops of small bowel to suggest small " bowel obstruction or small bowel inflammation. The appendix is visualized and normal within the right lower quadrant.   There is colonic diverticulosis. There is no diverticulitis or colitis.    The aorta is normal in caliber without aneurysm formation. There is diffuse aortoiliac atherosclerotic calcification. There is no mesenteric, retroperitoneal, or pelvic lymphadenopathy. There are degenerative changes of the thoracolumbar spine.      Impression:      Impression:  1.No acute intra-abdominal or pelvic abnormality.  2.Colonic diverticulosis without diverticulitis.  3.Postsurgical changes of prior prostatectomy.        Electronically Signed: Anshul Coelho    3/25/2025 10:39 PM EDT    Workstation ID: [id number]    XR Chest 1 View [[id number]] Collected: 03/24/25 2048     Updated: 03/24/25 2051    Narrative:      XR CHEST 1 VW    Date of Exam: 3/24/2025 8:00 PM EDT    Indication: elevated temperature    Comparison: Chest AP dated 2/24/2025    Findings:  The lungs are clear bilaterally. The cardiac and mediastinal silhouettes appear normal. No effusion is seen. A TAVR has been performed. A dual-lead transvenous pacemaker has been placed.      Impression:      Impression:  No acute cardiopulmonary disease.      Electronically Signed: Wiliam Jasso MD    3/24/2025 8:49 PM EDT    Workstation ID: [id number]    CT Head Without Contrast [[id number]] Collected: 03/24/25 1635     Updated: 03/24/25 1640    Narrative:      CT HEAD WO CONTRAST    Date of Exam: 3/24/2025 4:23 PM EDT    Indication: weakness, ambulation difficulties.    Comparison: None available.    Technique: Axial CT images were obtained of the head without contrast administration.  Coronal reconstructions were performed.  Automated exposure control and iterative reconstruction methods were used.      Findings:  There is no evidence of hemorrhage. There is no mass effect or midline shift. There is diffuse brain atrophy. Periventricular hypodensities compatible  with chronic small vessel ischemia. Chronic right basal ganglia lacunar infarct    There is no extracerebral collection.    Ventricles are normal in size and configuration for patient's stated age.     Posterior fossa is within normal limits.    Calvarium and skull base appear intact.  Visualized sinuses show no air fluid levels. Visualized orbits are unremarkable.        Impression:      Impression:  1.No acute intracranial abnormality identified.  2.Chronic small vessel ischemia and chronic right basal ganglia lacunar infarct.        Electronically Signed: Kingsley Lynch MD    3/24/2025 4:38 PM EDT    Workstation ID: [id number]            ECHOCARDIOGRAM:    Results for orders placed during the hospital encounter of 03/21/25    Adult Transthoracic Echo Complete W/ Cont if Necessary Per Protocol    Interpretation Summary    Left ventricular systolic function is normal. Left ventricular ejection fraction appears to be 61 - 65%.    Left ventricular wall thickness is consistent with mild to moderate concentric hypertrophy.    Left ventricular diastolic function is consistent with (grade I) impaired relaxation.    There is a TAVR valve present.    Estimated right ventricular systolic pressure from tricuspid regurgitation is normal (<35 mmHg).    No gross vegetation noted.  Recommend CHRISTIANO if clinically indicated for bacterial endocarditis.  Recommend modified Duke criteria for clinical diagnosis of bacterial endocarditis        I reviewed the patient's new clinical results.    EKG:      Assessment:       Weakness of both lower extremities    Gait disturbance    Bacteremia    Bactermia  CHRISTIANO with no vegetation    2. HTN    3. VHD s/p prior TAVR  Functioning well, no significant valvular dysfunction    4. Paroxysmal atrial fibrillation  On Xarelto SR    5. SSS s/p PPM    Plan:   No vegetation on CHRISTIANO  SR presently  Continue bystolic  Continue a/c with Xarelto     Patient is seen and examined and findings are verified.  All  data is reviewed by me personally.  Assessment and plan formulated by APC was done after discussion with attending.  I spent more than 50% of time in taking care of the patient.    Patient is sitting up in chair.  Feeling slightly better but unhappy to be in the hospital.    Hemodynamics are stable    Normal S1 and S2.  No pericardial rub or murmur abdominal exam is benign no leg edema noted.    Patient underwent CHRISTIANO and it showed no gross vegetation.  Patient had bacteremia and would be treated with antibiotics as per ID possibly for 2 weeks.  From a cardiac standpoint patient is stable.    Electronically signed by Steve Muhammad MD, 03/28/25, 3:45 PM EDT.    LARRY Parker  03/28/25  11:06 EDT

## 2025-03-29 VITALS
RESPIRATION RATE: 18 BRPM | BODY MASS INDEX: 27.78 KG/M2 | WEIGHT: 198.41 LBS | SYSTOLIC BLOOD PRESSURE: 120 MMHG | HEIGHT: 71 IN | DIASTOLIC BLOOD PRESSURE: 58 MMHG | TEMPERATURE: 97.1 F | OXYGEN SATURATION: 96 % | HEART RATE: 60 BPM

## 2025-03-29 LAB
GLUCOSE BLDC GLUCOMTR-MCNC: 145 MG/DL (ref 70–105)
GLUCOSE BLDC GLUCOMTR-MCNC: 166 MG/DL (ref 70–105)

## 2025-03-29 PROCEDURE — 25010000002 VANCOMYCIN HCL 1.25 G RECONSTITUTED SOLUTION 1 EACH VIAL: Performed by: STUDENT IN AN ORGANIZED HEALTH CARE EDUCATION/TRAINING PROGRAM

## 2025-03-29 PROCEDURE — 25810000003 SODIUM CHLORIDE 0.9 % SOLUTION 250 ML FLEX CONT: Performed by: STUDENT IN AN ORGANIZED HEALTH CARE EDUCATION/TRAINING PROGRAM

## 2025-03-29 PROCEDURE — 82948 REAGENT STRIP/BLOOD GLUCOSE: CPT

## 2025-03-29 PROCEDURE — C1751 CATH, INF, PER/CENT/MIDLINE: HCPCS

## 2025-03-29 PROCEDURE — 36410 VNPNXR 3YR/> PHY/QHP DX/THER: CPT

## 2025-03-29 RX ORDER — SODIUM CHLORIDE 9 MG/ML
40 INJECTION, SOLUTION INTRAVENOUS AS NEEDED
Status: DISCONTINUED | OUTPATIENT
Start: 2025-03-29 | End: 2025-03-29 | Stop reason: HOSPADM

## 2025-03-29 RX ORDER — IPRATROPIUM BROMIDE AND ALBUTEROL SULFATE 2.5; .5 MG/3ML; MG/3ML
3 SOLUTION RESPIRATORY (INHALATION) ONCE AS NEEDED
Qty: 360 ML | Refills: 0 | Status: SHIPPED | OUTPATIENT
Start: 2025-03-29

## 2025-03-29 RX ORDER — AMOXICILLIN 250 MG
2 CAPSULE ORAL 2 TIMES DAILY PRN
Qty: 30 TABLET | Refills: 0 | Status: SHIPPED | OUTPATIENT
Start: 2025-03-29

## 2025-03-29 RX ORDER — SODIUM CHLORIDE 0.9 % (FLUSH) 0.9 %
10 SYRINGE (ML) INJECTION EVERY 12 HOURS SCHEDULED
Status: DISCONTINUED | OUTPATIENT
Start: 2025-03-29 | End: 2025-03-29 | Stop reason: HOSPADM

## 2025-03-29 RX ORDER — INSULIN LISPRO 100 [IU]/ML
2-7 INJECTION, SOLUTION INTRAVENOUS; SUBCUTANEOUS
Qty: 15 ML | Refills: 12 | Status: SHIPPED | OUTPATIENT
Start: 2025-03-29

## 2025-03-29 RX ORDER — SODIUM CHLORIDE 0.9 % (FLUSH) 0.9 %
10 SYRINGE (ML) INJECTION AS NEEDED
Status: DISCONTINUED | OUTPATIENT
Start: 2025-03-29 | End: 2025-03-29 | Stop reason: HOSPADM

## 2025-03-29 RX ORDER — OXYCODONE HYDROCHLORIDE 10 MG/1
10 TABLET ORAL EVERY 4 HOURS PRN
Start: 2025-03-29

## 2025-03-29 RX ADMIN — NIFEDIPINE 30 MG: 30 TABLET, EXTENDED RELEASE ORAL at 09:33

## 2025-03-29 RX ADMIN — LEVOTHYROXINE SODIUM 112 MCG: 112 TABLET ORAL at 09:32

## 2025-03-29 RX ADMIN — ASPIRIN 81 MG CHEWABLE TABLET 81 MG: 81 TABLET CHEWABLE at 09:32

## 2025-03-29 RX ADMIN — PANTOPRAZOLE SODIUM 40 MG: 40 TABLET, DELAYED RELEASE ORAL at 09:32

## 2025-03-29 RX ADMIN — OXYCODONE HYDROCHLORIDE 10 MG: 5 TABLET ORAL at 09:32

## 2025-03-29 RX ADMIN — NEBIVOLOL 5 MG: 10 TABLET ORAL at 09:32

## 2025-03-29 RX ADMIN — LOSARTAN POTASSIUM 50 MG: 50 TABLET, FILM COATED ORAL at 09:32

## 2025-03-29 RX ADMIN — VANCOMYCIN HYDROCHLORIDE 1250 MG: 1.25 INJECTION, POWDER, LYOPHILIZED, FOR SOLUTION INTRAVENOUS at 15:10

## 2025-03-29 RX ADMIN — Medication 10 ML: at 09:35

## 2025-03-29 NOTE — DISCHARGE SUMMARY
[facility] Medicine Services  Discharge Summary    Date of Service: 3/29/2025  Patient Name: Alen Ratliff  : 1940  MRN: [id number]    Date of Admission: 3/21/2025  Discharge Diagnosis: Weakness of both lower extremities  Date of Discharge: 3/29/2025  Primary Care Physician: Serenity Wren MD      Presenting Problem:   Gait disturbance [R26.9]  Electrolyte abnormality [E87.8]  Anemia, unspecified type [D64.9]  Bacteremia [R78.81]    Active and Resolved Hospital Problems:  Active Hospital Problems    Diagnosis POA    **Weakness of both lower extremities [R29.898] Yes    Bacteremia [R78.81] Yes    Gait disturbance [R26.9] Yes      Resolved Hospital Problems   No resolved problems to display.       Bilateral lower extremity weakness  -Given history of strokes,    brain CT-.No acute intracranial abnormality identified.  2.Chronic small vessel ischemia and chronic right basal ganglia lacunar infarct.  -PT OT eval  -Falls precaution     AUDREY  -Creatinine 1.72 on presentation  -NS at 100 cc/h    E. Faecium Bactermia  Generalized weakness of upper and lower extremities  -Symptoms now believed likely 2/2 E. Faecalis bactermia  -This is not a common cause of endocarditis, although patient did undergo CHRISTIANO on 3/27 which did not show any evidence of valvular vegetation or any vegetation on the patient's device leads  -Continue IV antibiotics with vancomycin for now; defer to ID for final antibiotic course  -Cultures from 3/25 still with evidence of bacteremia; repeat cultures from 3/27 pending and if negative at 48 hours, can place midline for planned antibiotics after discharge  -Source was likely intestinal, possible translocation of bacteria given recent diarrheal infection per family and patient     AUDREY -now is at baseline  -Renal function appears to be at baseline currently  -Status post fluid resuscitation; tolerating p.o. intake     Type 2 diabetes  -cont home diabetic regimen  -Low-dose  "insulin sliding scale     Complete heart block status post pacemaker February 24, 2024  Hypertension  Hyperlipidemia  Hypothyroidism  GERD  CAD  CVA x 2   Aortic stenosis status post TAVR  -Resume home meds when verified by pharmacy and clinically appropriate       Hospital Course     History of Present Illness: Alen Ratliff is a 84 y.o. male with a CMH of atrial fibrillation on xarelto , complete heart block s/p pacemaker (2/24/2025), CVA X2, coronary artery disease S/P TAVR, type 2 diabetes, GERD, hyperlipidemia, hypertension, hypothyroidism   who presented to [facility] on [facility] with  lower extremity weakness in the past 5 days.  Described as requiring more effort to move and heaviness in his legs.  He attributes this to changes in his medications since he was discharged.  Per wife at bedside.  Patient reports blood pressure meds where \"too strong\" and was asked to decrease dose several times before presentation.  She denies any focal neurological deficits, falls, hypotensive episodes, denies recent sick contacts, joint pain, fever or chills.  In the ED, vitally stable, labs notable for creatinine 1.72.      3/28-Patient doing well this morning.  Family was concerned that he was slightly lethargic this morning although he is more alert and oriented when speaking with me.   3/29 patient seen and examined medical distress, family at bedside, doing better today, will discharge patient to rehab.  Condition at discharge stable.      DISCHARGE Follow Up Recommendations for labs and diagnostics: Follow with pcp in one week        Day of Discharge     Vital Signs:  Temp:  [97.4 °F (36.3 °C)-98.3 °F (36.8 °C)] 97.5 °F (36.4 °C)  Heart Rate:  [60-63] 60  Resp:  [16-20] 20  BP: (122-170)/(54-71) 147/68      Physical Exam      Appearance: Normal appearance.   HENT:      Head: Normocephalic.      Right Ear: Tympanic membrane normal.      Left Ear: Tympanic membrane normal.      Mouth/Throat:      Mouth: " Mucous membranes are moist.   Eyes:      Pupils: Pupils are equal, round, and reactive to light.   Cardiovascular:      Rate and Rhythm: Normal rate and regular rhythm.      Heart sounds: No murmur heard.  Pulmonary:      Effort: No respiratory distress.      Breath sounds: No wheezing.   Abdominal:      General: There is no distension.      Palpations: There is no mass.   Musculoskeletal:         General: Normal range of motion.   Skin:     General: Skin is warm and dry.      Capillary Refill: Capillary refill takes less than 2 seconds.   Neurological:      General: No focal deficit present.      Mental Status: He is alert and oriented to person, place, and time.       Pertinent  and/or Most Recent Results     LAB RESULTS:      Lab 03/28/25 2304 03/27/25 2332 03/26/25 2327 03/26/25  0047 03/25/25  0519 03/25/25  0005 03/24/25 2028   WBC 11.78* 11.07* 11.35* 12.02* 10.00  --  8.93   HEMOGLOBIN 10.2* 10.1* 11.9* 12.4* 10.8*  --  11.5*   HEMATOCRIT 32.6* 32.3* 38.5 41.0 36.1*  --  37.7   PLATELETS 321 297 293 282 227  --  253   NEUTROS ABS  --  8.33*  --   --   --   --  6.74   IMMATURE GRANS (ABS)  --  0.06*  --   --   --   --  0.05   LYMPHS ABS  --  1.30  --   --   --   --  0.95   MONOS ABS  --  1.22*  --   --   --   --  1.04*   EOS ABS  --  0.11  --   --   --   --  0.10   MCV 91.6 92.0 92.1 93.2 95.3  --  92.4   LACTATE  --   --   --   --   --  1.2 2.1*         Lab 03/28/25 2304 03/27/25 2332 03/26/25 2327 03/26/25  0047 03/25/25  0005 03/23/25  1027 03/23/25  0127   SODIUM 137 137 137 138 139   < >  --    POTASSIUM 4.2 4.1 3.9 3.8 3.9   < >  --    CHLORIDE 105 103 102 104 108*   < >  --    CO2 20.4* 21.5* 21.9* 22.7 20.8*   < >  --    ANION GAP 11.6 12.5 13.1 11.3 10.2   < >  --    BUN 23 20 19 18 23   < >  --    CREATININE 1.40* 1.30* 1.28* 1.38* 1.38*   < >  --    EGFR 49.3* 53.8* 54.8* 50.1* 50.1*   < >  --    GLUCOSE 183* 172* 217* 139* 154*   < >  --    CALCIUM 8.7 8.5* 8.8 8.8 8.3*   < >  --     MAGNESIUM  --   --   --   --   --   --  2.2    < > = values in this interval not displayed.         Lab 03/27/25  2332   TOTAL PROTEIN 6.2   ALBUMIN 3.0*   GLOBULIN 3.2   ALT (SGPT) 73*   AST (SGOT) 54*   BILIRUBIN 0.3   ALK PHOS 69                     Brief Urine Lab Results  (Last result in the past 365 days)        Color   Clarity   Blood   Leuk Est   Nitrite   Protein   CREAT   Urine HCG        03/21/25 1832 Yellow   Cloudy   Negative   Negative   Negative   30 mg/dL (1+)                 Microbiology Results (last 10 days)       Procedure Component Value - Date/Time    Blood Culture - Blood, Arm, Left [[id number]]  (Normal) Collected: 03/27/25 1147    Lab Status: Preliminary result Specimen: Blood from Arm, Left Updated: 03/28/25 1230     Blood Culture No growth at 24 hours    Narrative:      Less than seven (7) mL's of blood was collected.  Insufficient quantity may yield false negative results.    Blood Culture - Blood, Arm, Right [[id number]]  (Normal) Collected: 03/27/25 1147    Lab Status: Preliminary result Specimen: Blood from Arm, Right Updated: 03/28/25 1230     Blood Culture No growth at 24 hours    Narrative:      Less than seven (7) mL's of blood was collected.  Insufficient quantity may yield false negative results.    Blood Culture - Blood, Hand, Right [[id number]]  (Normal) Collected: 03/25/25 1724    Lab Status: Preliminary result Specimen: Blood from Hand, Right Updated: 03/28/25 1745     Blood Culture No growth at 3 days    Narrative:      Less than seven (7) mL's of blood was collected.  Insufficient quantity may yield false negative results.    Blood Culture - Blood, Hand, Left [[id number]]  (Abnormal) Collected: 03/25/25 0005    Lab Status: Final result Specimen: Blood from Hand, Left Updated: 03/27/25 0623     Blood Culture Enterococcus faecium     Comment:   Infectious disease consultation is highly recommended.        Isolated from Aerobic and Anaerobic Bottles     Gram Stain Anaerobic  Bottle Gram positive cocci in chains      Aerobic Bottle Gram positive cocci in chains    Narrative:      Refer to previous blood culture collected on 03/24/2025 2028 for Mics.     Respiratory Panel PCR w/COVID-19(SARS-CoV-2) SUPA/GURU/[facility]I/PAD/COR/DEANGELO In-House, NP Swab in UTM/VTM, 2 HR TAT - Swab, Nasopharynx [[id number]]  (Normal) Collected: 03/24/25 2156    Lab Status: Final result Specimen: Swab from Nasopharynx Updated: 03/24/25 2310     ADENOVIRUS, PCR Not Detected     Coronavirus 229E Not Detected     Coronavirus HKU1 Not Detected     Coronavirus NL63 Not Detected     Coronavirus OC43 Not Detected     COVID19 Not Detected     Human Metapneumovirus Not Detected     Human Rhinovirus/Enterovirus Not Detected     Influenza A PCR Not Detected     Influenza B PCR Not Detected     Parainfluenza Virus 1 Not Detected     Parainfluenza Virus 2 Not Detected     Parainfluenza Virus 3 Not Detected     Parainfluenza Virus 4 Not Detected     RSV, PCR Not Detected     Bordetella pertussis pcr Not Detected     Bordetella parapertussis PCR Not Detected     Chlamydophila pneumoniae PCR Not Detected     Mycoplasma pneumo by PCR Not Detected    Narrative:      In the setting of a positive respiratory panel with a viral infection PLUS a negative procalcitonin without other underlying concern for bacterial infection, consider observing off antibiotics or discontinuation of antibiotics and continue supportive care. If the respiratory panel is positive for atypical bacterial infection (Bordetella pertussis, Chlamydophila pneumoniae, or Mycoplasma pneumoniae), consider antibiotic de-escalation to target atypical bacterial infection.    Blood Culture - Blood, Hand, Right [[id number]]  (Abnormal)  (Susceptibility) Collected: 03/24/25 2028    Lab Status: Final result Specimen: Blood from Hand, Right Updated: 03/27/25 0623     Blood Culture Enterococcus faecium     Comment:   Infectious disease consultation is highly recommended.         Isolated from Aerobic and Anaerobic Bottles     Gram Stain Anaerobic Bottle Gram positive cocci in chains      Aerobic Bottle Gram positive cocci in chains    Susceptibility        Enterococcus faecium      MARIO      Ampicillin Resistant      Gentamicin High Level Synergy Resistant      Streptomycin High Level Synergy Susceptible      Vancomycin Susceptible                           Blood Culture ID, PCR - Blood, Hand, Right [[id number]]  (Abnormal) Collected: 03/24/25 2028    Lab Status: Final result Specimen: Blood from Hand, Right Updated: 03/25/25 1242     BCID, PCR Enterococcus faecium. Saúl/B (vancomycin resistance gene) not detected. Identification by BCID2 PCR.     BOTTLE TYPE Anaerobic Bottle    Narrative:      Infectious disease consultation is highly recommended to rule out distant foci of infection.            CT Abdomen Pelvis Without Contrast  Result Date: 3/25/2025  Impression: Impression: 1.No acute intra-abdominal or pelvic abnormality. 2.Colonic diverticulosis without diverticulitis. 3.Postsurgical changes of prior prostatectomy. Electronically Signed: Anshul Coelho  3/25/2025 10:39 PM EDT  Workstation ID: [id number]    XR Chest 1 View  Result Date: 3/24/2025  Impression: Impression: No acute cardiopulmonary disease. Electronically Signed: Wiliam Jasso MD  3/24/2025 8:49 PM EDT  Workstation ID: [id number]    CT Head Without Contrast  Result Date: 3/24/2025  Impression: Impression: 1.No acute intracranial abnormality identified. 2.Chronic small vessel ischemia and chronic right basal ganglia lacunar infarct. Electronically Signed: Kingsley Lynch MD  3/24/2025 4:38 PM EDT  Workstation ID: [id number]      Results for orders placed during the hospital encounter of 02/20/25    Duplex Renal Artery - Bilateral Complete CAR    Interpretation Summary    No ultrasound evidence of renal artery stenosis.  Technically difficult study and unable to visualize origins of renal arteries bilaterally.  If there is a high  index of suspicion for renovascular hypertension then consider alternative imaging such as CTA or conventional angiography      Results for orders placed during the hospital encounter of 02/20/25    Duplex Renal Artery - Bilateral Complete CAR    Interpretation Summary    No ultrasound evidence of renal artery stenosis.  Technically difficult study and unable to visualize origins of renal arteries bilaterally.  If there is a high index of suspicion for renovascular hypertension then consider alternative imaging such as CTA or conventional angiography      Results for orders placed during the hospital encounter of 03/21/25    Adult Transthoracic Echo Complete W/ Cont if Necessary Per Protocol    Interpretation Summary    Left ventricular systolic function is normal. Left ventricular ejection fraction appears to be 61 - 65%.    Left ventricular wall thickness is consistent with mild to moderate concentric hypertrophy.    Left ventricular diastolic function is consistent with (grade I) impaired relaxation.    There is a TAVR valve present.    Estimated right ventricular systolic pressure from tricuspid regurgitation is normal (<35 mmHg).    No gross vegetation noted.  Recommend CHRISTIANO if clinically indicated for bacterial endocarditis.  Recommend modified Duke criteria for clinical diagnosis of bacterial endocarditis      Labs Pending at Discharge:  Pending Results       Procedure [Order ID] Specimen - Date/Time    Adult Transesophageal Echo (CHRISTIANO) W/ Cont if Necessary Per Protocol [[id number]] Resulted: 03/27/25 1019     Updated: 03/27/25 1019      CV ECHO SHUNT ASSESSMENT PERFORMED (HIDDEN SCRIPTING) 1    Vancomycin, Trough [[id number]]     Specimen: Blood             Procedures Performed           Consults:   Consults       Date and Time Order Name Status Description    3/25/2025  5:02 PM Inpatient Cardiology Consult Completed     3/25/2025  1:28 PM Inpatient Infectious Diseases Consult Completed     3/21/2025  6:50 PM  Hospitalist (on-call MD unless specified)      2/25/2025 11:56 AM Inpatient Nephrology Consult Completed     2/20/2025  4:56 PM Inpatient Cardiology Consult Completed               Discharge Details        Discharge Medications        New Medications        Instructions Start Date   insulin lispro 100 UNIT/ML injection  Commonly known as: HUMALOG/ADMELOG   2-7 Units, Subcutaneous, 4 Times Daily Before Meals & Nightly      ipratropium-albuterol 0.5-2.5 mg/3 ml nebulizer  Commonly known as: DUO-NEB   3 mL, Nebulization, Once As Needed      oxyCODONE 10 MG tablet  Commonly known as: ROXICODONE   10 mg, Oral, Every 4 Hours PRN      sennosides-docusate 8.6-50 MG per tablet  Commonly known as: PERICOLACE   2 tablets, Oral, 2 Times Daily PRN      vancomycin 1250 mg in sodium chloride 0.9% 250 mL (CD)   1,250 mg, Intravenous, Every 24 Hours             Continue These Medications        Instructions Start Date   artificial tears ophthalmic ointment   Every 1 Hour PRN      aspirin 81 MG chewable tablet   81 mg, Oral, Daily      atorvastatin 80 MG tablet  Commonly known as: LIPITOR   80 mg, Oral, Daily      fluticasone 50 MCG/ACT nasal spray  Commonly known as: FLONASE   2 sprays, Daily PRN      levothyroxine 112 MCG tablet  Commonly known as: Synthroid   112 mcg, Oral, Daily      losartan 100 MG tablet  Commonly known as: COZAAR   25 mg, Daily      nebivolol 2.5 MG tablet  Commonly known as: BYSTOLIC   2.5 mg, Oral, Every 24 Hours Scheduled      NIFEdipine CC 30 MG 24 hr tablet  Commonly known as: ADALAT CC   30 mg, Daily      pantoprazole 40 MG EC tablet  Commonly known as: PROTONIX   40 mg, Oral, Daily      rivaroxaban 15 MG tablet  Commonly known as: Xarelto   15 mg, Oral, Daily With Dinner      SITagliptin 50 MG tablet  Commonly known as: Januvia   50 mg, Oral, Daily               Allergies   Allergen Reactions    Azithromycin Unknown - High Severity    Tramadol Unknown - High Severity         Discharge Disposition:    Rehab Facility or Unit (DC - External)    Diet:  Hospital:  Diet Order   Procedures    Diet: Cardiac, Diabetic; Healthy Heart (2-3 Na+); Consistent Carbohydrate; Fluid Consistency: Thin (IDDSI 0)         Discharge Activity:   Activity Instructions       Gradually Increase Activity Until at Pre-Hospitalization Level                CODE STATUS:  Code Status and Medical Interventions: CPR (Attempt to Resuscitate); Full Support   Ordered at: 03/21/25 2207     Code Status (Patient has no pulse and is not breathing):    CPR (Attempt to Resuscitate)     Medical Interventions (Patient has pulse or is breathing):    Full Support         Future Appointments   Date Time Provider Department Center   4/1/2025 11:15 AM Sabrina Tavera APRN [facility] POD NA [facility]   4/1/2025  1:00 PM Aundrea Nath APRN [facility] CAR NA P [facility]   4/15/2025  8:10 AM LAB [facility] [place] [facility]   4/22/2025 11:00 AM Serenity Wren MD [facility] PC [facility]   8/6/2025  3:30 PM Steve Muhammad MD [facility] CAR NA P [facility] NA   2/18/2026  1:45 PM [facility] VASC MACHINE 4 BH [facility] CARDI [facility]   2/18/2026  2:45 PM Catherine Mclaughlin APRN [facility] VS [facility]       Additional Instructions for the Follow-ups that You Need to Schedule       Discharge Follow-up with PCP   As directed       Currently Documented PCP:    Serenity Wren MD    PCP Phone Number:    [phone]     Follow Up Details: 1 week                Time spent on Discharge including face to face service:  35 minutes    Signature: Electronically signed by Maurice Duran MD, 03/29/25, 11:46 EDT.  [facility] Hospitalist Team

## 2025-03-29 NOTE — CONSULTS
Midline Line Insertion Procedure Note    Procedure: Insertion of #18G/10CM powerglide midline    Indications:  Home IV therapy    Procedure Details:   Sterile technique was used including antiseptics, cap, gloves, gown, hand hygiene, mask, and sheet.    #18G/10CM PowerGlide inserted to the R Cephalic vein per hospital protocol by ABBIE Austin.     Non-pulsatile blood return: yes    Ultrasound Guidance: Yes    Lot #: [id number]  Expiration date: 1/31/2026    Complications:  No immediate complications noted.     Findings:  Catheter inserted to 10 cm, with 0 cm exposed.   Mid upper arm circumference is 28 cm.  Catheter was flushed with 10 cc NS and sterile dressing applied.   Patient tolerated procedure well.    Recommendations:  Verbal and/or written Care/Maintenance instructions provided to patient.   Primary nurse notified that midline is okay to use at this time.     Mauro Austin RN  03/29/25  14:32 EDT    DISPLAY PLAN FREE TEXT

## 2025-03-29 NOTE — NURSING NOTE
MD placed order for pt to leave with a midline. Waiting for PICC team to come place line to draw vanc trough and run IV vancomycin before pt discharge.

## 2025-03-29 NOTE — SIGNIFICANT NOTE
03/29/25 1435   OTHER   Discipline physical therapy assistant   Rehab Time/Intention   Session Not Performed other (see comments)  (pt to dc to rehab after midline)   Recommendation   PT - Next Appointment 03/31/25

## 2025-03-29 NOTE — NURSING NOTE
"At this time Pt refused to have labs drawn for vancomycin trough. Pt stated \"not right now\" when this nurse asked if I could obtain lab for Vancomycin trough. Will attempt to draw again in an hour if pt will cooperate.   "

## 2025-03-30 LAB — BACTERIA SPEC AEROBE CULT: NORMAL

## 2025-03-30 NOTE — CASE MANAGEMENT/SOCIAL WORK
Case Management Discharge Note      Final Note: [facility] Continued Care - Discharged on 3/29/2025 Admission date: 3/21/2025 - Discharge disposition: Rehab Facility or Unit (DC - External)      Destination Coordination complete.      Service Provider Services Address Phone Fax Patient Preferred    [facility] [address] [place] [address] [place] [address] [phone] --               Transportation Services  W/C Van: Other (KIRA Perez)    Final Discharge Disposition Code: 62 - inpatient rehab facility

## 2025-04-01 LAB
BACTERIA SPEC AEROBE CULT: NORMAL
BACTERIA SPEC AEROBE CULT: NORMAL

## 2025-04-01 NOTE — PROGRESS NOTES
Enter Query Response Below      Query Response: Acute kidney injury (AUDREY) was not supported Electronically signed by Maurice Duran MD, 04/01/25, 7:59 PM EDT.              If applicable, please update the problem list.

## 2025-04-08 DIAGNOSIS — E78.2 MIXED HYPERLIPIDEMIA: Chronic | ICD-10-CM

## 2025-04-08 LAB — BH CV ECHO SHUNT ASSESSMENT PERFORMED (HIDDEN SCRIPTING): 1

## 2025-04-08 RX ORDER — ATORVASTATIN CALCIUM 80 MG/1
80 TABLET, FILM COATED ORAL DAILY
Qty: 90 TABLET | Refills: 0 | Status: SHIPPED | OUTPATIENT
Start: 2025-04-08

## 2025-04-09 DIAGNOSIS — G45.9 TIA (TRANSIENT ISCHEMIC ATTACK): ICD-10-CM

## 2025-04-09 NOTE — TELEPHONE ENCOUNTER
Caller: Elba Ratliff    Relationship: Emergency Contact    Best call back number: [phone]    Requested Prescriptions:   Requested Prescriptions     Pending Prescriptions Disp Refills    rivaroxaban (Xarelto) 15 MG tablet 90 tablet 0     Sig: Take 1 tablet by mouth Daily With Dinner.        [facility], [address] [phone] [facility] [phone] FX     Last office visit with prescribing clinician: 3/10/2025   Last telemedicine visit with prescribing clinician: Visit date not found   Next office visit with prescribing clinician: 4/22/2025     Additional details provided by patient: PATIENT NEEDS REFILL     Does the patient have less than a 3 day supply:  [] Yes  [x] No    Would you like a call back once the refill request has been completed: [] Yes [x] No    If the office needs to give you a call back, can they leave a voicemail: [] Yes [x] No    Cruz Rascon   04/09/25 09:41 EDT

## 2025-04-15 ENCOUNTER — LAB (OUTPATIENT)
Dept: FAMILY MEDICINE CLINIC | Facility: CLINIC | Age: 85
End: 2025-04-15
Payer: MEDICARE

## 2025-04-15 DIAGNOSIS — E78.2 MIXED HYPERLIPIDEMIA: Primary | Chronic | ICD-10-CM

## 2025-04-15 DIAGNOSIS — I10 ESSENTIAL HYPERTENSION: Chronic | ICD-10-CM

## 2025-04-15 DIAGNOSIS — E11.42 DM TYPE 2 WITH DIABETIC PERIPHERAL NEUROPATHY: ICD-10-CM

## 2025-04-15 DIAGNOSIS — N18.31 STAGE 3A CHRONIC KIDNEY DISEASE: ICD-10-CM

## 2025-04-15 LAB
ALBUMIN SERPL-MCNC: 3.5 G/DL (ref 3.5–5.2)
ALBUMIN UR-MCNC: 8.6 MG/DL
ALBUMIN/GLOB SERPL: 0.9 G/DL
ALP SERPL-CCNC: 84 U/L (ref 39–117)
ALT SERPL W P-5'-P-CCNC: 17 U/L (ref 1–41)
ANION GAP SERPL CALCULATED.3IONS-SCNC: 14.7 MMOL/L (ref 5–15)
AST SERPL-CCNC: 20 U/L (ref 1–40)
BASOPHILS # BLD AUTO: 0.12 10*3/MM3 (ref 0–0.2)
BASOPHILS NFR BLD AUTO: 1.5 % (ref 0–1.5)
BILIRUB SERPL-MCNC: 0.4 MG/DL (ref 0–1.2)
BUN SERPL-MCNC: 33 MG/DL (ref 8–23)
BUN/CREAT SERPL: 22.1 (ref 7–25)
CALCIUM SPEC-SCNC: 8.9 MG/DL (ref 8.6–10.5)
CHLORIDE SERPL-SCNC: 105 MMOL/L (ref 98–107)
CHOLEST SERPL-MCNC: 110 MG/DL (ref 0–200)
CO2 SERPL-SCNC: 22.3 MMOL/L (ref 22–29)
CREAT SERPL-MCNC: 1.49 MG/DL (ref 0.76–1.27)
CREAT UR-MCNC: 100.7 MG/DL
DEPRECATED RDW RBC AUTO: 45.1 FL (ref 37–54)
EGFRCR SERPLBLD CKD-EPI 2021: 45.7 ML/MIN/1.73
EOSINOPHIL # BLD AUTO: 0.28 10*3/MM3 (ref 0–0.4)
EOSINOPHIL NFR BLD AUTO: 3.5 % (ref 0.3–6.2)
ERYTHROCYTE [DISTWIDTH] IN BLOOD BY AUTOMATED COUNT: 13.3 % (ref 12.3–15.4)
GLOBULIN UR ELPH-MCNC: 3.7 GM/DL
GLUCOSE SERPL-MCNC: 140 MG/DL (ref 65–99)
HCT VFR BLD AUTO: 37.9 % (ref 37.5–51)
HDLC SERPL-MCNC: 34 MG/DL (ref 40–60)
HGB BLD-MCNC: 11.9 G/DL (ref 13–17.7)
IMM GRANULOCYTES # BLD AUTO: 0.03 10*3/MM3 (ref 0–0.05)
IMM GRANULOCYTES NFR BLD AUTO: 0.4 % (ref 0–0.5)
LDLC SERPL CALC-MCNC: 59 MG/DL (ref 0–100)
LDLC/HDLC SERPL: 1.75 {RATIO}
LYMPHOCYTES # BLD AUTO: 1.44 10*3/MM3 (ref 0.7–3.1)
LYMPHOCYTES NFR BLD AUTO: 18.3 % (ref 19.6–45.3)
MCH RBC QN AUTO: 29.4 PG (ref 26.6–33)
MCHC RBC AUTO-ENTMCNC: 31.4 G/DL (ref 31.5–35.7)
MCV RBC AUTO: 93.6 FL (ref 79–97)
MICROALBUMIN/CREAT UR: 85.4 MG/G (ref 0–29)
MONOCYTES # BLD AUTO: 0.84 10*3/MM3 (ref 0.1–0.9)
MONOCYTES NFR BLD AUTO: 10.6 % (ref 5–12)
NEUTROPHILS NFR BLD AUTO: 5.18 10*3/MM3 (ref 1.7–7)
NEUTROPHILS NFR BLD AUTO: 65.7 % (ref 42.7–76)
NRBC BLD AUTO-RTO: 0 /100 WBC (ref 0–0.2)
PLATELET # BLD AUTO: 273 10*3/MM3 (ref 140–450)
PMV BLD AUTO: 10.9 FL (ref 6–12)
POTASSIUM SERPL-SCNC: 3.9 MMOL/L (ref 3.5–5.2)
PROT SERPL-MCNC: 7.2 G/DL (ref 6–8.5)
RBC # BLD AUTO: 4.05 10*6/MM3 (ref 4.14–5.8)
SODIUM SERPL-SCNC: 142 MMOL/L (ref 136–145)
TRIGL SERPL-MCNC: 83 MG/DL (ref 0–150)
VLDLC SERPL-MCNC: 17 MG/DL (ref 5–40)
WBC NRBC COR # BLD AUTO: 7.89 10*3/MM3 (ref 3.4–10.8)

## 2025-04-15 PROCEDURE — 85025 COMPLETE CBC W/AUTO DIFF WBC: CPT | Performed by: FAMILY MEDICINE

## 2025-04-15 PROCEDURE — 82570 ASSAY OF URINE CREATININE: CPT | Performed by: FAMILY MEDICINE

## 2025-04-15 PROCEDURE — 82043 UR ALBUMIN QUANTITATIVE: CPT | Performed by: FAMILY MEDICINE

## 2025-04-15 PROCEDURE — 80053 COMPREHEN METABOLIC PANEL: CPT | Performed by: FAMILY MEDICINE

## 2025-04-15 PROCEDURE — 80061 LIPID PANEL: CPT | Performed by: FAMILY MEDICINE

## 2025-04-15 PROCEDURE — 36415 COLL VENOUS BLD VENIPUNCTURE: CPT | Performed by: FAMILY MEDICINE

## 2025-04-22 ENCOUNTER — OFFICE VISIT (OUTPATIENT)
Dept: FAMILY MEDICINE CLINIC | Facility: CLINIC | Age: 85
End: 2025-04-22
Payer: MEDICARE

## 2025-04-22 VITALS
SYSTOLIC BLOOD PRESSURE: 131 MMHG | OXYGEN SATURATION: 96 % | BODY MASS INDEX: 27.07 KG/M2 | TEMPERATURE: 97.5 F | HEIGHT: 71 IN | HEART RATE: 60 BPM | WEIGHT: 193.4 LBS | DIASTOLIC BLOOD PRESSURE: 74 MMHG | RESPIRATION RATE: 16 BRPM

## 2025-04-22 DIAGNOSIS — E78.2 MIXED HYPERLIPIDEMIA: Chronic | ICD-10-CM

## 2025-04-22 DIAGNOSIS — E11.42 DM TYPE 2 WITH DIABETIC PERIPHERAL NEUROPATHY: ICD-10-CM

## 2025-04-22 DIAGNOSIS — D64.9 ANEMIA, UNSPECIFIED TYPE: ICD-10-CM

## 2025-04-22 DIAGNOSIS — I10 ESSENTIAL HYPERTENSION: Primary | Chronic | ICD-10-CM

## 2025-04-22 DIAGNOSIS — E03.9 ACQUIRED HYPOTHYROIDISM: ICD-10-CM

## 2025-04-22 RX ORDER — NEBIVOLOL 2.5 MG/1
2.5 TABLET ORAL
Qty: 90 TABLET | Refills: 0 | Status: SHIPPED | OUTPATIENT
Start: 2025-04-22

## 2025-04-22 RX ORDER — HYDROCHLOROTHIAZIDE 25 MG/1
25 TABLET ORAL DAILY
Qty: 90 TABLET | Refills: 0 | Status: SHIPPED | OUTPATIENT
Start: 2025-04-22

## 2025-04-22 RX ORDER — HYDROCHLOROTHIAZIDE 25 MG/1
1 TABLET ORAL DAILY
COMMUNITY
Start: 2025-04-07 | End: 2025-04-22 | Stop reason: SDUPTHER

## 2025-04-22 RX ORDER — NIFEDIPINE 30 MG
30 TABLET, EXTENDED RELEASE ORAL DAILY
Qty: 90 TABLET | Refills: 0 | Status: SHIPPED | OUTPATIENT
Start: 2025-04-22

## 2025-04-22 RX ORDER — ATORVASTATIN CALCIUM 80 MG/1
80 TABLET, FILM COATED ORAL DAILY
Qty: 90 TABLET | Refills: 1 | Status: SHIPPED | OUTPATIENT
Start: 2025-04-22

## 2025-04-22 NOTE — PROGRESS NOTES
Subjective   Chief Complaint   Patient presents with    Follow-Up    Med Refill    Hyperlipidemia    Hypertension    Diabetes    Hypothyroidism     Alen Ratliff is a 84 y.o. male.     Patient Care Team:  Serenity Wren MD as PCP - General (Family Medicine)  Steve Muhammad MD as Consulting Physician (Cardiology)  CAROLE Vasquez DPM as Consulting Physician (Podiatry)  David Urbina MD as Consulting Physician (Otolaryngology)  Gregory Valle MD as Surgeon (Vascular Surgery)  Gómez Arvizu MD as Consulting Physician (Ophthalmology)  Radha Danielle MD as Consulting Physician (Cardiology)  Modesto Mccall MD as Consulting Physician (Nephrology)    History of Present Illness  He is coming in today with his wife to follow-up on his chronic medical problems and his medications including diabetes, hypertension, hyperlipidemia, hypothyroidism, CKD, and we are also addressing his recent hospital admission.  Patient had pacemaker placed earlier this year, he was subsequently discharged to rehab and he is still doing outpatient physical therapy.  He had some medication changes while in the hospital.  He is scheduled to follow-up with the cardiology office next week.  He overall reports doing well and no new issues are being reported.  He needs some new refills.  He recently had fasting blood work done and these results are being reviewed and discussed with the patient today.       The following portions of the patient's history were reviewed and updated as appropriate: allergies, current medications, past family history, past medical history, past social history, past surgical history, and problem list.  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    Stroke      Past Surgical History:   Procedure Laterality Date    ANKLE OPEN REDUCTION INTERNAL FIXATION Left 12/16/2020    Procedure: ANKLE OPEN REDUCTION INTERNAL FIXATION;  Surgeon: CAROLE Vasquez DPM;  Location: [facility] MAIN OR;  Service: Podiatry;  Laterality: Left;    AORTIC VALVE REPAIR/REPLACEMENT N/A 02/20/2023    Procedure: Transfemoral Transcatheter Aortic Valve Replacement;  Surgeon: Naveed Leonardo MD;  Location: [facility] HYBRID OR;  Service: Cardiovascular;  Laterality: N/A;    AORTIC VALVE REPAIR/REPLACEMENT N/A 02/20/2023    Procedure: TRANSCATHETER AORTIC VALVE REPLACEMENT;  Surgeon: Fabio Villafana MD;  Location: [facility] HYBRID OR;  Service: Cardiothoracic;  Laterality: N/A;    BACK SURGERY  1994    CARDIAC CATHETERIZATION Right 09/27/2022    Procedure: Left Heart Cath;  Surgeon: Steve Muhammad MD;  Location: [facility] CATH INVASIVE LOCATION;  Service: Cardiovascular;  Laterality: Right;    CARDIAC CATHETERIZATION N/A 01/12/2023    Procedure: Stent JENN coronary;  Surgeon: Steve Muhammad MD;  Location: [facility] CATH INVASIVE LOCATION;  Service: Cardiovascular;  Laterality: N/A;    CARDIAC ELECTROPHYSIOLOGY PROCEDURE N/A 02/24/2025    Procedure: Pacemaker DC new, BiV PPM for complete heart block;  Surgeon: Radha Danielle MD;  Location: [facility] CATH INVASIVE LOCATION;  Service: Cardiovascular;  Laterality: N/A;    CAROTID ENDARTERECTOMY Right 12/14/2020    Procedure: CAROTID ENDARTERECTOMY;  Surgeon: Toby Fung MD;  Location: [facility] MAIN OR;  Service: Vascular;  Laterality: Right;    COLONOSCOPY  08/25/2015    CORONARY STENT PLACEMENT      PACEMAKER IMPLANTATION      2/24/25    PROSTATECTOMY  2001    due to cancer     The patient has a family history of  Family History   Problem Relation Age of Onset    Hypertension Other     Heart attack Mother     Heart disease Mother     Heart attack Father     Heart disease Father      Social History     Socioeconomic  "History    Marital status:    Tobacco Use    Smoking status: Never     Passive exposure: Never    Smokeless tobacco: Never   Vaping Use    Vaping status: Never Used   Substance and Sexual Activity    Alcohol use: Never    Drug use: Never    Sexual activity: Defer       Review of Systems   Constitutional:  Negative for activity change, fatigue and fever.   Respiratory:  Negative for shortness of breath and wheezing.    Cardiovascular:  Negative for chest pain, palpitations and leg swelling.   Musculoskeletal:  Negative for arthralgias and back pain.   Skin:  Negative for rash.   Neurological:  Negative for tremors and headache.     Visit Vitals  /74 (BP Location: Left arm, Patient Position: Sitting, Cuff Size: Infant)   Pulse 60   Temp 97.5 °F (36.4 °C) (Infrared)   Resp 16   Ht 180.3 cm (71\")   Wt 87.7 kg (193 lb 6.4 oz)   SpO2 96%   BMI 26.97 kg/m²              Current Outpatient Medications:     Artificial Tear Ointment (artificial tears) ophthalmic ointment, Administer  to both eyes Every 1 (One) Hour As Needed., Disp: , Rfl:     aspirin 81 MG chewable tablet, Chew 1 tablet Daily., Disp: 30 tablet, Rfl: 1    atorvastatin (LIPITOR) 80 MG tablet, Take 1 tablet by mouth Daily., Disp: 90 tablet, Rfl: 1    fluticasone (FLONASE) 50 MCG/ACT nasal spray, Administer 2 sprays into the nostril(s) as directed by provider Daily As Needed., Disp: , Rfl:     hydroCHLOROthiazide 25 MG tablet, Take 1 tablet by mouth Daily., Disp: 90 tablet, Rfl: 0    Insulin Lispro, 1 Unit Dial, (HUMALOG) 100 UNIT/ML solution pen-injector, Inject 2-7 Units under the skin into the appropriate area as directed 4 (Four) Times a Day Before Meals & at Bedtime., Disp: 15 mL, Rfl: 12    ipratropium-albuterol (DUO-NEB) 0.5-2.5 mg/3 ml nebulizer, Take 3 mL by nebulization 1 (One) Time As Needed for Wheezing or Shortness of Air (bronchospasm) for up to 1 dose., Disp: 360 mL, Rfl: 0    levothyroxine (Synthroid) 112 MCG tablet, Take 1 tablet by " mouth Daily., Disp: 90 tablet, Rfl: 1    losartan (COZAAR) 100 MG tablet, Take 0.5 tablets by mouth Daily., Disp: , Rfl:     nebivolol (BYSTOLIC) 2.5 MG tablet, Take 1 tablet by mouth Daily., Disp: 90 tablet, Rfl: 0    NIFEdipine CC (ADALAT CC) 30 MG 24 hr tablet, Take 1 tablet by mouth Daily., Disp: 90 tablet, Rfl: 0    pantoprazole (PROTONIX) 40 MG EC tablet, TAKE ONE TABLET BY MOUTH EVERY DAY, Disp: 90 tablet, Rfl: 0    rivaroxaban (Xarelto) 15 MG tablet, Take 1 tablet by mouth Daily With Dinner., Disp: 90 tablet, Rfl: 0    sennosides-docusate (PERICOLACE) 8.6-50 MG per tablet, Take 2 tablets by mouth 2 (Two) Times a Day As Needed for Constipation., Disp: 30 tablet, Rfl: 0    SITagliptin (Januvia) 50 MG tablet, Take 1 tablet by mouth Daily., Disp: 90 tablet, Rfl: 1    Objective   Physical Exam  Constitutional:       General: He is not in acute distress.     Appearance: Normal appearance. He is well-developed. He is not ill-appearing or diaphoretic.      Comments: Patient is in no distress, patient has normal voice and speech.  Normal respiratory effort.   HENT:      Head: Normocephalic and atraumatic.   Pulmonary:      Effort: Pulmonary effort is normal.   Musculoskeletal:      Cervical back: Normal range of motion and neck supple.   Neurological:      General: No focal deficit present.      Mental Status: He is alert and oriented to person, place, and time. Mental status is at baseline.   Psychiatric:         Mood and Affect: Mood normal.         CT Abdomen Pelvis Without Contrast  Result Date: 3/25/2025  Impression: Impression: 1.No acute intra-abdominal or pelvic abnormality. 2.Colonic diverticulosis without diverticulitis. 3.Postsurgical changes of prior prostatectomy. Electronically Signed: Anshul Coelho  3/25/2025 10:39 PM EDT  Workstation ID: [id number]    XR Chest 1 View  Result Date: 3/24/2025  Impression: Impression: No acute cardiopulmonary disease. Electronically Signed: Wiliam Jasso MD  3/24/2025  8:49 PM EDT  Workstation ID: [id number]    CT Head Without Contrast  Result Date: 3/24/2025  Impression: Impression: 1.No acute intracranial abnormality identified. 2.Chronic small vessel ischemia and chronic right basal ganglia lacunar infarct. Electronically Signed: Kingsley Lynch MD  3/24/2025 4:38 PM EDT  Workstation ID: [id number]      FOLLOWING LABS WERE REVIEWED TODAY:  CMP          3/27/2025    23:32 3/28/2025    23:04 4/15/2025    08:06   CMP   Glucose 172  183  140    BUN 20  23  33    Creatinine 1.30  1.40  1.49    EGFR 53.8  49.3  45.7    Sodium 137  137  142    Potassium 4.1  4.2  3.9    Chloride 103  105  105    Calcium 8.5  8.7  8.9    Total Protein 6.2   7.2    Albumin 3.0   3.5    Globulin 3.2   3.7    Total Bilirubin 0.3   0.4    Alkaline Phosphatase 69   84    AST (SGOT) 54   20    ALT (SGPT) 73   17    Albumin/Globulin Ratio 0.9   0.9    BUN/Creatinine Ratio 15.4  16.4  22.1    Anion Gap 12.5  11.6  14.7      Lipid Panel          10/10/2024    09:05 4/15/2025    08:06   Lipid Panel   Total Cholesterol 110  110    Triglycerides 62  83    HDL Cholesterol 37  34    VLDL Cholesterol 14  17    LDL Cholesterol  59  59    LDL/HDL Ratio 1.64  1.75      TSH          12/18/2024    10:14 2/20/2025    14:16 3/21/2025    18:05   TSH   TSH 3.070  2.710  1.680      Most Recent A1C          3/21/2025    18:05   HGBA1C Most Recent   Hemoglobin A1C 6.97              Assessment & Plan   Diagnoses and all orders for this visit:    1. Essential hypertension (Primary)  -     Comprehensive Metabolic Panel  -     nebivolol (BYSTOLIC) 2.5 MG tablet; Take 1 tablet by mouth Daily.  Dispense: 90 tablet; Refill: 0  -     NIFEdipine CC (ADALAT CC) 30 MG 24 hr tablet; Take 1 tablet by mouth Daily.  Dispense: 90 tablet; Refill: 0  -     hydroCHLOROthiazide 25 MG tablet; Take 1 tablet by mouth Daily.  Dispense: 90 tablet; Refill: 0    2. Anemia, unspecified type  -     CBC Auto Differential    3. Acquired hypothyroidism  -      TSH    4. DM type 2 with diabetic peripheral neuropathy  -     SITagliptin (Januvia) 50 MG tablet; Take 1 tablet by mouth Daily.  Dispense: 90 tablet; Refill: 1    5. Mixed hyperlipidemia  -     atorvastatin (LIPITOR) 80 MG tablet; Take 1 tablet by mouth Daily.  Dispense: 90 tablet; Refill: 1      I reviewed his chronic medical problems and his medications.  Also reviewed his recent hospital admission records including his labs.  Patient also recently had blood work done through our office and these results were reviewed.  His kidney test has been trending up recently, I will be rechecking the labs to follow-up on his kidney function as well as on his mild anemia.  Medication refill was given.  Patient is scheduled to follow-up with the cardiology office in couple of weeks or so.  He will continue to monitor his blood sugar and blood pressure.        Return in about 6 months (around 10/22/2025) for Next scheduled follow up.    Requested Prescriptions     Signed Prescriptions Disp Refills    SITagliptin (Januvia) 50 MG tablet 90 tablet 1     Sig: Take 1 tablet by mouth Daily.    nebivolol (BYSTOLIC) 2.5 MG tablet 90 tablet 0     Sig: Take 1 tablet by mouth Daily.    atorvastatin (LIPITOR) 80 MG tablet 90 tablet 1     Sig: Take 1 tablet by mouth Daily.    NIFEdipine CC (ADALAT CC) 30 MG 24 hr tablet 90 tablet 0     Sig: Take 1 tablet by mouth Daily.    hydroCHLOROthiazide 25 MG tablet 90 tablet 0     Sig: Take 1 tablet by mouth Daily.       Serenity Wren MD  04/22/2025  10:35 EDT

## 2025-04-29 ENCOUNTER — OFFICE VISIT (OUTPATIENT)
Dept: CARDIOLOGY | Facility: CLINIC | Age: 85
End: 2025-04-29
Payer: MEDICARE

## 2025-04-29 ENCOUNTER — CLINICAL SUPPORT NO REQUIREMENTS (OUTPATIENT)
Dept: CARDIOLOGY | Facility: CLINIC | Age: 85
End: 2025-04-29
Payer: MEDICARE

## 2025-04-29 VITALS
SYSTOLIC BLOOD PRESSURE: 136 MMHG | HEIGHT: 71 IN | DIASTOLIC BLOOD PRESSURE: 64 MMHG | OXYGEN SATURATION: 100 % | RESPIRATION RATE: 16 BRPM | BODY MASS INDEX: 26.46 KG/M2 | WEIGHT: 189 LBS

## 2025-04-29 DIAGNOSIS — I44.2 THIRD DEGREE AV BLOCK: ICD-10-CM

## 2025-04-29 DIAGNOSIS — I48.0 PAF (PAROXYSMAL ATRIAL FIBRILLATION): Primary | ICD-10-CM

## 2025-04-29 DIAGNOSIS — I25.10 CORONARY ARTERY DISEASE INVOLVING NATIVE CORONARY ARTERY OF NATIVE HEART WITHOUT ANGINA PECTORIS: ICD-10-CM

## 2025-04-29 DIAGNOSIS — I35.0 AORTIC STENOSIS, SEVERE: ICD-10-CM

## 2025-04-29 DIAGNOSIS — I50.32 CHRONIC DIASTOLIC CHF (CONGESTIVE HEART FAILURE), NYHA CLASS 2: ICD-10-CM

## 2025-04-29 DIAGNOSIS — Z95.2 S/P TAVR (TRANSCATHETER AORTIC VALVE REPLACEMENT): ICD-10-CM

## 2025-04-29 DIAGNOSIS — I49.5 SICK SINUS SYNDROME: ICD-10-CM

## 2025-04-29 DIAGNOSIS — Z95.0 PACEMAKER: ICD-10-CM

## 2025-04-29 PROCEDURE — 1159F MED LIST DOCD IN RCRD: CPT | Performed by: NURSE PRACTITIONER

## 2025-04-29 PROCEDURE — 99214 OFFICE O/P EST MOD 30 MIN: CPT | Performed by: NURSE PRACTITIONER

## 2025-04-29 PROCEDURE — 3078F DIAST BP <80 MM HG: CPT | Performed by: NURSE PRACTITIONER

## 2025-04-29 PROCEDURE — 3075F SYST BP GE 130 - 139MM HG: CPT | Performed by: NURSE PRACTITIONER

## 2025-04-29 PROCEDURE — 93280 PM DEVICE PROGR EVAL DUAL: CPT | Performed by: NURSE PRACTITIONER

## 2025-04-29 PROCEDURE — 93000 ELECTROCARDIOGRAM COMPLETE: CPT | Performed by: NURSE PRACTITIONER

## 2025-04-29 PROCEDURE — 1160F RVW MEDS BY RX/DR IN RCRD: CPT | Performed by: NURSE PRACTITIONER

## 2025-04-29 NOTE — PROGRESS NOTES
Cardiology Office Follow Up Visit      Primary Care Provider:  Serenity Wren MD    Reason for f/u:     Hospital follow-up  Coronary artery disease  Aortic stenosis  Complete heart block  Dual-chamber pacemaker  Recent bacteremia      Subjective     CC:    Chest pain or dyspnea    History of Present Illness       Alen Ratliff is a 84 y.o. male.  Patient is an 84-year-old male who is routinely followed by Dr. Muhammad.  He is known to have coronary artery disease with previous PCI, paroxysmal atrial fibrillation, complete heart block, previous pacemaker.  Hypertension, dyslipidemia, diastolic heart failure, aortic stenosis with previous TAVR, history of CVA, bilateral carotid artery stenosis with previous endarterectomy, type 2 diabetes, GERD, CKD stage III, prostate cancer.    Patient was admitted to the hospital in March 2025 with complaints of lower extremity weakness for 5 days.  He was found to have positive blood cultures and cardiology was consulted for CHRISTIANO to rule out endocarditis.    CHRISTIANO was performed which showed no vegetation.  He was treated per ID with antibiotics for 2 weeks.  CHRISTIANO showed grossly normal LVEF with EF of 60 to 65% there is mild concentric LVH.  TAVR valve present.  No gross vegetation noted on any valve or pacemaker lead.    Patient has now completed antibiotic therapy.  He has been discharged home from rehab.  He complains of feeling unbalanced.  He denies chest pain dyspnea, PND, orthopnea, palpitations or feelings of his heart racing.  He reports compliance with prescribed medical therapy.          ASSESSMENT/PLAN:      Diagnoses and all orders for this visit:    1. PAF (paroxysmal atrial fibrillation) (Primary)    2. Sick sinus syndrome    3. Third degree AV block    4. Pacemaker    5. Aortic stenosis, severe    6. S/P TAVR (transcatheter aortic valve replacement)    7. Coronary artery disease involving native coronary artery of native heart without angina pectoris    8. Chronic  diastolic CHF (congestive heart failure), NYHA class 2            MEDICAL DECISION MAKING:      Patient's pacemaker was interrogated in the office today.  Device function is stable.  He paces in the atrium 62.6% in the ventricle 99.7% has home monitor is connected to our office and transmitting.  He has had no A-fib.  Twelve-lead EKG shows an AV paced rhythm.    Vitals today are stable.    Volume status appears stable.  I have made no changes in his medication.  Will continue the current treatment.  He will keep his next scheduled follow-up with Dr. Muhammad.  We will continue home monitoring of his pacemaker via [employer] if he develops any new symptoms of asked him to contact the office sooner.      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    Stroke        Past Surgical History:   Procedure Laterality Date    ANKLE OPEN REDUCTION INTERNAL FIXATION Left 12/16/2020    Procedure: ANKLE OPEN REDUCTION INTERNAL FIXATION;  Surgeon: CAROLE Vasquez DPM;  Location: [facility] MAIN OR;  Service: Podiatry;  Laterality: Left;    AORTIC VALVE REPAIR/REPLACEMENT N/A 02/20/2023    Procedure: Transfemoral Transcatheter Aortic Valve Replacement;  Surgeon: Naveed Leonardo MD;  Location: [facility] HYBRID OR;  Service: Cardiovascular;  Laterality: N/A;    AORTIC VALVE REPAIR/REPLACEMENT N/A 02/20/2023    Procedure: TRANSCATHETER AORTIC VALVE REPLACEMENT;  Surgeon: Fabio Villafana MD;  Location: [facility] HYBRID OR;  Service: Cardiothoracic;  Laterality: N/A;    BACK SURGERY  1994    CARDIAC CATHETERIZATION Right 09/27/2022    Procedure: Left Heart Cath;  Surgeon: Steve Muhammad MD;  Location: [facility] CATH INVASIVE LOCATION;  Service: Cardiovascular;   Laterality: Right;    CARDIAC CATHETERIZATION N/A 01/12/2023    Procedure: Stent JENN coronary;  Surgeon: Steve Muhammad MD;  Location: [facility] CATH INVASIVE LOCATION;  Service: Cardiovascular;  Laterality: N/A;    CARDIAC ELECTROPHYSIOLOGY PROCEDURE N/A 02/24/2025    Procedure: Pacemaker DC new, BiV PPM for complete heart block;  Surgeon: Radha Danielle MD;  Location: [facility] CATH INVASIVE LOCATION;  Service: Cardiovascular;  Laterality: N/A;    CAROTID ENDARTERECTOMY Right 12/14/2020    Procedure: CAROTID ENDARTERECTOMY;  Surgeon: Toby Fung MD;  Location: [facility] MAIN OR;  Service: Vascular;  Laterality: Right;    COLONOSCOPY  08/25/2015    CORONARY STENT PLACEMENT      PACEMAKER IMPLANTATION      2/24/25    PROSTATECTOMY  2001    due to cancer         Current Outpatient Medications:     Artificial Tear Ointment (artificial tears) ophthalmic ointment, Administer  to both eyes Every 1 (One) Hour As Needed., Disp: , Rfl:     aspirin 81 MG chewable tablet, Chew 1 tablet Daily., Disp: 30 tablet, Rfl: 1    atorvastatin (LIPITOR) 80 MG tablet, Take 1 tablet by mouth Daily., Disp: 90 tablet, Rfl: 1    fluticasone (FLONASE) 50 MCG/ACT nasal spray, Administer 2 sprays into the nostril(s) as directed by provider Daily As Needed., Disp: , Rfl:     levothyroxine (Synthroid) 112 MCG tablet, Take 1 tablet by mouth Daily., Disp: 90 tablet, Rfl: 1    losartan (COZAAR) 100 MG tablet, Take 0.5 tablets by mouth Daily., Disp: , Rfl:     nebivolol (BYSTOLIC) 2.5 MG tablet, Take 1 tablet by mouth Daily., Disp: 90 tablet, Rfl: 0    NIFEdipine CC (ADALAT CC) 30 MG 24 hr tablet, Take 1 tablet by mouth Daily. (Patient taking differently: Take 2 tablets by mouth Daily.), Disp: 90 tablet, Rfl: 0    pantoprazole (PROTONIX) 40 MG EC tablet, TAKE ONE TABLET BY MOUTH EVERY DAY, Disp: 90 tablet, Rfl: 0    rivaroxaban (Xarelto) 15 MG tablet, Take 1 tablet by mouth Daily With Dinner., Disp: 90 tablet, Rfl: 0    SITagliptin (Januvia) 50  "MG tablet, Take 1 tablet by mouth Daily., Disp: 90 tablet, Rfl: 1    Social History     Socioeconomic History    Marital status:    Tobacco Use    Smoking status: Never     Passive exposure: Never    Smokeless tobacco: Never   Vaping Use    Vaping status: Never Used   Substance and Sexual Activity    Alcohol use: Never    Drug use: Never    Sexual activity: Defer       Family History   Problem Relation Age of Onset    Hypertension Other     Heart attack Mother     Heart disease Mother     Heart attack Father     Heart disease Father        The following portions of the patient's history were reviewed and updated as appropriate: allergies, current medications, past family history, past medical history, past social history, past surgical history and problem list.    Review of Systems   Neurological:  Positive for loss of balance.       Pertinent items are noted in HPI, all other systems reviewed and negative    /64 (BP Location: Right arm, Patient Position: Sitting)   Resp 16   Ht 180.3 cm (71\")   Wt 85.7 kg (189 lb)   SpO2 100%   BMI 26.36 kg/m² .  Objective     Vitals reviewed.   Constitutional:       General: Not in acute distress.     Appearance: Normal appearance. Well-developed.   Eyes:      Pupils: Pupils are equal, round, and reactive to light.   HENT:      Head: Normocephalic and atraumatic.   Neck:      Vascular: No JVD.   Pulmonary:      Effort: Pulmonary effort is normal.      Breath sounds: Normal breath sounds.   Cardiovascular:      Normal rate. Regular rhythm.   Edema:     Peripheral edema absent.   Abdominal:      General: There is no distension.      Palpations: Abdomen is soft.      Tenderness: There is no abdominal tenderness.   Musculoskeletal: Normal range of motion.      Cervical back: Normal range of motion and neck supple. Skin:     General: Skin is warm and dry.   Neurological:      Mental Status: Alert and oriented to person, place, and time.             ECG 12 " Lead    Date/Time: 4/29/2025 12:20 PM  Performed by: Mary Hernandez APRN    Authorized by: Mary Hernandez APRN  Rhythm: paced  Rate: normal  BPM: 62  QRS axis: normal  Pacing: dual chamber pacing        EKG ordered by and reviewed by me in office         DEVICE INTERROGATION:  IN OFFICE    DEVICE TYPE: Dual-chamber pacemaker    :   [employer]tronic    BATTERY:  Stable    TIME TO ELECTIVE REPLACEMENT INDICATORS:   9.3    CHARGE TIME:   Not applicable        LEAD DATA:       DEVICE REPROGRAMMED FOR TESTING      Atrial:   1.0 mV, 475 ohms, 0.875 V@0.4 ms    Ventricular:     20 mV, 32 ohms, 1.0 V@0.4 ms    LV:      Pacemaker dependent:   Yes      Atrial pacing percentage: 62%    Ventricular pacing percentage: 99%      Arrhythmia Logbook Reviewed: No A-fib        Summary:    Stable Device Function    No significant arhythmia burden.     Battery status is stable.          NEXT IN OFFICE DEVICE CHECK DUE: Next visit    REMOTE DEVICE INTERROGATIONS:  ongoing

## 2025-05-02 ENCOUNTER — OFFICE VISIT (OUTPATIENT)
Age: 85
End: 2025-05-02
Payer: MEDICARE

## 2025-05-02 VITALS — HEART RATE: 61 BPM | BODY MASS INDEX: 37.11 KG/M2 | HEIGHT: 60 IN | WEIGHT: 189 LBS | OXYGEN SATURATION: 95 %

## 2025-05-02 DIAGNOSIS — R26.81 UNSTEADINESS ON FEET: ICD-10-CM

## 2025-05-02 DIAGNOSIS — M79.675 PAIN IN TOES OF BOTH FEET: ICD-10-CM

## 2025-05-02 DIAGNOSIS — E11.65 TYPE 2 DIABETES MELLITUS WITH HYPERGLYCEMIA, WITHOUT LONG-TERM CURRENT USE OF INSULIN: ICD-10-CM

## 2025-05-02 DIAGNOSIS — L60.3 ONYCHODYSTROPHY: Primary | ICD-10-CM

## 2025-05-02 DIAGNOSIS — M79.674 PAIN IN TOES OF BOTH FEET: ICD-10-CM

## 2025-05-02 DIAGNOSIS — E11.42 DIABETIC PERIPHERAL NEUROPATHY ASSOCIATED WITH TYPE 2 DIABETES MELLITUS: ICD-10-CM

## 2025-05-02 NOTE — PROGRESS NOTES
05/02/2025  Foot and Ankle Surgery - New Patient   Provider: LARRY Lentz   Location: [facility]    Subjective:  Alen Ratliff is a 84 y.o. male.     Chief Complaint   Patient presents with    Left Foot - Callouses     DFC - left 5th metatarsal head    Right Foot - Follow-up     DFC    Follow-Up     PCP: Serenity Wren MD  Last PCP Visit:   4/22/25       History of Present Illness  The patient is an 84-year-old male who presents today for a routine diabetic foot check.    He reports a need for nail trimming, which has been overdue due to a recent hospitalization. Discomfort is experienced as the nail of his big toe makes contact with his shoe, causing pain. Additionally, one foot appears larger than the other. Attempts to trim the nails himself were unsuccessful due to their hardness. He inquires about the frequency of replacing shoe inserts, noting that his current ones are approximately 5 to 6 years old.    During his hospital stay, a pacemaker implantation procedure was performed, which was complicated by an infection. This led to mobility issues, necessitating a rehabilitation period of slightly over a week. Gradual improvement in his condition is reported.    PAST SURGICAL HISTORY: Pacemaker implantation.       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    Stroke        Past Surgical History:   Procedure Laterality Date    ANKLE OPEN REDUCTION INTERNAL FIXATION Left 12/16/2020    Procedure: ANKLE OPEN REDUCTION INTERNAL FIXATION;  Surgeon: CAROLE Vasquez DPM;  Location:   [facility] MAIN OR;  Service: Podiatry;  Laterality: Left;    AORTIC VALVE REPAIR/REPLACEMENT N/A 02/20/2023    Procedure: Transfemoral Transcatheter Aortic Valve Replacement;  Surgeon: Naveed Leonardo MD;  Location: [facility] HYBRID OR;  Service: Cardiovascular;  Laterality: N/A;    AORTIC VALVE REPAIR/REPLACEMENT N/A 02/20/2023    Procedure: TRANSCATHETER AORTIC VALVE REPLACEMENT;  Surgeon: Fabio Villafana MD;  Location: [facility] HYBRID OR;  Service: Cardiothoracic;  Laterality: N/A;    BACK SURGERY  1994    CARDIAC CATHETERIZATION Right 09/27/2022    Procedure: Left Heart Cath;  Surgeon: Steve Muhammad MD;  Location: [facility] CATH INVASIVE LOCATION;  Service: Cardiovascular;  Laterality: Right;    CARDIAC CATHETERIZATION N/A 01/12/2023    Procedure: Stent JENN coronary;  Surgeon: Steve Muhammad MD;  Location: [facility] CATH INVASIVE LOCATION;  Service: Cardiovascular;  Laterality: N/A;    CARDIAC ELECTROPHYSIOLOGY PROCEDURE N/A 02/24/2025    Procedure: Pacemaker DC new, BiV PPM for complete heart block;  Surgeon: Radha Danielle MD;  Location: [facility] CATH INVASIVE LOCATION;  Service: Cardiovascular;  Laterality: N/A;    CAROTID ENDARTERECTOMY Right 12/14/2020    Procedure: CAROTID ENDARTERECTOMY;  Surgeon: Toby Fung MD;  Location: [facility] MAIN OR;  Service: Vascular;  Laterality: Right;    COLONOSCOPY  08/25/2015    CORONARY STENT PLACEMENT      PACEMAKER IMPLANTATION      2/24/25    PROSTATECTOMY  2001    due to cancer       Family History   Problem Relation Age of Onset    Hypertension Other     Heart attack Mother     Heart disease Mother     Heart attack Father     Heart disease Father        Social History     Socioeconomic History    Marital status:    Tobacco Use    Smoking status: Never     Passive exposure: Never    Smokeless tobacco: Never   Vaping Use    Vaping status: Never Used   Substance and Sexual Activity    Alcohol use: Never    Drug use: Never    Sexual activity: Defer        Current Outpatient  "Medications on File Prior to Visit   Medication Sig Dispense Refill    Artificial Tear Ointment (artificial tears) ophthalmic ointment Administer  to both eyes Every 1 (One) Hour As Needed.      aspirin 81 MG chewable tablet Chew 1 tablet Daily. 30 tablet 1    atorvastatin (LIPITOR) 80 MG tablet Take 1 tablet by mouth Daily. 90 tablet 1    fluticasone (FLONASE) 50 MCG/ACT nasal spray Administer 2 sprays into the nostril(s) as directed by provider Daily As Needed.      levothyroxine (Synthroid) 112 MCG tablet Take 1 tablet by mouth Daily. 90 tablet 1    losartan (COZAAR) 100 MG tablet Take 0.5 tablets by mouth Daily.      nebivolol (BYSTOLIC) 2.5 MG tablet Take 1 tablet by mouth Daily. 90 tablet 0    NIFEdipine CC (ADALAT CC) 30 MG 24 hr tablet Take 1 tablet by mouth Daily. (Patient taking differently: Take 2 tablets by mouth Daily.) 90 tablet 0    pantoprazole (PROTONIX) 40 MG EC tablet TAKE ONE TABLET BY MOUTH EVERY DAY 90 tablet 0    rivaroxaban (Xarelto) 15 MG tablet Take 1 tablet by mouth Daily With Dinner. 90 tablet 0    SITagliptin (Januvia) 50 MG tablet Take 1 tablet by mouth Daily. 90 tablet 1     No current facility-administered medications on file prior to visit.           Objective   Pulse 61   Ht 71 cm (27.95\")   Wt 85.7 kg (189 lb)   SpO2 95%   .06 kg/m²     Foot/Ankle Exam  GENERAL  Diabetic foot exam performed    Appearance:  appears stated age  Orientation:  AAOx3  Affect:  appropriate  Gait:  unimpaired  Assistance:  independent  Right shoe gear: casual shoe and sock  Left shoe gear: casual shoe and sock     VASCULAR      Right Foot Vascularity   Dorsalis pedis:  2+  Posterior tibial:  2+  Skin temperature:  warm  Edema grading:  None  CFT:  < 3 seconds  Pedal hair growth:  Present  Varicosities:  mild varicosities      Left Foot Vascularity   Dorsalis pedis:  2+  Posterior tibial:  2+  Skin temperature:  warm  Edema grading:  None  CFT:  < 3 seconds  Pedal hair growth:  " Present  Varicosities:  mild varicosities     NEUROLOGIC      Right Foot Neurologic   Light touch sensation: normal  Protective Sensation using [place] Monofilament:   Sites intact: 4  Sites tested: 10      Left Foot Neurologic   Light touch sensation: normal  Protective Sensation using [place] Monofilament:   Sites intact: 3  Sites tested: 10     MUSCULOSKELETAL      Right Foot Musculoskeletal   Ecchymosis:  none  Tenderness:  toenail problem        Left Foot Musculoskeletal   Ecchymosis:  none  Tenderness:  toenail problem     MUSCLE STRENGTH      Right Foot Muscle Strength   Foot dorsiflexion:  5  Foot plantar flexion:  5      Left Foot Muscle Strength   Foot dorsiflexion:  5  Foot plantar flexion:  5     DERMATOLOGIC       Right Foot Dermatologic   Skin  Positive for dryness and skin changes.   Nails  1.  Positive for elongated, abnormal thickness and dystrophic nail.  2.  Positive for elongated, abnormal thickness and dystrophic nail.  3.  Positive for elongated, abnormal thickness and dystrophic nail.  4.  Positive for elongated, abnormal thickness and dystrophic nail.  5.  Positive for elongated, abnormal thickness and dystrophic nail.      Left Foot Dermatologic   Skin  Positive for dryness and skin changes.   Nails  1.  Positive for elongated, abnormal thickness and dystrophic nail.  2.  Positive for elongated, abnormal thickness and dystrophic nail.  3.  Positive for elongated, abnormal thickness and dystrophic nail.  4.  Positive for elongated, abnormally thick and dystrophic nail.  5.  Positive for elongated, abnormally thick and dystrophic nail.  Physical Exam  Specialty Testing  Monofilament test shows 4 out of 10 on the left side and 3 out of 10 on the right.       Physical Exam  General: Patient appears well-groomed and in no acute distress.  Integumentary: Toenails are thickened and difficult to trim. No signs of infection noted.    Musculoskeletal:  Right Foot: Right foot is  larger than the left. Big toenail is causing discomfort due to contact with the shoe.     Results  Labs   - Blood sugar level: 6.2       Assessment & Plan   Diagnoses and all orders for this visit:    1. Onychodystrophy (Primary)    2. Type 2 diabetes mellitus with hyperglycemia, without long-term current use of insulin    3. Diabetic peripheral neuropathy associated with type 2 diabetes mellitus    4. Unsteadiness on feet    5. Pain in toes of both feet      Assessment & Plan  1. Diabetic foot care:  Blood sugar level 6.2    Trimming nails too short can lead to ingrown toenail issues. Nail removal was discussed, including potential callus formation and a recovery period of 1 to 2 weeks. If symptoms can be managed with regular nail trimming, this is the preferred and safer approach. Visit a medical supply store for custom insoles, as they are not frequently provided here and insurance often does not cover them.    2. Post-pacemaker implantation:  Recent pacemaker implantation led to an infection and subsequent rehabilitation for walking difficulties. Reports feeling better slowly.    Pt is at moderate risk for pedal complication related to diabetes. Explained importance of diabetic foot care, daily foot checks, and glycemic control. Patient should check both feet on a daily basis, monitor and control blood sugars, make sure that both feet and in between toes are towel dried after baths or showers. Avoid barefoot walking at all times.  I discussed wearing white socks and checking shoes before wearing them. Patient was given information on proper foot care. Call the office at the first signs of a wound or with signs of infection.     Nail debridement: Both feet x10    Consent and time out was performed before proceeding with the procedure. Nails were debrided with a nail nipper without complication.  No anesthesia was required.  Indications for procedure were thickened, dystrophic, and fungal appearing nails which are  difficult to trim.  Proper self-care and technique was discussed with patient.  Patient was stable after procedure.     PROCEDURE  Nails were trimmed today in the office.      No orders of the defined types were placed in this encounter.         Patient or patient representative verbalized consent for the use of Ambient Listening during the visit with  LARRY Jean for chart documentation. 5/2/2025  13:49 EDT

## 2025-05-07 ENCOUNTER — LAB (OUTPATIENT)
Dept: FAMILY MEDICINE CLINIC | Facility: CLINIC | Age: 85
End: 2025-05-07
Payer: MEDICARE

## 2025-05-07 LAB
ALBUMIN SERPL-MCNC: 3.8 G/DL (ref 3.5–5.2)
ALBUMIN/GLOB SERPL: 1.3 G/DL
ALP SERPL-CCNC: 79 U/L (ref 39–117)
ALT SERPL W P-5'-P-CCNC: 16 U/L (ref 1–41)
ANION GAP SERPL CALCULATED.3IONS-SCNC: 13.7 MMOL/L (ref 5–15)
AST SERPL-CCNC: 22 U/L (ref 1–40)
BASOPHILS # BLD AUTO: 0.07 10*3/MM3 (ref 0–0.2)
BASOPHILS NFR BLD AUTO: 0.8 % (ref 0–1.5)
BILIRUB SERPL-MCNC: 0.4 MG/DL (ref 0–1.2)
BUN SERPL-MCNC: 35 MG/DL (ref 8–23)
BUN/CREAT SERPL: 24.8 (ref 7–25)
CALCIUM SPEC-SCNC: 8.8 MG/DL (ref 8.6–10.5)
CHLORIDE SERPL-SCNC: 103 MMOL/L (ref 98–107)
CO2 SERPL-SCNC: 22.3 MMOL/L (ref 22–29)
CREAT SERPL-MCNC: 1.41 MG/DL (ref 0.76–1.27)
DEPRECATED RDW RBC AUTO: 48.6 FL (ref 37–54)
EGFRCR SERPLBLD CKD-EPI 2021: 48.8 ML/MIN/1.73
EOSINOPHIL # BLD AUTO: 0.23 10*3/MM3 (ref 0–0.4)
EOSINOPHIL NFR BLD AUTO: 2.7 % (ref 0.3–6.2)
ERYTHROCYTE [DISTWIDTH] IN BLOOD BY AUTOMATED COUNT: 14.4 % (ref 12.3–15.4)
GLOBULIN UR ELPH-MCNC: 2.9 GM/DL
GLUCOSE SERPL-MCNC: 185 MG/DL (ref 65–99)
HCT VFR BLD AUTO: 36.2 % (ref 37.5–51)
HGB BLD-MCNC: 11.9 G/DL (ref 13–17.7)
IMM GRANULOCYTES # BLD AUTO: 0.03 10*3/MM3 (ref 0–0.05)
IMM GRANULOCYTES NFR BLD AUTO: 0.4 % (ref 0–0.5)
LYMPHOCYTES # BLD AUTO: 1.82 10*3/MM3 (ref 0.7–3.1)
LYMPHOCYTES NFR BLD AUTO: 21.6 % (ref 19.6–45.3)
MCH RBC QN AUTO: 30.4 PG (ref 26.6–33)
MCHC RBC AUTO-ENTMCNC: 32.9 G/DL (ref 31.5–35.7)
MCV RBC AUTO: 92.3 FL (ref 79–97)
MONOCYTES # BLD AUTO: 0.69 10*3/MM3 (ref 0.1–0.9)
MONOCYTES NFR BLD AUTO: 8.2 % (ref 5–12)
NEUTROPHILS NFR BLD AUTO: 5.58 10*3/MM3 (ref 1.7–7)
NEUTROPHILS NFR BLD AUTO: 66.3 % (ref 42.7–76)
NRBC BLD AUTO-RTO: 0 /100 WBC (ref 0–0.2)
PLATELET # BLD AUTO: 182 10*3/MM3 (ref 140–450)
PMV BLD AUTO: 10.8 FL (ref 6–12)
POTASSIUM SERPL-SCNC: 4.2 MMOL/L (ref 3.5–5.2)
PROT SERPL-MCNC: 6.7 G/DL (ref 6–8.5)
RBC # BLD AUTO: 3.92 10*6/MM3 (ref 4.14–5.8)
SODIUM SERPL-SCNC: 139 MMOL/L (ref 136–145)
TSH SERPL DL<=0.05 MIU/L-ACNC: 2.02 UIU/ML (ref 0.27–4.2)
WBC NRBC COR # BLD AUTO: 8.42 10*3/MM3 (ref 3.4–10.8)

## 2025-05-07 PROCEDURE — 84443 ASSAY THYROID STIM HORMONE: CPT | Performed by: FAMILY MEDICINE

## 2025-05-07 PROCEDURE — 80053 COMPREHEN METABOLIC PANEL: CPT | Performed by: FAMILY MEDICINE

## 2025-05-07 PROCEDURE — 85025 COMPLETE CBC W/AUTO DIFF WBC: CPT | Performed by: FAMILY MEDICINE

## 2025-05-15 RX ORDER — LOSARTAN POTASSIUM 100 MG/1
50 TABLET ORAL DAILY
Qty: 45 TABLET | Refills: 1 | Status: SHIPPED | OUTPATIENT
Start: 2025-05-15

## 2025-05-15 NOTE — TELEPHONE ENCOUNTER
Caller: Elba Ratliff    Relationship: Emergency Contact    Best call back number: [phone]    Requested Prescriptions:   Requested Prescriptions     Pending Prescriptions Disp Refills    losartan (COZAAR) 100 MG tablet       Sig: Take 0.5 tablets by mouth Daily.        [facility], [address] [phone] [facility] [phone]      Last office visit with prescribing clinician: 4/22/2025   Last telemedicine visit with prescribing clinician: Visit date not found   Next office visit with prescribing clinician: 7/22/2025     Additional details provided by patient: WILL NEED FILLED , 3 MONTH SUPPLY WITH REFILLS    Does the patient have less than a 3 day supply:  [] Yes  [x] No    Would you like a call back once the refill request has been completed: [] Yes [x] No    If the office needs to give you a call back, can they leave a voicemail: [] Yes [x] No    Homa Rondon   05/15/25 11:25 EDT

## 2025-05-28 ENCOUNTER — TRANSCRIBE ORDERS (OUTPATIENT)
Dept: ADMINISTRATIVE | Facility: HOSPITAL | Age: 85
End: 2025-05-28
Payer: MEDICARE

## 2025-05-28 ENCOUNTER — LAB (OUTPATIENT)
Dept: LAB | Facility: HOSPITAL | Age: 85
End: 2025-05-28
Payer: MEDICARE

## 2025-05-28 DIAGNOSIS — E11.69 DIABETES MELLITUS ASSOCIATED WITH HORMONAL ETIOLOGY: ICD-10-CM

## 2025-05-28 DIAGNOSIS — N17.9 ACUTE RENAL FAILURE, UNSPECIFIED ACUTE RENAL FAILURE TYPE: ICD-10-CM

## 2025-05-28 DIAGNOSIS — N17.9 ACUTE RENAL FAILURE, UNSPECIFIED ACUTE RENAL FAILURE TYPE: Primary | ICD-10-CM

## 2025-05-28 LAB
ALBUMIN SERPL-MCNC: 3.9 G/DL (ref 3.5–5.2)
ALBUMIN/GLOB SERPL: 1.3 G/DL
ALP SERPL-CCNC: 82 U/L (ref 39–117)
ALT SERPL W P-5'-P-CCNC: 19 U/L (ref 1–41)
ANION GAP SERPL CALCULATED.3IONS-SCNC: 12.3 MMOL/L (ref 5–15)
AST SERPL-CCNC: 20 U/L (ref 1–40)
BASOPHILS # BLD AUTO: 0.06 10*3/MM3 (ref 0–0.2)
BASOPHILS NFR BLD AUTO: 0.7 % (ref 0–1.5)
BILIRUB SERPL-MCNC: 0.4 MG/DL (ref 0–1.2)
BUN SERPL-MCNC: 37 MG/DL (ref 8–23)
BUN/CREAT SERPL: 23.7 (ref 7–25)
CALCIUM SPEC-SCNC: 8.6 MG/DL (ref 8.6–10.5)
CHLORIDE SERPL-SCNC: 105 MMOL/L (ref 98–107)
CO2 SERPL-SCNC: 21.7 MMOL/L (ref 22–29)
CREAT SERPL-MCNC: 1.56 MG/DL (ref 0.76–1.27)
CREAT UR-MCNC: 58.3 MG/DL
DEPRECATED RDW RBC AUTO: 51.3 FL (ref 37–54)
EGFRCR SERPLBLD CKD-EPI 2021: 43.3 ML/MIN/1.73
EOSINOPHIL # BLD AUTO: 0.08 10*3/MM3 (ref 0–0.4)
EOSINOPHIL NFR BLD AUTO: 0.9 % (ref 0.3–6.2)
ERYTHROCYTE [DISTWIDTH] IN BLOOD BY AUTOMATED COUNT: 14.4 % (ref 12.3–15.4)
GLOBULIN UR ELPH-MCNC: 2.9 GM/DL
GLUCOSE SERPL-MCNC: 211 MG/DL (ref 65–99)
HCT VFR BLD AUTO: 37.4 % (ref 37.5–51)
HGB BLD-MCNC: 11.6 G/DL (ref 13–17.7)
IMM GRANULOCYTES # BLD AUTO: 0.05 10*3/MM3 (ref 0–0.05)
IMM GRANULOCYTES NFR BLD AUTO: 0.6 % (ref 0–0.5)
LYMPHOCYTES # BLD AUTO: 1.37 10*3/MM3 (ref 0.7–3.1)
LYMPHOCYTES NFR BLD AUTO: 15.3 % (ref 19.6–45.3)
MCH RBC QN AUTO: 30 PG (ref 26.6–33)
MCHC RBC AUTO-ENTMCNC: 31 G/DL (ref 31.5–35.7)
MCV RBC AUTO: 96.6 FL (ref 79–97)
MONOCYTES # BLD AUTO: 0.95 10*3/MM3 (ref 0.1–0.9)
MONOCYTES NFR BLD AUTO: 10.6 % (ref 5–12)
NEUTROPHILS NFR BLD AUTO: 6.44 10*3/MM3 (ref 1.7–7)
NEUTROPHILS NFR BLD AUTO: 71.9 % (ref 42.7–76)
NRBC BLD AUTO-RTO: 0 /100 WBC (ref 0–0.2)
PLATELET # BLD AUTO: 224 10*3/MM3 (ref 140–450)
PMV BLD AUTO: 10.9 FL (ref 6–12)
POTASSIUM SERPL-SCNC: 4.1 MMOL/L (ref 3.5–5.2)
PROT ?TM UR-MCNC: 18.7 MG/DL
PROT SERPL-MCNC: 6.8 G/DL (ref 6–8.5)
PROT/CREAT UR: 320.8 MG/G CREA (ref 0–200)
RBC # BLD AUTO: 3.87 10*6/MM3 (ref 4.14–5.8)
SODIUM SERPL-SCNC: 139 MMOL/L (ref 136–145)
WBC NRBC COR # BLD AUTO: 8.95 10*3/MM3 (ref 3.4–10.8)

## 2025-05-28 PROCEDURE — 82570 ASSAY OF URINE CREATININE: CPT

## 2025-05-28 PROCEDURE — 36415 COLL VENOUS BLD VENIPUNCTURE: CPT

## 2025-05-28 PROCEDURE — 80053 COMPREHEN METABOLIC PANEL: CPT

## 2025-05-28 PROCEDURE — 85025 COMPLETE CBC W/AUTO DIFF WBC: CPT

## 2025-05-28 PROCEDURE — 84156 ASSAY OF PROTEIN URINE: CPT

## 2025-05-30 ENCOUNTER — OFFICE VISIT (OUTPATIENT)
Dept: FAMILY MEDICINE CLINIC | Facility: CLINIC | Age: 85
End: 2025-05-30
Payer: MEDICARE

## 2025-05-30 VITALS
RESPIRATION RATE: 16 BRPM | HEIGHT: 71 IN | OXYGEN SATURATION: 97 % | TEMPERATURE: 98 F | WEIGHT: 194.4 LBS | DIASTOLIC BLOOD PRESSURE: 68 MMHG | SYSTOLIC BLOOD PRESSURE: 122 MMHG | HEART RATE: 63 BPM | BODY MASS INDEX: 27.22 KG/M2

## 2025-05-30 DIAGNOSIS — M54.50 CHRONIC BILATERAL LOW BACK PAIN WITHOUT SCIATICA: Primary | ICD-10-CM

## 2025-05-30 DIAGNOSIS — G89.29 CHRONIC BILATERAL LOW BACK PAIN WITHOUT SCIATICA: Primary | ICD-10-CM

## 2025-05-30 NOTE — PROGRESS NOTES
Subjective   Chief Complaint   Patient presents with    Follow-up     Consult for Chiropractor referral.    Back Pain     Alen Ratliff is a 84 y.o. male.     Patient Care Team:  Serenity Wren MD as PCP - General (Family Medicine)  Steve Muhammad MD as Consulting Physician (Cardiology)  CAROLE Vasquez DPM as Consulting Physician (Podiatry)  David Urbina MD as Consulting Physician (Otolaryngology)  Gregory Valle MD as Surgeon (Vascular Surgery)  Gómez Arvizu MD as Consulting Physician (Ophthalmology)  Radha Danielle MD as Consulting Physician (Cardiology)  Modesto Mccall MD as Consulting Physician (Nephrology)    History of Present Illness  He is coming in today with his wife due to his chronic lower back pain as he would like to get a referral to see the chiropractor.  He just wants to see him for the consultation to see if there is anything they can do.  Patient understands that he would not do any aggressive adjustments due to his advanced age and underlying cardiac issues.  He simply just wants to go and talk to the chiropractor to discuss these options if any.  His insurance requires a referral.       The following portions of the patient's history were reviewed and updated as appropriate: allergies, current medications, past family history, past medical history, past social history, past surgical history, and problem list.  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    Stroke      Past Surgical History:   Procedure Laterality Date    ANKLE OPEN REDUCTION INTERNAL FIXATION Left 12/16/2020    Procedure: ANKLE OPEN REDUCTION INTERNAL FIXATION;  Surgeon: CAROLE Vasquez DPM;  Location:   [facility] MAIN OR;  Service: Podiatry;  Laterality: Left;    AORTIC VALVE REPAIR/REPLACEMENT N/A 02/20/2023    Procedure: Transfemoral Transcatheter Aortic Valve Replacement;  Surgeon: Naveed Leonardo MD;  Location: [facility] HYBRID OR;  Service: Cardiovascular;  Laterality: N/A;    AORTIC VALVE REPAIR/REPLACEMENT N/A 02/20/2023    Procedure: TRANSCATHETER AORTIC VALVE REPLACEMENT;  Surgeon: Fabio Villafana MD;  Location: [facility] HYBRID OR;  Service: Cardiothoracic;  Laterality: N/A;    BACK SURGERY  1994    CARDIAC CATHETERIZATION Right 09/27/2022    Procedure: Left Heart Cath;  Surgeon: Steve Muhammad MD;  Location: [facility] CATH INVASIVE LOCATION;  Service: Cardiovascular;  Laterality: Right;    CARDIAC CATHETERIZATION N/A 01/12/2023    Procedure: Stent JENN coronary;  Surgeon: Steve Muhammad MD;  Location: [facility] CATH INVASIVE LOCATION;  Service: Cardiovascular;  Laterality: N/A;    CARDIAC ELECTROPHYSIOLOGY PROCEDURE N/A 02/24/2025    Procedure: Pacemaker DC new, BiV PPM for complete heart block;  Surgeon: Radha Danielle MD;  Location: [facility] CATH INVASIVE LOCATION;  Service: Cardiovascular;  Laterality: N/A;    CAROTID ENDARTERECTOMY Right 12/14/2020    Procedure: CAROTID ENDARTERECTOMY;  Surgeon: Toby Fung MD;  Location: [facility] MAIN OR;  Service: Vascular;  Laterality: Right;    COLONOSCOPY  08/25/2015    CORONARY STENT PLACEMENT      PACEMAKER IMPLANTATION      2/24/25    PROSTATECTOMY  2001    due to cancer     The patient has a family history of  Family History   Problem Relation Age of Onset    Hypertension Other     Heart attack Mother     Heart disease Mother     Heart attack Father     Heart disease Father      Social History     Socioeconomic History    Marital status:    Tobacco Use    Smoking status: Never     Passive exposure: Never    Smokeless tobacco: Never   Vaping Use    Vaping status: Never Used   Substance and Sexual Activity    Alcohol use: Never    Drug use: Never    Sexual  "activity: Defer       Review of Systems   Musculoskeletal:  Positive for arthralgias and back pain.   Neurological:  Negative for weakness and numbness.     Visit Vitals  /68 (BP Location: Left arm, Patient Position: Sitting, Cuff Size: Adult)   Pulse 63   Temp 98 °F (36.7 °C) (Infrared)   Resp 16   Ht 180.3 cm (70.98\")   Wt 88.2 kg (194 lb 6.4 oz)   SpO2 97%   BMI 27.13 kg/m²              Current Outpatient Medications:     Artificial Tear Ointment (artificial tears) ophthalmic ointment, Administer  to both eyes Every 1 (One) Hour As Needed., Disp: , Rfl:     aspirin 81 MG chewable tablet, Chew 1 tablet Daily., Disp: 30 tablet, Rfl: 1    atorvastatin (LIPITOR) 80 MG tablet, Take 1 tablet by mouth Daily., Disp: 90 tablet, Rfl: 1    fluticasone (FLONASE) 50 MCG/ACT nasal spray, Administer 2 sprays into the nostril(s) as directed by provider Daily As Needed., Disp: , Rfl:     levothyroxine (Synthroid) 112 MCG tablet, Take 1 tablet by mouth Daily., Disp: 90 tablet, Rfl: 1    losartan (COZAAR) 100 MG tablet, Take 0.5 tablets by mouth Daily., Disp: 45 tablet, Rfl: 1    nebivolol (BYSTOLIC) 2.5 MG tablet, Take 1 tablet by mouth Daily., Disp: 90 tablet, Rfl: 0    NIFEdipine CC (ADALAT CC) 30 MG 24 hr tablet, Take 1 tablet by mouth Daily. (Patient taking differently: Take 2 tablets by mouth Daily.), Disp: 90 tablet, Rfl: 0    pantoprazole (PROTONIX) 40 MG EC tablet, TAKE ONE TABLET BY MOUTH EVERY DAY, Disp: 90 tablet, Rfl: 0    rivaroxaban (Xarelto) 15 MG tablet, Take 1 tablet by mouth Daily With Dinner., Disp: 90 tablet, Rfl: 0    SITagliptin (Januvia) 50 MG tablet, Take 1 tablet by mouth Daily., Disp: 90 tablet, Rfl: 1    Objective   Physical Exam  Constitutional:       General: He is not in acute distress.     Appearance: Normal appearance. He is well-developed. He is not ill-appearing or diaphoretic.      Comments: Patient is in no distress, patient has normal voice and speech.  Normal respiratory effort.   HENT: "      Head: Normocephalic and atraumatic.   Pulmonary:      Effort: Pulmonary effort is normal.   Musculoskeletal:      Cervical back: Normal range of motion and neck supple.   Neurological:      General: No focal deficit present.      Mental Status: He is alert and oriented to person, place, and time. Mental status is at baseline.   Psychiatric:         Mood and Affect: Mood normal.              FOLLOWING LABS WERE REVIEWED TODAY:  CMP          4/15/2025    08:06 5/7/2025    10:03 5/28/2025    09:52   CMP   Glucose 140  185  211    BUN 33  35  37.0    Creatinine 1.49  1.41  1.56    EGFR 45.7  48.8  43.3    Sodium 142  139  139    Potassium 3.9  4.2  4.1    Chloride 105  103  105    Calcium 8.9  8.8  8.6    Total Protein 7.2  6.7  6.8    Albumin 3.5  3.8  3.9    Globulin 3.7  2.9  2.9    Total Bilirubin 0.4  0.4  0.4    Alkaline Phosphatase 84  79  82    AST (SGOT) 20  22  20    ALT (SGPT) 17  16  19    Albumin/Globulin Ratio 0.9  1.3  1.3    BUN/Creatinine Ratio 22.1  24.8  23.7    Anion Gap 14.7  13.7  12.3            Assessment & Plan   Diagnoses and all orders for this visit:    1. Chronic bilateral low back pain without sciatica (Primary)  -     Ambulatory Referral to Chiropractic  -     XR Spine Lumbar 2 or 3 View; Future      I will be getting x-ray of the lumbar spine.  I provided a referral for the patient as requested, but I verbalized with him repeatedly that he would not be a good candidate for aggressive adjustment through the chiropractor.  He may take over-the-counter Tylenol as needed for pain.  I advised him to avoid oral anti-inflammatories due to his chronic kidney disease and chronic anticoagulation.        Return in about 5 months (around 10/30/2025) for Next scheduled follow up.    Requested Prescriptions      No prescriptions requested or ordered in this encounter       Serenity Wren MD  05/30/2025  13:23 EDT

## 2025-06-10 ENCOUNTER — TELEPHONE (OUTPATIENT)
Dept: FAMILY MEDICINE CLINIC | Facility: CLINIC | Age: 85
End: 2025-06-10

## 2025-06-10 DIAGNOSIS — K21.9 GASTROESOPHAGEAL REFLUX DISEASE, UNSPECIFIED WHETHER ESOPHAGITIS PRESENT: ICD-10-CM

## 2025-06-10 RX ORDER — PANTOPRAZOLE SODIUM 40 MG/1
40 TABLET, DELAYED RELEASE ORAL DAILY
Qty: 90 TABLET | Refills: 0 | Status: SHIPPED | OUTPATIENT
Start: 2025-06-10

## 2025-06-10 NOTE — TELEPHONE ENCOUNTER
Caller: Elba Ratliff    Relationship: Emergency Contact    Best call back number: [phone]     PATIENT IS NEEDING IMAGES OF HIS X-RAY THAT WAS TAKEN IN OFFICE.

## 2025-06-23 ENCOUNTER — TELEPHONE (OUTPATIENT)
Dept: FAMILY MEDICINE CLINIC | Facility: CLINIC | Age: 85
End: 2025-06-23
Payer: MEDICARE

## 2025-06-26 LAB
MC_CV_MDC_IDC_RATE_1: 150
MC_CV_MDC_IDC_RATE_1: 171
MC_CV_MDC_IDC_THERAPIES: NORMAL
MC_CV_MDC_IDC_ZONE_ID: 2
MC_CV_MDC_IDC_ZONE_ID: 6
MDC_IDC_MSMT_BATTERY_REMAINING_LONGEVITY: 136 MO
MDC_IDC_MSMT_BATTERY_RRT_TRIGGER: 2.62
MDC_IDC_MSMT_BATTERY_STATUS: NORMAL
MDC_IDC_MSMT_BATTERY_VOLTAGE: 3.17
MDC_IDC_MSMT_LEADCHNL_RA_DTM: NORMAL
MDC_IDC_MSMT_LEADCHNL_RA_IMPEDANCE_VALUE: 494
MDC_IDC_MSMT_LEADCHNL_RA_PACING_THRESHOLD_AMPLITUDE: 0.62
MDC_IDC_MSMT_LEADCHNL_RA_PACING_THRESHOLD_POLARITY: NORMAL
MDC_IDC_MSMT_LEADCHNL_RA_PACING_THRESHOLD_PULSEWIDTH: 0.4
MDC_IDC_MSMT_LEADCHNL_RA_SENSING_INTR_AMPL: 2.88
MDC_IDC_MSMT_LEADCHNL_RV_DTM: NORMAL
MDC_IDC_MSMT_LEADCHNL_RV_IMPEDANCE_VALUE: 532
MDC_IDC_MSMT_LEADCHNL_RV_PACING_THRESHOLD_AMPLITUDE: 1.25
MDC_IDC_MSMT_LEADCHNL_RV_PACING_THRESHOLD_POLARITY: NORMAL
MDC_IDC_MSMT_LEADCHNL_RV_PACING_THRESHOLD_PULSEWIDTH: 0.4
MDC_IDC_MSMT_LEADCHNL_RV_SENSING_INTR_AMPL: 24.5
MDC_IDC_PG_IMPLANT_DTM: NORMAL
MDC_IDC_PG_MFG: NORMAL
MDC_IDC_PG_MODEL: NORMAL
MDC_IDC_PG_SERIAL: NORMAL
MDC_IDC_PG_TYPE: NORMAL
MDC_IDC_SESS_DTM: NORMAL
MDC_IDC_SESS_TYPE: NORMAL
MDC_IDC_SET_BRADY_AT_MODE_SWITCH_RATE: 171
MDC_IDC_SET_BRADY_LOWRATE: 60
MDC_IDC_SET_BRADY_MAX_SENSOR_RATE: 130
MDC_IDC_SET_BRADY_MAX_TRACKING_RATE: 130
MDC_IDC_SET_BRADY_MODE: NORMAL
MDC_IDC_SET_BRADY_PAV_DELAY: 180
MDC_IDC_SET_BRADY_SAV_DELAY: 150
MDC_IDC_SET_LEADCHNL_RA_PACING_AMPLITUDE: 1.5
MDC_IDC_SET_LEADCHNL_RA_PACING_POLARITY: NORMAL
MDC_IDC_SET_LEADCHNL_RA_PACING_PULSEWIDTH: 0.4
MDC_IDC_SET_LEADCHNL_RA_SENSING_POLARITY: NORMAL
MDC_IDC_SET_LEADCHNL_RA_SENSING_SENSITIVITY: 0.3
MDC_IDC_SET_LEADCHNL_RV_PACING_AMPLITUDE: 2.5
MDC_IDC_SET_LEADCHNL_RV_PACING_POLARITY: NORMAL
MDC_IDC_SET_LEADCHNL_RV_PACING_PULSEWIDTH: 0.4
MDC_IDC_SET_LEADCHNL_RV_SENSING_POLARITY: NORMAL
MDC_IDC_SET_LEADCHNL_RV_SENSING_SENSITIVITY: 0.9
MDC_IDC_SET_ZONE_STATUS: NORMAL
MDC_IDC_SET_ZONE_STATUS: NORMAL
MDC_IDC_SET_ZONE_TYPE: NORMAL
MDC_IDC_SET_ZONE_TYPE: NORMAL
MDC_IDC_STAT_AT_BURDEN_PERCENT: 0
MDC_IDC_STAT_BRADY_RA_PERCENT_PACED: 60.82
MDC_IDC_STAT_BRADY_RV_PERCENT_PACED: 99.87

## 2025-07-01 ENCOUNTER — APPOINTMENT (OUTPATIENT)
Dept: CT IMAGING | Facility: HOSPITAL | Age: 85
End: 2025-07-01
Payer: MEDICARE

## 2025-07-01 ENCOUNTER — HOSPITAL ENCOUNTER (OUTPATIENT)
Facility: HOSPITAL | Age: 85
Setting detail: OBSERVATION
Discharge: HOME | End: 2025-07-02
Attending: EMERGENCY MEDICINE
Payer: MEDICARE

## 2025-07-01 ENCOUNTER — APPOINTMENT (OUTPATIENT)
Dept: GENERAL RADIOLOGY | Facility: HOSPITAL | Age: 85
End: 2025-07-01
Payer: MEDICARE

## 2025-07-01 DIAGNOSIS — M54.50 ACUTE MIDLINE LOW BACK PAIN, UNSPECIFIED WHETHER SCIATICA PRESENT: Primary | ICD-10-CM

## 2025-07-01 DIAGNOSIS — M54.9 INTRACTABLE BACK PAIN: ICD-10-CM

## 2025-07-01 LAB
ALBUMIN SERPL-MCNC: 3.8 G/DL (ref 3.5–5.2)
ALBUMIN/GLOB SERPL: 1.1 G/DL
ALP SERPL-CCNC: 78 U/L (ref 39–117)
ALT SERPL W P-5'-P-CCNC: 29 U/L (ref 1–41)
ANION GAP SERPL CALCULATED.3IONS-SCNC: 17.4 MMOL/L (ref 5–15)
AST SERPL-CCNC: 44 U/L (ref 1–40)
BASOPHILS # BLD AUTO: 0.05 10*3/MM3 (ref 0–0.2)
BASOPHILS NFR BLD AUTO: 0.4 % (ref 0–1.5)
BILIRUB SERPL-MCNC: 0.5 MG/DL (ref 0–1.2)
BILIRUB UR QL STRIP: NEGATIVE
BUN SERPL-MCNC: 41.5 MG/DL (ref 8–23)
BUN/CREAT SERPL: 28.4 (ref 7–25)
CALCIUM SPEC-SCNC: 8.9 MG/DL (ref 8.6–10.5)
CHLORIDE SERPL-SCNC: 101 MMOL/L (ref 98–107)
CLARITY UR: CLEAR
CO2 SERPL-SCNC: 17.6 MMOL/L (ref 22–29)
COLOR UR: YELLOW
CREAT SERPL-MCNC: 1.46 MG/DL (ref 0.76–1.27)
CRP SERPL-MCNC: 7.62 MG/DL (ref 0–0.5)
DEPRECATED RDW RBC AUTO: 45.5 FL (ref 37–54)
EGFRCR SERPLBLD CKD-EPI 2021: 46.8 ML/MIN/1.73
EOSINOPHIL # BLD AUTO: 0.03 10*3/MM3 (ref 0–0.4)
EOSINOPHIL NFR BLD AUTO: 0.2 % (ref 0.3–6.2)
ERYTHROCYTE [DISTWIDTH] IN BLOOD BY AUTOMATED COUNT: 13.4 % (ref 12.3–15.4)
ERYTHROCYTE [SEDIMENTATION RATE] IN BLOOD: 72 MM/HR (ref 0–20)
GEN 5 1HR TROPONIN T REFLEX: 41 NG/L
GLOBULIN UR ELPH-MCNC: 3.5 GM/DL
GLUCOSE SERPL-MCNC: 150 MG/DL (ref 65–99)
GLUCOSE UR STRIP-MCNC: ABNORMAL MG/DL
HCT VFR BLD AUTO: 36.4 % (ref 37.5–51)
HGB BLD-MCNC: 11.3 G/DL (ref 13–17.7)
HGB UR QL STRIP.AUTO: NEGATIVE
HOLD SPECIMEN: NORMAL
HOLD SPECIMEN: NORMAL
IMM GRANULOCYTES # BLD AUTO: 0.06 10*3/MM3 (ref 0–0.05)
IMM GRANULOCYTES NFR BLD AUTO: 0.5 % (ref 0–0.5)
KETONES UR QL STRIP: NEGATIVE
LEUKOCYTE ESTERASE UR QL STRIP.AUTO: NEGATIVE
LIPASE SERPL-CCNC: 47 U/L (ref 13–60)
LYMPHOCYTES # BLD AUTO: 1.3 10*3/MM3 (ref 0.7–3.1)
LYMPHOCYTES NFR BLD AUTO: 9.9 % (ref 19.6–45.3)
MCH RBC QN AUTO: 29 PG (ref 26.6–33)
MCHC RBC AUTO-ENTMCNC: 31 G/DL (ref 31.5–35.7)
MCV RBC AUTO: 93.3 FL (ref 79–97)
MONOCYTES # BLD AUTO: 2.13 10*3/MM3 (ref 0.1–0.9)
MONOCYTES NFR BLD AUTO: 16.2 % (ref 5–12)
NEUTROPHILS NFR BLD AUTO: 72.8 % (ref 42.7–76)
NEUTROPHILS NFR BLD AUTO: 9.6 10*3/MM3 (ref 1.7–7)
NITRITE UR QL STRIP: NEGATIVE
NRBC BLD AUTO-RTO: 0 /100 WBC (ref 0–0.2)
PH UR STRIP.AUTO: <=5 [PH] (ref 5–8)
PLATELET # BLD AUTO: 339 10*3/MM3 (ref 140–450)
PMV BLD AUTO: 9.7 FL (ref 6–12)
POTASSIUM SERPL-SCNC: 3.7 MMOL/L (ref 3.5–5.2)
PROT SERPL-MCNC: 7.3 G/DL (ref 6–8.5)
PROT UR QL STRIP: NEGATIVE
QT INTERVAL: 368 MS
QTC INTERVAL: 428 MS
RBC # BLD AUTO: 3.9 10*6/MM3 (ref 4.14–5.8)
SODIUM SERPL-SCNC: 136 MMOL/L (ref 136–145)
SP GR UR STRIP: 1.01 (ref 1–1.03)
TROPONIN T % DELTA: 3
TROPONIN T NUMERIC DELTA: 1 NG/L
TROPONIN T SERPL HS-MCNC: 40 NG/L
UROBILINOGEN UR QL STRIP: ABNORMAL
WBC NRBC COR # BLD AUTO: 13.17 10*3/MM3 (ref 3.4–10.8)
WHOLE BLOOD HOLD COAG: NORMAL
WHOLE BLOOD HOLD SPECIMEN: NORMAL

## 2025-07-01 PROCEDURE — 96375 TX/PRO/DX INJ NEW DRUG ADDON: CPT

## 2025-07-01 PROCEDURE — 81003 URINALYSIS AUTO W/O SCOPE: CPT | Performed by: EMERGENCY MEDICINE

## 2025-07-01 PROCEDURE — 96361 HYDRATE IV INFUSION ADD-ON: CPT

## 2025-07-01 PROCEDURE — 93005 ELECTROCARDIOGRAM TRACING: CPT | Performed by: EMERGENCY MEDICINE

## 2025-07-01 PROCEDURE — 25010000002 ONDANSETRON PER 1 MG: Performed by: EMERGENCY MEDICINE

## 2025-07-01 PROCEDURE — 93005 ELECTROCARDIOGRAM TRACING: CPT

## 2025-07-01 PROCEDURE — 36415 COLL VENOUS BLD VENIPUNCTURE: CPT

## 2025-07-01 PROCEDURE — 80053 COMPREHEN METABOLIC PANEL: CPT | Performed by: EMERGENCY MEDICINE

## 2025-07-01 PROCEDURE — 96372 THER/PROPH/DIAG INJ SC/IM: CPT

## 2025-07-01 PROCEDURE — 71045 X-RAY EXAM CHEST 1 VIEW: CPT

## 2025-07-01 PROCEDURE — 25010000002 DICYCLOMINE PER 20 MG: Performed by: EMERGENCY MEDICINE

## 2025-07-01 PROCEDURE — 85652 RBC SED RATE AUTOMATED: CPT | Performed by: EMERGENCY MEDICINE

## 2025-07-01 PROCEDURE — 84484 ASSAY OF TROPONIN QUANT: CPT | Performed by: EMERGENCY MEDICINE

## 2025-07-01 PROCEDURE — 96374 THER/PROPH/DIAG INJ IV PUSH: CPT

## 2025-07-01 PROCEDURE — 25010000002 HYDROMORPHONE 1 MG/ML SOLUTION

## 2025-07-01 PROCEDURE — 84145 PROCALCITONIN (PCT): CPT | Performed by: INTERNAL MEDICINE

## 2025-07-01 PROCEDURE — 85025 COMPLETE CBC W/AUTO DIFF WBC: CPT | Performed by: EMERGENCY MEDICINE

## 2025-07-01 PROCEDURE — G0378 HOSPITAL OBSERVATION PER HR: HCPCS

## 2025-07-01 PROCEDURE — 83690 ASSAY OF LIPASE: CPT | Performed by: EMERGENCY MEDICINE

## 2025-07-01 PROCEDURE — 86140 C-REACTIVE PROTEIN: CPT | Performed by: EMERGENCY MEDICINE

## 2025-07-01 PROCEDURE — 99285 EMERGENCY DEPT VISIT HI MDM: CPT

## 2025-07-01 PROCEDURE — 25010000002 HYDROMORPHONE PER 4 MG: Performed by: EMERGENCY MEDICINE

## 2025-07-01 PROCEDURE — 96376 TX/PRO/DX INJ SAME DRUG ADON: CPT

## 2025-07-01 PROCEDURE — 99203 OFFICE O/P NEW LOW 30 MIN: CPT

## 2025-07-01 PROCEDURE — 25810000003 LACTATED RINGERS PER 1000 ML

## 2025-07-01 PROCEDURE — 83880 ASSAY OF NATRIURETIC PEPTIDE: CPT | Performed by: INTERNAL MEDICINE

## 2025-07-01 PROCEDURE — 74176 CT ABD & PELVIS W/O CONTRAST: CPT

## 2025-07-01 RX ORDER — DICYCLOMINE HYDROCHLORIDE 10 MG/ML
20 INJECTION INTRAMUSCULAR ONCE
Status: COMPLETED | OUTPATIENT
Start: 2025-07-01 | End: 2025-07-01

## 2025-07-01 RX ORDER — LOSARTAN POTASSIUM 50 MG/1
50 TABLET ORAL DAILY
Status: DISCONTINUED | OUTPATIENT
Start: 2025-07-01 | End: 2025-07-02 | Stop reason: HOSPADM

## 2025-07-01 RX ORDER — SODIUM CHLORIDE 9 MG/ML
40 INJECTION, SOLUTION INTRAVENOUS AS NEEDED
Status: DISCONTINUED | OUTPATIENT
Start: 2025-07-01 | End: 2025-07-02 | Stop reason: HOSPADM

## 2025-07-01 RX ORDER — BISACODYL 10 MG
10 SUPPOSITORY, RECTAL RECTAL DAILY PRN
Status: DISCONTINUED | OUTPATIENT
Start: 2025-07-01 | End: 2025-07-02 | Stop reason: HOSPADM

## 2025-07-01 RX ORDER — ONDANSETRON 2 MG/ML
4 INJECTION INTRAMUSCULAR; INTRAVENOUS ONCE
Status: COMPLETED | OUTPATIENT
Start: 2025-07-01 | End: 2025-07-01

## 2025-07-01 RX ORDER — POLYETHYLENE GLYCOL 3350 17 G/17G
17 POWDER, FOR SOLUTION ORAL DAILY PRN
Status: DISCONTINUED | OUTPATIENT
Start: 2025-07-01 | End: 2025-07-02 | Stop reason: HOSPADM

## 2025-07-01 RX ORDER — ACETAMINOPHEN 325 MG/1
650 TABLET ORAL EVERY 4 HOURS PRN
Status: DISCONTINUED | OUTPATIENT
Start: 2025-07-01 | End: 2025-07-02 | Stop reason: HOSPADM

## 2025-07-01 RX ORDER — SODIUM CHLORIDE, SODIUM LACTATE, POTASSIUM CHLORIDE, CALCIUM CHLORIDE 600; 310; 30; 20 MG/100ML; MG/100ML; MG/100ML; MG/100ML
100 INJECTION, SOLUTION INTRAVENOUS CONTINUOUS
Status: DISPENSED | OUTPATIENT
Start: 2025-07-01 | End: 2025-07-02

## 2025-07-01 RX ORDER — METHOCARBAMOL 500 MG/1
500 TABLET, FILM COATED ORAL EVERY 8 HOURS PRN
Status: DISCONTINUED | OUTPATIENT
Start: 2025-07-01 | End: 2025-07-02 | Stop reason: HOSPADM

## 2025-07-01 RX ORDER — NIFEDIPINE 30 MG/1
30 TABLET, EXTENDED RELEASE ORAL DAILY
Status: DISCONTINUED | OUTPATIENT
Start: 2025-07-01 | End: 2025-07-02 | Stop reason: HOSPADM

## 2025-07-01 RX ORDER — SODIUM CHLORIDE 0.9 % (FLUSH) 0.9 %
10 SYRINGE (ML) INJECTION EVERY 12 HOURS SCHEDULED
Status: DISCONTINUED | OUTPATIENT
Start: 2025-07-01 | End: 2025-07-02 | Stop reason: HOSPADM

## 2025-07-01 RX ORDER — ACETAMINOPHEN 160 MG/5ML
650 SOLUTION ORAL EVERY 4 HOURS PRN
Status: DISCONTINUED | OUTPATIENT
Start: 2025-07-01 | End: 2025-07-02 | Stop reason: HOSPADM

## 2025-07-01 RX ORDER — ATORVASTATIN CALCIUM 40 MG/1
80 TABLET, FILM COATED ORAL DAILY
Status: DISCONTINUED | OUTPATIENT
Start: 2025-07-01 | End: 2025-07-02 | Stop reason: HOSPADM

## 2025-07-01 RX ORDER — HEPARIN SODIUM 5000 [USP'U]/ML
5000 INJECTION, SOLUTION INTRAVENOUS; SUBCUTANEOUS EVERY 8 HOURS SCHEDULED
Status: DISCONTINUED | OUTPATIENT
Start: 2025-07-01 | End: 2025-07-01

## 2025-07-01 RX ORDER — NALOXONE HCL 0.4 MG/ML
0.4 VIAL (ML) INJECTION
Status: DISCONTINUED | OUTPATIENT
Start: 2025-07-01 | End: 2025-07-02 | Stop reason: HOSPADM

## 2025-07-01 RX ORDER — LIDOCAINE 50 MG/G
1 OINTMENT TOPICAL AS NEEDED
Status: DISCONTINUED | OUTPATIENT
Start: 2025-07-01 | End: 2025-07-02 | Stop reason: HOSPADM

## 2025-07-01 RX ORDER — PANTOPRAZOLE SODIUM 40 MG/1
40 TABLET, DELAYED RELEASE ORAL DAILY
Status: DISCONTINUED | OUTPATIENT
Start: 2025-07-01 | End: 2025-07-02 | Stop reason: HOSPADM

## 2025-07-01 RX ORDER — LEVOTHYROXINE SODIUM 112 UG/1
112 TABLET ORAL DAILY
Status: DISCONTINUED | OUTPATIENT
Start: 2025-07-01 | End: 2025-07-02 | Stop reason: HOSPADM

## 2025-07-01 RX ORDER — SODIUM CHLORIDE 0.9 % (FLUSH) 0.9 %
10 SYRINGE (ML) INJECTION AS NEEDED
Status: DISCONTINUED | OUTPATIENT
Start: 2025-07-01 | End: 2025-07-02 | Stop reason: HOSPADM

## 2025-07-01 RX ORDER — ONDANSETRON 2 MG/ML
4 INJECTION INTRAMUSCULAR; INTRAVENOUS EVERY 6 HOURS PRN
Status: DISCONTINUED | OUTPATIENT
Start: 2025-07-01 | End: 2025-07-02 | Stop reason: HOSPADM

## 2025-07-01 RX ORDER — NEBIVOLOL 5 MG/1
2.5 TABLET ORAL
Status: DISCONTINUED | OUTPATIENT
Start: 2025-07-01 | End: 2025-07-02 | Stop reason: HOSPADM

## 2025-07-01 RX ORDER — AMOXICILLIN 250 MG
2 CAPSULE ORAL 2 TIMES DAILY PRN
Status: DISCONTINUED | OUTPATIENT
Start: 2025-07-01 | End: 2025-07-02 | Stop reason: HOSPADM

## 2025-07-01 RX ORDER — OXYCODONE HYDROCHLORIDE 5 MG/1
5 TABLET ORAL EVERY 6 HOURS PRN
Refills: 0 | Status: DISCONTINUED | OUTPATIENT
Start: 2025-07-01 | End: 2025-07-02 | Stop reason: HOSPADM

## 2025-07-01 RX ORDER — HYDROMORPHONE HYDROCHLORIDE 1 MG/ML
0.5 INJECTION, SOLUTION INTRAMUSCULAR; INTRAVENOUS; SUBCUTANEOUS ONCE
Refills: 0 | Status: COMPLETED | OUTPATIENT
Start: 2025-07-01 | End: 2025-07-01

## 2025-07-01 RX ORDER — BISACODYL 5 MG/1
5 TABLET, DELAYED RELEASE ORAL DAILY PRN
Status: DISCONTINUED | OUTPATIENT
Start: 2025-07-01 | End: 2025-07-02 | Stop reason: HOSPADM

## 2025-07-01 RX ORDER — NIFEDIPINE 30 MG
30 TABLET, EXTENDED RELEASE ORAL DAILY
COMMUNITY

## 2025-07-01 RX ORDER — ACETAMINOPHEN 650 MG/1
650 SUPPOSITORY RECTAL EVERY 4 HOURS PRN
Status: DISCONTINUED | OUTPATIENT
Start: 2025-07-01 | End: 2025-07-02 | Stop reason: HOSPADM

## 2025-07-01 RX ORDER — ASPIRIN 81 MG/1
81 TABLET, CHEWABLE ORAL DAILY
Status: DISCONTINUED | OUTPATIENT
Start: 2025-07-01 | End: 2025-07-02 | Stop reason: HOSPADM

## 2025-07-01 RX ADMIN — RIVAROXABAN 15 MG: 15 TABLET, FILM COATED ORAL at 17:21

## 2025-07-01 RX ADMIN — HYDROMORPHONE HYDROCHLORIDE 1 MG: 1 INJECTION, SOLUTION INTRAMUSCULAR; INTRAVENOUS; SUBCUTANEOUS at 22:46

## 2025-07-01 RX ADMIN — ONDANSETRON 4 MG: 2 INJECTION, SOLUTION INTRAMUSCULAR; INTRAVENOUS at 04:26

## 2025-07-01 RX ADMIN — METHOCARBAMOL TABLETS 500 MG: 500 TABLET, COATED ORAL at 20:13

## 2025-07-01 RX ADMIN — DICYCLOMINE HYDROCHLORIDE 20 MG: 10 INJECTION, SOLUTION INTRAMUSCULAR at 03:34

## 2025-07-01 RX ADMIN — NEBIVOLOL 2.5 MG: 5 TABLET ORAL at 13:45

## 2025-07-01 RX ADMIN — OXYCODONE 5 MG: 5 TABLET ORAL at 18:25

## 2025-07-01 RX ADMIN — METHOCARBAMOL TABLETS 500 MG: 500 TABLET, COATED ORAL at 08:55

## 2025-07-01 RX ADMIN — OXYCODONE 5 MG: 5 TABLET ORAL at 08:51

## 2025-07-01 RX ADMIN — PANTOPRAZOLE SODIUM 40 MG: 40 TABLET, DELAYED RELEASE ORAL at 13:44

## 2025-07-01 RX ADMIN — HYDROMORPHONE HYDROCHLORIDE 0.5 MG: 1 INJECTION, SOLUTION INTRAMUSCULAR; INTRAVENOUS; SUBCUTANEOUS at 04:26

## 2025-07-01 RX ADMIN — NIFEDIPINE 30 MG: 30 TABLET, EXTENDED RELEASE ORAL at 13:45

## 2025-07-01 RX ADMIN — LINAGLIPTIN 5 MG: 5 TABLET, FILM COATED ORAL at 13:46

## 2025-07-01 RX ADMIN — ATORVASTATIN CALCIUM 80 MG: 40 TABLET, FILM COATED ORAL at 13:44

## 2025-07-01 RX ADMIN — LOSARTAN POTASSIUM 50 MG: 50 TABLET, FILM COATED ORAL at 13:44

## 2025-07-01 RX ADMIN — SODIUM CHLORIDE, POTASSIUM CHLORIDE, SODIUM LACTATE AND CALCIUM CHLORIDE 100 ML/HR: 600; 310; 30; 20 INJECTION, SOLUTION INTRAVENOUS at 06:18

## 2025-07-01 RX ADMIN — SODIUM CHLORIDE, POTASSIUM CHLORIDE, SODIUM LACTATE AND CALCIUM CHLORIDE 100 ML/HR: 600; 310; 30; 20 INJECTION, SOLUTION INTRAVENOUS at 22:49

## 2025-07-01 RX ADMIN — LEVOTHYROXINE SODIUM 112 MCG: 112 TABLET ORAL at 13:44

## 2025-07-01 NOTE — CONSULTS
Neurosurgery Consultation      Patient: Alen Ratliff    Sex: male    YOB: 1940    Date of Admission: 7/1/2025  1:33 AM    Admitting Dx: Intractable back pain [M54.9]  Acute midline low back pain, unspecified whether sciatica present [M54.50]    Reason for Consult: Intractable low back pain      Subjective     CC: Severe mid to low back pain x2-3 days    HPI:  Patient is a 84 y.o. male with A-fib on Xarelto, CAD, and diabetes who presents with complaints of acute mid to low back pain.  Patient denies injury or inciting event prior to onset.  He describes spasm-like pain in his mid to lower back on both sides.  Worse with long periods of sitting.  Pain does not radiate around his trunk or down either lower extremity.  He denies numbness and tingling, lower extremity weakness, bowel/bladder dysfunction, and saddle anesthesia.      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    Stroke          Current Medications:  Scheduled Meds:[Held by provider] aspirin, 81 mg, Oral, Daily  atorvastatin, 80 mg, Oral, Daily  heparin (porcine), 5,000 Units, Subcutaneous, Q8H  levothyroxine, 112 mcg, Oral, Daily  linagliptin, 5 mg, Oral, Daily  losartan, 50 mg, Oral, Daily  nebivolol, 2.5 mg, Oral, Q24H  NIFEdipine XL, 30 mg, Oral, Daily  pantoprazole, 40 mg, Oral, Daily  [Held by provider] rivaroxaban, 15 mg, Oral, Daily With Dinner  sodium chloride, 10 mL, Intravenous, Q12H      Continuous Infusions:lactated ringers, 100 mL/hr, Last Rate: 100 mL/hr (07/01/25 0618)      PRN Meds:   acetaminophen **OR** acetaminophen **OR** acetaminophen    artificial tears    senna-docusate sodium **AND** polyethylene glycol  "**AND** bisacodyl **AND** bisacodyl    Calcium Replacement - Follow Nurse / BPA Driven Protocol    HYDROmorphone **AND** naloxone    lidocaine    Magnesium Standard Dose Replacement - Follow Nurse / BPA Driven Protocol    methocarbamol    ondansetron    oxyCODONE    Phosphorus Replacement - Follow Nurse / BPA Driven Protocol    Potassium Replacement - Follow Nurse / BPA Driven Protocol    sodium chloride    sodium chloride      ROS: 14 point review of systems was completed and is negative except for what is noted in HPI      Objective     Vitals:    07/01/25 0816 07/01/25 0831 07/01/25 0846 07/01/25 1003   BP: 131/63 153/68 158/68 165/65   BP Location:    Left arm   Patient Position:    Lying   Pulse: 63 63 62 64   Resp:    17   Temp:    97.9 °F (36.6 °C)   TempSrc:    Oral   SpO2: 97% 97% 96% 91%   Weight:    83.1 kg (183 lb 3.2 oz)   Height:    180.3 cm (70.98\")       Physical exam  General  - awake, cooperative, in no acute distress  HEENT  - Normocephalic, atraumatic  - PERRLA, EOM intact    NEUROLOGIC    MENTAL STATUS:  - A/O x3  MOTOR:  - 5/5 strength throughout BLE  REFLEXES:  - Negative clonus  SENSORY:  - Normal to touch and pinprick throughout BLE  GAIT:  - Deferred            RESULTS REVIEW:  Results from last 7 days   Lab Units 07/01/25  0204   WBC 10*3/mm3 13.17*   HEMOGLOBIN g/dL 11.3*   HEMATOCRIT % 36.4*   PLATELETS 10*3/mm3 339        Results from last 7 days   Lab Units 07/01/25  0204   SODIUM mmol/L 136   POTASSIUM mmol/L 3.7   CHLORIDE mmol/L 101   CO2 mmol/L 17.6*   BUN mg/dL 41.5*   CREATININE mg/dL 1.46*   CALCIUM mg/dL 8.9   BILIRUBIN mg/dL 0.5   ALK PHOS U/L 78   ALT (SGPT) U/L 29   AST (SGOT) U/L 44*   GLUCOSE mg/dL 150*        CT Abdomen Pelvis Without Contrast  Result Date: 7/1/2025  CT ABDOMEN PELVIS WO CONTRAST Date of Exam: 7/1/2025 4:14 AM EDT Indication: epigastric/back pain. Comparison: 3/25/2025. Technique: Axial CT images were obtained of the abdomen and pelvis without the " administration of contrast. Sagittal and coronal reconstructions were performed.  Automated exposure control and iterative reconstruction methods were used. Findings: There is evidence of prior TAVR. ICD leads noted in the right heart. Trace pericardial effusion. Small hiatal hernia. Minor atelectasis in the lung bases. Mild muscular atrophy. Evidence of prior ventral laparotomy. No acute osseous abnormality. No sclerotic bone lesions. Moderate lumbar spondylosis. There are old healed or healing right-sided rib fractures. The liver, bile ducts and pancreas appear normal. Cholelithiasis without acute cholecystitis. Spleen is normal size. No adrenal nodule or mass. Kidneys are homogeneous. No nephrolithiasis or hydronephrosis. Patient appears status post prostatectomy. Normal urinary bladder. Appendix is not seen. There is colonic diverticulosis. There is mild fat stranding around the sigmoid colon, which is unchanged in the prior study and may represent chronic inflammation. No evidence of acute diverticulitis. No small bowel distention. No ascites, pneumoperitoneum or lymphadenopathy. There is moderate atherosclerotic disease without aneurysm.     Impression: Impression: 1.No acute abdominal or pelvic abnormality. 2.Colonic diverticulosis without evidence of acute diverticulitis. 3.Cholelithiasis without acute cholecystitis. 4.Small hiatal hernia. 5.Status post prostatectomy. Electronically Signed: Pieter Marino MD  7/1/2025 4:39 AM EDT  Workstation ID: [id number]            Assessment     MDM: This is a 84 y.o. male presenting with acute mid to low back pain.  He seems to be having significant muscle spasms.  No focal sensorimotor deficits on exam.  Chronic degenerative changes seen on CT with no acute osseous abnormalities.  In the absence of fracture or lower extremity symptoms I do not think an MRI is indicated.    No acute neurosurgical intervention indicated at this time.  Would recommend medical management of  patient's pain.  He will also likely benefit from physical therapy as he has tight and somewhat weak core and posterior musculature.        Plan     Acute mid to low back pain  Muscle spasms  - Recommend PT eval  - Consider outpatient physical therapy if patient is agreeable  - Medical management and pain control per primary team  - Follow-up with PCP after discharge    Neurosurgery will sign off at this time.  Call with any questions or concerns.    I discussed my assessment and recommendations with Dr. Duane Jordan PA-C  7/1/2025  12:50 EDT        Part of this note may be an electronic transcription/translation of spoken language to printed text using the Dragon Dictation System.

## 2025-07-01 NOTE — CASE MANAGEMENT/SOCIAL WORK
Discharge Planning Assessment   David     Patient Name: Alen Ratliff  MRN: [id number]  Today's Date: 7/1/2025    Admit Date: 7/1/2025    Plan: From Home/PT/OT pending   Discharge Needs Assessment       Row Name 07/01/25 0901       Living Environment    People in Home spouse    Current Living Arrangements apartment      Row Name 07/01/25 0833       Living Environment    People in Home spouse    Name(s) of People in Home Spouse Elba    Potentially Unsafe Housing Conditions none    In the past 12 months has the electric, gas, oil, or water company threatened to shut off services in your home? No    Primary Care Provided by self    Provides Primary Care For no one    Family Caregiver if Needed spouse    Family Caregiver Names Spouse Elba    Quality of Family Relationships helpful;involved;supportive    Able to Return to Prior Arrangements yes       Resource/Environmental Concerns    Resource/Environmental Concerns none    Transportation Concerns none       Transportation Needs    In the past 12 months, has lack of transportation kept you from medical appointments or from getting medications? no    In the past 12 months, has lack of transportation kept you from meetings, work, or from getting things needed for daily living? No       Food Insecurity    Within the past 12 months, you worried that your food would run out before you got the money to buy more. Never true    Within the past 12 months, the food you bought just didn't last and you didn't have money to get more. Never true       Transition Planning    Patient/Family Anticipates Transition to home with family    Patient/Family Anticipated Services at Transition none    Transportation Anticipated family or friend will provide  Spouse Elba can transport at d/c       Discharge Needs Assessment    Readmission Within the Last 30 Days no previous admission in last 30 days    Equipment Currently Used at Home glucometer;walker, rolling    Do you want help finding  or keeping work or a job? I do not need or want help    Do you want help with school or training? For example, starting or completing job training or getting a high school diploma, GED or equivalent No    Anticipated Changes Related to Illness none    Equipment Needed After Discharge none    Provided Post Acute Provider List? N/A    Provided Post Acute Provider Quality & Resource List? N/A                   Discharge Plan       Row Name 07/01/25 0834       Plan    Plan From Home/PT/OT pending    Patient/Family in Agreement with Plan yes    Plan Comments CM spoke to pt. Alen and spouse Elba at bedside. PCP and pharmacy confirmed. Pt. and spouse deny financial, transportation, or medication issues. Pt's Spouse Elba can transport at d/c. DC barriers: PT/OT consult pending, IV med/Fluids                  Continued Care and Services - Admitted Since 7/1/2025    No active coordination exists.          Demographic Summary       Row Name 07/01/25 0831       General Information    Admission Type observation    Arrived From home    Required Notices Provided Observation Status Notice    Referral Source admission list    Reason for Consult discharge planning    Preferred Language English       Contact Information    Permission Granted to Share Info With     Contact Information Obtained for                    Functional Status       Row Name 07/01/25 0832       Functional Status    Usual Activity Tolerance good    Current Activity Tolerance moderate       Physical Activity    On average, how many days per week do you engage in moderate to strenuous exercise (like a brisk walk)? 0 days    On average, how many minutes do you engage in exercise at this level? 0 min    Number of minutes of exercise per week 0       Functional Status, IADL    Medications independent    Meal Preparation independent    Housekeeping assistive person  Spouse Elba    Laundry assistive person  Spouse Elba    Shopping independent     If for any reason you need help with day-to-day activities such as bathing, preparing meals, shopping, managing finances, etc., do you get the help you need? I get all the help I need                    Patient Forms       Row Name 07/01/25 0836       Patient Forms    Important Message from Medicare ([facility]) Delivered  GRANT per registration 7/1/2025    Patient Observation Letter Delivered  GRANT per registration 7/1/2025                      Cee Olmos RN    Office: [phone]  Fax: [phone]  [email or web address]

## 2025-07-01 NOTE — ED PROVIDER NOTES
Subjective   History of Present Illness  84-year-old male with intermittent epigastric spasms onset 11:30 AM on Monday.  Patient has mild associated dyspnea, no cough or fever, no vomiting or diarrhea.  Patient does have associated back pain in the same area      Review of Systems   Respiratory:  Positive for shortness of breath.    Cardiovascular:  Positive for chest pain.   Gastrointestinal:  Positive for abdominal pain and constipation.       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    Stroke        Allergies   Allergen Reactions    Azithromycin Unknown - High Severity    Tramadol Unknown - High Severity       Past Surgical History:   Procedure Laterality Date    ANKLE OPEN REDUCTION INTERNAL FIXATION Left 12/16/2020    Procedure: ANKLE OPEN REDUCTION INTERNAL FIXATION;  Surgeon: CAROLE Vasquez DPM;  Location: [facility] MAIN OR;  Service: Podiatry;  Laterality: Left;    AORTIC VALVE REPAIR/REPLACEMENT N/A 02/20/2023    Procedure: Transfemoral Transcatheter Aortic Valve Replacement;  Surgeon: Naveed Leonardo MD;  Location: [facility] HYBRID OR;  Service: Cardiovascular;  Laterality: N/A;    AORTIC VALVE REPAIR/REPLACEMENT N/A 02/20/2023    Procedure: TRANSCATHETER AORTIC VALVE REPLACEMENT;  Surgeon: Fabio Villafana MD;  Location: [facility] HYBRID OR;  Service: Cardiothoracic;  Laterality: N/A;    BACK SURGERY  1994    CARDIAC CATHETERIZATION Right 09/27/2022    Procedure: Left Heart Cath;  Surgeon: Steve Muhammad MD;  Location: [facility] CATH INVASIVE LOCATION;  Service: Cardiovascular;  Laterality: Right;    CARDIAC CATHETERIZATION N/A 01/12/2023    Procedure: Stent JENN coronary;  Surgeon: Steve Muhammad MD;  Location: [facility]  CATH INVASIVE LOCATION;  Service: Cardiovascular;  Laterality: N/A;    CARDIAC ELECTROPHYSIOLOGY PROCEDURE N/A 02/24/2025    Procedure: Pacemaker DC new, BiV PPM for complete heart block;  Surgeon: Radha Danielle MD;  Location: [facility] CATH INVASIVE LOCATION;  Service: Cardiovascular;  Laterality: N/A;    CAROTID ENDARTERECTOMY Right 12/14/2020    Procedure: CAROTID ENDARTERECTOMY;  Surgeon: Toby Fung MD;  Location: [facility] MAIN OR;  Service: Vascular;  Laterality: Right;    COLONOSCOPY  08/25/2015    CORONARY STENT PLACEMENT      PACEMAKER IMPLANTATION      2/24/25    PROSTATECTOMY  2001    due to cancer       Family History   Problem Relation Age of Onset    Hypertension Other     Heart attack Mother     Heart disease Mother     Heart attack Father     Heart disease Father        Social History     Socioeconomic History    Marital status:    Tobacco Use    Smoking status: Never     Passive exposure: Never    Smokeless tobacco: Never   Vaping Use    Vaping status: Never Used   Substance and Sexual Activity    Alcohol use: Never    Drug use: Never    Sexual activity: Defer           Objective   Physical Exam  Constitutional:       Appearance: Normal appearance.   HENT:      Head: Normocephalic and atraumatic.      Mouth/Throat:      Mouth: Mucous membranes are moist.      Pharynx: Oropharynx is clear.   Eyes:      Conjunctiva/sclera: Conjunctivae normal.      Pupils: Pupils are equal, round, and reactive to light.   Cardiovascular:      Rate and Rhythm: Normal rate and regular rhythm.      Heart sounds: Normal heart sounds.   Pulmonary:      Effort: Pulmonary effort is normal.      Breath sounds: Normal breath sounds.   Abdominal:      General: Bowel sounds are normal. There is no distension.      Palpations: Abdomen is soft.      Tenderness: There is no abdominal tenderness.   Musculoskeletal:         General: Normal range of motion.   Skin:     General: Skin is warm and dry.      Capillary  Refill: Capillary refill takes less than 2 seconds.   Neurological:      General: No focal deficit present.      Mental Status: He is alert and oriented to person, place, and time.   Psychiatric:         Mood and Affect: Mood normal.         Behavior: Behavior normal.         Procedures           ED Course                                                       Medical Decision Making  Patient now states that he never had abdominal or chest pain but that he had only back in his mid back.  Given his advanced age and reported malaise with decreased p.o. intake of the past 48 hours, poor historian, will observe in the hospital, check sed rate and CRP to further evaluate for the possibility of occult infection distally neurovascularly intact.  Case discussed with Dr. Niño, agrees with plan    Problems Addressed:  Acute midline low back pain, unspecified whether sciatica present: complicated acute illness or injury    Amount and/or Complexity of Data Reviewed  Labs: ordered.     Details: Normal wbc  Radiology: ordered and independent interpretation performed.     Details: Chest neg  ECG/medicine tests: ordered and independent interpretation performed.     Details: EKG interpretation: Atrially paced rhythm, rate 81, no STEMI seen    Risk  Prescription drug management.  Decision regarding hospitalization.        Final diagnoses:   Acute midline low back pain, unspecified whether sciatica present       ED Disposition  ED Disposition       ED Disposition   Decision to Admit    Condition   --    Comment   Level of Care: Med/Surg [1]   Admitting Physician: AYAAN NIÑO [[id number]]                 No follow-up provider specified.       Medication List      No changes were made to your prescriptions during this visit.            Sg Broussard MD  07/01/25 0584

## 2025-07-01 NOTE — SIGNIFICANT NOTE
07/01/25 1303   OTHER   Discipline occupational therapist   Rehab Time/Intention   Session Not Performed other (see comments)  (Pt states he does not want any kind of therapy. OT will make another attempt next day.)   Recommendation   OT - Next Appointment 07/02/25

## 2025-07-01 NOTE — SIGNIFICANT NOTE
"   07/01/25 0912   OTHER   Discipline physical therapist   Rehab Time/Intention   Session Not Performed patient/family declined evaluation  (pt stated he does not want therapy of any sort. States that \"therapy does more harm than good\" and to leave the room. Nursing notified.)   Therapy Assessment/Plan (PT)   Criteria for Skilled Interventions Met (PT) yes;skilled treatment is necessary   Recommendation   PT - Next Appointment 07/02/25       "
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
## 2025-07-01 NOTE — PLAN OF CARE
Goal Outcome Evaluation:              Outcome Evaluation: Pt. arrived from the ER with severe back pain. Pt. on RA, A&Ox4. Pt. had elevated trop in ER.

## 2025-07-01 NOTE — CASE MANAGEMENT/SOCIAL WORK
Discharge Planning Assessment   David     Patient Name: Alen Ratliff  MRN: [id number]  Today's Date: 7/1/2025    Admit Date: 7/1/2025    Plan: From Home/PT/OT pending   Discharge Needs Assessment       Row Name 07/01/25 0833       Living Environment    People in Home spouse    Name(s) of People in Home Spouse Elba    Current Living Arrangements home    Potentially Unsafe Housing Conditions none    In the past 12 months has the electric, gas, oil, or water company threatened to shut off services in your home? No    Primary Care Provided by self    Provides Primary Care For no one    Family Caregiver if Needed spouse    Family Caregiver Names Spouse Elba    Quality of Family Relationships helpful;involved;supportive    Able to Return to Prior Arrangements yes       Resource/Environmental Concerns    Resource/Environmental Concerns none    Transportation Concerns none       Transportation Needs    In the past 12 months, has lack of transportation kept you from medical appointments or from getting medications? no    In the past 12 months, has lack of transportation kept you from meetings, work, or from getting things needed for daily living? No       Food Insecurity    Within the past 12 months, you worried that your food would run out before you got the money to buy more. Never true    Within the past 12 months, the food you bought just didn't last and you didn't have money to get more. Never true       Transition Planning    Patient/Family Anticipates Transition to home with family    Patient/Family Anticipated Services at Transition none    Transportation Anticipated family or friend will provide  Spouse Elba can transport at d/c       Discharge Needs Assessment    Readmission Within the Last 30 Days no previous admission in last 30 days    Equipment Currently Used at Home glucometer;walker, rolling    Do you want help finding or keeping work or a job? I do not need or want help    Do you want help with  school or training? For example, starting or completing job training or getting a high school diploma, GED or equivalent No                   Discharge Plan       Row Name 07/01/25 0834       Plan    Plan From Home/PT/OT pending    Patient/Family in Agreement with Plan yes    Plan Comments CM spoke to pt. Alen and spouse Elba at bedside. PCP and pharmacy confirmed. Pt. and spouse deny financial, transportation, or medication issues. Pt's Spouse Elba can transport at d/c. DC barriers: PT/OT consult pending, IV med/Fluids              Demographic Summary       Row Name 07/01/25 0831       General Information    Admission Type observation    Arrived From home    Required Notices Provided Observation Status Notice    Referral Source admission list    Reason for Consult discharge planning    Preferred Language English       Contact Information    Permission Granted to Share Info With     Contact Information Obtained for                    Functional Status       Row Name 07/01/25 0832       Functional Status    Usual Activity Tolerance good    Current Activity Tolerance moderate       Physical Activity    On average, how many days per week do you engage in moderate to strenuous exercise (like a brisk walk)? 0 days    On average, how many minutes do you engage in exercise at this level? 0 min    Number of minutes of exercise per week 0       Functional Status, IADL    Medications independent    Meal Preparation independent    Housekeeping assistive person  Spouse Elba    Laundry assistive person  Spouse Elba    Shopping independent    If for any reason you need help with day-to-day activities such as bathing, preparing meals, shopping, managing finances, etc., do you get the help you need? I get all the help I need                Patient Forms       Row Name 07/01/25 0836       Patient Forms    Important Message from Medicare (IMM) Delivered  GRANT per registration 7/1/2025    Patient Observation  Letter Delivered  GRANT per registration 7/1/2025                      Cee Olmos RN    Office: [phone]  Fax: [phone]  [email or web address]

## 2025-07-02 ENCOUNTER — READMISSION MANAGEMENT (OUTPATIENT)
Dept: CALL CENTER | Facility: HOSPITAL | Age: 85
End: 2025-07-02
Payer: MEDICARE

## 2025-07-02 VITALS
TEMPERATURE: 98 F | HEART RATE: 68 BPM | DIASTOLIC BLOOD PRESSURE: 54 MMHG | HEIGHT: 71 IN | OXYGEN SATURATION: 96 % | WEIGHT: 183.2 LBS | BODY MASS INDEX: 25.65 KG/M2 | SYSTOLIC BLOOD PRESSURE: 115 MMHG | RESPIRATION RATE: 15 BRPM

## 2025-07-02 LAB
ANION GAP SERPL CALCULATED.3IONS-SCNC: 14 MMOL/L (ref 5–15)
BASOPHILS # BLD AUTO: 0.04 10*3/MM3 (ref 0–0.2)
BASOPHILS NFR BLD AUTO: 0.3 % (ref 0–1.5)
BUN SERPL-MCNC: 37.7 MG/DL (ref 8–23)
BUN/CREAT SERPL: 28.1 (ref 7–25)
CALCIUM SPEC-SCNC: 8.6 MG/DL (ref 8.6–10.5)
CHLORIDE SERPL-SCNC: 103 MMOL/L (ref 98–107)
CO2 SERPL-SCNC: 19 MMOL/L (ref 22–29)
CREAT SERPL-MCNC: 1.34 MG/DL (ref 0.76–1.27)
DEPRECATED RDW RBC AUTO: 45.6 FL (ref 37–54)
EGFRCR SERPLBLD CKD-EPI 2021: 51.9 ML/MIN/1.73
EOSINOPHIL # BLD AUTO: 0.07 10*3/MM3 (ref 0–0.4)
EOSINOPHIL NFR BLD AUTO: 0.6 % (ref 0.3–6.2)
ERYTHROCYTE [DISTWIDTH] IN BLOOD BY AUTOMATED COUNT: 13.4 % (ref 12.3–15.4)
GLUCOSE SERPL-MCNC: 198 MG/DL (ref 65–99)
HCT VFR BLD AUTO: 32.9 % (ref 37.5–51)
HGB BLD-MCNC: 10.4 G/DL (ref 13–17.7)
IMM GRANULOCYTES # BLD AUTO: 0.05 10*3/MM3 (ref 0–0.05)
IMM GRANULOCYTES NFR BLD AUTO: 0.4 % (ref 0–0.5)
LYMPHOCYTES # BLD AUTO: 1.47 10*3/MM3 (ref 0.7–3.1)
LYMPHOCYTES NFR BLD AUTO: 12.1 % (ref 19.6–45.3)
MCH RBC QN AUTO: 29.2 PG (ref 26.6–33)
MCHC RBC AUTO-ENTMCNC: 31.6 G/DL (ref 31.5–35.7)
MCV RBC AUTO: 92.4 FL (ref 79–97)
MONOCYTES # BLD AUTO: 1.33 10*3/MM3 (ref 0.1–0.9)
MONOCYTES NFR BLD AUTO: 10.9 % (ref 5–12)
NEUTROPHILS NFR BLD AUTO: 75.7 % (ref 42.7–76)
NEUTROPHILS NFR BLD AUTO: 9.21 10*3/MM3 (ref 1.7–7)
NRBC BLD AUTO-RTO: 0 /100 WBC (ref 0–0.2)
NT-PROBNP SERPL-MCNC: 571 PG/ML (ref 0–1800)
PLATELET # BLD AUTO: 333 10*3/MM3 (ref 140–450)
PMV BLD AUTO: 10 FL (ref 6–12)
POTASSIUM SERPL-SCNC: 4.1 MMOL/L (ref 3.5–5.2)
PROCALCITONIN SERPL-MCNC: 0.14 NG/ML (ref 0–0.25)
RBC # BLD AUTO: 3.56 10*6/MM3 (ref 4.14–5.8)
SODIUM SERPL-SCNC: 136 MMOL/L (ref 136–145)
WBC NRBC COR # BLD AUTO: 12.17 10*3/MM3 (ref 3.4–10.8)

## 2025-07-02 PROCEDURE — 80048 BASIC METABOLIC PNL TOTAL CA: CPT | Performed by: INTERNAL MEDICINE

## 2025-07-02 PROCEDURE — 96361 HYDRATE IV INFUSION ADD-ON: CPT

## 2025-07-02 PROCEDURE — G0378 HOSPITAL OBSERVATION PER HR: HCPCS

## 2025-07-02 PROCEDURE — 85025 COMPLETE CBC W/AUTO DIFF WBC: CPT | Performed by: INTERNAL MEDICINE

## 2025-07-02 RX ORDER — AMOXICILLIN 250 MG
2 CAPSULE ORAL 2 TIMES DAILY PRN
Qty: 60 TABLET | Refills: 0 | Status: SHIPPED | OUTPATIENT
Start: 2025-07-02

## 2025-07-02 RX ORDER — OXYCODONE HYDROCHLORIDE 5 MG/1
5 TABLET ORAL EVERY 6 HOURS PRN
Qty: 21 TABLET | Refills: 0 | Status: SHIPPED | OUTPATIENT
Start: 2025-07-02 | End: 2025-07-08

## 2025-07-02 RX ORDER — DAPAGLIFLOZIN 10 MG/1
10 TABLET, FILM COATED ORAL DAILY
Qty: 30 TABLET | Refills: 1 | Status: SHIPPED | OUTPATIENT
Start: 2025-07-02 | End: 2025-07-07 | Stop reason: SDUPTHER

## 2025-07-02 RX ORDER — METHOCARBAMOL 500 MG/1
500 TABLET, FILM COATED ORAL EVERY 8 HOURS PRN
Qty: 90 TABLET | Refills: 0 | Status: SHIPPED | OUTPATIENT
Start: 2025-07-02

## 2025-07-02 RX ADMIN — ATORVASTATIN CALCIUM 80 MG: 40 TABLET, FILM COATED ORAL at 09:19

## 2025-07-02 RX ADMIN — ASPIRIN 81 MG CHEWABLE TABLET 81 MG: 81 TABLET CHEWABLE at 09:19

## 2025-07-02 RX ADMIN — Medication 10 ML: at 09:20

## 2025-07-02 RX ADMIN — LINAGLIPTIN 5 MG: 5 TABLET, FILM COATED ORAL at 09:19

## 2025-07-02 RX ADMIN — LEVOTHYROXINE SODIUM 112 MCG: 112 TABLET ORAL at 09:19

## 2025-07-02 RX ADMIN — PANTOPRAZOLE SODIUM 40 MG: 40 TABLET, DELAYED RELEASE ORAL at 09:19

## 2025-07-02 NOTE — OUTREACH NOTE
Prep Survey      Flowsheet Row Responses   [facility] patient discharged from? [facility]   Is LACE score < 7 ? No   Eligibility [facility]   Date of Admission 07/01/25   Date of Discharge 07/02/25   Discharge diagnosis Intractable back pain   Does the patient have one of the following disease processes/diagnoses(primary or secondary)? Other   Prep survey completed? Yes            Nikki WHALEN - Registered Nurse

## 2025-07-02 NOTE — SIGNIFICANT NOTE
07/02/25 1018   OTHER   Discipline occupational therapist   Rehab Time/Intention   Session Not Performed patient/family declined evaluation  (Pt reports his back pain has resolved. Pt states that he walk around and has an HEP from [facility] that he will do. OT has attempted 2x and pt has declined OT eval both attempts. Will sign off and complete order.)

## 2025-07-02 NOTE — PLAN OF CARE
Goal Outcome Evaluation:                      Patient is alert and oriented x4. Patient had an episode of N&V but patient refused the PRN Zofran. He still complained of pain on his mid back, PRN pain med was administered.

## 2025-07-02 NOTE — SIGNIFICANT NOTE
07/02/25 0958   OTHER   Discipline physical therapist   Rehab Time/Intention   Session Not Performed patient/family declined evaluation  (Pt reports that he has no back pain today. Pt states that he will be fine when he gets home; he will walk around and has a written HEP from [facility] that he will do.  PT has attempted x 2;  pt has declined both attempts.  We will complete order.)   Therapy Assessment/Plan (PT)   Criteria for Skilled Interventions Met (PT) other (see comments)  (Pt refusal)

## 2025-07-02 NOTE — CASE MANAGEMENT/SOCIAL WORK
Continued Stay Note  THAD Hernandez     Patient Name: Alen Ratliff  MRN: [id number]  Today's Date: 7/2/2025    Admit Date: 7/1/2025    Plan: From home.   Discharge Plan       Row Name 07/02/25 1540       Plan    Plan From home.    Plan Comments Patient refusing all HHC and OPPT.    Final Discharge Disposition Code 01 - home or self-care    Final Note Home.                  Expected Discharge Date Expected Discharge Time    Jul 2, 2025           Met with patient in room wearing PPE: mask.    Maintained distance greater than six feet and spent less than 15 minutes in the room.       Nasra Rubi RN

## 2025-07-02 NOTE — PLAN OF CARE
Goal Outcome Evaluation:              Outcome Evaluation: Pt. expected to discharge home with wife

## 2025-07-02 NOTE — DISCHARGE INSTR - DIET
Diet: Cardiac, Diabetic; Healthy Heart (2-3 Na+); Consistent Carbohydrate; Fluid Consistency: Thin (IDDSI 0)

## 2025-07-02 NOTE — DISCHARGE SUMMARY
[facility] MEDICINE SERVICE  DAILY PROGRESS NOTE    NAME: Alen Ratliff  : 1940  MRN: [id number]      LOS: 0 days     PROVIDER OF SERVICE: Maurice Duran MD    Chief Complaint: Intractable back pain    Subjective:     Interval History:  History taken from: patient      History of Present Illness: Alen Ratliff is a 84 y.o. male with a CMH of atrial fibrillation on xarelto , complete heart block s/p pacemaker (2025), CVA X2, coronary artery disease S/P TAVR, type 2 diabetes, GERD, hyperlipidemia, hypertension, hypothyroidism  who presented to [facility] on 2025 with intractable right mid back pain.  Describes as spasmodic, no alleviating aggravating factors.  Started on the day of presentation after a car for over an hour.  Denies spinal back pain, recent trauma, acute numbness or tingling in extremities, bowel or bladder incontinence.  Also denies chest pain, shortness of breath, nausea vomiting or diarrhea.  Arrival to the ED: Vitally stable, pertinent labs include troponin 40-41, bicarb 17.1, creatinine 1.46, sed rate 72, CRP 7.62, WBCs 13.17, hemoglobin 11.3.  He had a CT abdomen pelvis that showed no acute abdominal or pelvic abnormality.  It showed cholelithiasis without acute cholecystitis and other nonspecific findings.  Will admit patient under observation, to determine effectiveness of muscle relaxants on back pain.      seen in bed NAD, VSS, No new complaints, c/o back pain    Review of Systems  Constitutional:  Negative for activity change, appetite change, fatigue and fever.   HENT:  Negative for congestion.    Respiratory:  Negative for apnea, cough, shortness of breath and wheezing.    Cardiovascular:  Negative for chest pain and leg swelling.   Gastrointestinal:  Negative for abdominal distention, abdominal pain, diarrhea, nausea and vomiting.   Endocrine: Negative for cold intolerance.   Genitourinary:  Negative for difficulty urinating, dysuria  and frequency.   Musculoskeletal:  Positive for back pain.   Neurological:  Negative for dizziness, seizures, speech difficulty, numbness and headaches.   Psychiatric/Behavioral:  Negative for agitation and confusion.    Objective:     Vital Signs  Temp:  [97.6 °F (36.4 °C)-98.4 °F (36.9 °C)] 98 °F (36.7 °C)  Heart Rate:  [61-73] 68  Resp:  [15-20] 15  BP: (115-141)/(54-64) 115/54   Body mass index is 25.56 kg/m².      Physical Exam        Appearance: Normal appearance.   HENT:      Head: Normocephalic.      Right Ear: Tympanic membrane normal.      Left Ear: Tympanic membrane normal.      Mouth/Throat:      Mouth: Mucous membranes are moist.   Eyes:      Pupils: Pupils are equal, round, and reactive to light.   Cardiovascular:      Rate and Rhythm: Normal rate and regular rhythm.      Heart sounds: No murmur heard.  Pulmonary:      Effort: No respiratory distress.      Breath sounds: No wheezing.   Abdominal:      General: There is no distension.      Palpations: There is no mass.      Tenderness: There is no right CVA tenderness, left CVA tenderness or guarding.   Musculoskeletal:         General: Normal range of motion.      Comments: Negative straight leg test  No spinal tenderness appreciated on palpation  Muscle tightness appreciated in the right mid back   Skin:     General: Skin is warm and dry.      Capillary Refill: Capillary refill takes less than 2 seconds.   Neurological:      General: No focal deficit present.      Mental Status: He is alert and oriented to person, place, and time.        Diagnostic Data    Results from last 7 days   Lab Units 07/01/25  0204   WBC 10*3/mm3 13.17*   HEMOGLOBIN g/dL 11.3*   HEMATOCRIT % 36.4*   PLATELETS 10*3/mm3 339   GLUCOSE mg/dL 150*   CREATININE mg/dL 1.46*   BUN mg/dL 41.5*   SODIUM mmol/L 136   POTASSIUM mmol/L 3.7   AST (SGOT) U/L 44*   ALT (SGPT) U/L 29   ALK PHOS U/L 78   BILIRUBIN mg/dL 0.5   ANION GAP mmol/L 17.4*       CT Abdomen Pelvis Without  Contrast  Result Date: 7/1/2025  Impression: 1.No acute abdominal or pelvic abnormality. 2.Colonic diverticulosis without evidence of acute diverticulitis. 3.Cholelithiasis without acute cholecystitis. 4.Small hiatal hernia. 5.Status post prostatectomy. Electronically Signed: Pieter Marino MD  7/1/2025 4:39 AM EDT  Workstation ID: [id number]    XR Chest 1 View  Result Date: 7/1/2025  Impression: No acute cardiopulmonary abnormality. Electronically Signed: Pieter Marino MD  7/1/2025 4:06 AM EDT  Workstation ID: [id number]        I reviewed the patient's new clinical results.    Assessment/Plan:     Active and Resolved Problems  Active Hospital Problems    Diagnosis  POA    **Intractable back pain [M54.9]  Yes      Resolved Hospital Problems   No resolved problems to display.       Intractable right sided back pain  Sed rate 72, CRP 7.62  CT of the abdomen and pelvis showed cholelithiasis without cholecystitis.  Falls precaution  Start on Robaxin renally dosed  Per spine>CT abdomen pelvis demonstrates the lower thoracic and lumbar and sacrum. Patient has grade 1 spondylolisthesis of L4-5 with some degree of central stenosis as well as some central stenosis at L3-4. Recommend PT consider YEVGENIY to cover L3-5. Would avoid surgery given advanced age      Leukocytosis likely reactive  Denies fevers, chills, dysuria  Hold off antibiotics and clinically monitor for now     CKD  creatinine 1.46 on presentation which is around baseline  Judicious use of nephrotoxins  Repeat BMP in the a.m.     Normocytic anemia  Hemoglobin 11.3  Transfuse for hemoglobin less than 7  CBC daily     Type 2 diabetes  Hemoglobin A1c 6.97 in March 2025  Hold home dose of antidiabetic regimen  Start on sliding scale insulin     Complete heart block status post pacemaker February 24, 2024  Hypertension  Hyperlipidemia  Hypothyroidism  GERD  CAD  CVA x 2  Aortic stenosis status post TAVR  Resume home meds when verified by pharmacy and clinically  appropriate    VTE Prophylaxis:  Pharmacologic VTE prophylaxis orders are present.    Disposition Planning:   Barriers to Discharge:none  Anticipated Date of Discharge: home  Place of Discharge: home  Time: 35 minutes     Code Status and Medical Interventions: CPR (Attempt to Resuscitate); Full Support   Ordered at: 07/01/25 0515     Code Status (Patient has no pulse and is not breathing):    CPR (Attempt to Resuscitate)     Medical Interventions (Patient has pulse or is breathing):    Full Support       Signature: Electronically signed by Maurice Duran MD, 07/02/25, 10:17 EDT.  [facility] Hospitalist Team

## 2025-07-02 NOTE — CASE MANAGEMENT/SOCIAL WORK
Case Management Discharge Note      Final Note: Home.    Provided Post Acute Provider List?: N/A  Provided Post Acute Provider Quality & Resource List?: N/A  \    Transportation Services  Transportation: Private Transportation  Private: Car    Final Discharge Disposition Code: 01 - home or self-care

## 2025-07-03 ENCOUNTER — TRANSITIONAL CARE MANAGEMENT TELEPHONE ENCOUNTER (OUTPATIENT)
Dept: CALL CENTER | Facility: HOSPITAL | Age: 85
End: 2025-07-03
Payer: MEDICARE

## 2025-07-03 ENCOUNTER — TELEPHONE (OUTPATIENT)
Dept: FAMILY MEDICINE CLINIC | Facility: CLINIC | Age: 85
End: 2025-07-03

## 2025-07-03 NOTE — OUTREACH NOTE
Call Center TCM Note      Flowsheet Row Responses   [facility] facility patient discharged from? [facility]   Does the patient have one of the following disease processes/diagnoses(primary or secondary)? Other   TCM attempt successful? No   Unsuccessful attempts Attempt 2            GUILLERMO Fenton Registered Nurse    7/3/2025, 14:08 EDT

## 2025-07-03 NOTE — TELEPHONE ENCOUNTER
Caller: Elba Ratliff    Relationship: Emergency Contact    Best call back number:     What was the call regarding: WHILE THE PATIENT WAS IN THE HOSPITAL, THE DR CHANGED HIS DIABETIC MEDS AND THEY WANTED TO KNOW IF THAT WAS OK WITH DR GABRIEL     CAN YOU LOOK INTO THIS AND DISCUSS     HOSPITAL FOLLOW UP MADE ALSO     Is it okay if the provider responds through MyChart: CALL

## 2025-07-03 NOTE — TELEPHONE ENCOUNTER
Patient notified and verbalized understanding   Patient wife wendy asked if you can send in test strips for the both of them

## 2025-07-03 NOTE — TELEPHONE ENCOUNTER
It sounds reasonable, he can start taking Farxiga.  I will go over this in more details with the patient at his hospital follow-up appointment next week.  Thank you.

## 2025-07-03 NOTE — OUTREACH NOTE
Call Center TCM Note      Flowsheet Row Responses   [facility] facility patient discharged from? [facility]   Does the patient have one of the following disease processes/diagnoses(primary or secondary)? Other   TCM attempt successful? No  [vr for wife]   Unsuccessful attempts Attempt 1  [call to pt and wife]   Call Status Left message            GUILLERMO Fenton Registered Nurse    7/3/2025, 12:34 EDT

## 2025-07-03 NOTE — TELEPHONE ENCOUNTER
Yes, I can do that.  However I need to know what brand they use.  I will clarify that at his appointment on 7/7/2025.

## 2025-07-03 NOTE — TELEPHONE ENCOUNTER
MEDS WERE CHANGED FROM JANUVIA TO FARXIGA. SHE WANTED TO MAKE SURE THIS WAS OKAY BEFORE SHE SWITCHED IT. SHE ALSO SAID THE DOCTOR AT THE HOSPITAL MENTIONED PT BUT NOTHING ELSE. I GOT HIM ON THE SCHEDULE FOR MONDAY FOR A HOSPITAL F/U.

## 2025-07-07 ENCOUNTER — TRANSITIONAL CARE MANAGEMENT TELEPHONE ENCOUNTER (OUTPATIENT)
Dept: CALL CENTER | Facility: HOSPITAL | Age: 85
End: 2025-07-07
Payer: MEDICARE

## 2025-07-07 ENCOUNTER — LAB (OUTPATIENT)
Dept: FAMILY MEDICINE CLINIC | Facility: CLINIC | Age: 85
End: 2025-07-07
Payer: MEDICARE

## 2025-07-07 ENCOUNTER — OFFICE VISIT (OUTPATIENT)
Dept: FAMILY MEDICINE CLINIC | Facility: CLINIC | Age: 85
End: 2025-07-07
Payer: MEDICARE

## 2025-07-07 VITALS
OXYGEN SATURATION: 95 % | HEIGHT: 71 IN | HEART RATE: 60 BPM | BODY MASS INDEX: 25.98 KG/M2 | TEMPERATURE: 98 F | WEIGHT: 185.6 LBS | DIASTOLIC BLOOD PRESSURE: 79 MMHG | SYSTOLIC BLOOD PRESSURE: 136 MMHG

## 2025-07-07 DIAGNOSIS — E11.42 DM TYPE 2 WITH DIABETIC PERIPHERAL NEUROPATHY: ICD-10-CM

## 2025-07-07 DIAGNOSIS — M54.50 CHRONIC BILATERAL LOW BACK PAIN WITHOUT SCIATICA: Primary | ICD-10-CM

## 2025-07-07 DIAGNOSIS — G89.29 CHRONIC BILATERAL LOW BACK PAIN WITHOUT SCIATICA: Primary | ICD-10-CM

## 2025-07-07 DIAGNOSIS — D64.9 ANEMIA, UNSPECIFIED TYPE: ICD-10-CM

## 2025-07-07 DIAGNOSIS — N18.31 STAGE 3A CHRONIC KIDNEY DISEASE: ICD-10-CM

## 2025-07-07 LAB
BASOPHILS # BLD AUTO: 0.05 10*3/MM3 (ref 0–0.2)
BASOPHILS NFR BLD AUTO: 0.5 % (ref 0–1.5)
DEPRECATED RDW RBC AUTO: 39.9 FL (ref 37–54)
EOSINOPHIL # BLD AUTO: 0.1 10*3/MM3 (ref 0–0.4)
EOSINOPHIL NFR BLD AUTO: 1 % (ref 0.3–6.2)
ERYTHROCYTE [DISTWIDTH] IN BLOOD BY AUTOMATED COUNT: 12.3 % (ref 12.3–15.4)
HBA1C MFR BLD: 7.3 % (ref 4.8–5.6)
HCT VFR BLD AUTO: 31.9 % (ref 37.5–51)
HGB BLD-MCNC: 10.4 G/DL (ref 13–17.7)
IMM GRANULOCYTES # BLD AUTO: 0.06 10*3/MM3 (ref 0–0.05)
IMM GRANULOCYTES NFR BLD AUTO: 0.6 % (ref 0–0.5)
LYMPHOCYTES # BLD AUTO: 1.36 10*3/MM3 (ref 0.7–3.1)
LYMPHOCYTES NFR BLD AUTO: 14.1 % (ref 19.6–45.3)
MCH RBC QN AUTO: 29.8 PG (ref 26.6–33)
MCHC RBC AUTO-ENTMCNC: 32.6 G/DL (ref 31.5–35.7)
MCV RBC AUTO: 91.4 FL (ref 79–97)
MONOCYTES # BLD AUTO: 1.08 10*3/MM3 (ref 0.1–0.9)
MONOCYTES NFR BLD AUTO: 11.2 % (ref 5–12)
NEUTROPHILS NFR BLD AUTO: 6.97 10*3/MM3 (ref 1.7–7)
NEUTROPHILS NFR BLD AUTO: 72.6 % (ref 42.7–76)
NRBC BLD AUTO-RTO: 0 /100 WBC (ref 0–0.2)
PLATELET # BLD AUTO: 407 10*3/MM3 (ref 140–450)
PMV BLD AUTO: 9.8 FL (ref 6–12)
RBC # BLD AUTO: 3.49 10*6/MM3 (ref 4.14–5.8)
WBC NRBC COR # BLD AUTO: 9.62 10*3/MM3 (ref 3.4–10.8)

## 2025-07-07 PROCEDURE — 1160F RVW MEDS BY RX/DR IN RCRD: CPT | Performed by: FAMILY MEDICINE

## 2025-07-07 PROCEDURE — 85025 COMPLETE CBC W/AUTO DIFF WBC: CPT | Performed by: FAMILY MEDICINE

## 2025-07-07 PROCEDURE — 36415 COLL VENOUS BLD VENIPUNCTURE: CPT | Performed by: FAMILY MEDICINE

## 2025-07-07 PROCEDURE — 3075F SYST BP GE 130 - 139MM HG: CPT | Performed by: FAMILY MEDICINE

## 2025-07-07 PROCEDURE — 1125F AMNT PAIN NOTED PAIN PRSNT: CPT | Performed by: FAMILY MEDICINE

## 2025-07-07 PROCEDURE — 1159F MED LIST DOCD IN RCRD: CPT | Performed by: FAMILY MEDICINE

## 2025-07-07 PROCEDURE — 3078F DIAST BP <80 MM HG: CPT | Performed by: FAMILY MEDICINE

## 2025-07-07 PROCEDURE — G2211 COMPLEX E/M VISIT ADD ON: HCPCS | Performed by: FAMILY MEDICINE

## 2025-07-07 PROCEDURE — 99214 OFFICE O/P EST MOD 30 MIN: CPT | Performed by: FAMILY MEDICINE

## 2025-07-07 PROCEDURE — 83036 HEMOGLOBIN GLYCOSYLATED A1C: CPT | Performed by: FAMILY MEDICINE

## 2025-07-07 RX ORDER — BLOOD SUGAR DIAGNOSTIC
1 STRIP MISCELLANEOUS DAILY
Qty: 100 EACH | Refills: 1 | Status: SHIPPED | OUTPATIENT
Start: 2025-07-07

## 2025-07-07 RX ORDER — DAPAGLIFLOZIN 10 MG/1
10 TABLET, FILM COATED ORAL DAILY
Qty: 90 TABLET | Refills: 0 | Status: SHIPPED | OUTPATIENT
Start: 2025-07-07

## 2025-07-07 NOTE — OUTREACH NOTE
Call Center TCM Note      Flowsheet Row Responses   [facility] [place] patient discharged from? [facility]   Does the patient have one of the following disease processes/diagnoses(primary or secondary)? Other   TCM attempt successful? Yes   Discharge diagnosis Intractable back pain   TCM call completed? Yes   Wrap up additional comments Patient has a completed hospital follow up appt documented with PCP for today (7/7).  This fulfills TCM requirements and no phone call is needed.   Would this patient benefit from a Referral to [facility] Social Work? No   Is the patient interested in additional calls from an ambulatory ? No            Julissa DE LOS SANTOS - Registered Nurse    7/7/2025, 10:40 EDT

## 2025-07-07 NOTE — PROGRESS NOTES
Subjective   Chief Complaint   Patient presents with    Hospital Follow Up Visit    Back Pain    Anemia    Diabetes    Chronic Kidney Disease     Alen Ratliff is a 84 y.o. male.     Patient Care Team:  Serenity Wren MD as PCP - General (Family Medicine)  Steve Muhammad MD as Consulting Physician (Cardiology)  CAROLE Vasquez DPM as Consulting Physician (Podiatry)  David Urbina MD as Consulting Physician (Otolaryngology)  Gregory Valle MD as Surgeon (Vascular Surgery)  Gómez Arvizu MD as Consulting Physician (Ophthalmology)  Radha Danielle MD as Consulting Physician (Cardiology)  Modesto Mccall MD as Consulting Physician (Nephrology)    History of Present Illness  He is coming in today with his wife to follow-up on his recent hospital admission.  Patient was evaluated in the ER and admitted overnight due to intense lower back pain.  Patient has dealt with lower back pain for quite some time.  He previously even did inpatient rehab and then outpatient physical therapy at the [facility] rehab.  Patient would like to get a new referral to a different physical therapy place.  He also had some chiropractic adjustment in the recent past and he tells me that this actually helped temporarily.  While in the hospital his diabetes medications were adjusted and patient was advised to discontinue Januvia and started on Farxiga.  This is a patient with CKD.  Also his hemoglobin was noted to be lower from his baseline.       The following portions of the patient's history were reviewed and updated as appropriate: allergies, current medications, past family history, past medical history, past social history, past surgical history, and problem list.  Past Medical History:   Diagnosis Date    Allergic rhinitis     Aortic stenosis     Atrial fibrillation     Coronary artery disease     Diabetes     GERD (gastroesophageal reflux disease)     Heart murmur     Hyperlipidemia     Hypertension     Hypothyroidism      Prostate cancer     S/P TAVR (transcatheter aortic valve replacement) 02/20/2023    Stroke      Past Surgical History:   Procedure Laterality Date    ANKLE OPEN REDUCTION INTERNAL FIXATION Left 12/16/2020    Procedure: ANKLE OPEN REDUCTION INTERNAL FIXATION;  Surgeon: CAROLE Vasquez DPM;  Location: [facility] MAIN OR;  Service: Podiatry;  Laterality: Left;    AORTIC VALVE REPAIR/REPLACEMENT N/A 02/20/2023    Procedure: Transfemoral Transcatheter Aortic Valve Replacement;  Surgeon: Naveed Leonardo MD;  Location: [facility] HYBRID OR;  Service: Cardiovascular;  Laterality: N/A;    AORTIC VALVE REPAIR/REPLACEMENT N/A 02/20/2023    Procedure: TRANSCATHETER AORTIC VALVE REPLACEMENT;  Surgeon: Fabio Villafana MD;  Location: [facility] HYBRID OR;  Service: Cardiothoracic;  Laterality: N/A;    BACK SURGERY  1994    CARDIAC CATHETERIZATION Right 09/27/2022    Procedure: Left Heart Cath;  Surgeon: Steve Muhammad MD;  Location: [facility] CATH INVASIVE LOCATION;  Service: Cardiovascular;  Laterality: Right;    CARDIAC CATHETERIZATION N/A 01/12/2023    Procedure: Stent JENN coronary;  Surgeon: Steve Muhammad MD;  Location: [facility] CATH INVASIVE LOCATION;  Service: Cardiovascular;  Laterality: N/A;    CARDIAC ELECTROPHYSIOLOGY PROCEDURE N/A 02/24/2025    Procedure: Pacemaker DC new, BiV PPM for complete heart block;  Surgeon: Radha Danielle MD;  Location: [facility] CATH INVASIVE LOCATION;  Service: Cardiovascular;  Laterality: N/A;    CAROTID ENDARTERECTOMY Right 12/14/2020    Procedure: CAROTID ENDARTERECTOMY;  Surgeon: Toby Fung MD;  Location: [facility] MAIN OR;  Service: Vascular;  Laterality: Right;    COLONOSCOPY  08/25/2015    CORONARY STENT PLACEMENT      PACEMAKER IMPLANTATION      2/24/25    PROSTATECTOMY  2001    due to cancer     The patient has a family history of  Family History   Problem Relation Age of Onset    Hypertension Other     Heart attack Mother     Heart disease Mother     Heart attack Father     Heart disease  "Father      Social History     Socioeconomic History    Marital status:    Tobacco Use    Smoking status: Never     Passive exposure: Never    Smokeless tobacco: Never   Vaping Use    Vaping status: Never Used   Substance and Sexual Activity    Alcohol use: Never    Drug use: Never    Sexual activity: Defer       Review of Systems   Constitutional:  Negative for activity change, fatigue and fever.   Respiratory:  Negative for shortness of breath and wheezing.    Cardiovascular:  Negative for chest pain, palpitations and leg swelling.   Musculoskeletal:  Negative for arthralgias and back pain.   Skin:  Negative for rash.   Neurological:  Negative for tremors and headache.     Visit Vitals  /79 (BP Location: Left arm, Patient Position: Sitting, Cuff Size: Adult)   Pulse 60   Temp 98 °F (36.7 °C) (Temporal)   Ht 180.3 cm (70.98\")   Wt 84.2 kg (185 lb 9.6 oz)   SpO2 95%   BMI 25.90 kg/m²              Current Outpatient Medications:     Artificial Tear Ointment (artificial tears) ophthalmic ointment, Administer  to both eyes Every 1 (One) Hour As Needed., Disp: , Rfl:     aspirin 81 MG chewable tablet, Chew 1 tablet Daily., Disp: 30 tablet, Rfl: 1    atorvastatin (LIPITOR) 80 MG tablet, Take 1 tablet by mouth Daily., Disp: 90 tablet, Rfl: 1    dapagliflozin Propanediol 10 MG tablet, Take 10 mg by mouth Daily., Disp: 90 tablet, Rfl: 0    fluticasone (FLONASE) 50 MCG/ACT nasal spray, Administer 2 sprays into the nostril(s) as directed by provider Daily As Needed., Disp: , Rfl:     levothyroxine (Synthroid) 112 MCG tablet, Take 1 tablet by mouth Daily., Disp: 90 tablet, Rfl: 1    losartan (COZAAR) 100 MG tablet, Take 0.5 tablets by mouth Daily., Disp: 45 tablet, Rfl: 1    methocarbamol (ROBAXIN) 500 MG tablet, Take 1 tablet by mouth Every 8 (Eight) Hours As Needed for Muscle Spasms., Disp: 90 tablet, Rfl: 0    nebivolol (BYSTOLIC) 2.5 MG tablet, Take 1 tablet by mouth Daily., Disp: 90 tablet, Rfl: 0    " NIFEdipine CC (ADALAT CC) 30 MG 24 hr tablet, Take 1 tablet by mouth Daily., Disp: , Rfl:     oxyCODONE (ROXICODONE) 5 MG immediate release tablet, Take 1 tablet by mouth Every 6 (Six) Hours As Needed for Moderate Pain for up to 4 days. Indications: Moderate Pain, Disp: 21 tablet, Rfl: 0    pantoprazole (PROTONIX) 40 MG EC tablet, TAKE ONE TABLET BY MOUTH EVERY DAY, Disp: 90 tablet, Rfl: 0    rivaroxaban (Xarelto) 15 MG tablet, Take 1 tablet by mouth Daily With Dinner., Disp: 90 tablet, Rfl: 0    sennosides-docusate (PERICOLACE) 8.6-50 MG per tablet, Take 2 tablets by mouth 2 (Two) Times a Day As Needed for Constipation., Disp: 60 tablet, Rfl: 0    glucose blood (Accu-Chek Guide Test) test strip, 1 each by Other route Daily. Dg: E11.9, Disp: 100 each, Rfl: 1    Objective   Physical Exam  Vitals and nursing note reviewed.   Constitutional:       General: He is not in acute distress.     Appearance: Normal appearance. He is well-developed. He is not ill-appearing.   HENT:      Head: Normocephalic and atraumatic.   Cardiovascular:      Rate and Rhythm: Normal rate and regular rhythm.      Heart sounds: Normal heart sounds. No murmur heard.     No gallop.   Pulmonary:      Effort: Pulmonary effort is normal. No respiratory distress.      Breath sounds: Normal breath sounds. No wheezing, rhonchi or rales.   Chest:      Chest wall: No tenderness.   Musculoskeletal:      Cervical back: Normal range of motion and neck supple.   Neurological:      General: No focal deficit present.      Mental Status: He is alert and oriented to person, place, and time. Mental status is at baseline.   Psychiatric:         Mood and Affect: Mood normal.         CT Abdomen Pelvis Without Contrast  Result Date: 7/1/2025  Impression: Impression: 1.No acute abdominal or pelvic abnormality. 2.Colonic diverticulosis without evidence of acute diverticulitis. 3.Cholelithiasis without acute cholecystitis. 4.Small hiatal hernia. 5.Status post  prostatectomy. Electronically Signed: Pieter Marino MD  7/1/2025 4:39 AM EDT  Workstation ID: [id number]    XR Chest 1 View  Result Date: 7/1/2025  Impression: Impression: No acute cardiopulmonary abnormality. Electronically Signed: Pieter Marino MD  7/1/2025 4:06 AM EDT  Workstation ID: [id number]      FOLLOWING LABS WERE REVIEWED TODAY:  CMP          5/28/2025    09:52 7/1/2025    02:04 7/2/2025    10:47   CMP   Glucose 211  150  198    BUN 37.0  41.5  37.7    Creatinine 1.56  1.46  1.34    EGFR 43.3  46.8  51.9    Sodium 139  136  136    Potassium 4.1  3.7  4.1    Chloride 105  101  103    Calcium 8.6  8.9  8.6    Total Protein 6.8  7.3     Albumin 3.9  3.8     Globulin 2.9  3.5     Total Bilirubin 0.4  0.5     Alkaline Phosphatase 82  78     AST (SGOT) 20  44     ALT (SGPT) 19  29     Albumin/Globulin Ratio 1.3  1.1     BUN/Creatinine Ratio 23.7  28.4  28.1    Anion Gap 12.3  17.4  14.0      CBC          5/28/2025    09:52 7/1/2025    02:04 7/2/2025    10:46   CBC   WBC 8.95  13.17  12.17    RBC 3.87  3.90  3.56    Hemoglobin 11.6  11.3  10.4    Hematocrit 37.4  36.4  32.9    MCV 96.6  93.3  92.4    MCH 30.0  29.0  29.2    MCHC 31.0  31.0  31.6    RDW 14.4  13.4  13.4    Platelets 224  339  333      TSH          2/20/2025    14:16 3/21/2025    18:05 5/7/2025    10:03   TSH   TSH 2.710  1.680  2.020      Most Recent A1C          3/21/2025    18:05   HGBA1C Most Recent   Hemoglobin A1C 6.97            Assessment & Plan   Diagnoses and all orders for this visit:    1. Chronic bilateral low back pain without sciatica (Primary)  -     Ambulatory Referral to Physical Therapy for Evaluation & Treatment    2. DM type 2 with diabetic peripheral neuropathy  -     dapagliflozin Propanediol 10 MG tablet; Take 10 mg by mouth Daily.  Dispense: 90 tablet; Refill: 0  -     Hemoglobin A1c  -     glucose blood (Accu-Chek Guide Test) test strip; 1 each by Other route Daily. Dg: E11.9  Dispense: 100 each; Refill: 1    3. Stage 3a  chronic kidney disease  -     dapagliflozin Propanediol 10 MG tablet; Take 10 mg by mouth Daily.  Dispense: 90 tablet; Refill: 0    4. Anemia, unspecified type  -     CBC Auto Differential        I reviewed his available hospital records.  I will be referring him to physical therapy.  Fall prevention was also discussed and stressed.  Patient was switched from Januvia to Farxiga while in hospital.  Refill was also provided.  I will be checking some labs today.      Return in about 3 months (around 10/7/2025) for Next scheduled follow up.    Requested Prescriptions     Signed Prescriptions Disp Refills    dapagliflozin Propanediol 10 MG tablet 90 tablet 0     Sig: Take 10 mg by mouth Daily.    glucose blood (Accu-Chek Guide Test) test strip 100 each 1     Si each by Other route Daily. Dg: E11.9       Serenity Wren MD  2025  08:30 EDT

## 2025-07-09 ENCOUNTER — TELEPHONE (OUTPATIENT)
Dept: FAMILY MEDICINE CLINIC | Facility: CLINIC | Age: 85
End: 2025-07-09
Payer: MEDICARE

## 2025-07-09 NOTE — TELEPHONE ENCOUNTER
Elba called to ask for questions to be answered about test results. She has requested a call back at [phone].

## 2025-07-14 ENCOUNTER — TELEPHONE (OUTPATIENT)
Dept: FAMILY MEDICINE CLINIC | Facility: CLINIC | Age: 85
End: 2025-07-14
Payer: MEDICARE

## 2025-07-14 DIAGNOSIS — G45.9 TIA (TRANSIENT ISCHEMIC ATTACK): ICD-10-CM

## 2025-07-14 NOTE — TELEPHONE ENCOUNTER
Caller: Elba Ratliff    Relationship: Emergency Contact    Best call back number: [phone]     What medication are you requesting: hydrochlorothiazide 25 mg  FARXIGA 10 MG     BOTH 1 TIME A DAY 90 DAY SUPPLY        How long have you been experiencing symptoms:     Have you had these symptoms before:    [x] Yes  [] No    Have you been treated for these symptoms before:   [x] Yes  [] No    If a prescription is needed, what is your preferred pharmacy and phone number:    [facility] [address] [phone] [facility] [phone]        Additional notes:    HYDROCHLOROTHIAZIDE WAS D/C'D ON 4/29 BUT PATIENT WAS NEVER TOLD TO DISCONTINUE.  IS THIS STILL NEEDED?      FARXIGA WAS PRESCRIBED IN THE HOSPITAL.

## 2025-07-14 NOTE — TELEPHONE ENCOUNTER
I already sent a prescription for Farxiga to his mail in order pharmacy.  Who told him not to take hydrochlorothiazide?

## 2025-07-21 ENCOUNTER — TELEPHONE (OUTPATIENT)
Dept: FAMILY MEDICINE CLINIC | Facility: CLINIC | Age: 85
End: 2025-07-21

## 2025-07-21 ENCOUNTER — OFFICE VISIT (OUTPATIENT)
Dept: FAMILY MEDICINE CLINIC | Facility: CLINIC | Age: 85
End: 2025-07-21
Payer: MEDICARE

## 2025-07-21 VITALS
TEMPERATURE: 98.2 F | OXYGEN SATURATION: 98 % | DIASTOLIC BLOOD PRESSURE: 76 MMHG | HEIGHT: 71 IN | BODY MASS INDEX: 25.7 KG/M2 | WEIGHT: 183.6 LBS | HEART RATE: 61 BPM | SYSTOLIC BLOOD PRESSURE: 131 MMHG | RESPIRATION RATE: 15 BRPM

## 2025-07-21 DIAGNOSIS — I10 ESSENTIAL HYPERTENSION: Primary | Chronic | ICD-10-CM

## 2025-07-21 DIAGNOSIS — E11.42 DM TYPE 2 WITH DIABETIC PERIPHERAL NEUROPATHY: ICD-10-CM

## 2025-07-21 DIAGNOSIS — N18.31 STAGE 3A CHRONIC KIDNEY DISEASE: ICD-10-CM

## 2025-07-21 DIAGNOSIS — E03.9 ACQUIRED HYPOTHYROIDISM: ICD-10-CM

## 2025-07-21 PROCEDURE — 1160F RVW MEDS BY RX/DR IN RCRD: CPT | Performed by: FAMILY MEDICINE

## 2025-07-21 PROCEDURE — 1125F AMNT PAIN NOTED PAIN PRSNT: CPT | Performed by: FAMILY MEDICINE

## 2025-07-21 PROCEDURE — 3078F DIAST BP <80 MM HG: CPT | Performed by: FAMILY MEDICINE

## 2025-07-21 PROCEDURE — 1159F MED LIST DOCD IN RCRD: CPT | Performed by: FAMILY MEDICINE

## 2025-07-21 PROCEDURE — 99214 OFFICE O/P EST MOD 30 MIN: CPT | Performed by: FAMILY MEDICINE

## 2025-07-21 PROCEDURE — 3075F SYST BP GE 130 - 139MM HG: CPT | Performed by: FAMILY MEDICINE

## 2025-07-21 PROCEDURE — G2211 COMPLEX E/M VISIT ADD ON: HCPCS | Performed by: FAMILY MEDICINE

## 2025-07-21 RX ORDER — LEVOTHYROXINE SODIUM 112 UG/1
112 TABLET ORAL DAILY
Qty: 90 TABLET | Refills: 1 | Status: SHIPPED | OUTPATIENT
Start: 2025-07-21

## 2025-07-21 RX ORDER — SITAGLIPTIN 50 MG/1
50 TABLET, FILM COATED ORAL DAILY
COMMUNITY
Start: 2025-07-18 | End: 2025-07-21

## 2025-07-21 RX ORDER — OXYCODONE HYDROCHLORIDE 5 MG/1
5 TABLET ORAL AS NEEDED
COMMUNITY
Start: 2025-07-02

## 2025-07-21 RX ORDER — DAPAGLIFLOZIN 10 MG/1
10 TABLET, FILM COATED ORAL DAILY
Qty: 90 TABLET | Refills: 0 | Status: SHIPPED | OUTPATIENT
Start: 2025-07-21

## 2025-07-21 RX ORDER — HYDROCHLOROTHIAZIDE 12.5 MG/1
12.5 TABLET ORAL DAILY
COMMUNITY
Start: 2025-07-15 | End: 2025-07-21

## 2025-07-21 NOTE — PROGRESS NOTES
Subjective   Chief Complaint   Patient presents with    Hypotension     Once every month. Yesterday /70, 108/61, 109/60, 111/61. Nephrology advised stop taking HCTZ if low. Today's /77, 12:45pm felt dizzy 130/66, 132/69.    Dizziness     Once every month    Blood Sugar Problem     Blood sugar high in the morning.     Alen Ratliff is a 84 y.o. male.     Patient Care Team:  Serenity Wren MD as PCP - General (Family Medicine)  Steve Muhammad MD as Consulting Physician (Cardiology)  CAROLE Vasquez DPM as Consulting Physician (Podiatry)  David Urbina MD as Consulting Physician (Otolaryngology)  Gregory Valle MD as Surgeon (Vascular Surgery)  Gómez Arvizu MD as Consulting Physician (Ophthalmology)  Radha Danielle MD as Consulting Physician (Cardiology)  Modesto Mccall MD as Consulting Physician (Nephrology)    History of Present Illness     History of Present Illness  The patient presents for evaluation of low blood pressure, diabetes, anemia, and stage 3 kidney disease.  He is here today with his wife.    Blood pressure readings have been fluctuating, with a reading of 106/61 yesterday, accompanied by a pulse rate of 63 and oxygen saturation of 96%. Later in the day, the blood pressure was recorded as 106/59 and then 109/60. This morning, it was 126/70, followed by 108/61 and 111/61. The current medication regimen includes losartan 50 mg, nifedipine 30 mg (reduced to 15 mg), and nebivolol 2.5 mg. The diuretic medication was discontinued yesterday as advised by the nephrologist if blood pressure falls below 110. Bisoprolol was also stopped. Lightheadedness was experienced when blood pressure was 132/69. Dizziness and fatigue have been ongoing for several months or maybe even longer, often leading to lying down and resting. Rehabilitation therapy has recently started, and a cane is used as a security blanket.    Blood sugar levels have been gradually increasing, with readings of 162  this morning, 182 the previous day, and other readings of 171, 169, and 152. Farxiga 10 mg has been taken since the beginning of 07/2025. Good hydration is maintained, and appetite is decreased, but eating occurs after taking medications. Previously, Januvia and Jardiance were taken, but currently, only Farxiga is used.    Anemia has been discussed with Dr. Mccall, and the hemoglobin level is currently 10.4.    Stage 3 kidney disease is managed by keeping the A1c level as close to 6 as possible.    The following portions of the patient's history were reviewed and updated as appropriate: allergies, current medications, past family history, past medical history, past social history, past surgical history, and problem list.  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    Stroke      Past Surgical History:   Procedure Laterality Date    ANKLE OPEN REDUCTION INTERNAL FIXATION Left 12/16/2020    Procedure: ANKLE OPEN REDUCTION INTERNAL FIXATION;  Surgeon: CAROLE Vasquez DPM;  Location: [facility] MAIN OR;  Service: Podiatry;  Laterality: Left;    AORTIC VALVE REPAIR/REPLACEMENT N/A 02/20/2023    Procedure: Transfemoral Transcatheter Aortic Valve Replacement;  Surgeon: Naveed Leonardo MD;  Location: [facility] HYBRID OR;  Service: Cardiovascular;  Laterality: N/A;    AORTIC VALVE REPAIR/REPLACEMENT N/A 02/20/2023    Procedure: TRANSCATHETER AORTIC VALVE REPLACEMENT;  Surgeon: Fabio Villafana MD;  Location: [facility] HYBRID OR;  Service: Cardiothoracic;  Laterality: N/A;    BACK SURGERY  1994    CARDIAC CATHETERIZATION Right 09/27/2022    Procedure: Left Heart Cath;  Surgeon: Steve Muhammad MD;  Location: [facility] CATH INVASIVE LOCATION;   "Service: Cardiovascular;  Laterality: Right;    CARDIAC CATHETERIZATION N/A 01/12/2023    Procedure: Stent JENN coronary;  Surgeon: Steve Muhammad MD;  Location: [facility] CATH INVASIVE LOCATION;  Service: Cardiovascular;  Laterality: N/A;    CARDIAC ELECTROPHYSIOLOGY PROCEDURE N/A 02/24/2025    Procedure: Pacemaker DC new, BiV PPM for complete heart block;  Surgeon: Radha Danielle MD;  Location: [facility] CATH INVASIVE LOCATION;  Service: Cardiovascular;  Laterality: N/A;    CAROTID ENDARTERECTOMY Right 12/14/2020    Procedure: CAROTID ENDARTERECTOMY;  Surgeon: Toby Fung MD;  Location: [facility] MAIN OR;  Service: Vascular;  Laterality: Right;    COLONOSCOPY  08/25/2015    CORONARY STENT PLACEMENT      PACEMAKER IMPLANTATION      2/24/25    PROSTATECTOMY  2001    due to cancer     The patient has a family history of  Family History   Problem Relation Age of Onset    Hypertension Other     Heart attack Mother     Heart disease Mother     Heart attack Father     Heart disease Father      Social History     Socioeconomic History    Marital status:    Tobacco Use    Smoking status: Never     Passive exposure: Never    Smokeless tobacco: Never   Vaping Use    Vaping status: Never Used   Substance and Sexual Activity    Alcohol use: Never    Drug use: Never    Sexual activity: Defer       Review of Systems   Constitutional:  Negative for activity change, fatigue and fever.   Respiratory:  Negative for shortness of breath and wheezing.    Cardiovascular:  Negative for chest pain, palpitations and leg swelling.   Musculoskeletal:  Negative for arthralgias and back pain.   Skin:  Negative for rash.   Neurological:  Negative for tremors and headache.     Visit Vitals  /76 (BP Location: Left arm, Patient Position: Sitting, Cuff Size: Adult)   Pulse 61   Temp 98.2 °F (36.8 °C) (Infrared)   Resp 15   Ht 180.3 cm (70.98\")   Wt 83.3 kg (183 lb 9.6 oz)   SpO2 98%   BMI 25.62 kg/m²              Current Outpatient " Medications:     Artificial Tear Ointment (artificial tears) ophthalmic ointment, Administer  to both eyes Every 1 (One) Hour As Needed., Disp: , Rfl:     aspirin 81 MG chewable tablet, Chew 1 tablet Daily., Disp: 30 tablet, Rfl: 1    atorvastatin (LIPITOR) 80 MG tablet, Take 1 tablet by mouth Daily., Disp: 90 tablet, Rfl: 1    dapagliflozin Propanediol 10 MG tablet, Take 10 mg by mouth Daily., Disp: 90 tablet, Rfl: 0    fluticasone (FLONASE) 50 MCG/ACT nasal spray, Administer 2 sprays into the nostril(s) as directed by provider Daily As Needed., Disp: , Rfl:     glucose blood (Accu-Chek Guide Test) test strip, 1 each by Other route Daily. Dg: E11.9, Disp: 100 each, Rfl: 1    levothyroxine (Synthroid) 112 MCG tablet, Take 1 tablet by mouth Daily., Disp: 90 tablet, Rfl: 1    losartan (COZAAR) 100 MG tablet, Take 0.5 tablets by mouth Daily., Disp: 45 tablet, Rfl: 1    methocarbamol (ROBAXIN) 500 MG tablet, Take 1 tablet by mouth Every 8 (Eight) Hours As Needed for Muscle Spasms., Disp: 90 tablet, Rfl: 0    nebivolol (BYSTOLIC) 2.5 MG tablet, Take 1 tablet by mouth Daily., Disp: 90 tablet, Rfl: 0    NIFEdipine CC (ADALAT CC) 30 MG 24 hr tablet, Take 1 tablet by mouth Daily., Disp: , Rfl:     oxyCODONE (ROXICODONE) 5 MG immediate release tablet, Take 1 tablet by mouth As Needed., Disp: , Rfl:     pantoprazole (PROTONIX) 40 MG EC tablet, TAKE ONE TABLET BY MOUTH EVERY DAY, Disp: 90 tablet, Rfl: 0    Polyvinyl Alcohol-Povidone PF (ARTIFICIAL TEARS) 1.4-0.6 % ophthalmic solution, Administer 1 drop to both eyes As Needed., Disp: , Rfl:     rivaroxaban (Xarelto) 15 MG tablet, Take 1 tablet by mouth Daily With Dinner., Disp: 90 tablet, Rfl: 1    sennosides-docusate (PERICOLACE) 8.6-50 MG per tablet, Take 2 tablets by mouth 2 (Two) Times a Day As Needed for Constipation., Disp: 60 tablet, Rfl: 0    Objective   Physical Exam  Vitals and nursing note reviewed.   Constitutional:       General: He is not in acute distress.      Appearance: Normal appearance. He is well-developed. He is not ill-appearing.      Comments: This is elderly, chronically ill-appearing patient, he is in no acute distress.  His gait is slow with a supportable cane.   HENT:      Head: Normocephalic and atraumatic.   Cardiovascular:      Rate and Rhythm: Normal rate and regular rhythm.      Heart sounds: Normal heart sounds. No murmur heard.     No gallop.   Pulmonary:      Effort: Pulmonary effort is normal. No respiratory distress.      Breath sounds: Normal breath sounds. No wheezing, rhonchi or rales.   Chest:      Chest wall: No tenderness.   Musculoskeletal:      Cervical back: Normal range of motion and neck supple.   Neurological:      General: No focal deficit present.      Mental Status: He is alert and oriented to person, place, and time. Mental status is at baseline.   Psychiatric:         Mood and Affect: Mood normal.         CT Abdomen Pelvis Without Contrast  Result Date: 7/1/2025  Impression: Impression: 1.No acute abdominal or pelvic abnormality. 2.Colonic diverticulosis without evidence of acute diverticulitis. 3.Cholelithiasis without acute cholecystitis. 4.Small hiatal hernia. 5.Status post prostatectomy. Electronically Signed: Pieter Marino MD  7/1/2025 4:39 AM EDT  Workstation ID: [id number]    XR Chest 1 View  Result Date: 7/1/2025  Impression: Impression: No acute cardiopulmonary abnormality. Electronically Signed: Pieter Marino MD  7/1/2025 4:06 AM EDT  Workstation ID: [id number]      FOLLOWING LABS WERE REVIEWED TODAY:  CMP          5/28/2025    09:52 7/1/2025    02:04 7/2/2025    10:47   CMP   Glucose 211  150  198    BUN 37.0  41.5  37.7    Creatinine 1.56  1.46  1.34    EGFR 43.3  46.8  51.9    Sodium 139  136  136    Potassium 4.1  3.7  4.1    Chloride 105  101  103    Calcium 8.6  8.9  8.6    Total Protein 6.8  7.3     Albumin 3.9  3.8     Globulin 2.9  3.5     Total Bilirubin 0.4  0.5     Alkaline Phosphatase 82  78     AST (SGOT) 20  44      ALT (SGPT) 19  29     Albumin/Globulin Ratio 1.3  1.1     BUN/Creatinine Ratio 23.7  28.4  28.1    Anion Gap 12.3  17.4  14.0      CBC          7/1/2025    02:04 7/2/2025    10:46 7/7/2025    09:21   CBC   WBC 13.17  12.17  9.62    RBC 3.90  3.56  3.49    Hemoglobin 11.3  10.4  10.4    Hematocrit 36.4  32.9  31.9    MCV 93.3  92.4  91.4    MCH 29.0  29.2  29.8    MCHC 31.0  31.6  32.6    RDW 13.4  13.4  12.3    Platelets 339  333  407      TSH          2/20/2025    14:16 3/21/2025    18:05 5/7/2025    10:03   TSH   TSH 2.710  1.680  2.020            Assessment & Plan   Diagnoses and all orders for this visit:    1. Essential hypertension (Primary)    2. DM type 2 with diabetic peripheral neuropathy  -     dapagliflozin Propanediol 10 MG tablet; Take 10 mg by mouth Daily.  Dispense: 90 tablet; Refill: 0    3. Acquired hypothyroidism  -     levothyroxine (Synthroid) 112 MCG tablet; Take 1 tablet by mouth Daily.  Dispense: 90 tablet; Refill: 1    4. Stage 3a chronic kidney disease  -     dapagliflozin Propanediol 10 MG tablet; Take 10 mg by mouth Daily.  Dispense: 90 tablet; Refill: 0        His blood pressure today looks good even though his wife reports some low readings at home.  Patient already stopped HCTZ and he was advised to stay off of it.  He will continue nifedipine 30 mg, losartan 50 mg, and Nebivolol 2.5 mg.  They will continue to monitor his blood pressure.  Fall prevention was also discussed and stressed.  If it comes to his diabetes he was advised to continue Farxiga 10 mg.  Medication refill was given for his hypothyroidism.      Return in about 3 months (around 10/21/2025) for Next scheduled follow up.    Requested Prescriptions     Signed Prescriptions Disp Refills    levothyroxine (Synthroid) 112 MCG tablet 90 tablet 1     Sig: Take 1 tablet by mouth Daily.    dapagliflozin Propanediol 10 MG tablet 90 tablet 0     Sig: Take 10 mg by mouth Daily.       Serenity Wren MD  07/21/2025  14:25  EDT      Patient or patient representative verbalized consent for the use of Ambient Listening during the visit with  Serenity Wren MD for chart documentation. 7/21/2025  14:25 EDT

## 2025-07-22 ENCOUNTER — READMISSION MANAGEMENT (OUTPATIENT)
Dept: CALL CENTER | Facility: HOSPITAL | Age: 85
End: 2025-07-22
Payer: MEDICARE

## 2025-07-22 NOTE — OUTREACH NOTE
Medical Week 3 Survey      Flowsheet Row Responses   [facility] facility patient discharged from? [facility]   Does the patient have one of the following disease processes/diagnoses(primary or secondary)? Other   Week 3 attempt successful? No   Unsuccessful attempts Attempt 1            Galilea Fenton Registered Nurse

## 2025-08-04 ENCOUNTER — TELEPHONE (OUTPATIENT)
Age: 85
End: 2025-08-04
Payer: MEDICARE

## 2025-08-05 ENCOUNTER — OFFICE VISIT (OUTPATIENT)
Age: 85
End: 2025-08-05
Payer: MEDICARE

## 2025-08-05 VITALS — HEIGHT: 71 IN | BODY MASS INDEX: 25.62 KG/M2 | WEIGHT: 183 LBS | HEART RATE: 78 BPM

## 2025-08-05 DIAGNOSIS — L60.3 ONYCHODYSTROPHY: ICD-10-CM

## 2025-08-05 DIAGNOSIS — R26.81 UNSTEADINESS ON FEET: ICD-10-CM

## 2025-08-05 DIAGNOSIS — E11.65 TYPE 2 DIABETES MELLITUS WITH HYPERGLYCEMIA, WITHOUT LONG-TERM CURRENT USE OF INSULIN: ICD-10-CM

## 2025-08-05 DIAGNOSIS — M79.675 PAIN IN TOES OF BOTH FEET: ICD-10-CM

## 2025-08-05 DIAGNOSIS — E11.42 DIABETIC PERIPHERAL NEUROPATHY ASSOCIATED WITH TYPE 2 DIABETES MELLITUS: Primary | ICD-10-CM

## 2025-08-05 DIAGNOSIS — M79.674 PAIN IN TOES OF BOTH FEET: ICD-10-CM

## 2025-08-05 PROCEDURE — 1160F RVW MEDS BY RX/DR IN RCRD: CPT

## 2025-08-05 PROCEDURE — 1159F MED LIST DOCD IN RCRD: CPT

## 2025-08-05 PROCEDURE — 11721 DEBRIDE NAIL 6 OR MORE: CPT

## 2025-08-11 DIAGNOSIS — I10 ESSENTIAL HYPERTENSION: Chronic | ICD-10-CM

## 2025-08-11 RX ORDER — NEBIVOLOL 2.5 MG/1
2.5 TABLET ORAL
Qty: 90 TABLET | Refills: 1 | Status: SHIPPED | OUTPATIENT
Start: 2025-08-11

## 2025-08-27 ENCOUNTER — APPOINTMENT (OUTPATIENT)
Dept: CT IMAGING | Facility: HOSPITAL | Age: 85
End: 2025-08-27
Payer: MEDICARE

## 2025-08-27 ENCOUNTER — HOSPITAL ENCOUNTER (EMERGENCY)
Facility: HOSPITAL | Age: 85
Discharge: HOME | End: 2025-08-27
Admitting: EMERGENCY MEDICINE
Payer: MEDICARE

## 2025-08-27 VITALS
SYSTOLIC BLOOD PRESSURE: 143 MMHG | OXYGEN SATURATION: 97 % | DIASTOLIC BLOOD PRESSURE: 65 MMHG | HEIGHT: 61 IN | BODY MASS INDEX: 34.71 KG/M2 | WEIGHT: 183.86 LBS | HEART RATE: 69 BPM | TEMPERATURE: 98.8 F | RESPIRATION RATE: 18 BRPM

## 2025-08-27 DIAGNOSIS — S00.01XA ABRASION OF SCALP, INITIAL ENCOUNTER: ICD-10-CM
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
## 2025-08-27 DIAGNOSIS — S00.03XA CONTUSION OF SCALP, INITIAL ENCOUNTER: ICD-10-CM

## 2025-08-27 PROCEDURE — 99284 EMERGENCY DEPT VISIT MOD MDM: CPT

## 2025-08-27 PROCEDURE — 72125 CT NECK SPINE W/O DYE: CPT

## 2025-08-27 PROCEDURE — 90471 IMMUNIZATION ADMIN: CPT | Performed by: NURSE PRACTITIONER

## 2025-08-27 PROCEDURE — 90715 TDAP VACCINE 7 YRS/> IM: CPT | Performed by: NURSE PRACTITIONER

## 2025-08-27 PROCEDURE — 70450 CT HEAD/BRAIN W/O DYE: CPT

## 2025-08-27 PROCEDURE — 25010000002 TETANUS-DIPHTH-ACELL PERTUSSIS 5-2.5-18.5 LF-MCG/0.5 SUSPENSION PREFILLED SYRINGE: Performed by: NURSE PRACTITIONER

## 2025-08-27 RX ORDER — DIAPER,BRIEF,INFANT-TODD,DISP
1 EACH MISCELLANEOUS ONCE
Status: COMPLETED | OUTPATIENT
Start: 2025-08-27 | End: 2025-08-27

## 2025-08-27 RX ORDER — ACETAMINOPHEN 500 MG
1000 TABLET ORAL ONCE
Status: COMPLETED | OUTPATIENT
Start: 2025-08-27 | End: 2025-08-27

## 2025-08-27 RX ADMIN — ACETAMINOPHEN 1000 MG: 500 TABLET, FILM COATED ORAL at 07:20

## 2025-08-27 RX ADMIN — BACITRACIN 0.9 G: 500 OINTMENT TOPICAL at 07:26

## 2025-08-27 RX ADMIN — TETANUS TOXOID, REDUCED DIPHTHERIA TOXOID AND ACELLULAR PERTUSSIS VACCINE, ADSORBED 0.5 ML: 5; 2.5; 8; 8; 2.5 SUSPENSION INTRAMUSCULAR at 07:22

## 2025-08-30 DIAGNOSIS — K21.9 GASTROESOPHAGEAL REFLUX DISEASE, UNSPECIFIED WHETHER ESOPHAGITIS PRESENT: ICD-10-CM

## 2025-08-30 RX ORDER — PANTOPRAZOLE SODIUM 40 MG/1
40 TABLET, DELAYED RELEASE ORAL DAILY
Qty: 90 TABLET | Refills: 0 | Status: ON HOLD | OUTPATIENT
Start: 2025-08-30

## 2025-08-31 PROBLEM — R51.9 HEADACHE: Status: ACTIVE | Noted: 2025-08-31

## (undated) DEVICE — MEDICINE CUP, GRADUATED, STER: Brand: MEDLINE

## (undated) DEVICE — SUT PROLN 6/0 BV1 D/A 30IN 8709H

## (undated) DEVICE — UNDYED BRAIDED (POLYGLACTIN 910), SYNTHETIC ABSORBABLE SUTURE: Brand: COATED VICRYL

## (undated) DEVICE — SKIN AFFIX SURG ADHESIVE 72/CS 0.55ML: Brand: MEDLINE

## (undated) DEVICE — STAPLER, SKIN, 35W, A: Brand: MEDLINE INDUSTRIES, INC.

## (undated) DEVICE — BLD SCLPL BEAVR MINI STR 2BVL 180D LF

## (undated) DEVICE — ELECTRD DEFIB M/FUNC PROPADZ RADIOL 2PK

## (undated) DEVICE — GAUZE,SPONGE,FLUFF,6"X6.75",STRL,5/TRAY: Brand: MEDLINE

## (undated) DEVICE — SOL IRRIG H2O 1000ML STRL

## (undated) DEVICE — VIOLET BRAIDED (POLYGLACTIN 910), SYNTHETIC ABSORBABLE SUTURE: Brand: COATED VICRYL

## (undated) DEVICE — HI-TORQUE WHISPER MS GUIDE WIRE .014 STRAIGHT TIP 3.0 CM X 190 CM: Brand: HI-TORQUE WHISPER

## (undated) DEVICE — SOL NS 500ML

## (undated) DEVICE — GOWN,REINFORCE,POLY,SIRUS,BREATH SLV,XLG: Brand: MEDLINE

## (undated) DEVICE — SPNG LAP PREWSH SFTPK 18X18IN STRL PK/5

## (undated) DEVICE — UNDERGLV SURG BIOGEL INDICAT PI SZ8 BLU

## (undated) DEVICE — GLV SURG BIOGEL M LTX PF 7 1/2

## (undated) DEVICE — DRAPE,FEM ANGIO,2 WIN,STERILE: Brand: MEDLINE

## (undated) DEVICE — GW DIAG EMERALD HEPCOAT MOVE JTIP STD .035 3MM 150CM

## (undated) DEVICE — GW PTFE EMERALD HEPCOAT FC J TIP STD .035 3MM 150CM

## (undated) DEVICE — TBG NAMIC PRESS MONTR A/ F/M 12IN

## (undated) DEVICE — CATH DIAG IMPULSE AL1 6F 100CM

## (undated) DEVICE — CATH DIAG IMPULSE FL4 6F 100CM

## (undated) DEVICE — DRSNG WND BORDR/ADHS NONADHR/GZ LF 4X4IN STRL

## (undated) DEVICE — 3M™ IOBAN™ 2 ANTIMICROBIAL INCISE DRAPE 6650EZ: Brand: IOBAN™ 2

## (undated) DEVICE — PACEMAKER CDS: Brand: MEDLINE INDUSTRIES, INC.

## (undated) DEVICE — GLV SURG BIOGEL LTX PF 7 1/2

## (undated) DEVICE — CABL BIPOL W/ALLGTR CLIP/SM 12FT

## (undated) DEVICE — TOWEL,OR,DSP,ST,WHITE,DLX,4/PK,20PK/CS: Brand: MEDLINE

## (undated) DEVICE — DEV INFL COMPAK W/ACCESSPLUS IN4530

## (undated) DEVICE — CATHETER,URETHRAL,REDRUBBER,STERILE,20FR: Brand: MEDLINE

## (undated) DEVICE — LN INJ CONTRST FLXCIL HP F/M LL 1200PSI72

## (undated) DEVICE — PENCL EVAC ULTRAVAC SMOKE W/BLD

## (undated) DEVICE — SUT VIC 4/0 SH 27IN J415H

## (undated) DEVICE — Device

## (undated) DEVICE — PK MINOR VASC 50

## (undated) DEVICE — TP UMB COTN 1/8X18 TU10T

## (undated) DEVICE — BANDAGE,GAUZE,BULKEE II,4.5"X4.1YD,STRL: Brand: MEDLINE

## (undated) DEVICE — TRUE™ DILATATION BALLOON VALVULOPLASTY CATHETER, 20 MM X 4.5 CM, 110 CM CATHETER: Brand: TRUE DILATATION

## (undated) DEVICE — SLV SCD CALF HEMOFORCE DVT THERP REPROC MD

## (undated) DEVICE — PERCLOSE™ PROSTYLE™ SUTURE-MEDIATED CLOSURE AND REPAIR SYSTEM: Brand: PERCLOSE™ PROSTYLE™

## (undated) DEVICE — SUT VIC 2/0 CT2 27IN J269H

## (undated) DEVICE — STPCK 3WY HP ROT

## (undated) DEVICE — KT MANIFLD NAMIC HEART/LT INTERGRATED/COMPENSATOR W/SYR/10ML

## (undated) DEVICE — INTENDED FOR TISSUE SEPARATION, AND OTHER PROCEDURES THAT REQUIRE A SHARP SURGICAL BLADE TO PUNCTURE OR CUT.: Brand: BARD-PARKER ® CARBON RIB-BACK BLADES

## (undated) DEVICE — SWAN-GANZ BIPOLAR PACING CATHETER: Brand: SWAN-GANZ

## (undated) DEVICE — SUT SILK 4/0 TIES 18IN A183H

## (undated) DEVICE — ANTIBACTERIAL UNDYED BRAIDED (POLYGLACTIN 910), SYNTHETIC ABSORBABLE SUTURE: Brand: COATED VICRYL

## (undated) DEVICE — ST ACC MICROPUNCTURE STFF/CANN PLAT/TP 4F 21G 40CM

## (undated) DEVICE — CONTRST ISOVUE300 61PCT 50ML

## (undated) DEVICE — BNDG ESMARK 4IN 12FT LF STRL BLU

## (undated) DEVICE — NC TREK™ CORONARY DILATATION CATHETER 2.25 MM X 12 MM / RAPID-EXCHANGE: Brand: NC TREK™

## (undated) DEVICE — CATH GUIDE RIGHTSITE C315HIS-02

## (undated) DEVICE — IMMOB SHLDR CUT/AWAY UNIV

## (undated) DEVICE — ADHS LIQ MASTISOL 2/3ML

## (undated) DEVICE — GW PERIPH GUIDERIGHT STD/J/TP PTFE/PCOAT SS .035IN 3MM 150CM

## (undated) DEVICE — ANGIO-SEAL VIP VASCULAR CLOSURE DEVICE: Brand: ANGIO-SEAL

## (undated) DEVICE — PINNACLE INTRODUCER SHEATH: Brand: PINNACLE

## (undated) DEVICE — DESTINATION RENAL GUIDING SHEATH: Brand: DESTINATION

## (undated) DEVICE — DRILL BIT

## (undated) DEVICE — COVER,TABLE,44X90,STERILE: Brand: MEDLINE

## (undated) DEVICE — DELIV SYS D-EVOLUTFX-2329: Brand: EVOLUT™ FX

## (undated) DEVICE — 3M™ PATIENT PLATE, CORDED, SPLIT, LARGE, 40 PER CASE, 1179: Brand: 3M™

## (undated) DEVICE — PK EXTREM 50

## (undated) DEVICE — APPL CHLORAPREP HI/LITE TINTED 10.5ML ORNG

## (undated) DEVICE — PROVE COVER: Brand: UNBRANDED

## (undated) DEVICE — PK TRY HEART CATH 50

## (undated) DEVICE — INTRO SHEATH PRELUDE SNAP .038 7F 13CM W/SDPRT

## (undated) DEVICE — OCCLUSIVE GAUZE STRIP OVERWRAP,3% BISMUTH TRIBROMOPHENATE IN PETROLATUM BLEND: Brand: XEROFORM

## (undated) DEVICE — RADIFOCUS GLIDEWIRE: Brand: GLIDEWIRE

## (undated) DEVICE — CUST SPEC PROD Y-ADPT LG BORE PG: Brand: NAMIC

## (undated) DEVICE — CATH DIAG IMPULSE PIG .056 6F 110CM

## (undated) DEVICE — TP UMB COTN 1/8X36 U12T

## (undated) DEVICE — C-ARM: Brand: UNBRANDED

## (undated) DEVICE — SUT SILK 3/0 TIES 18IN A184H

## (undated) DEVICE — DRSNG SURESITE WNDW 4X4.5

## (undated) DEVICE — BOWL PLSTC MD 16OZ BLU STRL

## (undated) DEVICE — TAVR: Brand: MEDLINE INDUSTRIES, INC.

## (undated) DEVICE — CUFF TOURNI 1BLADDER 1PRT 24IN STRL

## (undated) DEVICE — ST TOURNI COMPL A/ 7IN

## (undated) DEVICE — PAD E/S GRND SGL/FOIL 9FT/CORD DISP

## (undated) DEVICE — KT SURG TURNOVER 050

## (undated) DEVICE — DRAPE,INSTRUMENT,MAGNETIC,10X16: Brand: MEDLINE

## (undated) DEVICE — SOL IRRIG NACL 1000ML

## (undated) DEVICE — GAUZE,SPONGE,4"X4",32PLY,XRAY,STRL,LF: Brand: MEDLINE

## (undated) DEVICE — DRAPE,U/ SHT,SPLIT,PLAS,STERIL: Brand: MEDLINE

## (undated) DEVICE — PENCL SMOKE/EVAC MEGADYNE TELESCP 10FT

## (undated) DEVICE — SYR LL TP 10ML STRL

## (undated) DEVICE — SUT SILK 3/0 SH CR8 18IN C013D

## (undated) DEVICE — SUT VIC 3/0 SH 27IN J416H

## (undated) DEVICE — INTRO PERFORMER CHECKFLO/LG RAD/BND NO/GW 14F .038IN 30CM

## (undated) DEVICE — APPL COTN TP PLSTC 6IN STRL LF PK/2

## (undated) DEVICE — MICRO TIP WIPE: Brand: DEVON

## (undated) DEVICE — CATH DIAG IMPULSE FR4 6F 100CM

## (undated) DEVICE — 6F .070 XB 3.5 100CM: Brand: VISTA BRITE TIP